# Patient Record
Sex: MALE | Race: BLACK OR AFRICAN AMERICAN | NOT HISPANIC OR LATINO | Employment: OTHER | URBAN - METROPOLITAN AREA
[De-identification: names, ages, dates, MRNs, and addresses within clinical notes are randomized per-mention and may not be internally consistent; named-entity substitution may affect disease eponyms.]

---

## 2017-02-14 DIAGNOSIS — E78.5 HYPERLIPIDEMIA, UNSPECIFIED HYPERLIPIDEMIA TYPE: ICD-10-CM

## 2017-02-14 RX ORDER — TAMSULOSIN HYDROCHLORIDE 0.4 MG/1
CAPSULE ORAL
Qty: 90 CAPSULE | Refills: 1 | Status: SHIPPED | OUTPATIENT
Start: 2017-02-14 | End: 2017-02-20 | Stop reason: SDUPTHER

## 2017-02-14 RX ORDER — FLUTICASONE PROPIONATE 50 MCG
1 SPRAY, SUSPENSION (ML) NASAL DAILY
Qty: 1 BOTTLE | Refills: 2 | Status: SHIPPED | OUTPATIENT
Start: 2017-02-14 | End: 2017-02-20 | Stop reason: SDUPTHER

## 2017-02-14 RX ORDER — SIMVASTATIN 40 MG/1
40 TABLET, FILM COATED ORAL NIGHTLY
Qty: 90 TABLET | Refills: 1 | Status: SHIPPED | OUTPATIENT
Start: 2017-02-14 | End: 2017-02-20 | Stop reason: SDUPTHER

## 2017-02-14 NOTE — TELEPHONE ENCOUNTER
----- Message from Celestina Sage sent at 2/14/2017  1:38 PM CST -----  Contact: self   [phone]  Pt needs refills on Simvastatin and Tamsulosin and allergy meds . Pls call [facility] [phone]. Thanks......Cinthya

## 2017-02-20 DIAGNOSIS — E78.5 HYPERLIPIDEMIA, UNSPECIFIED HYPERLIPIDEMIA TYPE: ICD-10-CM

## 2017-02-20 RX ORDER — FLUTICASONE PROPIONATE 50 MCG
1 SPRAY, SUSPENSION (ML) NASAL DAILY
Qty: 1 BOTTLE | Refills: 2 | Status: SHIPPED | OUTPATIENT
Start: 2017-02-20 | End: 2017-12-13 | Stop reason: SDUPTHER

## 2017-02-20 RX ORDER — SIMVASTATIN 40 MG/1
40 TABLET, FILM COATED ORAL NIGHTLY
Qty: 90 TABLET | Refills: 1 | Status: SHIPPED | OUTPATIENT
Start: 2017-02-20 | End: 2017-09-20 | Stop reason: SDUPTHER

## 2017-02-20 RX ORDER — TAMSULOSIN HYDROCHLORIDE 0.4 MG/1
CAPSULE ORAL
Qty: 90 CAPSULE | Refills: 1 | Status: SHIPPED | OUTPATIENT
Start: 2017-02-20 | End: 2017-09-20 | Stop reason: SDUPTHER

## 2017-02-20 NOTE — TELEPHONE ENCOUNTER
----- Message from Celestina Sage sent at 2/20/2017  8:58 AM CST -----  Contact: self  [phone]  Pt called upset that he called last week requesting refill on meds to be sent to [facility], pt said that it was still sent to the wrong pharmacy. Pls [facility] script to [facility]. Pls call pt [phone]. Thanks......Cinthya

## 2017-03-21 ENCOUNTER — OFFICE VISIT (OUTPATIENT)
Dept: FAMILY MEDICINE | Facility: CLINIC | Age: 77
End: 2017-03-21
Payer: MEDICARE

## 2017-03-21 ENCOUNTER — LAB VISIT (OUTPATIENT)
Dept: LAB | Facility: HOSPITAL | Age: 77
End: 2017-03-21
Attending: FAMILY MEDICINE
Payer: MEDICARE

## 2017-03-21 VITALS
SYSTOLIC BLOOD PRESSURE: 126 MMHG | BODY MASS INDEX: 30.49 KG/M2 | RESPIRATION RATE: 20 BRPM | OXYGEN SATURATION: 97 % | HEIGHT: 65 IN | WEIGHT: 183 LBS | DIASTOLIC BLOOD PRESSURE: 70 MMHG | HEART RATE: 72 BPM | TEMPERATURE: 98 F

## 2017-03-21 DIAGNOSIS — R73.03 PREDIABETES: ICD-10-CM

## 2017-03-21 DIAGNOSIS — E78.5 HYPERLIPIDEMIA, UNSPECIFIED HYPERLIPIDEMIA TYPE: ICD-10-CM

## 2017-03-21 DIAGNOSIS — E78.5 HYPERLIPIDEMIA, UNSPECIFIED HYPERLIPIDEMIA TYPE: Primary | ICD-10-CM

## 2017-03-21 DIAGNOSIS — R73.9 HYPERGLYCEMIA: ICD-10-CM

## 2017-03-21 DIAGNOSIS — J30.1 SEASONAL ALLERGIC RHINITIS DUE TO POLLEN: ICD-10-CM

## 2017-03-21 DIAGNOSIS — Z12.11 COLON CANCER SCREENING: ICD-10-CM

## 2017-03-21 LAB
ALBUMIN SERPL BCP-MCNC: 3.7 G/DL
ALP SERPL-CCNC: 63 U/L
ALT SERPL W/O P-5'-P-CCNC: 16 U/L
ANION GAP SERPL CALC-SCNC: 8 MMOL/L
AST SERPL-CCNC: 21 U/L
BILIRUB SERPL-MCNC: 0.3 MG/DL
BUN SERPL-MCNC: 12 MG/DL
CALCIUM SERPL-MCNC: 9 MG/DL
CHLORIDE SERPL-SCNC: 105 MMOL/L
CHOLEST/HDLC SERPL: 3.6 {RATIO}
CO2 SERPL-SCNC: 27 MMOL/L
CREAT SERPL-MCNC: 1.3 MG/DL
EST. GFR  (AFRICAN AMERICAN): >60 ML/MIN/1.73 M^2
EST. GFR  (NON AFRICAN AMERICAN): 53 ML/MIN/1.73 M^2
GLUCOSE SERPL-MCNC: 102 MG/DL
HDL/CHOLESTEROL RATIO: 27.6 %
HDLC SERPL-MCNC: 145 MG/DL
HDLC SERPL-MCNC: 40 MG/DL
LDLC SERPL CALC-MCNC: 87.8 MG/DL
NONHDLC SERPL-MCNC: 105 MG/DL
POTASSIUM SERPL-SCNC: 4.7 MMOL/L
PROT SERPL-MCNC: 7.6 G/DL
SODIUM SERPL-SCNC: 140 MMOL/L
TRIGL SERPL-MCNC: 86 MG/DL

## 2017-03-21 PROCEDURE — 80053 COMPREHEN METABOLIC PANEL: CPT

## 2017-03-21 PROCEDURE — 99999 PR PBB SHADOW E&M-EST. PATIENT-LVL III: CPT | Mod: PBBFAC,,, | Performed by: FAMILY MEDICINE

## 2017-03-21 PROCEDURE — 83036 HEMOGLOBIN GLYCOSYLATED A1C: CPT

## 2017-03-21 PROCEDURE — 99213 OFFICE O/P EST LOW 20 MIN: CPT | Mod: PBBFAC,PN | Performed by: FAMILY MEDICINE

## 2017-03-21 PROCEDURE — 80061 LIPID PANEL: CPT

## 2017-03-21 PROCEDURE — 36415 COLL VENOUS BLD VENIPUNCTURE: CPT

## 2017-03-21 PROCEDURE — 99214 OFFICE O/P EST MOD 30 MIN: CPT | Mod: S$PBB,,, | Performed by: FAMILY MEDICINE

## 2017-03-21 RX ORDER — LEVOCETIRIZINE DIHYDROCHLORIDE 5 MG/1
5 TABLET, FILM COATED ORAL NIGHTLY
Qty: 90 TABLET | Refills: 3 | Status: SHIPPED | OUTPATIENT
Start: 2017-03-21 | End: 2018-03-23 | Stop reason: SDUPTHER

## 2017-03-21 NOTE — MR AVS SNAPSHOT
[facility]  [address]  [place] [address]  Phone: [phone]                  Fan Paz   3/21/2017 8:00 AM   Office Visit    Description:  Male : 1940   Provider:  Lachelle Lee MD   Department:  [facility]           Reason for Visit     Follow-up     Hyperlipidemia           Diagnoses this Visit        Comments    Colon cancer screening    -  Primary     Seasonal allergic rhinitis due to pollen         Prediabetes         Hyperlipidemia, unspecified hyperlipidemia type         Hyperglycemia                To Do List           Future Appointments        Provider Department Dept Phone    3/21/2017 8:45 AM LAB, [facility] [place] [facility] [phone]    2017 8:00 AM LAB, [place] [facility] [phone]    2017 1:30 PM Raymond Reddy Jr., MD [facility] - Radiation Oncology [phone]      Goals (5 Years of Data)     None       These Medications        Disp Refills Start End    levocetirizine (XYZAL) 5 MG tablet 90 tablet 3 3/21/2017 3/21/2018    Take 1 tablet (5 mg total) by mouth every evening. - Oral    Pharmacy: [facility] [address] [place] [address] Ph #: [phone]         [facility]     [facility] Nurse Care Line -  Assistance  Registered nurses in the [facility] provide clinical advisement, health education, appointment booking, and other advisory services.  Call for this free service at [phone].             Medications           Message regarding Medications     Verify the changes and/or additions to your medication regime listed below are the same as discussed with your clinician today.  If any of these changes or additions are incorrect, please notify your healthcare provider.        START taking these NEW medications        Refills    levocetirizine (XYZAL) 5 MG tablet 3    Sig: Take 1 tablet (5 mg total)  "by mouth every evening.    Class: Normal    Route: Oral      STOP taking these medications     FLUZONE HIGH-DOSE 2016-17, PF, 180 mcg/0.5 mL Syrg adminstered by pharmacist           Verify that the below list of medications is an accurate representation of the medications you are currently taking.  If none reported, the list may be blank. If incorrect, please contact your healthcare provider. Carry this list with you in case of emergency.           Current Medications     bismuth subsalicylate (PEPTO BISMOL) 262 mg/15 mL suspension Take 15 mLs by mouth every 6 (six) hours as needed for Indigestion.    calcium carbonate (TUMS) 200 mg calcium (500 mg) chewable tablet Take 1 tablet by mouth as needed.     fluticasone (FLONASE) 50 mcg/actuation nasal spray 1 spray by Each Nare route once daily.    levocetirizine (XYZAL) 5 MG tablet Take 1 tablet (5 mg total) by mouth every evening.    sildenafil (VIAGRA) 100 MG tablet Take 1 tablet (100 mg total) by mouth daily as needed for Erectile Dysfunction.    simvastatin (ZOCOR) 40 MG tablet Take 1 tablet (40 mg total) by mouth every evening.    tamsulosin (FLOMAX) 0.4 mg Cp24 TAKE 1 CAPSULE(S) BY MOUTH ONCE A DAY           Clinical Reference Information           Your Vitals Were     BP Pulse Temp Resp Height Weight    126/70 (BP Location: Right arm, Patient Position: Sitting, BP Method: Manual) 72 97.8 °F (36.6 °C) (Oral) 20 5' 5" (1.651 m) 83 kg (182 lb 15.7 oz)    SpO2 BMI             97% 30.45 kg/m2         Blood Pressure          Most Recent Value    BP  126/70      Allergies as of 3/21/2017     No Known Allergies      Immunizations Administered on Date of Encounter - 3/21/2017     None      Orders Placed During Today's Visit      Normal Orders This Visit    Case request GI: COLONOSCOPY     Future Labs/Procedures Expected by Expires    Comprehensive metabolic panel  3/21/2017 3/21/2018    Hemoglobin A1c  3/21/2017 3/21/2018    Lipid panel  3/21/2017 3/21/2018      [facility] " Sign-Up     Activating your [facility] account is as easy as 1-2-3!     1) Visit [email or web address], select Sign Up Now, enter this activation code and your date of birth, then select Next.  [email or web address]  Expires: 5/5/2017  8:33 AM      2) Create a username and password to use when you visit [facility] in the future and select a security question in case you lose your password and select Next.    3) Enter your e-mail address and click Sign Up!    Additional Information  If you have questions, please e-mail [email or web address] or call [phone] to talk to our [facility] staff. Remember, [facility] is NOT to be used for urgent needs. For medical emergencies, dial 911.         Language Assistance Services     ATTENTION: Language assistance services are available, free of charge. Please call [phone].      ATENCIÓN: Si habla emyañol, tiene a sy disposición servicios gratuitos de asistencia lingüística. Llame al [phone].     CHÚ Ý: N?u b?n nói Ti?ng Vi?t, có các d?ch v? h? tr? ngôn ng? mi?n phí dành cho b?n. G?i s? [phone].         [facility] complies with applicable Federal civil rights laws and does not discriminate on the basis of race, color, national origin, age, disability, or sex.

## 2017-03-21 NOTE — PROGRESS NOTES
"Routine Office Visit    Patient Name: Fan Paz    : 1940  MRN: [id number]    Subjective:  Fan is a 77 y.o. male who presents today for:    1. Hyperglycemia - has been in prediabetic range 5 months ago.  Would like to repeat labs.  Doing well overall, weight stable.  Not on any diabetic medications.      2.  HLD - taking Zocor, denies side effects.  Doing well overall.  Last lipid panel >5 months ago.    3.  Colon ca screen - had multiple polyps on last colonoscopy in , due for another per result review.     4.  Seasonal allergies - "this is the season where it gets bad" he's gotten Xyzal in the past and so he would like a refill. He denies any symptoms today.  His family is in the fishing business so he's outside on his boat a lot and keeps active.    Past Medical History  Past Medical History:   Diagnosis Date    Arthritis     Cancer of palate     HTN (hypertension)     Hyperlipidemia     Prostate cancer            Past Surgical History  Past Surgical History:   Procedure Laterality Date    BACK SURGERY  y-6    [facility] surgery      [facility] - [facility]     CARPAL TUNNEL RELEASE  14    right hand,Left hand     KNEE SURGERY  y-40    left knee    KNEE SURGERY Right 12-1-15    TKR    Radio active seed Implant      2012    TOTAL HIP ARTHROPLASTY  3/2011       Family History  Family History   Problem Relation Age of Onset    Coronary artery disease Father          Cancer Sister      Liver Cancer -    Diabetes Sister          No Known Problems Brother     No Known Problems Sister     No Known Problems Sister     No Known Problems Brother     No Known Problems Mother     Lung cancer Brother 60     - was a tobacco user, had lung cancer    Cancer Brother      Liver Cancer-     Lung cancer Sister      Alive    Breast cancer Sister     Clotting disorder Sister 75     blood clot on brain-alive    Stroke Brother          " Glaucoma Cousin     No Known Problems Maternal Aunt     No Known Problems Maternal Uncle     No Known Problems Paternal Aunt     No Known Problems Paternal Uncle     No Known Problems Maternal Grandmother     No Known Problems Maternal Grandfather     No Known Problems Paternal Grandmother     No Known Problems Paternal Grandfather     Macular degeneration Neg Hx     Strabismus Neg Hx     Amblyopia Neg Hx     Blindness Neg Hx     Cataracts Neg Hx     Hypertension Neg Hx     Retinal detachment Neg Hx     Thyroid disease Neg Hx        Social History  Social History     Social History    Marital status:      Spouse name: N/A    Number of children: N/A    Years of education: N/A     Occupational History     Retired     Social History Main Topics    Smoking status: Former Smoker     Packs/day: 3.00     Years: 20.00     Quit date: 7/10/1997    Smokeless tobacco: Never Used      Comment: Quit 1997-smoked for 40 years ,2 packs a day on an average    Alcohol use No      Comment: used to drink Occasionally    Drug use: No    Sexual activity: Yes     Partners: Female     Other Topics Concern    Not on file     Social History Narrative       Current Medications  Current Outpatient Prescriptions on File Prior to Visit   Medication Sig Dispense Refill    bismuth subsalicylate (PEPTO BISMOL) 262 mg/15 mL suspension Take 15 mLs by mouth every 6 (six) hours as needed for Indigestion.      calcium carbonate (TUMS) 200 mg calcium (500 mg) chewable tablet Take 1 tablet by mouth as needed.       fluticasone (FLONASE) 50 mcg/actuation nasal spray 1 spray by Each Nare route once daily. 1 Bottle 2    simvastatin (ZOCOR) 40 MG tablet Take 1 tablet (40 mg total) by mouth every evening. 90 tablet 1    tamsulosin (FLOMAX) 0.4 mg Cp24 TAKE 1 CAPSULE(S) BY MOUTH ONCE A DAY 90 capsule 1    sildenafil (VIAGRA) 100 MG tablet Take 1 tablet (100 mg total) by mouth daily as needed for Erectile Dysfunction. 10  "tablet 3     No current facility-administered medications on file prior to visit.        Allergies   Review of patient's allergies indicates:  No Known Allergies    Review of Systems (Pertinent positives)  Constititutional: Weight loss, excess fatigue, chills, fever, night sweats, weakness, loss of appetite  Ears: Earache, ringing in ears, discharge, hearing loss, hearing aid, popping, infection Nose: stuffy nose, mouth breathing, post-nasal drip,   Throat: hoarseness, bad breath, swelling, sore throat  Lungs: Cough, sputum, wheeze, frequent URI, SOA, Asthma  Heart: Chest pain, angina, palpitations, extra heart beats  Stomach/Intestine: Heartburn, Nausea, vomiting, diarrhea, indigestion, bloating, constipation  Bones/Muscles/Joints: Joint pain, deformities, back pain, swelling, stiffness    /70 (BP Location: Right arm, Patient Position: Sitting, BP Method: Manual)  Pulse 72  Temp 97.8 °F (36.6 °C) (Oral)   Resp 20  Ht 5' 5" (1.651 m)  Wt 83 kg (182 lb 15.7 oz)  SpO2 97%  BMI 30.45 kg/m2    GENERAL APPEARANCE: in no apparent distress and well developed and well nourished  RESPIRATORY: appears well, vitals normal, no respiratory distress, acyanotic, normal RR, chest clear, no wheezing, crepitations, rhonchi, normal symmetric air entry  HEART: regular rate and rhythm, S1, S2 normal, no murmur, click, rub or gallop.    NEUROLOGIC: normal without focal findings, CN II-XII are intact.   Extremities: warm/well perfused.  No abnormal hair patterns.  No clubbing, cyanosis or edema.    SKIN: no rashes, no wounds, no other lesions  PSYCH: Alert, oriented x 3, thought content appropriate, speech normal, pleasant and cooperative, good eye contact, well groomed, recall good, concentration/judgement good and apparently average intelligence.    Assessment/Plan:  Fan Paz is a 77 y.o. male who presents today for :    Fan was seen today for follow-up and hyperlipidemia.    Diagnoses and all orders for " this visit:    Hyperlipidemia, unspecified hyperlipidemia type  -     Lipid panel; Future  -     Controlled. Continue zocor.      Seasonal allergic rhinitis due to pollen  -     levocetirizine (XYZAL) 5 MG tablet; Take 1 tablet (5 mg total) by mouth every evening.    Colon cancer screening  -     Case request GI: COLONOSCOPY    Hyperglycemia  -     Hemoglobin A1c; Future    Congratulated patient and encouraged patient to keep active and be on his boat and keep moving. Patient doing very well in this regard.    F/u 6 months - hyperglycemia, HLD

## 2017-03-22 LAB
ESTIMATED AVG GLUCOSE: 137 MG/DL
HBA1C MFR BLD HPLC: 6.4 %

## 2017-03-29 ENCOUNTER — TELEPHONE (OUTPATIENT)
Dept: FAMILY MEDICINE | Facility: CLINIC | Age: 77
End: 2017-03-29

## 2017-03-29 NOTE — TELEPHONE ENCOUNTER
Please call patient and notify that:    Prediabetes has not changed - A1c is still 6.4%  Cholesterol panel is stable and within normal limits. Continue simvastatin  Kidney function showing mild dysfunction.  Would like to repeat every 3 months for changes.  Avoid taking NSAIDs like Aleve, Ibuprofen. May take tylenol though. And drink at least 8 glasses of water a day.    Sincerely  Dr Lee

## 2017-04-09 ENCOUNTER — ANESTHESIA EVENT (OUTPATIENT)
Dept: ENDOSCOPY | Facility: HOSPITAL | Age: 77
End: 2017-04-09
Payer: MEDICARE

## 2017-04-10 ENCOUNTER — ANESTHESIA (OUTPATIENT)
Dept: ENDOSCOPY | Facility: HOSPITAL | Age: 77
End: 2017-04-10
Payer: MEDICARE

## 2017-04-10 ENCOUNTER — SURGERY (OUTPATIENT)
Age: 77
End: 2017-04-10

## 2017-04-10 ENCOUNTER — HOSPITAL ENCOUNTER (OUTPATIENT)
Facility: HOSPITAL | Age: 77
Discharge: HOME | End: 2017-04-10
Attending: INTERNAL MEDICINE | Admitting: INTERNAL MEDICINE
Payer: MEDICARE

## 2017-04-10 VITALS
HEIGHT: 66 IN | BODY MASS INDEX: 28.93 KG/M2 | SYSTOLIC BLOOD PRESSURE: 121 MMHG | WEIGHT: 180 LBS | OXYGEN SATURATION: 99 % | HEART RATE: 69 BPM | TEMPERATURE: 98 F | RESPIRATION RATE: 18 BRPM | DIASTOLIC BLOOD PRESSURE: 71 MMHG

## 2017-04-10 DIAGNOSIS — Z12.11 SCREEN FOR COLON CANCER: ICD-10-CM

## 2017-04-10 PROCEDURE — 25000003 PHARM REV CODE 250: Performed by: ANESTHESIOLOGY

## 2017-04-10 PROCEDURE — D9220A PRA ANESTHESIA: Mod: PT,CRNA,, | Performed by: REGISTERED NURSE

## 2017-04-10 PROCEDURE — 88305 TISSUE EXAM BY PATHOLOGIST: CPT | Performed by: PATHOLOGY

## 2017-04-10 PROCEDURE — D9220A PRA ANESTHESIA: Mod: PT,ANES,, | Performed by: ANESTHESIOLOGY

## 2017-04-10 PROCEDURE — 27201012 HC FORCEPS, HOT/COLD, DISP: Performed by: INTERNAL MEDICINE

## 2017-04-10 PROCEDURE — 37000009 HC ANESTHESIA EA ADD 15 MINS: Performed by: INTERNAL MEDICINE

## 2017-04-10 PROCEDURE — 25000003 PHARM REV CODE 250

## 2017-04-10 PROCEDURE — 37000008 HC ANESTHESIA 1ST 15 MINUTES: Performed by: INTERNAL MEDICINE

## 2017-04-10 PROCEDURE — 63600175 PHARM REV CODE 636 W HCPCS

## 2017-04-10 PROCEDURE — 45380 COLONOSCOPY AND BIOPSY: CPT | Performed by: INTERNAL MEDICINE

## 2017-04-10 PROCEDURE — 88305 TISSUE EXAM BY PATHOLOGIST: CPT | Mod: 26,,, | Performed by: PATHOLOGY

## 2017-04-10 RX ORDER — LIDOCAINE HYDROCHLORIDE 20 MG/ML
INJECTION, SOLUTION INFILTRATION; PERINEURAL
Status: COMPLETED
Start: 2017-04-10 | End: 2017-04-10

## 2017-04-10 RX ORDER — PROPOFOL 10 MG/ML
VIAL (ML) INTRAVENOUS
Status: DISCONTINUED
Start: 2017-04-10 | End: 2017-04-10 | Stop reason: HOSPADM

## 2017-04-10 RX ORDER — LIDOCAINE HYDROCHLORIDE 10 MG/ML
1 INJECTION, SOLUTION EPIDURAL; INFILTRATION; INTRACAUDAL; PERINEURAL ONCE AS NEEDED
Status: DISCONTINUED | OUTPATIENT
Start: 2017-04-10 | End: 2017-04-10 | Stop reason: HOSPADM

## 2017-04-10 RX ORDER — SODIUM CHLORIDE 9 MG/ML
INJECTION, SOLUTION INTRAVENOUS CONTINUOUS
Status: DISCONTINUED | OUTPATIENT
Start: 2017-04-10 | End: 2017-04-10 | Stop reason: HOSPADM

## 2017-04-10 RX ORDER — PROPOFOL 10 MG/ML
VIAL (ML) INTRAVENOUS
Status: COMPLETED
Start: 2017-04-10 | End: 2017-04-10

## 2017-04-10 RX ADMIN — PROPOFOL 20 MG: 10 INJECTION, EMULSION INTRAVENOUS at 10:04

## 2017-04-10 RX ADMIN — PROPOFOL 50 MG: 10 INJECTION, EMULSION INTRAVENOUS at 09:04

## 2017-04-10 RX ADMIN — LIDOCAINE HYDROCHLORIDE 100 MG: 20 INJECTION, SOLUTION INFILTRATION; PERINEURAL at 09:04

## 2017-04-10 RX ADMIN — SODIUM CHLORIDE: 0.9 INJECTION, SOLUTION INTRAVENOUS at 08:04

## 2017-04-10 RX ADMIN — PROPOFOL 30 MG: 10 INJECTION, EMULSION INTRAVENOUS at 10:04

## 2017-04-10 RX ADMIN — PROPOFOL 20 MG: 10 INJECTION, EMULSION INTRAVENOUS at 09:04

## 2017-04-10 NOTE — TRANSFER OF CARE
"Anesthesia Transfer of Care Note    Patient: Fan Paz    Procedure(s) Performed: Procedure(s) (LRB):  COLONOSCOPY (N/A)    Patient location: GI    Anesthesia Type: MAC    Transport from OR: Transported from OR on room air with adequate spontaneous ventilation    Post pain: adequate analgesia    Post assessment: no apparent anesthetic complications and tolerated procedure well    Post vital signs: stable    Level of consciousness: sedated    Complications: none          Last vitals:   Visit Vitals    /67 (BP Location: Left arm, Patient Position: Lying, BP Method: Automatic)    Pulse 68    Temp 36.4 °C (97.5 °F) (Oral)    Resp 16    Ht 5' 6" (1.676 m)    Wt 81.6 kg (180 lb)    SpO2 96%    BMI 29.05 kg/m2     "

## 2017-04-10 NOTE — DISCHARGE SUMMARY
[facility]  Discharge Summary      Admit Date: 4/10/2017    Discharge Date and Time:  04/10/2017 10:40 AM    Attending Physician: Nilo Kelly MD     Reason for Admission: Surveillance colonoscopy    Procedures Performed: Procedure(s) (LRB):  COLONOSCOPY (N/A)    Hospital Course (synopsis of major diagnoses, care, treatment, and services provided during the course of the hospital stay): Outpatient colonoscopy     Consults: none    Significant Diagnostic Studies: Colonoscopy    Final Diagnoses:    Principal Problem: <principal problem not specified>   Secondary Diagnoses: Surveillance colonoscopy    Discharged Condition: good    Disposition: Home or Self Care    Follow Up/Patient Instructions: Follow-up with referring physician             Resume previous diet and activity.    Medications:  Reconciled Home Medications:   Current Discharge Medication List      CONTINUE these medications which have NOT CHANGED    Details   bismuth subsalicylate (PEPTO BISMOL) 262 mg/15 mL suspension Take 15 mLs by mouth every 6 (six) hours as needed for Indigestion.      calcium carbonate (TUMS) 200 mg calcium (500 mg) chewable tablet Take 1 tablet by mouth as needed.       fluticasone (FLONASE) 50 mcg/actuation nasal spray 1 spray by Each Nare route once daily.  Qty: 1 Bottle, Refills: 2      levocetirizine (XYZAL) 5 MG tablet Take 1 tablet (5 mg total) by mouth every evening.  Qty: 90 tablet, Refills: 3    Associated Diagnoses: Seasonal allergic rhinitis due to pollen      polyethylene glycol (COLYTE) 240-22.72-6.72 -5.84 gram SolR Take 4,000 mLs (4 L total) by mouth as needed.  Qty: 1 Bottle, Refills: 0      sildenafil (VIAGRA) 100 MG tablet Take 1 tablet (100 mg total) by mouth daily as needed for Erectile Dysfunction.  Qty: 10 tablet, Refills: 3    Associated Diagnoses: Erectile dysfunction, unspecified erectile dysfunction type      simvastatin (ZOCOR) 40 MG tablet Take 1 tablet (40 mg total) by mouth every  evening.  Qty: 90 tablet, Refills: 1    Associated Diagnoses: Hyperlipidemia, unspecified hyperlipidemia type      tamsulosin (FLOMAX) 0.4 mg Cp24 TAKE 1 CAPSULE(S) BY MOUTH ONCE A DAY  Qty: 90 capsule, Refills: 1           No discharge procedures on file.

## 2017-04-10 NOTE — ANESTHESIA PREPROCEDURE EVALUATION
04/10/2017  Fan Paz is a 77 y.o., male.    [facility] Anesthesia Evaluation    I have reviewed the Patient Summary Reports.     I have reviewed the Medications.     Review of Systems  Anesthesia Hx:  No problems with previous Anesthesia Denies Hx of Anesthetic complications  History of prior surgery of interest to airway management or planning: Denies Family Hx of Anesthesia complications.   Denies Personal Hx of Anesthesia complications.   Social:  Non-Smoker, No Alcohol Use    Hematology/Oncology:  Hematology Normal   Oncology Normal     EENT/Dental:EENT/Dental Normal   Cardiovascular:   Exercise tolerance: good Hypertension, well controlled    Pulmonary:  Pulmonary Normal    Renal/:  Renal/ Normal     Hepatic/GI:   GERD, well controlled    Musculoskeletal:  Musculoskeletal Normal    Neurological:  Neurology Normal    Endocrine:  Endocrine Normal    Dermatological:  Skin Normal    Psych:  Psychiatric Normal           Physical Exam  General:  Well nourished, Obesity    Airway/Jaw/Neck:  Airway Findings: Mouth Opening: Normal Tongue: Normal  General Airway Assessment: Adult  Mallampati: II  Improves to II with phonation.  TM Distance: 4 - 6 cm      Dental:  Dental Findings: In tact   Chest/Lungs:  Chest/Lungs Findings: Clear to auscultation, Normal Respiratory Rate     Heart/Vascular:  Heart Findings: Rate: Normal  Rhythm: Regular Rhythm        Mental Status:  Mental Status Findings:  Cooperative         Anesthesia Plan  Type of Anesthesia, risks & benefits discussed:  Anesthesia Type:  general  Patient's Preference:   Intra-op Monitoring Plan:   Intra-op Monitoring Plan Comments:   Post Op Pain Control Plan:   Post Op Pain Control Plan Comments:   Induction:   IV  Beta Blocker:  Patient is not currently on a Beta-Blocker (No further documentation required).       Informed Consent: Patient  understands risks and agrees with Anesthesia plan.  Questions answered. Anesthesia consent signed with patient.  ASA Score: 3     Day of Surgery Review of History & Physical:    H&P update referred to the surgeon.         Ready For Surgery From Anesthesia Perspective.

## 2017-04-10 NOTE — IP AVS SNAPSHOT
[place] [address] [place] [address]  Phone: [phone]           Patient Discharge Instructions   Our goal is to set you up for success. This packet includes information on your condition, medications, and your home care.  It will help you care for yourself to prevent having to return to the hospital.     Please ask your nurse if you have any questions.      There are many details to remember when preparing to leave the hospital. Here is what you will need to do:    1. Take your medicine. If you are prescribed medications, review your Medication List on the following pages. You may have new medications to  at the pharmacy and others that you'll need to stop taking. Review the instructions for how and when to take your medications. Talk with your doctor or nurses if you are unsure of what to do.     2. Go to your follow-up appointments. Specific follow-up information is listed in the following pages. Your may be contacted by a nurse or clinical provider about future appointments. Be sure we have all of the phone numbers to reach you. Please contact your provider's office if you are unable to make an appointment.     3. Watch for warning signs. Your doctor or nurse will give you detailed warning signs to watch for and when to call for assistance. These instructions may also include educational information about your condition. If you experience any of warning signs to your health, call your doctor.               ** Verify the list of medication(s) below is accurate and up to date. Carry this with you in case of emergency. If your medications have changed, please notify your healthcare provider.             Medication List      CONTINUE taking these medications        Additional Info                      bismuth subsalicylate 262 mg/15 mL suspension   Commonly known as:  PEPTO BISMOL   Refills:  0   Dose:  15 mL    Instructions:  Take 15 mLs by mouth every 6 (six) hours as  needed for Indigestion.     Begin Date    AM    Noon    PM    Bedtime       calcium carbonate 200 mg calcium (500 mg) chewable tablet   Commonly known as:  TUMS   Refills:  0   Dose:  1 tablet   Indications:  Heartburn    Instructions:  Take 1 tablet by mouth as needed.     Begin Date    AM    Noon    PM    Bedtime       fluticasone 50 mcg/actuation nasal spray   Commonly known as:  FLONASE   Quantity:  1 Bottle   Refills:  2   Dose:  1 spray    Instructions:  1 spray by Each Nare route once daily.     Begin Date    AM    Noon    PM    Bedtime       levocetirizine 5 MG tablet   Commonly known as:  XYZAL   Quantity:  90 tablet   Refills:  3   Dose:  5 mg    Instructions:  Take 1 tablet (5 mg total) by mouth every evening.     Begin Date    AM    Noon    PM    Bedtime       polyethylene glycol 240-22.72-6.72 -5.84 gram Solr   Commonly known as:  COLYTE   Quantity:  1 Bottle   Refills:  0   Dose:  4 L    Instructions:  Take 4,000 mLs (4 L total) by mouth as needed.     Begin Date    AM    Noon    PM    Bedtime       sildenafil 100 MG tablet   Commonly known as:  VIAGRA   Quantity:  10 tablet   Refills:  3   Dose:  100 mg    Instructions:  Take 1 tablet (100 mg total) by mouth daily as needed for Erectile Dysfunction.     Begin Date    AM    Noon    PM    Bedtime       simvastatin 40 MG tablet   Commonly known as:  ZOCOR   Quantity:  90 tablet   Refills:  1   Dose:  40 mg    Instructions:  Take 1 tablet (40 mg total) by mouth every evening.     Begin Date    AM    Noon    PM    Bedtime       tamsulosin 0.4 mg Cp24   Commonly known as:  FLOMAX   Quantity:  90 capsule   Refills:  1    Instructions:  TAKE 1 CAPSULE(S) BY MOUTH ONCE A DAY     Begin Date    AM    Noon    PM    Bedtime                  Please bring to all follow up appointments:    1. A copy of your discharge instructions.  2. All medicines you are currently taking in their original bottles.  3. Identification and insurance card.    Please arrive 15 minutes  "ahead of scheduled appointment time.    Please call 24 hours in advance if you must reschedule your appointment and/or time.        Your Scheduled Appointments     Apr 27, 2017  8:00 AM CDT   Non-Fasting Lab with MORELIA, DAGMAR   [facility] ([facility]    [address] [place] [address]   [phone]            May 04, 2017  1:30 PM CDT   Established Patient Visit with MD William Toledo Jr. [facility] ([facility] )    [address]  [place] [address] [phone]              Follow-up Information     Follow up with Julio Hudson MD.    Specialty:  Internal Medicine    Why:  As needed    Contact information:    [address] [place] [address]  [phone]          Discharge Instructions     Future Orders    Diet general     Questions:    Total calories:      Fat restriction, if any:      Protein restriction, if any:      Na restriction, if any:      Fluid restriction:      Additional restrictions:          Admission Information     Date & Time Provider Department CSN    4/10/2017  8:12 AM Nilo Kelly MD [facility] [id number]      Care Providers     Provider Role Specialty Primary office phone    Nilo Kelly MD Attending Provider Gastroenterology [phone]    Nilo Kelly MD Surgeon  Gastroenterology [phone]      Your Vitals Were     BP Pulse Temp Resp Height Weight    156/90 (BP Location: Left arm, BP Method: Automatic) 83 97.7 °F (36.5 °C) (Oral) 18 5' 6" (1.676 m) 81.6 kg (180 lb)    SpO2 BMI             98% 29.05 kg/m2         Recent Lab Values        11/25/2015 10/11/2016 3/21/2017                     2:55 PM  8:39 AM  8:40 AM         A1C 6.3 (H) 6.4 (H) 6.4 (H)         Comment for A1C at  8:39 AM on 10/11/2016:  According to ADA guidelines, hemoglobin A1C <7.0% represents  optimal control in non-pregnant diabetic patients.  Different  metrics may apply to specific populations. "   Standards of Medical Care in Diabetes - 2016.  For the purpose of screening for the presence of diabetes:  <5.7%     Consistent with the absence of diabetes  5.7-6.4%  Consistent with increasing risk for diabetes   (prediabetes)  >or=6.5%  Consistent with diabetes  Currently no consensus exists for use of hemoglobin A1C  for diagnosis of diabetes for children.      Comment for A1C at  8:40 AM on 3/21/2017:  According to ADA guidelines, hemoglobin A1C <7.0% represents  optimal control in non-pregnant diabetic patients.  Different  metrics may apply to specific populations.   Standards of Medical Care in Diabetes - 2016.  For the purpose of screening for the presence of diabetes:  <5.7%     Consistent with the absence of diabetes  5.7-6.4%  Consistent with increasing risk for diabetes   (prediabetes)  >or=6.5%  Consistent with diabetes  Currently no consensus exists for use of hemoglobin A1C  for diagnosis of diabetes for children.        Pending Labs     Order Current Status    Specimen to Pathology - Surgery Collected (04/10/17 1038)      Allergies as of 4/10/2017     No Known Allergies      [facility]     [facility] Nurse Care Line - 24/7 Assistance  Unless otherwise directed by your provider, please contact [facility], our nurse care line that is available for 24/7 assistance.     Registered nurses in the [facility] provide clinical advisement, health education, appointment booking, and other advisory services.  Call for this free service at [phone].        Advance Directives     An advance directive is a document which, in the event you are no longer able to make decisions for yourself, tells your healthcare team what kind of treatment you do or do not want to receive, or who you would like to make those decisions for you.  If you do not currently have an advance directive, [facility] encourages you to create one.  For more information call:  [phone]),  [phone] ([phone]),  or log on to [email or web address].        Smoking Cessation     If you would like to quit smoking:   You may be eligible for free services if you are a [place] resident and started smoking cigarettes before September 1, 1988.  Call the Smoking Cessation Trust (SCT) toll free at (284) 309-8950 or (428) 212-9680.   Call 3-800-QUIT-NOW if you do not meet the above criteria.   Contact us via email: [email or web address]   View our website for more information: [email or web address]        Language Assistance Services     ATTENTION: Language assistance services are available, free of charge. Please call [phone].      ATENCIÓN: Si habla español, tiene a sy disposición servicios gratuitos de asistencia lingüística. Llame al [phone].     CHÚ Ý: N?u b?n nói Ti?ng Vi?t, có các d?ch v? h? tr? ngôn ng? mi?n phí dành cho b?n. G?i s? [phone].        [facility] Sign-Up     Activating your [facility] account is as easy as 1-2-3!     1) Visit [email or web address], select Sign Up Now, enter this activation code and your date of birth, then select Next.  [email or web address]  Expires: 5/5/2017  8:33 AM      2) Create a username and password to use when you visit [facility] in the future and select a security question in case you lose your password and select Next.    3) Enter your e-mail address and click Sign Up!    Additional Information  If you have questions, please e-mail [email or web address] or call [phone] to talk to our [facility] staff. Remember, [facility] is NOT to be used for urgent needs. For medical emergencies, dial 911.          [facility] complies with applicable Federal civil rights laws and does not discriminate on the basis of race, color, national origin, age, disability, or sex.

## 2017-04-12 NOTE — ANESTHESIA POSTPROCEDURE EVALUATION
"Anesthesia Post Evaluation    Patient: Fan Paz    Procedure(s) Performed: Procedure(s) (LRB):  COLONOSCOPY (N/A)    Final Anesthesia Type: general  Patient location during evaluation: GI PACU  Patient participation: Yes- Able to Participate  Level of consciousness: awake and alert  Post-procedure vital signs: reviewed and stable  Pain management: adequate  Airway patency: patent  PONV status at discharge: No PONV  Anesthetic complications: no      Cardiovascular status: blood pressure returned to baseline  Respiratory status: unassisted  Hydration status: euvolemic  Follow-up not needed.        Visit Vitals    /71 (BP Location: Left arm, Patient Position: Lying, BP Method: Automatic)    Pulse 69    Temp 36.4 °C (97.5 °F) (Oral)    Resp 18    Ht 5' 6" (1.676 m)    Wt 81.6 kg (180 lb)    SpO2 99%    BMI 29.05 kg/m2       Pain/Rosales Score: Pain Assessment Performed: Yes (4/10/2017 10:55 AM)  Presence of Pain: denies (4/10/2017 10:55 AM)  Pain Rating Prior to Med Admin: 0 (4/10/2017  8:51 AM)  Rosales Score: 10 (4/10/2017 10:55 AM)      "

## 2017-04-27 ENCOUNTER — LAB VISIT (OUTPATIENT)
Dept: LAB | Facility: HOSPITAL | Age: 77
End: 2017-04-27
Attending: RADIOLOGY
Payer: MEDICARE

## 2017-04-27 DIAGNOSIS — C61 PROSTATE CANCER: ICD-10-CM

## 2017-04-27 LAB — COMPLEXED PSA SERPL-MCNC: 0.02 NG/ML

## 2017-04-27 PROCEDURE — 36415 COLL VENOUS BLD VENIPUNCTURE: CPT | Mod: PO

## 2017-04-27 PROCEDURE — 84153 ASSAY OF PSA TOTAL: CPT

## 2017-05-04 ENCOUNTER — OFFICE VISIT (OUTPATIENT)
Dept: RADIATION ONCOLOGY | Facility: CLINIC | Age: 77
End: 2017-05-04
Payer: MEDICARE

## 2017-05-04 VITALS
SYSTOLIC BLOOD PRESSURE: 136 MMHG | HEART RATE: 86 BPM | BODY MASS INDEX: 31 KG/M2 | RESPIRATION RATE: 16 BRPM | WEIGHT: 186.06 LBS | DIASTOLIC BLOOD PRESSURE: 92 MMHG | HEIGHT: 65 IN

## 2017-05-04 DIAGNOSIS — C61 PROSTATE CANCER: Primary | ICD-10-CM

## 2017-05-04 PROCEDURE — 99213 OFFICE O/P EST LOW 20 MIN: CPT | Mod: S$PBB,,, | Performed by: RADIOLOGY

## 2017-05-04 PROCEDURE — 99213 OFFICE O/P EST LOW 20 MIN: CPT | Mod: PBBFAC | Performed by: RADIOLOGY

## 2017-05-04 PROCEDURE — 99999 PR PBB SHADOW E&M-EST. PATIENT-LVL III: CPT | Mod: PBBFAC,,, | Performed by: RADIOLOGY

## 2017-05-04 NOTE — PROGRESS NOTES
Subjective:       Patient ID: Fan Paz is a 77 y.o. male.    Chief Complaint: Prostate Cancer (6mo f/u;psa)    HPI Comments: This patient returns for follow up visit.     Mr. Paz has a history of Stage II (T1c, N0, M0) adenocarcinoma of the prostate. He presented with an elevated PSA of 5 ng/ml.   Biopsies from the left apex, left mid gland, left base, and right apex revealed adenocarcinoma. The Ryan score was 7 (3+4) and biopsies from the left mid gland and left base. The tumor involved 30% and 60% of the specimens, respectively. [place]'s 6 adenocarcinoma was noted in biopsies from the left apex and right apex involving only 5% and 1% of the specimens, respectively. Further metastatic work up was negative. The patient was referred for definitive radiotherapy. He was enrolled on RTOG 0815. He was randomized to receive 6 months of hormonal ablation therapy along with radiotherapy. He completed 45 Gy external beam therapy followed by implantation with Palladium 103 seeds on 1/18/12.       The patient has remained BRIGHT since that time.  Today, the patient states he feels well.  No significant GI or  complaints. Recent colonoscopy which was negative.      Review of Systems   Constitutional: Negative for activity change, appetite change, diaphoresis and fatigue.   Respiratory: Negative for cough and shortness of breath.    Cardiovascular: Negative for chest pain and palpitations.   Gastrointestinal: Positive for blood in stool (occasional BRBPR with bowel movement. ). Negative for abdominal pain, anal bleeding, constipation and diarrhea.   Genitourinary: Negative for difficulty urinating, dysuria, frequency and hematuria.       Objective:      Physical Exam   Genitourinary:   Genitourinary Comments: rectal normal tone, external hemorrhoids.   Small depressed prostate   no palpable masses or induration. no blood on the examining glove.           PSA - 0.02 ng/ml  Assessment:       1. Prostate cancer         Plan:       Doing well.  Remains BRIGHT now 5 years status post therapy.  Plan follow up in 6 - 12 months with psa

## 2017-06-21 ENCOUNTER — OFFICE VISIT (OUTPATIENT)
Dept: FAMILY MEDICINE | Facility: CLINIC | Age: 77
End: 2017-06-21
Payer: MEDICARE

## 2017-06-21 VITALS
HEART RATE: 80 BPM | OXYGEN SATURATION: 96 % | WEIGHT: 187.38 LBS | HEIGHT: 65 IN | DIASTOLIC BLOOD PRESSURE: 86 MMHG | SYSTOLIC BLOOD PRESSURE: 138 MMHG | BODY MASS INDEX: 31.22 KG/M2 | RESPIRATION RATE: 17 BRPM | TEMPERATURE: 98 F

## 2017-06-21 DIAGNOSIS — N52.9 ERECTILE DYSFUNCTION, UNSPECIFIED ERECTILE DYSFUNCTION TYPE: ICD-10-CM

## 2017-06-21 DIAGNOSIS — R12 HEARTBURN: ICD-10-CM

## 2017-06-21 DIAGNOSIS — C61 PROSTATE CANCER: Primary | ICD-10-CM

## 2017-06-21 DIAGNOSIS — H61.22 IMPACTED CERUMEN OF LEFT EAR: Primary | ICD-10-CM

## 2017-06-21 PROCEDURE — 1159F MED LIST DOCD IN RCRD: CPT | Mod: ,,, | Performed by: INTERNAL MEDICINE

## 2017-06-21 PROCEDURE — 99213 OFFICE O/P EST LOW 20 MIN: CPT | Mod: PBBFAC,PN | Performed by: INTERNAL MEDICINE

## 2017-06-21 PROCEDURE — 99213 OFFICE O/P EST LOW 20 MIN: CPT | Mod: S$PBB,,, | Performed by: INTERNAL MEDICINE

## 2017-06-21 PROCEDURE — 1126F AMNT PAIN NOTED NONE PRSNT: CPT | Mod: ,,, | Performed by: INTERNAL MEDICINE

## 2017-06-21 PROCEDURE — 99999 PR PBB SHADOW E&M-EST. PATIENT-LVL III: CPT | Mod: PBBFAC,,, | Performed by: INTERNAL MEDICINE

## 2017-06-21 RX ORDER — OMEPRAZOLE 20 MG/1
20 CAPSULE, DELAYED RELEASE ORAL DAILY
Qty: 30 CAPSULE | Refills: 2 | Status: SHIPPED | OUTPATIENT
Start: 2017-06-21 | End: 2019-12-09

## 2017-06-21 RX ORDER — SILDENAFIL 50 MG/1
50 TABLET, FILM COATED ORAL DAILY PRN
Qty: 10 TABLET | Refills: 3 | Status: SHIPPED | OUTPATIENT
Start: 2017-06-21 | End: 2017-06-21 | Stop reason: SDUPTHER

## 2017-06-21 RX ORDER — SILDENAFIL 50 MG/1
50 TABLET, FILM COATED ORAL DAILY PRN
Qty: 10 TABLET | Refills: 3 | Status: SHIPPED | OUTPATIENT
Start: 2017-06-21 | End: 2017-10-16

## 2017-06-21 NOTE — PROGRESS NOTES
Subjective:       Patient ID: Fan Paz is a 77 y.o. male.    Chief Complaint: Ear Fullness    C/o intermittent ear fullness.  He would like to have his ear flushed.  He has tried OTC medications with little improvement.  He would like to decrease his dose of Viagra.  He had no side effects with the higher dose.  Also c/o heartburn with red gravy and sometimes other foods.  He c/o food sticking at times.  He has had trouble swallowing since having radiation years ago.      Review of Systems   HENT: Negative for ear pain.    Gastrointestinal: Negative for abdominal pain.   Musculoskeletal: Positive for arthralgias.       Objective:      Physical Exam   Constitutional: He is oriented to person, place, and time. He appears well-developed and well-nourished. No distress.   HENT:   Head: Normocephalic and atraumatic.   Right Ear: Tympanic membrane, external ear and ear canal normal.   Left cerumen impaction   Neurological: He is alert and oriented to person, place, and time.   Skin: Skin is warm and dry. No rash noted.   Psychiatric: He has a normal mood and affect.   Vitals reviewed.      Assessment:       1. Impacted cerumen of left ear    2. Heartburn    3. Erectile dysfunction, unspecified erectile dysfunction type        Plan:       Fan was seen today for ear fullness.    Diagnoses and all orders for this visit:    Impacted cerumen of left ear - irrigated    Heartburn - C/o food sticking.  History of radiation.  Trial of prilosec.  EGD if no improvement  -     omeprazole (PRILOSEC) 20 MG capsule; Take 1 capsule (20 mg total) by mouth once daily.    Erectile dysfunction, unspecified erectile dysfunction type - Would like to decrease the dose of his viagra  -     sildenafil (VIAGRA) 50 MG tablet; Take 1 tablet (50 mg total) by mouth daily as needed for Erectile Dysfunction.       F/u prn

## 2017-09-20 ENCOUNTER — TELEPHONE (OUTPATIENT)
Dept: FAMILY MEDICINE | Facility: CLINIC | Age: 77
End: 2017-09-20

## 2017-09-20 DIAGNOSIS — E78.5 HYPERLIPIDEMIA, UNSPECIFIED HYPERLIPIDEMIA TYPE: ICD-10-CM

## 2017-09-20 RX ORDER — SIMVASTATIN 40 MG/1
40 TABLET, FILM COATED ORAL NIGHTLY
Qty: 90 TABLET | Refills: 1 | Status: SHIPPED | OUTPATIENT
Start: 2017-09-20 | End: 2018-04-05 | Stop reason: SDUPTHER

## 2017-09-20 RX ORDER — TAMSULOSIN HYDROCHLORIDE 0.4 MG/1
CAPSULE ORAL
Qty: 90 CAPSULE | Refills: 1 | Status: SHIPPED | OUTPATIENT
Start: 2017-09-20 | End: 2018-04-12 | Stop reason: SDUPTHER

## 2017-09-20 NOTE — TELEPHONE ENCOUNTER
----- Message from Jazmyn Schneider sent at 9/20/2017  7:48 AM CDT -----  Contact: self  REFILL: tamsulosin (FLOMAX) 0.4 mg Cp24                simvastatin (ZOCOR) 40 MG tablet    PLEASE SEND TO [facility]

## 2017-10-09 NOTE — TELEPHONE ENCOUNTER
----- Message from Roxanna Vogt sent at 10/9/2017 10:37 AM CDT -----  Contact: self  Pt calling to request a refill of Celebrex. Please call pt to verify script. Pt can be reached at [phone] or [phone].

## 2017-10-10 RX ORDER — CELECOXIB 100 MG/1
200 CAPSULE ORAL DAILY
Qty: 30 CAPSULE | Refills: 0 | Status: SHIPPED | OUTPATIENT
Start: 2017-10-10 | End: 2017-10-12 | Stop reason: CLARIF

## 2017-10-10 NOTE — TELEPHONE ENCOUNTER
----- Message from Kareem Montoya sent at 10/10/2017  9:08 AM CDT -----  Contact: [phone]  Calling to check to see why script for arthritis wasn't sent pharm. [facility] [place]

## 2017-10-10 NOTE — TELEPHONE ENCOUNTER
----- Message from Kareem Montoya sent at 10/10/2017 12:25 PM CDT -----  Contact: [phone]  Returning phone call to give correct mg for Celebrex . Its 200 mg

## 2017-10-10 NOTE — TELEPHONE ENCOUNTER
Tried to call patient to get the mg on his celebrex cause I dont see it in the system and no answer.

## 2017-10-10 NOTE — TELEPHONE ENCOUNTER
----- Message from Eugenio Montgomery sent at 10/10/2017  2:44 PM CDT -----  Contact: Self  Pt called to check status of refill for Celebrex. Pt can be reached @ [phone].

## 2017-10-12 ENCOUNTER — OFFICE VISIT (OUTPATIENT)
Dept: FAMILY MEDICINE | Facility: CLINIC | Age: 77
End: 2017-10-12
Payer: MEDICARE

## 2017-10-12 VITALS
DIASTOLIC BLOOD PRESSURE: 78 MMHG | OXYGEN SATURATION: 97 % | HEIGHT: 65 IN | WEIGHT: 189.13 LBS | HEART RATE: 76 BPM | BODY MASS INDEX: 31.51 KG/M2 | SYSTOLIC BLOOD PRESSURE: 136 MMHG | TEMPERATURE: 98 F | RESPIRATION RATE: 17 BRPM

## 2017-10-12 DIAGNOSIS — Z79.899 LONG-TERM USE OF HIGH-RISK MEDICATION: Primary | ICD-10-CM

## 2017-10-12 DIAGNOSIS — M25.50 ARTHRALGIA, UNSPECIFIED JOINT: ICD-10-CM

## 2017-10-12 DIAGNOSIS — Z23 NEED FOR PROPHYLACTIC VACCINATION AND INOCULATION AGAINST INFLUENZA: ICD-10-CM

## 2017-10-12 DIAGNOSIS — I10 ESSENTIAL HYPERTENSION: Primary | ICD-10-CM

## 2017-10-12 PROCEDURE — 99213 OFFICE O/P EST LOW 20 MIN: CPT | Mod: S$PBB,,, | Performed by: INTERNAL MEDICINE

## 2017-10-12 PROCEDURE — 99999 PR PBB SHADOW E&M-EST. PATIENT-LVL III: CPT | Mod: PBBFAC,,, | Performed by: INTERNAL MEDICINE

## 2017-10-12 PROCEDURE — 99213 OFFICE O/P EST LOW 20 MIN: CPT | Mod: PBBFAC,PN | Performed by: INTERNAL MEDICINE

## 2017-10-12 PROCEDURE — G0008 ADMIN INFLUENZA VIRUS VAC: HCPCS | Mod: PBBFAC,PN

## 2017-10-12 NOTE — PROGRESS NOTES
Subjective:       Patient ID: Fan Paz is a 77 y.o. male.    Chief Complaint: Medication Management and Follow-up    He presents for follow up.  He initially called for Celebrex refills, however, the medication is no longer covered by his insurance.  He last took the medication 2 year ago.  He started Aleve 220 mg instead and has found this works very well for his joint pain.  He reports pain in his shoulders, hands and back intermittently.      Review of Systems   Musculoskeletal: Positive for arthralgias and back pain. Negative for joint swelling.       Objective:      Physical Exam   Constitutional: He is oriented to person, place, and time. He appears well-developed and well-nourished. No distress.   HENT:   Head: Normocephalic and atraumatic.   Right Ear: External ear normal.   Left Ear: External ear normal.   Eyes: Conjunctivae are normal. No scleral icterus.   Cardiovascular: Normal rate, regular rhythm and normal heart sounds.  Exam reveals no gallop and no friction rub.    No murmur heard.  Pulmonary/Chest: Effort normal and breath sounds normal. No respiratory distress. He has no wheezes. He has no rales.   Neurological: He is alert and oriented to person, place, and time. No cranial nerve deficit.   Skin: Skin is warm and dry. No rash noted.   Psychiatric: He has a normal mood and affect.   Vitals reviewed.      Assessment:       1. Essential hypertension    2. Arthralgia, unspecified joint    3. Need for prophylactic vaccination and inoculation against influenza    4. Need for influenza vaccination        Plan:       Fan was seen today for medication management and follow-up.    Diagnoses and all orders for this visit:    Essential hypertension - repeat bp wnl    Arthralgia, unspecified joint - continue Aleve sparingly.  Will monitor bp and cr    Need for prophylactic vaccination and inoculation against influenza  -     Flu Vaccine - High Dose (PF) (65+)    F/u prn

## 2017-10-16 ENCOUNTER — HOSPITAL ENCOUNTER (OUTPATIENT)
Dept: RADIOLOGY | Facility: HOSPITAL | Age: 77
Discharge: HOME | End: 2017-10-16
Attending: NURSE PRACTITIONER
Payer: MEDICARE

## 2017-10-16 ENCOUNTER — OFFICE VISIT (OUTPATIENT)
Dept: ORTHOPEDICS | Facility: CLINIC | Age: 77
End: 2017-10-16
Payer: MEDICARE

## 2017-10-16 VITALS — HEIGHT: 65 IN | WEIGHT: 189.38 LBS | BODY MASS INDEX: 31.55 KG/M2

## 2017-10-16 DIAGNOSIS — M25.561 PAIN IN BOTH KNEES, UNSPECIFIED CHRONICITY: ICD-10-CM

## 2017-10-16 DIAGNOSIS — M25.562 PAIN IN BOTH KNEES, UNSPECIFIED CHRONICITY: ICD-10-CM

## 2017-10-16 DIAGNOSIS — M25.562 PAIN IN BOTH KNEES, UNSPECIFIED CHRONICITY: Primary | ICD-10-CM

## 2017-10-16 DIAGNOSIS — M25.561 PAIN IN BOTH KNEES, UNSPECIFIED CHRONICITY: Primary | ICD-10-CM

## 2017-10-16 PROCEDURE — 99213 OFFICE O/P EST LOW 20 MIN: CPT | Mod: PBBFAC,25 | Performed by: NURSE PRACTITIONER

## 2017-10-16 PROCEDURE — 99213 OFFICE O/P EST LOW 20 MIN: CPT | Mod: S$PBB,,, | Performed by: NURSE PRACTITIONER

## 2017-10-16 PROCEDURE — 73562 X-RAY EXAM OF KNEE 3: CPT | Mod: 26,59,RT, | Performed by: RADIOLOGY

## 2017-10-16 PROCEDURE — 99999 PR PBB SHADOW E&M-EST. PATIENT-LVL III: CPT | Mod: PBBFAC,,, | Performed by: NURSE PRACTITIONER

## 2017-10-16 PROCEDURE — 73562 X-RAY EXAM OF KNEE 3: CPT | Mod: TC,50

## 2017-10-16 PROCEDURE — 73562 X-RAY EXAM OF KNEE 3: CPT | Mod: 26,LT,, | Performed by: RADIOLOGY

## 2017-10-16 NOTE — PROGRESS NOTES
"CC: Pain of the Left Knee      HPI: Pt with left knee pain and popping for the past several weeks. He had the left knee replaced by Dr. 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 He feels like the knee is "coming out of joint". He was exercising in the past, but he stopped over the past few months and that's when his knee really started bothering him. He has an exercise bike and a treadmill, but he currently isn't using either one. He has been fishing a lot and working on his boat. He is ambulating without assistive device. There is not a limp. He denies fever or chills. .    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  No joint effusion noted bilaterally.  No osseous lesions or fractures    Assessment:  Left knee pain    Plan:  Pt will resume exercise, especially the bike and he will do straight leg raises to help build up his quadriceps muscle and help with tightening the patella tendon  No problem identified on xray   RICE  F/u as needed  "

## 2017-12-13 RX ORDER — FLUTICASONE PROPIONATE 50 MCG
1 SPRAY, SUSPENSION (ML) NASAL DAILY
Qty: 1 BOTTLE | Refills: 2 | Status: SHIPPED | OUTPATIENT
Start: 2017-12-13 | End: 2018-04-05 | Stop reason: SDUPTHER

## 2018-01-03 ENCOUNTER — OFFICE VISIT (OUTPATIENT)
Dept: FAMILY MEDICINE | Facility: CLINIC | Age: 78
End: 2018-01-03
Payer: MEDICARE

## 2018-01-03 VITALS
TEMPERATURE: 98 F | DIASTOLIC BLOOD PRESSURE: 70 MMHG | RESPIRATION RATE: 14 BRPM | SYSTOLIC BLOOD PRESSURE: 118 MMHG | OXYGEN SATURATION: 98 % | HEART RATE: 90 BPM | HEIGHT: 65 IN | BODY MASS INDEX: 31.74 KG/M2 | WEIGHT: 190.5 LBS

## 2018-01-03 DIAGNOSIS — J41.0 SIMPLE CHRONIC BRONCHITIS: Primary | ICD-10-CM

## 2018-01-03 PROCEDURE — 99213 OFFICE O/P EST LOW 20 MIN: CPT | Mod: PBBFAC,PN | Performed by: FAMILY MEDICINE

## 2018-01-03 PROCEDURE — 99214 OFFICE O/P EST MOD 30 MIN: CPT | Mod: S$PBB,,, | Performed by: FAMILY MEDICINE

## 2018-01-03 PROCEDURE — 99999 PR PBB SHADOW E&M-EST. PATIENT-LVL III: CPT | Mod: PBBFAC,,, | Performed by: FAMILY MEDICINE

## 2018-01-03 RX ORDER — AZITHROMYCIN 250 MG/1
TABLET, FILM COATED ORAL
Qty: 6 TABLET | Refills: 0 | Status: SHIPPED | OUTPATIENT
Start: 2018-01-03 | End: 2018-01-08

## 2018-01-03 NOTE — PROGRESS NOTES
Routine Office Visit    Patient Name: Fan Paz    : 1940  MRN: [id number]    Subjective:  Fan is a 77 y.o. male who presents today for:    1. Cough  Patient presenting today with 4 days of cough and congestion.  Reformed smoker for 22 years after having esophageal cancer.  The cough is productive. No shortness of breath, wheezing, fevers, or chills.  No hemoptysis or cough induced emesis.     Past Medical History  Past Medical History:   Diagnosis Date    Arthritis     Cancer of palate     HTN (hypertension)     Hyperlipidemia     Prostate cancer            Past Surgical History  Past Surgical History:   Procedure Laterality Date    BACK SURGERY  y-6    Cancer of palate surgery      Late - [facility]     CARPAL TUNNEL RELEASE  13    right hand,Left hand     COLONOSCOPY N/A 4/10/2017    Procedure: COLONOSCOPY;  Surgeon: Nilo Kelly MD;  Location: [facility];  Service: Endoscopy;  Laterality: N/A;    KNEE SURGERY  y-40    left knee    KNEE SURGERY Right 12-1-15    TKR    Radio active seed Implant      2012    TOTAL HIP ARTHROPLASTY  3/2011       Family History  Family History   Problem Relation Age of Onset    Coronary artery disease Father          Cancer Sister      Liver Cancer -    Diabetes Sister          No Known Problems Brother     No Known Problems Sister     No Known Problems Sister     No Known Problems Brother     No Known Problems Mother     Lung cancer Brother 60     - was a tobacco user, had lung cancer    Cancer Brother      Liver Cancer-     Lung cancer Sister      Alive    Breast cancer Sister     Clotting disorder Sister 75     blood clot on brain-alive    Stroke Brother          Glaucoma Cousin     No Known Problems Maternal Aunt     No Known Problems Maternal Uncle     No Known Problems Paternal Aunt     No Known Problems Paternal Uncle     No Known Problems Maternal Grandmother     No  Known Problems Maternal Grandfather     No Known Problems Paternal Grandmother     No Known Problems Paternal Grandfather     Macular degeneration Neg Hx     Strabismus Neg Hx     Amblyopia Neg Hx     Blindness Neg Hx     Cataracts Neg Hx     Hypertension Neg Hx     Retinal detachment Neg Hx     Thyroid disease Neg Hx        Social History  Social History     Social History    Marital status:      Spouse name: N/A    Number of children: N/A    Years of education: N/A     Occupational History     Retired     Social History Main Topics    Smoking status: Former Smoker     Packs/day: 3.00     Years: 20.00     Quit date: 7/10/1997    Smokeless tobacco: Never Used      Comment: Quit 1997-smoked for 40 years ,2 packs a day on an average    Alcohol use No      Comment: used to drink Occasionally    Drug use: No    Sexual activity: Yes     Partners: Female     Other Topics Concern    Not on file     Social History Narrative    No narrative on file       Current Medications  Current Outpatient Prescriptions on File Prior to Visit   Medication Sig Dispense Refill    fluticasone (FLONASE) 50 mcg/actuation nasal spray 1 spray by Each Nare route once daily. 1 Bottle 2    levocetirizine (XYZAL) 5 MG tablet Take 1 tablet (5 mg total) by mouth every evening. 90 tablet 3    simvastatin (ZOCOR) 40 MG tablet Take 1 tablet (40 mg total) by mouth every evening. 90 tablet 1    tamsulosin (FLOMAX) 0.4 mg Cp24 TAKE 1 CAPSULE(S) BY MOUTH ONCE A DAY 90 capsule 1    omeprazole (PRILOSEC) 20 MG capsule Take 1 capsule (20 mg total) by mouth once daily. 30 capsule 2     No current facility-administered medications on file prior to visit.        Allergies   Review of patient's allergies indicates:  No Known Allergies    Review of Systems (Pertinent positives)  Review of Systems   Constitutional: Positive for malaise/fatigue.   HENT: Positive for congestion.    Eyes: Negative.    Respiratory: Positive for cough and  "sputum production. Negative for hemoptysis, shortness of breath and wheezing.    Cardiovascular: Negative.    Gastrointestinal: Negative.    Genitourinary: Negative.    Musculoskeletal: Negative.    Skin: Negative.          /70 (BP Location: Right arm, Patient Position: Sitting, BP Method: Medium (Manual))   Pulse 90   Temp 98.1 °F (36.7 °C) (Oral)   Resp 14   Ht 5' 5" (1.651 m)   Wt 86.4 kg (190 lb 7.6 oz)   SpO2 98%   BMI 31.70 kg/m²     GENERAL APPEARANCE: in no apparent distress and well developed and well nourished  HEENT: PERRL, EOMI, Sclera clear, anicteric, Oropharynx clear, no lesions, Neck supple with midline trachea  NECK: normal, supple, no adenopathy, thyroid normal in size  RESPIRATORY: appears well, vitals normal, no respiratory distress, acyanotic, normal RR, chest clear, no wheezing, crepitations, rhonchi, normal symmetric air entry  HEART: regular rate and rhythm, S1, S2 normal, no murmur, click, rub or gallop.    ABDOMEN: abdomen is soft without tenderness, no masses, no hernias, no organomegaly, no rebound, no guarding. Suprapubic tenderness absent. No CVA tenderness.  SKIN: no rashes, no wounds, no other lesions  PSYCH: Alert, oriented x 3, thought content appropriate, speech normal, pleasant and cooperative, good eye contact, well groomed    Assessment/Plan:  Fan Paz is a 77 y.o. male who presents today for :    Fan was seen today for cough.    Diagnoses and all orders for this visit:    Simple chronic bronchitis  -     azithromycin (Z-SWETA) 250 MG tablet; Take 2 tablets by mouth on day 1; Take 1 tablet by mouth on days 2-5    1.  Will treat for bronchitis given history of smoking and current symptoms  2.  Follow up as needed  3.  He is to call with any concerns    Otilio Damon MD      "

## 2018-01-05 ENCOUNTER — TELEPHONE (OUTPATIENT)
Dept: FAMILY MEDICINE | Facility: CLINIC | Age: 78
End: 2018-01-05

## 2018-01-05 NOTE — TELEPHONE ENCOUNTER
----- Message from Chelita Crandall sent at 1/5/2018  8:22 AM CST -----  Contact: self  Patient is stating medication that was given to him for the cough is not working. Need something else. Patient can be reached at [phone] or [phone].      Thanks,

## 2018-03-24 RX ORDER — LEVOCETIRIZINE DIHYDROCHLORIDE 5 MG/1
5 TABLET, FILM COATED ORAL NIGHTLY
Qty: 90 TABLET | Refills: 3 | Status: SHIPPED | OUTPATIENT
Start: 2018-03-24 | End: 2019-07-19

## 2018-04-05 ENCOUNTER — OFFICE VISIT (OUTPATIENT)
Dept: FAMILY MEDICINE | Facility: CLINIC | Age: 78
End: 2018-04-05
Payer: MEDICARE

## 2018-04-05 VITALS
DIASTOLIC BLOOD PRESSURE: 80 MMHG | SYSTOLIC BLOOD PRESSURE: 128 MMHG | BODY MASS INDEX: 31.55 KG/M2 | WEIGHT: 189.63 LBS | RESPIRATION RATE: 17 BRPM

## 2018-04-05 DIAGNOSIS — H61.23 BILATERAL IMPACTED CERUMEN: ICD-10-CM

## 2018-04-05 DIAGNOSIS — J30.9 ALLERGIC RHINITIS, UNSPECIFIED CHRONICITY, UNSPECIFIED SEASONALITY, UNSPECIFIED TRIGGER: Primary | ICD-10-CM

## 2018-04-05 DIAGNOSIS — E78.5 HYPERLIPIDEMIA, UNSPECIFIED HYPERLIPIDEMIA TYPE: ICD-10-CM

## 2018-04-05 PROCEDURE — 99214 OFFICE O/P EST MOD 30 MIN: CPT | Mod: PBBFAC,PN | Performed by: NURSE PRACTITIONER

## 2018-04-05 PROCEDURE — 99999 PR PBB SHADOW E&M-EST. PATIENT-LVL IV: CPT | Mod: PBBFAC,,, | Performed by: NURSE PRACTITIONER

## 2018-04-05 PROCEDURE — 99214 OFFICE O/P EST MOD 30 MIN: CPT | Mod: S$PBB,,, | Performed by: NURSE PRACTITIONER

## 2018-04-05 RX ORDER — FLUTICASONE PROPIONATE 50 MCG
1 SPRAY, SUSPENSION (ML) NASAL DAILY
Qty: 1 BOTTLE | Refills: 2 | Status: SHIPPED | OUTPATIENT
Start: 2018-04-05 | End: 2019-04-10 | Stop reason: SDUPTHER

## 2018-04-05 RX ORDER — SIMVASTATIN 40 MG/1
40 TABLET, FILM COATED ORAL NIGHTLY
Qty: 90 TABLET | Refills: 1 | Status: SHIPPED | OUTPATIENT
Start: 2018-04-05 | End: 2018-11-07 | Stop reason: SDUPTHER

## 2018-04-05 RX ORDER — BENZONATATE 100 MG/1
100 CAPSULE ORAL 3 TIMES DAILY PRN
Qty: 30 CAPSULE | Refills: 0 | Status: SHIPPED | OUTPATIENT
Start: 2018-04-05 | End: 2018-04-15

## 2018-04-05 NOTE — PATIENT INSTRUCTIONS
Follow up with primary care provider if not improved  Go to ER for new, worse or concerning symptoms  Drink plenty of fluids  Tylenol or ibuprofen as needed for fever or pain  DEBROX as needed for fever or pain      Causes of Nasal Allergies  Nasal allergies are most commonly caused by one or more of 4 kinds of allergens: pollen (which causes seasonal allergies), house-dust mites, mold, and animals. Other substances, called irritants, can bother the nose and make allergy symptoms worse.    Pollen  Plants reproduce by moving tiny grains of pollen from plant to plant. Some pollen is carried by bees, and some is blown by the wind. Its the wind-blown pollen that causes nasal allergies. The amount of pollen in the air varies from season to season.  House-dust mites  House-dust mites are tiny bugs too small to see. They can live in mattresses, blankets, stuffed toys, carpets, and curtains. The droppings of these mites are a common indoor cause of nasal allergies.  Mold  Mold loves dark, damp areas. It tends to grow in bathrooms, basements, refrigerators, and in the soil of houseplants. Mold reproduces by sending tiny grains called spores into the air. If these spores are breathed in, they can cause a nasal allergic reaction.  Animals  Pets, such as cats, dogs, birds, horses, and rabbits, are common causes of nasal allergies. Flakes of skin (dander), saliva left on fur when an animal cleans itself, urine in litter boxes and cages, and feathers can all cause nasal allergies.  Irritants make allergies worse  Although irritants dont cause nasal allergies, they can make allergy symptoms worse. Cigarette smoke, perfume, aerosol sprays, smoke from wood stoves or fireplaces, car exhaust, and strong odors are examples of irritants.   Date Last Reviewed: 9/1/2016  © 6207-5619 [employer]. [address] [place] [address], [place] [address]. All rights reserved. This information is not intended as a substitute for professional  medical care. Always follow your healthcare professional's instructions.

## 2018-04-12 RX ORDER — TAMSULOSIN HYDROCHLORIDE 0.4 MG/1
CAPSULE ORAL
Qty: 90 CAPSULE | Refills: 0 | Status: SHIPPED | OUTPATIENT
Start: 2018-04-12 | End: 2018-08-15 | Stop reason: SDUPTHER

## 2018-04-23 ENCOUNTER — LAB VISIT (OUTPATIENT)
Dept: LAB | Facility: HOSPITAL | Age: 78
End: 2018-04-23
Payer: MEDICARE

## 2018-04-23 ENCOUNTER — OFFICE VISIT (OUTPATIENT)
Dept: FAMILY MEDICINE | Facility: CLINIC | Age: 78
End: 2018-04-23
Payer: MEDICARE

## 2018-04-23 VITALS
RESPIRATION RATE: 12 BRPM | HEART RATE: 73 BPM | TEMPERATURE: 98 F | OXYGEN SATURATION: 95 % | SYSTOLIC BLOOD PRESSURE: 136 MMHG | HEIGHT: 65 IN | DIASTOLIC BLOOD PRESSURE: 78 MMHG | BODY MASS INDEX: 31.77 KG/M2 | WEIGHT: 190.69 LBS

## 2018-04-23 DIAGNOSIS — E66.09 CLASS 1 OBESITY DUE TO EXCESS CALORIES WITH SERIOUS COMORBIDITY AND BODY MASS INDEX (BMI) OF 31.0 TO 31.9 IN ADULT: ICD-10-CM

## 2018-04-23 DIAGNOSIS — E78.6 LOW HDL (UNDER 40): ICD-10-CM

## 2018-04-23 DIAGNOSIS — E78.5 HYPERLIPIDEMIA, UNSPECIFIED HYPERLIPIDEMIA TYPE: ICD-10-CM

## 2018-04-23 DIAGNOSIS — C61 PROSTATE CANCER: ICD-10-CM

## 2018-04-23 DIAGNOSIS — Z12.5 ENCOUNTER FOR SCREENING FOR MALIGNANT NEOPLASM OF PROSTATE: ICD-10-CM

## 2018-04-23 DIAGNOSIS — Z79.899 LONG-TERM USE OF HIGH-RISK MEDICATION: ICD-10-CM

## 2018-04-23 DIAGNOSIS — M17.0 PRIMARY OSTEOARTHRITIS OF BOTH KNEES: ICD-10-CM

## 2018-04-23 DIAGNOSIS — R73.03 PREDIABETES: Primary | ICD-10-CM

## 2018-04-23 DIAGNOSIS — K21.9 GASTROESOPHAGEAL REFLUX DISEASE, ESOPHAGITIS PRESENCE NOT SPECIFIED: ICD-10-CM

## 2018-04-23 PROBLEM — E66.811 CLASS 1 OBESITY DUE TO EXCESS CALORIES WITH SERIOUS COMORBIDITY AND BODY MASS INDEX (BMI) OF 31.0 TO 31.9 IN ADULT: Status: ACTIVE | Noted: 2018-04-23

## 2018-04-23 LAB
ALBUMIN SERPL BCP-MCNC: 3.8 G/DL
ALP SERPL-CCNC: 66 U/L
ALT SERPL W/O P-5'-P-CCNC: 18 U/L
ANION GAP SERPL CALC-SCNC: 6 MMOL/L
ANION GAP SERPL CALC-SCNC: 6 MMOL/L
AST SERPL-CCNC: 21 U/L
BASOPHILS # BLD AUTO: 0.03 K/UL
BASOPHILS NFR BLD: 0.6 %
BILIRUB SERPL-MCNC: 0.4 MG/DL
BUN SERPL-MCNC: 10 MG/DL
BUN SERPL-MCNC: 10 MG/DL
CALCIUM SERPL-MCNC: 9.3 MG/DL
CALCIUM SERPL-MCNC: 9.3 MG/DL
CHLORIDE SERPL-SCNC: 104 MMOL/L
CHLORIDE SERPL-SCNC: 104 MMOL/L
CHOLEST SERPL-MCNC: 170 MG/DL
CHOLEST/HDLC SERPL: 4.4 {RATIO}
CO2 SERPL-SCNC: 31 MMOL/L
CO2 SERPL-SCNC: 31 MMOL/L
CREAT SERPL-MCNC: 1.1 MG/DL
CREAT SERPL-MCNC: 1.1 MG/DL
DIFFERENTIAL METHOD: ABNORMAL
EOSINOPHIL # BLD AUTO: 0.2 K/UL
EOSINOPHIL NFR BLD: 4.5 %
ERYTHROCYTE [DISTWIDTH] IN BLOOD BY AUTOMATED COUNT: 15.5 %
EST. GFR  (AFRICAN AMERICAN): >60 ML/MIN/1.73 M^2
EST. GFR  (AFRICAN AMERICAN): >60 ML/MIN/1.73 M^2
EST. GFR  (NON AFRICAN AMERICAN): >60 ML/MIN/1.73 M^2
EST. GFR  (NON AFRICAN AMERICAN): >60 ML/MIN/1.73 M^2
GLUCOSE SERPL-MCNC: 112 MG/DL
GLUCOSE SERPL-MCNC: 112 MG/DL
HCT VFR BLD AUTO: 43 %
HDLC SERPL-MCNC: 39 MG/DL
HDLC SERPL: 22.9 %
HGB BLD-MCNC: 13.9 G/DL
LDLC SERPL CALC-MCNC: 100 MG/DL
LYMPHOCYTES # BLD AUTO: 1.3 K/UL
LYMPHOCYTES NFR BLD: 26.2 %
MCH RBC QN AUTO: 26.8 PG
MCHC RBC AUTO-ENTMCNC: 32.3 G/DL
MCV RBC AUTO: 83 FL
MONOCYTES # BLD AUTO: 0.5 K/UL
MONOCYTES NFR BLD: 11 %
NEUTROPHILS # BLD AUTO: 2.8 K/UL
NEUTROPHILS NFR BLD: 57.3 %
NONHDLC SERPL-MCNC: 131 MG/DL
PLATELET # BLD AUTO: 202 K/UL
PMV BLD AUTO: 10.1 FL
POTASSIUM SERPL-SCNC: 4.7 MMOL/L
POTASSIUM SERPL-SCNC: 4.7 MMOL/L
PROT SERPL-MCNC: 7.5 G/DL
RBC # BLD AUTO: 5.19 M/UL
SODIUM SERPL-SCNC: 141 MMOL/L
SODIUM SERPL-SCNC: 141 MMOL/L
TRIGL SERPL-MCNC: 155 MG/DL
WBC # BLD AUTO: 4.92 K/UL

## 2018-04-23 PROCEDURE — 80053 COMPREHEN METABOLIC PANEL: CPT

## 2018-04-23 PROCEDURE — 36415 COLL VENOUS BLD VENIPUNCTURE: CPT | Mod: PN

## 2018-04-23 PROCEDURE — 85025 COMPLETE CBC W/AUTO DIFF WBC: CPT

## 2018-04-23 PROCEDURE — 99999 PR PBB SHADOW E&M-EST. PATIENT-LVL III: CPT | Mod: PBBFAC,,, | Performed by: FAMILY MEDICINE

## 2018-04-23 PROCEDURE — 99213 OFFICE O/P EST LOW 20 MIN: CPT | Mod: PBBFAC,PN | Performed by: FAMILY MEDICINE

## 2018-04-23 PROCEDURE — 84403 ASSAY OF TOTAL TESTOSTERONE: CPT

## 2018-04-23 PROCEDURE — 99215 OFFICE O/P EST HI 40 MIN: CPT | Mod: S$PBB,,, | Performed by: FAMILY MEDICINE

## 2018-04-23 PROCEDURE — 80061 LIPID PANEL: CPT

## 2018-04-23 NOTE — PROGRESS NOTES
Routine Office Visit    Patient Name: Fan Paz    : 1940  MRN: [id number]    Subjective:  Fan is a 78 y.o. male who presents today for:    1. General health  Patient presenting for yearly evaluation.  He has no new complaints.  He states that he knows he did gain about 10 pounds over the pat few months, but usually does that this time of year.  He does suffer with prediabetes and arthritis, but states that neither have been causing him any problems lately.  He has had both knees and the right hip replaced due to arthritis.  He denies any hypoglycemic episodes.  Patient stays active, but states he does eat a lot of sweets.  He states that he is following up with urology as scheduled due to history of prostate CA.  He has not had any changes in urinary habits and denies any dysuria or hematuria.     Past Medical History  Past Medical History:   Diagnosis Date    Arthritis     Cancer of palate     HTN (hypertension)     Hyperlipidemia     Prostate cancer            Past Surgical History  Past Surgical History:   Procedure Laterality Date    BACK SURGERY  y-6    [facility] surgery      [facility] - [facility]     CARPAL TUNNEL RELEASE  13    right hand,Left hand     COLONOSCOPY N/A 4/10/2017    Procedure: COLONOSCOPY;  Surgeon: Nilo Kelly MD;  Location: [facility];  Service: Endoscopy;  Laterality: N/A;    KNEE SURGERY  y-40    left knee    KNEE SURGERY Right 12-1-15    TKR    Radio active seed Implant      2012    TOTAL HIP ARTHROPLASTY  3/2011       Family History  Family History   Problem Relation Age of Onset    Coronary artery disease Father          Cancer Sister      Liver Cancer -    Diabetes Sister          No Known Problems Brother     No Known Problems Sister     No Known Problems Sister     No Known Problems Brother     No Known Problems Mother     Lung cancer Brother 60     - was a tobacco user, had lung cancer     Cancer Brother      Liver Cancer-     Lung cancer Sister      Alive    Breast cancer Sister     Clotting disorder Sister 75     blood clot on brain-alive    Stroke Brother          Glaucoma Cousin     No Known Problems Maternal Aunt     No Known Problems Maternal Uncle     No Known Problems Paternal Aunt     No Known Problems Paternal Uncle     No Known Problems Maternal Grandmother     No Known Problems Maternal Grandfather     No Known Problems Paternal Grandmother     No Known Problems Paternal Grandfather     Macular degeneration Neg Hx     Strabismus Neg Hx     Amblyopia Neg Hx     Blindness Neg Hx     Cataracts Neg Hx     Hypertension Neg Hx     Retinal detachment Neg Hx     Thyroid disease Neg Hx        Social History  Social History     Social History    Marital status:      Spouse name: N/A    Number of children: N/A    Years of education: N/A     Occupational History     Retired     Social History Main Topics    Smoking status: Former Smoker     Packs/day: 3.00     Years: 20.00     Quit date: 7/10/1997    Smokeless tobacco: Never Used      Comment: Quit -smoked for 40 years ,2 packs a day on an average    Alcohol use No      Comment: used to drink Occasionally    Drug use: No    Sexual activity: Yes     Partners: Female     Other Topics Concern    Not on file     Social History Narrative    No narrative on file       Current Medications  Current Outpatient Prescriptions on File Prior to Visit   Medication Sig Dispense Refill    fluticasone (FLONASE) 50 mcg/actuation nasal spray 1 spray (50 mcg total) by Each Nare route once daily. 1 Bottle 2    simvastatin (ZOCOR) 40 MG tablet Take 1 tablet (40 mg total) by mouth every evening. 90 tablet 1    tamsulosin (FLOMAX) 0.4 mg Cp24 TAKE 1 CAPSULE(S) BY MOUTH ONCE A DAY 90 capsule 0    levocetirizine (XYZAL) 5 MG tablet Take 1 tablet (5 mg total) by mouth every evening. 90 tablet 3    omeprazole (PRILOSEC) 20  "MG capsule Take 1 capsule (20 mg total) by mouth once daily. 30 capsule 2     No current facility-administered medications on file prior to visit.        Allergies   Review of patient's allergies indicates:  No Known Allergies    Review of Systems (Pertinent positives)  Review of Systems   Constitutional: Negative.    HENT: Negative.    Eyes: Negative.    Respiratory: Negative.    Cardiovascular: Negative.    Gastrointestinal: Negative.    Genitourinary: Negative.    Musculoskeletal: Negative.    Skin: Negative.    Neurological: Negative.          /78   Pulse 73   Temp 97.5 °F (36.4 °C) (Oral)   Resp 12   Ht 5' 5" (1.651 m)   Wt 86.5 kg (190 lb 11.2 oz)   SpO2 95%   BMI 31.73 kg/m²     GENERAL APPEARANCE: in no apparent distress and well developed and well nourished  HEENT: PERRL, EOMI, Sclera clear, anicteric, Oropharynx clear, no lesions, Neck supple with midline trachea  NECK: normal, supple, no adenopathy, thyroid normal in size  RESPIRATORY: appears well, vitals normal, no respiratory distress, acyanotic, normal RR, chest clear, no wheezing, crepitations, rhonchi, normal symmetric air entry  HEART: regular rate and rhythm, S1, S2 normal, no murmur, click, rub or gallop.    ABDOMEN: abdomen is soft without tenderness, no masses, no hernias, no organomegaly, no rebound, no guarding. Suprapubic tenderness absent. No CVA tenderness.  NEUROLOGIC: normal without focal findings, CN II-XII are intact.   SKIN: no rashes, no wounds, no other lesions  PSYCH: Alert, oriented x 3, thought content appropriate, speech normal, pleasant and cooperative, good eye contact, well groomed    Assessment/Plan:  Fan Paz is a 78 y.o. male who presents today for :    Fan was seen today for annual exam.    Diagnoses and all orders for this visit:    Prediabetes  -     Comprehensive metabolic panel; Future  -     Hemoglobin A1c; Future    Primary osteoarthritis of both knees    Low HDL (under 40)  -     " Lipid panel; Future    Class 1 obesity due to excess calories with serious comorbidity and body mass index (BMI) of 31.0 to 31.9 in adult  -     CBC auto differential; Future    Prostate cancer  -     PSA, Screening; Future    Gastroesophageal reflux disease, esophagitis presence not specified    Encounter for screening for malignant neoplasm of prostate   -     PSA, Screening; Future      1. Labs done this morning  2. He is to take medications as prescribed  3.  He is to call with any concerns  4.  Follow up 3 months or sooner if needed  5.  He declines prevnar at this time.  6.  He is to go to the ED for any chest pain, shortness of breath, weakness, numbness, slurred speech, or facial droop  7.  Encouraged patient to cut back on sweets and to lose about 10-15 pounds to better control blood sugars and pressure      Otilio Damon MD

## 2018-04-24 LAB — TESTOST SERPL-MCNC: 385 NG/DL

## 2018-04-26 ENCOUNTER — TELEPHONE (OUTPATIENT)
Dept: FAMILY MEDICINE | Facility: CLINIC | Age: 78
End: 2018-04-26

## 2018-04-26 NOTE — TELEPHONE ENCOUNTER
----- Message from Otilio Damon MD sent at 4/26/2018  7:57 AM CDT -----  Please let patient know that his labs look fine.      Thanks,  Dr. Damon

## 2018-05-01 ENCOUNTER — LAB VISIT (OUTPATIENT)
Dept: LAB | Facility: HOSPITAL | Age: 78
End: 2018-05-01
Payer: MEDICARE

## 2018-05-01 DIAGNOSIS — R73.03 PREDIABETES: ICD-10-CM

## 2018-05-01 DIAGNOSIS — Z12.5 ENCOUNTER FOR SCREENING FOR MALIGNANT NEOPLASM OF PROSTATE: ICD-10-CM

## 2018-05-01 DIAGNOSIS — E66.09 CLASS 1 OBESITY DUE TO EXCESS CALORIES WITH SERIOUS COMORBIDITY AND BODY MASS INDEX (BMI) OF 31.0 TO 31.9 IN ADULT: ICD-10-CM

## 2018-05-01 DIAGNOSIS — E78.6 LOW HDL (UNDER 40): ICD-10-CM

## 2018-05-01 DIAGNOSIS — C61 PROSTATE CANCER: ICD-10-CM

## 2018-05-01 LAB
ALBUMIN SERPL BCP-MCNC: 3.8 G/DL
ALP SERPL-CCNC: 62 U/L
ALT SERPL W/O P-5'-P-CCNC: 20 U/L
ANION GAP SERPL CALC-SCNC: 7 MMOL/L
AST SERPL-CCNC: 31 U/L
BASOPHILS # BLD AUTO: 0.03 K/UL
BASOPHILS NFR BLD: 0.6 %
BILIRUB SERPL-MCNC: 0.6 MG/DL
BUN SERPL-MCNC: 13 MG/DL
CALCIUM SERPL-MCNC: 9.5 MG/DL
CHLORIDE SERPL-SCNC: 107 MMOL/L
CHOLEST SERPL-MCNC: 136 MG/DL
CHOLEST/HDLC SERPL: 3 {RATIO}
CO2 SERPL-SCNC: 26 MMOL/L
COMPLEXED PSA SERPL-MCNC: 0.01 NG/ML
CREAT SERPL-MCNC: 1.2 MG/DL
DIFFERENTIAL METHOD: ABNORMAL
EOSINOPHIL # BLD AUTO: 0.3 K/UL
EOSINOPHIL NFR BLD: 5.9 %
ERYTHROCYTE [DISTWIDTH] IN BLOOD BY AUTOMATED COUNT: 15.3 %
EST. GFR  (AFRICAN AMERICAN): >60 ML/MIN/1.73 M^2
EST. GFR  (NON AFRICAN AMERICAN): 58 ML/MIN/1.73 M^2
ESTIMATED AVG GLUCOSE: 123 MG/DL
GLUCOSE SERPL-MCNC: 117 MG/DL
HBA1C MFR BLD HPLC: 5.9 %
HCT VFR BLD AUTO: 42.8 %
HDLC SERPL-MCNC: 46 MG/DL
HDLC SERPL: 33.8 %
HGB BLD-MCNC: 13 G/DL
IMM GRANULOCYTES # BLD AUTO: 0.01 K/UL
IMM GRANULOCYTES NFR BLD AUTO: 0.2 %
LDLC SERPL CALC-MCNC: 77.8 MG/DL
LYMPHOCYTES # BLD AUTO: 1.3 K/UL
LYMPHOCYTES NFR BLD: 27.2 %
MCH RBC QN AUTO: 26.5 PG
MCHC RBC AUTO-ENTMCNC: 30.4 G/DL
MCV RBC AUTO: 87 FL
MONOCYTES # BLD AUTO: 0.6 K/UL
MONOCYTES NFR BLD: 12.8 %
NEUTROPHILS # BLD AUTO: 2.5 K/UL
NEUTROPHILS NFR BLD: 53.3 %
NONHDLC SERPL-MCNC: 90 MG/DL
NRBC BLD-RTO: 0 /100 WBC
PLATELET # BLD AUTO: 212 K/UL
PMV BLD AUTO: 9.8 FL
POTASSIUM SERPL-SCNC: 5.1 MMOL/L
PROT SERPL-MCNC: 7.4 G/DL
RBC # BLD AUTO: 4.9 M/UL
SODIUM SERPL-SCNC: 140 MMOL/L
TRIGL SERPL-MCNC: 61 MG/DL
WBC # BLD AUTO: 4.75 K/UL

## 2018-05-01 PROCEDURE — 84153 ASSAY OF PSA TOTAL: CPT

## 2018-05-01 PROCEDURE — 36415 COLL VENOUS BLD VENIPUNCTURE: CPT | Mod: PO

## 2018-05-01 PROCEDURE — 80053 COMPREHEN METABOLIC PANEL: CPT

## 2018-05-01 PROCEDURE — 80061 LIPID PANEL: CPT

## 2018-05-01 PROCEDURE — 85025 COMPLETE CBC W/AUTO DIFF WBC: CPT

## 2018-05-01 PROCEDURE — 83036 HEMOGLOBIN GLYCOSYLATED A1C: CPT

## 2018-05-02 ENCOUNTER — TELEPHONE (OUTPATIENT)
Dept: FAMILY MEDICINE | Facility: CLINIC | Age: 78
End: 2018-05-02

## 2018-05-02 NOTE — TELEPHONE ENCOUNTER
----- Message from Otilio Damon MD sent at 5/2/2018  3:50 PM CDT -----  Please let patient know that labs look good.  No changes to medications are needed at this time.    Thanks,  Dr. Damon

## 2018-05-02 NOTE — TELEPHONE ENCOUNTER
Patient informed of lab results. Patient verbalized understanding. No further questions or concerns at this time.

## 2018-05-02 NOTE — PROGRESS NOTES
Please let patient know that labs look good.  No changes to medications are needed at this time.    Thanks,  Dr. Damon

## 2018-05-08 ENCOUNTER — OFFICE VISIT (OUTPATIENT)
Dept: RADIATION ONCOLOGY | Facility: CLINIC | Age: 78
End: 2018-05-08
Payer: MEDICARE

## 2018-05-08 VITALS
HEART RATE: 70 BPM | DIASTOLIC BLOOD PRESSURE: 77 MMHG | HEIGHT: 65 IN | SYSTOLIC BLOOD PRESSURE: 151 MMHG | BODY MASS INDEX: 30.87 KG/M2 | WEIGHT: 185.25 LBS | RESPIRATION RATE: 16 BRPM

## 2018-05-08 DIAGNOSIS — C61 PROSTATE CANCER: Primary | ICD-10-CM

## 2018-05-08 PROCEDURE — 99213 OFFICE O/P EST LOW 20 MIN: CPT | Mod: S$PBB,,, | Performed by: RADIOLOGY

## 2018-05-08 PROCEDURE — 99999 PR PBB SHADOW E&M-EST. PATIENT-LVL III: CPT | Mod: PBBFAC,,, | Performed by: RADIOLOGY

## 2018-05-08 PROCEDURE — 99213 OFFICE O/P EST LOW 20 MIN: CPT | Mod: PBBFAC | Performed by: RADIOLOGY

## 2018-05-08 NOTE — PROGRESS NOTES
Subjective:       Patient ID: Fan Paz is a 78 y.o. male.    Chief Complaint: Prostate Cancer (1yr f/u;psa)    This patient returns for follow up visit.     Mr. Paz has a history of Stage II (T1c, N0, M0) adenocarcinoma of the prostate. He presented with an elevated PSA of 5 ng/ml.   Biopsies from the left apex, left mid gland, left base, and right apex revealed adenocarcinoma. The Trenton score was 7 (3+4) and biopsies from the left mid gland and left base. The tumor involved 30% and 60% of the specimens, respectively. Ryan's 6 adenocarcinoma was noted in biopsies from the left apex and right apex involving only 5% and 1% of the specimens, respectively. Further metastatic work up was negative. The patient was referred for definitive radiotherapy. He was enrolled on RTOG 0815. He was randomized to receive 6 months of hormonal ablation therapy along with radiotherapy. He completed 45 Gy external beam therapy followed by implantation with Palladium 103 seeds on 1/18/12.   The patient has remained BRIGHT since that time.  Today, the patient states he feels well.  Still notes some nocturia and increased frequency.  The patient drinks 4 - 5 cups of coffee each day.  No hematuria.   Rarely BRBPR.        Review of Systems   Constitutional: Negative for activity change, appetite change, chills and fatigue.   Respiratory: Negative for cough and shortness of breath.    Cardiovascular: Negative for chest pain and palpitations.   Gastrointestinal: Negative for abdominal pain, blood in stool, constipation and diarrhea.   Genitourinary: Negative for difficulty urinating, dysuria, flank pain, hematuria, testicular pain and urgency.       Objective:      Physical Exam   Constitutional: He appears well-developed and well-nourished. No distress.   Abdominal: Soft. He exhibits no distension.   Genitourinary:   Genitourinary Comments: rectal - normal tone, prostate depressed.  small gland   no nodules or induration.          PSA - 0.01 ng/ml   testosterone - 385  Assessment:       1. Prostate cancer        Plan:       Doing well  no evidence of tumor recurrence of progression.  Plan follow up in 1 year.

## 2018-08-15 RX ORDER — TAMSULOSIN HYDROCHLORIDE 0.4 MG/1
CAPSULE ORAL
Qty: 90 CAPSULE | Refills: 0 | Status: SHIPPED | OUTPATIENT
Start: 2018-08-15 | End: 2018-11-07 | Stop reason: SDUPTHER

## 2018-11-07 ENCOUNTER — OFFICE VISIT (OUTPATIENT)
Dept: FAMILY MEDICINE | Facility: CLINIC | Age: 78
End: 2018-11-07
Payer: MEDICARE

## 2018-11-07 VITALS
HEIGHT: 65 IN | SYSTOLIC BLOOD PRESSURE: 126 MMHG | DIASTOLIC BLOOD PRESSURE: 72 MMHG | TEMPERATURE: 98 F | WEIGHT: 185.19 LBS | HEART RATE: 73 BPM | BODY MASS INDEX: 30.85 KG/M2 | OXYGEN SATURATION: 97 %

## 2018-11-07 DIAGNOSIS — C61 PROSTATE CANCER: ICD-10-CM

## 2018-11-07 DIAGNOSIS — B35.6 TINEA CRURIS: Primary | ICD-10-CM

## 2018-11-07 DIAGNOSIS — E78.5 HYPERLIPIDEMIA, UNSPECIFIED HYPERLIPIDEMIA TYPE: ICD-10-CM

## 2018-11-07 DIAGNOSIS — R73.03 PREDIABETES: ICD-10-CM

## 2018-11-07 PROCEDURE — 99213 OFFICE O/P EST LOW 20 MIN: CPT | Mod: PBBFAC,PO | Performed by: INTERNAL MEDICINE

## 2018-11-07 PROCEDURE — 99214 OFFICE O/P EST MOD 30 MIN: CPT | Mod: S$PBB,,, | Performed by: INTERNAL MEDICINE

## 2018-11-07 PROCEDURE — 99999 PR PBB SHADOW E&M-EST. PATIENT-LVL III: CPT | Mod: PBBFAC,,, | Performed by: INTERNAL MEDICINE

## 2018-11-07 RX ORDER — KETOCONAZOLE 20 MG/G
CREAM TOPICAL DAILY
Qty: 30 G | Refills: 2 | Status: SHIPPED | OUTPATIENT
Start: 2018-11-07 | End: 2020-07-20

## 2018-11-07 RX ORDER — TAMSULOSIN HYDROCHLORIDE 0.4 MG/1
CAPSULE ORAL
Qty: 90 CAPSULE | Refills: 1 | Status: SHIPPED | OUTPATIENT
Start: 2018-11-07 | End: 2019-06-20 | Stop reason: SDUPTHER

## 2018-11-07 RX ORDER — SIMVASTATIN 40 MG/1
40 TABLET, FILM COATED ORAL NIGHTLY
Qty: 90 TABLET | Refills: 1 | Status: SHIPPED | OUTPATIENT
Start: 2018-11-07 | End: 2019-06-20 | Stop reason: SDUPTHER

## 2018-11-07 NOTE — PROGRESS NOTES
SUBJECTIVE     Chief Complaint   Patient presents with    Rash       HPI  Fan Paz is a 78 y.o. male with multiple medical diagnoses as listed in the medical history and problem list that presents for evaluation of a rash to the B/L groin x 3-4 weeks. Pt reports using deodorant in the area and then switched to powder, but still had the rash. He has been using diaper rash cream with only minimal improvement. Rash is erythematous and itchy.     PAST MEDICAL HISTORY:  Past Medical History:   Diagnosis Date    Arthritis     Cancer of palate     HTN (hypertension)     Hyperlipidemia     Prostate cancer     2011       PAST SURGICAL HISTORY:  Past Surgical History:   Procedure Laterality Date    ARTHROPLASTY, KNEE, TOTAL Left 2/6/2014    Performed by [facility]in [facility] at [facility] OR 2ND FLR    BACK SURGERY  y-6    Cancer of palate surgery      Late [facility]- [facility]     CARPAL TUNNEL RELEASE  8-14-13    right hand,Left hand 2013    COLONOSCOPY N/A 4/10/2017    Procedure: COLONOSCOPY;  Surgeon: Nilo Kelly MD;  Location: [facility];  Service: Endoscopy;  Laterality: N/A;    COLONOSCOPY N/A 4/10/2017    [facility]    COLONOSCOPY N/A 4/17/2014    [facility] at [facility]    KNEE SURGERY  y-40    left knee    KNEE SURGERY Right 12-1-15    TKR    Radio active seed Implant      January 2012    RELEASE, CARPAL TUNNEL Left 9/11/2013    Performed by Camelia [facility] at [facility] 1ST FLR    RELEASE, CARPAL TUNNEL Right 8/14/2013    [facility] at [facility] 1ST FLR    REPLACEMENT-KNEE-TOTAL Right 12/1/2015    Per[facility] by [facility] at [facility] OR 2ND FLR    TOTAL HIP ARTHROPLASTY  3/2011       SOCIAL HISTORY:  Social History     Socioeconomic History    Marital status:      Spouse name: Not on file    Number of children: Not on file    Years of education: Not on file    Highest education  level: Not on file   Social Needs    Financial resource strain: Not on file    Food insecurity - worry: Not on file    Food insecurity - inability: Not on file    Transportation needs - medical: Not on file    Transportation needs - non-medical: Not on file   Occupational History     Employer: RETIRED   Tobacco Use    Smoking status: Former Smoker     Packs/day: 3.00     Years: 20.00     Pack years: 60.00     Last attempt to quit: 7/10/1997     Years since quittin.3    Smokeless tobacco: Never Used    Tobacco comment: Quit -smoked for 40 years ,2 packs a day on an average   Substance and Sexual Activity    Alcohol use: No     Comment: used to drink Occasionally    Drug use: No    Sexual activity: Yes     Partners: Female   Other Topics Concern    Not on file   Social History Narrative    Not on file       FAMILY HISTORY:  Family History   Problem Relation Age of Onset    Coronary artery disease Father             Cancer Sister         Liver Cancer -    Diabetes Sister             No Known Problems Brother     No Known Problems Sister     No Known Problems Sister     No Known Problems Brother     No Known Problems Mother     Lung cancer Brother 60        - was a tobacco user, had lung cancer    Cancer Brother         Liver Cancer-     Lung cancer Sister         Alive    Breast cancer Sister     Clotting disorder Sister 75        blood clot on brain-alive    Stroke Brother             Glaucoma Cousin     No Known Problems Maternal Aunt     No Known Problems Maternal Uncle     No Known Problems Paternal Aunt     No Known Problems Paternal Uncle     No Known Problems Maternal Grandmother     No Known Problems Maternal Grandfather     No Known Problems Paternal Grandmother     No Known Problems Paternal Grandfather     Macular degeneration Neg Hx     Strabismus Neg Hx     Amblyopia Neg Hx     Blindness Neg Hx     Cataracts Neg Hx     Hypertension  "Neg Hx     Retinal detachment Neg Hx     Thyroid disease Neg Hx        ALLERGIES AND MEDICATIONS: updated and reviewed.  Review of patient's allergies indicates:  No Known Allergies  Current Outpatient Medications   Medication Sig Dispense Refill    fluticasone (FLONASE) 50 mcg/actuation nasal spray 1 spray (50 mcg total) by Each Nare route once daily. 1 Bottle 2    levocetirizine (XYZAL) 5 MG tablet Take 1 tablet (5 mg total) by mouth every evening. 90 tablet 3    simvastatin (ZOCOR) 40 MG tablet Take 1 tablet (40 mg total) by mouth every evening. 90 tablet 1    tamsulosin (FLOMAX) 0.4 mg Cap TAKE 1 CAPSULE(S) BY MOUTH ONCE A DAY 90 capsule 1    ketoconazole (NIZORAL) 2 % cream Apply topically once daily. 30 g 2    omeprazole (PRILOSEC) 20 MG capsule Take 1 capsule (20 mg total) by mouth once daily. 30 capsule 2     No current facility-administered medications for this visit.        ROS  Review of Systems   Constitutional: Negative for chills and fever.   HENT: Negative for hearing loss and sore throat.    Eyes: Negative for visual disturbance.   Respiratory: Negative for cough and shortness of breath.    Cardiovascular: Negative for chest pain, palpitations and leg swelling.   Gastrointestinal: Negative for abdominal pain, constipation, diarrhea, nausea and vomiting.   Genitourinary: Negative for dysuria, frequency and urgency.   Musculoskeletal: Negative for arthralgias, joint swelling and myalgias.   Skin: Positive for rash. Negative for wound.   Neurological: Negative for headaches.   Psychiatric/Behavioral: Negative for agitation and confusion. The patient is not nervous/anxious.          OBJECTIVE     Physical Exam  Vitals:    11/07/18 0858   BP: 126/72   Pulse: 73   Temp: 98.1 °F (36.7 °C)    Body mass index is 30.82 kg/m².  Weight: 84 kg (185 lb 3 oz)   Height: 5' 5" (165.1 cm)     Physical Exam   Constitutional: He is oriented to person, place, and time. He appears well-developed and " well-nourished. No distress.   HENT:   Head: Normocephalic and atraumatic.   Right Ear: External ear normal.   Left Ear: External ear normal.   Nose: Nose normal.   Mouth/Throat: Oropharynx is clear and moist.   Eyes: Conjunctivae and EOM are normal. Right eye exhibits no discharge. Left eye exhibits no discharge. No scleral icterus.   Neck: Normal range of motion. Neck supple. No JVD present. No tracheal deviation present.   Cardiovascular: Normal rate, regular rhythm, normal heart sounds and intact distal pulses. Exam reveals no gallop and no friction rub.   No murmur heard.  Pulmonary/Chest: Effort normal and breath sounds normal. No respiratory distress. He has no wheezes.   Abdominal: Soft. Bowel sounds are normal. He exhibits no distension and no mass. There is no tenderness. There is no rebound and no guarding.   Musculoskeletal: Normal range of motion. He exhibits no edema, tenderness or deformity.   Neurological: He is alert and oriented to person, place, and time. He exhibits normal muscle tone. Coordination normal.   Skin: Skin is warm and dry. No rash noted. No erythema.   Psychiatric: He has a normal mood and affect. His behavior is normal. Judgment and thought content normal.         Health Maintenance       Date Due Completion Date    Zoster Vaccine 03/12/2001 ---    Pneumococcal (65+) (2 of 2 - PCV13) 09/21/2016 9/21/2015    TETANUS VACCINE 04/02/2023 4/2/2013 (Done)    Override on 4/2/2013: Done    Lipid Panel 05/01/2023 5/1/2018            ASSESSMENT     78 y.o. male with     1. Tinea cruris    2. Hyperlipidemia, unspecified hyperlipidemia type    3. Prediabetes    4. Prostate cancer        PLAN:     1. Tinea cruris  - Pt advised to keep area dry  - ketoconazole (NIZORAL) 2 % cream; Apply topically once daily.  Dispense: 30 g; Refill: 2    2. Hyperlipidemia, unspecified hyperlipidemia type  - Stable; no acute issues  - The current medical regimen is effective;  continue present plan and  medications.  - simvastatin (ZOCOR) 40 MG tablet; Take 1 tablet (40 mg total) by mouth every evening.  Dispense: 90 tablet; Refill: 1    3. Prediabetes  - Stable; no acute issues  - Monitor    4. Prostate cancer  - Stable; no acute issues  - The current medical regimen is effective;  continue present plan and medications.  - tamsulosin (FLOMAX) 0.4 mg Cap; TAKE 1 CAPSULE(S) BY MOUTH ONCE A DAY  Dispense: 90 capsule; Refill: 1        RTC in 1-2 weeks as needed for any acute worsening of current condition or failure to improve; pt offered, but refused pneumo 13       Qiana Lorenz MD  11/07/2018 9:15 AM        No Follow-up on file.

## 2019-03-25 DIAGNOSIS — C61 CANCER OF PROSTATE: Primary | ICD-10-CM

## 2019-04-10 ENCOUNTER — OFFICE VISIT (OUTPATIENT)
Dept: FAMILY MEDICINE | Facility: CLINIC | Age: 79
End: 2019-04-10
Payer: MEDICARE

## 2019-04-10 VITALS
HEART RATE: 71 BPM | RESPIRATION RATE: 17 BRPM | SYSTOLIC BLOOD PRESSURE: 130 MMHG | TEMPERATURE: 99 F | OXYGEN SATURATION: 96 % | DIASTOLIC BLOOD PRESSURE: 78 MMHG | WEIGHT: 188.25 LBS | HEIGHT: 65 IN | BODY MASS INDEX: 31.36 KG/M2

## 2019-04-10 DIAGNOSIS — I10 ESSENTIAL HYPERTENSION: ICD-10-CM

## 2019-04-10 DIAGNOSIS — I83.93 ASYMPTOMATIC VARICOSE VEINS OF BILATERAL LOWER EXTREMITIES: ICD-10-CM

## 2019-04-10 DIAGNOSIS — J30.2 SEASONAL ALLERGIES: ICD-10-CM

## 2019-04-10 DIAGNOSIS — E78.00 PURE HYPERCHOLESTEROLEMIA: ICD-10-CM

## 2019-04-10 DIAGNOSIS — C61 PROSTATE CANCER: ICD-10-CM

## 2019-04-10 DIAGNOSIS — E66.09 CLASS 1 OBESITY DUE TO EXCESS CALORIES WITH SERIOUS COMORBIDITY AND BODY MASS INDEX (BMI) OF 31.0 TO 31.9 IN ADULT: ICD-10-CM

## 2019-04-10 DIAGNOSIS — Z00.00 VISIT FOR WELL MAN HEALTH CHECK: Primary | ICD-10-CM

## 2019-04-10 DIAGNOSIS — M17.11 PRIMARY OSTEOARTHRITIS OF RIGHT KNEE: ICD-10-CM

## 2019-04-10 DIAGNOSIS — K21.9 GASTROESOPHAGEAL REFLUX DISEASE, ESOPHAGITIS PRESENCE NOT SPECIFIED: ICD-10-CM

## 2019-04-10 PROBLEM — H61.23 BILATERAL IMPACTED CERUMEN: Status: RESOLVED | Noted: 2018-04-05 | Resolved: 2019-04-10

## 2019-04-10 PROCEDURE — 99999 PR PBB SHADOW E&M-EST. PATIENT-LVL III: CPT | Mod: PBBFAC,,, | Performed by: FAMILY MEDICINE

## 2019-04-10 PROCEDURE — 99999 PR PBB SHADOW E&M-EST. PATIENT-LVL III: ICD-10-PCS | Mod: PBBFAC,,, | Performed by: FAMILY MEDICINE

## 2019-04-10 PROCEDURE — 99397 PER PM REEVAL EST PAT 65+ YR: CPT | Mod: S$PBB,,, | Performed by: FAMILY MEDICINE

## 2019-04-10 PROCEDURE — 99213 OFFICE O/P EST LOW 20 MIN: CPT | Mod: PBBFAC,PN | Performed by: FAMILY MEDICINE

## 2019-04-10 PROCEDURE — 99397 PR PREVENTIVE VISIT,EST,65 & OVER: ICD-10-PCS | Mod: S$PBB,,, | Performed by: FAMILY MEDICINE

## 2019-04-10 RX ORDER — FLUTICASONE PROPIONATE 50 MCG
1 SPRAY, SUSPENSION (ML) NASAL DAILY
Qty: 1 BOTTLE | Refills: 5 | Status: SHIPPED | OUTPATIENT
Start: 2019-04-10 | End: 2020-06-02

## 2019-04-10 NOTE — PROGRESS NOTES
"Well Man VISIT      CHIEF COMPLAINT  Chief Complaint   Patient presents with    Annual Exam       HPI  Fan Paz is a 79 y.o. male who presents for physical.     Social Factors  Tobacco use: No  Ready to Quit: No  Alcohol: seldom use  Intimate partner violence screening  Living Will/POA: No  Regular Exercise: No    Depression  Over the past two weeks, have you felt down, depressed, or hopeless? No  Over the past two weeks, have you felt little interest or pleasure in doing things? No    Reproductive Health  STD screening in last year: deferred  HIV screening: deferred    Screen for Chronic Disease  CHD Risk Factors: advanced age (older than 55 for men, 65 for women), male gender and obesity (BMI >= 30 kg/m2)  Estimated body mass index is 31.33 kg/m² as calculated from the following:    Height as of this encounter: 5' 5" (1.651 m).    Weight as of this encounter: 85.4 kg (188 lb 4.4 oz).  Dyslipidemia screening needed: order 4/10/19  T2DM screening needed: order 4/10/19  Colonoscopy needed: n/a  PSA needed: order by urology  AAA screening needed:n/a  Screen men 35 years and older, and men 20 to 34 years of age who have cardiovascular risk factors for dyslipidemia  Begin screening colonoscopies at 50 years of age in men of average risk, and continue until 75 years of age; offer fecal occult blood testing every year, flexible sigmoidoscopy every five years combined with fecal occult blood testing every three years, or colonoscopy every 10 years   The American Urological Association recommends offering PSA testing and digital rectal examination to well-informed men beginning at 40 years of age and continuing until life expectancy is less than 10 years  Screen once with ultrasonography in men 65 to 75 years of age if they have a family history or have smoked at [id number] cigarettes in their lifetime  Screen men with a sustained blood pressure greater than 135/80 mm Hg for " "T2DM      Immunizations  delayed    ALLERGIES and MEDS were verified.   PMHx, PSHx, FHx, SOCIALHx were updated as pertinent.    REVIEW OF SYSTEMS  Review of Systems   Constitutional: Negative.    HENT: Negative.    Eyes: Negative.    Respiratory: Negative.    Cardiovascular: Negative.    Gastrointestinal: Negative.    Genitourinary: Negative.    Musculoskeletal: Positive for joint pain and myalgias.   Skin: Negative.    Neurological: Negative.    Psychiatric/Behavioral: Negative.          PHYSICAL EXAM  VITAL SIGNS: /78 (BP Location: Left arm, Patient Position: Sitting, BP Method: Medium (Manual))   Pulse 71   Temp 98.6 °F (37 °C) (Oral)   Resp 17   Ht 5' 5" (1.651 m)   Wt 85.4 kg (188 lb 4.4 oz)   SpO2 96%   BMI 31.33 kg/m²   GEN: Well developed, Well nourished, No acute distress.  HENT: Normocephalic, Atraumatic, Bilateral external ears normal, Nose normal, Oropharynx moist, No oral exudates.   Eyes: PERRL, EOMI, Conjunctiva normal, No discharge.   Neck: Supple, No tenderness.  Lymphatic: No cervical or supraclavicular lymphadenopathy noted.   Cardiovascular: Normal heart rate, Normal rhythm, No murmurs, No rubs, No gallops.   Thorax & Lungs: Normal breath sounds, No respiratory distress, No wheezing.  Abdomen: Soft, No tenderness, Bowel sounds normal.  Genital: deferred  Skin: Warm, Dry, No erythema, No rash.   Extremities: No edema, No tenderness.       ASSESSMENT/PLAN    Fan was seen today for annual exam.    Diagnoses and all orders for this visit:    Visit for [facility] health check  -     CBC auto differential; Future  -     Comprehensive metabolic panel; Future  -     Lipid panel; Future  -     Urinalysis; Future    Asymptomatic varicose veins of bilateral lower extremities    Essential hypertension    Pure hypercholesterolemia  -     Lipid panel; Future    Prostate cancer    Class 1 obesity due to excess calories with serious comorbidity and body mass index (BMI) of 31.0 to 31.9 in " adult    Gastroesophageal reflux disease, esophagitis presence not specified    Primary osteoarthritis of right knee    Seasonal allergies  -     fluticasone (FLONASE) 50 mcg/actuation nasal spray; 1 spray (50 mcg total) by Each Nare route once daily.       FOLLOW UP: 6 months or sooner if needed  Follow up with urology as scheduled      Otilio Damon MD

## 2019-04-12 ENCOUNTER — TELEPHONE (OUTPATIENT)
Dept: FAMILY MEDICINE | Facility: CLINIC | Age: 79
End: 2019-04-12

## 2019-04-12 NOTE — TELEPHONE ENCOUNTER
Spoke with Mrs.Betty Paz and informed her about Mr. Fan Paz lab results. Pt wife expressed understanding verbally.

## 2019-05-24 ENCOUNTER — LAB VISIT (OUTPATIENT)
Dept: LAB | Facility: HOSPITAL | Age: 79
End: 2019-05-24
Payer: MEDICARE

## 2019-05-24 DIAGNOSIS — C61 CANCER OF PROSTATE: ICD-10-CM

## 2019-05-24 LAB — COMPLEXED PSA SERPL-MCNC: <0.01 NG/ML (ref 0–4)

## 2019-05-24 PROCEDURE — 84153 ASSAY OF PSA TOTAL: CPT

## 2019-05-24 PROCEDURE — 36415 COLL VENOUS BLD VENIPUNCTURE: CPT | Mod: PN

## 2019-05-31 ENCOUNTER — OFFICE VISIT (OUTPATIENT)
Dept: RADIATION ONCOLOGY | Facility: CLINIC | Age: 79
End: 2019-05-31
Payer: MEDICARE

## 2019-05-31 VITALS
DIASTOLIC BLOOD PRESSURE: 80 MMHG | RESPIRATION RATE: 16 BRPM | HEIGHT: 66 IN | BODY MASS INDEX: 30.18 KG/M2 | SYSTOLIC BLOOD PRESSURE: 148 MMHG | WEIGHT: 187.81 LBS | HEART RATE: 73 BPM

## 2019-05-31 DIAGNOSIS — C61 PROSTATE CANCER: Primary | ICD-10-CM

## 2019-05-31 PROCEDURE — 99999 PR PBB SHADOW E&M-EST. PATIENT-LVL III: CPT | Mod: PBBFAC,,, | Performed by: RADIOLOGY

## 2019-05-31 PROCEDURE — 99213 PR OFFICE/OUTPT VISIT, EST, LEVL III, 20-29 MIN: ICD-10-PCS | Mod: S$PBB,,, | Performed by: RADIOLOGY

## 2019-05-31 PROCEDURE — 99213 OFFICE O/P EST LOW 20 MIN: CPT | Mod: PBBFAC | Performed by: RADIOLOGY

## 2019-05-31 PROCEDURE — 99213 OFFICE O/P EST LOW 20 MIN: CPT | Mod: S$PBB,,, | Performed by: RADIOLOGY

## 2019-05-31 PROCEDURE — 99999 PR PBB SHADOW E&M-EST. PATIENT-LVL III: ICD-10-PCS | Mod: PBBFAC,,, | Performed by: RADIOLOGY

## 2019-05-31 NOTE — PROGRESS NOTES
Subjective:       Patient ID: Fan Paz is a 79 y.o. male.    Chief Complaint: Prostate Cancer (1yr f/u;psa)    This patient returns for his annual visit.     Mr. Paz has a history of Stage II (T1c, N0, M0) adenocarcinoma of the prostate. He presented with an elevated PSA of 5 ng/ml.   Biopsies from the left apex, left mid gland, left base, and right apex revealed adenocarcinoma. The [place] score was 7 (3+4) and biopsies from the left mid gland and left base. The tumor involved 30% and 60% of the specimens, respectively. [place]'s 6 adenocarcinoma was noted in biopsies from the left apex and right apex involving only 5% and 1% of the specimens, respectively. Further metastatic work up was negative. The patient was referred for definitive radiotherapy. He was enrolled on RTOG 0815. He was randomized to receive 6 months of hormonal ablation therapy along with radiotherapy. He completed 45 Gy external beam therapy followed by implantation with Palladium 103 seeds on 1/18/12.   The patient has remained BRIGHT since that time.  Today, the patient states he feels well.  No GI or  complaints.      Review of Systems   Constitutional: Negative for activity change, appetite change, chills and fatigue.   Respiratory: Negative for cough and shortness of breath.    Cardiovascular: Negative for chest pain and palpitations.   Gastrointestinal: Negative for abdominal pain, blood in stool, constipation and diarrhea.   Genitourinary: Negative for difficulty urinating, dysuria, flank pain, frequency, hematuria and urgency.       Objective:      Physical Exam   Constitutional: He appears well-developed and well-nourished. No distress.   Neck: Normal range of motion. Neck supple.   Pulmonary/Chest: Effort normal. No respiratory distress. He has no wheezes.   Abdominal: Soft. He exhibits no distension. There is no tenderness.   Genitourinary:   Genitourinary Comments: rectal - external hemorrhoids.  Normal tone,   prostate fossa depressed  no nodules or induration.         PSA < 0.01 ng/ml  Assessment:       1. Prostate cancer        Plan:       Doing well  no evidence of recurrent or progressive disease.  Plan follow up in 1 year with PSA

## 2019-06-20 DIAGNOSIS — C61 PROSTATE CANCER: ICD-10-CM

## 2019-06-20 DIAGNOSIS — E78.5 HYPERLIPIDEMIA, UNSPECIFIED HYPERLIPIDEMIA TYPE: ICD-10-CM

## 2019-06-20 RX ORDER — SIMVASTATIN 40 MG/1
40 TABLET, FILM COATED ORAL NIGHTLY
Qty: 90 TABLET | Refills: 1 | Status: SHIPPED | OUTPATIENT
Start: 2019-06-20 | End: 2019-12-30

## 2019-06-20 RX ORDER — TAMSULOSIN HYDROCHLORIDE 0.4 MG/1
CAPSULE ORAL
Qty: 90 CAPSULE | Refills: 1 | Status: SHIPPED | OUTPATIENT
Start: 2019-06-20 | End: 2019-12-30

## 2019-06-20 NOTE — TELEPHONE ENCOUNTER
----- Message from Megha Ocasio sent at 6/20/2019  4:50 PM CDT -----  Contact: Self  Type: RX Refill Request    Who Called: Fan    Refill or New Rx:Refill    RX Name and Strength:tamsulosin (FLOMAX) 0.4 mg Cap 90 capsule   simvastatin (ZOCOR) 40 MG tablet 90 tablet     How is the patient currently taking it? (ex. 1XDay):    Is this a 30 day or 90 day RX:    [facility] phone number[facility] [address] [phone] (Phone)  [phone] (Fax)    Local or Mail Order:Local    Ordering Provider:Page    Would the patient rather a call back or a response via [facility]? Call    Best Call Back Number:[phone] or [phone]    Additional Information: medication refill

## 2019-07-19 ENCOUNTER — OFFICE VISIT (OUTPATIENT)
Dept: FAMILY MEDICINE | Facility: CLINIC | Age: 79
End: 2019-07-19
Payer: MEDICARE

## 2019-07-19 VITALS
HEART RATE: 71 BPM | TEMPERATURE: 97 F | RESPIRATION RATE: 17 BRPM | WEIGHT: 186.75 LBS | SYSTOLIC BLOOD PRESSURE: 115 MMHG | OXYGEN SATURATION: 96 % | BODY MASS INDEX: 30.01 KG/M2 | DIASTOLIC BLOOD PRESSURE: 63 MMHG | HEIGHT: 66 IN

## 2019-07-19 DIAGNOSIS — Z91.09 ENVIRONMENTAL ALLERGIES: Primary | ICD-10-CM

## 2019-07-19 PROCEDURE — 99214 PR OFFICE/OUTPT VISIT, EST, LEVL IV, 30-39 MIN: ICD-10-PCS | Mod: S$PBB,,, | Performed by: FAMILY MEDICINE

## 2019-07-19 PROCEDURE — 99213 OFFICE O/P EST LOW 20 MIN: CPT | Mod: PBBFAC,PN | Performed by: FAMILY MEDICINE

## 2019-07-19 PROCEDURE — 99214 OFFICE O/P EST MOD 30 MIN: CPT | Mod: S$PBB,,, | Performed by: FAMILY MEDICINE

## 2019-07-19 PROCEDURE — 99999 PR PBB SHADOW E&M-EST. PATIENT-LVL III: ICD-10-PCS | Mod: PBBFAC,,, | Performed by: FAMILY MEDICINE

## 2019-07-19 PROCEDURE — 99999 PR PBB SHADOW E&M-EST. PATIENT-LVL III: CPT | Mod: PBBFAC,,, | Performed by: FAMILY MEDICINE

## 2019-07-19 RX ORDER — MONTELUKAST SODIUM 10 MG/1
10 TABLET ORAL NIGHTLY
Qty: 90 TABLET | Refills: 3 | Status: SHIPPED | OUTPATIENT
Start: 2019-07-19 | End: 2020-09-28

## 2019-07-19 NOTE — PROGRESS NOTES
"Routine Office Visit    Patient Name: Fan Paz    : 1940  MRN: [id number]    Subjective:  Fan is a 79 y.o. male who presents today for:    1. Allergies  Patient presenting today for recurring environmental/seasonal allergies.  He states that he requires being on allergy medication year round or else he gets itchy eyes and runny nose "all the time".  He has been tried on a few medications including xyzal and it "иван helps".  He never has shortness of breath, wheezing, hives, rash, n/v/d.  He has not tried nasal steroids, but would rather not. He denies any history of asthma/COPD    Past Medical History  Past Medical History:   Diagnosis Date    Arthritis     Cancer of palate     HTN (hypertension)     Hyperlipidemia     Prostate cancer            Past Surgical History  Past Surgical History:   Procedure Laterality Date    ARTHROPLASTY, KNEE, TOTAL Left 2014    [facility]formed [facility] OR 2ND FLR    BACK SURGERY  y-6    [facility]      [facility]     CARPAL TUNNEL RELEASE  13    right hand,Left hand     COLONOSCOPY N/A 4/10/2017    [facility]    COLONOSCOPY N/A 2014    [facility] END[facility]    KNEE SURGERY  y-40    left knee    KNEE SURGERY Right 12-1-15    TKR    Radio active seed Implant      2012    RELEASE, CARPAL TUNNEL Left 2013    [facility] 1ST FLR    RELEASE, CARPAL TUNNEL Right 2013    [facility]ST [facility]LR    REPLACEMENT-KNEE-TOTAL Right 2015    [facility] at [facility] 2ND FLR    TOTAL HIP ARTHROPLASTY  3/2011       Family History  Family History   Problem Relation Age of Onset    Coronary artery disease Father             Cancer Sister         Liver Cancer -    Diabetes Sister             No Known Problems " Brother     No Known Problems Sister     No Known Problems Sister     No Known Problems Brother     No Known Problems Mother     Lung cancer Brother 60        - was a tobacco user, had lung cancer    Cancer Brother         Liver Cancer-     Lung cancer Sister         Alive    Breast cancer Sister     Clotting disorder Sister 75        blood clot on brain-alive    Stroke Brother             Glaucoma Cousin     No Known Problems Maternal Aunt     No Known Problems Maternal Uncle     No Known Problems Paternal Aunt     No Known Problems Paternal Uncle     No Known Problems Maternal Grandmother     No Known Problems Maternal Grandfather     No Known Problems Paternal Grandmother     No Known Problems Paternal Grandfather     Macular degeneration Neg Hx     Strabismus Neg Hx     Amblyopia Neg Hx     Blindness Neg Hx     Cataracts Neg Hx     Hypertension Neg Hx     Retinal detachment Neg Hx     Thyroid disease Neg Hx        Social History  Social History     Socioeconomic History    Marital status:      Spouse name: Not on file    Number of children: Not on file    Years of education: Not on file    Highest education level: Not on file   Occupational History     Employer: RETIRED   Social Needs    Financial resource strain: Not on file    Food insecurity:     Worry: Not on file     Inability: Not on file    Transportation needs:     Medical: Not on file     Non-medical: Not on file   Tobacco Use    Smoking status: Former Smoker     Packs/day: 3.00     Years: 20.00     Pack years: 60.00     Last attempt to quit: 7/10/1997     Years since quittin.0    Smokeless tobacco: Never Used    Tobacco comment: Quit -smoked for 40 years ,2 packs a day on an average   Substance and Sexual Activity    Alcohol use: No     Comment: used to drink Occasionally    Drug use: No    Sexual activity: Yes     Partners: Female   Lifestyle    Physical activity:     Days per week:  "Not on file     Minutes per session: Not on file    Stress: Not on file   Relationships    Social connections:     Talks on phone: Not on file     Gets together: Not on file     Attends Nondenominational service: Not on file     Active member of club or organization: Not on file     Attends meetings of clubs or organizations: Not on file     Relationship status: Not on file   Other Topics Concern    Not on file   Social History Narrative    Not on file       Current Medications  Current Outpatient Medications on File Prior to Visit   Medication Sig Dispense Refill    fluticasone (FLONASE) 50 mcg/actuation nasal spray 1 spray (50 mcg total) by Each Nare route once daily. 1 Bottle 5    ketoconazole (NIZORAL) 2 % cream Apply topically once daily. 30 g 2    simvastatin (ZOCOR) 40 MG tablet Take 1 tablet (40 mg total) by mouth every evening. 90 tablet 1    tamsulosin (FLOMAX) 0.4 mg Cap TAKE 1 CAPSULE(S) BY MOUTH ONCE A DAY 90 capsule 1    [DISCONTINUED] levocetirizine (XYZAL) 5 MG tablet Take 1 tablet (5 mg total) by mouth every evening. 90 tablet 3    omeprazole (PRILOSEC) 20 MG capsule Take 1 capsule (20 mg total) by mouth once daily. 30 capsule 2     No current facility-administered medications on file prior to visit.        Allergies   Review of patient's allergies indicates:  No Known Allergies    Review of Systems (Pertinent positives)  Review of Systems   Constitutional: Negative.    HENT: Positive for congestion and sore throat. Negative for ear discharge, ear pain, hearing loss and tinnitus.    Respiratory: Negative.    Cardiovascular: Negative.    Gastrointestinal: Negative.    Musculoskeletal: Negative.    Skin: Negative.          /63 (BP Location: Left arm, Patient Position: Sitting, BP Method: Medium (Automatic))   Pulse 71   Temp 97.3 °F (36.3 °C) (Oral)   Resp 17   Ht 5' 5.98" (1.676 m)   Wt 84.7 kg (186 lb 11.7 oz)   SpO2 96%   BMI 30.15 kg/m²     GENERAL APPEARANCE: in no apparent " distress and well developed and well nourished  HEENT: PERRL, EOMI, Sclera clear, anicteric, Oropharynx clear, no lesions, Neck supple with midline trachea  NECK: normal, supple, no adenopathy, thyroid normal in size  RESPIRATORY: appears well, vitals normal, no respiratory distress, acyanotic, normal RR, chest clear, no wheezing, crepitations, rhonchi, normal symmetric air entry  HEART: regular rate and rhythm, S1, S2 normal, no murmur, click, rub or gallop.    ABDOMEN: abdomen is soft without tenderness, no masses, no hernias, no organomegaly, no rebound, no guarding. Suprapubic tenderness absent. No CVA tenderness.  SKIN: no rashes, no wounds, no other lesions  PSYCH: Alert, oriented x 3, thought content appropriate, speech normal, pleasant and cooperative, good eye contact, well groomed    Assessment/Plan:  Fan Paz is a 79 y.o. male who presents today for :    Fan was seen today for sinus problem.    Diagnoses and all orders for this visit:    Environmental allergies  -     montelukast (SINGULAIR) 10 mg tablet; Take 1 tablet (10 mg total) by mouth every evening.      1.  Patient to try singulair  2.  If no improvement will try nasal or oral steroids to get inflammation down  3.  Follow up as needed  4.  Warned that singulair could be sedating, so only to take at bedtime    Otilio Damon MD

## 2019-08-21 ENCOUNTER — OFFICE VISIT (OUTPATIENT)
Dept: FAMILY MEDICINE | Facility: CLINIC | Age: 79
End: 2019-08-21
Payer: MEDICARE

## 2019-08-21 VITALS
WEIGHT: 175.69 LBS | DIASTOLIC BLOOD PRESSURE: 80 MMHG | OXYGEN SATURATION: 98 % | BODY MASS INDEX: 28.23 KG/M2 | RESPIRATION RATE: 17 BRPM | TEMPERATURE: 98 F | SYSTOLIC BLOOD PRESSURE: 138 MMHG | HEART RATE: 76 BPM | HEIGHT: 66 IN

## 2019-08-21 DIAGNOSIS — H61.23 BILATERAL HEARING LOSS DUE TO CERUMEN IMPACTION: Primary | ICD-10-CM

## 2019-08-21 DIAGNOSIS — L72.0 EPIDERMAL CYST: ICD-10-CM

## 2019-08-21 PROCEDURE — 99999 PR PBB SHADOW E&M-EST. PATIENT-LVL IV: CPT | Mod: PBBFAC,,, | Performed by: FAMILY MEDICINE

## 2019-08-21 PROCEDURE — 99214 OFFICE O/P EST MOD 30 MIN: CPT | Mod: PBBFAC,PN | Performed by: FAMILY MEDICINE

## 2019-08-21 PROCEDURE — 99999 PR PBB SHADOW E&M-EST. PATIENT-LVL IV: ICD-10-PCS | Mod: PBBFAC,,, | Performed by: FAMILY MEDICINE

## 2019-08-21 PROCEDURE — 99214 OFFICE O/P EST MOD 30 MIN: CPT | Mod: S$PBB,,, | Performed by: FAMILY MEDICINE

## 2019-08-21 PROCEDURE — 99214 PR OFFICE/OUTPT VISIT, EST, LEVL IV, 30-39 MIN: ICD-10-PCS | Mod: S$PBB,,, | Performed by: FAMILY MEDICINE

## 2019-08-21 NOTE — PROGRESS NOTES
"Routine Office Visit    Patient Name: Fan Paz    : 1940  MRN: [id number]    Subjective:  Fan is a 79 y.o. male who presents today for:    1. Decreased hearing  Patient states that has been having decreased hearing for several weeks.  He states that when this has happened in the past it was from cerumen impaction. There is no pain in the ears and there has been no drainage from either ear.  He has not used any OTC medications to help    2.  Skin lesion  Patient states he has an area on his left shoulder that he squeezes from time to time to get "stuff" out.  He states that sometimes pus is present and other times not.  It is not painful or red.  Not warm to touch.  It fluctuates in size.    Past Medical History  Past Medical History:   Diagnosis Date    Arthritis     Cancer of palate     HTN (hypertension)     Hyperlipidemia     Prostate cancer            Past Surgical History  Past Surgical History:   Procedure Laterality Date    ARTHROPLASTY, KNEE, TOTAL Left 2014    Performed by Dustin [facility] at [facility] OR 2ND FLR    BACK SURGERY  y-6    Cancer of palate surgery      Late - [facility]     CARPAL TUNNEL RELEASE  13    right hand,Left hand     COLONOSCOPY N/A 4/10/2017    Performed by Nilo Kelly MD at [facility] ENDO    COLONOSCOPY N/A 2014    Performed by Nilo Kelly MD at [facility] ENDO    KNEE SURGERY  y-40    left knee    KNEE SURGERY Right 12-1-15    TKR    Radio active seed Implant      2012    RELEASE, CARPAL TUNNEL Left 2013    Performed by Camelia Leonard [facility] at [facility] OR 1ST FLR    RELEASE, CARPAL TUNNEL Right 2013    Performed by Camelia Leonard [facility] at [facility] OR 1ST FLR    REPLACEMENT-KNEE-TOTAL Right 2015    Performed by Dustin [facility] at [facility] OR 2ND FLR    TOTAL HIP ARTHROPLASTY  3/2011       Family History  Family History   Problem Relation Age of Onset    Coronary artery disease Father        "      Cancer Sister         Liver Cancer -    Diabetes Sister             No Known Problems Brother     No Known Problems Sister     No Known Problems Sister     No Known Problems Brother     No Known Problems Mother     Lung cancer Brother 60        - was a tobacco user, had lung cancer    Cancer Brother         Liver Cancer-     Lung cancer Sister         Alive    Breast cancer Sister     Clotting disorder Sister 75        blood clot on brain-alive    Stroke Brother             Glaucoma Cousin     No Known Problems Maternal Aunt     No Known Problems Maternal Uncle     No Known Problems Paternal Aunt     No Known Problems Paternal Uncle     No Known Problems Maternal Grandmother     No Known Problems Maternal Grandfather     No Known Problems Paternal Grandmother     No Known Problems Paternal Grandfather     Macular degeneration Neg Hx     Strabismus Neg Hx     Amblyopia Neg Hx     Blindness Neg Hx     Cataracts Neg Hx     Hypertension Neg Hx     Retinal detachment Neg Hx     Thyroid disease Neg Hx        Social History  Social History     Socioeconomic History    Marital status:      Spouse name: Not on file    Number of children: Not on file    Years of education: Not on file    Highest education level: Not on file   Occupational History     Employer: RETIRED   Social Needs    Financial resource strain: Not on file    Food insecurity:     Worry: Not on file     Inability: Not on file    Transportation needs:     Medical: Not on file     Non-medical: Not on file   Tobacco Use    Smoking status: Former Smoker     Packs/day: 3.00     Years: 20.00     Pack years: 60.00     Last attempt to quit: 7/10/1997     Years since quittin.1    Smokeless tobacco: Never Used    Tobacco comment: Quit -smoked for 40 years ,2 packs a day on an average   Substance and Sexual Activity    Alcohol use: No     Comment: used to drink Occasionally    Drug  "use: No    Sexual activity: Yes     Partners: Female   Lifestyle    Physical activity:     Days per week: Not on file     Minutes per session: Not on file    Stress: Not on file   Relationships    Social connections:     Talks on phone: Not on file     Gets together: Not on file     Attends Confucianist service: Not on file     Active member of club or organization: Not on file     Attends meetings of clubs or organizations: Not on file     Relationship status: Not on file   Other Topics Concern    Not on file   Social History Narrative    Not on file       Current Medications  Current Outpatient Medications on File Prior to Visit   Medication Sig Dispense Refill    fluticasone (FLONASE) 50 mcg/actuation nasal spray 1 spray (50 mcg total) by Each Nare route once daily. 1 Bottle 5    ketoconazole (NIZORAL) 2 % cream Apply topically once daily. 30 g 2    montelukast (SINGULAIR) 10 mg tablet Take 1 tablet (10 mg total) by mouth every evening. 90 tablet 3    simvastatin (ZOCOR) 40 MG tablet Take 1 tablet (40 mg total) by mouth every evening. 90 tablet 1    tamsulosin (FLOMAX) 0.4 mg Cap TAKE 1 CAPSULE(S) BY MOUTH ONCE A DAY 90 capsule 1    omeprazole (PRILOSEC) 20 MG capsule Take 1 capsule (20 mg total) by mouth once daily. 30 capsule 2     No current facility-administered medications on file prior to visit.        Allergies   Review of patient's allergies indicates:  No Known Allergies    Review of Systems (Pertinent positives)  Review of Systems   Constitutional: Negative.    HENT: Positive for ear pain. Negative for congestion, ear discharge and sore throat.    Eyes: Negative.    Respiratory: Negative.    Cardiovascular: Negative.    Gastrointestinal: Negative.    Skin:        +lump to right shoulder           /80 (BP Location: Left arm, Patient Position: Sitting, BP Method: Medium (Automatic))   Pulse 76   Temp 97.9 °F (36.6 °C) (Oral)   Resp 17   Ht 5' 5.98" (1.676 m)   Wt 79.7 kg (175 lb 11.3 " oz)   SpO2 98%   BMI 28.37 kg/m²     GENERAL APPEARANCE: in no apparent distress and well developed and well nourished  HEENT: PERRL, EOMI, Sclera clear, anicteric, Oropharynx clear, no lesions, Neck supple with midline trachea; bilateral cerumen impaction  NECK: normal, supple, no adenopathy, thyroid normal in size  RESPIRATORY: appears well, vitals normal, no respiratory distress, acyanotic, normal RR, chest clear, no wheezing, crepitations, rhonchi, normal symmetric air entry  HEART: regular rate and rhythm, S1, S2 normal, no murmur, click, rub or gallop.    ABDOMEN: abdomen is soft without tenderness, no masses, no hernias, no organomegaly, no rebound, no guarding. Suprapubic tenderness absent. No CVA tenderness.  SKIN:epidermal cyst to left posterior shoulder  PSYCH: Alert, oriented x 3, thought content appropriate, speech normal, pleasant and cooperative, good eye contact    Assessment/Plan:  Fan Paz is a 79 y.o. male who presents today for :    Fan was seen today for cerumen impaction.    Diagnoses and all orders for this visit:    Bilateral hearing loss due to cerumen impaction  -     Ear wax removal    Epidermal cyst  -     Ambulatory referral to Dermatology      1.  Attempted to remove excess cerumen with currett, but not successful, so will lavage ears  2.  Referred to derm for epidermal cyst  3.  Recommended he use debrox drops regularly to prevent cerumen impaction  4.  Follow up 3 months or sooner if needed    Otilio Damon MD

## 2019-10-31 ENCOUNTER — EXTERNAL CHRONIC CARE MANAGEMENT (OUTPATIENT)
Dept: PRIMARY CARE CLINIC | Facility: CLINIC | Age: 79
End: 2019-10-31
Payer: MEDICARE

## 2019-10-31 PROCEDURE — 99490 CHRNC CARE MGMT STAFF 1ST 20: CPT | Mod: S$PBB,,, | Performed by: FAMILY MEDICINE

## 2019-10-31 PROCEDURE — 99490 CHRNC CARE MGMT STAFF 1ST 20: CPT | Mod: PBBFAC,PN | Performed by: FAMILY MEDICINE

## 2019-10-31 PROCEDURE — 99490 PR CHRONIC CARE MGMT, 1ST 20 MIN: ICD-10-PCS | Mod: S$PBB,,, | Performed by: FAMILY MEDICINE

## 2019-11-30 ENCOUNTER — EXTERNAL CHRONIC CARE MANAGEMENT (OUTPATIENT)
Dept: PRIMARY CARE CLINIC | Facility: CLINIC | Age: 79
End: 2019-11-30
Payer: MEDICARE

## 2019-11-30 PROCEDURE — 99490 CHRNC CARE MGMT STAFF 1ST 20: CPT | Mod: PBBFAC,PN | Performed by: FAMILY MEDICINE

## 2019-11-30 PROCEDURE — 99490 PR CHRONIC CARE MGMT, 1ST 20 MIN: ICD-10-PCS | Mod: S$PBB,,, | Performed by: FAMILY MEDICINE

## 2019-11-30 PROCEDURE — 99490 CHRNC CARE MGMT STAFF 1ST 20: CPT | Mod: S$PBB,,, | Performed by: FAMILY MEDICINE

## 2019-12-09 ENCOUNTER — OFFICE VISIT (OUTPATIENT)
Dept: FAMILY MEDICINE | Facility: CLINIC | Age: 79
End: 2019-12-09
Payer: MEDICARE

## 2019-12-09 VITALS
OXYGEN SATURATION: 97 % | BODY MASS INDEX: 29.02 KG/M2 | TEMPERATURE: 98 F | SYSTOLIC BLOOD PRESSURE: 132 MMHG | WEIGHT: 180.56 LBS | HEART RATE: 71 BPM | DIASTOLIC BLOOD PRESSURE: 81 MMHG | RESPIRATION RATE: 17 BRPM | HEIGHT: 66 IN

## 2019-12-09 DIAGNOSIS — Z79.899 DEEP VEIN THROMBOSIS PROPHYLAXIS: ICD-10-CM

## 2019-12-09 DIAGNOSIS — Z86.711 HX PULMONARY EMBOLISM: ICD-10-CM

## 2019-12-09 DIAGNOSIS — I10 ESSENTIAL HYPERTENSION: Primary | ICD-10-CM

## 2019-12-09 DIAGNOSIS — J30.1 SEASONAL ALLERGIC RHINITIS DUE TO POLLEN: ICD-10-CM

## 2019-12-09 DIAGNOSIS — J31.0 CHRONIC RHINITIS: ICD-10-CM

## 2019-12-09 DIAGNOSIS — E78.5 HYPERLIPIDEMIA, UNSPECIFIED HYPERLIPIDEMIA TYPE: ICD-10-CM

## 2019-12-09 PROCEDURE — 99213 OFFICE O/P EST LOW 20 MIN: CPT | Mod: PBBFAC,PN | Performed by: FAMILY MEDICINE

## 2019-12-09 PROCEDURE — 99214 PR OFFICE/OUTPT VISIT, EST, LEVL IV, 30-39 MIN: ICD-10-PCS | Mod: S$PBB,,, | Performed by: FAMILY MEDICINE

## 2019-12-09 PROCEDURE — 1126F AMNT PAIN NOTED NONE PRSNT: CPT | Mod: ,,, | Performed by: FAMILY MEDICINE

## 2019-12-09 PROCEDURE — 99999 PR PBB SHADOW E&M-EST. PATIENT-LVL III: ICD-10-PCS | Mod: PBBFAC,,, | Performed by: FAMILY MEDICINE

## 2019-12-09 PROCEDURE — 99214 OFFICE O/P EST MOD 30 MIN: CPT | Mod: S$PBB,,, | Performed by: FAMILY MEDICINE

## 2019-12-09 PROCEDURE — 1159F MED LIST DOCD IN RCRD: CPT | Mod: ,,, | Performed by: FAMILY MEDICINE

## 2019-12-09 PROCEDURE — 1159F PR MEDICATION LIST DOCUMENTED IN MEDICAL RECORD: ICD-10-PCS | Mod: ,,, | Performed by: FAMILY MEDICINE

## 2019-12-09 PROCEDURE — 1126F PR PAIN SEVERITY QUANTIFIED, NO PAIN PRESENT: ICD-10-PCS | Mod: ,,, | Performed by: FAMILY MEDICINE

## 2019-12-09 PROCEDURE — 99999 PR PBB SHADOW E&M-EST. PATIENT-LVL III: CPT | Mod: PBBFAC,,, | Performed by: FAMILY MEDICINE

## 2019-12-09 NOTE — PROGRESS NOTES
Routine Office Visit    Patient Name: Fan Paz    : 1940  MRN: [id number]    Subjective:  Fan is a 79 y.o. male who presents today for:    1. General health  Patient presenting for yearly evaluation.  He has no new complaints.  He states that he knows he did gain about 5 pounds over the pat few months, but usually does that this time of year.  He does suffer with prediabetes (A1c in 2018 was 5.9 and most recent fasting glucose was 119mg/dl) and arthritis, but states that neither have been causing him any problems lately.  He has had both knees and the right hip replaced due to arthritis.  He denies any hypoglycemic episodes.  Patient stays active, but states he does eat a lot of sweets.  He states that he is following up with urology as scheduled due to history of prostate CA.  He has not had any changes in urinary habits and denies any dysuria or hematuria. He does have chronic rhinitis that he states he had surgery for years ago at [facility].  He has been tried on nasal sprays and oral antihistamines, but no relief.  He is requesting to see ENT.    Past Medical History  Past Medical History:   Diagnosis Date    Arthritis     Cancer of palate     HTN (hypertension)     Hyperlipidemia     Prostate cancer            Past Surgical History  Past Surgical History:   Procedure Laterality Date    BACK SURGERY  y-6    Cancer of palate surgery      Late - [facility]     CARPAL TUNNEL RELEASE  13    right hand,Left hand     COLONOSCOPY N/A 4/10/2017    Procedure: COLONOSCOPY;  Surgeon: Nilo Kelly MD;  Location[facility];  Service: Endoscopy;  Laterality: N/A;    KNEE SURGERY  y-40    left knee    KNEE SURGERY Right 12-1-15    TKR    Radio active seed Implant      2012    TOTAL HIP ARTHROPLASTY  3/2011       Family History  Family History   Problem Relation Age of Onset    Coronary artery disease Father             Cancer Sister         Liver Cancer  -    Diabetes Sister             No Known Problems Brother     No Known Problems Sister     No Known Problems Sister     No Known Problems Brother     No Known Problems Mother     Lung cancer Brother 60        - was a tobacco user, had lung cancer    Cancer Brother         Liver Cancer-     Lung cancer Sister         Alive    Breast cancer Sister     Clotting disorder Sister 75        blood clot on brain-alive    Stroke Brother             Glaucoma Cousin     No Known Problems Maternal Aunt     No Known Problems Maternal Uncle     No Known Problems Paternal Aunt     No Known Problems Paternal Uncle     No Known Problems Maternal Grandmother     No Known Problems Maternal Grandfather     No Known Problems Paternal Grandmother     No Known Problems Paternal Grandfather     Macular degeneration Neg Hx     Strabismus Neg Hx     Amblyopia Neg Hx     Blindness Neg Hx     Cataracts Neg Hx     Hypertension Neg Hx     Retinal detachment Neg Hx     Thyroid disease Neg Hx        Social History  Social History     Socioeconomic History    Marital status:      Spouse name: Not on file    Number of children: Not on file    Years of education: Not on file    Highest education level: Not on file   Occupational History     Employer: RETIRED   Social Needs    Financial resource strain: Not on file    Food insecurity:     Worry: Not on file     Inability: Not on file    Transportation needs:     Medical: Not on file     Non-medical: Not on file   Tobacco Use    Smoking status: Former Smoker     Packs/day: 3.00     Years: 20.00     Pack years: 60.00     Last attempt to quit: 7/10/1997     Years since quittin.4    Smokeless tobacco: Never Used    Tobacco comment: Quit -smoked for 40 years ,2 packs a day on an average   Substance and Sexual Activity    Alcohol use: No     Comment: used to drink Occasionally    Drug use: No    Sexual activity: Yes     Partners:  Female   Lifestyle    Physical activity:     Days per week: Not on file     Minutes per session: Not on file    Stress: Not on file   Relationships    Social connections:     Talks on phone: Not on file     Gets together: Not on file     Attends Spiritism service: Not on file     Active member of club or organization: Not on file     Attends meetings of clubs or organizations: Not on file     Relationship status: Not on file   Other Topics Concern    Not on file   Social History Narrative    Not on file       Current Medications  Current Outpatient Medications on File Prior to Visit   Medication Sig Dispense Refill    fluticasone (FLONASE) 50 mcg/actuation nasal spray 1 spray (50 mcg total) by Each Nare route once daily. 1 Bottle 5    FLUZONE HIGH-DOSE 2019-20, PF, 180 mcg/0.5 mL Syrg PHARMACIST ADMINISTERED IMMUNIZATION ADMINISTERED AT TIME OF DISPENSING  0    ketoconazole (NIZORAL) 2 % cream Apply topically once daily. 30 g 2    montelukast (SINGULAIR) 10 mg tablet Take 1 tablet (10 mg total) by mouth every evening. 90 tablet 3    simvastatin (ZOCOR) 40 MG tablet Take 1 tablet (40 mg total) by mouth every evening. 90 tablet 1    tamsulosin (FLOMAX) 0.4 mg Cap TAKE 1 CAPSULE(S) BY MOUTH ONCE A DAY 90 capsule 1    [DISCONTINUED] omeprazole (PRILOSEC) 20 MG capsule Take 1 capsule (20 mg total) by mouth once daily. 30 capsule 2     No current facility-administered medications on file prior to visit.        Allergies   Review of patient's allergies indicates:  No Known Allergies    Review of Systems (Pertinent positives)  Review of Systems   Constitutional: Negative.    HENT: Negative.    Eyes: Negative.    Respiratory: Negative.    Cardiovascular: Negative.    Gastrointestinal: Negative.    Genitourinary: Negative.    Musculoskeletal: Negative.    Skin: Negative.    Neurological: Negative.          /81 (BP Location: Left arm, Patient Position: Sitting, BP Method: Medium (Automatic))   Pulse 71    "Temp 97.8 °F (36.6 °C) (Oral)   Resp 17   Ht 5' 5.98" (1.676 m)   Wt 81.9 kg (180 lb 8.9 oz)   SpO2 97%   BMI 29.16 kg/m²     GENERAL APPEARANCE: in no apparent distress and well developed and well nourished  HEENT: PERRL, EOMI, Sclera clear, anicteric, Oropharynx clear, no lesions, Neck supple with midline trachea  NECK: normal, supple, no adenopathy, thyroid normal in size  RESPIRATORY: appears well, vitals normal, no respiratory distress, acyanotic, normal RR, chest clear, no wheezing, crepitations, rhonchi, normal symmetric air entry  HEART: regular rate and rhythm, S1, S2 normal, no murmur, click, rub or gallop.    ABDOMEN: abdomen is soft without tenderness, no masses, no hernias, no organomegaly, no rebound, no guarding. Suprapubic tenderness absent. No CVA tenderness.  NEUROLOGIC: normal without focal findings, CN II-XII are intact.   SKIN: no rashes, no wounds, no other lesions  PSYCH: Alert, oriented x 3, thought content appropriate, speech normal, pleasant and cooperative, good eye contact, well groomed    Assessment/Plan:  Fan Paz is a 79 y.o. male who presents today for :    Fan was seen today for follow-up.    Diagnoses and all orders for this visit:    Essential hypertension    Chronic rhinitis  -     Ambulatory referral to ENT    Deep vein thrombosis prophylaxis    Hx pulmonary embolism-6/20/09- bilateral lower lobe pulmonary emboli    Hyperlipidemia, unspecified hyperlipidemia type    Seasonal allergic rhinitis due to pollen      1. Labs UTD  2. He is to take medications as prescribed  3.  He is to call with any concerns  4.  Follow up 6 months or sooner if needed  5.  He declines prevnar at this time.  6.  He is to go to the ED for any chest pain, shortness of breath, weakness, numbness, slurred speech, or facial droop  7.  Encouraged patient to cut back on sweets and to lose about 10-15 pounds more to better control blood sugars and pressure      Otilio Damon MD    "

## 2019-12-30 DIAGNOSIS — C61 PROSTATE CANCER: ICD-10-CM

## 2019-12-30 DIAGNOSIS — E78.5 HYPERLIPIDEMIA, UNSPECIFIED HYPERLIPIDEMIA TYPE: ICD-10-CM

## 2019-12-30 RX ORDER — SIMVASTATIN 40 MG/1
TABLET, FILM COATED ORAL
Qty: 90 TABLET | Refills: 0 | Status: SHIPPED | OUTPATIENT
Start: 2019-12-30 | End: 2020-04-13

## 2019-12-30 RX ORDER — TAMSULOSIN HYDROCHLORIDE 0.4 MG/1
CAPSULE ORAL
Qty: 90 CAPSULE | Refills: 0 | Status: SHIPPED | OUTPATIENT
Start: 2019-12-30 | End: 2020-04-13

## 2019-12-31 ENCOUNTER — EXTERNAL CHRONIC CARE MANAGEMENT (OUTPATIENT)
Dept: PRIMARY CARE CLINIC | Facility: CLINIC | Age: 79
End: 2019-12-31
Payer: MEDICARE

## 2019-12-31 PROCEDURE — 99490 PR CHRONIC CARE MGMT, 1ST 20 MIN: ICD-10-PCS | Mod: S$PBB,,, | Performed by: FAMILY MEDICINE

## 2019-12-31 PROCEDURE — 99490 CHRNC CARE MGMT STAFF 1ST 20: CPT | Mod: S$PBB,,, | Performed by: FAMILY MEDICINE

## 2019-12-31 PROCEDURE — 99490 CHRNC CARE MGMT STAFF 1ST 20: CPT | Mod: PBBFAC,PN | Performed by: FAMILY MEDICINE

## 2020-01-23 ENCOUNTER — OFFICE VISIT (OUTPATIENT)
Dept: FAMILY MEDICINE | Facility: CLINIC | Age: 80
End: 2020-01-23
Payer: MEDICARE

## 2020-01-23 VITALS
HEART RATE: 96 BPM | RESPIRATION RATE: 16 BRPM | BODY MASS INDEX: 29.72 KG/M2 | WEIGHT: 184.94 LBS | HEIGHT: 66 IN | OXYGEN SATURATION: 97 % | SYSTOLIC BLOOD PRESSURE: 142 MMHG | TEMPERATURE: 98 F | DIASTOLIC BLOOD PRESSURE: 73 MMHG

## 2020-01-23 DIAGNOSIS — M79.672 LEFT FOOT PAIN: Primary | ICD-10-CM

## 2020-01-23 PROCEDURE — 1125F PR PAIN SEVERITY QUANTIFIED, PAIN PRESENT: ICD-10-PCS | Mod: ,,, | Performed by: NURSE PRACTITIONER

## 2020-01-23 PROCEDURE — 99999 PR PBB SHADOW E&M-EST. PATIENT-LVL III: CPT | Mod: PBBFAC,,, | Performed by: NURSE PRACTITIONER

## 2020-01-23 PROCEDURE — 99213 OFFICE O/P EST LOW 20 MIN: CPT | Mod: PBBFAC,PN | Performed by: NURSE PRACTITIONER

## 2020-01-23 PROCEDURE — 1125F AMNT PAIN NOTED PAIN PRSNT: CPT | Mod: ,,, | Performed by: NURSE PRACTITIONER

## 2020-01-23 PROCEDURE — 99999 PR PBB SHADOW E&M-EST. PATIENT-LVL III: ICD-10-PCS | Mod: PBBFAC,,, | Performed by: NURSE PRACTITIONER

## 2020-01-23 PROCEDURE — 1159F PR MEDICATION LIST DOCUMENTED IN MEDICAL RECORD: ICD-10-PCS | Mod: ,,, | Performed by: NURSE PRACTITIONER

## 2020-01-23 PROCEDURE — 1159F MED LIST DOCD IN RCRD: CPT | Mod: ,,, | Performed by: NURSE PRACTITIONER

## 2020-01-23 PROCEDURE — 99213 PR OFFICE/OUTPT VISIT, EST, LEVL III, 20-29 MIN: ICD-10-PCS | Mod: S$PBB,,, | Performed by: NURSE PRACTITIONER

## 2020-01-23 PROCEDURE — 99213 OFFICE O/P EST LOW 20 MIN: CPT | Mod: S$PBB,,, | Performed by: NURSE PRACTITIONER

## 2020-01-23 NOTE — PROGRESS NOTES
Routine Office Visit    Patient Name: Fan Paz    : 1940  MRN: [id number]    Chief Complaint:  Left foot pain    Subjective:  Fan is a 79 y.o. male who presents today for:    1.  Left foot pain - patient reports today for evaluation of left foot pain. He states that he has been having pain in the medial aspect of his left foot for the last 2 weeks.  Denies any pain at rest but he does report pain in the area when walking and stepping.  Pain is 6/10 in severity aching in nature when walking and stepping.  He has tried nothing for the symptoms.  The pain does not radiate anywhere.  He states that his ankle is slightly swollen as well.  Denies any numbness, tingling, or weakness associated with the symptoms.  He reports being very active staying busy doing things around his house.  Has a history of a right hip and bilateral knee replacement.  He denies any other symptoms at today's visit.  Patient is new to me.  He is wearing new sketchers sneakers at today's visit.    Past Medical History  Past Medical History:   Diagnosis Date    Arthritis     Cancer of palate     HTN (hypertension)     Hyperlipidemia     Prostate cancer            Past Surgical History  Past Surgical History:   Procedure Laterality Date    BACK SURGERY  y-6    [place]     CARPAL TUNNEL RELEASE  13    right hand,Left hand     COLONOSCOPY N/A 4/10/2017    Procedure: COLONOSCOPY;  Surgeon: Nilo Kelly MD;  Location: [facility];  Service: Endoscopy;  Laterality: N/A;    KNEE SURGERY  y-40    left knee    KNEE SURGERY Right 12-1-15    TKR    Radio active seed Implant      2012    TOTAL HIP ARTHROPLASTY  3/2011       Family History  Family History   Problem Relation Age of Onset    Coronary artery disease Father             Cancer Sister         Liver Cancer -    Diabetes Sister             No Known Problems Brother     No Known  Problems Sister     No Known Problems Sister     No Known Problems Brother     No Known Problems Mother     Lung cancer Brother 60        - was a tobacco user, had lung cancer    Cancer Brother         Liver Cancer-     Lung cancer Sister         Alive    Breast cancer Sister     Clotting disorder Sister 75        blood clot on brain-alive    Stroke Brother             Glaucoma Cousin     No Known Problems Maternal Aunt     No Known Problems Maternal Uncle     No Known Problems Paternal Aunt     No Known Problems Paternal Uncle     No Known Problems Maternal Grandmother     No Known Problems Maternal Grandfather     No Known Problems Paternal Grandmother     No Known Problems Paternal Grandfather     Macular degeneration Neg Hx     Strabismus Neg Hx     Amblyopia Neg Hx     Blindness Neg Hx     Cataracts Neg Hx     Hypertension Neg Hx     Retinal detachment Neg Hx     Thyroid disease Neg Hx        Social History  Social History     Socioeconomic History    Marital status:      Spouse name: Not on file    Number of children: Not on file    Years of education: Not on file    Highest education level: Not on file   Occupational History     Employer: RETIRED   Social Needs    Financial resource strain: Not on file    Food insecurity:     Worry: Not on file     Inability: Not on file    Transportation needs:     Medical: Not on file     Non-medical: Not on file   Tobacco Use    Smoking status: Former Smoker     Packs/day: 3.00     Years: 20.00     Pack years: 60.00     Last attempt to quit: 7/10/1997     Years since quittin.5    Smokeless tobacco: Never Used    Tobacco comment: Quit -smoked for 40 years ,2 packs a day on an average   Substance and Sexual Activity    Alcohol use: No     Comment: used to drink Occasionally    Drug use: No    Sexual activity: Yes     Partners: Female   Lifestyle    Physical activity:     Days per week: Not on file      Minutes per session: Not on file    Stress: Not on file   Relationships    Social connections:     Talks on phone: Not on file     Gets together: Not on file     Attends Islam service: Not on file     Active member of club or organization: Not on file     Attends meetings of clubs or organizations: Not on file     Relationship status: Not on file   Other Topics Concern    Not on file   Social History Narrative    Not on file       Current Medications  Current Outpatient Medications on File Prior to Visit   Medication Sig Dispense Refill    fluticasone (FLONASE) 50 mcg/actuation nasal spray 1 spray (50 mcg total) by Each Nare route once daily. 1 Bottle 5    FLUZONE HIGH-DOSE 2019-20, PF, 180 mcg/0.5 mL Syrg PHARMACIST ADMINISTERED IMMUNIZATION ADMINISTERED AT TIME OF DISPENSING  0    ketoconazole (NIZORAL) 2 % cream Apply topically once daily. 30 g 2    montelukast (SINGULAIR) 10 mg tablet Take 1 tablet (10 mg total) by mouth every evening. 90 tablet 3    simvastatin (ZOCOR) 40 MG tablet TAKE 1 TABLET BY MOUTH ONCE DAILY EVERY  EVENING 90 tablet 0    tamsulosin (FLOMAX) 0.4 mg Cap TAKE 1 CAPSULE BY MOUTH ONCE DAILY 90 capsule 0     No current facility-administered medications on file prior to visit.        Allergies   Review of patient's allergies indicates:  No Known Allergies    Review of Systems (Pertinent positives)  Review of Systems   Constitutional: Negative for chills, fever and weight loss.   HENT: Negative.    Eyes: Negative.    Cardiovascular: Negative.    Gastrointestinal: Negative.    Genitourinary: Negative.    Musculoskeletal: Positive for joint pain and myalgias. Negative for back pain, falls and neck pain.   Skin: Negative.    Neurological: Negative.    Endo/Heme/Allergies: Negative.    Psychiatric/Behavioral: Negative.        BP (!) 142/73 (BP Location: Left arm, Patient Position: Sitting, BP Method: Small (Automatic))   Pulse 96   Temp 98.4 °F (36.9 °C) (Oral)   Resp 16   Ht 5'  "5.98" (1.676 m)   Wt 83.9 kg (184 lb 15.5 oz)   SpO2 97%   BMI 29.87 kg/m²     Physical Exam   Constitutional: He is oriented to person, place, and time. He appears well-developed and well-nourished. No distress.   Cardiovascular: Normal rate, regular rhythm, normal heart sounds and intact distal pulses. Exam reveals no gallop and no friction rub.   No murmur heard.  Pulses:       Popliteal pulses are 2+ on the right side, and 2+ on the left side.        Dorsalis pedis pulses are 2+ on the right side, and 2+ on the left side.   No edema noted bilaterally to ankles or lower extremities   Pulmonary/Chest: Effort normal and breath sounds normal. No stridor. No respiratory distress. He has no wheezes. He has no rales. He exhibits no tenderness.   Musculoskeletal: Normal range of motion.        Left ankle: He exhibits normal range of motion and no swelling. No tenderness. Achilles tendon normal.        Left foot: There is tenderness. There is normal range of motion, no bony tenderness, no swelling, normal capillary refill, no crepitus, no deformity and no laceration.        Feet:    Neurological: He is alert and oriented to person, place, and time.   Skin: Skin is warm and dry. Capillary refill takes less than 2 seconds. He is not diaphoretic.   Vitals reviewed.       Assessment/Plan:  Fan Paz is a 79 y.o. male who presents today for :    Fan was seen today for ankle pain.    Diagnoses and all orders for this visit:    Left foot pain  -     X-Ray Foot Complete Left; Future    Discussed potential etiologies of pain with patient.  He does have some slight inward bowing of the left ankle, however the arches of his feet are not overtly flat.  Educated patient symptoms may be due to pes planus recommended patient to wear appropriate shoe inserts to assist with arch support.  Offered to send the patient a short course prescription NSAID to assist with the symptoms, however patient states he does not want " to take any medications.  Recommended range of motion and strengthening exercises of the ankle.  Will get ankle x-ray as well.  If symptoms not resolved with the recommended therapies will refer to Podiatry.  Patient verbalized understanding of instructions.        This office note has been dictated.  This dictation has been generated using M-Modal Fluency Direct dictation; some phonetic errors may occur.   My collaborating physician is Dr. Arthur Acosta.

## 2020-01-24 ENCOUNTER — TELEPHONE (OUTPATIENT)
Dept: FAMILY MEDICINE | Facility: CLINIC | Age: 80
End: 2020-01-24

## 2020-01-31 ENCOUNTER — EXTERNAL CHRONIC CARE MANAGEMENT (OUTPATIENT)
Dept: PRIMARY CARE CLINIC | Facility: CLINIC | Age: 80
End: 2020-01-31
Payer: MEDICARE

## 2020-01-31 PROCEDURE — 99490 CHRNC CARE MGMT STAFF 1ST 20: CPT | Mod: S$PBB,,, | Performed by: FAMILY MEDICINE

## 2020-01-31 PROCEDURE — 99490 PR CHRONIC CARE MGMT, 1ST 20 MIN: ICD-10-PCS | Mod: S$PBB,,, | Performed by: FAMILY MEDICINE

## 2020-01-31 PROCEDURE — 99490 CHRNC CARE MGMT STAFF 1ST 20: CPT | Mod: PBBFAC,PN | Performed by: FAMILY MEDICINE

## 2020-02-06 ENCOUNTER — PATIENT OUTREACH (OUTPATIENT)
Dept: ADMINISTRATIVE | Facility: OTHER | Age: 80
End: 2020-02-06

## 2020-02-06 ENCOUNTER — OFFICE VISIT (OUTPATIENT)
Dept: PODIATRY | Facility: CLINIC | Age: 80
End: 2020-02-06
Payer: MEDICARE

## 2020-02-06 VITALS
HEIGHT: 65 IN | SYSTOLIC BLOOD PRESSURE: 138 MMHG | BODY MASS INDEX: 30.81 KG/M2 | DIASTOLIC BLOOD PRESSURE: 74 MMHG | WEIGHT: 184.94 LBS

## 2020-02-06 DIAGNOSIS — M79.672 LEFT FOOT PAIN: Primary | ICD-10-CM

## 2020-02-06 DIAGNOSIS — M76.829 PTTD (POSTERIOR TIBIAL TENDON DYSFUNCTION): ICD-10-CM

## 2020-02-06 PROCEDURE — 99999 PR PBB SHADOW E&M-EST. PATIENT-LVL III: ICD-10-PCS | Mod: PBBFAC,,, | Performed by: PODIATRIST

## 2020-02-06 PROCEDURE — 99999 PR PBB SHADOW E&M-EST. PATIENT-LVL III: CPT | Mod: PBBFAC,,, | Performed by: PODIATRIST

## 2020-02-06 PROCEDURE — 99203 OFFICE O/P NEW LOW 30 MIN: CPT | Mod: S$PBB,,, | Performed by: PODIATRIST

## 2020-02-06 PROCEDURE — 99203 PR OFFICE/OUTPT VISIT, NEW, LEVL III, 30-44 MIN: ICD-10-PCS | Mod: S$PBB,,, | Performed by: PODIATRIST

## 2020-02-06 PROCEDURE — 99213 OFFICE O/P EST LOW 20 MIN: CPT | Mod: PBBFAC,PO | Performed by: PODIATRIST

## 2020-02-06 RX ORDER — DICLOFENAC SODIUM 10 MG/G
2 GEL TOPICAL DAILY
Qty: 100 G | Refills: 3 | Status: SHIPPED | OUTPATIENT
Start: 2020-02-06 | End: 2020-08-31

## 2020-02-11 NOTE — PROGRESS NOTES
Subjective:      Patient ID: Fan Paz is a 79 y.o. male.    Chief Complaint: Ankle Pain (LEFT FOOT, OV 12/9/19 DR OAKES)    Fan is a 79 y.o. male who presents to the podiatry clinic  with complaint of  left foot pain. Onset of the symptoms was several weeks ago. Precipitating event: none known. Current symptoms include: ability to bear weight, but with some pain. Aggravating factors: any weight bearing. Symptoms have progressed to a point and plateaued. Patient has had no prior foot problems. Evaluation to date: none. Treatment to date: none. Patients rates pain 5/10 on pain scale.        Review of Systems   Constitution: Negative for chills, diaphoresis and fever.   Cardiovascular: Negative for claudication, cyanosis, leg swelling and syncope.   Respiratory: Negative for cough and shortness of breath.    Skin: Negative for color change, nail changes and suspicious lesions.   Musculoskeletal: Positive for joint pain. Negative for falls, muscle cramps and muscle weakness.   Gastrointestinal: Negative for diarrhea, nausea and vomiting.   Neurological: Negative for disturbances in coordination, numbness, paresthesias, sensory change, tremors and weakness.   Psychiatric/Behavioral: Negative for altered mental status.           Objective:      Physical Exam   Constitutional: He appears well-developed. He is cooperative.   Oriented to time, place, and person.   Cardiovascular:   DP and PT pulses are palpable bilaterally. 3 sec capillary refill time and toes and feet are warm to touch proximally .  There is  hair growth on the feet and toes b/l. There is no edema b/l. No spider veins or varicosities present b/l.      Musculoskeletal:   Equinus noted b/l ankles with < 10 deg DF noted. MMT 5/5 in DF/PF/Inv/Ev resistance with no reproduction of pain in any direction. Passive range of motion of ankle and pedal joints is painless b/l.    Pain upon palpation left medial ankle along course of PT tendon.    Pes  planus foot type.    Feet:   Right Foot:   Skin Integrity: Negative for callus or dry skin.   Left Foot:   Skin Integrity: Negative for callus or dry skin.   Lymphadenopathy:   Negative lymphadenopathy bilateral popliteal fossa and tarsal tunnel.   Neurological: He is alert.   Light touch, proprioception, and sharp/dull sensation are all intact bilaterally. Protective threshold with the [place]-Wienstein monofilament is intact bilaterally.    Skin:   No open lesions, lacerations or wounds noted.Interdigital spaces clean, dry and intact b/l. No erythema noted to b/l foot.  Nails normal color and trophic qualities.     Psychiatric: He has a normal mood and affect.             Assessment:       Encounter Diagnoses   Name Primary?    Left foot pain Yes    PTTD (posterior tibial tendon dysfunction)          Plan:       Fan was seen today for ankle pain.    Diagnoses and all orders for this visit:    Left foot pain    PTTD (posterior tibial tendon dysfunction)    Other orders  -     diclofenac sodium (VOLTAREN) 1 % Gel; Apply 2 g topically once daily.      I counseled the patient on his conditions, their implications and medical management.      Dispensed, fitted and gait trained with ankle brace. Instructed to wear with supportive shoe at all times for next 3-4 weeks when placing weight on the foot.    Declines CAM boot    Discussed conservative treatment with shoes of adequate dimensions, material, and style to alleviate symptoms and delay or prevent surgical intervention.    Rx Voltaren gel to be applied to affected area up to 3-4 x daily as needed for pain    F/u 6 weeks.

## 2020-02-29 ENCOUNTER — EXTERNAL CHRONIC CARE MANAGEMENT (OUTPATIENT)
Dept: PRIMARY CARE CLINIC | Facility: CLINIC | Age: 80
End: 2020-02-29
Payer: MEDICARE

## 2020-02-29 PROCEDURE — 99490 PR CHRONIC CARE MGMT, 1ST 20 MIN: ICD-10-PCS | Mod: S$PBB,,, | Performed by: FAMILY MEDICINE

## 2020-02-29 PROCEDURE — 99490 CHRNC CARE MGMT STAFF 1ST 20: CPT | Mod: S$PBB,,, | Performed by: FAMILY MEDICINE

## 2020-02-29 PROCEDURE — 99490 CHRNC CARE MGMT STAFF 1ST 20: CPT | Mod: PBBFAC,PN | Performed by: FAMILY MEDICINE

## 2020-03-03 DIAGNOSIS — M25.561 PAIN IN BOTH KNEES, UNSPECIFIED CHRONICITY: Primary | ICD-10-CM

## 2020-03-03 DIAGNOSIS — M25.562 PAIN IN BOTH KNEES, UNSPECIFIED CHRONICITY: Primary | ICD-10-CM

## 2020-03-10 ENCOUNTER — PATIENT OUTREACH (OUTPATIENT)
Dept: ADMINISTRATIVE | Facility: OTHER | Age: 80
End: 2020-03-10

## 2020-03-12 ENCOUNTER — OFFICE VISIT (OUTPATIENT)
Dept: PODIATRY | Facility: CLINIC | Age: 80
End: 2020-03-12
Payer: MEDICARE

## 2020-03-12 VITALS
BODY MASS INDEX: 30.81 KG/M2 | SYSTOLIC BLOOD PRESSURE: 132 MMHG | WEIGHT: 184.94 LBS | DIASTOLIC BLOOD PRESSURE: 74 MMHG | HEIGHT: 65 IN

## 2020-03-12 DIAGNOSIS — M79.672 LEFT FOOT PAIN: Primary | ICD-10-CM

## 2020-03-12 DIAGNOSIS — M76.829 PTTD (POSTERIOR TIBIAL TENDON DYSFUNCTION): ICD-10-CM

## 2020-03-12 PROCEDURE — 99213 OFFICE O/P EST LOW 20 MIN: CPT | Mod: S$PBB,,, | Performed by: PODIATRIST

## 2020-03-12 PROCEDURE — 99213 PR OFFICE/OUTPT VISIT, EST, LEVL III, 20-29 MIN: ICD-10-PCS | Mod: S$PBB,,, | Performed by: PODIATRIST

## 2020-03-12 PROCEDURE — 99999 PR PBB SHADOW E&M-EST. PATIENT-LVL III: CPT | Mod: PBBFAC,,, | Performed by: PODIATRIST

## 2020-03-12 PROCEDURE — 99213 OFFICE O/P EST LOW 20 MIN: CPT | Mod: PBBFAC,PO | Performed by: PODIATRIST

## 2020-03-12 PROCEDURE — 99999 PR PBB SHADOW E&M-EST. PATIENT-LVL III: ICD-10-PCS | Mod: PBBFAC,,, | Performed by: PODIATRIST

## 2020-03-16 NOTE — PROGRESS NOTES
Subjective:      Patient ID: Fan Paz is a 80 y.o. male.    Chief Complaint: Ankle Pain (left foot)    Fan is a 80 y.o. male who presents to the podiatry clinic  with complaint of  left foot pain. Onset of the symptoms was several weeks ago. Precipitating event: none known. Current symptoms include: ability to bear weight, but with some pain. Aggravating factors: any weight bearing. Symptoms have progressed to a point and plateaued. Patient has had no prior foot problems. Evaluation to date: none. Treatment to date: none. Patients rates pain 5/10 on pain scale.    3/12/20: F/u left foot pain. Reports pain improved with wearing previously dispensed ankle brace. Presents in boots today.     Review of Systems   Constitution: Negative for chills, diaphoresis and fever.   Cardiovascular: Negative for claudication, cyanosis, leg swelling and syncope.   Respiratory: Negative for cough and shortness of breath.    Skin: Negative for color change, nail changes and suspicious lesions.   Musculoskeletal: Positive for joint pain. Negative for falls, muscle cramps and muscle weakness.   Gastrointestinal: Negative for diarrhea, nausea and vomiting.   Neurological: Negative for disturbances in coordination, numbness, paresthesias, sensory change, tremors and weakness.   Psychiatric/Behavioral: Negative for altered mental status.           Objective:      Physical Exam   Constitutional: He appears well-developed. He is cooperative.   Oriented to time, place, and person.   Cardiovascular:   DP and PT pulses are palpable bilaterally. 3 sec capillary refill time and toes and feet are warm to touch proximally .  There is  hair growth on the feet and toes b/l. There is no edema b/l. No spider veins or varicosities present b/l.      Musculoskeletal:   Equinus noted b/l ankles with < 10 deg DF noted. MMT 5/5 in DF/PF/Inv/Ev resistance with no reproduction of pain in any direction. Passive range of motion of ankle and  pedal joints is painless b/l.    Pain upon palpation left medial ankle along course of PT tendon.    Pes planus foot type.    Feet:   Right Foot:   Skin Integrity: Negative for callus or dry skin.   Left Foot:   Skin Integrity: Negative for callus or dry skin.   Lymphadenopathy:   Negative lymphadenopathy bilateral popliteal fossa and tarsal tunnel.   Neurological: He is alert.   Light touch, proprioception, and sharp/dull sensation are all intact bilaterally. Protective threshold with the [place]-Wienstein monofilament is intact bilaterally.    Skin:   No open lesions, lacerations or wounds noted.Interdigital spaces clean, dry and intact b/l. No erythema noted to b/l foot.  Nails normal color and trophic qualities.     Psychiatric: He has a normal mood and affect.             Assessment:       Encounter Diagnoses   Name Primary?    Left foot pain Yes    PTTD (posterior tibial tendon dysfunction)          Plan:       Fan was seen today for ankle pain.    Diagnoses and all orders for this visit:    Left foot pain    PTTD (posterior tibial tendon dysfunction)      I counseled the patient on his conditions, their implications and medical management.    Pain left foot improved today upon exam.     Dispensed, fitted and gait trained with ankle brace. Instructed to wear with supportive shoe at all times for next 3-4 weeks when placing weight on the foot.-- continue PRN.       Discussed conservative treatment with shoes of adequate dimensions, material, and style to alleviate symptoms and delay or prevent surgical intervention.    Rx Voltaren gel to be applied to affected area up to 3-4 x daily as needed for pain-- continue    RTC PRN

## 2020-03-31 ENCOUNTER — EXTERNAL CHRONIC CARE MANAGEMENT (OUTPATIENT)
Dept: PRIMARY CARE CLINIC | Facility: CLINIC | Age: 80
End: 2020-03-31
Payer: MEDICARE

## 2020-03-31 PROCEDURE — 99490 CHRNC CARE MGMT STAFF 1ST 20: CPT | Mod: S$PBB,,, | Performed by: FAMILY MEDICINE

## 2020-03-31 PROCEDURE — 99490 PR CHRONIC CARE MGMT, 1ST 20 MIN: ICD-10-PCS | Mod: S$PBB,,, | Performed by: FAMILY MEDICINE

## 2020-03-31 PROCEDURE — 99490 CHRNC CARE MGMT STAFF 1ST 20: CPT | Mod: PBBFAC,PN | Performed by: FAMILY MEDICINE

## 2020-04-13 DIAGNOSIS — E78.5 HYPERLIPIDEMIA, UNSPECIFIED HYPERLIPIDEMIA TYPE: ICD-10-CM

## 2020-04-13 DIAGNOSIS — C61 PROSTATE CANCER: ICD-10-CM

## 2020-04-13 RX ORDER — TAMSULOSIN HYDROCHLORIDE 0.4 MG/1
CAPSULE ORAL
Qty: 90 CAPSULE | Refills: 0 | Status: SHIPPED | OUTPATIENT
Start: 2020-04-13 | End: 2020-07-20 | Stop reason: SDUPTHER

## 2020-04-13 RX ORDER — SIMVASTATIN 40 MG/1
TABLET, FILM COATED ORAL
Qty: 90 TABLET | Refills: 0 | Status: SHIPPED | OUTPATIENT
Start: 2020-04-13 | End: 2020-07-20 | Stop reason: SDUPTHER

## 2020-04-30 ENCOUNTER — EXTERNAL CHRONIC CARE MANAGEMENT (OUTPATIENT)
Dept: PRIMARY CARE CLINIC | Facility: CLINIC | Age: 80
End: 2020-04-30
Payer: MEDICARE

## 2020-04-30 PROCEDURE — 99490 CHRNC CARE MGMT STAFF 1ST 20: CPT | Mod: PBBFAC,PN | Performed by: FAMILY MEDICINE

## 2020-04-30 PROCEDURE — 99490 PR CHRONIC CARE MGMT, 1ST 20 MIN: ICD-10-PCS | Mod: S$PBB,,, | Performed by: FAMILY MEDICINE

## 2020-04-30 PROCEDURE — 99490 CHRNC CARE MGMT STAFF 1ST 20: CPT | Mod: S$PBB,,, | Performed by: FAMILY MEDICINE

## 2020-05-07 DIAGNOSIS — I10 ESSENTIAL HYPERTENSION: ICD-10-CM

## 2020-05-29 DIAGNOSIS — I10 ESSENTIAL HYPERTENSION: ICD-10-CM

## 2020-05-31 ENCOUNTER — EXTERNAL CHRONIC CARE MANAGEMENT (OUTPATIENT)
Dept: PRIMARY CARE CLINIC | Facility: CLINIC | Age: 80
End: 2020-05-31
Payer: MEDICARE

## 2020-05-31 PROCEDURE — 99490 CHRNC CARE MGMT STAFF 1ST 20: CPT | Mod: S$PBB,,, | Performed by: FAMILY MEDICINE

## 2020-05-31 PROCEDURE — 99490 CHRNC CARE MGMT STAFF 1ST 20: CPT | Mod: PBBFAC,PN | Performed by: FAMILY MEDICINE

## 2020-05-31 PROCEDURE — 99490 PR CHRONIC CARE MGMT, 1ST 20 MIN: ICD-10-PCS | Mod: S$PBB,,, | Performed by: FAMILY MEDICINE

## 2020-06-29 ENCOUNTER — LAB VISIT (OUTPATIENT)
Dept: LAB | Facility: HOSPITAL | Age: 80
End: 2020-06-29
Attending: RADIOLOGY
Payer: MEDICARE

## 2020-06-29 DIAGNOSIS — C61 PROSTATE CANCER: ICD-10-CM

## 2020-06-29 LAB — COMPLEXED PSA SERPL-MCNC: <0.01 NG/ML (ref 0–4)

## 2020-06-29 PROCEDURE — 84153 ASSAY OF PSA TOTAL: CPT

## 2020-06-29 PROCEDURE — 36415 COLL VENOUS BLD VENIPUNCTURE: CPT | Mod: PN

## 2020-06-30 ENCOUNTER — EXTERNAL CHRONIC CARE MANAGEMENT (OUTPATIENT)
Dept: PRIMARY CARE CLINIC | Facility: CLINIC | Age: 80
End: 2020-06-30
Payer: MEDICARE

## 2020-06-30 PROCEDURE — 99490 CHRNC CARE MGMT STAFF 1ST 20: CPT | Mod: PBBFAC,PN | Performed by: FAMILY MEDICINE

## 2020-06-30 PROCEDURE — 99490 PR CHRONIC CARE MGMT, 1ST 20 MIN: ICD-10-PCS | Mod: S$PBB,,, | Performed by: FAMILY MEDICINE

## 2020-06-30 PROCEDURE — 99490 CHRNC CARE MGMT STAFF 1ST 20: CPT | Mod: S$PBB,,, | Performed by: FAMILY MEDICINE

## 2020-07-20 ENCOUNTER — OFFICE VISIT (OUTPATIENT)
Dept: FAMILY MEDICINE | Facility: CLINIC | Age: 80
End: 2020-07-20
Payer: MEDICARE

## 2020-07-20 VITALS
WEIGHT: 179.69 LBS | TEMPERATURE: 98 F | BODY MASS INDEX: 29.94 KG/M2 | RESPIRATION RATE: 17 BRPM | DIASTOLIC BLOOD PRESSURE: 75 MMHG | HEIGHT: 65 IN | HEART RATE: 59 BPM | SYSTOLIC BLOOD PRESSURE: 136 MMHG | OXYGEN SATURATION: 96 %

## 2020-07-20 DIAGNOSIS — Z86.711 HX PULMONARY EMBOLISM: ICD-10-CM

## 2020-07-20 DIAGNOSIS — Z12.11 COLON CANCER SCREENING: ICD-10-CM

## 2020-07-20 DIAGNOSIS — R73.03 PREDIABETES: ICD-10-CM

## 2020-07-20 DIAGNOSIS — I10 ESSENTIAL HYPERTENSION: ICD-10-CM

## 2020-07-20 DIAGNOSIS — E78.5 HYPERLIPIDEMIA, UNSPECIFIED HYPERLIPIDEMIA TYPE: ICD-10-CM

## 2020-07-20 DIAGNOSIS — K21.9 GASTROESOPHAGEAL REFLUX DISEASE, ESOPHAGITIS PRESENCE NOT SPECIFIED: ICD-10-CM

## 2020-07-20 DIAGNOSIS — Z12.11 COLON CANCER SCREENING: Primary | ICD-10-CM

## 2020-07-20 DIAGNOSIS — Z00.00 VISIT FOR WELL MAN HEALTH CHECK: Primary | ICD-10-CM

## 2020-07-20 DIAGNOSIS — E66.09 CLASS 1 OBESITY DUE TO EXCESS CALORIES WITH SERIOUS COMORBIDITY AND BODY MASS INDEX (BMI) OF 31.0 TO 31.9 IN ADULT: ICD-10-CM

## 2020-07-20 DIAGNOSIS — C61 PROSTATE CANCER: ICD-10-CM

## 2020-07-20 DIAGNOSIS — E66.3 OVERWEIGHT (BMI 25.0-29.9): ICD-10-CM

## 2020-07-20 DIAGNOSIS — R79.9 ABNORMAL FINDING OF BLOOD CHEMISTRY, UNSPECIFIED: ICD-10-CM

## 2020-07-20 PROCEDURE — 99999 PR PBB SHADOW E&M-EST. PATIENT-LVL III: ICD-10-PCS | Mod: PBBFAC,,, | Performed by: FAMILY MEDICINE

## 2020-07-20 PROCEDURE — 99999 PR PBB SHADOW E&M-EST. PATIENT-LVL III: CPT | Mod: PBBFAC,,, | Performed by: FAMILY MEDICINE

## 2020-07-20 PROCEDURE — 99397 PR PREVENTIVE VISIT,EST,65 & OVER: ICD-10-PCS | Mod: S$PBB,,, | Performed by: FAMILY MEDICINE

## 2020-07-20 PROCEDURE — 99213 OFFICE O/P EST LOW 20 MIN: CPT | Mod: PBBFAC,PN | Performed by: FAMILY MEDICINE

## 2020-07-20 PROCEDURE — 99397 PER PM REEVAL EST PAT 65+ YR: CPT | Mod: S$PBB,,, | Performed by: FAMILY MEDICINE

## 2020-07-20 RX ORDER — SIMVASTATIN 40 MG/1
40 TABLET, FILM COATED ORAL NIGHTLY
Qty: 90 TABLET | Refills: 1 | Status: SHIPPED | OUTPATIENT
Start: 2020-07-20 | End: 2021-03-05

## 2020-07-20 RX ORDER — TAMSULOSIN HYDROCHLORIDE 0.4 MG/1
1 CAPSULE ORAL DAILY
Qty: 90 CAPSULE | Refills: 1 | Status: SHIPPED | OUTPATIENT
Start: 2020-07-20 | End: 2021-03-05

## 2020-07-20 NOTE — PROGRESS NOTES
"Well Man VISIT      CHIEF COMPLAINT  Chief Complaint   Patient presents with    Annual Exam       HPI  Fan Paz is a 80 y.o. male who presents for physical.     Social Factors  Tobacco use: No  Ready to Quit: No  Alcohol: seldom use  Intimate partner violence screening  Living Will/POA: No  Regular Exercise: No    Depression  Over the past two weeks, have you felt down, depressed, or hopeless? No  Over the past two weeks, have you felt little interest or pleasure in doing things? No    Reproductive Health  STD screening in last year: deferred  HIV screening: deferred    Screen for Chronic Disease  CHD Risk Factors: advanced age (older than 55 for men, 65 for women), male gender and obesity (BMI >= 30 kg/m2)  Estimated body mass index is 29.9 kg/m² as calculated from the following:    Height as of this encounter: 5' 5" (1.651 m).    Weight as of this encounter: 81.5 kg (179 lb 10.8 oz).  Dyslipidemia screening needed: ordered  T2DM screening needed: ordered  Colonoscopy needed: n/a  PSA needed: followed by urology  AAA screening needed:n/a  Screen men 35 years and older, and men 20 to 34 years of age who have cardiovascular risk factors for dyslipidemia  Begin screening colonoscopies at 50 years of age in men of average risk, and continue until 75 years of age; offer fecal occult blood testing every year, flexible sigmoidoscopy every five years combined with fecal occult blood testing every three years, or colonoscopy every 10 years   The American Urological Association recommends offering PSA testing and digital rectal examination to well-informed men beginning at 40 years of age and continuing until life expectancy is less than 10 years  Screen once with ultrasonography in men 65 to 75 years of age if they have a family history or have smoked at [id number] cigarettes in their lifetime  Screen men with a sustained blood pressure greater than 135/80 mm Hg for " "T2DM      Immunizations  delayed    ALLERGIES and MEDS were verified.   PMHx, PSHx, FHx, SOCIALHx were updated as pertinent.    REVIEW OF SYSTEMS  Review of Systems   Constitutional: Negative.    HENT: Negative.    Eyes: Negative.    Respiratory: Negative.    Cardiovascular: Negative.    Gastrointestinal: Negative.    Genitourinary: Negative.    Musculoskeletal: Positive for joint pain and myalgias.   Skin: Negative.    Neurological: Negative.    Psychiatric/Behavioral: Negative.          PHYSICAL EXAM  VITAL SIGNS: /75 (BP Location: Left arm, Patient Position: Sitting, BP Method: Medium (Automatic))   Pulse (!) 59   Temp 98.1 °F (36.7 °C) (Oral)   Resp 17   Ht 5' 5" (1.651 m)   Wt 81.5 kg (179 lb 10.8 oz)   SpO2 96%   BMI 29.90 kg/m²   GEN: Well developed, Well nourished, No acute distress.  HENT: Normocephalic, Atraumatic, Bilateral external ears normal, Nose normal, Oropharynx moist, No oral exudates.   Eyes: PERRL, EOMI, Conjunctiva normal, No discharge.   Neck: Supple, No tenderness.  Lymphatic: No cervical or supraclavicular lymphadenopathy noted.   Cardiovascular: Normal heart rate, Normal rhythm, No murmurs, No rubs, No gallops.   Thorax & Lungs: Normal breath sounds, No respiratory distress, No wheezing.  Abdomen: Soft, No tenderness, Bowel sounds normal.  Genital: deferred  Skin: Warm, Dry, No erythema, No rash.   Extremities: No edema, No tenderness.       ASSESSMENT/PLAN    Fan was seen today for annual exam.    Diagnoses and all orders for this visit:    Visit for [facility] health check    Colon cancer screening  -     Case request GI: COLONOSCOPY    Hyperlipidemia, unspecified hyperlipidemia type  -     simvastatin (ZOCOR) 40 MG tablet; Take 1 tablet (40 mg total) by mouth every evening.    Essential hypertension    Hx pulmonary embolism-6/20/09- bilateral lower lobe pulmonary emboli    Prediabetes    Class 1 obesity due to excess calories with serious comorbidity and body mass index " (BMI) of 31.0 to 31.9 in adult    Gastroesophageal reflux disease, esophagitis presence not specified    Overweight (BMI 25.0-29.9)    Prostate cancer  -     tamsulosin (FLOMAX) 0.4 mg Cap; Take 1 capsule (0.4 mg total) by mouth once daily.       FOLLOW UP: 6 months or sooner if needed  Follow up with urology as scheduled      Otilio Damon MD

## 2020-07-27 DIAGNOSIS — D64.9 ANEMIA, UNSPECIFIED TYPE: Primary | ICD-10-CM

## 2020-07-30 ENCOUNTER — TELEPHONE (OUTPATIENT)
Dept: FAMILY MEDICINE | Facility: CLINIC | Age: 80
End: 2020-07-30

## 2020-07-30 NOTE — TELEPHONE ENCOUNTER
----- Message from Otilio Damon MD sent at 7/26/2020 12:57 PM CDT -----  Please let patient know that his labs showed him to be mildly anemic.  I want to see him back in 4 weeks to recheck these labs to see if they correct on their own.    Dr. Damon

## 2020-07-30 NOTE — TELEPHONE ENCOUNTER
I spoke to the pt and scheduled him for repeat blood work. He also requested to be seen sooner for Shoulder pain. Scheduled for next week with NP

## 2020-07-31 ENCOUNTER — EXTERNAL CHRONIC CARE MANAGEMENT (OUTPATIENT)
Dept: PRIMARY CARE CLINIC | Facility: CLINIC | Age: 80
End: 2020-07-31
Payer: MEDICARE

## 2020-07-31 PROCEDURE — 99490 PR CHRONIC CARE MGMT, 1ST 20 MIN: ICD-10-PCS | Mod: S$PBB,,, | Performed by: FAMILY MEDICINE

## 2020-07-31 PROCEDURE — 99490 CHRNC CARE MGMT STAFF 1ST 20: CPT | Mod: PBBFAC,PN | Performed by: FAMILY MEDICINE

## 2020-07-31 PROCEDURE — 99490 CHRNC CARE MGMT STAFF 1ST 20: CPT | Mod: S$PBB,,, | Performed by: FAMILY MEDICINE

## 2020-08-04 ENCOUNTER — OFFICE VISIT (OUTPATIENT)
Dept: FAMILY MEDICINE | Facility: CLINIC | Age: 80
End: 2020-08-04
Payer: MEDICARE

## 2020-08-04 VITALS
BODY MASS INDEX: 29.79 KG/M2 | DIASTOLIC BLOOD PRESSURE: 79 MMHG | WEIGHT: 178.81 LBS | HEART RATE: 85 BPM | TEMPERATURE: 98 F | SYSTOLIC BLOOD PRESSURE: 130 MMHG | HEIGHT: 65 IN | OXYGEN SATURATION: 98 % | RESPIRATION RATE: 18 BRPM

## 2020-08-04 DIAGNOSIS — G89.29 CHRONIC RIGHT SHOULDER PAIN: Primary | ICD-10-CM

## 2020-08-04 DIAGNOSIS — M25.511 CHRONIC RIGHT SHOULDER PAIN: Primary | ICD-10-CM

## 2020-08-04 PROCEDURE — 99213 PR OFFICE/OUTPT VISIT, EST, LEVL III, 20-29 MIN: ICD-10-PCS | Mod: S$PBB,,, | Performed by: NURSE PRACTITIONER

## 2020-08-04 PROCEDURE — 99999 PR PBB SHADOW E&M-EST. PATIENT-LVL III: ICD-10-PCS | Mod: PBBFAC,,, | Performed by: NURSE PRACTITIONER

## 2020-08-04 PROCEDURE — 99999 PR PBB SHADOW E&M-EST. PATIENT-LVL III: CPT | Mod: PBBFAC,,, | Performed by: NURSE PRACTITIONER

## 2020-08-04 PROCEDURE — 99213 OFFICE O/P EST LOW 20 MIN: CPT | Mod: S$PBB,,, | Performed by: NURSE PRACTITIONER

## 2020-08-04 PROCEDURE — 99213 OFFICE O/P EST LOW 20 MIN: CPT | Mod: PBBFAC,PN | Performed by: NURSE PRACTITIONER

## 2020-08-04 NOTE — PROGRESS NOTES
Routine Office Visit    Patient Name: Fan Paz    : 1940  MRN: [id number]    Chief Complaint:  Right shoulder pain    Subjective:  Fan is a 80 y.o. male who presents today for:    1.  Right shoulder pain - patient reports today for evaluation of right shoulder pain.  He states that he has been having right shoulder pain for at least the last year.  He denies any specific inciting event or injury for this pain.  He notes that he has pain on the posterior aspect of his right shoulder that does radiate sometimes down to the elbow.  Denies any numbness, tingling, or weakness of the upper extremity.  He states that he usually has constant pain and the pain severity waxes and wanes.  Worsening motions include reaching backwards and abducting the right shoulder.  Denies any fevers, chills, or neck pain.  Denies any cardiac or respiratory symptoms.  He has tried Voltaren gel for the symptoms with only partial relief.    Past Medical History  Past Medical History:   Diagnosis Date    Arthritis     Cancer of palate     HTN (hypertension)     Hyperlipidemia     Prostate cancer            Past Surgical History  Past Surgical History:   Procedure Laterality Date    BACK SURGERY  y-6    [facility] palate surgery      [facility] - [facility]     CARPAL TUNNEL RELEASE  14    right hand,Left hand     COLONOSCOPY N/A 4/10/2017    Procedure: COLONOSCOPY;  Surgeon: Nilo Kelly MD;  Location: [facility];  Service: Endoscopy;  Laterality: N/A;    KNEE SURGERY  y-40    left knee    KNEE SURGERY Right 12-1-15    TKR    Radio active seed Implant      2012    TOTAL HIP ARTHROPLASTY  3/2011       Family History  Family History   Problem Relation Age of Onset    Coronary artery disease Father             Cancer Sister         Liver Cancer -    Diabetes Sister             No Known Problems Brother     No Known Problems Sister     No Known Problems Sister     No Known  Problems Brother     No Known Problems Mother     Lung cancer Brother 60        - was a tobacco user, had lung cancer    Cancer Brother         Liver Cancer-     Lung cancer Sister         Alive    Breast cancer Sister     Clotting disorder Sister 75        blood clot on brain-alive    Stroke Brother             Glaucoma Cousin     No Known Problems Maternal Aunt     No Known Problems Maternal Uncle     No Known Problems Paternal Aunt     No Known Problems Paternal Uncle     No Known Problems Maternal Grandmother     No Known Problems Maternal Grandfather     No Known Problems Paternal Grandmother     No Known Problems Paternal Grandfather     Macular degeneration Neg Hx     Strabismus Neg Hx     Amblyopia Neg Hx     Blindness Neg Hx     Cataracts Neg Hx     Hypertension Neg Hx     Retinal detachment Neg Hx     Thyroid disease Neg Hx        Social History  Social History     Socioeconomic History    Marital status:      Spouse name: Not on file    Number of children: Not on file    Years of education: Not on file    Highest education level: Not on file   Occupational History     Employer: RETIRED   Social Needs    Financial resource strain: Not on file    Food insecurity     Worry: Not on file     Inability: Not on file    Transportation needs     Medical: Not on file     Non-medical: Not on file   Tobacco Use    Smoking status: Former Smoker     Packs/day: 3.00     Years: 20.00     Pack years: 60.00     Quit date: 7/10/1997     Years since quittin.0    Smokeless tobacco: Never Used    Tobacco comment: Quit -smoked for 40 years ,2 packs a day on an average   Substance and Sexual Activity    Alcohol use: No     Comment: used to drink Occasionally    Drug use: No    Sexual activity: Yes     Partners: Female   Lifestyle    Physical activity     Days per week: Not on file     Minutes per session: Not on file    Stress: Not on file   Relationships     Social connections     Talks on phone: Not on file     Gets together: Not on file     Attends Mu-ism service: Not on file     Active member of club or organization: Not on file     Attends meetings of clubs or organizations: Not on file     Relationship status: Not on file   Other Topics Concern    Not on file   Social History Narrative    Not on file       Current Medications  Current Outpatient Medications on File Prior to Visit   Medication Sig Dispense Refill    diclofenac sodium (VOLTAREN) 1 % Gel Apply 2 g topically once daily. 100 g 3    fluticasone propionate (FLONASE) 50 mcg/actuation nasal spray Use 1 spray(s) in each nostril once daily 16 g 0    simvastatin (ZOCOR) 40 MG tablet Take 1 tablet (40 mg total) by mouth every evening. 90 tablet 1    tamsulosin (FLOMAX) 0.4 mg Cap Take 1 capsule (0.4 mg total) by mouth once daily. 90 capsule 1    FLUZONE HIGH-DOSE 2019-20, PF, 180 mcg/0.5 mL Syrg PHARMACIST ADMINISTERED IMMUNIZATION ADMINISTERED AT TIME OF DISPENSING  0    [START ON 9/14/2020] polyethylene glycol (COLYTE) 240-22.72-6.72 -5.84 gram SolR Take 4,000 mLs (4 L total) by mouth once. for 1 dose (Patient not taking: Reported on 8/4/2020) 4000 mL 0     No current facility-administered medications on file prior to visit.        Allergies   Review of patient's allergies indicates:  No Known Allergies    Review of Systems (Pertinent positives)  Review of Systems   Constitutional: Negative.    HENT: Negative.    Eyes: Negative.    Respiratory: Negative.    Cardiovascular: Negative.    Gastrointestinal: Negative.    Genitourinary: Negative.    Musculoskeletal: Positive for joint pain and myalgias. Negative for back pain, falls and neck pain.   Skin: Negative.    Neurological: Negative.    Endo/Heme/Allergies: Negative.    Psychiatric/Behavioral: Negative.        /79 (BP Location: Left arm, Patient Position: Sitting, BP Method: Medium (Automatic))   Pulse 85   Temp 98 °F (36.7 °C) (Oral)    "Resp 18   Ht 5' 5" (1.651 m)   Wt 81.1 kg (178 lb 12.7 oz)   SpO2 98%   BMI 29.75 kg/m²     Physical Exam  Vitals signs reviewed.   Constitutional:       General: He is not in acute distress.     Appearance: Normal appearance. He is not ill-appearing, toxic-appearing or diaphoretic.   HENT:      Head: Normocephalic and atraumatic.   Neck:      Musculoskeletal: Normal range of motion and neck supple. No neck rigidity or muscular tenderness.   Cardiovascular:      Rate and Rhythm: Normal rate and regular rhythm.      Pulses: Normal pulses.      Heart sounds: Normal heart sounds. No murmur. No friction rub. No gallop.    Pulmonary:      Effort: Pulmonary effort is normal. No respiratory distress.      Breath sounds: Normal breath sounds. No stridor. No wheezing, rhonchi or rales.   Chest:      Chest wall: No tenderness.   Abdominal:      General: Abdomen is flat. Bowel sounds are normal.      Palpations: Abdomen is soft.   Musculoskeletal:      Right shoulder: He exhibits tenderness. He exhibits normal range of motion, no bony tenderness, no swelling, no effusion, no crepitus, no deformity, no laceration, no pain, no spasm, normal pulse and normal strength.      Comments: Right shoulder -normal range of motion exhibited; patient reports TTP about the posterior aspect of the right shoulder; no instability of rotator cuff noted, however patient does report pain in the posterior aspect of the right shoulder with the scratch test   Skin:     General: Skin is warm and dry.      Capillary Refill: Capillary refill takes less than 2 seconds.   Neurological:      General: No focal deficit present.      Mental Status: He is alert and oriented to person, place, and time.   Psychiatric:         Mood and Affect: Mood normal.         Behavior: Behavior normal.          Assessment/Plan:  Fan Paz is a 80 y.o. male who presents today for :    Fan was seen today for shoulder pain.    Diagnoses and all orders for " this visit:    Chronic right shoulder pain  -     Ambulatory referral/consult to Orthopedics; Future     Discussed with patient that due to chronicity of symptoms it may be prudent for him to get evaluation by an orthopedic specialist.  I offered to send him a p.r.n. p.o. NSAID, however patient states that he does not want to take any new medications by mouth at this time.  Recommended continued use of Voltaren gel for pain and follow-up with orthopedics.  Will defer imaging to them.  Patient is in agreement with plan of care and verbalized understanding of instructions.        This office note has been dictated.  This dictation has been generated using M-Modal Fluency Direct dictation; some phonetic errors may occur.   My collaborating physician is Dr. Arthur Acosta.

## 2020-08-14 DIAGNOSIS — Z11.59 NEED FOR HEPATITIS C SCREENING TEST: ICD-10-CM

## 2020-08-27 DIAGNOSIS — M25.511 RIGHT SHOULDER PAIN, UNSPECIFIED CHRONICITY: Primary | ICD-10-CM

## 2020-08-27 DIAGNOSIS — M25.512 LEFT SHOULDER PAIN, UNSPECIFIED CHRONICITY: ICD-10-CM

## 2020-08-28 ENCOUNTER — PATIENT OUTREACH (OUTPATIENT)
Dept: ADMINISTRATIVE | Facility: OTHER | Age: 80
End: 2020-08-28

## 2020-08-28 NOTE — PROGRESS NOTES
Health Maintenance Due   Topic Date Due    Hepatitis C Screening  1940    Shingles Vaccine (1 of 2) 03/12/1991    Pneumococcal Vaccine (65+ High/Highest Risk) (2 of 2 - PCV13) 09/21/2016     Updates were requested from care everywhere.  Chart was reviewed for overdue Proactive [facility] Encounters (FUAD) topics (CRS, Breast Cancer Screening, Eye exam)  Health Maintenance has been updated.  LINKS immunization registry triggered.  Immunizations were reconciled.

## 2020-08-31 ENCOUNTER — OFFICE VISIT (OUTPATIENT)
Dept: ORTHOPEDICS | Facility: CLINIC | Age: 80
End: 2020-08-31
Payer: MEDICARE

## 2020-08-31 ENCOUNTER — EXTERNAL CHRONIC CARE MANAGEMENT (OUTPATIENT)
Dept: PRIMARY CARE CLINIC | Facility: CLINIC | Age: 80
End: 2020-08-31
Payer: MEDICARE

## 2020-08-31 ENCOUNTER — LAB VISIT (OUTPATIENT)
Dept: LAB | Facility: HOSPITAL | Age: 80
End: 2020-08-31
Attending: FAMILY MEDICINE
Payer: MEDICARE

## 2020-08-31 VITALS
HEIGHT: 65 IN | WEIGHT: 176.38 LBS | BODY MASS INDEX: 29.38 KG/M2 | OXYGEN SATURATION: 98 % | HEART RATE: 67 BPM | RESPIRATION RATE: 18 BRPM | SYSTOLIC BLOOD PRESSURE: 134 MMHG | DIASTOLIC BLOOD PRESSURE: 82 MMHG

## 2020-08-31 DIAGNOSIS — M25.511 CHRONIC RIGHT SHOULDER PAIN: ICD-10-CM

## 2020-08-31 DIAGNOSIS — D64.9 ANEMIA, UNSPECIFIED TYPE: ICD-10-CM

## 2020-08-31 DIAGNOSIS — G89.29 CHRONIC RIGHT SHOULDER PAIN: ICD-10-CM

## 2020-08-31 LAB
BASOPHILS # BLD AUTO: 0.03 K/UL (ref 0–0.2)
BASOPHILS NFR BLD: 0.5 % (ref 0–1.9)
DIFFERENTIAL METHOD: ABNORMAL
EOSINOPHIL # BLD AUTO: 0.2 K/UL (ref 0–0.5)
EOSINOPHIL NFR BLD: 3.1 % (ref 0–8)
ERYTHROCYTE [DISTWIDTH] IN BLOOD BY AUTOMATED COUNT: 15.1 % (ref 11.5–14.5)
HCT VFR BLD AUTO: 41.5 % (ref 40–54)
HGB BLD-MCNC: 13.1 G/DL (ref 14–18)
IMM GRANULOCYTES # BLD AUTO: 0.01 K/UL (ref 0–0.04)
IMM GRANULOCYTES NFR BLD AUTO: 0.2 % (ref 0–0.5)
LYMPHOCYTES # BLD AUTO: 1.3 K/UL (ref 1–4.8)
LYMPHOCYTES NFR BLD: 20.8 % (ref 18–48)
MCH RBC QN AUTO: 27.1 PG (ref 27–31)
MCHC RBC AUTO-ENTMCNC: 31.6 G/DL (ref 32–36)
MCV RBC AUTO: 86 FL (ref 82–98)
MONOCYTES # BLD AUTO: 0.7 K/UL (ref 0.3–1)
MONOCYTES NFR BLD: 11.1 % (ref 4–15)
NEUTROPHILS # BLD AUTO: 3.9 K/UL (ref 1.8–7.7)
NEUTROPHILS NFR BLD: 64.3 % (ref 38–73)
NRBC BLD-RTO: 0 /100 WBC
PLATELET # BLD AUTO: 198 K/UL (ref 150–350)
PMV BLD AUTO: 9.9 FL (ref 9.2–12.9)
RBC # BLD AUTO: 4.83 M/UL (ref 4.6–6.2)
WBC # BLD AUTO: 6.1 K/UL (ref 3.9–12.7)

## 2020-08-31 PROCEDURE — 85025 COMPLETE CBC W/AUTO DIFF WBC: CPT

## 2020-08-31 PROCEDURE — 99999 PR PBB SHADOW E&M-EST. PATIENT-LVL III: ICD-10-PCS | Mod: PBBFAC,,, | Performed by: ORTHOPAEDIC SURGERY

## 2020-08-31 PROCEDURE — 99214 OFFICE O/P EST MOD 30 MIN: CPT | Mod: S$PBB,,, | Performed by: ORTHOPAEDIC SURGERY

## 2020-08-31 PROCEDURE — 99214 PR OFFICE/OUTPT VISIT, EST, LEVL IV, 30-39 MIN: ICD-10-PCS | Mod: S$PBB,,, | Performed by: ORTHOPAEDIC SURGERY

## 2020-08-31 PROCEDURE — 36415 COLL VENOUS BLD VENIPUNCTURE: CPT | Mod: PN

## 2020-08-31 PROCEDURE — 99999 PR PBB SHADOW E&M-EST. PATIENT-LVL III: CPT | Mod: PBBFAC,,, | Performed by: ORTHOPAEDIC SURGERY

## 2020-08-31 PROCEDURE — 99490 CHRNC CARE MGMT STAFF 1ST 20: CPT | Mod: PBBFAC,PN | Performed by: FAMILY MEDICINE

## 2020-08-31 PROCEDURE — 99490 CHRNC CARE MGMT STAFF 1ST 20: CPT | Mod: S$PBB,,, | Performed by: FAMILY MEDICINE

## 2020-08-31 PROCEDURE — 99490 PR CHRONIC CARE MGMT, 1ST 20 MIN: ICD-10-PCS | Mod: S$PBB,,, | Performed by: FAMILY MEDICINE

## 2020-08-31 PROCEDURE — 99213 OFFICE O/P EST LOW 20 MIN: CPT | Mod: PBBFAC,25,PN | Performed by: ORTHOPAEDIC SURGERY

## 2020-08-31 RX ORDER — DICLOFENAC SODIUM 50 MG/1
50 TABLET, DELAYED RELEASE ORAL 2 TIMES DAILY
Qty: 60 TABLET | Refills: 0 | Status: ON HOLD | OUTPATIENT
Start: 2020-08-31 | End: 2023-05-03 | Stop reason: HOSPADM

## 2020-08-31 NOTE — LETTER
August 31, 2020      Dick Valdez, NP  [address]  [facility] [address]           [facility]  [address], [address]  [place] [address]  Phone: [phone]          Patient: Fan Paz   MR Number: [id number]   YOB: 1940   Date of Visit: 8/31/2020       Dear Dick Valdez:    Thank you for referring Fan Paz to me for evaluation. Attached you will find relevant portions of my assessment and plan of care.    If you have questions, please do not hesitate to call me. I look forward to following Fan Paz along with you.    Sincerely,    Terri Wade MD    Enclosure  CC:  No Recipients    If you would like to receive this communication electronically, please contact [email or web address] or [phone] to request more information on [employer] Link access.    For providers and/or their staff who would like to refer a patient to [facility], please contact us through our one-stop-shop provider referral line, [facility], at [phone].    If you feel you have received this communication in error or would no longer like to receive these types of communications, please e-mail [email or web address]          Detail Level: Zone

## 2020-08-31 NOTE — PROGRESS NOTES
Chief Complaint   Patient presents with    Right Shoulder - Pain       This patient was seen in consultation at the request of Dick Valdez     [facility] (08/31/2020): Fan Paz is a 80 y.o. male who presents today complaining of right shoulder pain  Duration of symptoms:  About 1 year  Trauma or new activity: no  Pain is intermittent  Aggravating factors: Lifting, reaching overhead, reaching behind his back,laying on the right side   Relieving factors: rest   Night pain is absent  Radicular symptoms: occ numbness, paresthesias   Pain radiates to the elbow    Associated symptoms: none  Prior treatment:  None  Pain does interfere with activities of daily living .    This is the extent of the patient's complaints at this time.     Hand dominance: Right     Occupation: Retired     Review of Systems   All other systems reviewed and are negative.        Review of patient's allergies indicates:  No Known Allergies      Current Outpatient Medications:     diclofenac sodium (VOLTAREN) 1 % Gel, Apply 2 g topically once daily., Disp: 100 g, Rfl: 3    fluticasone propionate (FLONASE) 50 mcg/actuation nasal spray, Use 1 spray(s) in each nostril once daily, Disp: 16 g, Rfl: 0    simvastatin (ZOCOR) 40 MG tablet, Take 1 tablet (40 mg total) by mouth every evening., Disp: 90 tablet, Rfl: 1    tamsulosin (FLOMAX) 0.4 mg Cap, Take 1 capsule (0.4 mg total) by mouth once daily., Disp: 90 capsule, Rfl: 1    FLUZONE HIGH-DOSE 2019-20, PF, 180 mcg/0.5 mL Syrg, PHARMACIST ADMINISTERED IMMUNIZATION ADMINISTERED AT TIME OF DISPENSING, Disp: , Rfl: 0    [START ON 9/14/2020] polyethylene glycol (COLYTE) 240-22.72-6.72 -5.84 gram SolR, Take 4,000 mLs (4 L total) by mouth once. for 1 dose (Patient not taking: Reported on 8/31/2020), Disp: 4000 mL, Rfl: 0    Past Medical History:   Diagnosis Date    Arthritis     Cancer of palate     HTN (hypertension)     Hyperlipidemia     Prostate cancer     2011       Patient Active  Problem List   Diagnosis    Hyperlipidemia    History of prostate cancer    Carpal tunnel syndrome    Arthritis of hand    Essential hypertension    Anemia    Osteoarthritis, knee    Dysphagia    Dry mouth    GERD (gastroesophageal reflux disease)    Hx pulmonary embolism-09- bilateral lower lobe pulmonary emboli    Asymptomatic varicose veins of bilateral lower extremities    At risk for aspiration    Edema    Prediabetes    Primary osteoarthritis of right knee    Long-term (current) use of anticoagulants    Deep vein thrombosis prophylaxis    Seasonal allergic rhinitis due to pollen    Screen for colon cancer    Low HDL (under 40)    Class 1 obesity due to excess calories with serious comorbidity and body mass index (BMI) of 31.0 to 31.9 in adult    Overweight (BMI 25.0-29.9)       Past Surgical History:   Procedure Laterality Date    BACK SURGERY  y-6    Cancer of palate surgery      Late - [facility]     CARPAL TUNNEL RELEASE  13    right hand,Left hand     COLONOSCOPY N/A 4/10/2017    Procedure: COLONOSCOPY;  Surgeon: Nilo Kelly MD;  Location[facility];  Service: Endoscopy;  Laterality: N/A;    KNEE SURGERY  y-40    left knee    KNEE SURGERY Right 12-1-15    TKR    Radio active seed Implant      2012    TOTAL HIP ARTHROPLASTY  3/2011       Social History     Tobacco Use    Smoking status: Former Smoker     Packs/day: 3.00     Years: 20.00     Pack years: 60.00     Quit date: 7/10/1997     Years since quittin.1    Smokeless tobacco: Never Used    Tobacco comment: Quit -smoked for 40 years ,2 packs a day on an average   Substance Use Topics    Alcohol use: No     Comment: used to drink Occasionally    Drug use: No       Family History   Problem Relation Age of Onset    Coronary artery disease Father             Cancer Sister         Liver Cancer -    Diabetes Sister             No Known Problems Brother     No Known  "Problems Sister     No Known Problems Sister     No Known Problems Brother     No Known Problems Mother     Lung cancer Brother 60        - was a tobacco user, had lung cancer    Cancer Brother         Liver Cancer-     Lung cancer Sister         Alive    Breast cancer Sister     Clotting disorder Sister 75        blood clot on brain-alive    Stroke Brother             Glaucoma Cousin     No Known Problems Maternal Aunt     No Known Problems Maternal Uncle     No Known Problems Paternal Aunt     No Known Problems Paternal Uncle     No Known Problems Maternal Grandmother     No Known Problems Maternal Grandfather     No Known Problems Paternal Grandmother     No Known Problems Paternal Grandfather     Macular degeneration Neg Hx     Strabismus Neg Hx     Amblyopia Neg Hx     Blindness Neg Hx     Cataracts Neg Hx     Hypertension Neg Hx     Retinal detachment Neg Hx     Thyroid disease Neg Hx        Physical Exam:   Vitals:    20 1414   BP: 134/82   Pulse: 67   Resp: 18   SpO2: 98%   Weight: 80 kg (176 lb 5.9 oz)   Height: 5' 5" (1.651 m)   PainSc: 0-No pain       General: Weight: 80 kg (176 lb 5.9 oz) Body mass index is 29.35 kg/m².  Patient is alert, awake and oriented to time, place and person. Mood and affect are appropriate.  Patient does not appear to be in any distress, denies any constitutional symptoms and appears stated age.   HEENT: Pupils are equal and round, sclera are not injected. External examination of ears and nose reveals no abnormalities. Cranial nerves II-X are grossly intact  Neck: examination demonstrates painless full active range of motion. Spurling's sign is negative  Skin: no rashes, abrasions or open wounds on the affected extremity   Resp: No respiratory distress or audible wheezing   CV: 2+  pulses, all extremities warm and well perfused   Right Shoulder    Shoulder Range of Motion    Right     Left   (Active/Passive)       Forward Elevation "     165/165            165/165  External rotation (arm at side)  45/45             45/45   Internal rotation behind the back   L3             L3     Range of motion is painful     Scapular winging no  Scapular dyskinesia no    Examination of the back shows no atrophy     Acromioclavicular joint is not tender  Crossbody test: negative    Neer's positive  Hawkin's positive    Wiley's positive  Drop arm negative  Belly press negative      Cuff Strength     Right     Left   Supraspinatus        5/5    5/5  Infraspinatus     5/5    5/5  Subscapularis     5/5    5/5    Deltoid testing            5/5    5/5    Speeds negative  Yergasons negative    Elbow examination demonstrates no tenderness to palpation and has normal range of motion.     LTSI m/u/r  2+ RP  + EPL, IO, FDS, FDP      Imaging:  3 views of the right shoulder:  positive for degenerative changes of the AC joint. The humeral head is well centered on the AP and axillary views.   + sclerosis at the rotator cuff insertion on the greater tuberosity. There is not significant degenerative change of the glenohumeral joint or posterior subluxation of the humeral head. No acute changes or fracture.      I personally reviewed and interpreted the patient's imaging obtained today in clinic     Assessment: 80 y.o. male with right subacromial bursitis  rotator cuff tendinitis    I explained my diagnostic impression and the reasoning behind it in detail, using layman's terms.  Models and/or pictures were used to help in the explanation.  We discussed non operative and operative treatment modalities -- He would like to start with non-operative treatment in the form of meds.     Plan:   - Diclofenac 50 mg PO BID x 2 weeks then PRN. Take with food. The patient was advised that NSAID-type medications have multiple very important potential side effects: gastrointestinal irritation including Gi bleed, cardiac effects and renal injuries. Instructions were given to take the medication  with food and to stop for any GI upset. Call for vomiting, abdominal pain or black/bloody stools. Do not take with other NSAIDs such as ibuprofen or naproxen.  The patient expresses understanding of these issues and questions were answered.   - declined pt and injections - wants to see how he does with meds  - Return to clinic PRN    All questions were answered in detail. The patient is in full agreement with the treatment plan and will proceed accordingly.    A note notifying Dick Valdez of my findings was sent via the electronic medical record     This note was created by combination of typed  and M-Modal dictation. Transcription and phonetic errors may be present.  If there are any questions, please contact me.

## 2020-09-10 DIAGNOSIS — Z12.11 ENCOUNTER FOR SCREENING COLONOSCOPY: ICD-10-CM

## 2020-09-12 ENCOUNTER — CLINICAL SUPPORT (OUTPATIENT)
Dept: URGENT CARE | Facility: CLINIC | Age: 80
End: 2020-09-12
Payer: MEDICARE

## 2020-09-12 DIAGNOSIS — Z12.11 ENCOUNTER FOR SCREENING COLONOSCOPY: ICD-10-CM

## 2020-09-12 PROCEDURE — U0003 INFECTIOUS AGENT DETECTION BY NUCLEIC ACID (DNA OR RNA); SEVERE ACUTE RESPIRATORY SYNDROME CORONAVIRUS 2 (SARS-COV-2) (CORONAVIRUS DISEASE [COVID-19]), AMPLIFIED PROBE TECHNIQUE, MAKING USE OF HIGH THROUGHPUT TECHNOLOGIES AS DESCRIBED BY CMS-2020-01-R: HCPCS

## 2020-09-13 LAB — SARS-COV-2 RNA RESP QL NAA+PROBE: NOT DETECTED

## 2020-09-30 ENCOUNTER — EXTERNAL CHRONIC CARE MANAGEMENT (OUTPATIENT)
Dept: PRIMARY CARE CLINIC | Facility: CLINIC | Age: 80
End: 2020-09-30
Payer: MEDICARE

## 2020-09-30 PROCEDURE — 99490 CHRNC CARE MGMT STAFF 1ST 20: CPT | Mod: PBBFAC,PN | Performed by: FAMILY MEDICINE

## 2020-09-30 PROCEDURE — 99490 CHRNC CARE MGMT STAFF 1ST 20: CPT | Mod: S$PBB,,, | Performed by: FAMILY MEDICINE

## 2020-09-30 PROCEDURE — 99490 PR CHRONIC CARE MGMT, 1ST 20 MIN: ICD-10-PCS | Mod: S$PBB,,, | Performed by: FAMILY MEDICINE

## 2020-10-05 ENCOUNTER — TELEPHONE (OUTPATIENT)
Dept: FAMILY MEDICINE | Facility: CLINIC | Age: 80
End: 2020-10-05

## 2020-10-05 NOTE — TELEPHONE ENCOUNTER
----- Message from Rahel Parada sent at 10/5/2020  2:32 PM CDT -----  Patient came into clinic. He states he has not been contacted with his lab results. Please call him at number on chart. Thank you

## 2020-10-05 NOTE — TELEPHONE ENCOUNTER
Please let patient know that his blood work improved, so no further work up needed    Thanks  Dr. Damon

## 2020-10-05 NOTE — TELEPHONE ENCOUNTER
Patient called for results on anemia. Pt stated he took bloodwork a few weeks ago and did not receive results.

## 2020-10-06 ENCOUNTER — HOSPITAL ENCOUNTER (OUTPATIENT)
Dept: RADIOLOGY | Facility: HOSPITAL | Age: 80
Discharge: HOME | End: 2020-10-06
Attending: NURSE PRACTITIONER
Payer: MEDICARE

## 2020-10-06 ENCOUNTER — PATIENT OUTREACH (OUTPATIENT)
Dept: ADMINISTRATIVE | Facility: OTHER | Age: 80
End: 2020-10-06

## 2020-10-06 ENCOUNTER — OFFICE VISIT (OUTPATIENT)
Dept: ORTHOPEDICS | Facility: CLINIC | Age: 80
End: 2020-10-06
Payer: MEDICARE

## 2020-10-06 DIAGNOSIS — M25.559 HIP PAIN: Primary | ICD-10-CM

## 2020-10-06 DIAGNOSIS — M25.559 HIP PAIN: ICD-10-CM

## 2020-10-06 PROCEDURE — 99213 OFFICE O/P EST LOW 20 MIN: CPT | Mod: S$PBB,,, | Performed by: NURSE PRACTITIONER

## 2020-10-06 PROCEDURE — 99999 PR PBB SHADOW E&M-EST. PATIENT-LVL III: ICD-10-PCS | Mod: PBBFAC,,, | Performed by: NURSE PRACTITIONER

## 2020-10-06 PROCEDURE — 73502 X-RAY EXAM HIP UNI 2-3 VIEWS: CPT | Mod: TC,RT

## 2020-10-06 PROCEDURE — 99213 PR OFFICE/OUTPT VISIT, EST, LEVL III, 20-29 MIN: ICD-10-PCS | Mod: S$PBB,,, | Performed by: NURSE PRACTITIONER

## 2020-10-06 PROCEDURE — 73502 X-RAY EXAM HIP UNI 2-3 VIEWS: CPT | Mod: 26,RT,, | Performed by: RADIOLOGY

## 2020-10-06 PROCEDURE — 73502 XR HIP 2 VIEW RIGHT: ICD-10-PCS | Mod: 26,RT,, | Performed by: RADIOLOGY

## 2020-10-06 PROCEDURE — 99213 OFFICE O/P EST LOW 20 MIN: CPT | Mod: PBBFAC,25 | Performed by: NURSE PRACTITIONER

## 2020-10-06 PROCEDURE — 99999 PR PBB SHADOW E&M-EST. PATIENT-LVL III: CPT | Mod: PBBFAC,,, | Performed by: NURSE PRACTITIONER

## 2020-10-06 RX ORDER — METHYLPREDNISOLONE 4 MG/1
TABLET ORAL
Qty: 1 PACKAGE | Refills: 0 | Status: SHIPPED | OUTPATIENT
Start: 2020-10-06 | End: 2020-10-27

## 2020-10-06 RX ORDER — TIZANIDINE 2 MG/1
4 TABLET ORAL EVERY 8 HOURS PRN
Qty: 40 TABLET | Refills: 0 | Status: SHIPPED | OUTPATIENT
Start: 2020-10-06 | End: 2020-10-16

## 2020-10-06 NOTE — PROGRESS NOTES
Chart reviewed.   Immunizations: updated  Orders placed: n/a  Upcoming appts to satisfy FUAD topics: Colonoscopy 10/21

## 2020-10-07 NOTE — PROGRESS NOTES
CC: Pain of the Right Hip      HPI: Pt with c/o right hip pain for the past few weeks. The pain in the right hip is aching and starts over the buttock and radiates to the thigh. The pain started after he picked up something heavy for his boat. 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 He also likes to work on his boat which is very physical. He has a history of bilateral knee replacement and right hip replacement. He denies complication with any of those surgeries. He has had back pain intermittently.     ROS  General: denies fever and chills  Resp: no c/o sob  CVS: no c/o cp  MSK: c/o right hip pain which starts in the buttock and radiates to the thigh    PE  General: AAOx3, pleasant and cooperative  Resp: respirations even and unlabored  MSK: right hip exam  - [facility]  - straight leg raise  - pain with internal rotation  - pain with external rotation  - pain over the greater trochanter    Xray:  Reviewed by me with the patient: There is a right CINDY in place good alignment no complication.  There are prostate radiation seeds and DJD of the spine pelvis and left hip joint.  No fracture dislocation bone destruction seen. Lumbar spine djd    Assessment:  sciatica    Plan:  Medrol dose pack  Tizanidine prn muscle spasms  F/u as needed

## 2020-10-18 ENCOUNTER — CLINICAL SUPPORT (OUTPATIENT)
Dept: URGENT CARE | Facility: CLINIC | Age: 80
End: 2020-10-18
Payer: MEDICARE

## 2020-10-18 DIAGNOSIS — Z12.11 COLON CANCER SCREENING: ICD-10-CM

## 2020-10-18 PROCEDURE — 99211 OFF/OP EST MAY X REQ PHY/QHP: CPT | Mod: S$GLB,,, | Performed by: NURSE PRACTITIONER

## 2020-10-18 PROCEDURE — 99211 PR OFFICE/OUTPT VISIT, EST, LEVL I: ICD-10-PCS | Mod: S$GLB,,, | Performed by: NURSE PRACTITIONER

## 2020-10-18 PROCEDURE — U0003 INFECTIOUS AGENT DETECTION BY NUCLEIC ACID (DNA OR RNA); SEVERE ACUTE RESPIRATORY SYNDROME CORONAVIRUS 2 (SARS-COV-2) (CORONAVIRUS DISEASE [COVID-19]), AMPLIFIED PROBE TECHNIQUE, MAKING USE OF HIGH THROUGHPUT TECHNOLOGIES AS DESCRIBED BY CMS-2020-01-R: HCPCS

## 2020-10-19 LAB — SARS-COV-2 RNA RESP QL NAA+PROBE: NOT DETECTED

## 2020-10-21 ENCOUNTER — ANESTHESIA (OUTPATIENT)
Dept: ENDOSCOPY | Facility: HOSPITAL | Age: 80
End: 2020-10-21
Payer: MEDICARE

## 2020-10-21 ENCOUNTER — ANESTHESIA EVENT (OUTPATIENT)
Dept: ENDOSCOPY | Facility: HOSPITAL | Age: 80
End: 2020-10-21
Payer: MEDICARE

## 2020-10-21 ENCOUNTER — HOSPITAL ENCOUNTER (OUTPATIENT)
Facility: HOSPITAL | Age: 80
Discharge: HOME | End: 2020-10-21
Attending: INTERNAL MEDICINE | Admitting: INTERNAL MEDICINE
Payer: MEDICARE

## 2020-10-21 VITALS
HEIGHT: 65 IN | RESPIRATION RATE: 18 BRPM | HEART RATE: 68 BPM | DIASTOLIC BLOOD PRESSURE: 88 MMHG | OXYGEN SATURATION: 98 % | WEIGHT: 179 LBS | TEMPERATURE: 98 F | SYSTOLIC BLOOD PRESSURE: 161 MMHG | BODY MASS INDEX: 29.82 KG/M2

## 2020-10-21 DIAGNOSIS — Z12.11 ENCOUNTER FOR SCREENING COLONOSCOPY: ICD-10-CM

## 2020-10-21 PROCEDURE — G0105 COLORECTAL SCRN; HI RISK IND: HCPCS | Mod: ,,, | Performed by: INTERNAL MEDICINE

## 2020-10-21 PROCEDURE — 37000008 HC ANESTHESIA 1ST 15 MINUTES: Performed by: INTERNAL MEDICINE

## 2020-10-21 PROCEDURE — G0105 COLORECTAL SCRN; HI RISK IND: ICD-10-PCS | Mod: ,,, | Performed by: INTERNAL MEDICINE

## 2020-10-21 PROCEDURE — G0105 COLORECTAL SCRN; HI RISK IND: HCPCS | Performed by: INTERNAL MEDICINE

## 2020-10-21 PROCEDURE — D9220A PRA ANESTHESIA: ICD-10-PCS | Mod: ANES,,, | Performed by: ANESTHESIOLOGY

## 2020-10-21 PROCEDURE — G0121 COLON CA SCRN NOT HI RSK IND: HCPCS | Performed by: INTERNAL MEDICINE

## 2020-10-21 PROCEDURE — 63600175 PHARM REV CODE 636 W HCPCS: Performed by: REGISTERED NURSE

## 2020-10-21 PROCEDURE — 25000003 PHARM REV CODE 250: Performed by: ANESTHESIOLOGY

## 2020-10-21 PROCEDURE — D9220A PRA ANESTHESIA: Mod: CRNA,,, | Performed by: REGISTERED NURSE

## 2020-10-21 PROCEDURE — 25000003 PHARM REV CODE 250: Performed by: REGISTERED NURSE

## 2020-10-21 PROCEDURE — D9220A PRA ANESTHESIA: Mod: ANES,,, | Performed by: ANESTHESIOLOGY

## 2020-10-21 PROCEDURE — D9220A PRA ANESTHESIA: ICD-10-PCS | Mod: CRNA,,, | Performed by: REGISTERED NURSE

## 2020-10-21 PROCEDURE — 37000009 HC ANESTHESIA EA ADD 15 MINS: Performed by: INTERNAL MEDICINE

## 2020-10-21 RX ORDER — SODIUM CHLORIDE 9 MG/ML
INJECTION, SOLUTION INTRAVENOUS CONTINUOUS
Status: DISCONTINUED | OUTPATIENT
Start: 2020-10-21 | End: 2020-10-21 | Stop reason: HOSPADM

## 2020-10-21 RX ORDER — LIDOCAINE HYDROCHLORIDE 20 MG/ML
INJECTION INTRAVENOUS
Status: DISCONTINUED | OUTPATIENT
Start: 2020-10-21 | End: 2020-10-21

## 2020-10-21 RX ORDER — PROPOFOL 10 MG/ML
INJECTION, EMULSION INTRAVENOUS
Status: DISCONTINUED
Start: 2020-10-21 | End: 2020-10-21 | Stop reason: HOSPADM

## 2020-10-21 RX ORDER — LIDOCAINE HYDROCHLORIDE 20 MG/ML
INJECTION, SOLUTION EPIDURAL; INFILTRATION; INTRACAUDAL; PERINEURAL
Status: DISCONTINUED
Start: 2020-10-21 | End: 2020-10-21 | Stop reason: HOSPADM

## 2020-10-21 RX ORDER — LIDOCAINE HYDROCHLORIDE 10 MG/ML
1 INJECTION, SOLUTION EPIDURAL; INFILTRATION; INTRACAUDAL; PERINEURAL ONCE
Status: DISCONTINUED | OUTPATIENT
Start: 2020-10-21 | End: 2020-10-21 | Stop reason: HOSPADM

## 2020-10-21 RX ORDER — PROPOFOL 10 MG/ML
VIAL (ML) INTRAVENOUS
Status: DISCONTINUED | OUTPATIENT
Start: 2020-10-21 | End: 2020-10-21

## 2020-10-21 RX ADMIN — SODIUM CHLORIDE: 0.9 INJECTION, SOLUTION INTRAVENOUS at 10:10

## 2020-10-21 RX ADMIN — PROPOFOL 30 MG: 10 INJECTION, EMULSION INTRAVENOUS at 12:10

## 2020-10-21 RX ADMIN — PROPOFOL 20 MG: 10 INJECTION, EMULSION INTRAVENOUS at 12:10

## 2020-10-21 RX ADMIN — SODIUM CHLORIDE: 0.9 INJECTION, SOLUTION INTRAVENOUS at 11:10

## 2020-10-21 RX ADMIN — PROPOFOL 70 MG: 10 INJECTION, EMULSION INTRAVENOUS at 11:10

## 2020-10-21 RX ADMIN — Medication 100 MG: at 11:10

## 2020-10-21 NOTE — DISCHARGE INSTRUCTIONS
Understanding Hemorrhoids    Hemorrhoid tissues are cushions of blood vessels that swell slightly during bowel movements. Too much pressure on the anal canal can make these tissues remain enlarged, become inflamed, and cause symptoms. This can happen both inside and outside the anal canal.  Parts of the anal canal  The parts of the anal canal are:  · Internal hemorrhoid tissue is in the upper area of the anal canal.  · The rectum is the last several inches of the colon. This is where stool is stored prior to bowel movements.  · Anal sphincters are ring-shaped muscles that expand and contract to control the anal opening.  · External hemorrhoid tissue lies under the anal skin.  · The anus is the passage between the rectum and the outside of the body.  Normal hemorrhoid tissue  Hemorrhoid tissues play an important role in helping your body eliminate waste. Food passes from the stomach through the intestines. The waste (stool) then travels through the colon to the rectum. It is stored in the rectum until its ready to be passed from the anus. During bowel movements, hemorrhoids swell with blood and become slightly larger. This swelling helps protect and cushion the anal canal as stool passes from the body. Once the stool has passed, the tissues stop swelling and return to normal.  Problem hemorrhoids  Pressure due to straining or other factors can cause hemorrhoid tissues to remain swollen. When this happens to the hemorrhoid tissues in the anal canal theyre called internal hemorrhoids. Swollen tissues around the anal opening are called external hemorrhoids. Depending on the location, your symptoms can differ.  · Internal hemorrhoids often happen in clusters around the wall of the anal canal. They are usually painless. But they may prolapse (protrude out of the anus) due to straining or pressure from hard stool. After the bowel movement is over, they may then reduce (return inside the body). Internal hemorrhoids  often bleed. They can also discharge mucus.  ·   External hemorrhoids are located at the anal opening, just beneath the skin. These tissues rarely cause problems unless they thrombose (form a blood clot). When this happens, a hard, bluish lump may appear. A thrombosed hemorrhoid also causes sudden, severe pain. In time, the clot may go away on its own. This sometimes leaves a skin tag of tissue stretched by the clot.  Hemorrhoid symptoms  Hemorrhoid symptoms may include:  · Pain or a burning sensation  · Bleeding during bowel movements  · Protrusion of tissue from the anus  · Itching around the anus  Causes of hemorrhoids  Theres no single cause of hemorrhoids. Most often, though, they are caused by too much pressure on the anal canal. This can be due to:  · Chronic (ongoing) constipation  · Straining during bowel movements  · Sitting too long on the toilet  · Strenuous exercise or heavy lifting  · Pregnancy and childbirth  · Aging  · Diarrhea  Date Last Reviewed: 7/1/2016  © 5324-8104 [employer]. [address] [place] [address]. All rights reserved. This information is not intended as a substitute for professional medical care. Always follow your healthcare professional's instructions.        Discharge Instructions: Eating a High-Fiber Diet  Your health care provider has prescribed a high-fiber diet for you. Fiber is what gives strength and structure to plants. Most grains, beans, vegetables, and fruits contain fiber. Foods rich in fiber are often low in calories and fat, but they fill you up more. These foods may also reduce the risk of certain health problems.  There are two types of fiber:  · Insoluble fiber. This is found in whole grains, cereals, and certain fruits and vegetables (such as apple skins, corn, and beans). Insoluble fiber is made up mainly of plant cell walls. It may prevent constipation and reduce the risk of certain types of cancer.  · Soluble fiber. This type of  fiber is found in oats, beans, nuts, and certain fruits and vegetables (such as strawberries and peas). Soluble fiber turns to gel in the digestive system, slowing the movement of the digestive tract. It helps control blood sugar levels and can reduce cholesterol, which may help lower the risk of heart disease. Soluble fiber can also help control appetite.     Home care  · Know how much fiber you need a day. The recommended daily amount of fiber is 25 grams for women and 38 grams for men. After age 50, daily fiber needs drop to 21 grams for women and 30 grams for men.  · Ask your doctor about a fiber supplement. (Always take fiber supplements with a large glass of water.)  · Keep track of how much fiber you eat.  · Eat a variety of foods high in fiber.  · Learn to read and understand food labels.  · Ask your healthcare provider how much water you should be drinking.  · Look for these high-fiber foods:  ¨ Whole-grain breads and cereals  § 6 ounces a day give you about 18 grams of fiber (1 ounce is equal to 1 slice of bread, 1 cup of dry cereal, or 1/2 cup of cooked rice).  § Include wheat and oat bran cereals, whole-wheat muffins or toast, and corn tortillas in your meals.  ¨ Fruits   § 2 cups a day give you about 8 grams of fiber.  § Apples, oranges, strawberries, pears, and bananas are good sources.  § Fruit juice does not contain as much fiber as the fruit it was made from.  ¨ Vegetables  § 2½ cups a day give you about 11 grams of fiber. Add asparagus, carrots, broccoli, peas, and corn to your meals.  ¨ Legumes  § 1/4 cup a day (in place of meat) gives you about 4 grams of fiber. Try navy beans, lentils, chickpeas, and soybeans.  ¨ Seeds   § A small handful of seeds gives you about 3 grams of fiber. Try sunflower seeds.  Follow-up  Make a follow-up appointment with a nutritionist as directed by our staff.  Date Last Reviewed: 6/1/2015  © 1062-9423 [employer]. [address] [place] [address], [place]  [address]. All rights reserved. This information is not intended as a substitute for professional medical care. Always follow your healthcare professional's instructions.        Understanding Diverticulosis and Diverticulitis     Pouches or diverticula usually occur in the lower part of the colon called the sigmoid.     The colon (large intestine) is the last part of the digestive tract. It absorbs water from stool and changes it from a liquid to a solid. In certain cases, small pouches called diverticula can form in the colon wall. This condition is called diverticulosis. The pouches can become infected. If this happens, it becomes a more serious problem called diverticulitis. These problems can be painful. But they can be managed.  Managing your condition  Diet changes or medicines may be prescribed.   If you have diverticulosis  Recommendations include:  · Diet changes are often enough to control symptoms. The main changes are adding fiber (roughage) and drinking more water. Fiber absorbs water as it travels through your colon. This helps your stool stay soft and move smoothly. Water helps this process.  · If needed, you may be told to take over-the-counter stool softeners.  · To help relieve pain, antispasmodic medicines may be prescribed.  · Watch for changes in your bowel movements. Tell the healthcare provider if you notice any changes.  · Begin an exercise program. Ask your healthcare provider how to get started.  · Get plenty of rest and sleep.   If you have diverticulitis  Treatment depends on how bad your symptoms are.  · For mild symptoms. You may be put on a liquid diet for a short time. Antibiotics are usually prescribed. If these two steps relieve your symptoms, you may then be prescribed a high-fiber diet. If you still have symptoms, your healthcare provider will discuss more treatment choices with you.  · For severe symptoms. You may need to be admitted to the hospital. There, you can be given IV  antibiotics and fluids. You will also be put on a low-fiber or liquid diet. Although not common, surgery is needed in some people with severe symptoms.  [place] to colon health     Diverticulitis occurs when the pouches become infected or inflamed.     Help keep your colon healthy with a diet that includes plenty of high-fiber fruits, vegetables, and whole grains. Drink plenty of liquids like water and juice. Maintain a healthy lifestyle including regular exercise, stress management, and adequate rest and sleep.   Date Last Reviewed: 7/1/2016  © 4981-1903 [employer]. [address] [place] [address]. All rights reserved. This information is not intended as a substitute for professional medical care. Always follow your healthcare professional's instructions.

## 2020-10-21 NOTE — ANESTHESIA POSTPROCEDURE EVALUATION
Anesthesia Post Evaluation    Patient: Fan Paz    Procedure(s) Performed: Procedure(s) (LRB):  COLONOSCOPY (N/A)    Final Anesthesia Type: general    Patient location during evaluation: GI PACU  Patient participation: Yes- Able to Participate  Level of consciousness: awake and alert and oriented  Post-procedure vital signs: reviewed and stable  Pain management: adequate  Airway patency: patent    PONV status at discharge: No PONV  Anesthetic complications: no      Cardiovascular status: hemodynamically stable and blood pressure returned to baseline  Respiratory status: spontaneous ventilation, room air and unassisted  Hydration status: euvolemic  Follow-up not needed.          Vitals Value Taken Time   /88 10/21/20 1248   Temp 36.8 °C (98.2 °F) 10/21/20 1219   Pulse 68 10/21/20 1248   Resp 18 10/21/20 1248   SpO2 98 % 10/21/20 1248         Event Time   Out of Recovery 12:50:00         Pain/Rosales Score: Rosales Score: 10 (10/21/2020 12:49 PM)

## 2020-10-21 NOTE — [employer] PATIENT INSTRUCTIONS
Discharge Summary/Instructions after an Endoscopic Procedure  Patient Name: Fan aPz  Patient MRN: [id number]  Patient YOB: 1940  Wednesday, October 21, 2020  Demetrius Araujo MD  RESTRICTIONS:  During your procedure today, you received medications for sedation.  These   medications may affect your judgment, balance and coordination.  Therefore,   for 24 hours, you have the following restrictions:   - DO NOT drive a car, operate machinery, make legal/financial decisions,   sign important papers or drink alcohol.    ACTIVITY:  Today: no heavy lifting, straining or running due to procedural   sedation/anesthesia.  The following day: return to full activity including work.  DIET:  Eat and drink normally unless instructed otherwise.     TREATMENT FOR COMMON SIDE EFFECTS:  - Mild abdominal pain, nausea, belching, bloating or excessive gas:  rest,   eat lightly and use a heating pad.  - Sore Throat: treat with throat lozenges and/or gargle with warm salt   water.  - Because air was used during the procedure, expelling large amounts of air   from your rectum or belching is normal.  - If a bowel prep was taken, you may not have a bowel movement for 1-3 days.    This is normal.  SYMPTOMS TO WATCH FOR AND REPORT TO YOUR PHYSICIAN:  1. Abdominal pain or bloating, other than gas cramps.  2. Chest pain.  3. Back pain.  4. Signs of infection such as: chills or fever occurring within 24 hours   after the procedure.  5. Rectal bleeding, which would show as bright red, maroon, or black stools.   (A tablespoon of blood from the rectum is not serious, especially if   hemorrhoids are present.)  6. Vomiting.  7. Weakness or dizziness.  GO DIRECTLY TO THE NEAREST EMERGENCY ROOM IF YOU HAVE ANY OF THE FOLLOWING:      Difficulty breathing              Chills and/or fever over 101 F   Persistent vomiting and/or vomiting blood   Severe abdominal pain   Severe chest pain   Black, tarry stools   Bleeding- more than one  tablespoon   Any other symptom or condition that you feel may need urgent attention  Your doctor recommends these additional instructions:  If any biopsies were taken, your doctors clinic will contact you in 1 to 2   weeks with any results.  - Discharge patient to home.   - Repeat colonoscopy in 5 years for surveillance.   - Return to referring physician.   - The findings and recommendations were discussed with the patient.  For questions, problems or results please call your physician - Demetrius Araujo MD at Work:  [phone].  [facility] can be reached at [phone]     IF A COMPLICATION OR EMERGENCY SITUATION ARISES AND YOU ARE UNABLE TO REACH   YOUR PHYSICIAN - GO DIRECTLY TO THE EMERGENCY ROOM.  Demetrius Araujo MD  10/21/2020 12:21:27 PM  This report has been verified and signed electronically.  [employer]

## 2020-10-21 NOTE — ANESTHESIA PREPROCEDURE EVALUATION
10/21/2020  Fan Paz is a 80 y.o., male.  To undergo Procedure(s) (LRB):  COLONOSCOPY (N/A)     Denies CP/SOB/MI/CVA/URI symptoms.  Occasional GERD with certain foods.  METS > 4  NPO > 8    Past Medical History:  Past Medical History:   Diagnosis Date    Arthritis     Cancer of palate     HTN (hypertension)     Hyperlipidemia     Prostate cancer            Past Surgical History:  Past Surgical History:   Procedure Laterality Date    BACK SURGERY  y-6    Cancer of palate surgery      Late [facility]     CARPAL TUNNEL RELEASE  13    right hand,Left hand     COLONOSCOPY N/A 4/10/2017    Procedure: COLONOSCOPY;  Surgeon: Nilo Kelly MD;  Location: [facility];  Service: Endoscopy;  Laterality: N/A;    KNEE SURGERY  y-40    left knee    KNEE SURGERY Right 12-1-15    TKR    Radio active seed Implant      2012    TOTAL HIP ARTHROPLASTY  3/2011       Social History:  Social History     Socioeconomic History    Marital status:      Spouse name: Not on file    Number of children: Not on file    Years of education: Not on file    Highest education level: Not on file   Occupational History     Employer: RETIRED   Social Needs    Financial resource strain: Not on file    Food insecurity     Worry: Not on file     Inability: Not on file    Transportation needs     Medical: Not on file     Non-medical: Not on file   Tobacco Use    Smoking status: Former Smoker     Packs/day: 3.00     Years: 20.00     Pack years: 60.00     Quit date: 7/10/1997     Years since quittin.2    Smokeless tobacco: Never Used    Tobacco comment: Quit -smoked for 40 years ,2 packs a day on an average   Substance and Sexual Activity    Alcohol use: No     Comment: used to drink Occasionally    Drug use: No    Sexual activity: Yes     Partners: Female   Lifestyle     Physical activity     Days per week: Not on file     Minutes per session: Not on file    Stress: Not on file   Relationships    Social connections     Talks on phone: Not on file     Gets together: Not on file     Attends [employer] service: Not on file     Active member of club or organization: Not on file     Attends meetings of clubs or organizations: Not on file     Relationship status: Not on file   Other Topics Concern    Not on file   Social History Narrative    Not on file       Medications:  No current facility-administered medications on file prior to encounter.      Current Outpatient Medications on File Prior to Encounter   Medication Sig Dispense Refill    FLUZONE HIGH-DOSE 2019-20, PF, 180 mcg/0.5 mL Syrg PHARMACIST ADMINISTERED IMMUNIZATION ADMINISTERED AT TIME OF DISPENSING  0    simvastatin (ZOCOR) 40 MG tablet Take 1 tablet (40 mg total) by mouth every evening. 90 tablet 1    tamsulosin (FLOMAX) 0.4 mg Cap Take 1 capsule (0.4 mg total) by mouth once daily. 90 capsule 1       Allergies:  Review of patient's allergies indicates:  No Known Allergies    Active Problems:  Patient Active Problem List   Diagnosis    Hyperlipidemia    History of prostate cancer    Carpal tunnel syndrome    Arthritis of hand    Essential hypertension    Anemia    Osteoarthritis, knee    Dysphagia    Dry mouth    GERD (gastroesophageal reflux disease)    Hx pulmonary embolism-6/20/09- bilateral lower lobe pulmonary emboli    Asymptomatic varicose veins of bilateral lower extremities    At risk for aspiration    Edema    Prediabetes    Primary osteoarthritis of right knee    Long-term (current) use of anticoagulants    Deep vein thrombosis prophylaxis    Seasonal allergic rhinitis due to pollen    Screen for colon cancer    Low HDL (under 40)    Class 1 obesity due to excess calories with serious comorbidity and body mass index (BMI) of 31.0 to 31.9 in adult    Overweight (BMI 25.0-29.9)        Diagnostic Studies:  Results for BOLA REICH (MRN [id number]) as of 10/21/2020 08:02   Ref. Range 8/31/2020 13:53   WBC Latest Ref Range: 3.90 - 12.70 K/uL 6.10   RBC Latest Ref Range: 4.60 - 6.20 M/uL 4.83   Hemoglobin Latest Ref Range: 14.0 - 18.0 g/dL 13.1 (L)   Hematocrit Latest Ref Range: 40.0 - 54.0 % 41.5   MCV Latest Ref Range: 82 - 98 fL 86   MCH Latest Ref Range: 27.0 - 31.0 pg 27.1   MCHC Latest Ref Range: 32.0 - 36.0 g/dL 31.6 (L)   RDW Latest Ref Range: 11.5 - 14.5 % 15.1 (H)   Platelets Latest Ref Range: 150 - 350 K/uL 198   MPV Latest Ref Range: 9.2 - 12.9 fL 9.9   Gran% Latest Ref Range: 38.0 - 73.0 % 64.3   Lymph% Latest Ref Range: 18.0 - 48.0 % 20.8   Mono% Latest Ref Range: 4.0 - 15.0 % 11.1   Eosinophil% Latest Ref Range: 0.0 - 8.0 % 3.1   Basophil% Latest Ref Range: 0.0 - 1.9 % 0.5   Immature Granulocytes Latest Ref Range: 0.0 - 0.5 % 0.2   Gran # (ANC) Latest Ref Range: 1.8 - 7.7 K/uL 3.9   Lymph # Latest Ref Range: 1.0 - 4.8 K/uL 1.3   Mono # Latest Ref Range: 0.3 - 1.0 K/uL 0.7   Eos # Latest Ref Range: 0.0 - 0.5 K/uL 0.2   Baso # Latest Ref Range: 0.00 - 0.20 K/uL 0.03   Immature Grans (Abs) Latest Ref Range: 0.00 - 0.04 K/uL 0.01   nRBC Latest Ref Range: 0 /100 WBC 0   Differential Method Unknown Automated   Results for BOLA REICH (MRN [id number]) as of 10/21/2020 08:02   Ref. Range 10/18/2020 09:25   SARS-CoV2 (COVID-19) Qualitative PCR Latest Ref Range: Not Detected  Not Detected     24 Hour Vitals:      See Nursing Charting For Additional Vitals    Anesthesia Evaluation    I have reviewed the Patient Summary Reports.    I have reviewed the Nursing Notes.       Review of Systems  Anesthesia Hx:  No problems with previous Anesthesia   Denies Personal Hx of Anesthesia complications.   Social:  Former Smoker, Social Alcohol Use    Hematology/Oncology:         -- Anemia: --  Cancer in past history:  Oncology Comments: CA of palate - surgical removal   Radiation to  throat x17, denies dysphagia    CA prostate     Cardiovascular:   Exercise tolerance: good Hypertension, well controlled hyperlipidemia    Pulmonary:  Pulmonary Normal    Hepatic/GI:   Bowel prep:    GERD, well controlled    Musculoskeletal:   Arthritis     Neurological:  Neurology Normal    Endocrine:  Endocrine Normal        Physical Exam  General:  Well nourished    Airway/Jaw/Neck:   MP2, TMD > 3FB, edentulous     Chest/Lungs:  Chest/Lungs Clear    Heart/Vascular:  Heart Findings: Normal            Anesthesia Plan  Type of Anesthesia, risks & benefits discussed:  Anesthesia Type:  general, MAC  Patient's Preference:   Intra-op Monitoring Plan: standard ASA monitors  Intra-op Monitoring Plan Comments:   Post Op Pain Control Plan: multimodal analgesia  Post Op Pain Control Plan Comments:   Induction:   IV  Beta Blocker:  Patient is not currently on a Beta-Blocker (No further documentation required).       Informed Consent: Patient understands risks and agrees with Anesthesia plan.  Questions answered. Anesthesia consent signed with patient.  ASA Score: 3     Day of Surgery Review of History & Physical:  There are no significant changes.          Ready For Surgery From Anesthesia Perspective.

## 2020-10-21 NOTE — H&P
[facility]  History & Physical    Subjective:      Chief Complaint/Reason for Admission:     Colonoscopy    Fan Paz is a 80 y.o. male.    Past Medical History:   Diagnosis Date    Arthritis     Cancer of palate     HTN (hypertension)     Hyperlipidemia     Prostate cancer          Past Surgical History:   Procedure Laterality Date    BACK SURGERY  y-6    Cancer of [facility] surgery      Late [facility]     CARPAL TUNNEL RELEASE  13    right hand,Left hand     COLONOSCOPY N/A 4/10/2017    Procedure: COLONOSCOPY;  Surgeon: Nilo Kelly MD;  Location: [facility];  Service: Endoscopy;  Laterality: N/A;    KNEE SURGERY  y-40    left knee    KNEE SURGERY Right 12-1-15    TKR    Radio active seed Implant      2012    TOTAL HIP ARTHROPLASTY  3/2011     Family History   Problem Relation Age of Onset    Coronary artery disease Father             Cancer Sister         Liver Cancer -    Diabetes Sister             No Known Problems Brother     No Known Problems Sister     No Known Problems Sister     No Known Problems Brother     No Known Problems Mother     Lung cancer Brother 60        - was a tobacco user, had lung cancer    Cancer Brother         Liver Cancer-     Lung cancer Sister         Alive    Breast cancer Sister     Clotting disorder Sister 75        blood clot on brain-alive    Stroke Brother             Glaucoma Cousin     No Known Problems Maternal Aunt     No Known Problems Maternal Uncle     No Known Problems Paternal Aunt     No Known Problems Paternal Uncle     No Known Problems Maternal Grandmother     No Known Problems Maternal Grandfather     No Known Problems Paternal Grandmother     No Known Problems Paternal Grandfather     Macular degeneration Neg Hx     Strabismus Neg Hx     Amblyopia Neg Hx     Blindness Neg Hx     Cataracts Neg Hx     Hypertension Neg Hx     Retinal  detachment Neg Hx     Thyroid disease Neg Hx      Social History     Tobacco Use    Smoking status: Former Smoker     Packs/day: 3.00     Years: 20.00     Pack years: 60.00     Quit date: 7/10/1997     Years since quittin.2    Smokeless tobacco: Never Used    Tobacco comment: Quit -smoked for 40 years ,2 packs a day on an average   Substance Use Topics    Alcohol use: No     Comment: used to drink Occasionally    Drug use: No       PTA Medications   Medication Sig    diclofenac (VOLTAREN) 50 MG EC tablet Take 1 tablet (50 mg total) by mouth 2 (two) times daily.    fluticasone propionate (FLONASE) 50 mcg/actuation nasal spray Use 1 spray(s) in each nostril once daily    FLUZONE HIGH-DOSE 2019-20, PF, 180 mcg/0.5 mL Syrg PHARMACIST ADMINISTERED IMMUNIZATION ADMINISTERED AT TIME OF DISPENSING    methylPREDNISolone (MEDROL DOSEPACK) 4 mg tablet use as directed    montelukast (SINGULAIR) 10 mg tablet TAKE 1 TABLET BY MOUTH ONCE DAILY IN THE EVENING    simvastatin (ZOCOR) 40 MG tablet Take 1 tablet (40 mg total) by mouth every evening.    tamsulosin (FLOMAX) 0.4 mg Cap Take 1 capsule (0.4 mg total) by mouth once daily.     Review of patient's allergies indicates:  No Known Allergies     Review of Systems   Constitutional: Negative for chills and fever.   Respiratory: Negative for shortness of breath.    Cardiovascular: Negative for chest pain.   Gastrointestinal: Negative for abdominal pain.       Objective:      Vital Signs (Most Recent)  Temp: 97.6 °F (36.4 °C) (10/21/20 1014)  Pulse: 87 (10/21/20 1014)  Resp: 18 (10/21/20 1014)  BP: (!) 175/90 (10/21/20 1014)  SpO2: 95 % (10/21/20 1014)    Vital Signs Range (Last 24H):  Temp:  [97.6 °F (36.4 °C)]   Pulse:  [87]   Resp:  [18]   BP: (175)/(90)   SpO2:  [95 %]     Physical Exam  Cardiovascular:      Rate and Rhythm: Normal rate.   Pulmonary:      Effort: Pulmonary effort is normal.   Abdominal:      Palpations: Abdomen is soft.   Neurological:       Mental Status: He is alert and oriented to person, place, and time.             Assessment:      Active Hospital Problems    Diagnosis  POA    Encounter for screening colonoscopy [Z12.11]  Not Applicable      Resolved Hospital Problems   No resolved problems to display.       Plan:    Direct access surveillance colonoscopy for history of multiple colon polyps

## 2020-10-21 NOTE — OR NURSING
PROCEDURE AND RECOVERY COMPLETED.  DISCUSSED RESULTS AND DISCHARGE INSTRUCTIONS GIVEN TO PATIENT AND SPOUSE. UNDERSTANDING VERBALIZED. PATIENT READY FOR DISCHARGE.

## 2020-10-21 NOTE — TRANSFER OF CARE
"Anesthesia Transfer of Care Note    Patient: Fan Paz    Procedure(s) Performed: Procedure(s) (LRB):  COLONOSCOPY (N/A)    Patient location: GI    Anesthesia Type: general    Transport from OR: Transported from OR on room air with adequate spontaneous ventilation    Post pain: adequate analgesia    Post assessment: no apparent anesthetic complications and tolerated procedure well    Post vital signs: stable    Level of consciousness: awake and alert    Nausea/Vomiting: no nausea/vomiting    Complications: none    Transfer of care protocol was followed      Last vitals:   Visit Vitals  /61 (BP Location: Left arm, Patient Position: Lying)   Pulse 72   Temp 36.8 °C (98.2 °F)   Resp 14   Ht 5' 5" (1.651 m)   Wt 81.2 kg (179 lb)   SpO2 98%   BMI 29.79 kg/m²     "

## 2020-10-31 ENCOUNTER — EXTERNAL CHRONIC CARE MANAGEMENT (OUTPATIENT)
Dept: PRIMARY CARE CLINIC | Facility: CLINIC | Age: 80
End: 2020-10-31
Payer: MEDICARE

## 2020-10-31 PROCEDURE — G2058 CCM ADD 20MIN: HCPCS | Mod: S$PBB,,, | Performed by: FAMILY MEDICINE

## 2020-10-31 PROCEDURE — 99490 PR CHRONIC CARE MGMT, 1ST 20 MIN: ICD-10-PCS | Mod: S$PBB,,, | Performed by: FAMILY MEDICINE

## 2020-10-31 PROCEDURE — G2058 CCM ADD 20MIN: HCPCS | Mod: PBBFAC,PN | Performed by: FAMILY MEDICINE

## 2020-10-31 PROCEDURE — 99490 CHRNC CARE MGMT STAFF 1ST 20: CPT | Mod: PBBFAC,PN | Performed by: FAMILY MEDICINE

## 2020-10-31 PROCEDURE — 99490 CHRNC CARE MGMT STAFF 1ST 20: CPT | Mod: S$PBB,,, | Performed by: FAMILY MEDICINE

## 2020-10-31 PROCEDURE — G2058 PR CHRON CARE MGMT, EA ADDTL 20 MINS: ICD-10-PCS | Mod: S$PBB,,, | Performed by: FAMILY MEDICINE

## 2020-11-11 ENCOUNTER — OFFICE VISIT (OUTPATIENT)
Dept: FAMILY MEDICINE | Facility: CLINIC | Age: 80
End: 2020-11-11
Payer: MEDICARE

## 2020-11-11 VITALS
HEIGHT: 65 IN | WEIGHT: 178.81 LBS | HEART RATE: 67 BPM | DIASTOLIC BLOOD PRESSURE: 78 MMHG | SYSTOLIC BLOOD PRESSURE: 132 MMHG | OXYGEN SATURATION: 97 % | RESPIRATION RATE: 18 BRPM | TEMPERATURE: 98 F | BODY MASS INDEX: 29.79 KG/M2

## 2020-11-11 DIAGNOSIS — H61.22 LEFT EAR IMPACTED CERUMEN: ICD-10-CM

## 2020-11-11 DIAGNOSIS — R20.2 TINGLING SENSATION: Primary | ICD-10-CM

## 2020-11-11 PROCEDURE — 99213 PR OFFICE/OUTPT VISIT, EST, LEVL III, 20-29 MIN: ICD-10-PCS | Mod: S$PBB,,, | Performed by: FAMILY MEDICINE

## 2020-11-11 PROCEDURE — 99213 OFFICE O/P EST LOW 20 MIN: CPT | Mod: PBBFAC,PN | Performed by: FAMILY MEDICINE

## 2020-11-11 PROCEDURE — 99999 PR PBB SHADOW E&M-EST. PATIENT-LVL III: ICD-10-PCS | Mod: PBBFAC,,, | Performed by: FAMILY MEDICINE

## 2020-11-11 PROCEDURE — 99213 OFFICE O/P EST LOW 20 MIN: CPT | Mod: S$PBB,,, | Performed by: FAMILY MEDICINE

## 2020-11-11 PROCEDURE — 99999 PR PBB SHADOW E&M-EST. PATIENT-LVL III: CPT | Mod: PBBFAC,,, | Performed by: FAMILY MEDICINE

## 2020-11-17 ENCOUNTER — TELEPHONE (OUTPATIENT)
Dept: FAMILY MEDICINE | Facility: CLINIC | Age: 80
End: 2020-11-17

## 2020-11-17 NOTE — TELEPHONE ENCOUNTER
----- Message from Debbie Haas sent at 11/17/2020 10:00 AM CST -----  Regarding: callback  Type: Patient Call Back    Who called: pt    What is the request in detail: PT called in to speak with a nurse. PT stated he discussed medication being sent in for him     Can the clinic reply by [facility]? No     Would the patient rather a call back or a response via [facility]? Callback     Best call back number: [phone] ([place])      Additional Information:

## 2020-11-17 NOTE — TELEPHONE ENCOUNTER
----- Message from Dayanara Batista sent at 11/17/2020  1:40 PM CST -----  Regarding: Patient Returning Call  Contact: Patient  Type:  Patient Returning Call    Who Called: Patient     Who Left Message for Patient:  Veronica Linnea,    Does the patient know what this is regarding?: not sure     Would the patient rather a call back or a response via [facility]? Call back     Best Call Back Number: [phone]

## 2020-11-17 NOTE — TELEPHONE ENCOUNTER
I spoke to the pt and he was not able to get the Lidocaine gel last week. [facility] said they did not receive the Rx. I will call pharmacy to get more information.

## 2020-11-23 NOTE — PROGRESS NOTES
"Subjective:       Patient ID: Fan Paz is a 80 y.o. male.    Chief Complaint: No chief complaint on file.    HPI   80-year-old male comes in with complaint of tingling sensation on a his low last top scalp.  It has been present for a month.  He states that it is constant.  He does not know what is causing it.  He states that is not headache.  It is not associated with any other symptoms.    Review of Systems   Constitutional: Negative for unexpected weight change.   HENT: Negative for nasal congestion, ear pain, hearing loss, postnasal drip, rhinorrhea and sneezing.    Eyes: Negative for visual disturbance.   Respiratory: Negative for cough.    Cardiovascular: Negative for chest pain.   Neurological:        Tingling of left scalp         Objective:     /78 (BP Location: Right arm, Patient Position: Sitting, BP Method: Medium (Manual))   Pulse 67   Temp 98.3 °F (36.8 °C) (Oral)   Resp 18   Ht 5' 5" (1.651 m)   Wt 81.1 kg (178 lb 12.7 oz)   SpO2 97%   BMI 29.75 kg/m²     Physical Exam  HENT:      Head: Normocephalic and atraumatic.        Right Ear: Ear canal and external ear normal.      Left Ear: There is impacted cerumen.   Neck:      Musculoskeletal: Normal range of motion.   Cardiovascular:      Rate and Rhythm: Normal rate.      Pulses: Normal pulses.   Pulmonary:      Effort: Pulmonary effort is normal. No respiratory distress.      Breath sounds: No wheezing or rales.   Neurological:      General: No focal deficit present.      Mental Status: He is alert.      Cranial Nerves: No cranial nerve deficit.         Assessment:       1. Tingling sensation    2. Left ear impacted cerumen        Plan:       Diagnoses and all orders for this visit:    Tingling sensation  -     lidocaine 4 % Gel; To scalp area BID PRN for tingling    Left ear impacted cerumen      Cerumen from left ear canal was irrigated out and patient tolerated this well.  Patient also reports there was no tingling of scalp " at the end of the visit.  Patient to return to office as needed.

## 2020-11-27 ENCOUNTER — TELEPHONE (OUTPATIENT)
Dept: ORTHOPEDICS | Facility: CLINIC | Age: 80
End: 2020-11-27

## 2020-11-27 ENCOUNTER — ANESTHESIA EVENT (OUTPATIENT)
Dept: SURGERY | Facility: HOSPITAL | Age: 80
DRG: 487 | End: 2020-11-27
Payer: MEDICARE

## 2020-11-27 ENCOUNTER — OFFICE VISIT (OUTPATIENT)
Dept: ORTHOPEDICS | Facility: CLINIC | Age: 80
DRG: 487 | End: 2020-11-27
Payer: MEDICARE

## 2020-11-27 ENCOUNTER — HOSPITAL ENCOUNTER (OUTPATIENT)
Dept: RADIOLOGY | Facility: HOSPITAL | Age: 80
Discharge: HOME | DRG: 487 | End: 2020-11-27
Attending: NURSE PRACTITIONER
Payer: MEDICARE

## 2020-11-27 ENCOUNTER — PATIENT OUTREACH (OUTPATIENT)
Dept: ADMINISTRATIVE | Facility: OTHER | Age: 80
End: 2020-11-27

## 2020-11-27 ENCOUNTER — HOSPITAL ENCOUNTER (INPATIENT)
Facility: HOSPITAL | Age: 80
LOS: 4 days | Discharge: HOME | DRG: 487 | End: 2020-12-01
Attending: ORTHOPAEDIC SURGERY | Admitting: ORTHOPAEDIC SURGERY
Payer: MEDICARE

## 2020-11-27 DIAGNOSIS — T84.50XA: ICD-10-CM

## 2020-11-27 DIAGNOSIS — T84.50XD INFECTION OF PROSTHETIC JOINT, SUBSEQUENT ENCOUNTER: ICD-10-CM

## 2020-11-27 DIAGNOSIS — T84.50XA INFECTION OF PROSTHETIC JOINT, INITIAL ENCOUNTER: ICD-10-CM

## 2020-11-27 DIAGNOSIS — M25.561 PAIN IN BOTH KNEES, UNSPECIFIED CHRONICITY: Primary | ICD-10-CM

## 2020-11-27 DIAGNOSIS — M25.562 ACUTE PAIN OF LEFT KNEE: Primary | ICD-10-CM

## 2020-11-27 DIAGNOSIS — T84.50XA INFECTION OF PROSTHETIC JOINT, INITIAL ENCOUNTER: Primary | ICD-10-CM

## 2020-11-27 DIAGNOSIS — M25.561 PAIN IN BOTH KNEES, UNSPECIFIED CHRONICITY: ICD-10-CM

## 2020-11-27 DIAGNOSIS — M25.562 PAIN IN BOTH KNEES, UNSPECIFIED CHRONICITY: ICD-10-CM

## 2020-11-27 DIAGNOSIS — M25.562 PAIN IN BOTH KNEES, UNSPECIFIED CHRONICITY: Primary | ICD-10-CM

## 2020-11-27 LAB
ABO + RH BLD: NORMAL
ANION GAP SERPL CALC-SCNC: 11 MMOL/L (ref 8–16)
APPEARANCE FLD: NORMAL
BACTERIA #/AREA URNS AUTO: NORMAL /HPF
BASOPHILS # BLD AUTO: 0.02 K/UL (ref 0–0.2)
BASOPHILS NFR BLD: 0.2 % (ref 0–1.9)
BILIRUB UR QL STRIP: NEGATIVE
BLD GP AB SCN CELLS X3 SERPL QL: NORMAL
BODY FLD TYPE: NORMAL
BUN SERPL-MCNC: 15 MG/DL (ref 8–23)
CALCIUM SERPL-MCNC: 9.2 MG/DL (ref 8.7–10.5)
CHLORIDE SERPL-SCNC: 101 MMOL/L (ref 95–110)
CLARITY UR REFRACT.AUTO: CLEAR
CO2 SERPL-SCNC: 23 MMOL/L (ref 23–29)
COLOR FLD: NORMAL
COLOR UR AUTO: YELLOW
CREAT SERPL-MCNC: 1.1 MG/DL (ref 0.5–1.4)
DIFFERENTIAL METHOD: ABNORMAL
EOSINOPHIL # BLD AUTO: 0 K/UL (ref 0–0.5)
EOSINOPHIL NFR BLD: 0.1 % (ref 0–8)
ERYTHROCYTE [DISTWIDTH] IN BLOOD BY AUTOMATED COUNT: 15.7 % (ref 11.5–14.5)
EST. GFR  (AFRICAN AMERICAN): >60 ML/MIN/1.73 M^2
EST. GFR  (NON AFRICAN AMERICAN): >60 ML/MIN/1.73 M^2
GLUCOSE SERPL-MCNC: 104 MG/DL (ref 70–110)
GLUCOSE UR QL STRIP: NEGATIVE
HCT VFR BLD AUTO: 40.9 % (ref 40–54)
HGB BLD-MCNC: 12.7 G/DL (ref 14–18)
HGB UR QL STRIP: NEGATIVE
HYALINE CASTS UR QL AUTO: 0 /LPF
IMM GRANULOCYTES # BLD AUTO: 0.04 K/UL (ref 0–0.04)
IMM GRANULOCYTES NFR BLD AUTO: 0.3 % (ref 0–0.5)
INR PPP: 1 (ref 0.8–1.2)
KETONES UR QL STRIP: ABNORMAL
LEUKOCYTE ESTERASE UR QL STRIP: NEGATIVE
LYMPHOCYTES # BLD AUTO: 0.9 K/UL (ref 1–4.8)
LYMPHOCYTES NFR BLD: 8 % (ref 18–48)
LYMPHOCYTES NFR FLD MANUAL: 3 %
MCH RBC QN AUTO: 27.1 PG (ref 27–31)
MCHC RBC AUTO-ENTMCNC: 31.1 G/DL (ref 32–36)
MCV RBC AUTO: 87 FL (ref 82–98)
MICROSCOPIC COMMENT: NORMAL
MONOCYTES # BLD AUTO: 1.3 K/UL (ref 0.3–1)
MONOCYTES NFR BLD: 11.3 % (ref 4–15)
MONOS+MACROS NFR FLD MANUAL: 2 %
NEUTROPHILS # BLD AUTO: 9.4 K/UL (ref 1.8–7.7)
NEUTROPHILS NFR BLD: 80.1 % (ref 38–73)
NEUTROPHILS NFR FLD MANUAL: 95 %
NITRITE UR QL STRIP: NEGATIVE
NRBC BLD-RTO: 0 /100 WBC
PH UR STRIP: 5 [PH] (ref 5–8)
PLATELET # BLD AUTO: 173 K/UL (ref 150–350)
PMV BLD AUTO: 11 FL (ref 9.2–12.9)
POTASSIUM SERPL-SCNC: 3.9 MMOL/L (ref 3.5–5.1)
PROT UR QL STRIP: ABNORMAL
PROTHROMBIN TIME: 11 SEC (ref 9–12.5)
RBC # BLD AUTO: 4.68 M/UL (ref 4.6–6.2)
RBC #/AREA URNS AUTO: 2 /HPF (ref 0–4)
SARS-COV-2 RDRP RESP QL NAA+PROBE: NEGATIVE
SODIUM SERPL-SCNC: 135 MMOL/L (ref 136–145)
SP GR UR STRIP: 1.02 (ref 1–1.03)
URN SPEC COLLECT METH UR: ABNORMAL
WBC # BLD AUTO: 11.68 K/UL (ref 3.9–12.7)
WBC # FLD: NORMAL /CU MM
WBC #/AREA URNS AUTO: 1 /HPF (ref 0–5)

## 2020-11-27 PROCEDURE — 87147 CULTURE TYPE IMMUNOLOGIC: CPT

## 2020-11-27 PROCEDURE — 73562 XR KNEE ORTHO BILAT: ICD-10-PCS | Mod: 26,50,, | Performed by: RADIOLOGY

## 2020-11-27 PROCEDURE — 99999 PR PBB SHADOW E&M-EST. PATIENT-LVL III: CPT | Mod: PBBFAC,,, | Performed by: NURSE PRACTITIONER

## 2020-11-27 PROCEDURE — 87186 SC STD MICRODIL/AGAR DIL: CPT

## 2020-11-27 PROCEDURE — U0002 COVID-19 LAB TEST NON-CDC: HCPCS

## 2020-11-27 PROCEDURE — 87070 CULTURE OTHR SPECIMN AEROBIC: CPT

## 2020-11-27 PROCEDURE — 99999 PR PBB SHADOW E&M-EST. PATIENT-LVL III: ICD-10-PCS | Mod: PBBFAC,,, | Performed by: NURSE PRACTITIONER

## 2020-11-27 PROCEDURE — 80048 BASIC METABOLIC PNL TOTAL CA: CPT

## 2020-11-27 PROCEDURE — 11000001 HC ACUTE MED/SURG PRIVATE ROOM

## 2020-11-27 PROCEDURE — 85025 COMPLETE CBC W/AUTO DIFF WBC: CPT

## 2020-11-27 PROCEDURE — 86850 RBC ANTIBODY SCREEN: CPT

## 2020-11-27 PROCEDURE — 87040 BLOOD CULTURE FOR BACTERIA: CPT | Mod: 59

## 2020-11-27 PROCEDURE — 99213 OFFICE O/P EST LOW 20 MIN: CPT | Mod: PBBFAC,25 | Performed by: NURSE PRACTITIONER

## 2020-11-27 PROCEDURE — 99213 PR OFFICE/OUTPT VISIT, EST, LEVL III, 20-29 MIN: ICD-10-PCS | Mod: S$PBB,,, | Performed by: NURSE PRACTITIONER

## 2020-11-27 PROCEDURE — 73562 X-RAY EXAM OF KNEE 3: CPT | Mod: 26,50,, | Performed by: RADIOLOGY

## 2020-11-27 PROCEDURE — 25000003 PHARM REV CODE 250: Performed by: STUDENT IN AN ORGANIZED HEALTH CARE EDUCATION/TRAINING PROGRAM

## 2020-11-27 PROCEDURE — 81001 URINALYSIS AUTO W/SCOPE: CPT

## 2020-11-27 PROCEDURE — 86920 COMPATIBILITY TEST SPIN: CPT

## 2020-11-27 PROCEDURE — 73562 X-RAY EXAM OF KNEE 3: CPT | Mod: TC,50

## 2020-11-27 PROCEDURE — 36415 COLL VENOUS BLD VENIPUNCTURE: CPT

## 2020-11-27 PROCEDURE — 85610 PROTHROMBIN TIME: CPT

## 2020-11-27 PROCEDURE — 89051 BODY FLUID CELL COUNT: CPT

## 2020-11-27 PROCEDURE — 99213 OFFICE O/P EST LOW 20 MIN: CPT | Mod: S$PBB,,, | Performed by: NURSE PRACTITIONER

## 2020-11-27 RX ORDER — LIDOCAINE HYDROCHLORIDE 10 MG/ML
1 INJECTION, SOLUTION EPIDURAL; INFILTRATION; INTRACAUDAL; PERINEURAL ONCE
Status: DISCONTINUED | OUTPATIENT
Start: 2020-11-27 | End: 2020-12-01 | Stop reason: HOSPADM

## 2020-11-27 RX ORDER — SODIUM CHLORIDE 0.9 % (FLUSH) 0.9 %
10 SYRINGE (ML) INJECTION
Status: DISCONTINUED | OUTPATIENT
Start: 2020-11-27 | End: 2020-12-01 | Stop reason: HOSPADM

## 2020-11-27 RX ORDER — OXYCODONE HYDROCHLORIDE 10 MG/1
10 TABLET ORAL EVERY 4 HOURS PRN
Status: DISCONTINUED | OUTPATIENT
Start: 2020-11-27 | End: 2020-11-28

## 2020-11-27 RX ORDER — ACETAMINOPHEN 325 MG/1
650 TABLET ORAL EVERY 4 HOURS PRN
Status: DISCONTINUED | OUTPATIENT
Start: 2020-11-27 | End: 2020-11-28

## 2020-11-27 RX ORDER — TALC
6 POWDER (GRAM) TOPICAL NIGHTLY PRN
Status: DISCONTINUED | OUTPATIENT
Start: 2020-11-27 | End: 2020-12-01 | Stop reason: HOSPADM

## 2020-11-27 RX ORDER — FLUTICASONE PROPIONATE 50 MCG
1 SPRAY, SUSPENSION (ML) NASAL DAILY
Status: DISCONTINUED | OUTPATIENT
Start: 2020-11-28 | End: 2020-12-01 | Stop reason: HOSPADM

## 2020-11-27 RX ORDER — TAMSULOSIN HYDROCHLORIDE 0.4 MG/1
1 CAPSULE ORAL DAILY
Status: DISCONTINUED | OUTPATIENT
Start: 2020-11-28 | End: 2020-12-01 | Stop reason: HOSPADM

## 2020-11-27 RX ORDER — ACETAMINOPHEN 325 MG/1
650 TABLET ORAL EVERY 8 HOURS PRN
Status: DISCONTINUED | OUTPATIENT
Start: 2020-11-27 | End: 2020-11-28

## 2020-11-27 RX ORDER — MUPIROCIN 20 MG/G
OINTMENT TOPICAL
Status: CANCELLED | OUTPATIENT
Start: 2020-11-27

## 2020-11-27 RX ORDER — ONDANSETRON 8 MG/1
8 TABLET, ORALLY DISINTEGRATING ORAL EVERY 8 HOURS PRN
Status: DISCONTINUED | OUTPATIENT
Start: 2020-11-27 | End: 2020-12-01 | Stop reason: HOSPADM

## 2020-11-27 RX ORDER — METOCLOPRAMIDE HYDROCHLORIDE 5 MG/ML
5 INJECTION INTRAMUSCULAR; INTRAVENOUS EVERY 6 HOURS PRN
Status: DISCONTINUED | OUTPATIENT
Start: 2020-11-27 | End: 2020-12-01 | Stop reason: HOSPADM

## 2020-11-27 RX ORDER — MONTELUKAST SODIUM 10 MG/1
10 TABLET ORAL NIGHTLY
Status: DISCONTINUED | OUTPATIENT
Start: 2020-11-27 | End: 2020-12-01 | Stop reason: HOSPADM

## 2020-11-27 RX ORDER — LABETALOL HCL 20 MG/4 ML
10 SYRINGE (ML) INTRAVENOUS EVERY 6 HOURS PRN
Status: DISCONTINUED | OUTPATIENT
Start: 2020-11-27 | End: 2020-12-01 | Stop reason: HOSPADM

## 2020-11-27 RX ORDER — SIMVASTATIN 20 MG/1
40 TABLET, FILM COATED ORAL NIGHTLY
Status: DISCONTINUED | OUTPATIENT
Start: 2020-11-27 | End: 2020-12-01 | Stop reason: HOSPADM

## 2020-11-27 RX ORDER — OXYCODONE HYDROCHLORIDE 5 MG/1
5 TABLET ORAL EVERY 4 HOURS PRN
Status: DISCONTINUED | OUTPATIENT
Start: 2020-11-27 | End: 2020-11-28

## 2020-11-27 RX ADMIN — SIMVASTATIN 40 MG: 20 TABLET, FILM COATED ORAL at 09:11

## 2020-11-27 RX ADMIN — MONTELUKAST 10 MG: 10 TABLET, FILM COATED ORAL at 09:11

## 2020-11-27 RX ADMIN — OXYCODONE HYDROCHLORIDE 5 MG: 5 TABLET ORAL at 03:11

## 2020-11-27 NOTE — PROGRESS NOTES
Requested updates within Care Everywhere.  Patient's chart was reviewed for overdue FUAD topics.  Immunizations reconciled.

## 2020-11-27 NOTE — PROGRESS NOTES
CC: Pain of the Left Knee      HPI: Pt with c/o new onset of left knee pain and swelling since Wednesday night. The pain is severe and he has been unable to stand or walk on the knee. The pain is aching and global. He would have gone to the ER, but he and his family are concerned about possible covid exposure. He denies any recent infections. He does report low grade fever and warmth to the knee. The knee was replaced by Dr. Paul in February 2014. He comes to this appt in a wheelchair accompanied by his daughter.     ROS  General: denies fever and chills  Resp: no c/o sob  CVS: no c/o cp  MSK: c/o severe pain with swelling in the left knee    PE  General: AAOx3, pleasant and cooperative  Resp: respirations even and unlabored  MSK: left knee exam  -10 degrees extension  80 degrees flexion  + warmth or erythema   + large effusion  Pain with all movement     Xray:  Reviewed by myself and Dr. Pires: Bones are well mineralized.  Postoperative changes of TKA are again identified bilaterally.  Position and alignment of both prosthetic devices appears satisfactory and similar to the previous study.  Hardware remains intact without evidence of loosening.  No fracture or dislocation is seen.  There is a little increased soft tissue density in the suprapatellar area on the left which could represent a small joint effusion.    Assessment:  Left knee prosthetic joint infection    Plan:  , ESR 40  Aspiration done and fluid sent for cultures at [facility] and to synovasure testing  Discussed with Dr. Pires and he plans to take the patient to the OR tomorrow or Sunday for a washout and poly exchange  Pt and his daughter understand and are in agreement with the plan  Rapid covid test sent to lab        Knee Arthrocentesis with Injection Procedure Note    Pre-operative Diagnosis: Left knee degenerative arthritis    Post-operative Diagnosis: same    Indications: Left knee pain    Anesthesia: none    Procedure Details      Verbal consent was obtained for the procedure.An 18 gauge needle was inserted into the superior aspect of the joint from a lateral approach. 110 ml of purulent yellow fluid was removed from the joint and sent to the lab for analysis. The needle was removed and the area cleansed and dressed.    Complications:  None; patient tolerated the procedure well.

## 2020-11-27 NOTE — SUBJECTIVE & OBJECTIVE
Past Medical History:   Diagnosis Date    Arthritis     Cancer of palate     HTN (hypertension)     Hyperlipidemia     Prostate cancer     2011       Past Surgical History:   Procedure Laterality Date    BACK SURGERY  y-6    Cancer [facility] surgery      [facility]     CARPAL TUNNEL RELEASE  8-14-13    right hand,Left hand 2013    COLONOSCOPY N/A 4/10/2017    Procedure: COLONOSCOPY;  Surgeon: Nilo Kelly MD;  Location: [facility];  Service: Endoscopy;  Laterality: N/A;    COLONOSCOPY N/A 10/21/2020    Procedure: COLONOSCOPY;  Surgeon: Demetrius Araujo MD;  Location: [facility];  Service: Endoscopy;  Laterality: N/A;    KNEE SURGERY  y-40    left knee    KNEE SURGERY Right 12-1-15    TKR    Radio active seed Implant      January 2012    TOTAL HIP ARTHROPLASTY  3/2011       Review of patient's allergies indicates:  No Known Allergies    Current Facility-Administered Medications   Medication    acetaminophen tablet 650 mg    acetaminophen tablet 650 mg    [START ON 11/28/2020] fluticasone propionate 50 mcg/actuation nasal spray 50 mcg    lidocaine (PF) 10 mg/ml (1%) injection 10 mg    melatonin tablet 6 mg    metoclopramide HCl injection 5 mg    montelukast tablet 10 mg    ondansetron disintegrating tablet 8 mg    oxyCODONE immediate release tablet 5 mg    oxyCODONE immediate release tablet Tab 10 mg    simvastatin tablet 40 mg    sodium chloride 0.9% flush 10 mL    [START ON 11/28/2020] tamsulosin 24 hr capsule 0.4 mg     Family History     Problem Relation (Age of Onset)    Breast cancer Sister    Cancer Sister, Brother    Clotting disorder Sister (75)    Coronary artery disease Father    Diabetes Sister    Glaucoma Cousin    Lung cancer Brother (60), Sister    No Known Problems Brother, Sister, Sister, Brother, Mother, Maternal Aunt, Maternal Uncle, Paternal Aunt, Paternal Uncle, Maternal Grandmother, Maternal Grandfather, Paternal Grandmother, Paternal Grandfather    Stroke  "Brother        Tobacco Use    Smoking status: Former Smoker     Packs/day: 3.00     Years: 20.00     Pack years: 60.00     Quit date: 7/10/1997     Years since quittin.4    Smokeless tobacco: Never Used    Tobacco comment: Quit -smoked for 40 years ,2 packs a day on an average   Substance and Sexual Activity    Alcohol use: No     Comment: used to drink Occasionally    Drug use: No    Sexual activity: Yes     Partners: Female     ROS   Constitutional: negative for fevers  Eyes: no visual changes  ENT: negative for hearing loss  Respiratory: negative for dyspnea  Cardiovascular: negative for chest pain  Gastrointestinal: negative for abdominal pain  Genitourinary: negative for dysuria  Neurological: negative for headaches  Behavioral/Psych: negative for hallucinations  Endocrine: negative for temperature intolerance    Objective:     Vital Signs (Most Recent):  Temp: 98.6 °F (37 °C) (20 1425)  Pulse: 95 (20 1425)  Resp: 14 (20 1425)  BP: (!) 193/86 (20 1425)  SpO2: 95 % (20 1425) Vital Signs (24h Range):  Temp:  [98.6 °F (37 °C)] 98.6 °F (37 °C)  Pulse:  [95] 95  Resp:  [14] 14  SpO2:  [95 %] 95 %  BP: (193)/(86) 193/86     Weight: 80.7 kg (178 lb)  Height: 5' 6" (167.6 cm)  Body mass index is 28.73 kg/m².    Ortho/SPM Exam     Vitals: Afebrile.  Vital signs stable.  General: No acute distress.  Cardio: Regular rate.  Chest: No increased work of breathing.    Left Lower Extremity Exam    - Skin intact, well healed anterior knee incision, mild warmth  - Mild effusion s/p arthrocentesis   - TTP about knee  -10 degrees extension  - 80 degrees flexion  - Compartments soft and compressible  - ROM full (eversion,inversion,plantar/dorsiflexion)  - TA/EHL/Gastroc/FHL assessed in isolation without deficit  - SILT throughout  - DP and PT palpated  2+  - Capillary Refill <3s    Significant Labs:   CRP:   Recent Labs   Lab 20  1033   .0*     Admission labs pending    All " pertinent labs within the past 24 hours have been reviewed.    Significant Imaging: I have reviewed all pertinent imaging results/findings.   No fracture or dislocation appreciated. Well fixed, appropriately placed knee components.

## 2020-11-27 NOTE — TELEPHONE ENCOUNTER
Spoke to Baljeet, patients daughter, offered appointment for today. She was agreeable.     ----- Message from Fahad Guy sent at 11/27/2020  8:49 AM CST -----  Contact: daughter- baljeet  Pts daughter is asking for a call back in regards to a sooner appt. She stated that the pts knee is causing unbearable pain, knee swelling has increased and is hot .      I was not able to schedule the pt due to no earlier availability     Contact info- [phone] or [phone]

## 2020-11-27 NOTE — ANESTHESIA PREPROCEDURE EVALUATION
[facility] [address]  Anesthesia Pre-Operative Evaluation         Patient Name: Fan Paz  YOB: 1940  MRN: [id number]    SUBJECTIVE:     Pre-operative evaluation for Procedure(s) (LRB):  REVISION, ARTHROPLASTY, KNEE, left, exactech, supine, cultures, pulsavac, bactisure avaliable (Left)     11/27/2020    Fan Paz is a 80 y.o. male w/ a significant PMHx of HTN, HLD, prostate cancer [2011], GERD, arthritis, and multiple knee surgeries.   Pt admitted with left knee pain. Workup concerning for infection of the prosthetic joint.    Patient now presents for the above procedure(s).    Prev airway: None documented.    Patient Active Problem List   Diagnosis    Hyperlipidemia    History of prostate cancer    Carpal tunnel syndrome    Arthritis of hand    Essential hypertension    Anemia    Osteoarthritis, knee    Dysphagia    Dry mouth    GERD (gastroesophageal reflux disease)    Hx pulmonary embolism-6/20/09- bilateral lower lobe pulmonary emboli    Asymptomatic varicose veins of bilateral lower extremities    At risk for aspiration    Edema    Prediabetes    Primary osteoarthritis of right knee    Long-term (current) use of anticoagulants    Deep vein thrombosis prophylaxis    Seasonal allergic rhinitis due to pollen    Screen for colon cancer    Low HDL (under 40)    Class 1 obesity due to excess calories with serious comorbidity and body mass index (BMI) of 31.0 to 31.9 in adult    Overweight (BMI 25.0-29.9)    Encounter for screening colonoscopy    Infection of prosthetic joint       Review of patient's allergies indicates:  No Known Allergies    Current Inpatient Medications:   [START ON 11/28/2020] fluticasone propionate  1 spray Each Nostril Daily    lidocaine (PF) 10 mg/ml (1%)  1 mL Other Once    montelukast  10 mg Oral QHS    simvastatin  40 mg Oral QHS    [START ON 11/28/2020] tamsulosin  1 capsule Oral Daily       No current  facility-administered medications on file prior to encounter.      Current Outpatient Medications on File Prior to Encounter   Medication Sig Dispense Refill    montelukast (SINGULAIR) 10 mg tablet TAKE 1 TABLET BY MOUTH ONCE DAILY IN THE EVENING 90 tablet 0    simvastatin (ZOCOR) 40 MG tablet Take 1 tablet (40 mg total) by mouth every evening. 90 tablet 1    tamsulosin (FLOMAX) 0.4 mg Cap Take 1 capsule (0.4 mg total) by mouth once daily. 90 capsule 1    diclofenac (VOLTAREN) 50 MG EC tablet Take 1 tablet (50 mg total) by mouth 2 (two) times daily. 60 tablet 0    fluticasone propionate (FLONASE) 50 mcg/actuation nasal spray Use 1 spray(s) in each nostril once daily 16 g 0    FLUZONE HIGH-DOSE , PF, 180 mcg/0.5 mL Syrg PHARMACIST ADMINISTERED IMMUNIZATION ADMINISTERED AT TIME OF DISPENSING  0    lidocaine 4 % Gel To scalp area BID PRN for tingling 30 g 0       Past Surgical History:   Procedure Laterality Date    BACK SURGERY  y-6    Cancer of palate surgery      Late [facility]     CARPAL TUNNEL RELEASE  13    right hand,Left hand     COLONOSCOPY N/A 4/10/2017    Procedure: COLONOSCOPY;  Surgeon: Nilo Kelly MD;  Location: [facility] ENDO;  Service: Endoscopy;  Laterality: N/A;    COLONOSCOPY N/A 10/21/2020    Procedure: COLONOSCOPY;  Surgeon: Demetrius Araujo MD;  Location: [facility] ENDO;  Service: Endoscopy;  Laterality: N/A;    KNEE SURGERY  y-40    left knee    KNEE SURGERY Right 12-1-15    TKR    Radio active seed Implant      2012    TOTAL HIP ARTHROPLASTY  3/2011       Social History     Socioeconomic History    Marital status:    Tobacco Use    Smoking status: Former Smoker     Packs/day: 3.00     Years: 20.00     Pack years: 60.00     Quit date: 7/10/1997     Years since quittin.4    Smokeless tobacco: Never Used    Tobacco comment: Quit -smoked for 40 years ,2 packs a day on an average       OBJECTIVE:     Vital Signs Range (Last 24H):  Temp:   [36.6 °C (97.9 °F)-37 °C (98.6 °F)]   Pulse:  [86-95]   Resp:  [14-16]   BP: (142-193)/(74-86)   SpO2:  [95 %-96 %]       Significant Labs:  Lab Results   Component Value Date    WBC 11.68 11/27/2020    HGB 12.7 (L) 11/27/2020    HCT 40.9 11/27/2020     11/27/2020    CHOL 130 07/20/2020    TRIG 68 07/20/2020    HDL 43 07/20/2020    ALT 24 07/20/2020    AST 28 07/20/2020     07/20/2020    K 4.3 07/20/2020     07/20/2020    CREATININE 1.1 07/20/2020    BUN 15 07/20/2020    CO2 26 07/20/2020    TSH 1.030 12/07/2012    PSA 0.01 05/01/2018    INR 1.0 11/27/2020    HGBA1C 5.9 (H) 05/01/2018       Diagnostic Studies: No relevant studies.    EKG:   Results for orders placed or performed during the hospital encounter of 01/30/14   SCHEDULED EKG 12-LEAD (to Muse)    Collection Time: 01/30/14 11:32 AM    Narrative    Test Reason : 729.5  Vent. Rate : 083 BPM     Atrial Rate : 083 BPM     P-R Int : 206 ms          QRS Dur : 090 ms      QT Int : 348 ms       P-R-T Axes : 052 006 064 degrees     QTc Int : 408 ms    Normal sinus rhythm  Nonspecific T wave abnormality  Abnormal ECG  When compared with ECG of 20-JUN-2009 17:19,  No significant change was found  Confirmed by ROQUE GRIGSBY MD ([id number]) on 1/30/2014 1:19:46 PM    Referred By: RHONDA LEOPOLD           Overread By: ROQUE GRIGSBY MD       2D ECHO:  TTE:  No results found for this or any previous visit.    ASSESSMENT/PLAN:       Anesthesia Evaluation    I have reviewed the Patient Summary Reports.    I have reviewed the Nursing Notes.       Review of Systems  Anesthesia Hx:  No problems with previous Anesthesia   Denies Personal Hx of Anesthesia complications.   Social:  Former Smoker, Social Alcohol Use    Hematology/Oncology:         -- Anemia: --  Cancer in past history:  Oncology Comments: CA of palate - surgical removal   Radiation to throat x17, denies dysphagia    CA prostate     Cardiovascular:   Exercise tolerance: good Hypertension, well  controlled hyperlipidemia    Pulmonary:  Pulmonary Normal    Hepatic/GI:   Bowel prep:    GERD, well controlled    Musculoskeletal:   Arthritis     Neurological:  Neurology Normal    Endocrine:  Endocrine Normal        Physical Exam  General:  Well nourished    Airway/Jaw/Neck:  Airway Findings: Mouth Opening: Normal Tongue: Normal  General Airway Assessment: Adult  Mallampati: II  Improves to II with phonation.  TM Distance: 4 - 6 cm  Jaw/Neck Findings:  Neck ROM: Extension Decreased, Mild  MP2, TMD > 3FB, edentulous    Dental:  Dental Findings: Edentulous   Chest/Lungs:  Chest/Lungs Findings: Normal Respiratory Rate     Heart/Vascular:  Heart Findings: Rate: Normal        Mental Status:  Mental Status Findings:  Cooperative         Anesthesia Plan  Type of Anesthesia, risks & benefits discussed:  Anesthesia Type:  MAC, general, regional  Patient's Preference:   Intra-op Monitoring Plan: standard ASA monitors  Intra-op Monitoring Plan Comments:   Post Op Pain Control Plan: per primary service following discharge from PACU, IV/PO Opioids PRN and multimodal analgesia  Post Op Pain Control Plan Comments:   Induction:   IV  Beta Blocker:  Patient is not currently on a Beta-Blocker (No further documentation required).       Informed Consent: Patient understands risks and agrees with Anesthesia plan.  Questions answered. Anesthesia consent signed with patient.  ASA Score: 3     Day of Surgery Review of History & Physical:    H&P update referred to the surgeon.         Ready For Surgery From Anesthesia Perspective.

## 2020-11-27 NOTE — ASSESSMENT & PLAN NOTE
Fan Paz is a 80 y.o. male s/p L TKA with Dr. Paul 2/2015.   Diagnosis discussed in detail with patient. Will plan for I&D with poly exchange tomorrow.      -NPO midnight  -Pain control MM  -Marked, booked, and consented for surgery  - WBAT  -DVT PPx: Hold anticoagulation  -Abx: Hold abx for now  -Labs:  , ESR 40 , other labs pending

## 2020-11-27 NOTE — H&P
[facility]  Orthopedics  H&P    Patient Name: Fan Paz  MRN: [id number]  Admission Date: 11/27/2020  Primary Care Provider: Otilio Damon MD        Subjective:     Principal Problem:Infection of prosthetic joint    Chief Complaint: No chief complaint on file.       HPI: Fan Paz is a 80 y.o. male presented to clinic earlier today with new onset of left knee pain and swelling for 2 days. The pain is severe and he has been unable to stand or walk on the knee. The pain is aching and global. He would have gone to the ER, but he and his family are concerned about possible covid exposure. He denies any recent infections. He does report low grade fever and warmth to the knee. The knee was replaced by Dr. Paul in February 2014. He came to his appt in a wheelchair accompanied by his daughter. He denies any SOB,CP, N/V.   R TKA by Dr. Paul on 12/1/15  L TKA: IMPLANTS: Ex[employer]ech Optetrak Logic, size 4 Left posterior stabilized   cemented femoral component with a 3 cemented trapezoidal tibial tray   with 12 x 11 stem extension and stem extension set screw, a 35 mm 3-peg   patella and a size 4, 9 mm posterior stabilized polyethylene insert    Past Medical History:   Diagnosis Date    Arthritis     Cancer of palate     HTN (hypertension)     Hyperlipidemia     Prostate cancer     2011       Past Surgical History:   Procedure Laterality Date    BACK SURGERY  y-6    Cancer of palate surgery      Late 90's- [facility]     CARPAL TUNNEL RELEASE  8-14-13    right hand,Left hand 2013    COLONOSCOPY N/A 4/10/2017    Procedure: COLONOSCOPY;  Surgeon: Nilo Kelly MD;  Location: [facility] ENDO;  Service: Endoscopy;  Laterality: N/A;    COLONOSCOPY N/A 10/21/2020    Procedure: COLONOSCOPY;  Surgeon: Demetrius Araujo MD;  Location: [facility] ENDO;  Service: Endoscopy;  Laterality: N/A;    KNEE SURGERY  y-40    left knee    KNEE SURGERY Right 12-1-15    TKR    Radio active seed  Implant      2012    TOTAL HIP ARTHROPLASTY  3/2011       Review of patient's allergies indicates:  No Known Allergies    Current Facility-Administered Medications   Medication    acetaminophen tablet 650 mg    acetaminophen tablet 650 mg    [START ON 2020] fluticasone propionate 50 mcg/actuation nasal spray 50 mcg    lidocaine (PF) 10 mg/ml (1%) injection 10 mg    melatonin tablet 6 mg    metoclopramide HCl injection 5 mg    montelukast tablet 10 mg    ondansetron disintegrating tablet 8 mg    oxyCODONE immediate release tablet 5 mg    oxyCODONE immediate release tablet Tab 10 mg    simvastatin tablet 40 mg    sodium chloride 0.9% flush 10 mL    [START ON 2020] tamsulosin 24 hr capsule 0.4 mg     Family History     Problem Relation (Age of Onset)    Breast cancer Sister    Cancer Sister, Brother    Clotting disorder Sister (75)    Coronary artery disease Father    Diabetes Sister    Glaucoma Cousin    Lung cancer Brother (60), Sister    No Known Problems Brother, Sister, Sister, Brother, Mother, Maternal Aunt, Maternal Uncle, Paternal Aunt, Paternal Uncle, Maternal Grandmother, Maternal Grandfather, Paternal Grandmother, Paternal Grandfather    Stroke Brother        Tobacco Use    Smoking status: Former Smoker     Packs/day: 3.00     Years: 20.00     Pack years: 60.00     Quit date: 7/10/1997     Years since quittin.4    Smokeless tobacco: Never Used    Tobacco comment: Quit -smoked for 40 years ,2 packs a day on an average   Substance and Sexual Activity    Alcohol use: No     Comment: used to drink Occasionally    Drug use: No    Sexual activity: Yes     Partners: Female     ROS   Constitutional: negative for fevers  Eyes: no visual changes  ENT: negative for hearing loss  Respiratory: negative for dyspnea  Cardiovascular: negative for chest pain  Gastrointestinal: negative for abdominal pain  Genitourinary: negative for dysuria  Neurological: negative for  "headaches  Behavioral/Psych: negative for hallucinations  Endocrine: negative for temperature intolerance    Objective:     Vital Signs (Most Recent):  Temp: 98.6 °F (37 °C) (11/27/20 1425)  Pulse: 95 (11/27/20 1425)  Resp: 14 (11/27/20 1425)  BP: (!) 193/86 (11/27/20 1425)  SpO2: 95 % (11/27/20 1425) Vital Signs (24h Range):  Temp:  [98.6 °F (37 °C)] 98.6 °F (37 °C)  Pulse:  [95] 95  Resp:  [14] 14  SpO2:  [95 %] 95 %  BP: (193)/(86) 193/86     Weight: 80.7 kg (178 lb)  Height: 5' 6" (167.6 cm)  Body mass index is 28.73 kg/m².    Ortho/SPM Exam     Vitals: Afebrile.  Vital signs stable.  General: No acute distress.  Cardio: Regular rate.  Chest: No increased work of breathing.    Left Lower Extremity Exam    - Skin intact, well healed anterior knee incision, mild warmth  - Mild effusion s/p arthrocentesis   - TTP about knee  -10 degrees extension  - 80 degrees flexion  - Compartments soft and compressible  - ROM full (eversion,inversion,plantar/dorsiflexion)  - TA/EHL/Gastroc/FHL assessed in isolation without deficit  - SILT throughout  - DP and PT palpated  2+  - Capillary Refill <3s    Significant Labs:   CRP:   Recent Labs   Lab 11/27/20  1033   .0*     Admission labs pending    All pertinent labs within the past 24 hours have been reviewed.    Significant Imaging: I have reviewed all pertinent imaging results/findings.   No fracture or dislocation appreciated. Well fixed, appropriately placed knee components.     Assessment/Plan:     * Infection of prosthetic joint  Fan Paz is a 80 y.o. male s/p L TKA with Dr. Paul 2/2015. 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  Diagnosis discussed in detail with patient. Will plan for I&D with poly exchange tomorrow.      -NPO midnight  -Pain control MM  -Marked, booked, and consented for surgery  - WBAT  -DVT PPx: Hold anticoagulation  -Abx: Hold abx for now  -Labs:  , ESR 40 , other labs pending        Essential " hypertension  Restart home meds  Labetalol for break through     History of prostate cancer  Restart home meds    Hyperlipidemia  Restart home meds      Ronak Molina MD  Orthopedics  [facility]

## 2020-11-27 NOTE — HPI
Fan Paz is a 80 y.o. male presented to clinic earlier today with new onset of left knee pain and swelling for 2 days. The pain is severe and he has been unable to stand or walk on the knee. The pain is aching and global. He would have gone to the ER, but he and his family are concerned about possible covid exposure. He denies any recent infections. He does report low grade fever and warmth to the knee. The knee was replaced by Dr. Paul in February 2014. He came to his appt in a wheelchair accompanied by his daughter. He denies any SOB,CP, N/V.   R TKA by Dr. Paul on 12/1/15  L TKA: IMPLANTS: Ex[employer]ech Optetrak [employer], size 4 Left posterior stabilized   cemented femoral component with a 3 cemented trapezoidal tibial tray   with 12 x 11 stem extension and stem extension set screw, a 35 mm 3-peg   patella and a size 4, 9 mm posterior stabilized polyethylene insert

## 2020-11-28 ENCOUNTER — ANESTHESIA (OUTPATIENT)
Dept: SURGERY | Facility: HOSPITAL | Age: 80
DRG: 487 | End: 2020-11-28
Payer: MEDICARE

## 2020-11-28 LAB
ANION GAP SERPL CALC-SCNC: 10 MMOL/L (ref 8–16)
BASOPHILS # BLD AUTO: 0.02 K/UL (ref 0–0.2)
BASOPHILS NFR BLD: 0.2 % (ref 0–1.9)
BUN SERPL-MCNC: 17 MG/DL (ref 8–23)
CALCIUM SERPL-MCNC: 8.5 MG/DL (ref 8.7–10.5)
CHLORIDE SERPL-SCNC: 101 MMOL/L (ref 95–110)
CO2 SERPL-SCNC: 21 MMOL/L (ref 23–29)
CREAT SERPL-MCNC: 0.9 MG/DL (ref 0.5–1.4)
DIFFERENTIAL METHOD: ABNORMAL
EOSINOPHIL # BLD AUTO: 0.1 K/UL (ref 0–0.5)
EOSINOPHIL NFR BLD: 0.6 % (ref 0–8)
ERYTHROCYTE [DISTWIDTH] IN BLOOD BY AUTOMATED COUNT: 15.4 % (ref 11.5–14.5)
EST. GFR  (AFRICAN AMERICAN): >60 ML/MIN/1.73 M^2
EST. GFR  (NON AFRICAN AMERICAN): >60 ML/MIN/1.73 M^2
GLUCOSE SERPL-MCNC: 116 MG/DL (ref 70–110)
GRAM STN SPEC: NORMAL
GRAM STN SPEC: NORMAL
HCT VFR BLD AUTO: 36.3 % (ref 40–54)
HGB BLD-MCNC: 11.6 G/DL (ref 14–18)
IMM GRANULOCYTES # BLD AUTO: 0.04 K/UL (ref 0–0.04)
IMM GRANULOCYTES NFR BLD AUTO: 0.5 % (ref 0–0.5)
LYMPHOCYTES # BLD AUTO: 1 K/UL (ref 1–4.8)
LYMPHOCYTES NFR BLD: 11.6 % (ref 18–48)
MCH RBC QN AUTO: 27.5 PG (ref 27–31)
MCHC RBC AUTO-ENTMCNC: 32 G/DL (ref 32–36)
MCV RBC AUTO: 86 FL (ref 82–98)
MONOCYTES # BLD AUTO: 1.1 K/UL (ref 0.3–1)
MONOCYTES NFR BLD: 12.8 % (ref 4–15)
NEUTROPHILS # BLD AUTO: 6.6 K/UL (ref 1.8–7.7)
NEUTROPHILS NFR BLD: 74.3 % (ref 38–73)
NRBC BLD-RTO: 0 /100 WBC
PLATELET # BLD AUTO: 182 K/UL (ref 150–350)
PMV BLD AUTO: 10.6 FL (ref 9.2–12.9)
POTASSIUM SERPL-SCNC: 3.7 MMOL/L (ref 3.5–5.1)
RBC # BLD AUTO: 4.22 M/UL (ref 4.6–6.2)
SODIUM SERPL-SCNC: 132 MMOL/L (ref 136–145)
WBC # BLD AUTO: 8.86 K/UL (ref 3.9–12.7)

## 2020-11-28 PROCEDURE — 80048 BASIC METABOLIC PNL TOTAL CA: CPT

## 2020-11-28 PROCEDURE — 37000008 HC ANESTHESIA 1ST 15 MINUTES: Performed by: ORTHOPAEDIC SURGERY

## 2020-11-28 PROCEDURE — 76942 ECHO GUIDE FOR BIOPSY: CPT | Mod: 26,,, | Performed by: ANESTHESIOLOGY

## 2020-11-28 PROCEDURE — 25000003 PHARM REV CODE 250: Performed by: STUDENT IN AN ORGANIZED HEALTH CARE EDUCATION/TRAINING PROGRAM

## 2020-11-28 PROCEDURE — 63600175 PHARM REV CODE 636 W HCPCS

## 2020-11-28 PROCEDURE — 63600175 PHARM REV CODE 636 W HCPCS: Performed by: STUDENT IN AN ORGANIZED HEALTH CARE EDUCATION/TRAINING PROGRAM

## 2020-11-28 PROCEDURE — 64447 NJX AA&/STRD FEMORAL NRV IMG: CPT | Mod: 59,RT,, | Performed by: ANESTHESIOLOGY

## 2020-11-28 PROCEDURE — D9220A PRA ANESTHESIA: Mod: CRNA,,, | Performed by: NURSE ANESTHETIST, CERTIFIED REGISTERED

## 2020-11-28 PROCEDURE — 27486 REVISE/REPLACE KNEE JOINT: CPT | Mod: 52,LT,GC, | Performed by: ORTHOPAEDIC SURGERY

## 2020-11-28 PROCEDURE — 63600175 PHARM REV CODE 636 W HCPCS: Performed by: PHYSICIAN ASSISTANT

## 2020-11-28 PROCEDURE — C1776 JOINT DEVICE (IMPLANTABLE): HCPCS | Performed by: ORTHOPAEDIC SURGERY

## 2020-11-28 PROCEDURE — D9220A PRA ANESTHESIA: ICD-10-PCS | Mod: CRNA,,, | Performed by: NURSE ANESTHETIST, CERTIFIED REGISTERED

## 2020-11-28 PROCEDURE — 64447 NJX AA&/STRD FEMORAL NRV IMG: CPT | Performed by: ANESTHESIOLOGY

## 2020-11-28 PROCEDURE — 63600175 PHARM REV CODE 636 W HCPCS: Performed by: ANESTHESIOLOGY

## 2020-11-28 PROCEDURE — 87176 TISSUE HOMOGENIZATION CULTR: CPT

## 2020-11-28 PROCEDURE — 76942 ADDUCTOR CANAL SINGLE INJECTION BLOCK: ICD-10-PCS | Mod: 26,,, | Performed by: ANESTHESIOLOGY

## 2020-11-28 PROCEDURE — 87015 SPECIMEN INFECT AGNT CONCNTJ: CPT

## 2020-11-28 PROCEDURE — 87206 SMEAR FLUORESCENT/ACID STAI: CPT

## 2020-11-28 PROCEDURE — 27486 PR REVISE KNEE JOINT REPLACE,1 PART: ICD-10-PCS | Mod: 52,LT,GC, | Performed by: ORTHOPAEDIC SURGERY

## 2020-11-28 PROCEDURE — 64447 ADDUCTOR CANAL SINGLE INJECTION BLOCK: ICD-10-PCS | Mod: 59,RT,, | Performed by: ANESTHESIOLOGY

## 2020-11-28 PROCEDURE — 25000003 PHARM REV CODE 250: Performed by: NURSE ANESTHETIST, CERTIFIED REGISTERED

## 2020-11-28 PROCEDURE — 71000016 HC POSTOP RECOV ADDL HR: Performed by: ORTHOPAEDIC SURGERY

## 2020-11-28 PROCEDURE — 87075 CULTR BACTERIA EXCEPT BLOOD: CPT

## 2020-11-28 PROCEDURE — D9220A PRA ANESTHESIA: Mod: ANES,,, | Performed by: ANESTHESIOLOGY

## 2020-11-28 PROCEDURE — 63600175 PHARM REV CODE 636 W HCPCS: Performed by: NURSE ANESTHETIST, CERTIFIED REGISTERED

## 2020-11-28 PROCEDURE — 11000001 HC ACUTE MED/SURG PRIVATE ROOM

## 2020-11-28 PROCEDURE — 36000711: Performed by: ORTHOPAEDIC SURGERY

## 2020-11-28 PROCEDURE — 71000033 HC RECOVERY, INTIAL HOUR: Performed by: ORTHOPAEDIC SURGERY

## 2020-11-28 PROCEDURE — 87070 CULTURE OTHR SPECIMN AEROBIC: CPT

## 2020-11-28 PROCEDURE — 71000015 HC POSTOP RECOV 1ST HR: Performed by: ORTHOPAEDIC SURGERY

## 2020-11-28 PROCEDURE — 63600175 PHARM REV CODE 636 W HCPCS: Performed by: ORTHOPAEDIC SURGERY

## 2020-11-28 PROCEDURE — 87205 SMEAR GRAM STAIN: CPT

## 2020-11-28 PROCEDURE — 37000009 HC ANESTHESIA EA ADD 15 MINS: Performed by: ORTHOPAEDIC SURGERY

## 2020-11-28 PROCEDURE — 27201423 OPTIME MED/SURG SUP & DEVICES STERILE SUPPLY: Performed by: ORTHOPAEDIC SURGERY

## 2020-11-28 PROCEDURE — D9220A PRA ANESTHESIA: ICD-10-PCS | Mod: ANES,,, | Performed by: ANESTHESIOLOGY

## 2020-11-28 PROCEDURE — 99499 UNLISTED E&M SERVICE: CPT | Mod: ,,, | Performed by: PHYSICIAN ASSISTANT

## 2020-11-28 PROCEDURE — 87147 CULTURE TYPE IMMUNOLOGIC: CPT

## 2020-11-28 PROCEDURE — 36415 COLL VENOUS BLD VENIPUNCTURE: CPT

## 2020-11-28 PROCEDURE — 36000710: Performed by: ORTHOPAEDIC SURGERY

## 2020-11-28 PROCEDURE — 85025 COMPLETE CBC W/AUTO DIFF WBC: CPT

## 2020-11-28 PROCEDURE — 87116 MYCOBACTERIA CULTURE: CPT

## 2020-11-28 PROCEDURE — 87102 FUNGUS ISOLATION CULTURE: CPT

## 2020-11-28 PROCEDURE — 99499 NO LOS: ICD-10-PCS | Mod: ,,, | Performed by: PHYSICIAN ASSISTANT

## 2020-11-28 DEVICE — IMPLANTABLE DEVICE: Type: IMPLANTABLE DEVICE | Site: KNEE | Status: FUNCTIONAL

## 2020-11-28 RX ORDER — HYDROMORPHONE HYDROCHLORIDE 1 MG/ML
0.2 INJECTION, SOLUTION INTRAMUSCULAR; INTRAVENOUS; SUBCUTANEOUS
Status: DISCONTINUED | OUTPATIENT
Start: 2020-11-28 | End: 2020-12-01 | Stop reason: HOSPADM

## 2020-11-28 RX ORDER — CEFAZOLIN SODIUM 1 G/3ML
INJECTION, POWDER, FOR SOLUTION INTRAMUSCULAR; INTRAVENOUS
Status: DISCONTINUED | OUTPATIENT
Start: 2020-11-28 | End: 2020-11-28

## 2020-11-28 RX ORDER — DOCUSATE SODIUM 100 MG/1
100 CAPSULE, LIQUID FILLED ORAL 2 TIMES DAILY
Qty: 60 CAPSULE | Refills: 0 | Status: SHIPPED | OUTPATIENT
Start: 2020-11-28 | End: 2020-12-31

## 2020-11-28 RX ORDER — HYDROMORPHONE HYDROCHLORIDE 1 MG/ML
INJECTION, SOLUTION INTRAMUSCULAR; INTRAVENOUS; SUBCUTANEOUS
Status: COMPLETED
Start: 2020-11-28 | End: 2020-11-28

## 2020-11-28 RX ORDER — OXYCODONE HYDROCHLORIDE 5 MG/1
5 TABLET ORAL EVERY 4 HOURS PRN
Status: DISCONTINUED | OUTPATIENT
Start: 2020-11-28 | End: 2020-12-01 | Stop reason: HOSPADM

## 2020-11-28 RX ORDER — PHENYLEPHRINE HYDROCHLORIDE 10 MG/ML
INJECTION INTRAVENOUS
Status: DISCONTINUED | OUTPATIENT
Start: 2020-11-28 | End: 2020-11-28

## 2020-11-28 RX ORDER — TRANEXAMIC ACID 100 MG/ML
INJECTION, SOLUTION INTRAVENOUS
Status: DISCONTINUED | OUTPATIENT
Start: 2020-11-28 | End: 2020-11-28

## 2020-11-28 RX ORDER — ONDANSETRON 2 MG/ML
INJECTION INTRAMUSCULAR; INTRAVENOUS
Status: DISCONTINUED | OUTPATIENT
Start: 2020-11-28 | End: 2020-11-28

## 2020-11-28 RX ORDER — KETAMINE HCL IN 0.9 % NACL 50 MG/5 ML
SYRINGE (ML) INTRAVENOUS
Status: DISCONTINUED | OUTPATIENT
Start: 2020-11-28 | End: 2020-11-28

## 2020-11-28 RX ORDER — FENTANYL CITRATE 50 UG/ML
INJECTION, SOLUTION INTRAMUSCULAR; INTRAVENOUS
Status: DISCONTINUED | OUTPATIENT
Start: 2020-11-28 | End: 2020-11-28

## 2020-11-28 RX ORDER — ACETAMINOPHEN 500 MG
1000 TABLET ORAL EVERY 6 HOURS
Qty: 60 TABLET | Refills: 0 | Status: SHIPPED | OUTPATIENT
Start: 2020-11-28 | End: 2022-10-24

## 2020-11-28 RX ORDER — ACETAMINOPHEN 325 MG/1
650 TABLET ORAL
Status: DISCONTINUED | OUTPATIENT
Start: 2020-11-28 | End: 2020-12-01 | Stop reason: HOSPADM

## 2020-11-28 RX ORDER — ASPIRIN 81 MG/1
81 TABLET ORAL 2 TIMES DAILY
Qty: 60 TABLET | Refills: 0 | Status: SHIPPED | OUTPATIENT
Start: 2020-11-28 | End: 2021-01-28 | Stop reason: SDUPTHER

## 2020-11-28 RX ORDER — ONDANSETRON 8 MG/1
8 TABLET, ORALLY DISINTEGRATING ORAL EVERY 6 HOURS PRN
Qty: 30 TABLET | Refills: 0 | Status: ON HOLD | OUTPATIENT
Start: 2020-11-28 | End: 2023-05-03 | Stop reason: HOSPADM

## 2020-11-28 RX ORDER — CELECOXIB 200 MG/1
200 CAPSULE ORAL DAILY
Qty: 30 CAPSULE | Refills: 0 | Status: SHIPPED | OUTPATIENT
Start: 2020-11-28 | End: 2022-01-28

## 2020-11-28 RX ORDER — OXYCODONE HYDROCHLORIDE 10 MG/1
10 TABLET ORAL EVERY 4 HOURS PRN
Status: DISCONTINUED | OUTPATIENT
Start: 2020-11-28 | End: 2020-12-01 | Stop reason: HOSPADM

## 2020-11-28 RX ORDER — OXYCODONE HYDROCHLORIDE 5 MG/1
5 TABLET ORAL EVERY 6 HOURS PRN
Qty: 50 TABLET | Refills: 0 | Status: SHIPPED | OUTPATIENT
Start: 2020-11-28 | End: 2022-01-28

## 2020-11-28 RX ORDER — ROCURONIUM BROMIDE 10 MG/ML
INJECTION, SOLUTION INTRAVENOUS
Status: DISCONTINUED | OUTPATIENT
Start: 2020-11-28 | End: 2020-11-28

## 2020-11-28 RX ORDER — VANCOMYCIN HYDROCHLORIDE 1 G/20ML
INJECTION, POWDER, LYOPHILIZED, FOR SOLUTION INTRAVENOUS
Status: DISCONTINUED | OUTPATIENT
Start: 2020-11-28 | End: 2020-11-28 | Stop reason: HOSPADM

## 2020-11-28 RX ORDER — ROPIVACAINE HYDROCHLORIDE 5 MG/ML
INJECTION, SOLUTION EPIDURAL; INFILTRATION; PERINEURAL
Status: COMPLETED | OUTPATIENT
Start: 2020-11-28 | End: 2020-11-28

## 2020-11-28 RX ORDER — LIDOCAINE HYDROCHLORIDE 20 MG/ML
INJECTION INTRAVENOUS
Status: DISCONTINUED | OUTPATIENT
Start: 2020-11-28 | End: 2020-11-28

## 2020-11-28 RX ORDER — SODIUM CHLORIDE 9 MG/ML
INJECTION, SOLUTION INTRAVENOUS CONTINUOUS PRN
Status: DISCONTINUED | OUTPATIENT
Start: 2020-11-28 | End: 2020-11-28

## 2020-11-28 RX ORDER — RIFAMPIN 300 MG/1
300 CAPSULE ORAL EVERY 12 HOURS
Status: DISCONTINUED | OUTPATIENT
Start: 2020-11-28 | End: 2020-11-30

## 2020-11-28 RX ORDER — SUCCINYLCHOLINE CHLORIDE 20 MG/ML
INJECTION INTRAMUSCULAR; INTRAVENOUS
Status: DISCONTINUED | OUTPATIENT
Start: 2020-11-28 | End: 2020-11-28

## 2020-11-28 RX ORDER — PROPOFOL 10 MG/ML
VIAL (ML) INTRAVENOUS
Status: DISCONTINUED | OUTPATIENT
Start: 2020-11-28 | End: 2020-11-28

## 2020-11-28 RX ORDER — HYDROMORPHONE HYDROCHLORIDE 1 MG/ML
0.2 INJECTION, SOLUTION INTRAMUSCULAR; INTRAVENOUS; SUBCUTANEOUS EVERY 5 MIN PRN
Status: DISCONTINUED | OUTPATIENT
Start: 2020-11-28 | End: 2020-11-28

## 2020-11-28 RX ADMIN — FENTANYL CITRATE 50 MCG: 50 INJECTION INTRAMUSCULAR; INTRAVENOUS at 08:11

## 2020-11-28 RX ADMIN — RIFAMPIN 300 MG: 300 CAPSULE ORAL at 09:11

## 2020-11-28 RX ADMIN — TRANEXAMIC ACID 1000 MG: 1 INJECTION, SOLUTION INTRAVENOUS at 09:11

## 2020-11-28 RX ADMIN — ONDANSETRON 4 MG: 2 INJECTION INTRAMUSCULAR; INTRAVENOUS at 09:11

## 2020-11-28 RX ADMIN — PHENYLEPHRINE HYDROCHLORIDE 100 MCG: 10 INJECTION INTRAVENOUS at 09:11

## 2020-11-28 RX ADMIN — CEFAZOLIN 2 G: 330 INJECTION, POWDER, FOR SOLUTION INTRAMUSCULAR; INTRAVENOUS at 08:11

## 2020-11-28 RX ADMIN — Medication 10 MG: at 11:11

## 2020-11-28 RX ADMIN — Medication 10 MG: at 08:11

## 2020-11-28 RX ADMIN — MONTELUKAST 10 MG: 10 TABLET, FILM COATED ORAL at 09:11

## 2020-11-28 RX ADMIN — OXYCODONE HYDROCHLORIDE 5 MG: 5 TABLET ORAL at 10:11

## 2020-11-28 RX ADMIN — HYDROMORPHONE HYDROCHLORIDE 0.2 MG: 1 INJECTION, SOLUTION INTRAMUSCULAR; INTRAVENOUS; SUBCUTANEOUS at 11:11

## 2020-11-28 RX ADMIN — CEFTRIAXONE 2 G: 2 INJECTION, SOLUTION INTRAVENOUS at 12:11

## 2020-11-28 RX ADMIN — METOCLOPRAMIDE 5 MG: 5 INJECTION, SOLUTION INTRAMUSCULAR; INTRAVENOUS at 11:11

## 2020-11-28 RX ADMIN — VANCOMYCIN HYDROCHLORIDE 1500 MG: 1.5 INJECTION, POWDER, LYOPHILIZED, FOR SOLUTION INTRAVENOUS at 10:11

## 2020-11-28 RX ADMIN — ROPIVACAINE HYDROCHLORIDE 15 ML: 5 INJECTION, SOLUTION EPIDURAL; INFILTRATION; PERINEURAL at 08:11

## 2020-11-28 RX ADMIN — OXYCODONE HYDROCHLORIDE 5 MG: 5 TABLET ORAL at 03:11

## 2020-11-28 RX ADMIN — OXYCODONE HYDROCHLORIDE 5 MG: 5 TABLET ORAL at 08:11

## 2020-11-28 RX ADMIN — SODIUM CHLORIDE: 0.9 INJECTION, SOLUTION INTRAVENOUS at 08:11

## 2020-11-28 RX ADMIN — ACETAMINOPHEN 650 MG: 325 TABLET ORAL at 05:11

## 2020-11-28 RX ADMIN — ACETAMINOPHEN 650 MG: 325 TABLET ORAL at 10:11

## 2020-11-28 RX ADMIN — Medication 15 MG: at 09:11

## 2020-11-28 RX ADMIN — RIFAMPIN 300 MG: 300 CAPSULE ORAL at 12:11

## 2020-11-28 RX ADMIN — LIDOCAINE HYDROCHLORIDE 100 MG: 20 INJECTION, SOLUTION INTRAVENOUS at 08:11

## 2020-11-28 RX ADMIN — Medication 25 MG: at 08:11

## 2020-11-28 RX ADMIN — PHENYLEPHRINE HYDROCHLORIDE 100 MCG: 10 INJECTION INTRAVENOUS at 08:11

## 2020-11-28 RX ADMIN — ROCURONIUM BROMIDE 5 MG: 10 INJECTION, SOLUTION INTRAVENOUS at 08:11

## 2020-11-28 RX ADMIN — SIMVASTATIN 40 MG: 20 TABLET, FILM COATED ORAL at 09:11

## 2020-11-28 RX ADMIN — PROPOFOL 50 MG: 10 INJECTION, EMULSION INTRAVENOUS at 08:11

## 2020-11-28 RX ADMIN — PROPOFOL 140 MG: 10 INJECTION, EMULSION INTRAVENOUS at 08:11

## 2020-11-28 RX ADMIN — SUCCINYLCHOLINE CHLORIDE 120 MG: 20 INJECTION, SOLUTION INTRAMUSCULAR; INTRAVENOUS; PARENTERAL at 08:11

## 2020-11-28 NOTE — PROGRESS NOTES
Pharmacokinetic Initial Assessment: IV Vancomycin    Assessment/Plan:    Initiate intravenous vancomycin with loading dose of 1500 mg once followed by a maintenance dose of vancomycin 1250mg IV every 12 hours  Desired empiric serum trough concentration is 15 to 20 mcg/mL  Draw vancomycin trough level 60 min prior to 5th dose on 11/30 at approximately 1100  Pharmacy will continue to follow and monitor vancomycin.      Please contact pharmacy at extension [phone] with any questions regarding this assessment.     Thank you for the consult,   Megha Ricci       Patient brief summary:  Fan Paz is a 80 y.o. male initiated on antimicrobial therapy with IV Vancomycin for treatment of suspected bone/joint infection    Drug Allergies:   Review of patient's allergies indicates:  No Known Allergies    Actual Body Weight:   80.7kg    Renal Function:   Estimated Creatinine Clearance: 66.5 mL/min (based on SCr of 0.9 mg/dL).,     Dialysis Method (if applicable):  N/A    CBC (last 72 hours):  Recent Labs   Lab Result Units 11/27/20  1539 11/28/20  0453   WBC K/uL 11.68 8.86   Hemoglobin g/dL 12.7* 11.6*   Hematocrit % 40.9 36.3*   Platelets K/uL 173 182   Gran % % 80.1* 74.3*   Lymph % % 8.0* 11.6*   Mono % % 11.3 12.8   Eosinophil % % 0.1 0.6   Basophil % % 0.2 0.2   Differential Method  Automated Automated       Metabolic Panel (last 72 hours):  Recent Labs   Lab Result Units 11/27/20  1538 11/27/20  1623 11/28/20  0453   Sodium mmol/L 135*  --  132*   Potassium mmol/L 3.9  --  3.7   Chloride mmol/L 101  --  101   CO2 mmol/L 23  --  21*   Glucose mg/dL 104  --  116*   Glucose, UA   --  Negative  --    BUN mg/dL 15  --  17   Creatinine mg/dL 1.1  --  0.9       Drug levels (last 3 results):  No results for input(s): VANCOMYCINRA, VANCOMYCINPE, VANCOMYCINTR in the last 72 hours.    Microbiologic Results:  Microbiology Results (last 7 days)     Procedure Component Value Units Date/Time    AFB Culture & Smear  [[id number]] Collected: 11/28/20 0858    Order Status: Sent Specimen: Wound from Knee, Left Updated: 11/28/20 0944    Fungus culture [[id number]] Collected: 11/28/20 0858    Order Status: Sent Specimen: Wound from Knee, Left Updated: 11/28/20 0944    Culture, Anaerobe [[id number]] Collected: 11/28/20 0858    Order Status: Sent Specimen: Wound from Knee, Left Updated: 11/28/20 0944    Gram stain [[id number]] Collected: 11/28/20 0858    Order Status: Sent Specimen: Wound from Knee, Left Updated: 11/28/20 0944    Aerobic culture [[id number]] Collected: 11/28/20 0858    Order Status: Sent Specimen: Wound from Knee, Left Updated: 11/28/20 0944    Blood culture - site #2 [[id number]] Collected: 11/27/20 1536    Order Status: Completed Specimen: Blood Updated: 11/28/20 0115     Blood Culture, Routine No Growth to date    Narrative:      Collection has been rescheduled by VS1 at 11/27/2020 14:08 Reason:   Patient is not in room has to be admitted from another floor  Collection has been rescheduled by VS1 at 11/27/2020 14:08 Reason:   Patient is not in room has to be admitted from another floor    Blood culture - site #1 [[id number]] Collected: 11/27/20 1537    Order Status: Completed Specimen: Blood Updated: 11/28/20 0115     Blood Culture, Routine No Growth to date    Narrative:      Collection has been rescheduled by VS1 at 11/27/2020 14:08 Reason:   Patient is not in room has to be admitted from another floor  Collection has been rescheduled by VS1 at 11/27/2020 14:08 Reason:   Patient is not in room has to be admitted from another floor

## 2020-11-28 NOTE — ASSESSMENT & PLAN NOTE
ID consult received. Patient not seen today as was off the floor in the OR.  Chart reviewed. Aspiration cx are showing gpcs in chains resembling strep. Surgical cultures to be obtained.     1. Recommend continuing IV Vancomycin  2. Deescalate Zosyn to Ceftriaxone  3. Will follow culture data to guide antibiotics  4. Anticipate 6 weeks of antibiotic therapy    Will see patient tomorrow with formal consult note to follow.      In the interim, please call with any questions.    Thank you,  Elisa Up PA-C  [phone]

## 2020-11-28 NOTE — CONSULTS
[facility]  Infectious Disease  Consult Note    Patient Name: Fan Paz  MRN: [id number]  Admission Date: 11/27/2020  Hospital Length of Stay: 1 days  Attending Physician: Ronak Pires MD  Primary Care Provider: Otilio Damon MD         Inpatient consult to Infectious Diseases  Consult performed by: Elisa Up PA-C  Consult ordered by: Fernie Baldwin MD          * Infection of prosthetic joint    ID consult received. Patient not seen today as was off the floor in the OR.  Chart reviewed. Aspiration cx are showing gpcs in chains resembling strep. Surgical cultures to be obtained.     1. Recommend continuing IV Vancomycin  2. Deescalate Zosyn to Ceftriaxone  3. Will follow culture data to guide antibiotics  4. Anticipate 6 weeks of antibiotic therapy    Will see patient tomorrow with formal consult note to follow.      In the interim, please call with any questions.    Thank you,  Elisa Up PA-C  [phone]

## 2020-11-28 NOTE — PROGRESS NOTES
Patient arrive to floor via stretcher escorted by staff with LLE ace wrap with polar ice. AAO x 4. VSS. Call light within reach. Bed in lowest position.

## 2020-11-28 NOTE — PLAN OF CARE
Patient's underwear and watch placed in plastic bag with patient label, brought to PACU with patient and chart.

## 2020-11-28 NOTE — HPI
Fan Paz is a 80 y.o. male with history of left TKA in 2015 with Dr. Paul admitted with left prosthetic knee infection. The patient presented to clinic two days ago with new onset of left knee pain and swelling for 2 days. The pain is severe and he has been unable to stand or walk on the knee.  He would have gone to the ER, but he and his family are concerned about possible covid exposure. He denies any recent infections. He does report low grade fever and warmth to the knee. He denies any SOB,CP, N/V. Underwent left knee aspiration in clinic which revealed 92,717 WBC (95% segs). Aspiration cx are positive for GBS. The patient then underwent an I&D and poly exchange yesterday. He is doing well post operatively. He is afebrile. No leukocytosis. ESR 40. . Surgical cx pending. Denies fevers, chills, sweats. Pain well controlled. Lives at home with his wife.

## 2020-11-28 NOTE — ANESTHESIA PROCEDURE NOTES
Adductor Canal Single Injection Block    Patient location during procedure: pre-op   Block not for primary anesthetic.  Reason for block: at surgeon's request and post-op pain management   Post-op Pain Location: right knee pain  Start time: 11/28/2020 8:18 AM  Timeout: 11/28/2020 8:16 AM   End time: 11/28/2020 8:20 AM    Staffing  Authorizing Provider: Lefty Nova MD  Performing Provider: Dina Ferrell MD    Preanesthetic Checklist  Completed: patient identified, site marked, surgical consent, pre-op evaluation, timeout performed, IV checked, risks and benefits discussed and monitors and equipment checked  Peripheral Block  Patient position: supine  Prep: ChloraPrep  Patient monitoring: heart rate, cardiac monitor, continuous pulse ox, continuous capnometry and frequent blood pressure checks  Block type: adductor canal  Laterality: right  Injection technique: single shot  Needle  Needle type: Stimuplex   Needle gauge: 21 G  Needle length: 4 in  Needle localization: anatomical landmarks and ultrasound guidance   -ultrasound image captured on disc.  Assessment  Injection assessment: negative aspiration, negative parasthesia and local visualized surrounding nerve  Paresthesia pain: none  Heart rate change: no  Slow fractionated injection: yes  Additional Notes  Done in OR - 15 cc of 0.5% ropiv with 1/300k epi used as local

## 2020-11-28 NOTE — PLAN OF CARE
Plan of care reviewed with patient and family. VSS and Afebrile. No distress noted. Medicated with Oxycodone 5mg x 1 for pain. Medication was effective. Moderate relief obtained. Safety maintained .

## 2020-11-28 NOTE — ANESTHESIA POSTPROCEDURE EVALUATION
Anesthesia Post Evaluation    Patient: Fan Paz had no inrtraoperative  Problems. To PACU in stable condition. Full report.     Procedure(s) Performed: Procedure(s) (LRB):  REPLACEMENT, POLYETHYLENE LINER (Left)    Final Anesthesia Type: general    Patient location during evaluation: PACU  Patient participation: Yes- Able to Participate  Level of consciousness: awake and alert  Post-procedure vital signs: reviewed and stable  Pain management: adequate  Airway patency: patent    PONV status at discharge: No PONV  Anesthetic complications: no      Cardiovascular status: blood pressure returned to baseline  Respiratory status: unassisted  Hydration status: euvolemic  Follow-up not needed.          Vitals Value Taken Time   /79 11/28/20 1146   Temp 37.1 °C (98.7 °F) 11/28/20 1145   Pulse 73 11/28/20 1153   Resp 16 11/28/20 1145   SpO2 98 % 11/28/20 1153   Vitals shown include unvalidated device data.      Event Time   Out of Recovery 11/28/2020 10:46:00         Pain/Rosales Score: Pain Rating Prior to Med Admin: 6 (11/28/2020 11:25 AM)  Rosales Score: 10 (11/28/2020 10:46 AM)

## 2020-11-28 NOTE — ASSESSMENT & PLAN NOTE
Fan Paz is a 80 y.o. male s/p L TKA with Dr. 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 Scheduled for I&D and poly exchange this morning with Dr. Pires.      -Npo for surgery this AM  -Pain control MM  - WBAT  -DVT PPx: Hold anticoagulation  -Abx: Hold abx for now  -Labs:  , ESR 40, Hb 12.7, WBC 11

## 2020-11-28 NOTE — SUBJECTIVE & OBJECTIVE
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 Ready for surgery this AM. No questions or concerns that have not already been addressed.    Review of patient's allergies indicates:  No Known Allergies    Current Facility-Administered Medications   Medication    acetaminophen tablet 650 mg    acetaminophen tablet 650 mg    fluticasone propionate 50 mcg/actuation nasal spray 50 mcg    labetalol 20 mg/4 mL (5 mg/mL) IV syring    lidocaine (PF) 10 mg/ml (1%) injection 10 mg    melatonin tablet 6 mg    metoclopramide HCl injection 5 mg    montelukast tablet 10 mg    ondansetron disintegrating tablet 8 mg    oxyCODONE immediate release tablet 5 mg    oxyCODONE immediate release tablet Tab 10 mg    simvastatin tablet 40 mg    sodium chloride 0.9% flush 10 mL    tamsulosin 24 hr capsule 0.4 mg     Objective:     Vital Signs (Most Recent):  Temp: 98.9 °F (37.2 °C) (11/28/20 0432)  Pulse: 84 (11/28/20 0432)  Resp: 20 (11/27/20 2133)  BP: 127/62 (11/28/20 0432)  SpO2: 97 % (11/28/20 0432) Vital Signs (24h Range):  Temp:  [97.9 °F (36.6 °C)-99.8 °F (37.7 °C)] 98.9 °F (37.2 °C)  Pulse:  [84-95] 84  Resp:  [14-20] 20  SpO2:  [95 %-97 %] 97 %  BP: (127-193)/(62-86) 127/62     Weight: 80.7 kg (178 lb)  Height: 5' 6" (167.6 cm)  Body mass index is 28.73 kg/m².    No intake or output data in the 24 hours ending 11/28/20 0603    Ortho/SPM Exam  Ortho/SPM Exam      Vitals: Afebrile.  Vital signs stable.  General: No acute distress.  Cardio: Regular rate.  Chest: No increased work of breathing.     Left Lower Extremity Exam     - Skin intact, well healed anterior knee incision, mild warmth  - Mild effusion s/p arthrocentesis   - TTP about knee  -10 degrees extension  - 80 degrees flexion  - Compartments soft and compressible  - ROM full (eversion,inversion,plantar/dorsiflexion)  - TA/EHL/Gastroc/FHL " assessed in isolation without deficit  - SILT throughout  - DP and PT palpated  2+  - Capillary Refill <3s     Significant Labs:   Blood Culture:   Recent Labs   Lab 11/27/20  1536 11/27/20  1537   LABBLOO No Growth to date No Growth to date     BMP:   Recent Labs   Lab 11/27/20  1538      *   K 3.9      CO2 23   BUN 15   CREATININE 1.1   CALCIUM 9.2     CBC:   Recent Labs   Lab 11/27/20  1539   WBC 11.68   HGB 12.7*   HCT 40.9        CMP:   Recent Labs   Lab 11/27/20  1538   *   K 3.9      CO2 23      BUN 15   CREATININE 1.1   CALCIUM 9.2   ANIONGAP 11   EGFRNONAA >60.0     Coagulation:   Recent Labs   Lab 11/27/20  1538   LABPROT 11.0   INR 1.0     CRP:   Recent Labs   Lab 11/27/20  1033   .0*     Lactic Acid: No results for input(s): LACTATE in the last 48 hours.  All pertinent labs within the past 24 hours have been reviewed.    Significant Imaging: I have reviewed and interpreted all pertinent imaging results/findings.

## 2020-11-28 NOTE — NURSING TRANSFER
Nursing Transfer Note      11/28/2020     Transfer To: 506    Transfer via stretcher    Transfer with personal belongings, watch, polar ice     Transported by pct    Medicines sent: none    Chart send with patient: Yes    Notified: spouse via text messaging system    Patient reassessed at: 11/28/2020 1132

## 2020-11-28 NOTE — PROGRESS NOTES
"[facility]  Orthopedics  Progress Note    Patient Name: Fan Paz  MRN: [id number]  Admission Date: 11/27/2020  Hospital Length of Stay: 1 days  Attending Provider: Ronak Pires MD  Primary Care Provider: Otilio Damon MD  Follow-up For: Procedure(s) (LRB):  REVISION, ARTHROPLASTY, KNEE, left, exactech, supine, cultures, pulsavac, bactisure avaliable (Left)    Post-Operative Day:    Subjective:     Principal Problem:Infection of prosthetic joint    Principal Orthopedic Problem: left knee PJI, pending poly exchange surgery 11/28/2020     Interval History: NAEON. Ready for surgery this AM. No questions or concerns that have not already been addressed.    Review of patient's allergies indicates:  No Known Allergies    Current Facility-Administered Medications   Medication    acetaminophen tablet 650 mg    acetaminophen tablet 650 mg    fluticasone propionate 50 mcg/actuation nasal spray 50 mcg    labetalol 20 mg/4 mL (5 mg/mL) IV syring    lidocaine (PF) 10 mg/ml (1%) injection 10 mg    melatonin tablet 6 mg    metoclopramide HCl injection 5 mg    montelukast tablet 10 mg    ondansetron disintegrating tablet 8 mg    oxyCODONE immediate release tablet 5 mg    oxyCODONE immediate release tablet Tab 10 mg    simvastatin tablet 40 mg    sodium chloride 0.9% flush 10 mL    tamsulosin 24 hr capsule 0.4 mg     Objective:     Vital Signs (Most Recent):  Temp: 98.9 °F (37.2 °C) (11/28/20 0432)  Pulse: 84 (11/28/20 0432)  Resp: 20 (11/27/20 2133)  BP: 127/62 (11/28/20 0432)  SpO2: 97 % (11/28/20 0432) Vital Signs (24h Range):  Temp:  [97.9 °F (36.6 °C)-99.8 °F (37.7 °C)] 98.9 °F (37.2 °C)  Pulse:  [84-95] 84  Resp:  [14-20] 20  SpO2:  [95 %-97 %] 97 %  BP: (127-193)/(62-86) 127/62     Weight: 80.7 kg (178 lb)  Height: 5' 6" (167.6 cm)  Body mass index is 28.73 kg/m².    No intake or output data in the 24 hours ending 11/28/20 0603    Ortho/SPM Exam  Ortho/SPM Exam      Vitals: " Afebrile.  Vital signs stable.  General: No acute distress.  Cardio: Regular rate.  Chest: No increased work of breathing.     Left Lower Extremity Exam     - Skin intact, well healed anterior knee incision, mild warmth  - Mild effusion s/p arthrocentesis   - TTP about knee  -10 degrees extension  - 80 degrees flexion  - Compartments soft and compressible  - ROM full (eversion,inversion,plantar/dorsiflexion)  - TA/EHL/Gastroc/FHL assessed in isolation without deficit  - SILT throughout  - DP and PT palpated  2+  - Capillary Refill <3s     Significant Labs:   Blood Culture:   Recent Labs   Lab 11/27/20  1536 11/27/20  1537   LABBLOO No Growth to date No Growth to date     BMP:   Recent Labs   Lab 11/27/20  1538      *   K 3.9      CO2 23   BUN 15   CREATININE 1.1   CALCIUM 9.2     CBC:   Recent Labs   Lab 11/27/20  1539   WBC 11.68   HGB 12.7*   HCT 40.9        CMP:   Recent Labs   Lab 11/27/20  1538   *   K 3.9      CO2 23      BUN 15   CREATININE 1.1   CALCIUM 9.2   ANIONGAP 11   EGFRNONAA >60.0     Coagulation:   Recent Labs   Lab 11/27/20  1538   LABPROT 11.0   INR 1.0     CRP:   Recent Labs   Lab 11/27/20  1033   .0*     Lactic Acid: No results for input(s): LACTATE in the last 48 hours.  All pertinent labs within the past 24 hours have been reviewed.    Significant Imaging: I have reviewed and interpreted all pertinent imaging results/findings.         Assessment/Plan:     * Infection of prosthetic joint  Fan Paz is a 80 y.o. male s/p L TKA with Dr. 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 Scheduled for I&D and poly exchange this morning with Dr. Pires.      -Npo for surgery this AM  -Pain control MM  - WBAT  -DVT PPx: Hold anticoagulation  -Abx: Hold abx for now  -Labs:  , ESR 40, Hb 12.7, WBC 11        Essential hypertension  Restart home meds  Labetalol for break through     History of prostate cancer  Restart home  meds    Hyperlipidemia  Restart home meds          Carmen Curry MD  Orthopedics  [facility]

## 2020-11-28 NOTE — ANESTHESIA PROCEDURE NOTES
Intubation  Performed by: Shauna Rosario CRNA  Authorized by: Lefty Nova MD     Intubation:     Induction:  Intravenous    Intubated:  Postinduction    Mask Ventilation:  Easy mask    Attempts:  1    Attempted By:  CRNA    Blade:  Villanueva 2    Laryngeal View Grade: Grade I - full view of chords      Difficult Airway Encountered?: No      Complications:  None    Airway Device:  Oral endotracheal tube    Airway Device Size:  7.5    Style/Cuff Inflation:  Cuffed    Inflation Amount (mL):  8    Tube secured:  22    Secured at:  The lips    Placement Verified By:  Capnometry    Complicating Factors:  None    Findings Post-Intubation:  BS equal bilateral and atraumatic/condition of teeth unchanged

## 2020-11-28 NOTE — PLAN OF CARE
Problem: Fall Injury Risk  Goal: Absence of Fall and Fall-Related Injury  Outcome: Ongoing, Progressing     Patient AAOx4. VS stable, afebrile. LLE knee edema noted. Bilateral pedal pulses +2. No skin breakdown noted. Up to bathroom with walker. IV site clean and patent. Room air. Free from falls. Bed at lowest point, side rails up x2 and call light within reach. Nonskid socks out of bed. Patient verbalizes understanding of care plan and voices no concerns at this time,

## 2020-11-28 NOTE — PLAN OF CARE
SW completed discharge planning assessment with the patient's spouse Bonnie at bedside (Pt currently in surgery). SW verified demographic information listed on the pt.'s Face sheet. Pt lived with his spouse in a one story home with steps outside the home w/rail. Pt's wife reports that their daughter will provide transportation upon discharge. As reported pt utilizes a walker and mathew as needed. Pt's wife reports the pt would best benefit from a shower chair upon discharge (SW communicated shower chair will be out-of-pocket cost). As reported the pt has has  services, however does not recall the name at this time.    Otilio Damon MD  Payor: MEDICARE / Plan: MEDICARE PART A & B / Product Type: [facility] /     [facility] [address] [place] - [address] [place] [address]  [address] [place] [address]  [place] [id number]  Phone: [phone] Fax: [phone]       11/28/20 1006   Discharge Assessment   Assessment Type Discharge Planning Assessment   Confirmed/corrected address and phone number on facesheet? Yes   Assessment information obtained from? Other  (Wife Bonnie)   Expected Length of Stay (days)   (TBD)   Communicated expected length of stay with patient/caregiver yes   Prior to hospitilization cognitive status: Alert/Oriented   Prior to hospitalization functional status: Independent;Assistive Equipment   Current cognitive status: Alert/Oriented   Current Functional Status: Independent;Assistive Equipment   Facility Arrived From: N/A   Lives With spouse   Able to Return to Prior Arrangements yes   Is patient able to care for self after discharge? Yes   Who are your caregiver(s) and their phone number(s)? Bonnie #[phone]   Patient's perception of discharge disposition home or selfcare   Readmission Within the Last 30 Days no previous admission in last 30 days   Patient currently being followed by outpatient case management? No   Patient currently receives any other outside agency services? No   Equipment Currently Used  at Home cane, straight;walker, rolling   Do you have any problems affording any of your prescribed medications? No   Is the patient taking medications as prescribed? yes   Does the patient have transportation home? Yes   Transportation Anticipated family or friend will provide  (Pt's daughter)   Dialysis Name and Scheduled days N/A   Does the patient receive services at the [facility]mad[facility] Clinic? No   Discharge Plan A Home with family   Discharge Plan B Home Health   DME Needed Upon Discharge  shower chair   Patient/Family in Agreement with Plan yes     Amanda Narvaez LMSW  Case Management   [facility]   Ext. [phone]

## 2020-11-28 NOTE — OP NOTE
Lexis Left TKA I&D  OPERATIVE NOTE    Date of Operation:   11/28/2020    Providers Performing:   Surgeon(s):  MD Ronak Marks MD      Operative Procedure:   Left Total Knee Arthroplasty arthrotomy with I&D and poly swap    Preoperative Diagnosis:   Left TKA Deep PJI    Postoperative Diagnosis:   SAME    Components Used:       Implant Name Type Inv. Item Serial No.  Lot No. LRB No. Used Action   [employer]SC TIBIAL INSERT POSTERIOR STABILIZED   [id number] [employer]  Left 1 Implanted       Anesthesia:   General  Adductor canal block, 1 shot      Estimated Blood Loss:   100 cc    Specimens:   Tissue sent for culture    Complications:   None    Indications:   The patient has presented with hyperacute deep prosthetic infection of the left total knee.  He and his family understand the need for operative treatment of this to attempt to eradicate his infection with exchange of polyethylene as well.  They understand that this is not guaranteed to cure his infection, and that if it fails he will need further surgery.  After an explanation of risks and benefits of knee arthroplasty surgery, the patient wishes to proceed with surgery.    Operative Notes:   The patient was greeted in the pre-op holding area.  The patients knee was marked with a surgical marker by the surgeon.  General anesthesia in nerve block were administered by the anesthesia team. The patient was placed in the supine position on the OR table with all bony prominences padded.  A tourniquet was not used.  IV antibiotics were administered.  The leg was prepped and draped in the usual sterile fashion.  A timeout was performed and the correct patient, site and procedure were identified.    Scar line reopened sharply.  The arthrotomy was recreated in line with healed scar deep.  Medial and lateral releases to include scar and soft tissue release was utilized to expose TKA.     Once this had been accomplished, the  articular surface was removed. The size was verified on the component.  We did as complete a synovectomy as possible.  The other interior surfaces of the joint or thoroughly curetted and rongeured to clean out all the surface material and we irrigated with Bactisure.  We changed our gloves and drapes.  We irrigated with Betadine solution and 6 liters saline by pulse lavage. A new articular surface was placed. The TKA was then ranged and found to be appropriate.   We used multiple deep #1 PDS sutures to close the arthrotomy. The skin and subQ were closed using 2-0 PDS and staples. Dressing was applied, and the patient was taken to recovery room in stable condition without apparent orthopedic or anesthetic complications.  Sponge and needle counts were correct.    The first-assistant was critical to all steps of the operation, including retraction and leg stabilization during exposure and bone preparation, as well as the deep and superficial wound closure.  Dr. Pires performed and/or supervised the key portions of this surgical procedure.    Postoperative Plan:  The patient will be anticoagulated with 81mg aspirin twice daily for one month.   They will receive 24 hours of post-operative antibiotics.    Activity will be weight bearing as tolerated.    Signed by: Ronak Pires MD

## 2020-11-28 NOTE — TRANSFER OF CARE
"Anesthesia Transfer of Care Note    Patient: Fan Paz    Procedure(s) Performed: Procedure(s) (LRB):  REPLACEMENT, POLYETHYLENE LINER (Left)    Patient location: PACU    Anesthesia Type: general    Transport from OR: Transported from OR on room air with adequate spontaneous ventilation    Post pain: adequate analgesia    Post assessment: no apparent anesthetic complications and tolerated procedure well    Post vital signs: stable    Level of consciousness: awake, alert and oriented    Nausea/Vomiting: no nausea/vomiting    Complications: none    Transfer of care protocol was followed      Last vitals:   Visit Vitals  BP (!) 151/75   Pulse 78   Temp 36.7 °C (98.1 °F) (Temporal)   Resp 15   Ht 5' 6" (1.676 m)   Wt 80.7 kg (178 lb)   SpO2 97%   BMI 28.73 kg/m²     "

## 2020-11-28 NOTE — PROGRESS NOTES
Pharmacist Renal Dose Adjustment Note    Fan Paz is a 80 y.o. male being treated with the medication piperacillin/tazobactam    Patient Data:    Vital Signs (Most Recent):  Temp: 98.1 °F (36.7 °C) (11/28/20 1033)  Pulse: 78 (11/28/20 1033)  Resp: 15 (11/28/20 1033)  BP: (!) 151/75 (11/28/20 1033)  SpO2: 97 % (11/28/20 1033)   Vital Signs (72h Range):  Temp:  [97.9 °F (36.6 °C)-99.8 °F (37.7 °C)]   Pulse:  [78-95]   Resp:  [14-20]   BP: (127-193)/(62-86)   SpO2:  [95 %-97 %]      Recent Labs   Lab 11/27/20  1538 11/28/20  0453   CREATININE 1.1 0.9     Serum creatinine: 0.9 mg/dL 11/28/20 0453  Estimated creatinine clearance: 66.5 mL/min    Medication: piperacillin/tazobactam 4.5g q12h will be changed to piperacillin/tazobactam 4.5g q8h because CrCl is above 20mL/min    Pharmacist's Name: Megha Ricci  Pharmacist's Extension: [phone]

## 2020-11-29 LAB
ALBUMIN SERPL BCP-MCNC: 2.5 G/DL (ref 3.5–5.2)
ALP SERPL-CCNC: 67 U/L (ref 55–135)
ALT SERPL W/O P-5'-P-CCNC: 19 U/L (ref 10–44)
ANION GAP SERPL CALC-SCNC: 9 MMOL/L (ref 8–16)
AST SERPL-CCNC: 24 U/L (ref 10–40)
BASOPHILS # BLD AUTO: 0.02 K/UL (ref 0–0.2)
BASOPHILS NFR BLD: 0.2 % (ref 0–1.9)
BILIRUB SERPL-MCNC: 2.2 MG/DL (ref 0.1–1)
BUN SERPL-MCNC: 10 MG/DL (ref 8–23)
CALCIUM SERPL-MCNC: 8.6 MG/DL (ref 8.7–10.5)
CHLORIDE SERPL-SCNC: 102 MMOL/L (ref 95–110)
CO2 SERPL-SCNC: 24 MMOL/L (ref 23–29)
CREAT SERPL-MCNC: 0.9 MG/DL (ref 0.5–1.4)
DIFFERENTIAL METHOD: ABNORMAL
EOSINOPHIL # BLD AUTO: 0 K/UL (ref 0–0.5)
EOSINOPHIL NFR BLD: 0.3 % (ref 0–8)
ERYTHROCYTE [DISTWIDTH] IN BLOOD BY AUTOMATED COUNT: 15.2 % (ref 11.5–14.5)
EST. GFR  (AFRICAN AMERICAN): >60 ML/MIN/1.73 M^2
EST. GFR  (NON AFRICAN AMERICAN): >60 ML/MIN/1.73 M^2
ESTIMATED AVG GLUCOSE: 117 MG/DL (ref 68–131)
GLUCOSE SERPL-MCNC: 129 MG/DL (ref 70–110)
HBA1C MFR BLD HPLC: 5.7 % (ref 4–5.6)
HCT VFR BLD AUTO: 37.7 % (ref 40–54)
HGB BLD-MCNC: 11.8 G/DL (ref 14–18)
IMM GRANULOCYTES # BLD AUTO: 0.04 K/UL (ref 0–0.04)
IMM GRANULOCYTES NFR BLD AUTO: 0.4 % (ref 0–0.5)
LYMPHOCYTES # BLD AUTO: 0.9 K/UL (ref 1–4.8)
LYMPHOCYTES NFR BLD: 9.5 % (ref 18–48)
MCH RBC QN AUTO: 26.7 PG (ref 27–31)
MCHC RBC AUTO-ENTMCNC: 31.3 G/DL (ref 32–36)
MCV RBC AUTO: 85 FL (ref 82–98)
MONOCYTES # BLD AUTO: 1.2 K/UL (ref 0.3–1)
MONOCYTES NFR BLD: 12.4 % (ref 4–15)
NEUTROPHILS # BLD AUTO: 7.3 K/UL (ref 1.8–7.7)
NEUTROPHILS NFR BLD: 77.2 % (ref 38–73)
NRBC BLD-RTO: 0 /100 WBC
PLATELET # BLD AUTO: 167 K/UL (ref 150–350)
PMV BLD AUTO: 10 FL (ref 9.2–12.9)
POCT GLUCOSE: 112 MG/DL (ref 70–110)
POCT GLUCOSE: 125 MG/DL (ref 70–110)
POTASSIUM SERPL-SCNC: 4.2 MMOL/L (ref 3.5–5.1)
PROT SERPL-MCNC: 6.9 G/DL (ref 6–8.4)
RBC # BLD AUTO: 4.42 M/UL (ref 4.6–6.2)
SODIUM SERPL-SCNC: 135 MMOL/L (ref 136–145)
WBC # BLD AUTO: 9.45 K/UL (ref 3.9–12.7)

## 2020-11-29 PROCEDURE — 25000003 PHARM REV CODE 250: Performed by: ORTHOPAEDIC SURGERY

## 2020-11-29 PROCEDURE — 36415 COLL VENOUS BLD VENIPUNCTURE: CPT

## 2020-11-29 PROCEDURE — 97116 GAIT TRAINING THERAPY: CPT

## 2020-11-29 PROCEDURE — 97165 OT EVAL LOW COMPLEX 30 MIN: CPT

## 2020-11-29 PROCEDURE — 25000003 PHARM REV CODE 250: Performed by: STUDENT IN AN ORGANIZED HEALTH CARE EDUCATION/TRAINING PROGRAM

## 2020-11-29 PROCEDURE — 63600175 PHARM REV CODE 636 W HCPCS: Performed by: PHYSICIAN ASSISTANT

## 2020-11-29 PROCEDURE — 97161 PT EVAL LOW COMPLEX 20 MIN: CPT

## 2020-11-29 PROCEDURE — 99223 1ST HOSP IP/OBS HIGH 75: CPT | Mod: ,,, | Performed by: PHYSICIAN ASSISTANT

## 2020-11-29 PROCEDURE — 11000001 HC ACUTE MED/SURG PRIVATE ROOM

## 2020-11-29 PROCEDURE — 63600175 PHARM REV CODE 636 W HCPCS: Performed by: ORTHOPAEDIC SURGERY

## 2020-11-29 PROCEDURE — 85025 COMPLETE CBC W/AUTO DIFF WBC: CPT

## 2020-11-29 PROCEDURE — 25000242 PHARM REV CODE 250 ALT 637 W/ HCPCS: Performed by: STUDENT IN AN ORGANIZED HEALTH CARE EDUCATION/TRAINING PROGRAM

## 2020-11-29 PROCEDURE — 83036 HEMOGLOBIN GLYCOSYLATED A1C: CPT

## 2020-11-29 PROCEDURE — 80053 COMPREHEN METABOLIC PANEL: CPT

## 2020-11-29 PROCEDURE — 99223 PR INITIAL HOSPITAL CARE,LEVL III: ICD-10-PCS | Mod: ,,, | Performed by: PHYSICIAN ASSISTANT

## 2020-11-29 PROCEDURE — 97535 SELF CARE MNGMENT TRAINING: CPT

## 2020-11-29 RX ORDER — IBUPROFEN 200 MG
16 TABLET ORAL
Status: DISCONTINUED | OUTPATIENT
Start: 2020-11-29 | End: 2020-12-01 | Stop reason: HOSPADM

## 2020-11-29 RX ORDER — INSULIN ASPART 100 [IU]/ML
0-5 INJECTION, SOLUTION INTRAVENOUS; SUBCUTANEOUS
Status: DISCONTINUED | OUTPATIENT
Start: 2020-11-29 | End: 2020-12-01 | Stop reason: HOSPADM

## 2020-11-29 RX ORDER — IBUPROFEN 200 MG
24 TABLET ORAL
Status: DISCONTINUED | OUTPATIENT
Start: 2020-11-29 | End: 2020-12-01 | Stop reason: HOSPADM

## 2020-11-29 RX ORDER — ASPIRIN 81 MG/1
81 TABLET ORAL 2 TIMES DAILY
Status: DISCONTINUED | OUTPATIENT
Start: 2020-11-29 | End: 2020-12-01 | Stop reason: HOSPADM

## 2020-11-29 RX ORDER — GLUCAGON 1 MG
1 KIT INJECTION
Status: DISCONTINUED | OUTPATIENT
Start: 2020-11-29 | End: 2020-12-01 | Stop reason: HOSPADM

## 2020-11-29 RX ADMIN — ACETAMINOPHEN 650 MG: 325 TABLET ORAL at 11:11

## 2020-11-29 RX ADMIN — VANCOMYCIN HYDROCHLORIDE 1250 MG: 1.25 INJECTION, POWDER, LYOPHILIZED, FOR SOLUTION INTRAVENOUS at 11:11

## 2020-11-29 RX ADMIN — ASPIRIN 81 MG: 81 TABLET, COATED ORAL at 09:11

## 2020-11-29 RX ADMIN — TAMSULOSIN HYDROCHLORIDE 0.4 MG: 0.4 CAPSULE ORAL at 09:11

## 2020-11-29 RX ADMIN — CEFTRIAXONE 2 G: 2 INJECTION, SOLUTION INTRAVENOUS at 01:11

## 2020-11-29 RX ADMIN — OXYCODONE HYDROCHLORIDE 10 MG: 10 TABLET ORAL at 12:11

## 2020-11-29 RX ADMIN — OXYCODONE HYDROCHLORIDE 5 MG: 5 TABLET ORAL at 03:11

## 2020-11-29 RX ADMIN — OXYCODONE HYDROCHLORIDE 5 MG: 5 TABLET ORAL at 11:11

## 2020-11-29 RX ADMIN — ACETAMINOPHEN 650 MG: 325 TABLET ORAL at 12:11

## 2020-11-29 RX ADMIN — VANCOMYCIN HYDROCHLORIDE 1250 MG: 1.25 INJECTION, POWDER, LYOPHILIZED, FOR SOLUTION INTRAVENOUS at 12:11

## 2020-11-29 RX ADMIN — ACETAMINOPHEN 650 MG: 325 TABLET ORAL at 05:11

## 2020-11-29 RX ADMIN — SIMVASTATIN 40 MG: 20 TABLET, FILM COATED ORAL at 11:11

## 2020-11-29 RX ADMIN — RIFAMPIN 300 MG: 300 CAPSULE ORAL at 11:11

## 2020-11-29 RX ADMIN — RIFAMPIN 300 MG: 300 CAPSULE ORAL at 09:11

## 2020-11-29 RX ADMIN — OXYCODONE HYDROCHLORIDE 5 MG: 5 TABLET ORAL at 07:11

## 2020-11-29 RX ADMIN — ACETAMINOPHEN 650 MG: 325 TABLET ORAL at 06:11

## 2020-11-29 RX ADMIN — MONTELUKAST 10 MG: 10 TABLET, FILM COATED ORAL at 11:11

## 2020-11-29 RX ADMIN — ASPIRIN 81 MG: 81 TABLET, COATED ORAL at 11:11

## 2020-11-29 RX ADMIN — FLUTICASONE PROPIONATE 50 MCG: 50 SPRAY, METERED NASAL at 08:11

## 2020-11-29 NOTE — PLAN OF CARE
PT alexander completed. Pt will begin PT POC.  Gema Hoskins, PT  2020    Problem: Physical Therapy Goal  Goal: Physical Therapy Goal  Description: Goals to be met by: 20     Patient will increase functional independence with mobility by performin. Supine to sit with Set-up [place]  2. Sit to supine with Set-up [place]  3. Sit to stand transfer with Supervision  4. Bed to chair transfer with Supervision using Rolling Walker  5. Gait  x 150 feet with Supervision using Rolling Walker.   6. Ascend/descend 4 stair with bilateral Handrails Contact Guard Assistance     Outcome: Ongoing, Progressing

## 2020-11-29 NOTE — PLAN OF CARE
OT Acute eval complete. Goals established. Pt motivated for therapy and tolerated session well secondary to pain. Pt required CGA in bed mobility, t/f's, and ambulation due to decreased balance and pain. Pt ambulated ~20ft CGA using RW to increase activity tolerance required for adls and functional mobility. Pt stood at sink to complete grooming tasks with CGA. Pt would benefit from OP PT when d/c to return to PLOF.      Problem: Occupational Therapy Goal  Goal: Occupational Therapy Goal  Description: Goals to be met by: 12/29/2020    Patient will increase functional independence with ADLs by performing:    UE Dressing with Stand-by Assistance.  LE Dressing with Stand-by Assistance.  Grooming while standing at sink with Stand-by Assistance.  Toileting from toilet with Stand-by Assistance for hygiene and clothing management.   Step transfer with Stand-by Assistance  Toilet transfer to toilet with Stand-by Assistance.    Outcome: Ongoing, Progressing

## 2020-11-29 NOTE — PROGRESS NOTES
"[facility]  Orthopedics  Progress Note    Patient Name: Fan Paz  MRN: [id number]  Admission Date: 11/27/2020  Hospital Length of Stay: 2 days  Attending Provider: Ronak Pires MD  Primary Care Provider: Otilio Damon MD  Follow-up For: Procedure(s) (LRB):  REPLACEMENT, POLYETHYLENE LINER (Left)    Post-Operative Day: 1 Day Post-Op  Subjective:     Principal Problem:Infection of prosthetic joint    Principal Orthopedic Problem: Same    Interval History: Pt seen and examined at bedside.     Review of patient's allergies indicates:  No Known Allergies    Current Facility-Administered Medications   Medication    acetaminophen tablet 650 mg    aspirin EC tablet 81 mg    cefTRIAXone (ROCEPHIN) 2 g/50 mL D5W IVPB    fluticasone propionate 50 mcg/actuation nasal spray 50 mcg    HYDROmorphone injection 0.2 mg    labetalol 20 mg/4 mL (5 mg/mL) IV syring    lidocaine (PF) 10 mg/ml (1%) injection 10 mg    melatonin tablet 6 mg    metoclopramide HCl injection 5 mg    montelukast tablet 10 mg    ondansetron disintegrating tablet 8 mg    oxyCODONE immediate release tablet 5 mg    oxyCODONE immediate release tablet Tab 10 mg    rifAMpin capsule 300 mg    simvastatin tablet 40 mg    sodium chloride 0.9% flush 10 mL    tamsulosin 24 hr capsule 0.4 mg    vancomycin - pharmacy to dose    vancomycin 1.25 g in dextrose 5% 250 mL IVPB (ready to mix)     Objective:     Vital Signs (Most Recent):  Temp: 98.3 °F (36.8 °C) (11/29/20 0503)  Pulse: 84 (11/29/20 0503)  Resp: 18 (11/29/20 0503)  BP: (!) 156/80 (11/29/20 0503)  SpO2: (!) 91 % (11/29/20 0503) Vital Signs (24h Range):  Temp:  [97 °F (36.1 °C)-100.9 °F (38.3 °C)] 98.3 °F (36.8 °C)  Pulse:  [] 84  Resp:  [14-18] 18  SpO2:  [91 %-98 %] 91 %  BP: (136-197)/(73-92) 156/80     Weight: 80.7 kg (178 lb)  Height: 5' 6" (167.6 cm)  Body mass index is 28.73 kg/m².      Intake/Output Summary (Last 24 hours) at 11/29/2020 0652  Last " data filed at 11/29/2020 0010  Gross per 24 hour   Intake 1180 ml   Output 450 ml   Net 730 ml       Ortho/SPM Exam    Physical Exam:  NAD, A/O x 3.  Wound c/d/i with clean dressing.  No focal motor or sensory deficits noted.  Drain: None    Significant Labs:   BMP:   Recent Labs   Lab 11/29/20 0447   *   *   K 4.2      CO2 24   BUN 10   CREATININE 0.9   CALCIUM 8.6*     CBC:   Recent Labs   Lab 11/27/20  1539 11/28/20 0453 11/29/20 0447   WBC 11.68 8.86 9.45   HGB 12.7* 11.6* 11.8*   HCT 40.9 36.3* 37.7*    182 167     CMP:   Recent Labs   Lab 11/27/20  1538 11/28/20 0453 11/29/20 0447   * 132* 135*   K 3.9 3.7 4.2    101 102   CO2 23 21* 24    116* 129*   BUN 15 17 10   CREATININE 1.1 0.9 0.9   CALCIUM 9.2 8.5* 8.6*   PROT  --   --  6.9   ALBUMIN  --   --  2.5*   BILITOT  --   --  2.2*   ALKPHOS  --   --  67   AST  --   --  24   ALT  --   --  19   ANIONGAP 11 10 9   EGFRNONAA >60.0 >60.0 >60.0     All pertinent labs within the past 24 hours have been reviewed.    Significant Imaging: I have reviewed all pertinent imaging results/findings.    Assessment/Plan:     * Infection of prosthetic joint  Fan Paz is a 80 y.o. male s/p L TKA with Dr. 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     Now s/p I&D with poly exchange 11/28/20.     -Pain control MM  - WBAT  -DVT PPx: ASA BID  -Abx: Vanc, rocephin, rifampin  - Cx: GBS, sen pending  - PT/OT  -Labs: hgb 12, wbc 9  - Appreciate ID recs        Essential hypertension  Restart home meds  Labetalol for break through     History of prostate cancer  Restart home meds    Hyperlipidemia  Restart home meds          Ronak Molina MD  Orthopedics  [facility]

## 2020-11-29 NOTE — CONSULTS
[facility]  Infectious Disease  Consult Note    Patient Name: Fan Paz  MRN: [id number]  Admission Date: 11/27/2020  Hospital Length of Stay: 2 days  Attending Physician: Ronak Pires MD  Primary Care Provider: Otilio Damon MD     Isolation Status: No active isolations    Patient information was obtained from patient and past medical records.      Consults  Assessment/Plan:     * Infection of prosthetic joint     80 y.o. male with history of left TKA in 2015 admitted with left prosthetic knee infection.  Underwent left knee aspiration in clinic which revealed 92,717 WBC (95% segs). Aspiration cx are positive for GBS. He is now s/p I&D and poly exchange on 11/28. Surgical cx are pending. He is doing well post operatively. Afebrile. No leukocytosis.  Pain well controlled.    Plan  1. Continue IV Vancomycin and Ceftriaxone for now  2. Will follow culture data to guide antibiotics (likely deescalate antibiotics tomorrow).  3. Anticipate 6 weeks of antibiotic therapy (end date 1/9/21)  4. ID will follow. Ok to place PICC line.         Thank you for the consult. Please call for any questions.  Elisa Up PA-C  Phone: [phone]  Pager: [phone]    Subjective:     Principal Problem: Infection of prosthetic joint    HPI: Fan Paz is a 80 y.o. male with history of left TKA in 2015 with Dr. Paul admitted with left prosthetic knee infection. The patient presented to clinic two days ago with new onset of left knee pain and swelling for 2 days. The pain is severe and he has been unable to stand or walk on the knee.  He would have gone to the ER, but he and his family are concerned about possible covid exposure. He denies any recent infections. He does report low grade fever and warmth to the knee. He denies any SOB,CP, N/V. Underwent left knee aspiration in clinic which revealed 92,717 WBC (95% segs). Aspiration cx are positive for GBS. The patient then underwent an I&D  and poly exchange yesterday. He is doing well post operatively. He is afebrile. No leukocytosis. ESR 40. . Surgical cx pending. Denies fevers, chills, sweats. Pain well controlled. Lives at home with his wife.        Past Medical History:   Diagnosis Date    Arthritis     Cancer of palate     HTN (hypertension)     Hyperlipidemia     Prostate cancer     2011       Past Surgical History:   Procedure Laterality Date    BACK SURGERY  y-6    Cancer of palate surgery      [facility]- [facility]     CARPAL TUNNEL RELEASE  8-14-13    right hand,Left hand 2013    COLONOSCOPY N/A 4/10/2017    Procedure: COLONOSCOPY;  Surgeon: Nilo Kelly MD;  Location: [facility] ENDO;  Service: Endoscopy;  Laterality: N/A;    COLONOSCOPY N/A 10/21/2020    Procedure: COLONOSCOPY;  Surgeon: Demetrius Araujo MD;  Location: [facility] ENDO;  Service: Endoscopy;  Laterality: N/A;    KNEE SURGERY  y-40    left knee    KNEE SURGERY Right 12-1-15    TKR    Radio active seed Implant      January 2012    TOTAL HIP ARTHROPLASTY  3/2011       Review of patient's allergies indicates:  No Known Allergies    Medications:  Medications Prior to Admission   Medication Sig    montelukast (SINGULAIR) 10 mg tablet TAKE 1 TABLET BY MOUTH ONCE DAILY IN THE EVENING    simvastatin (ZOCOR) 40 MG tablet Take 1 tablet (40 mg total) by mouth every evening.    tamsulosin (FLOMAX) 0.4 mg Cap Take 1 capsule (0.4 mg total) by mouth once daily.    diclofenac (VOLTAREN) 50 MG EC tablet Take 1 tablet (50 mg total) by mouth 2 (two) times daily.    fluticasone propionate (FLONASE) 50 mcg/actuation nasal spray Use 1 spray(s) in each nostril once daily    FLUZONE HIGH-DOSE 2019-20, PF, 180 mcg/0.5 mL Syrg PHARMACIST ADMINISTERED IMMUNIZATION ADMINISTERED AT TIME OF DISPENSING    lidocaine 4 % Gel To scalp area BID PRN for tingling     Antibiotics (From admission, onward)    Start     Stop Route Frequency Ordered    11/29/20 0000  vancomycin 1.25 g in dextrose  5% 250 mL IVPB (ready to mix)      -- IV Every 12 hours (non-standard times) 11/28/20 1046    11/28/20 1230  cefTRIAXone (ROCEPHIN) 2 g/50 mL D5W IVPB      -- IV Every 24 hours (non-standard times) 11/28/20 1129    11/28/20 1145  rifAMpin capsule 300 mg      -- Oral Every 12 hours 11/28/20 1036    11/28/20 1133  vancomycin - pharmacy to dose  (vancomycin IVPB)      -- IV pharmacy to manage frequency 11/28/20 1036        Antifungals (From admission, onward)    None        Antivirals (From admission, onward)    None           Immunization History   Administered Date(s) Administered    Hepatitis B, Adult 10/10/2005    Influenza 09/21/2015    Influenza - High Dose - PF (65 years and older) 11/15/2013, 12/15/2016, 10/12/2017, 10/17/2018, 11/04/2019    Influenza - Quadrivalent - High Dose - PF (65 years and older) 09/01/2020    Influenza - Quadrivalent - PF *Preferred* (6 months and older) 11/16/2005, 11/13/2008, 12/02/2009    Pneumococcal Polysaccharide - 23 Valent 09/21/2015       Family History     Problem Relation (Age of Onset)    Breast cancer Sister    Cancer Sister, Brother    Clotting disorder Sister (75)    Coronary artery disease Father    Diabetes Sister    Glaucoma Cousin    Lung cancer Brother (60), Sister    No Known Problems Brother, Sister, Sister, Brother, Mother, Maternal Aunt, Maternal Uncle, Paternal Aunt, Paternal Uncle, Maternal Grandmother, Maternal Grandfather, Paternal Grandmother, Paternal Grandfather    Stroke Brother        Social History     Socioeconomic History    Marital status:      Spouse name: Not on file    Number of children: Not on file    Years of education: Not on file    Highest education level: Not on file   Occupational History     Employer: RETIRED   Social Needs    Financial resource strain: Not on file    Food insecurity     Worry: Not on file     Inability: Not on file    Transportation needs     Medical: Not on file     Non-medical: Not on file   Tobacco  Use    Smoking status: Former Smoker     Packs/day: 3.00     Years: 20.00     Pack years: 60.00     Quit date: 7/10/1997     Years since quittin.4    Smokeless tobacco: Never Used    Tobacco comment: Quit -smoked for 40 years ,2 packs a day on an average   Substance and Sexual Activity    Alcohol use: No     Comment: used to drink Occasionally    Drug use: No    Sexual activity: Yes     Partners: Female   Lifestyle    Physical activity     Days per week: Not on file     Minutes per session: Not on file    Stress: Not on file   Relationships    Social connections     Talks on phone: Not on file     Gets together: Not on file     Attends Buddhism service: Not on file     Active member of club or organization: Not on file     Attends meetings of clubs or organizations: Not on file     Relationship status: Not on file   Other Topics Concern    Not on file   Social History Narrative    Not on file     Review of Systems   Constitutional: Positive for appetite change (decreased). Negative for chills, diaphoresis, fatigue and fever.   Eyes: Negative for visual disturbance.   Respiratory: Negative for cough and shortness of breath.    Cardiovascular: Negative for chest pain and leg swelling.   Gastrointestinal: Negative for abdominal pain, constipation, diarrhea, nausea and vomiting.   Genitourinary: Negative for difficulty urinating, dysuria, frequency and hematuria.   Musculoskeletal: Positive for joint swelling. Negative for arthralgias and back pain.   Skin: Negative for color change and rash.   Neurological: Negative for dizziness, weakness and headaches.   Psychiatric/Behavioral: Negative for agitation and confusion. The patient is not nervous/anxious.    All other systems reviewed and are negative.    Objective:     Vital Signs (Most Recent):  Temp: 97.5 °F (36.4 °C) (20)  Pulse: 89 (20)  Resp: 16 (20)  BP: (!) 144/73 (20)  SpO2: 95 % (20)  Vital Signs (24h Range):  Temp:  [97 °F (36.1 °C)-100.9 °F (38.3 °C)] 97.5 °F (36.4 °C)  Pulse:  [] 89  Resp:  [15-18] 16  SpO2:  [91 %-98 %] 95 %  BP: (139-197)/(73-92) 144/73     Weight: 80.7 kg (178 lb)  Body mass index is 28.73 kg/m².    Estimated Creatinine Clearance: 66.5 mL/min (based on SCr of 0.9 mg/dL).    Physical Exam  Vitals signs reviewed.   Constitutional:       Appearance: Normal appearance. He is not ill-appearing or diaphoretic.      Comments: Seen sitting up in chair   HENT:      Head: Normocephalic and atraumatic.      Nose: Nose normal. No congestion.      Mouth/Throat:      Pharynx: Oropharynx is clear.   Eyes:      General: No scleral icterus.     Conjunctiva/sclera: Conjunctivae normal.   Cardiovascular:      Rate and Rhythm: Normal rate and regular rhythm.   Pulmonary:      Effort: Pulmonary effort is normal. No respiratory distress.      Breath sounds: Normal breath sounds.   Abdominal:      General: There is no distension.      Palpations: Abdomen is soft.   Musculoskeletal:      Comments: Left knee wound dressed. Dressing c/d/i   Skin:     General: Skin is warm and dry.      Findings: No rash.   Neurological:      Mental Status: He is alert and oriented to person, place, and time. Mental status is at baseline.   Psychiatric:         Mood and Affect: Mood normal.         Behavior: Behavior normal.         Thought Content: Thought content normal.         Judgment: Judgment normal.         Significant Labs: All pertinent labs within the past 24 hours have been reviewed.    Significant Imaging: I have reviewed all pertinent imaging results/findings within the past 24 hours.

## 2020-11-29 NOTE — SUBJECTIVE & OBJECTIVE
"Principal Problem:Infection of prosthetic joint    Principal Orthopedic Problem: Same    Interval History: Pt seen and examined at bedside.     Review of patient's allergies indicates:  No Known Allergies    Current Facility-Administered Medications   Medication    acetaminophen tablet 650 mg    aspirin EC tablet 81 mg    cefTRIAXone (ROCEPHIN) 2 g/50 mL D5W IVPB    fluticasone propionate 50 mcg/actuation nasal spray 50 mcg    HYDROmorphone injection 0.2 mg    labetalol 20 mg/4 mL (5 mg/mL) IV syring    lidocaine (PF) 10 mg/ml (1%) injection 10 mg    melatonin tablet 6 mg    metoclopramide HCl injection 5 mg    montelukast tablet 10 mg    ondansetron disintegrating tablet 8 mg    oxyCODONE immediate release tablet 5 mg    oxyCODONE immediate release tablet Tab 10 mg    rifAMpin capsule 300 mg    simvastatin tablet 40 mg    sodium chloride 0.9% flush 10 mL    tamsulosin 24 hr capsule 0.4 mg    vancomycin - pharmacy to dose    vancomycin 1.25 g in dextrose 5% 250 mL IVPB (ready to mix)     Objective:     Vital Signs (Most Recent):  Temp: 98.3 °F (36.8 °C) (11/29/20 0503)  Pulse: 84 (11/29/20 0503)  Resp: 18 (11/29/20 0503)  BP: (!) 156/80 (11/29/20 0503)  SpO2: (!) 91 % (11/29/20 0503) Vital Signs (24h Range):  Temp:  [97 °F (36.1 °C)-100.9 °F (38.3 °C)] 98.3 °F (36.8 °C)  Pulse:  [] 84  Resp:  [14-18] 18  SpO2:  [91 %-98 %] 91 %  BP: (136-197)/(73-92) 156/80     Weight: 80.7 kg (178 lb)  Height: 5' 6" (167.6 cm)  Body mass index is 28.73 kg/m².      Intake/Output Summary (Last 24 hours) at 11/29/2020 0652  Last data filed at 11/29/2020 0010  Gross per 24 hour   Intake 1180 ml   Output 450 ml   Net 730 ml       Ortho/SPM Exam    Physical Exam:  NAD, A/O x 3.  Wound c/d/i with clean dressing.  No focal motor or sensory deficits noted.  Drain: None    Significant Labs:   BMP:   Recent Labs   Lab 11/29/20  0447   *   *   K 4.2      CO2 24   BUN 10   CREATININE 0.9   CALCIUM " 8.6*     CBC:   Recent Labs   Lab 11/27/20  1539 11/28/20  0453 11/29/20  0447   WBC 11.68 8.86 9.45   HGB 12.7* 11.6* 11.8*   HCT 40.9 36.3* 37.7*    182 167     CMP:   Recent Labs   Lab 11/27/20  1538 11/28/20  0453 11/29/20  0447   * 132* 135*   K 3.9 3.7 4.2    101 102   CO2 23 21* 24    116* 129*   BUN 15 17 10   CREATININE 1.1 0.9 0.9   CALCIUM 9.2 8.5* 8.6*   PROT  --   --  6.9   ALBUMIN  --   --  2.5*   BILITOT  --   --  2.2*   ALKPHOS  --   --  67   AST  --   --  24   ALT  --   --  19   ANIONGAP 11 10 9   EGFRNONAA >60.0 >60.0 >60.0     All pertinent labs within the past 24 hours have been reviewed.    Significant Imaging: I have reviewed all pertinent imaging results/findings.

## 2020-11-29 NOTE — SUBJECTIVE & OBJECTIVE
Past Medical History:   Diagnosis Date    Arthritis     Cancer of palate     HTN (hypertension)     Hyperlipidemia     Prostate cancer     2011       Past Surgical History:   Procedure Laterality Date    BACK SURGERY  y-6    Cancer [facility] palate surgery      [facility]- [facility]     CARPAL TUNNEL RELEASE  8-14-13    right hand,Left hand 2013    COLONOSCOPY N/A 4/10/2017    Procedure: COLONOSCOPY;  Surgeon: Nilo Kelly MD;  Location: [facility] ENDO;  Service: Endoscopy;  Laterality: N/A;    COLONOSCOPY N/A 10/21/2020    Procedure: COLONOSCOPY;  Surgeon: Demetrius Araujo MD;  Location: [facility] ENDO;  Service: Endoscopy;  Laterality: N/A;    KNEE SURGERY  y-40    left knee    KNEE SURGERY Right 12-1-15    TKR    Radio active seed Implant      January 2012    TOTAL HIP ARTHROPLASTY  3/2011       Review of patient's allergies indicates:  No Known Allergies    Medications:  Medications Prior to Admission   Medication Sig    montelukast (SINGULAIR) 10 mg tablet TAKE 1 TABLET BY MOUTH ONCE DAILY IN THE EVENING    simvastatin (ZOCOR) 40 MG tablet Take 1 tablet (40 mg total) by mouth every evening.    tamsulosin (FLOMAX) 0.4 mg Cap Take 1 capsule (0.4 mg total) by mouth once daily.    diclofenac (VOLTAREN) 50 MG EC tablet Take 1 tablet (50 mg total) by mouth 2 (two) times daily.    fluticasone propionate (FLONASE) 50 mcg/actuation nasal spray Use 1 spray(s) in each nostril once daily    FLUZONE HIGH-DOSE 2019-20, PF, 180 mcg/0.5 mL Syrg PHARMACIST ADMINISTERED IMMUNIZATION ADMINISTERED AT TIME OF DISPENSING    lidocaine 4 % Gel To scalp area BID PRN for tingling     Antibiotics (From admission, onward)    Start     Stop Route Frequency Ordered    11/29/20 0000  vancomycin 1.25 g in dextrose 5% 250 mL IVPB (ready to mix)      -- IV Every 12 hours (non-standard times) 11/28/20 1046    11/28/20 1230  cefTRIAXone (ROCEPHIN) 2 g/50 mL D5W IVPB      -- IV Every 24 hours (non-standard times) 11/28/20 1129    11/28/20  1145  rifAMpin capsule 300 mg      -- Oral Every 12 hours 20 1036    20 1133  vancomycin - pharmacy to dose  (vancomycin IVPB)      -- IV pharmacy to manage frequency 20 1036        Antifungals (From admission, onward)    None        Antivirals (From admission, onward)    None           Immunization History   Administered Date(s) Administered    Hepatitis B, Adult 10/10/2005    Influenza 2015    Influenza - High Dose - PF (65 years and older) 11/15/2013, 12/15/2016, 10/12/2017, 10/17/2018, 2019    Influenza - Quadrivalent - High Dose - PF (65 years and older) 2020    Influenza - Quadrivalent - PF *Preferred* (6 months and older) 2005, 2008, 2009    Pneumococcal Polysaccharide - 23 Valent 2015       Family History     Problem Relation (Age of Onset)    Breast cancer Sister    Cancer Sister, Brother    Clotting disorder Sister (75)    Coronary artery disease Father    Diabetes Sister    Glaucoma Cousin    Lung cancer Brother (60), Sister    No Known Problems Brother, Sister, Sister, Brother, Mother, Maternal Aunt, Maternal Uncle, Paternal Aunt, Paternal Uncle, Maternal Grandmother, Maternal Grandfather, Paternal Grandmother, Paternal Grandfather    Stroke Brother        Social History     Socioeconomic History    Marital status:      Spouse name: Not on file    Number of children: Not on file    Years of education: Not on file    Highest education level: Not on file   Occupational History     Employer: RETIRED   Social Needs    Financial resource strain: Not on file    Food insecurity     Worry: Not on file     Inability: Not on file    Transportation needs     Medical: Not on file     Non-medical: Not on file   Tobacco Use    Smoking status: Former Smoker     Packs/day: 3.00     Years: 20.00     Pack years: 60.00     Quit date: 7/10/1997     Years since quittin.4    Smokeless tobacco: Never Used    Tobacco comment: Quit  1997-smoked for 40 years ,2 packs a day on an average   Substance and Sexual Activity    Alcohol use: No     Comment: used to drink Occasionally    Drug use: No    Sexual activity: Yes     Partners: Female   Lifestyle    Physical activity     Days per week: Not on file     Minutes per session: Not on file    Stress: Not on file   Relationships    Social connections     Talks on phone: Not on file     Gets together: Not on file     Attends Christianity service: Not on file     Active member of club or organization: Not on file     Attends meetings of clubs or organizations: Not on file     Relationship status: Not on file   Other Topics Concern    Not on file   Social History Narrative    Not on file     Review of Systems   Constitutional: Positive for appetite change (decreased). Negative for chills, diaphoresis, fatigue and fever.   Eyes: Negative for visual disturbance.   Respiratory: Negative for cough and shortness of breath.    Cardiovascular: Negative for chest pain and leg swelling.   Gastrointestinal: Negative for abdominal pain, constipation, diarrhea, nausea and vomiting.   Genitourinary: Negative for difficulty urinating, dysuria, frequency and hematuria.   Musculoskeletal: Positive for joint swelling. Negative for arthralgias and back pain.   Skin: Negative for color change and rash.   Neurological: Negative for dizziness, weakness and headaches.   Psychiatric/Behavioral: Negative for agitation and confusion. The patient is not nervous/anxious.    All other systems reviewed and are negative.    Objective:     Vital Signs (Most Recent):  Temp: 97.5 °F (36.4 °C) (11/29/20 0711)  Pulse: 89 (11/29/20 0711)  Resp: 16 (11/29/20 0730)  BP: (!) 144/73 (11/29/20 0711)  SpO2: 95 % (11/29/20 0711) Vital Signs (24h Range):  Temp:  [97 °F (36.1 °C)-100.9 °F (38.3 °C)] 97.5 °F (36.4 °C)  Pulse:  [] 89  Resp:  [15-18] 16  SpO2:  [91 %-98 %] 95 %  BP: (139-197)/(73-92) 144/73     Weight: 80.7 kg (178  lb)  Body mass index is 28.73 kg/m².    Estimated Creatinine Clearance: 66.5 mL/min (based on SCr of 0.9 mg/dL).    Physical Exam  Vitals signs reviewed.   Constitutional:       Appearance: Normal appearance. He is not ill-appearing or diaphoretic.      Comments: Seen sitting up in chair   HENT:      Head: Normocephalic and atraumatic.      Nose: Nose normal. No congestion.      Mouth/Throat:      Pharynx: Oropharynx is clear.   Eyes:      General: No scleral icterus.     Conjunctiva/sclera: Conjunctivae normal.   Cardiovascular:      Rate and Rhythm: Normal rate and regular rhythm.   Pulmonary:      Effort: Pulmonary effort is normal. No respiratory distress.      Breath sounds: Normal breath sounds.   Abdominal:      General: There is no distension.      Palpations: Abdomen is soft.   Musculoskeletal:      Comments: Left knee wound dressed. Dressing c/d/i   Skin:     General: Skin is warm and dry.      Findings: No rash.   Neurological:      Mental Status: He is alert and oriented to person, place, and time. Mental status is at baseline.   Psychiatric:         Mood and Affect: Mood normal.         Behavior: Behavior normal.         Thought Content: Thought content normal.         Judgment: Judgment normal.         Significant Labs: All pertinent labs within the past 24 hours have been reviewed.    Significant Imaging: I have reviewed all pertinent imaging results/findings within the past 24 hours.

## 2020-11-29 NOTE — ASSESSMENT & PLAN NOTE
80 y.o. male with history of left TKA in 2015 admitted with left prosthetic knee infection.  Underwent left knee aspiration in clinic which revealed 92,717 WBC (95% segs). Aspiration cx are positive for GBS. He is now s/p I&D and poly exchange on 11/28. Surgical cx are pending. He is doing well post operatively. Afebrile. No leukocytosis.  Pain well controlled.    Plan  1. Continue IV Vancomycin and Ceftriaxone for now  2. Will follow culture data to guide antibiotics (likely deescalate antibiotics tomorrow).  3. Anticipate 6 weeks of antibiotic therapy (end date 1/9/21)  4. ID will follow. Ok to place PICC line.

## 2020-11-29 NOTE — ASSESSMENT & PLAN NOTE
Fan Paz is a 80 y.o. male s/p L TKA with Dr. 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     Now s/p I&D with poly exchange 11/28/20.     -Pain control MM  - WBAT  -DVT PPx: ASA BID  -Abx: Vanc, rocephin, rifampin  - Cx: GBS, sen pending  - PT/OT  -Labs: hgb 12, wbc 9  - Appreciate ID recs

## 2020-11-29 NOTE — PLAN OF CARE
Patient A&Ox4. VS as charted. BP and HR elevated with pain, but decreased with pain medication administration. BP in the upper 130s/70a by 0400 vitals. Q2H rounding and white board updating maintained. Call bell within reach. Pain well controlled with PRN medication. No falls or skin breakdown noted. Will continue plan of care.     Problem: Adult Inpatient Plan of Care  Goal: Plan of Care Review  Outcome: Ongoing, Progressing  Goal: Patient-Specific Goal (Individualization)  Outcome: Ongoing, Progressing  Goal: Absence of Hospital-Acquired Illness or Injury  Outcome: Ongoing, Progressing  Intervention: Identify and Manage Fall Risk  Flowsheets (Taken 11/29/2020 0313)  Safety Promotion/Fall Prevention:   assistive device/personal item within reach   commode/urinal/bedpan at bedside   Fall Risk signage in place   Fall Risk reviewed with patient/family   medications reviewed   nonskid shoes/socks when out of bed   side rails raised x 2   supervised activity   instructed to call staff for mobility  Intervention: Prevent VTE (venous thromboembolism)  Flowsheets (Taken 11/29/2020 0313)  VTE Prevention/Management:   remove, assess skin and reapply sequential compression device   ROM (passive) performed   fluids promoted  Goal: Optimal Comfort and Wellbeing  Outcome: Ongoing, Progressing  Intervention: Provide Person-Centered Care  Flowsheets (Taken 11/29/2020 0313)  Trust Relationship/Rapport:   care explained   choices provided   emotional support provided   empathic listening provided   questions answered   questions encouraged   reassurance provided   thoughts/feelings acknowledged  Goal: Readiness for Transition of Care  Outcome: Ongoing, Progressing  Goal: Rounds/Family Conference  Outcome: Ongoing, Progressing     Problem: Fall Injury Risk  Goal: Absence of Fall and Fall-Related Injury  Outcome: Ongoing, Progressing  Intervention: Identify and Manage Contributors to Fall Injury Risk  Flowsheets  (Taken 11/29/2020 0313)  Self-Care Promotion:   independence encouraged   safe use of adaptive equipment encouraged  Medication Review/Management: medications reviewed  Intervention: Promote Injury-Free Environment  Flowsheets (Taken 11/29/2020 0313)  Safety Promotion/Fall Prevention:   assistive device/personal item within reach   commode/urinal/bedpan at bedside   Fall Risk signage in place   Fall Risk reviewed with patient/family   medications reviewed   nonskid shoes/socks when out of bed   side rails raised x 2   supervised activity   instructed to call staff for mobility  Environmental Safety Modification:   assistive device/personal items within reach   clutter free environment maintained

## 2020-11-29 NOTE — PT/OT/SLP EVAL
"Occupational Therapy   Evaluation    Name: Fan Paz  MRN: [id number]  Admitting Diagnosis:  Infection of prosthetic joint 1 Day Post-Op    Recommendations:     Discharge Recommendations: outpatient PT  Discharge Equipment Recommendations:  none  Barriers to discharge:  None    Assessment:     Fan Paz is a 80 y.o. male with a medical diagnosis of Infection of prosthetic joint.  He presents with the following. Performance deficits affecting function: weakness, impaired endurance, impaired self care skills, impaired functional mobilty, gait instability, impaired balance, decreased lower extremity function, decreased safety awareness, pain, orthopedic precautions.      OT Acute eval complete. Goals established. Pt motivated for therapy and tolerated session well secondary to pain. Pt required CGA in bed mobility, t/f's, and ambulation due to decreased balance and pain. Pt ambulated ~20ft CGA using RW to increase activity tolerance required for adls and functional mobility. Pt stood at sink to complete grooming tasks with CGA. Pt would benefit from OP PT when d/c to return to PLOF.    Rehab Prognosis: Good; patient would benefit from acute skilled OT services to address these deficits and reach maximum level of function.       Plan:     Patient to be seen daily to address the above listed problems via self-care/home management, therapeutic activities, therapeutic exercises  · Plan of Care Expires: 12/28/20  · Plan of Care Reviewed with: patient    Subjective     Chief Complaint: "This wrap is really tight"  Patient/Family Comments/goals: to go home    Occupational Profile:  Living Environment: Patient lives with their spouse in [facility] with 4 steps to enter and ramp and has a walk in shower.  Previous level of function: Independent  Roles and Routines: fishing  Equipment Used at Home:  shower chair, walker, rolling, rollator, wheelchair, bedside commode  Assistance upon Discharge: Pt will have " assistance from spouse and family upon d/c.    Pain/Comfort:  · Pain Rating 1: 2/10  · Location - Side 1: Left  · Location 1: knee  · Pain Addressed 1: Pre-medicate for activity, Reposition, Distraction    Patients cultural, spiritual, Jew conflicts given the current situation: no    Objective:     Communicated with: Nursing prior to session.  Patient found supine with telemetry, cryotherapy upon OT entry to room.    General Precautions: Standard, fall   Orthopedic Precautions:LLE weight bearing as tolerated   Braces:       Occupational Performance:    Bed Mobility:    · Patient completed Supine to Sit with contact guard assistance    Functional Mobility/Transfers:  · Patient completed Sit <> Stand Transfer with contact guard assistance  with  rolling walker   · Patient completed Bed <> Chair Transfer using Step Transfer technique with contact guard assistance with rolling walker  · Patient completed Toilet Transfer Step Transfer technique with contact guard assistance with  rolling walker  · Functional Mobility: OT Acute eval complete. Goals established. Pt motivated for therapy and tolerated session well secondary to pain. Pt required CGA in bed mobility, t/f's, and ambulation due to decreased balance and pain. Pt ambulated ~20ft CGA using RW to increase activity tolerance required for adls and functional mobility. Pt stood at sink to complete grooming tasks with CGA. Pt would benefit from OP PT when d/c to return to PLOF.    Activities of Daily Living:  · Grooming: contact guard assistance standing at sink  · Upper Body Dressing: minimum assistance sam gown  · Lower Body Dressing: total assistance sam sock  · Toileting: contact guard assistance standing at toilet    Cognitive/Visual Perceptual:  Cognitive/Psychosocial Skills:     -       Oriented to: Person, Place, Time and Situation   -       Safety awareness/insight to disability: intact     Physical Exam:  Upper Extremity Range of Motion:     -        Right Upper Extremity: WFL  -       Left Upper Extremity: WFL  Upper Extremity Strength:    -       Right Upper Extremity: WFL  -       Left Upper Extremity: WFL   Strength:    -       Right Upper Extremity: WFL  -       Left Upper Extremity: WFL    AMPAC 6 Click ADL:  AMPAC Total Score: 21    Treatment & Education:  - OT/POC-  - Importance of mobility to maximize recovery.  - safety w/ functional mobility; hand placement to ensure safe transfers to various surfaces in prep for ADLs  - Reviewed gait sequence and RW management via verbalization and demonstration   - encouraged to ambulate within household environment at least every hour to hour 1/2  -Educated on weight bearing status    Patient left up in chair with all lines intact, call button in reach and RN notified    GOALS:   Multidisciplinary Problems     Occupational Therapy Goals        Problem: Occupational Therapy Goal    Goal Priority Disciplines Outcome Interventions   Occupational Therapy Goal     OT, PT/OT Ongoing, Progressing    Description: Goals to be met by: 12/29/2020    Patient will increase functional independence with ADLs by performing:    UE Dressing with Stand-by Assistance.  LE Dressing with Stand-by Assistance.  Grooming while standing at sink with Stand-by Assistance.  Toileting from toilet with Stand-by Assistance for hygiene and clothing management.   Step transfer with Stand-by Assistance  Toilet transfer to toilet with Stand-by Assistance.                     History:     Past Medical History:   Diagnosis Date    Arthritis     Cancer of palate     HTN (hypertension)     Hyperlipidemia     Prostate cancer     2011       Past Surgical History:   Procedure Laterality Date    BACK SURGERY  y-6    Cancer of palate surgery      Late [facility]'s- [facility]     CARPAL TUNNEL RELEASE  8-14-13    right hand,Left hand 2013    COLONOSCOPY N/A 4/10/2017    Procedure: COLONOSCOPY;  Surgeon: Nilo Kelly MD;  Location: [facility];  Service:  Endoscopy;  Laterality: N/A;    COLONOSCOPY N/A 10/21/2020    Procedure: COLONOSCOPY;  Surgeon: Demetrius Araujo MD;  Location: [facility];  Service: Endoscopy;  Laterality: N/A;    KNEE SURGERY  y-41    left knee    KNEE SURGERY Right 12-1-15    TKR    Radio active seed Implant      January 2012    TOTAL HIP ARTHROPLASTY  3/2011       Time Tracking:     OT Date of Treatment: 11/29/20  OT Start Time: 0906  OT Stop Time: 0932  OT Total Time (min): 26 min    Billable Minutes:Evaluation 10  Self Care/Home Management 16    Roxie Bell, OT  11/29/2020    Co-tx performed for this visit due to patient need for two skilled therapists to ensure patient and staff safety and to accommodate for patient activity tolerance

## 2020-11-29 NOTE — PT/OT/SLP EVAL
Physical Therapy Evaluation    Patient Name:  Fan Paz   MRN:  [id number]    Recommendations:     Discharge Recommendations:  outpatient OT   Discharge Equipment Recommendations: none   Barriers to discharge: None    Assessment:     Fan Paz is a 80 y.o. male admitted with a medical diagnosis of Infection of prosthetic joint.  He presents with the following impairments/functional limitations:  weakness, impaired endurance, impaired self care skills, impaired functional mobilty, gait instability, impaired balance, decreased lower extremity function, pain, decreased ROM, edema .     Pt suzie session well w/ good participation. PTA he was IND w/ all mobility/ADLs w/o an AD. He is currently limited by the above listed deficits requiring CGA for safety w/ transfers and short distance gait. Pt is expected to progress well towards goals and would benefit from OP PT upon D/C from acute care. Pt will begin PT POC.    Rehab Prognosis: Good; patient would benefit from acute skilled PT services to address these deficits and reach maximum level of function.    Recent Surgery: Procedure(s) (LRB):  REPLACEMENT, POLYETHYLENE LINER (Left) 1 Day Post-Op    Plan:     During this hospitalization, patient to be seen daily to address the identified rehab impairments via gait training, therapeutic activities, therapeutic exercises and progress toward the following goals:    · Plan of Care Expires:  12/28/20    Subjective     Chief Complaint: LLE pain/stiffness  Patient/Family Comments/goals: return to PLOF  Pain/Comfort:  · Pain Rating 1: 5/10  · Location - Side 1: Left  · Location - Orientation 1: generalized  · Location 1: knee  · Pain Addressed 1: Pre-medicate for activity, Reposition, Cessation of Activity  · Pain Rating Post-Intervention 1: 2/10    Patients cultural, spiritual, Yazidi conflicts given the current situation: no    Living Environment:  Pt lives w/ his wife in a 1SH that has 2 entrances- 1 has  4 HENRIETTA w/ BHR and 1 has a ramp w/ a RHR. Pt has a walk-in shower w/ a grab bar.  Prior to admission, patients level of function was IND w/o AD for all mobility and ADLs. He drives and enjoys fishing on his boat.  Equipment used at home: none.  DME owned (not currently used): rolling walker, bedside commode, shower chair, wheelchair and rollator.  Upon discharge, patient will have assistance from his family 24/7.    Objective:     Communicated with nursing prior to session.  Patient found supine with cryotherapy  upon PT entry to room.    General Precautions: Standard, fall   Orthopedic Precautions:LLE weight bearing as tolerated   Braces: N/A     Exams:  · Cognitive Exam:  Patient is oriented to Person, Place, Time and Situation  · Fine Motor Coordination:    · -       Intact  · Gross Motor Coordination:  WFL  · Postural Exam:  Patient presented with the following abnormalities:    · -       No postural abnormalities identified  · Sensation:    · -       Intact  · Skin Integrity/Edema:      · -       Edema: Mild LLE  · RLE ROM: WFL  · RLE Strength: WFL  · LLE ROM: WFL except knee limited by edema and ace wrap  · LLE Strength: HF 1/5, knee 1/5, ankle WFL    Functional Mobility:  · Bed Mobility:  · Supine>sit on bed w/ Tyson for LLE  · HOB elevated and w/ side rail  · inc'd time and cues required  · Transfers:   · Sit>stand from EOB w/ RW and CGA  · Stand pivot to bedside chair w/ RW and CGA  · Cues for hand placement and safety  · Gait:   · ~50ft in room w/ RW and CGA for safety  · Minimal WB through LLE due to pain  · Limited by fatigue  · Balance:   · Static stand at sink for ADLs ~3min w/ RW and CGA for safety    Therapeutic Activities and Exercises:   Pt was educated on PT role and POC. Pt verb understanding.    AM-PAC 6 CLICK MOBILITY  Total Score:18     Patient left up in chair with all lines intact and call button in reach.    GOALS:   Multidisciplinary Problems     Physical Therapy Goals        Problem:  Physical Therapy Goal    Goal Priority Disciplines Outcome Goal Variances Interventions   Physical Therapy Goal     PT, PT/OT Ongoing, Progressing     Description: Goals to be met by: 20     Patient will increase functional independence with mobility by performin. Supine to sit with Set-up Gem  2. Sit to supine with Set-up Gem  3. Sit to stand transfer with Supervision  4. Bed to chair transfer with Supervision using Rolling Walker  5. Gait  x 150 feet with Supervision using Rolling Walker.   6. Ascend/descend 4 stair with bilateral Handrails Contact Guard Assistance                      History:     Past Medical History:   Diagnosis Date    Arthritis     Cancer of palate     HTN (hypertension)     Hyperlipidemia     Prostate cancer            Past Surgical History:   Procedure Laterality Date    BACK SURGERY  y-6    Cancer of palate surgery      Late - [facility]     CARPAL TUNNEL RELEASE  13    right hand,Left hand     COLONOSCOPY N/A 4/10/2017    Procedure: COLONOSCOPY;  Surgeon: Nilo Kelly MD;  Location: [facility] ENDO;  Service: Endoscopy;  Laterality: N/A;    COLONOSCOPY N/A 10/21/2020    Procedure: COLONOSCOPY;  Surgeon: Demetrius Araujo MD;  Location: [facility] ENDO;  Service: Endoscopy;  Laterality: N/A;    KNEE SURGERY  y-40    left knee    KNEE SURGERY Right 12-1-15    TKR    Radio active seed Implant      2012    TOTAL HIP ARTHROPLASTY  3/2011       Time Tracking:     PT Received On: 20  PT Start Time: 905     PT Stop Time: 932  PT Total Time (min): 27 min     Billable Minutes: Evaluation 17, Gait Training 10 and Total Time 27      Gema Hoskins, PT  2020

## 2020-11-30 ENCOUNTER — EXTERNAL CHRONIC CARE MANAGEMENT (OUTPATIENT)
Dept: PRIMARY CARE CLINIC | Facility: CLINIC | Age: 80
DRG: 487 | End: 2020-11-30
Payer: MEDICARE

## 2020-11-30 LAB
ALBUMIN SERPL BCP-MCNC: 2.5 G/DL (ref 3.5–5.2)
ALP SERPL-CCNC: 78 U/L (ref 55–135)
ALT SERPL W/O P-5'-P-CCNC: 17 U/L (ref 10–44)
ANION GAP SERPL CALC-SCNC: 10 MMOL/L (ref 8–16)
AST SERPL-CCNC: 20 U/L (ref 10–40)
BACTERIA SPEC AEROBE CULT: ABNORMAL
BASOPHILS # BLD AUTO: 0.04 K/UL (ref 0–0.2)
BASOPHILS NFR BLD: 0.4 % (ref 0–1.9)
BILIRUB SERPL-MCNC: 2.1 MG/DL (ref 0.1–1)
BUN SERPL-MCNC: 12 MG/DL (ref 8–23)
CALCIUM SERPL-MCNC: 8.7 MG/DL (ref 8.7–10.5)
CHLORIDE SERPL-SCNC: 101 MMOL/L (ref 95–110)
CO2 SERPL-SCNC: 26 MMOL/L (ref 23–29)
CREAT SERPL-MCNC: 0.9 MG/DL (ref 0.5–1.4)
DIFFERENTIAL METHOD: ABNORMAL
EOSINOPHIL # BLD AUTO: 0.1 K/UL (ref 0–0.5)
EOSINOPHIL NFR BLD: 1.4 % (ref 0–8)
ERYTHROCYTE [DISTWIDTH] IN BLOOD BY AUTOMATED COUNT: 15 % (ref 11.5–14.5)
EST. GFR  (AFRICAN AMERICAN): >60 ML/MIN/1.73 M^2
EST. GFR  (NON AFRICAN AMERICAN): >60 ML/MIN/1.73 M^2
GLUCOSE SERPL-MCNC: 121 MG/DL (ref 70–110)
HCT VFR BLD AUTO: 37.5 % (ref 40–54)
HGB BLD-MCNC: 11.9 G/DL (ref 14–18)
IMM GRANULOCYTES # BLD AUTO: 0.05 K/UL (ref 0–0.04)
IMM GRANULOCYTES NFR BLD AUTO: 0.5 % (ref 0–0.5)
LYMPHOCYTES # BLD AUTO: 1.1 K/UL (ref 1–4.8)
LYMPHOCYTES NFR BLD: 10.8 % (ref 18–48)
MCH RBC QN AUTO: 27 PG (ref 27–31)
MCHC RBC AUTO-ENTMCNC: 31.7 G/DL (ref 32–36)
MCV RBC AUTO: 85 FL (ref 82–98)
MONOCYTES # BLD AUTO: 1.5 K/UL (ref 0.3–1)
MONOCYTES NFR BLD: 14.4 % (ref 4–15)
NEUTROPHILS # BLD AUTO: 7.3 K/UL (ref 1.8–7.7)
NEUTROPHILS NFR BLD: 72.5 % (ref 38–73)
NRBC BLD-RTO: 0 /100 WBC
PLATELET # BLD AUTO: 220 K/UL (ref 150–350)
PMV BLD AUTO: 10.3 FL (ref 9.2–12.9)
POCT GLUCOSE: 111 MG/DL (ref 70–110)
POCT GLUCOSE: 127 MG/DL (ref 70–110)
POCT GLUCOSE: 95 MG/DL (ref 70–110)
POTASSIUM SERPL-SCNC: 4.3 MMOL/L (ref 3.5–5.1)
PROT SERPL-MCNC: 7.2 G/DL (ref 6–8.4)
RBC # BLD AUTO: 4.4 M/UL (ref 4.6–6.2)
SODIUM SERPL-SCNC: 137 MMOL/L (ref 136–145)
WBC # BLD AUTO: 10.04 K/UL (ref 3.9–12.7)

## 2020-11-30 PROCEDURE — C1751 CATH, INF, PER/CENT/MIDLINE: HCPCS

## 2020-11-30 PROCEDURE — 97116 GAIT TRAINING THERAPY: CPT | Mod: CQ

## 2020-11-30 PROCEDURE — 99490 PR CHRONIC CARE MGMT, 1ST 20 MIN: ICD-10-PCS | Mod: S$PBB,,, | Performed by: FAMILY MEDICINE

## 2020-11-30 PROCEDURE — 99233 SBSQ HOSP IP/OBS HIGH 50: CPT | Mod: ,,, | Performed by: PHYSICIAN ASSISTANT

## 2020-11-30 PROCEDURE — 99490 CHRNC CARE MGMT STAFF 1ST 20: CPT | Mod: S$PBB,,, | Performed by: FAMILY MEDICINE

## 2020-11-30 PROCEDURE — 25000003 PHARM REV CODE 250: Performed by: STUDENT IN AN ORGANIZED HEALTH CARE EDUCATION/TRAINING PROGRAM

## 2020-11-30 PROCEDURE — 80053 COMPREHEN METABOLIC PANEL: CPT

## 2020-11-30 PROCEDURE — 63600175 PHARM REV CODE 636 W HCPCS: Performed by: PHYSICIAN ASSISTANT

## 2020-11-30 PROCEDURE — 11000001 HC ACUTE MED/SURG PRIVATE ROOM

## 2020-11-30 PROCEDURE — A4216 STERILE WATER/SALINE, 10 ML: HCPCS | Performed by: ORTHOPAEDIC SURGERY

## 2020-11-30 PROCEDURE — 36415 COLL VENOUS BLD VENIPUNCTURE: CPT

## 2020-11-30 PROCEDURE — 36573 INSJ PICC RS&I 5 YR+: CPT

## 2020-11-30 PROCEDURE — 97530 THERAPEUTIC ACTIVITIES: CPT | Mod: CQ

## 2020-11-30 PROCEDURE — 99233 PR SUBSEQUENT HOSPITAL CARE,LEVL III: ICD-10-PCS | Mod: ,,, | Performed by: PHYSICIAN ASSISTANT

## 2020-11-30 PROCEDURE — 25000003 PHARM REV CODE 250: Performed by: ORTHOPAEDIC SURGERY

## 2020-11-30 PROCEDURE — 97535 SELF CARE MNGMENT TRAINING: CPT

## 2020-11-30 PROCEDURE — 76937 US GUIDE VASCULAR ACCESS: CPT

## 2020-11-30 PROCEDURE — 85025 COMPLETE CBC W/AUTO DIFF WBC: CPT

## 2020-11-30 PROCEDURE — 99490 CHRNC CARE MGMT STAFF 1ST 20: CPT | Mod: PBBFAC,PN | Performed by: FAMILY MEDICINE

## 2020-11-30 RX ORDER — SODIUM CHLORIDE 0.9 % (FLUSH) 0.9 %
10 SYRINGE (ML) INJECTION
Status: DISCONTINUED | OUTPATIENT
Start: 2020-11-30 | End: 2020-12-01 | Stop reason: HOSPADM

## 2020-11-30 RX ORDER — SODIUM CHLORIDE 0.9 % (FLUSH) 0.9 %
10 SYRINGE (ML) INJECTION EVERY 6 HOURS
Status: DISCONTINUED | OUTPATIENT
Start: 2020-11-30 | End: 2020-12-01 | Stop reason: HOSPADM

## 2020-11-30 RX ADMIN — ASPIRIN 81 MG: 81 TABLET, COATED ORAL at 08:11

## 2020-11-30 RX ADMIN — Medication 10 ML: at 12:11

## 2020-11-30 RX ADMIN — TAMSULOSIN HYDROCHLORIDE 0.4 MG: 0.4 CAPSULE ORAL at 09:11

## 2020-11-30 RX ADMIN — OXYCODONE HYDROCHLORIDE 5 MG: 5 TABLET ORAL at 05:11

## 2020-11-30 RX ADMIN — ACETAMINOPHEN 650 MG: 325 TABLET ORAL at 11:11

## 2020-11-30 RX ADMIN — ASPIRIN 81 MG: 81 TABLET, COATED ORAL at 09:11

## 2020-11-30 RX ADMIN — SIMVASTATIN 40 MG: 20 TABLET, FILM COATED ORAL at 08:11

## 2020-11-30 RX ADMIN — ACETAMINOPHEN 650 MG: 325 TABLET ORAL at 12:11

## 2020-11-30 RX ADMIN — MONTELUKAST 10 MG: 10 TABLET, FILM COATED ORAL at 08:11

## 2020-11-30 RX ADMIN — Medication 10 ML: at 11:11

## 2020-11-30 RX ADMIN — OXYCODONE HYDROCHLORIDE 5 MG: 5 TABLET ORAL at 09:11

## 2020-11-30 RX ADMIN — ACETAMINOPHEN 650 MG: 325 TABLET ORAL at 05:11

## 2020-11-30 RX ADMIN — FLUTICASONE PROPIONATE 50 MCG: 50 SPRAY, METERED NASAL at 09:11

## 2020-11-30 RX ADMIN — CEFTRIAXONE 2 G: 2 INJECTION, SOLUTION INTRAVENOUS at 01:11

## 2020-11-30 NOTE — PLAN OF CARE
Pt AAO x 4. Plan of care reviewed with patient and family.Pt afebrile throughout shift. Accuchecks ACHS. Did not require coverage. Will continue to monitor.

## 2020-11-30 NOTE — PLAN OF CARE
Pt motivated for therapy and tolerated session well. Pt required SBA for grooming activities standing at sink and for perineal hygiene. Pt ambulated ~100ft SBA using RW to increase activity tolerance required for adls and functional mobility. Continue OT POC    Problem: Occupational Therapy Goal  Goal: Occupational Therapy Goal  Description: Goals to be met by: 12/29/2020    Patient will increase functional independence with ADLs by performing:    UE Dressing with Stand-by Assistance.  LE Dressing with Stand-by Assistance.  Grooming while standing at sink with Stand-by Assistance.  Toileting from toilet with Stand-by Assistance for hygiene and clothing management.   Step transfer with Stand-by Assistance  Toilet transfer to toilet with Stand-by Assistance.    Outcome: Ongoing, Progressing

## 2020-11-30 NOTE — PLAN OF CARE
Patient is AAOX3. Frequent checks maintained q 2 hours. Pain controlled with prn medications. Vital signs have remained stable throughout shift. Will continue to monitor.

## 2020-11-30 NOTE — PLAN OF CARE
11/30/20 1427   Discharge Reassessment   Assessment Type Discharge Planning Reassessment   Provided patient/caregiver education on the expected discharge date and the discharge plan Yes   Discharge Plan A Home with family;Home Health   Discharge Plan B Skilled Nursing Facility   Post-Acute Status   Post-Acute Authorization Home Health   Home Health Status Awaiting Internal Medical Clearance

## 2020-11-30 NOTE — PLAN OF CARE
Problem: Physical Therapy Goal  Goal: Physical Therapy Goal  Description: Goals to be met by: 20     Patient will increase functional independence with mobility by performin. Supine to sit with Set-up [place]  2. Sit to supine with Set-up [place]  3. Sit to stand transfer with Supervision  4. Bed to chair transfer with Supervision using Rolling Walker  5. Gait  x 150 feet with Supervision using Rolling Walker.   6. Ascend/descend 4 stair with bilateral Handrails Contact Guard Assistance     Outcome: Ongoing, Progressing

## 2020-11-30 NOTE — SUBJECTIVE & OBJECTIVE
Interval History: NAEON. Afebrile. No leukocytosis. Sitting up in chair. Awaiting PICC and teaching. Daughter to come when teaching occurs. Pain controlled.    Review of Systems   Constitutional: Positive for appetite change (decreased). Negative for chills, diaphoresis, fatigue and fever.   Eyes: Negative for visual disturbance.   Respiratory: Negative for cough and shortness of breath.    Cardiovascular: Negative for chest pain and leg swelling.   Gastrointestinal: Negative for abdominal pain, diarrhea and nausea.   Genitourinary: Negative for difficulty urinating, dysuria, frequency and hematuria.   Musculoskeletal: Positive for joint swelling. Negative for arthralgias and back pain.   Skin: Negative for color change and rash.   Neurological: Negative for dizziness, weakness and headaches.   Psychiatric/Behavioral: Negative for agitation and confusion. The patient is not nervous/anxious.    All other systems reviewed and are negative.    Objective:     Vital Signs (Most Recent):  Temp: 98 °F (36.7 °C) (11/30/20 0800)  Pulse: 84 (11/30/20 0800)  Resp: 16 (11/30/20 0915)  BP: (!) 152/82 (11/30/20 0800)  SpO2: 95 % (11/30/20 0800) Vital Signs (24h Range):  Temp:  [98 °F (36.7 °C)-99.2 °F (37.3 °C)] 98 °F (36.7 °C)  Pulse:  [84-93] 84  Resp:  [14-19] 16  SpO2:  [95 %-96 %] 95 %  BP: (127-152)/(74-84) 152/82     Weight: 80.7 kg (178 lb)  Body mass index is 28.73 kg/m².    Estimated Creatinine Clearance: 66.5 mL/min (based on SCr of 0.9 mg/dL).    Physical Exam  Vitals signs reviewed.   Constitutional:       Appearance: Normal appearance. He is not ill-appearing or diaphoretic.      Comments: Seen sitting up in chair   HENT:      Head: Normocephalic and atraumatic.      Nose: Nose normal. No congestion.      Mouth/Throat:      Pharynx: Oropharynx is clear.   Eyes:      General: No scleral icterus.     Conjunctiva/sclera: Conjunctivae normal.   Cardiovascular:      Rate and Rhythm: Normal rate and regular rhythm.    Pulmonary:      Effort: Pulmonary effort is normal. No respiratory distress.      Breath sounds: Normal breath sounds.   Abdominal:      General: There is no distension.      Palpations: Abdomen is soft.   Musculoskeletal:      Comments: Left knee wound dressed. Dressing c/d/i   Skin:     General: Skin is warm and dry.      Findings: No rash.   Neurological:      Mental Status: He is alert and oriented to person, place, and time. Mental status is at baseline.   Psychiatric:         Mood and Affect: Mood normal.         Behavior: Behavior normal.         Thought Content: Thought content normal.         Judgment: Judgment normal.         Significant Labs: All pertinent labs within the past 24 hours have been reviewed.    Significant Imaging: I have reviewed all pertinent imaging results/findings within the past 24 hours.

## 2020-11-30 NOTE — PROGRESS NOTES
[facility]  Infectious Disease  Progress Note    Patient Name: Fan Paz  MRN: [id number]  Admission Date: 11/27/2020  Length of Stay: 3 days  Attending Physician: Ronak Pires MD  Primary Care Provider: Otilio Damon MD    Isolation Status: No active isolations  Assessment/Plan:      * Infection of prosthetic joint     80 y.o. male with history of left TKA in 2015 admitted with left prosthetic knee infection s/p I&D and poly exchange on 11/28. Aspiration and surgical cx are positive for GBS. He is on empiric Vanc/Ceftriaxone. He is doing well post operatively. Afebrile. No leukocytosis.  Pain well controlled.    Plan  Recommend Ceftriaxone 2 g IV q 24 hours x 6 weeks from day of surgery    End date of IV antibiotics: 1/9/21    Weekly outpatient laboratory on Monday or Tuesday while on IV antibiotics.    CBC   CMP   ESR and CRP    Fax laboratory results to [facility] at [phone] with attn: Elisa Up    Outpatient Infectious Diseases clinic follow up will be arranged and found in patient calendar.    Will consider transitioning to oral Amoxil suppression for 6 months once IV abx are complete given retained hardware.     Prior to discharge, please ensure the patient's follow-up has been scheduled.  If there is still no follow-up scheduled in Infectious Diseases clinic, please send an EPIC message to Jazmine Conroy in Infectious Diseases.    ID will sign off.          Please call for any questions. Thank you.  Elisa Up PA-C  Phone: [phone]  Pager: [phone]    Subjective:     Principal Problem:Infection of prosthetic joint    HPI: Fan Paz is a 80 y.o. male with history of left TKA in 2015 with Dr. Paul admitted with left prosthetic knee infection. The patient presented to clinic two days ago with new onset of left knee pain and swelling for 2 days. The pain is severe and he has been unable to stand or walk on the knee.  He would have gone to  the ER, but he and his family are concerned about possible covid exposure. He denies any recent infections. He does report low grade fever and warmth to the knee. He denies any SOB,CP, N/V. Underwent left knee aspiration in clinic which revealed 92,717 WBC (95% segs). Aspiration cx are positive for GBS. The patient then underwent an I&D and poly exchange yesterday. He is doing well post operatively. He is afebrile. No leukocytosis. ESR 40. . Surgical cx pending. Denies fevers, chills, sweats. Pain well controlled. Lives at home with his wife.      Interval History: NAEON. Afebrile. No leukocytosis. Sitting up in chair. Awaiting PICC and teaching. Daughter to come when teaching occurs. Pain controlled.    Review of Systems   Constitutional: Positive for appetite change (decreased). Negative for chills, diaphoresis, fatigue and fever.   Eyes: Negative for visual disturbance.   Respiratory: Negative for cough and shortness of breath.    Cardiovascular: Negative for chest pain and leg swelling.   Gastrointestinal: Negative for abdominal pain, diarrhea and nausea.   Genitourinary: Negative for difficulty urinating, dysuria, frequency and hematuria.   Musculoskeletal: Positive for joint swelling. Negative for arthralgias and back pain.   Skin: Negative for color change and rash.   Neurological: Negative for dizziness, weakness and headaches.   Psychiatric/Behavioral: Negative for agitation and confusion. The patient is not nervous/anxious.    All other systems reviewed and are negative.    Objective:     Vital Signs (Most Recent):  Temp: 98 °F (36.7 °C) (11/30/20 0800)  Pulse: 84 (11/30/20 0800)  Resp: 16 (11/30/20 0915)  BP: (!) 152/82 (11/30/20 0800)  SpO2: 95 % (11/30/20 0800) Vital Signs (24h Range):  Temp:  [98 °F (36.7 °C)-99.2 °F (37.3 °C)] 98 °F (36.7 °C)  Pulse:  [84-93] 84  Resp:  [14-19] 16  SpO2:  [95 %-96 %] 95 %  BP: (127-152)/(74-84) 152/82     Weight: 80.7 kg (178 lb)  Body mass index is 28.73  kg/m².    Estimated Creatinine Clearance: 66.5 mL/min (based on SCr of 0.9 mg/dL).    Physical Exam  Vitals signs reviewed.   Constitutional:       Appearance: Normal appearance. He is not ill-appearing or diaphoretic.      Comments: Seen sitting up in chair   HENT:      Head: Normocephalic and atraumatic.      Nose: Nose normal. No congestion.      Mouth/Throat:      Pharynx: Oropharynx is clear.   Eyes:      General: No scleral icterus.     Conjunctiva/sclera: Conjunctivae normal.   Cardiovascular:      Rate and Rhythm: Normal rate and regular rhythm.   Pulmonary:      Effort: Pulmonary effort is normal. No respiratory distress.      Breath sounds: Normal breath sounds.   Abdominal:      General: There is no distension.      Palpations: Abdomen is soft.   Musculoskeletal:      Comments: Left knee wound dressed. Dressing c/d/i   Skin:     General: Skin is warm and dry.      Findings: No rash.   Neurological:      Mental Status: He is alert and oriented to person, place, and time. Mental status is at baseline.   Psychiatric:         Mood and Affect: Mood normal.         Behavior: Behavior normal.         Thought Content: Thought content normal.         Judgment: Judgment normal.         Significant Labs: All pertinent labs within the past 24 hours have been reviewed.    Significant Imaging: I have reviewed all pertinent imaging results/findings within the past 24 hours.

## 2020-11-30 NOTE — CONSULTS
Double lumen PICC to right brachial vein.  38 cm in length, 30 cm arm circumference and 0 cm exposed.   Lot # [id number].

## 2020-11-30 NOTE — PROCEDURES
"Fan Paz is a 80 y.o. male patient.    Temp: 98 °F (36.7 °C) (11/30/20 0800)  Pulse: 84 (11/30/20 0800)  Resp: 16 (11/30/20 0915)  BP: (!) 152/82 (11/30/20 0800)  SpO2: 95 % (11/30/20 0800)  Weight: 80.7 kg (178 lb) (11/28/20 2305)  Height: 5' 6" (167.6 cm) (11/28/20 2305)    PICC  Date/Time: 11/30/2020 9:55 AM  Performed by: Kandace Ayers RN  Assisting provider: Chris Hernandez RN  Consent Done: Yes  Time out: Immediately prior to procedure a time out was called to verify the correct patient, procedure, equipment, support staff and site/side marked as required  Indications: med administration and vascular access  Anesthesia: local infiltration  Local anesthetic: lidocaine 1% without epinephrine  Anesthetic Total (mL): 3  Description of findings: PICC  Preparation: skin prepped with ChloraPrep  Skin prep agent dried: skin prep agent completely dried prior to procedure  Sterile barriers: all five maximum sterile barriers used - cap, mask, sterile gown, sterile gloves, and large sterile sheet  Hand hygiene: hand hygiene performed prior to central venous catheter insertion  Location details: right brachial  Catheter type: double lumen  Catheter size: 5 Fr  Catheter Length: 38cm    Ultrasound guidance: yes  Vessel Caliber: medium and patent, compressibility normal  Vascular Doppler: not done  Needle advanced into vessel with real time Ultrasound guidance.  Guidewire confirmed in vessel.  Image recorded and saved.  Sterile sheath used.  Number of attempts: 1  Post-procedure: blood return through all ports, chlorhexidine patch and sterile dressing applied  Technical procedures used: 3cg  Specimens: No  Implants: No  Assessment: placement verified by x-ray  Complications: none          Chris Hernandez  11/30/2020  "

## 2020-11-30 NOTE — SUBJECTIVE & OBJECTIVE
"Principal Problem:Infection of prosthetic joint    Principal Orthopedic Problem: Same    Interval History: Patient seen and examined at bedside.  No acute events overnight.  Afebrile, HTN to  overnight, other signs stable.  Patient walked 50 feet with PT yesterday with rolling walker.  Pain well controlled       Review of patient's allergies indicates:  No Known Allergies    Current Facility-Administered Medications   Medication    acetaminophen tablet 650 mg    aspirin EC tablet 81 mg    cefTRIAXone (ROCEPHIN) 2 g/50 mL D5W IVPB    dextrose 50% injection 12.5 g    dextrose 50% injection 25 g    fluticasone propionate 50 mcg/actuation nasal spray 50 mcg    glucagon (human recombinant) injection 1 mg    glucose chewable tablet 16 g    glucose chewable tablet 24 g    HYDROmorphone injection 0.2 mg    insulin aspart U-100 pen 0-5 Units    labetalol 20 mg/4 mL (5 mg/mL) IV syring    lidocaine (PF) 10 mg/ml (1%) injection 10 mg    melatonin tablet 6 mg    metoclopramide HCl injection 5 mg    montelukast tablet 10 mg    ondansetron disintegrating tablet 8 mg    oxyCODONE immediate release tablet 5 mg    oxyCODONE immediate release tablet Tab 10 mg    rifAMpin capsule 300 mg    simvastatin tablet 40 mg    sodium chloride 0.9% flush 10 mL    tamsulosin 24 hr capsule 0.4 mg    vancomycin - pharmacy to dose    vancomycin 1.25 g in dextrose 5% 250 mL IVPB (ready to mix)     Objective:     Vital Signs (Most Recent):  Temp: 98.4 °F (36.9 °C) (11/30/20 0400)  Pulse: 86 (11/30/20 0400)  Resp: 17 (11/30/20 0502)  BP: 134/78 (11/30/20 0400)  SpO2: 95 % (11/30/20 0400) Vital Signs (24h Range):  Temp:  [97.5 °F (36.4 °C)-100.3 °F (37.9 °C)] 98.4 °F (36.9 °C)  Pulse:  [] 86  Resp:  [15-19] 17  SpO2:  [95 %-96 %] 95 %  BP: (127-160)/(73-84) 134/78     Weight: 80.7 kg (178 lb)  Height: 5' 6" (167.6 cm)  Body mass index is 28.73 kg/m².      Intake/Output Summary (Last 24 hours) at 11/30/2020 " 0557  Last data filed at 11/30/2020 0100  Gross per 24 hour   Intake 1104 ml   Output 1550 ml   Net -446 ml       Ortho/SPM Exam      Physical Exam:  NAD, A/O x 3.  Wound c/d/i with clean dressing.  No focal motor or sensory deficits noted.  Drain: None    Significant Labs:   BMP:   Recent Labs   Lab 11/29/20 0447   *   *   K 4.2      CO2 24   BUN 10   CREATININE 0.9   CALCIUM 8.6*     CBC:   Recent Labs   Lab 11/29/20 0447   WBC 9.45   HGB 11.8*   HCT 37.7*        CMP:   Recent Labs   Lab 11/29/20 0447   *   K 4.2      CO2 24   *   BUN 10   CREATININE 0.9   CALCIUM 8.6*   PROT 6.9   ALBUMIN 2.5*   BILITOT 2.2*   ALKPHOS 67   AST 24   ALT 19   ANIONGAP 9   EGFRNONAA >60.0     All pertinent labs within the past 24 hours have been reviewed.    Significant Imaging: I have reviewed all pertinent imaging results/findings.

## 2020-11-30 NOTE — PROGRESS NOTES
[facility]  Orthopedics  Progress Note    Patient Name: Fan Paz  MRN: [id number]  Admission Date: 11/27/2020  Hospital Length of Stay: 3 days  Attending Provider: Ronak Pires MD  Primary Care Provider: Otilio Damon MD  Follow-up For: Procedure(s) (LRB):  REPLACEMENT, POLYETHYLENE LINER (Left)    Post-Operative Day: 2 Days Post-Op  Subjective:     Principal Problem:Infection of prosthetic joint    Principal Orthopedic Problem: Same    Interval History: Patient seen and examined at bedside.  No acute events overnight.  Afebrile, HTN to  overnight, other signs stable.  Patient walked 50 feet with PT yesterday with rolling walker.  Pain well controlled       Review of patient's allergies indicates:  No Known Allergies    Current Facility-Administered Medications   Medication    acetaminophen tablet 650 mg    aspirin EC tablet 81 mg    cefTRIAXone (ROCEPHIN) 2 g/50 mL D5W IVPB    dextrose 50% injection 12.5 g    dextrose 50% injection 25 g    fluticasone propionate 50 mcg/actuation nasal spray 50 mcg    glucagon (human recombinant) injection 1 mg    glucose chewable tablet 16 g    glucose chewable tablet 24 g    HYDROmorphone injection 0.2 mg    insulin aspart U-100 pen 0-5 Units    labetalol 20 mg/4 mL (5 mg/mL) IV syring    lidocaine (PF) 10 mg/ml (1%) injection 10 mg    melatonin tablet 6 mg    metoclopramide HCl injection 5 mg    montelukast tablet 10 mg    ondansetron disintegrating tablet 8 mg    oxyCODONE immediate release tablet 5 mg    oxyCODONE immediate release tablet Tab 10 mg    rifAMpin capsule 300 mg    simvastatin tablet 40 mg    sodium chloride 0.9% flush 10 mL    tamsulosin 24 hr capsule 0.4 mg    vancomycin - pharmacy to dose    vancomycin 1.25 g in dextrose 5% 250 mL IVPB (ready to mix)     Objective:     Vital Signs (Most Recent):  Temp: 98.4 °F (36.9 °C) (11/30/20 0400)  Pulse: 86 (11/30/20 0400)  Resp: 17 (11/30/20 0502)  BP:  "134/78 (11/30/20 0400)  SpO2: 95 % (11/30/20 0400) Vital Signs (24h Range):  Temp:  [97.5 °F (36.4 °C)-100.3 °F (37.9 °C)] 98.4 °F (36.9 °C)  Pulse:  [] 86  Resp:  [15-19] 17  SpO2:  [95 %-96 %] 95 %  BP: (127-160)/(73-84) 134/78     Weight: 80.7 kg (178 lb)  Height: 5' 6" (167.6 cm)  Body mass index is 28.73 kg/m².      Intake/Output Summary (Last 24 hours) at 11/30/2020 0557  Last data filed at 11/30/2020 0100  Gross per 24 hour   Intake 1104 ml   Output 1550 ml   Net -446 ml       Ortho/SPM Exam      Physical Exam:  NAD, A/O x 3.  Wound c/d/i with clean dressing.  No focal motor or sensory deficits noted.  Drain: None    Significant Labs:   BMP:   Recent Labs   Lab 11/29/20  0447   *   *   K 4.2      CO2 24   BUN 10   CREATININE 0.9   CALCIUM 8.6*     CBC:   Recent Labs   Lab 11/29/20  0447   WBC 9.45   HGB 11.8*   HCT 37.7*        CMP:   Recent Labs   Lab 11/29/20  0447   *   K 4.2      CO2 24   *   BUN 10   CREATININE 0.9   CALCIUM 8.6*   PROT 6.9   ALBUMIN 2.5*   BILITOT 2.2*   ALKPHOS 67   AST 24   ALT 19   ANIONGAP 9   EGFRNONAA >60.0     All pertinent labs within the past 24 hours have been reviewed.    Significant Imaging: I have reviewed all pertinent imaging results/findings.    Assessment/Plan:     * Infection of prosthetic joint  Fan Paz is a 80 y.o. male s/p L TKA with Dr. 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     Now s/p I&D with poly exchange 11/28/20.    -Pain control MM  - WBAT  -DVT PPx: ASA BID  -Abx: Vanc, rocephin, rifampin  - Cx: GBS, sen pending  - PT/OT  -Labs: hgb 12, wbc 9  - Appreciate ID recs.    Dispo: Plan for dispo pending home antibiotic plan and clearance with PT    Essential hypertension  Restart home meds  Labetalol for break through     History of prostate cancer  Restart home meds    Hyperlipidemia  Restart home meds          Elías Pearce MD  Orthopedics  [facility]

## 2020-11-30 NOTE — PT/OT/SLP PROGRESS
Occupational Therapy   Treatment    Name: Fan Paz  MRN: [id number]  Admitting Diagnosis:  Infection of prosthetic joint  2 Days Post-Op    Recommendations:     Discharge Recommendations: outpatient PT  Discharge Equipment Recommendations:  none  Barriers to discharge:  None    Assessment:     Fan Paz is a 80 y.o. male with a medical diagnosis of Infection of prosthetic joint.  He presents with the following. Performance deficits affecting function are weakness, impaired endurance, impaired self care skills, impaired functional mobilty, gait instability, impaired balance, decreased lower extremity function, decreased safety awareness, orthopedic precautions, pain.    Pt motivated for therapy and tolerated session well. Pt required SBA for grooming activities standing at sink and for perineal hygiene. Pt ambulated ~100ft SBA using RW to increase activity tolerance required for adls and functional mobility. Continue OT POC     Rehab Prognosis:  Good; patient would benefit from acute skilled OT services to address these deficits and reach maximum level of function.       Plan:     Patient to be seen daily to address the above listed problems via self-care/home management, therapeutic activities, therapeutic exercises  · Plan of Care Expires: 12/28/20  · Plan of Care Reviewed with: patient    Subjective     Pain/Comfort:  · Pain Rating 1: 2/10  · Location - Side 1: Left  · Location 1: knee  · Pain Addressed 1: Pre-medicate for activity    Objective:     Communicated with: Nursing prior to session.  Patient found standing in room with cryotherapy upon OT entry to room.    General Precautions: Standard, fall   Orthopedic Precautions:LLE weight bearing as tolerated   Braces: N/A     Occupational Performance:     Bed Mobility:    · Patient completed Sit to Supine with minimum assistance     Functional Mobility/Transfers:  · Patient completed Sit <> Stand Transfer with stand by assistance  with   rolling walker   · Patient completed Bed <> Chair Transfer using Step Transfer technique with stand by assistance with rolling walker  · Patient completed Toilet Transfer Step Transfer technique with stand by assistance with  rolling walker  · Functional Mobility: Pt motivated for therapy and tolerated session well. Pt required SBA for grooming activities standing at sink and for perineal hygiene. Pt ambulated ~100ft SBA using RW to increase activity tolerance required for adls and functional mobility. Continue OT POC    Activities of Daily Living:  · Grooming: stand by assistance standing at sink  · Upper Body Dressing: minimum assistance sam gown  · Toileting: stand by assistance for perineal hygiene      [facility] 6 Click ADL: 22    Treatment & Education:  - OT POC   - Importance of mobility to maximize recovery.  - safety w/ functional mobility; hand placement to ensure safe transfers to various surfaces in prep for ADLs  - Reviewed gait sequence and RW management via verbalization and demonstration   - encouraged to ambulate within household environment at least every hour to hour 1/2  -Educated on weight bearing status    Patient left supine with all lines intact, call button in reach, RN notified and spouse present    GOALS:   Multidisciplinary Problems     Occupational Therapy Goals        Problem: Occupational Therapy Goal    Goal Priority Disciplines Outcome Interventions   Occupational Therapy Goal     OT, PT/OT Ongoing, Progressing    Description: Goals to be met by: 12/29/2020    Patient will increase functional independence with ADLs by performing:    UE Dressing with Stand-by Assistance.  LE Dressing with Stand-by Assistance.  Grooming while standing at sink with Stand-by Assistance.  Toileting from toilet with Stand-by Assistance for hygiene and clothing management.   Step transfer with Stand-by Assistance  Toilet transfer to toilet with Stand-by Assistance.                     Time Tracking:     OT  Date of Treatment: 11/30/20  OT Start Time: 0907  OT Stop Time: 0930  OT Total Time (min): 23 min    Billable Minutes:Self Care/Home Management 23    Roxie Bell OT  11/30/2020    Co-tx performed for this visit due to patient need for two skilled therapists to ensure patient and staff safety and to accommodate for patient activity tolerance

## 2020-11-30 NOTE — PT/OT/SLP PROGRESS
"Physical Therapy Treatment    Patient Name:  Fan Paz   MRN:  [id number]    Recommendations:     Discharge Recommendations:  outpatient PT   Discharge Equipment Recommendations: none   Barriers to discharge: None    Assessment:     Fan Paz is a 80 y.o. male admitted with a medical diagnosis of Infection of prosthetic joint.  He presents with the following impairments/functional limitations:  weakness, impaired endurance, impaired self care skills, impaired functional mobilty, gait instability, impaired balance, decreased lower extremity function, pain.  Patient making good progress with therapy. He was able to ambulate in halls for apprx 100 ft with SBA and RW. Will continue PT per POC.     Rehab Prognosis: Good; patient would benefit from acute skilled PT services to address these deficits and reach maximum level of function.    Recent Surgery: Procedure(s) (LRB):  REPLACEMENT, POLYETHYLENE LINER (Left) 2 Days Post-Op    Plan:     During this hospitalization, patient to be seen daily to address the identified rehab impairments via gait training, therapeutic activities, therapeutic exercises and progress toward the following goals:    · Plan of Care Expires:  12/28/20    Subjective     Chief Complaint: discomfort to LLE  Patient/Family Comments/goals: "That leg is heavy".   Pain/Comfort:  Pain Rating 1: 3/10  Location - Side 1: Left  Location 1: knee  Pain Addressed 1: Pre-medicate for activity      Objective:     Communicated with nurse prior to session.  Patient found up in bathroom with spouse present with cryotherapy upon PT entry to room.     General Precautions: Standard, fall   Orthopedic Precautions:LLE weight bearing as tolerated   Braces: N/A     Functional Mobility:  · Bed Mobility:     · Sit to Supine: minimum assistance and for LE only  · Transfers:     · Sit to Stand:  supervision and stand by assistance with rolling walker  · Gait: 100 ft with RW and SBA for safety. "       AM-PAC 6 CLICK MOBILITY  Turning over in bed (including adjusting bedclothes, sheets and blankets)?: 4  Sitting down on and standing up from a chair with arms (e.g., wheelchair, bedside commode, etc.): 3  Moving from lying on back to sitting on the side of the bed?: 3  Moving to and from a bed to a chair (including a wheelchair)?: 3  Need to walk in hospital room?: 3  Climbing 3-5 steps with a railing?: 2  Basic Mobility Total Score: 18       Therapeutic Activities and Exercises:   -white board updated  -educ patient with benefit of OOB activity    Patient left supine with all lines intact, call button in reach and PICC line team and spouse present..    GOALS:   Multidisciplinary Problems     Physical Therapy Goals        Problem: Physical Therapy Goal    Goal Priority Disciplines Outcome Goal Variances Interventions   Physical Therapy Goal     PT, PT/OT Ongoing, Progressing     Description: Goals to be met by: 20     Patient will increase functional independence with mobility by performin. Supine to sit with Set-up [place]  2. Sit to supine with Set-up [place]  3. Sit to stand transfer with Supervision  4. Bed to chair transfer with Supervision using Rolling Walker  5. Gait  x 150 feet with Supervision using Rolling Walker.   6. Ascend/descend 4 stair with bilateral Handrails Contact Guard Assistance                      Time Tracking:     PT Received On: 20  PT Start Time: 907     PT Stop Time: 930  PT Total Time (min): 23 min     Billable Minutes: Gait Training 15 and Therapeutic Activity 8    Treatment Type: Treatment  PT/PTA: PTA     PTA Visit Number: 1     Alana Peraza, RENEE  2020

## 2020-11-30 NOTE — ASSESSMENT & PLAN NOTE
Fan Paz is a 80 y.o. male s/p L TKA with Dr. 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     Now s/p I&D with poly exchange 11/28/20.    -Pain control MM  - WBAT  -DVT PPx: ASA BID  -Abx: Vanc, rocephin, rifampin  - Cx: GBS, sen pending  - PT/OT  -Labs: hgb 12, wbc 9  - Appreciate ID recs.

## 2020-12-01 ENCOUNTER — TELEPHONE (OUTPATIENT)
Dept: ORTHOPEDICS | Facility: CLINIC | Age: 80
End: 2020-12-01

## 2020-12-01 VITALS
SYSTOLIC BLOOD PRESSURE: 140 MMHG | WEIGHT: 178 LBS | RESPIRATION RATE: 18 BRPM | BODY MASS INDEX: 28.61 KG/M2 | TEMPERATURE: 98 F | HEART RATE: 89 BPM | DIASTOLIC BLOOD PRESSURE: 77 MMHG | HEIGHT: 66 IN | OXYGEN SATURATION: 97 %

## 2020-12-01 LAB
ALBUMIN SERPL BCP-MCNC: 2.4 G/DL (ref 3.5–5.2)
ALP SERPL-CCNC: 93 U/L (ref 55–135)
ALT SERPL W/O P-5'-P-CCNC: 21 U/L (ref 10–44)
ANION GAP SERPL CALC-SCNC: 9 MMOL/L (ref 8–16)
AST SERPL-CCNC: 27 U/L (ref 10–40)
BACTERIA FLD CULT: ABNORMAL
BASOPHILS # BLD AUTO: 0.03 K/UL (ref 0–0.2)
BASOPHILS NFR BLD: 0.3 % (ref 0–1.9)
BILIRUB SERPL-MCNC: 0.7 MG/DL (ref 0.1–1)
BLD PROD TYP BPU: NORMAL
BLOOD UNIT EXPIRATION DATE: NORMAL
BLOOD UNIT TYPE CODE: 5100
BLOOD UNIT TYPE: NORMAL
BUN SERPL-MCNC: 13 MG/DL (ref 8–23)
CALCIUM SERPL-MCNC: 8.5 MG/DL (ref 8.7–10.5)
CHLORIDE SERPL-SCNC: 101 MMOL/L (ref 95–110)
CO2 SERPL-SCNC: 26 MMOL/L (ref 23–29)
CODING SYSTEM: NORMAL
CREAT SERPL-MCNC: 0.8 MG/DL (ref 0.5–1.4)
DIFFERENTIAL METHOD: ABNORMAL
DISPENSE STATUS: NORMAL
EOSINOPHIL # BLD AUTO: 0.3 K/UL (ref 0–0.5)
EOSINOPHIL NFR BLD: 2.5 % (ref 0–8)
ERYTHROCYTE [DISTWIDTH] IN BLOOD BY AUTOMATED COUNT: 15.2 % (ref 11.5–14.5)
EST. GFR  (AFRICAN AMERICAN): >60 ML/MIN/1.73 M^2
EST. GFR  (NON AFRICAN AMERICAN): >60 ML/MIN/1.73 M^2
GLUCOSE SERPL-MCNC: 111 MG/DL (ref 70–110)
HCT VFR BLD AUTO: 33.7 % (ref 40–54)
HGB BLD-MCNC: 10.9 G/DL (ref 14–18)
IMM GRANULOCYTES # BLD AUTO: 0.08 K/UL (ref 0–0.04)
IMM GRANULOCYTES NFR BLD AUTO: 0.8 % (ref 0–0.5)
LYMPHOCYTES # BLD AUTO: 1 K/UL (ref 1–4.8)
LYMPHOCYTES NFR BLD: 9.8 % (ref 18–48)
MCH RBC QN AUTO: 26.8 PG (ref 27–31)
MCHC RBC AUTO-ENTMCNC: 32.3 G/DL (ref 32–36)
MCV RBC AUTO: 83 FL (ref 82–98)
MONOCYTES # BLD AUTO: 1.3 K/UL (ref 0.3–1)
MONOCYTES NFR BLD: 12.1 % (ref 4–15)
NEUTROPHILS # BLD AUTO: 7.7 K/UL (ref 1.8–7.7)
NEUTROPHILS NFR BLD: 74.5 % (ref 38–73)
NRBC BLD-RTO: 0 /100 WBC
PLATELET # BLD AUTO: 247 K/UL (ref 150–350)
PMV BLD AUTO: 10 FL (ref 9.2–12.9)
POCT GLUCOSE: 146 MG/DL (ref 70–110)
POTASSIUM SERPL-SCNC: 3.6 MMOL/L (ref 3.5–5.1)
PROT SERPL-MCNC: 6.7 G/DL (ref 6–8.4)
RBC # BLD AUTO: 4.07 M/UL (ref 4.6–6.2)
SODIUM SERPL-SCNC: 136 MMOL/L (ref 136–145)
TRANS ERYTHROCYTES VOL PATIENT: NORMAL ML
WBC # BLD AUTO: 10.32 K/UL (ref 3.9–12.7)

## 2020-12-01 PROCEDURE — 97110 THERAPEUTIC EXERCISES: CPT | Mod: CQ

## 2020-12-01 PROCEDURE — 25000003 PHARM REV CODE 250: Performed by: STUDENT IN AN ORGANIZED HEALTH CARE EDUCATION/TRAINING PROGRAM

## 2020-12-01 PROCEDURE — 80053 COMPREHEN METABOLIC PANEL: CPT

## 2020-12-01 PROCEDURE — 85025 COMPLETE CBC W/AUTO DIFF WBC: CPT

## 2020-12-01 PROCEDURE — A4216 STERILE WATER/SALINE, 10 ML: HCPCS | Performed by: ORTHOPAEDIC SURGERY

## 2020-12-01 PROCEDURE — 25000003 PHARM REV CODE 250: Performed by: ORTHOPAEDIC SURGERY

## 2020-12-01 PROCEDURE — 97116 GAIT TRAINING THERAPY: CPT | Mod: CQ

## 2020-12-01 RX ORDER — ASPIRIN 81 MG/1
81 TABLET ORAL 2 TIMES DAILY
Qty: 120 TABLET | Refills: 0 | Status: SHIPPED | OUTPATIENT
Start: 2020-12-01 | End: 2020-12-01 | Stop reason: HOSPADM

## 2020-12-01 RX ADMIN — Medication 10 ML: at 05:12

## 2020-12-01 RX ADMIN — OXYCODONE HYDROCHLORIDE 5 MG: 5 TABLET ORAL at 08:12

## 2020-12-01 RX ADMIN — ACETAMINOPHEN 650 MG: 325 TABLET ORAL at 05:12

## 2020-12-01 RX ADMIN — TAMSULOSIN HYDROCHLORIDE 0.4 MG: 0.4 CAPSULE ORAL at 08:12

## 2020-12-01 RX ADMIN — ASPIRIN 81 MG: 81 TABLET, COATED ORAL at 08:12

## 2020-12-01 NOTE — PLAN OF CARE
Received referral. Plan to dc home today with IV ABX. Plan of care noted by Dr. Pearce for ceftriaxone 2 GM IV q 24 hrs end date is 01/09/21. Will need noon dose prior to dc. Pt has right brachial 38 cm double lumen picc. Spoke with Martha Sahu and sending referral to [facility]. Will need to run benefits, teach, and deliver medications to bedside.       [facility]  Ravi Reeves Rn, Clinical Educator  Cell [phone]  Office [phone]  Fax [phone]

## 2020-12-01 NOTE — PLAN OF CARE
DAR spoke with Shauna Dodson at [phone] and was advised to fax  referral to [phone]. Fax sent manually due to  system is down.  DAR will continue to monitor.      DAR spoke to Ravi with [facility] at [phone] and verbally completed referral process.  DAR waiting for acceptance response and marcial     12/01/20 1037   Post-Acute Status   Post-Acute Authorization Medications;Home Health   Home Health Status Referrals Sent   Medication Status Pending Prior Authorization     Martha Valadez LMSW  PRN-  [facility]  Ext. [phone]

## 2020-12-01 NOTE — SUBJECTIVE & OBJECTIVE
"Principal Problem:Infection of prosthetic joint    Principal Orthopedic Problem: Same    Interval History: Patient seen and examined at bedside.  No acute events overnight.  Afebrile, HTN to  overnight, other signs stable.  Patient walked 100 feet with PT yesterday with rolling walker.  Pain well controlled and tolerating regular diet.      Review of patient's allergies indicates:  No Known Allergies    Current Facility-Administered Medications   Medication    acetaminophen tablet 650 mg    aspirin EC tablet 81 mg    cefTRIAXone (ROCEPHIN) 2 g/50 mL D5W IVPB    dextrose 50% injection 12.5 g    dextrose 50% injection 25 g    fluticasone propionate 50 mcg/actuation nasal spray 50 mcg    glucagon (human recombinant) injection 1 mg    glucose chewable tablet 16 g    glucose chewable tablet 24 g    HYDROmorphone injection 0.2 mg    insulin aspart U-100 pen 0-5 Units    labetalol 20 mg/4 mL (5 mg/mL) IV syring    lidocaine (PF) 10 mg/ml (1%) injection 10 mg    melatonin tablet 6 mg    metoclopramide HCl injection 5 mg    montelukast tablet 10 mg    ondansetron disintegrating tablet 8 mg    oxyCODONE immediate release tablet 5 mg    oxyCODONE immediate release tablet Tab 10 mg    simvastatin tablet 40 mg    sodium chloride 0.9% flush 10 mL    sodium chloride 0.9% flush 10 mL    And    sodium chloride 0.9% flush 10 mL    tamsulosin 24 hr capsule 0.4 mg     Objective:     Vital Signs (Most Recent):  Temp: 98.1 °F (36.7 °C) (12/01/20 0405)  Pulse: 78 (12/01/20 0405)  Resp: 17 (12/01/20 0405)  BP: (!) 147/74 (12/01/20 0405)  SpO2: 95 % (12/01/20 0405) Vital Signs (24h Range):  Temp:  [97.5 °F (36.4 °C)-98.1 °F (36.7 °C)] 98.1 °F (36.7 °C)  Pulse:  [78-94] 78  Resp:  [14-19] 17  SpO2:  [95 %-97 %] 95 %  BP: (130-160)/(67-85) 147/74     Weight: 80.7 kg (178 lb)  Height: 5' 6" (167.6 cm)  Body mass index is 28.73 kg/m².      Intake/Output Summary (Last 24 hours) at 12/1/2020 0649  Last data filed at " 11/30/2020 2300  Gross per 24 hour   Intake 1105 ml   Output 1075 ml   Net 30 ml       Ortho/SPM Exam      Physical Exam:  NAD, A/O x 3.  Wound c/d/i with clean dressing.  No focal motor or sensory deficits noted.  Drain: None    Significant Labs:   BMP:   Recent Labs   Lab 12/01/20 0418   *      K 3.6      CO2 26   BUN 13   CREATININE 0.8   CALCIUM 8.5*     CBC:   Recent Labs   Lab 11/30/20 0457 12/01/20 0418   WBC 10.04 10.32   HGB 11.9* 10.9*   HCT 37.5* 33.7*    247     CMP:   Recent Labs   Lab 11/30/20 0457 12/01/20 0418    136   K 4.3 3.6    101   CO2 26 26   * 111*   BUN 12 13   CREATININE 0.9 0.8   CALCIUM 8.7 8.5*   PROT 7.2 6.7   ALBUMIN 2.5* 2.4*   BILITOT 2.1* 0.7   ALKPHOS 78 93   AST 20 27   ALT 17 21   ANIONGAP 10 9   EGFRNONAA >60.0 >60.0     All pertinent labs within the past 24 hours have been reviewed.    Significant Imaging: I have reviewed all pertinent imaging results/findings.

## 2020-12-01 NOTE — PLAN OF CARE
12/01/20 1532   Final Note   Assessment Type Final Discharge Note   Anticipated Discharge Disposition Home-Health  ([facility]   What phone number can be called within the next 1-3 days to see how you are doing after discharge? [phone]   Hospital Follow Up  Appt(s) scheduled? Yes   Discharge plans and expectations educations in teach back method with documentation complete? Yes   Post-Acute Status   Post-Acute Authorization Home Health  ([facility]     Future Appointments   Date Time Provider Department Center   12/11/2020 11:30 AM Kelly Paz NP [facility]   12/17/2020 10:00 AM Elisa Up PA-C [facility]   12/30/2020 10:40 AM Otilio Damon MD [facility]   1/8/2021  2:00 PM Isai Chen PA-C [facility]

## 2020-12-01 NOTE — ASSESSMENT & PLAN NOTE
Fan Paz is a 80 y.o. male s/p L TKA with Dr. 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     Now s/p I&D with poly exchange 11/28/20.  ID recommending 6 weeks IV rocephin followed by 6 months of oral abx.      -Pain control MM  - WBAT  -DVT PPx: ASA BID  -Abx: Vanc, rocephin, rifampin  - Cx: GBS, sen pending  - PT/OT  -Labs: hgb 10.9, wbc 10    Dispo: Plan for today after PICC placement.

## 2020-12-01 NOTE — PLAN OF CARE
Patient A&Ox4. VS as charted. Q2H rounding and white board updating maintained. Call bell within reach.  Patient had no complaints of pain throughout the night. No falls or skin breakdown noted. Will continue plan of care.     Problem: Adult Inpatient Plan of Care  Goal: Plan of Care Review  Outcome: Ongoing, Progressing  Goal: Patient-Specific Goal (Individualization)  Outcome: Ongoing, Progressing  Goal: Absence of Hospital-Acquired Illness or Injury  Outcome: Ongoing, Progressing  Intervention: Identify and Manage Fall Risk  Flowsheets (Taken 12/1/2020 0334)  Safety Promotion/Fall Prevention:   assistive device/personal item within reach   commode/urinal/bedpan at bedside   Fall Risk reviewed with patient/family   Fall Risk signage in place   nonskid shoes/socks when out of bed   side rails raised x 2   instructed to call staff for mobility  Intervention: Prevent VTE (venous thromboembolism)  Flowsheets (Taken 12/1/2020 0334)  VTE Prevention/Management:   remove, assess skin and reapply sequential compression device   ROM (passive) performed  Goal: Optimal Comfort and Wellbeing  Outcome: Ongoing, Progressing  Intervention: Provide Person-Centered Care  Flowsheets (Taken 12/1/2020 0334)  Trust Relationship/Rapport:   care explained   choices provided   emotional support provided   empathic listening provided   questions answered   questions encouraged   reassurance provided   thoughts/feelings acknowledged  Goal: Readiness for Transition of Care  Outcome: Ongoing, Progressing  Goal: Rounds/Family Conference  Outcome: Ongoing, Progressing     Problem: Fall Injury Risk  Goal: Absence of Fall and Fall-Related Injury  Outcome: Ongoing, Progressing  Intervention: Identify and Manage Contributors to Fall Injury Risk  Flowsheets (Taken 12/1/2020 0334)  Self-Care Promotion:   independence encouraged   safe use of adaptive equipment encouraged  Medication Review/Management: medications reviewed  Intervention: Promote  Injury-Free Environment  Flowsheets (Taken 12/1/2020 0334)  Safety Promotion/Fall Prevention:   assistive device/personal item within reach   commode/urinal/bedpan at bedside   Fall Risk reviewed with patient/family   Fall Risk signage in place   nonskid shoes/socks when out of bed   side rails raised x 2   instructed to call staff for mobility  Environmental Safety Modification:   assistive device/personal items within reach   clutter free environment maintained     Problem: Infection  Goal: Infection Symptom Resolution  Outcome: Ongoing, Progressing  Intervention: Prevent or Manage Infection  Flowsheets (Taken 12/1/2020 0334)  Fever Reduction/Comfort Measures:   lightweight bedding   lightweight clothing  Infection Management: aseptic technique maintained  Isolation Precautions: protective environment maintained

## 2020-12-01 NOTE — TELEPHONE ENCOUNTER
Spoke with pt. Scheduled his p/o appt. Pt. Verbalized understanding.              ----- Message from Elías Pearce MD sent at 12/1/2020  9:06 AM CST -----  Hi there, please schedule patient for 2 week post op visit.  Thanks!

## 2020-12-01 NOTE — PT/OT/SLP PROGRESS
"Physical Therapy Treatment    Patient Name:  Fan Paz   MRN:  [id number]    Recommendations:     Discharge Recommendations:  outpatient PT   Discharge Equipment Recommendations: none   Barriers to discharge: None    Assessment:     Fan Paz is a 80 y.o. male admitted with a medical diagnosis of Infection of prosthetic joint.  He presents with the following impairments/functional limitations:  orthopedic precautions, pain.  Patient doing well with therapy as evidenced by his ability to ambulate in pereira apprx 150 ft with no difficulty and walker. Will continue PT per POC    Rehab Prognosis: Good; patient would benefit from acute skilled PT services to address these deficits and reach maximum level of function.    Recent Surgery: Procedure(s) (LRB):  REPLACEMENT, POLYETHYLENE LINER (Left) 3 Days Post-Op    Plan:     During this hospitalization, patient to be seen daily to address the identified rehab impairments via gait training, therapeutic activities, therapeutic exercises and progress toward the following goals:    · Plan of Care Expires:  01/28/21    Subjective     Chief Complaint: none  Patient/Family Comments/goals: "I hope to leave today".   Pain/Comfort:  · Pain Rating 1: 0/10      Objective:     Communicated with nurse prior to session.  Patient found supine with cryotherapy upon PT entry to room.     General Precautions: Standard, fall   Orthopedic Precautions:LLE weight bearing as tolerated   Braces: N/A     Functional Mobility:  · Bed Mobility:     · Supine to Sit: independence  · Transfers:     · Sit to Stand:  modified independence with rolling walker  · Gait: 150 ft with RW and S in pereira with mask donned.       AM-PAC 6 CLICK MOBILITY  Turning over in bed (including adjusting bedclothes, sheets and blankets)?: 4  Sitting down on and standing up from a chair with arms (e.g., wheelchair, bedside commode, etc.): 4  Moving from lying on back to sitting on the side of the bed?: " 4  Moving to and from a bed to a chair (including a wheelchair)?: 4  Need to walk in hospital room?: 3  Climbing 3-5 steps with a railing?: 3  Basic Mobility Total Score: 22       Therapeutic Activities and Exercises:   -white board updated  -instr patient with TKR exercises including qs,ap,hs,laq. Performed 10 reps each    Patient left up in chair with all lines intact, call button in reach and nurse notified..    GOALS:   Multidisciplinary Problems     Physical Therapy Goals        Problem: Physical Therapy Goal    Goal Priority Disciplines Outcome Goal Variances Interventions   Physical Therapy Goal     PT, PT/OT Ongoing, Progressing     Description: Goals to be met by: 20     Patient will increase functional independence with mobility by performin. Supine to sit with Set-up Daniels  2. Sit to supine with Set-up Daniels  3. Sit to stand transfer with Supervision  4. Bed to chair transfer with Supervision using Rolling Walker  5. Gait  x 150 feet with Supervision using Rolling Walker.   6. Ascend/descend 4 stair with bilateral Handrails Contact Guard Assistance                      Time Tracking:     PT Received On: 20  PT Start Time: 908     PT Stop Time: 932  PT Total Time (min): 24 min     Billable Minutes: Gait Training 15 and Therapeutic Exercise 9    Treatment Type: Treatment  PT/PTA: PTA     PTA Visit Number: 1     Alana Peraza PTA  2020

## 2020-12-01 NOTE — PROGRESS NOTES
[facility]  Orthopedics  Progress Note    Patient Name: Fan Paz  MRN: [id number]  Admission Date: 11/27/2020  Hospital Length of Stay: 4 days  Attending Provider: Ronak Pires MD  Primary Care Provider: Otilio Damon MD  Follow-up For: Procedure(s) (LRB):  REPLACEMENT, POLYETHYLENE LINER (Left)    Post-Operative Day: 3 Days Post-Op  Subjective:     Principal Problem:Infection of prosthetic joint    Principal Orthopedic Problem: Same    Interval History: Patient seen and examined at bedside.  No acute events overnight.  Afebrile, HTN to  overnight, other signs stable.  Patient walked 100 feet with PT yesterday with rolling walker.  Pain well controlled and tolerating regular diet.      Review of patient's allergies indicates:  No Known Allergies    Current Facility-Administered Medications   Medication    acetaminophen tablet 650 mg    aspirin EC tablet 81 mg    cefTRIAXone (ROCEPHIN) 2 g/50 mL D5W IVPB    dextrose 50% injection 12.5 g    dextrose 50% injection 25 g    fluticasone propionate 50 mcg/actuation nasal spray 50 mcg    glucagon (human recombinant) injection 1 mg    glucose chewable tablet 16 g    glucose chewable tablet 24 g    HYDROmorphone injection 0.2 mg    insulin aspart U-100 pen 0-5 Units    labetalol 20 mg/4 mL (5 mg/mL) IV syring    lidocaine (PF) 10 mg/ml (1%) injection 10 mg    melatonin tablet 6 mg    metoclopramide HCl injection 5 mg    montelukast tablet 10 mg    ondansetron disintegrating tablet 8 mg    oxyCODONE immediate release tablet 5 mg    oxyCODONE immediate release tablet Tab 10 mg    simvastatin tablet 40 mg    sodium chloride 0.9% flush 10 mL    sodium chloride 0.9% flush 10 mL    And    sodium chloride 0.9% flush 10 mL    tamsulosin 24 hr capsule 0.4 mg     Objective:     Vital Signs (Most Recent):  Temp: 98.1 °F (36.7 °C) (12/01/20 0405)  Pulse: 78 (12/01/20 0405)  Resp: 17 (12/01/20 0405)  BP: (!) 147/74  "(12/01/20 0405)  SpO2: 95 % (12/01/20 0405) Vital Signs (24h Range):  Temp:  [97.5 °F (36.4 °C)-98.1 °F (36.7 °C)] 98.1 °F (36.7 °C)  Pulse:  [78-94] 78  Resp:  [14-19] 17  SpO2:  [95 %-97 %] 95 %  BP: (130-160)/(67-85) 147/74     Weight: 80.7 kg (178 lb)  Height: 5' 6" (167.6 cm)  Body mass index is 28.73 kg/m².      Intake/Output Summary (Last 24 hours) at 12/1/2020 0649  Last data filed at 11/30/2020 2300  Gross per 24 hour   Intake 1105 ml   Output 1075 ml   Net 30 ml       Ortho/SPM Exam      Physical Exam:  NAD, A/O x 3.  Wound c/d/i with clean dressing.  No focal motor or sensory deficits noted.  Drain: None    Significant Labs:   BMP:   Recent Labs   Lab 12/01/20  0418   *      K 3.6      CO2 26   BUN 13   CREATININE 0.8   CALCIUM 8.5*     CBC:   Recent Labs   Lab 11/30/20  0457 12/01/20  0418   WBC 10.04 10.32   HGB 11.9* 10.9*   HCT 37.5* 33.7*    247     CMP:   Recent Labs   Lab 11/30/20  0457 12/01/20  0418    136   K 4.3 3.6    101   CO2 26 26   * 111*   BUN 12 13   CREATININE 0.9 0.8   CALCIUM 8.7 8.5*   PROT 7.2 6.7   ALBUMIN 2.5* 2.4*   BILITOT 2.1* 0.7   ALKPHOS 78 93   AST 20 27   ALT 17 21   ANIONGAP 10 9   EGFRNONAA >60.0 >60.0     All pertinent labs within the past 24 hours have been reviewed.    Significant Imaging: I have reviewed all pertinent imaging results/findings.    Assessment/Plan:     * Infection of prosthetic joint  Fan Paz is a 80 y.o. male s/p L TKA with Dr. 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     Now s/p I&D with poly exchange 11/28/20.  ID recommending 6 weeks IV rocephin followed by 6 months of oral abx.      -Pain control MM  - WBAT  -DVT PPx: ASA BID  -Abx: Vanc, rocephin, rifampin  - Cx: GBS, sen pending  - PT/OT  -Labs: hgb 10.9, wbc 10    Dispo: Plan for today after PICC placement.         Essential hypertension  Restart home meds  Labetalol for break through     History of prostate " cancer  Restart home meds    Hyperlipidemia  Restart home meds          Elías Pearce MD  Orthopedics  [facility]

## 2020-12-01 NOTE — PLAN OF CARE
[facility]    HOME HEALTH ORDERS  FACE TO FACE ENCOUNTER    Patient Name: Fan Paz  YOB: 1940    PCP: Otilio Damon MD   PCP Address: [address] / [place] [address]  PCP Phone Number: [phone]  PCP Fax: [phone]    Encounter Date: 12/01/2020    Admit to Home Health    Diagnoses:  Active Hospital Problems    Diagnosis  POA    *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]  Yes    Essential hypertension [I10]  Yes    History of prostate cancer [Z85.46]  Not Applicable    Hyperlipidemia [E78.5]  Yes      Resolved Hospital Problems   No resolved problems to display.       Future Appointments   Date Time Provider Department Center   12/17/2020 10:00 AM Elisa Up PA-C [facility] ID [address]   12/30/2020 10:40 AM Otilio Damon MD [facility] -    1/8/2021  2:00 PM Isai Chen PA-C [facility] [address]           I have seen and examined this patient face to face today. My clinical findings that support the need for the home health skilled services and home bound status are the following:  Medical restrictions requiring assistance of another human to leave home due to  IV infusion Needs.    Allergies:Review of patient's allergies indicates:  No Known Allergies    Diet: regular diet    Activities: activity as tolerated    Home Health Admitting Clinician:   SN/PT to complete comprehensive assessment including routine vital signs. Instruct on disease process and s/s of complications to report to MD    Follow specific home health arthoplasty protocol. Review/verify medication list sent home with the patient at time of discharge  and instruct patient/caregiver as needed. If coumadin ordered, coumadin clinic to manage INR with INR draws 2x per week with a goal to maintain INR between 1.8 and 2.2. Frequency may be adjusted depending on start of care date.    Notify MD if SBP > 160 or < 90; DBP > 90 or < 50; HR > 120 or < 50; Temp > 101    Home Medical  Equipment:  Walker, 3-1 bedside commode, transfer tub bench    CONSULTS:    Physical Therapy may admit if patient not on coumadin, PT to perform comprehensive assessment if performing admit visit and generate therapy plan of care. Evaluate for home safety and equipment needs; Establish/upgrade home exercise program. Perform/instruct on therapeutic exercises, gait training, transfer training, and Range of Motion.      OTHER: (only select if patient needs other therapy disciplines)  Infusion therapy for PICC line    MISCELLANEOUS CARE:  HOME INFUSION THERAPY:   SN to perform Infusion Therapy/Central Line Care.  Review Central Line Care & Central Line Flush with patient.    Administer (drug and dose): Ceftriaxone 2g IV q 24 hours for 6 weeks (end date 1/9/21)  Last dose given: Today (12/1)                       Home dose due: Tomorrow (12/2)    Scrub the Hub: Prior to accessing the line, always perform a 30 second alcohol scrub  Each lumen of the central line is to be flushed at least daily with 10 mL Normal Saline and 3 mL Heparin flush (10 units/mL)  Skilled Nurse (SN) may draw blood from IV access  Blood Draw Procedure:   - Aspirate at least 5 mL of blood   - Discard   - Obtain specimen   - Change injection cap   - Flush with 20 mL Normal Saline followed by a                 3-5 mL Heparin flush (10 units/mL)  Central :   - Sterile dressing changes are done weekly and as needed.   - Use chlor-hexadine scrub to cleanse site, apply Biopatch to insertion site,       apply securement device dressing   - Injection caps are changed weekly and after EVERY lab draw.   - If sterile gauze is under dressing to control oozing,                 dressing change must be performed every 24 hours until gauze is not needed.    Weekly Labs  - CBC  - CMP  - ESR and CRP    WOUND CARE ORDERS:  Assess Surgical Incision/DSRG each TX  Aquacel AG drsg applied post-op leave on 14 days post op. Call MD if any drainage reaches  border to border of drsg horizontally, s/s of infection, temp >101, induration, swelling or redness.  If dressing is removed per MD order, then apply island dressing, change/teach caregiver to perform daily dressing change if island dressing present.    Medications: Review discharge medications with patient and family and provide education.      Current Discharge Medication List      START taking these medications    Details   acetaminophen (TYLENOL) 500 MG tablet Take 2 tablets (1,000 mg total) by mouth every 6 (six) hours.  Qty: 60 tablet, Refills: 0      aspirin (ECOTRIN) 81 MG EC tablet Take 1 tablet (81 mg total) by mouth 2 (two) times daily.  Qty: 60 tablet, Refills: 0      celecoxib (CELEBREX) 200 MG capsule Take 1 capsule (200 mg total) by mouth once daily.  Qty: 30 capsule, Refills: 0      docusate sodium (COLACE) 100 MG capsule Take 1 capsule (100 mg total) by mouth 2 (two) times daily.  Qty: 60 capsule, Refills: 0      ondansetron (ZOFRAN-ODT) 8 MG TbDL Dissolve 1 tablet (8 mg total) by mouth every 6 (six) hours as needed.  Qty: 30 tablet, Refills: 0      oxyCODONE (ROXICODONE) 5 MG immediate release tablet Take 1 tablet (5 mg total) by mouth every 6 (six) hours as needed for Pain. May take 1-2 tablets every 6 hours as needed for pain  Qty: 50 tablet, Refills: 0         CONTINUE these medications which have NOT CHANGED    Details   montelukast (SINGULAIR) 10 mg tablet TAKE 1 TABLET BY MOUTH ONCE DAILY IN THE EVENING  Qty: 90 tablet, Refills: 0    Associated Diagnoses: Environmental allergies      simvastatin (ZOCOR) 40 MG tablet Take 1 tablet (40 mg total) by mouth every evening.  Qty: 90 tablet, Refills: 1    Associated Diagnoses: Hyperlipidemia, unspecified hyperlipidemia type      tamsulosin (FLOMAX) 0.4 mg Cap Take 1 capsule (0.4 mg total) by mouth once daily.  Qty: 90 capsule, Refills: 1    Associated Diagnoses: Prostate cancer      diclofenac (VOLTAREN) 50 MG EC tablet Take 1 tablet (50 mg total) by  mouth 2 (two) times daily.  Qty: 60 tablet, Refills: 0      fluticasone propionate (FLONASE) 50 mcg/actuation nasal spray Use 1 spray(s) in each nostril once daily  Qty: 16 g, Refills: 0    Associated Diagnoses: Seasonal allergies      FLUZONE HIGH-DOSE 2019-20, PF, 180 mcg/0.5 mL Syrg PHARMACIST ADMINISTERED IMMUNIZATION ADMINISTERED AT TIME OF DISPENSING  Refills: 0      lidocaine 4 % Gel To scalp area BID PRN for tingling  Qty: 30 g, Refills: 0    Associated Diagnoses: Tingling sensation             I certify that this patient is confined to his home and needs intermittent skilled nursing care.

## 2020-12-01 NOTE — NURSING
Pt d/c home per MD order. VSS. Pt and family verbalized understanding d/c instructions. Pt medications delivered to patient bedside. Pt left via wheelchair escorted by staff.

## 2020-12-01 NOTE — PLAN OF CARE
SW spoke with pt and wife at bedside to discuss referrals.  Pt does not have a preference for Home Infusion.  Pt would like [facility] for [facility].  SW spoke to Shauna austin/ [facility] at [phone] in reference to a referral for HH.  SW manually faxed HH referral documentation to [phone] as instructed.  system is down.  SW spoke to Ravi austin/ [facility] at [phone] and completed a verbal referral.  SW waiting for a response on acceptance.  SW will continue to follow case status.      12:02 PM  SW spoke to Shauna with [facility] and was advised they are unable to accept pt, facility does not cover [facility].     SW sent referrals to [facility] [phone] and [facility] [phone].  SW will continue to follow for acceptance.      12:35 PM  [facility] unable to accept pt due to no service provider in area.   1:18 PM  The follow locations unable to accept pt:  [facility], [facility], [facility], [facility], [facility], [facility], and [facility]. SW sent additional referrals and will continue to follow.       2:22 PM  The following agencies does not service [facility]:  [facility], [facility], [facility], [facility].         12/01/20 1037   Post-Acute Status   Post-Acute Authorization Medications;Home Health   Home Health Status Referrals Sent   Medication Status Pending Prior Authorization     Martha Valadez LMSW  PRN-  [facility]  Ext. [phone]

## 2020-12-01 NOTE — DISCHARGE INSTRUCTIONS
6 weeks of IV antibiotic therapy from home health nursing.      Pain control via prescriptions    Weight bearing as tolerated through left leg.      Return to clinic in 2 weeks for post op ap

## 2020-12-02 LAB
BACTERIA BLD CULT: NORMAL
BACTERIA BLD CULT: NORMAL
BACTERIA SPEC ANAEROBE CULT: NORMAL

## 2020-12-02 PROCEDURE — G0180 MD CERTIFICATION HHA PATIENT: HCPCS | Mod: ,,, | Performed by: INTERNAL MEDICINE

## 2020-12-02 PROCEDURE — G0180 PR HOME HEALTH MD CERTIFICATION: ICD-10-PCS | Mod: ,,, | Performed by: INTERNAL MEDICINE

## 2020-12-02 NOTE — DISCHARGE SUMMARY
[facility]  Orthopedics  Discharge Summary      Patient Name: Fan Paz  MRN: [id number]  Admission Date: 11/27/2020  Hospital Length of Stay: 4 days  Discharge Date and Time: 12/1/2020  4:17 PM  Attending Physician: No att. providers found   Discharging Provider: Elías Pearce MD  Primary Care Provider: Otilio Damon MD    HPI: Fan Paz is a 80 y.o. male presented to clinic on 11/27 with new onset of left knee pain and swelling for 2 days.  He came to his appt in a wheelchair accompanied by his daughter.  The pain is severe and he was unable to stand or walk on the knee. The pain is aching and global. He denied any recent infections. He did report low grade fever and warmth to the knee. The knee was replaced by Dr. Paul in February 2014. He denies any SOB,CP, N/V.     R TKA was done by Dr. Paul on 12/1/15    Procedure(s) (LRB):  REPLACEMENT, POLYETHYLENE LINER (Left)      Hospital Course:  Following the patient's clinic visit he was admitted to the hospital.  The following morning, upon completion of pre-operative preparation, the patient was taken back to the operative theatre.  Left total knee arthroplasty arthrotomy with I and D and poly exchange was performed without complication and the patient was transported to the post anesthesia care unit in stable condition.  After appropriate recovery from the anaesthetic agents used during the surgery, the patient was then transported to the hospital inpatient floor.  Intraoperative cultures were taken and sent, which grew Streptococcus agalactiae.  Infectious Disease was consulted to provide recommendations for antibiotic treatment.  He was initially placed on IV vancomycin and ceftriaxone, which was deescalated ceftriaxone.  A PICC line was placed for antibiotic therapy after discharge.    The interim of the hospital stay from arrival on the floor up to discharge has been uncomplicated. The patient has tolerated  regular diet.  The patient's pain has been controlled using a multimodal approach. Currently, the patient's pain is well controlled on an oral regimen.  The patient has been voiding without difficulty.  The patient began participation in physical therapy after surgery and has progressed throughout the entire hospital stay.  Currently, the patient's progress is sufficient to allow the them to be discharged to home safely with home health to provide physical therapy and assist with antibiotic treatments.  Patient was discharged with plan to continue ceftriaxone for 6 weeks.  The patient agrees with this assessment and desires a discharge today.      Consults (From admission, onward)        Status Ordering Provider     Inpatient consult to Infectious Diseases  Once     Provider:  (Not yet assigned)    Completed FAY MAYS     Inpatient consult to PICC team ([facility])  Once     Provider:  (Not yet assigned)    Completed FAY MAYS          Significant Diagnostic Studies: Labs:   CMP   Recent Labs   Lab 12/01/20  0418      K 3.6      CO2 26   *   BUN 13   CREATININE 0.8   CALCIUM 8.5*   PROT 6.7   ALBUMIN 2.4*   BILITOT 0.7   ALKPHOS 93   AST 27   ALT 21   ANIONGAP 9   ESTGFRAFRICA >60.0   EGFRNONAA >60.0    and CBC   Recent Labs   Lab 12/01/20  0418   WBC 10.32   HGB 10.9*   HCT 33.7*          Pending Diagnostic Studies:     None        Final Active Diagnoses:    Diagnosis Date Noted POA    PRINCIPAL PROBLEM:  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] 11/27/2020 Yes    Essential hypertension [I10] 01/30/2014 Yes    History of prostate cancer [Z85.46]  Not Applicable    Hyperlipidemia [E78.5]  Yes      Problems Resolved During this Admission:      Discharged Condition: stable    Disposition: Home or Self Care    Follow Up:    Patient Instructions:      Ambulatory referral/consult to Physical/Occupational Therapy   Standing Status: Future   Referral Priority: Routine Referral  Type: Physical Medicine   Referral Reason: Specialty Services Required   Number of Visits Requested: 1     Diet Adult Regular     Notify your health care provider if you experience any of the following:  temperature >100.4     Notify your health care provider if you experience any of the following:  persistent nausea and vomiting or diarrhea     Notify your health care provider if you experience any of the following:  severe uncontrolled pain     Notify your health care provider if you experience any of the following:  redness, tenderness, or signs of infection (pain, swelling, redness, odor or green/yellow discharge around incision site)     Notify your health care provider if you experience any of the following:  difficulty breathing or increased cough     Leave dressing on - Keep it clean, dry, and intact until clinic visit     Activity as tolerated     Medications:  Reconciled Home Medications:      Medication List      START taking these medications    celecoxib 200 MG capsule  Commonly known as: CeleBREX  Take 1 capsule (200 mg total) by mouth once daily.     ELIQUIS 2.5 mg Tab  Generic drug: apixaban  Take 1 tablet (2.5 mg total) by mouth 2 (two) times daily.     ondansetron 8 MG Tbdl  Commonly known as: ZOFRAN-ODT  Dissolve 1 tablet (8 mg total) by mouth every 6 (six) hours as needed.     oxyCODONE 5 MG immediate release tablet  Commonly known as: ROXICODONE  Take 1 tablet (5 mg total) by mouth every 6 (six) hours as needed for Pain. May take 1-2 tablets every 6 hours as needed for pain     PAIN RELIEF EXTRA STRENGTH 500 MG tablet  Generic drug: acetaminophen  Take 2 tablets (1,000 mg total) by mouth every 6 (six) hours.     STOOL SOFTENER 100 MG capsule  Generic drug: docusate sodium  Take 1 capsule (100 mg total) by mouth 2 (two) times daily.        CONTINUE taking these medications    diclofenac 50 MG EC tablet  Commonly known as: VOLTAREN  Take 1 tablet (50 mg total) by mouth 2 (two) times daily.      fluticasone propionate 50 mcg/actuation nasal spray  Commonly known as: FLONASE  Use 1 spray(s) in each nostril once daily     FLUZONE HIGH-DOSE 2019-20 (PF) 180 mcg/0.5 mL Syrg  Generic drug: flu vacc [id number](65yr up)PF  PHARMACIST ADMINISTERED IMMUNIZATION ADMINISTERED AT TIME OF DISPENSING     lidocaine 4 % Gel  To scalp area BID PRN for tingling     montelukast 10 mg tablet  Commonly known as: SINGULAIR  TAKE 1 TABLET BY MOUTH ONCE DAILY IN THE EVENING     simvastatin 40 MG tablet  Commonly known as: ZOCOR  Take 1 tablet (40 mg total) by mouth every evening.     tamsulosin 0.4 mg Cap  Commonly known as: FLOMAX  Take 1 capsule (0.4 mg total) by mouth once daily.            Elías Pearce MD  Orthopedics  [facility]

## 2020-12-11 ENCOUNTER — OFFICE VISIT (OUTPATIENT)
Dept: ORTHOPEDICS | Facility: CLINIC | Age: 80
End: 2020-12-11
Payer: MEDICARE

## 2020-12-11 DIAGNOSIS — T84.50XD INFECTION OF PROSTHETIC JOINT, SUBSEQUENT ENCOUNTER: Primary | ICD-10-CM

## 2020-12-11 PROCEDURE — 99999 PR PBB SHADOW E&M-EST. PATIENT-LVL III: CPT | Mod: PBBFAC,,, | Performed by: NURSE PRACTITIONER

## 2020-12-11 PROCEDURE — 99213 OFFICE O/P EST LOW 20 MIN: CPT | Mod: PBBFAC | Performed by: NURSE PRACTITIONER

## 2020-12-11 PROCEDURE — 99024 POSTOP FOLLOW-UP VISIT: CPT | Mod: POP,,, | Performed by: NURSE PRACTITIONER

## 2020-12-11 PROCEDURE — 99024 PR POST-OP FOLLOW-UP VISIT: ICD-10-PCS | Mod: POP,,, | Performed by: NURSE PRACTITIONER

## 2020-12-11 PROCEDURE — 99999 PR PBB SHADOW E&M-EST. PATIENT-LVL III: ICD-10-PCS | Mod: PBBFAC,,, | Performed by: NURSE PRACTITIONER

## 2020-12-12 ENCOUNTER — LAB VISIT (OUTPATIENT)
Dept: LAB | Facility: HOSPITAL | Age: 80
End: 2020-12-12
Attending: INTERNAL MEDICINE
Payer: MEDICARE

## 2020-12-12 DIAGNOSIS — T84.50XD INFECTION OF PROSTHETIC JOINT, SUBSEQUENT ENCOUNTER: ICD-10-CM

## 2020-12-12 LAB
ANION GAP SERPL CALC-SCNC: 10 MMOL/L (ref 8–16)
BUN SERPL-MCNC: 12 MG/DL (ref 8–23)
CALCIUM SERPL-MCNC: 9.3 MG/DL (ref 8.7–10.5)
CHLORIDE SERPL-SCNC: 100 MMOL/L (ref 95–110)
CO2 SERPL-SCNC: 29 MMOL/L (ref 23–29)
CREAT SERPL-MCNC: 0.9 MG/DL (ref 0.5–1.4)
EST. GFR  (AFRICAN AMERICAN): >60 ML/MIN/1.73 M^2
EST. GFR  (NON AFRICAN AMERICAN): >60 ML/MIN/1.73 M^2
GLUCOSE SERPL-MCNC: 124 MG/DL (ref 70–110)
POTASSIUM SERPL-SCNC: 5 MMOL/L (ref 3.5–5.1)
SODIUM SERPL-SCNC: 139 MMOL/L (ref 136–145)

## 2020-12-12 PROCEDURE — 36415 COLL VENOUS BLD VENIPUNCTURE: CPT

## 2020-12-12 PROCEDURE — 80048 BASIC METABOLIC PNL TOTAL CA: CPT

## 2020-12-14 NOTE — PROGRESS NOTES
Fan Paz presents for initial post-operative visit following a left total knee revision arthroplasty (I&D with poly exchange) performed by Dr. Pires on 11/28/2020. Preop cultures were positive for group B strep. He is currently being treated with IV antibiotics by ID.His crp is trending down.    Exam:    Ambulating well with assistive device. He is using a rolling walker to ambulate.  Incision is clean and dry without drainage or erythema. Staples removed without difficulty.   ROM:0-100    Initial post-operative radiographs reviewed today revealing a well fixed and aligned prosthesis.    A/P:  2 weeks s/p left total knee replacement    - The patient was advised to keep the incision clean and dry for the next 24 hours after which he may wash the area with antibacterial soap in the shower. Will not submerge until the incision is completely healed.   - Outpatient PT ongoing with home health  - Continue eliquis for 1 month from surgery.  - Pain medication: refill not needed  - Reviewed antibiotic prophylaxis   - Follow up in 4 weeks with new x-rays. Pt will call clinic with problems/concerns.

## 2020-12-17 ENCOUNTER — OFFICE VISIT (OUTPATIENT)
Dept: INFECTIOUS DISEASES | Facility: CLINIC | Age: 80
End: 2020-12-17
Payer: MEDICARE

## 2020-12-17 VITALS
BODY MASS INDEX: 28.23 KG/M2 | HEART RATE: 86 BPM | DIASTOLIC BLOOD PRESSURE: 64 MMHG | TEMPERATURE: 98 F | SYSTOLIC BLOOD PRESSURE: 118 MMHG | HEIGHT: 66 IN | WEIGHT: 175.69 LBS

## 2020-12-17 DIAGNOSIS — T84.50XD INFECTION OF PROSTHETIC JOINT, SUBSEQUENT ENCOUNTER: Primary | ICD-10-CM

## 2020-12-17 PROCEDURE — 99213 OFFICE O/P EST LOW 20 MIN: CPT | Mod: PBBFAC | Performed by: PHYSICIAN ASSISTANT

## 2020-12-17 PROCEDURE — 99213 PR OFFICE/OUTPT VISIT, EST, LEVL III, 20-29 MIN: ICD-10-PCS | Mod: S$PBB,,, | Performed by: PHYSICIAN ASSISTANT

## 2020-12-17 PROCEDURE — 99999 PR PBB SHADOW E&M-EST. PATIENT-LVL III: ICD-10-PCS | Mod: PBBFAC,,, | Performed by: PHYSICIAN ASSISTANT

## 2020-12-17 PROCEDURE — 99999 PR PBB SHADOW E&M-EST. PATIENT-LVL III: CPT | Mod: PBBFAC,,, | Performed by: PHYSICIAN ASSISTANT

## 2020-12-17 PROCEDURE — 99213 OFFICE O/P EST LOW 20 MIN: CPT | Mod: S$PBB,,, | Performed by: PHYSICIAN ASSISTANT

## 2020-12-17 NOTE — PROGRESS NOTES
Subjective:      Patient ID: Fan Paz is a 80 y.o. male.    Chief Complaint:No chief complaint on file.      History of Present Illness    HPI    Mr. Paz is a 80 y.o. 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  He was discharged on Ceftriaxone x 6 weeks from day of surgery (end date 1/9/21)     He is seen today for follow up. He saw orthopedic surgery on 12/11. He has overall been feeling well. His incision is healing well without any drainage or warmth. Reports improvement in swelling and pain.  Ambulating well with a walker. Denies issues with his PICC line. Tolerating Ceftriaxone. Denies systemic signs of infection. CRP is downtrending. Only complaint is stiffness. He is working with PT.    Review of Systems   Constitution: Positive for weight loss. Negative for chills, fever, malaise/fatigue, night sweats and weight gain.   HENT: Negative for congestion and sore throat.    Eyes: Negative for blurred vision, redness and visual disturbance.   Cardiovascular: Negative for chest pain and leg swelling.   Respiratory: Negative for cough, shortness of breath, sputum production and wheezing.    Hematologic/Lymphatic: Negative for adenopathy. Does not bruise/bleed easily.   Skin: Negative for dry skin, itching, rash and suspicious lesions.   Musculoskeletal: Negative for back pain, joint pain, myalgias and neck pain.   Gastrointestinal: Positive for constipation. Negative for abdominal pain, diarrhea, heartburn, nausea and vomiting.   Genitourinary: Negative for dysuria, flank pain, frequency, hematuria, hesitancy and urgency.   Neurological: Negative for dizziness, headaches, numbness, paresthesias and weakness.   Psychiatric/Behavioral: Negative for depression and memory loss. The patient does not have insomnia and is not nervous/anxious.   "  Allergic/Immunologic: Negative for environmental allergies, HIV exposure, hives and persistent infections.     Objective:     Vitals:    12/17/20 0940   BP: 118/64   Pulse: 86   Temp: 97.9 °F (36.6 °C)   TempSrc: Oral   Weight: 79.7 kg (175 lb 11.3 oz)   Height: 5' 6" (1.676 m)   PainSc: 0-No pain     Physical Exam  Vitals signs reviewed.   Constitutional:       General: He is not in acute distress.     Appearance: He is well-developed. He is not toxic-appearing or diaphoretic.   HENT:      Head: Normocephalic and atraumatic.      Nose: Nose normal. No congestion.      Mouth/Throat:      Pharynx: No oropharyngeal exudate.   Eyes:      General: No scleral icterus.     Conjunctiva/sclera: Conjunctivae normal.   Cardiovascular:      Rate and Rhythm: Normal rate and regular rhythm.   Pulmonary:      Effort: Pulmonary effort is normal. No respiratory distress.      Breath sounds: Normal breath sounds. No wheezing.   Abdominal:      General: Bowel sounds are normal. There is no distension.      Palpations: Abdomen is soft.      Tenderness: There is no abdominal tenderness.   Musculoskeletal: Normal range of motion.         General: No swelling, tenderness or deformity.      Comments: Left knee incision healing well. No drainage. No warmth, redness, swelling.    Skin:     General: Skin is warm and dry.      Findings: No erythema or rash.   Neurological:      Mental Status: He is alert and oriented to person, place, and time.      Cranial Nerves: No cranial nerve deficit.   Psychiatric:         Behavior: Behavior normal.         Thought Content: Thought content normal.         Judgment: Judgment normal.                                 >> 82    Assessment:       1. Infection of prosthetic joint, subsequent encounter          Plan:   - Continue Ceftriaxone 2 g IV q 24 hours x 6 weeks as planned (end date of IV antibiotics: 1/9/21)  - Weekly ESR, CRP, CBC and CMP (labs drawn yesterday - will request updated " results)  - ID follow up has been scheduled and coordinated with orthopedic surgery follow up.   - Will likely plan on transitioning to oral Amoxil suppression for 6 months once IV abx are complete given retained hardware.   - The patient understands and agrees with the plan of care. All questions were answered.

## 2020-12-28 LAB — FUNGUS SPEC CULT: NORMAL

## 2020-12-31 ENCOUNTER — EXTERNAL CHRONIC CARE MANAGEMENT (OUTPATIENT)
Dept: PRIMARY CARE CLINIC | Facility: CLINIC | Age: 80
End: 2020-12-31
Payer: MEDICARE

## 2020-12-31 PROCEDURE — 99490 CHRNC CARE MGMT STAFF 1ST 20: CPT | Mod: S$PBB,,, | Performed by: FAMILY MEDICINE

## 2020-12-31 PROCEDURE — 99490 CHRNC CARE MGMT STAFF 1ST 20: CPT | Mod: PBBFAC,PN | Performed by: FAMILY MEDICINE

## 2020-12-31 PROCEDURE — 99490 PR CHRONIC CARE MGMT, 1ST 20 MIN: ICD-10-PCS | Mod: S$PBB,,, | Performed by: FAMILY MEDICINE

## 2021-01-04 ENCOUNTER — TELEPHONE (OUTPATIENT)
Dept: INFECTIOUS DISEASES | Facility: CLINIC | Age: 81
End: 2021-01-04

## 2021-01-04 LAB
ALP ISOS SERPL LEV INH-CCNC: 45 U/L (ref 35–144)
ALT SERPL-CCNC: 35 U/L (ref 9–46)
AST: 35 U/L (ref 10–35)
BILIRUBIN TOTAL: 0.3 MG/DL (ref 0.2–1.2)
BUN BLD-MCNC: 13 MG/DL (ref 7–25)
CREATININE: 0.7 MG/DL (ref 0.7–1.18)
CRP: 65 MG/L
GLUCOSE: 96 MG/DL (ref 65–99)
HEMATOCRIT BLOOD: 28.7 % (ref 38.5–50)
HGB BLD-MCNC: 9.3 G/DL (ref 13.5–17.5)
PLATELET COUNT: 229 THOUSAND/UL (ref 140–400)
POTASSIUM: 4.3 MMOL/L (ref 3.5–5.3)
SODIUM: 134 MMOL/L (ref 135–146)
WBC: 2.9 THOUSAND/UL (ref 3.8–10.8)

## 2021-01-05 ENCOUNTER — EXTERNAL HOME HEALTH (OUTPATIENT)
Dept: HOME HEALTH SERVICES | Facility: HOSPITAL | Age: 81
End: 2021-01-05
Payer: MEDICARE

## 2021-01-06 ENCOUNTER — PATIENT OUTREACH (OUTPATIENT)
Dept: ADMINISTRATIVE | Facility: OTHER | Age: 81
End: 2021-01-06

## 2021-01-06 ENCOUNTER — DOCUMENT SCAN (OUTPATIENT)
Dept: HOME HEALTH SERVICES | Facility: HOSPITAL | Age: 81
End: 2021-01-06
Payer: MEDICARE

## 2021-01-08 ENCOUNTER — HOSPITAL ENCOUNTER (OUTPATIENT)
Dept: RADIOLOGY | Facility: HOSPITAL | Age: 81
Discharge: HOME | End: 2021-01-08
Attending: ORTHOPAEDIC SURGERY
Payer: MEDICARE

## 2021-01-08 ENCOUNTER — PATIENT MESSAGE (OUTPATIENT)
Dept: ADMINISTRATIVE | Facility: OTHER | Age: 81
End: 2021-01-08

## 2021-01-08 ENCOUNTER — OFFICE VISIT (OUTPATIENT)
Dept: INFECTIOUS DISEASES | Facility: CLINIC | Age: 81
End: 2021-01-08
Payer: MEDICARE

## 2021-01-08 ENCOUNTER — INFUSION (OUTPATIENT)
Dept: INFECTIOUS DISEASES | Facility: HOSPITAL | Age: 81
End: 2021-01-08
Attending: INTERNAL MEDICINE
Payer: MEDICARE

## 2021-01-08 ENCOUNTER — OFFICE VISIT (OUTPATIENT)
Dept: ORTHOPEDICS | Facility: CLINIC | Age: 81
End: 2021-01-08
Payer: MEDICARE

## 2021-01-08 VITALS — BODY MASS INDEX: 28.23 KG/M2 | HEIGHT: 66 IN | WEIGHT: 175.69 LBS

## 2021-01-08 DIAGNOSIS — Z96.652 STATUS POST LEFT KNEE REPLACEMENT: Primary | ICD-10-CM

## 2021-01-08 DIAGNOSIS — Z51.81 THERAPEUTIC DRUG MONITORING: ICD-10-CM

## 2021-01-08 DIAGNOSIS — T84.50XD INFECTION OF PROSTHETIC JOINT, SUBSEQUENT ENCOUNTER: Primary | ICD-10-CM

## 2021-01-08 DIAGNOSIS — Z96.652 STATUS POST LEFT KNEE REPLACEMENT: ICD-10-CM

## 2021-01-08 PROCEDURE — 99213 OFFICE O/P EST LOW 20 MIN: CPT | Mod: S$PBB,,, | Performed by: PHYSICIAN ASSISTANT

## 2021-01-08 PROCEDURE — 99999 PR PBB SHADOW E&M-EST. PATIENT-LVL I: CPT | Mod: PBBFAC,,, | Performed by: PHYSICIAN ASSISTANT

## 2021-01-08 PROCEDURE — 99999 PR PBB SHADOW E&M-EST. PATIENT-LVL III: ICD-10-PCS | Mod: PBBFAC,,, | Performed by: ORTHOPAEDIC SURGERY

## 2021-01-08 PROCEDURE — 99213 OFFICE O/P EST LOW 20 MIN: CPT | Mod: PBBFAC,25,27 | Performed by: ORTHOPAEDIC SURGERY

## 2021-01-08 PROCEDURE — 99213 PR OFFICE/OUTPT VISIT, EST, LEVL III, 20-29 MIN: ICD-10-PCS | Mod: S$PBB,,, | Performed by: PHYSICIAN ASSISTANT

## 2021-01-08 PROCEDURE — 99024 POSTOP FOLLOW-UP VISIT: CPT | Mod: POP,,, | Performed by: ORTHOPAEDIC SURGERY

## 2021-01-08 PROCEDURE — 73562 XR KNEE 3 VIEW LEFT: ICD-10-PCS | Mod: 26,LT,, | Performed by: RADIOLOGY

## 2021-01-08 PROCEDURE — 73562 X-RAY EXAM OF KNEE 3: CPT | Mod: 26,LT,, | Performed by: RADIOLOGY

## 2021-01-08 PROCEDURE — 73562 X-RAY EXAM OF KNEE 3: CPT | Mod: TC,LT

## 2021-01-08 PROCEDURE — 99211 OFF/OP EST MAY X REQ PHY/QHP: CPT | Mod: PBBFAC,25 | Performed by: PHYSICIAN ASSISTANT

## 2021-01-08 PROCEDURE — 99024 PR POST-OP FOLLOW-UP VISIT: ICD-10-PCS | Mod: POP,,, | Performed by: ORTHOPAEDIC SURGERY

## 2021-01-08 PROCEDURE — 99999 PR PBB SHADOW E&M-EST. PATIENT-LVL III: CPT | Mod: PBBFAC,,, | Performed by: ORTHOPAEDIC SURGERY

## 2021-01-08 PROCEDURE — 99999 PR PBB SHADOW E&M-EST. PATIENT-LVL I: ICD-10-PCS | Mod: PBBFAC,,, | Performed by: PHYSICIAN ASSISTANT

## 2021-01-08 RX ORDER — CEFADROXIL 500 MG/1
1000 CAPSULE ORAL EVERY 12 HOURS
Qty: 120 CAPSULE | Refills: 5 | Status: SHIPPED | OUTPATIENT
Start: 2021-01-08 | End: 2021-02-07

## 2021-01-22 ENCOUNTER — TELEPHONE (OUTPATIENT)
Dept: ORTHOPEDICS | Facility: CLINIC | Age: 81
End: 2021-01-22

## 2021-01-27 ENCOUNTER — DOCUMENT SCAN (OUTPATIENT)
Dept: HOME HEALTH SERVICES | Facility: HOSPITAL | Age: 81
End: 2021-01-27
Payer: MEDICARE

## 2021-01-28 RX ORDER — ASPIRIN 81 MG/1
81 TABLET ORAL 2 TIMES DAILY
Qty: 60 TABLET | Refills: 0 | Status: ON HOLD | OUTPATIENT
Start: 2021-01-28 | End: 2023-05-03 | Stop reason: HOSPADM

## 2021-01-28 RX ORDER — ASPIRIN 81 MG/1
81 TABLET ORAL 2 TIMES DAILY
Qty: 60 TABLET | Refills: 0 | Status: CANCELLED | OUTPATIENT
Start: 2021-01-28

## 2021-01-30 LAB
ACID FAST MOD KINY STN SPEC: NORMAL
MYCOBACTERIUM SPEC QL CULT: NORMAL

## 2021-01-31 ENCOUNTER — EXTERNAL CHRONIC CARE MANAGEMENT (OUTPATIENT)
Dept: PRIMARY CARE CLINIC | Facility: CLINIC | Age: 81
End: 2021-01-31
Payer: MEDICARE

## 2021-01-31 PROCEDURE — 99490 CHRNC CARE MGMT STAFF 1ST 20: CPT | Mod: PBBFAC,PN | Performed by: FAMILY MEDICINE

## 2021-01-31 PROCEDURE — 99490 PR CHRONIC CARE MGMT, 1ST 20 MIN: ICD-10-PCS | Mod: S$PBB,,, | Performed by: FAMILY MEDICINE

## 2021-01-31 PROCEDURE — 99490 CHRNC CARE MGMT STAFF 1ST 20: CPT | Mod: S$PBB,,, | Performed by: FAMILY MEDICINE

## 2021-02-19 ENCOUNTER — PATIENT MESSAGE (OUTPATIENT)
Dept: ADMINISTRATIVE | Facility: OTHER | Age: 81
End: 2021-02-19

## 2021-02-19 ENCOUNTER — PATIENT OUTREACH (OUTPATIENT)
Dept: ORTHOPEDICS | Facility: CLINIC | Age: 81
End: 2021-02-19

## 2021-02-19 ENCOUNTER — LAB VISIT (OUTPATIENT)
Dept: LAB | Facility: HOSPITAL | Age: 81
End: 2021-02-19
Attending: ORTHOPAEDIC SURGERY
Payer: MEDICARE

## 2021-02-19 ENCOUNTER — OFFICE VISIT (OUTPATIENT)
Dept: ORTHOPEDICS | Facility: CLINIC | Age: 81
End: 2021-02-19
Payer: MEDICARE

## 2021-02-19 DIAGNOSIS — T84.50XD INFECTION OF PROSTHETIC JOINT, SUBSEQUENT ENCOUNTER: ICD-10-CM

## 2021-02-19 DIAGNOSIS — T84.50XD INFECTION OF PROSTHETIC JOINT, SUBSEQUENT ENCOUNTER: Primary | ICD-10-CM

## 2021-02-19 LAB
BASOPHILS # BLD AUTO: 0.03 K/UL (ref 0–0.2)
BASOPHILS NFR BLD: 0.5 % (ref 0–1.9)
CRP SERPL-MCNC: 15 MG/L (ref 0–8.2)
DIFFERENTIAL METHOD: ABNORMAL
EOSINOPHIL # BLD AUTO: 0.1 K/UL (ref 0–0.5)
EOSINOPHIL NFR BLD: 2.3 % (ref 0–8)
ERYTHROCYTE [DISTWIDTH] IN BLOOD BY AUTOMATED COUNT: 16.9 % (ref 11.5–14.5)
ERYTHROCYTE [SEDIMENTATION RATE] IN BLOOD BY WESTERGREN METHOD: 80 MM/HR (ref 0–23)
HCT VFR BLD AUTO: 36.6 % (ref 40–54)
HGB BLD-MCNC: 11.1 G/DL (ref 14–18)
IMM GRANULOCYTES # BLD AUTO: 0.02 K/UL (ref 0–0.04)
IMM GRANULOCYTES NFR BLD AUTO: 0.3 % (ref 0–0.5)
LYMPHOCYTES # BLD AUTO: 1.1 K/UL (ref 1–4.8)
LYMPHOCYTES NFR BLD: 18.2 % (ref 18–48)
MCH RBC QN AUTO: 25.5 PG (ref 27–31)
MCHC RBC AUTO-ENTMCNC: 30.3 G/DL (ref 32–36)
MCV RBC AUTO: 84 FL (ref 82–98)
MONOCYTES # BLD AUTO: 0.7 K/UL (ref 0.3–1)
MONOCYTES NFR BLD: 11.7 % (ref 4–15)
NEUTROPHILS # BLD AUTO: 4 K/UL (ref 1.8–7.7)
NEUTROPHILS NFR BLD: 67 % (ref 38–73)
NRBC BLD-RTO: 0 /100 WBC
PLATELET # BLD AUTO: 269 K/UL (ref 150–350)
PMV BLD AUTO: 9.4 FL (ref 9.2–12.9)
RBC # BLD AUTO: 4.35 M/UL (ref 4.6–6.2)
WBC # BLD AUTO: 5.99 K/UL (ref 3.9–12.7)

## 2021-02-19 PROCEDURE — 99024 POSTOP FOLLOW-UP VISIT: CPT | Mod: POP,,, | Performed by: ORTHOPAEDIC SURGERY

## 2021-02-19 PROCEDURE — 86140 C-REACTIVE PROTEIN: CPT

## 2021-02-19 PROCEDURE — 99999 PR PBB SHADOW E&M-EST. PATIENT-LVL III: CPT | Mod: PBBFAC,,, | Performed by: ORTHOPAEDIC SURGERY

## 2021-02-19 PROCEDURE — 99999 PR PBB SHADOW E&M-EST. PATIENT-LVL III: ICD-10-PCS | Mod: PBBFAC,,, | Performed by: ORTHOPAEDIC SURGERY

## 2021-02-19 PROCEDURE — 99213 OFFICE O/P EST LOW 20 MIN: CPT | Mod: PBBFAC | Performed by: ORTHOPAEDIC SURGERY

## 2021-02-19 PROCEDURE — 85025 COMPLETE CBC W/AUTO DIFF WBC: CPT

## 2021-02-19 PROCEDURE — 36415 COLL VENOUS BLD VENIPUNCTURE: CPT

## 2021-02-19 PROCEDURE — 99024 PR POST-OP FOLLOW-UP VISIT: ICD-10-PCS | Mod: POP,,, | Performed by: ORTHOPAEDIC SURGERY

## 2021-02-19 PROCEDURE — 85652 RBC SED RATE AUTOMATED: CPT

## 2021-02-22 ENCOUNTER — TELEPHONE (OUTPATIENT)
Dept: RADIATION ONCOLOGY | Facility: CLINIC | Age: 81
End: 2021-02-22

## 2021-02-23 ENCOUNTER — TELEPHONE (OUTPATIENT)
Dept: FAMILY MEDICINE | Facility: CLINIC | Age: 81
End: 2021-02-23

## 2021-02-23 ENCOUNTER — TELEPHONE (OUTPATIENT)
Dept: INFECTIOUS DISEASES | Facility: CLINIC | Age: 81
End: 2021-02-23

## 2021-02-23 DIAGNOSIS — T84.50XD INFECTION OF PROSTHETIC JOINT, SUBSEQUENT ENCOUNTER: Primary | ICD-10-CM

## 2021-02-24 ENCOUNTER — OFFICE VISIT (OUTPATIENT)
Dept: FAMILY MEDICINE | Facility: CLINIC | Age: 81
End: 2021-02-24
Payer: MEDICARE

## 2021-02-24 VITALS
WEIGHT: 171.75 LBS | HEIGHT: 66 IN | DIASTOLIC BLOOD PRESSURE: 76 MMHG | BODY MASS INDEX: 27.6 KG/M2 | TEMPERATURE: 98 F | OXYGEN SATURATION: 98 % | HEART RATE: 95 BPM | SYSTOLIC BLOOD PRESSURE: 134 MMHG | RESPIRATION RATE: 18 BRPM

## 2021-02-24 DIAGNOSIS — Z85.46 HISTORY OF PROSTATE CANCER: ICD-10-CM

## 2021-02-24 DIAGNOSIS — R35.1 NOCTURIA: Primary | ICD-10-CM

## 2021-02-24 PROCEDURE — 99213 OFFICE O/P EST LOW 20 MIN: CPT | Mod: S$PBB,,, | Performed by: NURSE PRACTITIONER

## 2021-02-24 PROCEDURE — 87086 URINE CULTURE/COLONY COUNT: CPT

## 2021-02-24 PROCEDURE — 99213 OFFICE O/P EST LOW 20 MIN: CPT | Mod: PBBFAC,PN | Performed by: NURSE PRACTITIONER

## 2021-02-24 PROCEDURE — 99999 PR PBB SHADOW E&M-EST. PATIENT-LVL III: ICD-10-PCS | Mod: PBBFAC,,, | Performed by: NURSE PRACTITIONER

## 2021-02-24 PROCEDURE — 99999 PR PBB SHADOW E&M-EST. PATIENT-LVL III: CPT | Mod: PBBFAC,,, | Performed by: NURSE PRACTITIONER

## 2021-02-24 PROCEDURE — 99213 PR OFFICE/OUTPT VISIT, EST, LEVL III, 20-29 MIN: ICD-10-PCS | Mod: S$PBB,,, | Performed by: NURSE PRACTITIONER

## 2021-02-24 RX ORDER — CEFADROXIL 500 MG/1
1000 CAPSULE ORAL EVERY 12 HOURS
COMMUNITY
End: 2022-01-28

## 2021-02-25 ENCOUNTER — TELEPHONE (OUTPATIENT)
Dept: ORTHOPEDICS | Facility: CLINIC | Age: 81
End: 2021-02-25

## 2021-02-25 DIAGNOSIS — T84.50XD INFECTION OF PROSTHETIC JOINT, SUBSEQUENT ENCOUNTER: Primary | ICD-10-CM

## 2021-02-26 ENCOUNTER — TELEPHONE (OUTPATIENT)
Dept: FAMILY MEDICINE | Facility: CLINIC | Age: 81
End: 2021-02-26

## 2021-02-26 LAB — BACTERIA UR CULT: NO GROWTH

## 2021-02-28 ENCOUNTER — EXTERNAL CHRONIC CARE MANAGEMENT (OUTPATIENT)
Dept: PRIMARY CARE CLINIC | Facility: CLINIC | Age: 81
End: 2021-02-28
Payer: MEDICARE

## 2021-02-28 PROCEDURE — 99490 PR CHRONIC CARE MGMT, 1ST 20 MIN: ICD-10-PCS | Mod: S$PBB,,, | Performed by: FAMILY MEDICINE

## 2021-02-28 PROCEDURE — 99490 CHRNC CARE MGMT STAFF 1ST 20: CPT | Mod: PBBFAC,PN | Performed by: FAMILY MEDICINE

## 2021-02-28 PROCEDURE — 99490 CHRNC CARE MGMT STAFF 1ST 20: CPT | Mod: S$PBB,,, | Performed by: FAMILY MEDICINE

## 2021-03-05 ENCOUNTER — LAB VISIT (OUTPATIENT)
Dept: LAB | Facility: HOSPITAL | Age: 81
End: 2021-03-05
Attending: PHYSICIAN ASSISTANT
Payer: MEDICARE

## 2021-03-05 DIAGNOSIS — T84.50XD INFECTION OF PROSTHETIC JOINT, SUBSEQUENT ENCOUNTER: ICD-10-CM

## 2021-03-05 LAB
ALBUMIN SERPL BCP-MCNC: 3.5 G/DL (ref 3.5–5.2)
ALP SERPL-CCNC: 74 U/L (ref 55–135)
ALT SERPL W/O P-5'-P-CCNC: 13 U/L (ref 10–44)
ANION GAP SERPL CALC-SCNC: 9 MMOL/L (ref 8–16)
AST SERPL-CCNC: 16 U/L (ref 10–40)
BILIRUB SERPL-MCNC: 0.3 MG/DL (ref 0.1–1)
BUN SERPL-MCNC: 13 MG/DL (ref 8–23)
CALCIUM SERPL-MCNC: 9 MG/DL (ref 8.7–10.5)
CHLORIDE SERPL-SCNC: 102 MMOL/L (ref 95–110)
CO2 SERPL-SCNC: 26 MMOL/L (ref 23–29)
CREAT SERPL-MCNC: 0.8 MG/DL (ref 0.5–1.4)
CRP SERPL-MCNC: 13.6 MG/L (ref 0–8.2)
ERYTHROCYTE [SEDIMENTATION RATE] IN BLOOD BY WESTERGREN METHOD: 68 MM/HR (ref 0–10)
EST. GFR  (AFRICAN AMERICAN): >60 ML/MIN/1.73 M^2
EST. GFR  (NON AFRICAN AMERICAN): >60 ML/MIN/1.73 M^2
GLUCOSE SERPL-MCNC: 106 MG/DL (ref 70–110)
POTASSIUM SERPL-SCNC: 4.8 MMOL/L (ref 3.5–5.1)
PROT SERPL-MCNC: 8.1 G/DL (ref 6–8.4)
SODIUM SERPL-SCNC: 137 MMOL/L (ref 136–145)

## 2021-03-05 PROCEDURE — 80053 COMPREHEN METABOLIC PANEL: CPT | Performed by: PHYSICIAN ASSISTANT

## 2021-03-05 PROCEDURE — 36415 COLL VENOUS BLD VENIPUNCTURE: CPT | Performed by: PHYSICIAN ASSISTANT

## 2021-03-05 PROCEDURE — 85652 RBC SED RATE AUTOMATED: CPT | Performed by: PHYSICIAN ASSISTANT

## 2021-03-05 PROCEDURE — 86140 C-REACTIVE PROTEIN: CPT | Performed by: PHYSICIAN ASSISTANT

## 2021-03-23 ENCOUNTER — PES CALL (OUTPATIENT)
Dept: ADMINISTRATIVE | Facility: CLINIC | Age: 81
End: 2021-03-23

## 2021-03-31 ENCOUNTER — EXTERNAL CHRONIC CARE MANAGEMENT (OUTPATIENT)
Dept: PRIMARY CARE CLINIC | Facility: CLINIC | Age: 81
End: 2021-03-31
Payer: MEDICARE

## 2021-03-31 PROCEDURE — 99490 PR CHRONIC CARE MGMT, 1ST 20 MIN: ICD-10-PCS | Mod: S$PBB,,, | Performed by: FAMILY MEDICINE

## 2021-03-31 PROCEDURE — 99490 CHRNC CARE MGMT STAFF 1ST 20: CPT | Mod: PBBFAC,PN | Performed by: FAMILY MEDICINE

## 2021-03-31 PROCEDURE — 99490 CHRNC CARE MGMT STAFF 1ST 20: CPT | Mod: S$PBB,,, | Performed by: FAMILY MEDICINE

## 2021-04-29 ENCOUNTER — TELEPHONE (OUTPATIENT)
Dept: ORTHOPEDICS | Facility: CLINIC | Age: 81
End: 2021-04-29

## 2021-04-30 ENCOUNTER — EXTERNAL CHRONIC CARE MANAGEMENT (OUTPATIENT)
Dept: PRIMARY CARE CLINIC | Facility: CLINIC | Age: 81
End: 2021-04-30
Payer: MEDICARE

## 2021-04-30 PROCEDURE — 99490 CHRNC CARE MGMT STAFF 1ST 20: CPT | Mod: PBBFAC,PN | Performed by: FAMILY MEDICINE

## 2021-04-30 PROCEDURE — 99490 PR CHRONIC CARE MGMT, 1ST 20 MIN: ICD-10-PCS | Mod: S$PBB,,, | Performed by: FAMILY MEDICINE

## 2021-04-30 PROCEDURE — 99490 CHRNC CARE MGMT STAFF 1ST 20: CPT | Mod: S$PBB,,, | Performed by: FAMILY MEDICINE

## 2021-05-18 DIAGNOSIS — C61 PROSTATE CANCER: Primary | ICD-10-CM

## 2021-05-19 ENCOUNTER — PATIENT OUTREACH (OUTPATIENT)
Dept: ADMINISTRATIVE | Facility: OTHER | Age: 81
End: 2021-05-19

## 2021-05-20 ENCOUNTER — OFFICE VISIT (OUTPATIENT)
Dept: ORTHOPEDICS | Facility: CLINIC | Age: 81
End: 2021-05-20
Payer: MEDICARE

## 2021-05-20 ENCOUNTER — OFFICE VISIT (OUTPATIENT)
Dept: INFECTIOUS DISEASES | Facility: CLINIC | Age: 81
End: 2021-05-20
Payer: MEDICARE

## 2021-05-20 VITALS — BODY MASS INDEX: 27.91 KG/M2 | HEIGHT: 66 IN | WEIGHT: 173.63 LBS

## 2021-05-20 DIAGNOSIS — T84.50XD INFECTION OF PROSTHETIC JOINT, SUBSEQUENT ENCOUNTER: Primary | ICD-10-CM

## 2021-05-20 PROCEDURE — 99213 PR OFFICE/OUTPT VISIT, EST, LEVL III, 20-29 MIN: ICD-10-PCS | Mod: S$PBB,,, | Performed by: ORTHOPAEDIC SURGERY

## 2021-05-20 PROCEDURE — 99213 OFFICE O/P EST LOW 20 MIN: CPT | Mod: S$PBB,,, | Performed by: ORTHOPAEDIC SURGERY

## 2021-05-20 PROCEDURE — 99213 OFFICE O/P EST LOW 20 MIN: CPT | Mod: PBBFAC | Performed by: ORTHOPAEDIC SURGERY

## 2021-05-20 PROCEDURE — 99999 PR PBB SHADOW E&M-EST. PATIENT-LVL III: CPT | Mod: PBBFAC,,, | Performed by: ORTHOPAEDIC SURGERY

## 2021-05-20 PROCEDURE — 99213 PR OFFICE/OUTPT VISIT, EST, LEVL III, 20-29 MIN: ICD-10-PCS | Mod: S$PBB,,, | Performed by: PHYSICIAN ASSISTANT

## 2021-05-20 PROCEDURE — 99213 OFFICE O/P EST LOW 20 MIN: CPT | Mod: S$PBB,,, | Performed by: PHYSICIAN ASSISTANT

## 2021-05-20 PROCEDURE — 99999 PR PBB SHADOW E&M-EST. PATIENT-LVL III: ICD-10-PCS | Mod: PBBFAC,,, | Performed by: ORTHOPAEDIC SURGERY

## 2021-05-31 ENCOUNTER — EXTERNAL CHRONIC CARE MANAGEMENT (OUTPATIENT)
Dept: PRIMARY CARE CLINIC | Facility: CLINIC | Age: 81
End: 2021-05-31
Payer: MEDICARE

## 2021-05-31 PROCEDURE — 99490 CHRNC CARE MGMT STAFF 1ST 20: CPT | Mod: PBBFAC,PN | Performed by: FAMILY MEDICINE

## 2021-05-31 PROCEDURE — 99490 PR CHRONIC CARE MGMT, 1ST 20 MIN: ICD-10-PCS | Mod: S$PBB,,, | Performed by: FAMILY MEDICINE

## 2021-05-31 PROCEDURE — 99490 CHRNC CARE MGMT STAFF 1ST 20: CPT | Mod: S$PBB,,, | Performed by: FAMILY MEDICINE

## 2021-06-30 ENCOUNTER — EXTERNAL CHRONIC CARE MANAGEMENT (OUTPATIENT)
Dept: PRIMARY CARE CLINIC | Facility: CLINIC | Age: 81
End: 2021-06-30
Payer: MEDICARE

## 2021-06-30 PROCEDURE — 99490 PR CHRONIC CARE MGMT, 1ST 20 MIN: ICD-10-PCS | Mod: S$PBB,,, | Performed by: FAMILY MEDICINE

## 2021-06-30 PROCEDURE — 99490 CHRNC CARE MGMT STAFF 1ST 20: CPT | Mod: S$PBB,,, | Performed by: FAMILY MEDICINE

## 2021-06-30 PROCEDURE — 99490 CHRNC CARE MGMT STAFF 1ST 20: CPT | Mod: PBBFAC,PN | Performed by: FAMILY MEDICINE

## 2021-07-06 ENCOUNTER — LAB VISIT (OUTPATIENT)
Dept: LAB | Facility: HOSPITAL | Age: 81
End: 2021-07-06
Payer: MEDICARE

## 2021-07-06 DIAGNOSIS — T84.50XD INFECTION OF PROSTHETIC JOINT, SUBSEQUENT ENCOUNTER: ICD-10-CM

## 2021-07-06 LAB
ALBUMIN SERPL BCP-MCNC: 3.8 G/DL (ref 3.5–5.2)
ALP SERPL-CCNC: 65 U/L (ref 55–135)
ALT SERPL W/O P-5'-P-CCNC: 15 U/L (ref 10–44)
ANION GAP SERPL CALC-SCNC: 7 MMOL/L (ref 8–16)
AST SERPL-CCNC: 19 U/L (ref 10–40)
BASOPHILS # BLD AUTO: 0.04 K/UL (ref 0–0.2)
BASOPHILS NFR BLD: 0.8 % (ref 0–1.9)
BILIRUB SERPL-MCNC: 0.4 MG/DL (ref 0.1–1)
BUN SERPL-MCNC: 14 MG/DL (ref 8–23)
CALCIUM SERPL-MCNC: 8.9 MG/DL (ref 8.7–10.5)
CHLORIDE SERPL-SCNC: 106 MMOL/L (ref 95–110)
CO2 SERPL-SCNC: 27 MMOL/L (ref 23–29)
CREAT SERPL-MCNC: 0.9 MG/DL (ref 0.5–1.4)
CRP SERPL-MCNC: 1 MG/L (ref 0–8.2)
DIFFERENTIAL METHOD: ABNORMAL
EOSINOPHIL # BLD AUTO: 0.2 K/UL (ref 0–0.5)
EOSINOPHIL NFR BLD: 3.5 % (ref 0–8)
ERYTHROCYTE [DISTWIDTH] IN BLOOD BY AUTOMATED COUNT: 17.2 % (ref 11.5–14.5)
EST. GFR  (AFRICAN AMERICAN): >60 ML/MIN/1.73 M^2
EST. GFR  (NON AFRICAN AMERICAN): >60 ML/MIN/1.73 M^2
GLUCOSE SERPL-MCNC: 93 MG/DL (ref 70–110)
HCT VFR BLD AUTO: 41.5 % (ref 40–54)
HGB BLD-MCNC: 12.7 G/DL (ref 14–18)
IMM GRANULOCYTES # BLD AUTO: 0.02 K/UL (ref 0–0.04)
IMM GRANULOCYTES NFR BLD AUTO: 0.4 % (ref 0–0.5)
LYMPHOCYTES # BLD AUTO: 1.1 K/UL (ref 1–4.8)
LYMPHOCYTES NFR BLD: 20.7 % (ref 18–48)
MCH RBC QN AUTO: 26.2 PG (ref 27–31)
MCHC RBC AUTO-ENTMCNC: 30.6 G/DL (ref 32–36)
MCV RBC AUTO: 86 FL (ref 82–98)
MONOCYTES # BLD AUTO: 0.5 K/UL (ref 0.3–1)
MONOCYTES NFR BLD: 10.6 % (ref 4–15)
NEUTROPHILS # BLD AUTO: 3.3 K/UL (ref 1.8–7.7)
NEUTROPHILS NFR BLD: 64 % (ref 38–73)
NRBC BLD-RTO: 0 /100 WBC
PLATELET # BLD AUTO: 208 K/UL (ref 150–450)
PMV BLD AUTO: 10.9 FL (ref 9.2–12.9)
POTASSIUM SERPL-SCNC: 3.9 MMOL/L (ref 3.5–5.1)
PROT SERPL-MCNC: 7.3 G/DL (ref 6–8.4)
RBC # BLD AUTO: 4.84 M/UL (ref 4.6–6.2)
SODIUM SERPL-SCNC: 140 MMOL/L (ref 136–145)
WBC # BLD AUTO: 5.11 K/UL (ref 3.9–12.7)

## 2021-07-06 PROCEDURE — 36415 COLL VENOUS BLD VENIPUNCTURE: CPT | Mod: PN | Performed by: PHYSICIAN ASSISTANT

## 2021-07-06 PROCEDURE — 80053 COMPREHEN METABOLIC PANEL: CPT | Performed by: PHYSICIAN ASSISTANT

## 2021-07-06 PROCEDURE — 86140 C-REACTIVE PROTEIN: CPT | Performed by: PHYSICIAN ASSISTANT

## 2021-07-06 PROCEDURE — 85025 COMPLETE CBC W/AUTO DIFF WBC: CPT | Performed by: PHYSICIAN ASSISTANT

## 2021-07-13 ENCOUNTER — OFFICE VISIT (OUTPATIENT)
Dept: RADIATION ONCOLOGY | Facility: CLINIC | Age: 81
End: 2021-07-13
Payer: MEDICARE

## 2021-07-13 ENCOUNTER — LAB VISIT (OUTPATIENT)
Dept: LAB | Facility: HOSPITAL | Age: 81
End: 2021-07-13
Attending: RADIOLOGY
Payer: MEDICARE

## 2021-07-13 VITALS
DIASTOLIC BLOOD PRESSURE: 86 MMHG | SYSTOLIC BLOOD PRESSURE: 174 MMHG | BODY MASS INDEX: 28.42 KG/M2 | HEART RATE: 78 BPM | RESPIRATION RATE: 18 BRPM | WEIGHT: 176.13 LBS | TEMPERATURE: 98 F

## 2021-07-13 DIAGNOSIS — Z85.46 HISTORY OF PROSTATE CANCER: Primary | ICD-10-CM

## 2021-07-13 DIAGNOSIS — Z85.46 HISTORY OF PROSTATE CANCER: ICD-10-CM

## 2021-07-13 LAB — COMPLEXED PSA SERPL-MCNC: <0.01 NG/ML (ref 0–4)

## 2021-07-13 PROCEDURE — 99999 PR PBB SHADOW E&M-EST. PATIENT-LVL III: ICD-10-PCS | Mod: PBBFAC,,, | Performed by: RADIOLOGY

## 2021-07-13 PROCEDURE — 36415 COLL VENOUS BLD VENIPUNCTURE: CPT | Performed by: RADIOLOGY

## 2021-07-13 PROCEDURE — 99212 OFFICE O/P EST SF 10 MIN: CPT | Mod: S$PBB,,, | Performed by: RADIOLOGY

## 2021-07-13 PROCEDURE — 84153 ASSAY OF PSA TOTAL: CPT | Performed by: RADIOLOGY

## 2021-07-13 PROCEDURE — 99213 OFFICE O/P EST LOW 20 MIN: CPT | Mod: PBBFAC | Performed by: RADIOLOGY

## 2021-07-13 PROCEDURE — 99212 PR OFFICE/OUTPT VISIT, EST, LEVL II, 10-19 MIN: ICD-10-PCS | Mod: S$PBB,,, | Performed by: RADIOLOGY

## 2021-07-13 PROCEDURE — 99999 PR PBB SHADOW E&M-EST. PATIENT-LVL III: CPT | Mod: PBBFAC,,, | Performed by: RADIOLOGY

## 2021-07-31 ENCOUNTER — EXTERNAL CHRONIC CARE MANAGEMENT (OUTPATIENT)
Dept: PRIMARY CARE CLINIC | Facility: CLINIC | Age: 81
End: 2021-07-31
Payer: MEDICARE

## 2021-07-31 PROCEDURE — 99490 CHRNC CARE MGMT STAFF 1ST 20: CPT | Mod: S$PBB,,, | Performed by: FAMILY MEDICINE

## 2021-07-31 PROCEDURE — 99490 CHRNC CARE MGMT STAFF 1ST 20: CPT | Mod: PBBFAC,PN | Performed by: FAMILY MEDICINE

## 2021-07-31 PROCEDURE — 99490 PR CHRONIC CARE MGMT, 1ST 20 MIN: ICD-10-PCS | Mod: S$PBB,,, | Performed by: FAMILY MEDICINE

## 2021-08-05 NOTE — ASSESSMENT & PLAN NOTE
80 y.o. male with history of left TKA in 2015 admitted with left prosthetic knee infection s/p I&D and poly exchange on 11/28. Aspiration and surgical cx are positive for GBS. He is on empiric Vanc/Ceftriaxone. He is doing well post operatively. Afebrile. No leukocytosis.  Pain well controlled.    Plan  Recommend Ceftriaxone 2 g IV q 24 hours x 6 weeks from day of surgery    End date of IV antibiotics: 1/9/21    Weekly outpatient laboratory on Monday or Tuesday while on IV antibiotics.    CBC   CMP   ESR and CRP    Fax laboratory results to [facility] at [phone] with attn: Elisa Up    Outpatient Infectious Diseases clinic follow up will be arranged and found in patient calendar.    Prior to discharge, please ensure the patient's follow-up has been scheduled.  If there is still no follow-up scheduled in Infectious Diseases clinic, please send an EPIC message to Jazmine Conroy in Infectious Diseases.    ID will sign off.     0 Hour(s) 22 Minute(s)

## 2021-08-31 ENCOUNTER — EXTERNAL CHRONIC CARE MANAGEMENT (OUTPATIENT)
Dept: PRIMARY CARE CLINIC | Facility: CLINIC | Age: 81
End: 2021-08-31
Payer: MEDICARE

## 2021-08-31 PROCEDURE — 99490 CHRNC CARE MGMT STAFF 1ST 20: CPT | Mod: S$PBB,,, | Performed by: FAMILY MEDICINE

## 2021-08-31 PROCEDURE — 99490 CHRNC CARE MGMT STAFF 1ST 20: CPT | Mod: PBBFAC,PN | Performed by: FAMILY MEDICINE

## 2021-08-31 PROCEDURE — 99490 PR CHRONIC CARE MGMT, 1ST 20 MIN: ICD-10-PCS | Mod: S$PBB,,, | Performed by: FAMILY MEDICINE

## 2021-09-28 ENCOUNTER — OFFICE VISIT (OUTPATIENT)
Dept: OPTOMETRY | Facility: CLINIC | Age: 81
End: 2021-09-28
Payer: MEDICARE

## 2021-09-28 DIAGNOSIS — H25.13 NUCLEAR SCLEROSIS, BILATERAL: Primary | ICD-10-CM

## 2021-09-28 DIAGNOSIS — Z13.5 GLAUCOMA SCREENING: ICD-10-CM

## 2021-09-28 PROCEDURE — 99999 PR PBB SHADOW E&M-EST. PATIENT-LVL III: ICD-10-PCS | Mod: PBBFAC,,, | Performed by: OPTOMETRIST

## 2021-09-28 PROCEDURE — 99999 PR PBB SHADOW E&M-EST. PATIENT-LVL III: CPT | Mod: PBBFAC,,, | Performed by: OPTOMETRIST

## 2021-09-28 PROCEDURE — 99213 OFFICE O/P EST LOW 20 MIN: CPT | Mod: PBBFAC,PO | Performed by: OPTOMETRIST

## 2021-09-28 PROCEDURE — 92004 PR EYE EXAM, NEW PATIENT,COMPREHESV: ICD-10-PCS | Mod: S$PBB,,, | Performed by: OPTOMETRIST

## 2021-09-28 PROCEDURE — 92004 COMPRE OPH EXAM NEW PT 1/>: CPT | Mod: S$PBB,,, | Performed by: OPTOMETRIST

## 2021-09-30 ENCOUNTER — EXTERNAL CHRONIC CARE MANAGEMENT (OUTPATIENT)
Dept: PRIMARY CARE CLINIC | Facility: CLINIC | Age: 81
End: 2021-09-30
Payer: MEDICARE

## 2021-09-30 PROCEDURE — 99490 PR CHRONIC CARE MGMT, 1ST 20 MIN: ICD-10-PCS | Mod: S$PBB,,, | Performed by: FAMILY MEDICINE

## 2021-09-30 PROCEDURE — 99490 CHRNC CARE MGMT STAFF 1ST 20: CPT | Mod: PBBFAC,PN | Performed by: FAMILY MEDICINE

## 2021-09-30 PROCEDURE — 99490 CHRNC CARE MGMT STAFF 1ST 20: CPT | Mod: S$PBB,,, | Performed by: FAMILY MEDICINE

## 2021-10-11 ENCOUNTER — OFFICE VISIT (OUTPATIENT)
Dept: FAMILY MEDICINE | Facility: CLINIC | Age: 81
End: 2021-10-11
Payer: MEDICARE

## 2021-10-11 VITALS
WEIGHT: 180.75 LBS | HEART RATE: 73 BPM | OXYGEN SATURATION: 97 % | SYSTOLIC BLOOD PRESSURE: 144 MMHG | TEMPERATURE: 98 F | DIASTOLIC BLOOD PRESSURE: 72 MMHG | BODY MASS INDEX: 29.05 KG/M2 | RESPIRATION RATE: 18 BRPM | HEIGHT: 66 IN

## 2021-10-11 DIAGNOSIS — R09.81 CHRONIC NASAL CONGESTION: Primary | ICD-10-CM

## 2021-10-11 DIAGNOSIS — Z23 NEEDS FLU SHOT: ICD-10-CM

## 2021-10-11 DIAGNOSIS — J30.2 SEASONAL ALLERGIES: ICD-10-CM

## 2021-10-11 PROCEDURE — G0008 ADMIN INFLUENZA VIRUS VAC: HCPCS | Mod: PBBFAC,PN

## 2021-10-11 PROCEDURE — 99999 PR PBB SHADOW E&M-EST. PATIENT-LVL V: ICD-10-PCS | Mod: PBBFAC,,, | Performed by: NURSE PRACTITIONER

## 2021-10-11 PROCEDURE — 99215 OFFICE O/P EST HI 40 MIN: CPT | Mod: PBBFAC,PN | Performed by: NURSE PRACTITIONER

## 2021-10-11 PROCEDURE — 99999 PR PBB SHADOW E&M-EST. PATIENT-LVL V: CPT | Mod: PBBFAC,,, | Performed by: NURSE PRACTITIONER

## 2021-10-11 PROCEDURE — 99214 OFFICE O/P EST MOD 30 MIN: CPT | Mod: S$PBB,,, | Performed by: NURSE PRACTITIONER

## 2021-10-11 PROCEDURE — 90694 VACC AIIV4 NO PRSRV 0.5ML IM: CPT | Mod: PBBFAC,PN

## 2021-10-11 PROCEDURE — 99214 PR OFFICE/OUTPT VISIT, EST, LEVL IV, 30-39 MIN: ICD-10-PCS | Mod: S$PBB,,, | Performed by: NURSE PRACTITIONER

## 2021-10-11 RX ORDER — FLUTICASONE PROPIONATE 50 MCG
1 SPRAY, SUSPENSION (ML) NASAL DAILY
Qty: 16 G | Refills: 0 | Status: ON HOLD | OUTPATIENT
Start: 2021-10-11 | End: 2023-05-03 | Stop reason: HOSPADM

## 2021-10-31 ENCOUNTER — EXTERNAL CHRONIC CARE MANAGEMENT (OUTPATIENT)
Dept: PRIMARY CARE CLINIC | Facility: CLINIC | Age: 81
End: 2021-10-31
Payer: MEDICARE

## 2021-10-31 PROCEDURE — 99490 CHRNC CARE MGMT STAFF 1ST 20: CPT | Mod: S$PBB,,, | Performed by: FAMILY MEDICINE

## 2021-10-31 PROCEDURE — 99490 PR CHRONIC CARE MGMT, 1ST 20 MIN: ICD-10-PCS | Mod: S$PBB,,, | Performed by: FAMILY MEDICINE

## 2021-10-31 PROCEDURE — 99490 CHRNC CARE MGMT STAFF 1ST 20: CPT | Mod: PBBFAC,PN | Performed by: FAMILY MEDICINE

## 2021-11-08 ENCOUNTER — OFFICE VISIT (OUTPATIENT)
Dept: OTOLARYNGOLOGY | Facility: CLINIC | Age: 81
End: 2021-11-08
Payer: MEDICARE

## 2021-11-08 ENCOUNTER — DOCUMENTATION ONLY (OUTPATIENT)
Dept: OTOLARYNGOLOGY | Facility: CLINIC | Age: 81
End: 2021-11-08

## 2021-11-08 VITALS — WEIGHT: 176.81 LBS | BODY MASS INDEX: 28.42 KG/M2 | HEIGHT: 66 IN

## 2021-11-08 DIAGNOSIS — J34.2 DEVIATED NASAL SEPTUM: ICD-10-CM

## 2021-11-08 DIAGNOSIS — Z08 ENCOUNTER FOR FOLLOW-UP SURVEILLANCE OF ORAL CAVITY CANCER: ICD-10-CM

## 2021-11-08 DIAGNOSIS — Z85.819 ENCOUNTER FOR FOLLOW-UP SURVEILLANCE OF ORAL CAVITY CANCER: ICD-10-CM

## 2021-11-08 DIAGNOSIS — J34.89 CONCHA BULLOSA: ICD-10-CM

## 2021-11-08 DIAGNOSIS — Z92.3 HISTORY OF HEAD AND NECK RADIATION: Primary | ICD-10-CM

## 2021-11-08 DIAGNOSIS — R09.81 CHRONIC NASAL CONGESTION: ICD-10-CM

## 2021-11-08 PROCEDURE — 31231 PR NASAL ENDOSCOPY, DX: ICD-10-PCS | Mod: S$GLB,,, | Performed by: OTOLARYNGOLOGY

## 2021-11-08 PROCEDURE — 99203 PR OFFICE/OUTPT VISIT, NEW, LEVL III, 30-44 MIN: ICD-10-PCS | Mod: 25,S$GLB,, | Performed by: OTOLARYNGOLOGY

## 2021-11-08 PROCEDURE — 99203 OFFICE O/P NEW LOW 30 MIN: CPT | Mod: 25,S$GLB,, | Performed by: OTOLARYNGOLOGY

## 2021-11-08 PROCEDURE — 31231 NASAL ENDOSCOPY DX: CPT | Mod: S$GLB,,, | Performed by: OTOLARYNGOLOGY

## 2021-11-08 RX ORDER — FLUTICASONE PROPIONATE 50 MCG
1 SPRAY, SUSPENSION (ML) NASAL 2 TIMES DAILY
Qty: 18.2 ML | Refills: 3 | Status: SHIPPED | OUTPATIENT
Start: 2021-11-08 | End: 2022-10-24

## 2021-11-30 ENCOUNTER — OFFICE VISIT (OUTPATIENT)
Dept: ORTHOPEDICS | Facility: CLINIC | Age: 81
End: 2021-11-30
Payer: MEDICARE

## 2021-11-30 ENCOUNTER — TELEPHONE (OUTPATIENT)
Dept: FAMILY MEDICINE | Facility: CLINIC | Age: 81
End: 2021-11-30
Payer: MEDICARE

## 2021-11-30 ENCOUNTER — EXTERNAL CHRONIC CARE MANAGEMENT (OUTPATIENT)
Dept: PRIMARY CARE CLINIC | Facility: CLINIC | Age: 81
End: 2021-11-30
Payer: MEDICARE

## 2021-11-30 ENCOUNTER — PATIENT OUTREACH (OUTPATIENT)
Dept: ADMINISTRATIVE | Facility: OTHER | Age: 81
End: 2021-11-30
Payer: MEDICARE

## 2021-11-30 ENCOUNTER — HOSPITAL ENCOUNTER (OUTPATIENT)
Dept: RADIOLOGY | Facility: HOSPITAL | Age: 81
Discharge: HOME | End: 2021-11-30
Attending: ORTHOPAEDIC SURGERY
Payer: MEDICARE

## 2021-11-30 VITALS — WEIGHT: 178.38 LBS | BODY MASS INDEX: 28.67 KG/M2 | HEIGHT: 66 IN

## 2021-11-30 DIAGNOSIS — M25.569 KNEE PAIN, UNSPECIFIED CHRONICITY, UNSPECIFIED LATERALITY: Primary | ICD-10-CM

## 2021-11-30 DIAGNOSIS — M25.569 KNEE PAIN, UNSPECIFIED CHRONICITY, UNSPECIFIED LATERALITY: ICD-10-CM

## 2021-11-30 PROCEDURE — 99213 PR OFFICE/OUTPT VISIT, EST, LEVL III, 20-29 MIN: ICD-10-PCS | Mod: S$PBB,,, | Performed by: ORTHOPAEDIC SURGERY

## 2021-11-30 PROCEDURE — 99213 OFFICE O/P EST LOW 20 MIN: CPT | Mod: PBBFAC | Performed by: ORTHOPAEDIC SURGERY

## 2021-11-30 PROCEDURE — 73562 XR KNEE ORTHO LEFT: ICD-10-PCS | Mod: 26,LT,, | Performed by: RADIOLOGY

## 2021-11-30 PROCEDURE — 99490 CHRNC CARE MGMT STAFF 1ST 20: CPT | Mod: PBBFAC,PN | Performed by: FAMILY MEDICINE

## 2021-11-30 PROCEDURE — 73562 X-RAY EXAM OF KNEE 3: CPT | Mod: 26,LT,, | Performed by: RADIOLOGY

## 2021-11-30 PROCEDURE — 73560 XR KNEE ORTHO LEFT: ICD-10-PCS | Mod: 26,RT,, | Performed by: RADIOLOGY

## 2021-11-30 PROCEDURE — 99999 PR PBB SHADOW E&M-EST. PATIENT-LVL III: ICD-10-PCS | Mod: PBBFAC,,, | Performed by: ORTHOPAEDIC SURGERY

## 2021-11-30 PROCEDURE — 73560 X-RAY EXAM OF KNEE 1 OR 2: CPT | Mod: TC,RT

## 2021-11-30 PROCEDURE — 99999 PR PBB SHADOW E&M-EST. PATIENT-LVL III: CPT | Mod: PBBFAC,,, | Performed by: ORTHOPAEDIC SURGERY

## 2021-11-30 PROCEDURE — 73560 X-RAY EXAM OF KNEE 1 OR 2: CPT | Mod: 26,RT,, | Performed by: RADIOLOGY

## 2021-11-30 PROCEDURE — 99490 PR CHRONIC CARE MGMT, 1ST 20 MIN: ICD-10-PCS | Mod: S$PBB,,, | Performed by: FAMILY MEDICINE

## 2021-11-30 PROCEDURE — 99490 CHRNC CARE MGMT STAFF 1ST 20: CPT | Mod: S$PBB,,, | Performed by: FAMILY MEDICINE

## 2021-11-30 PROCEDURE — 99213 OFFICE O/P EST LOW 20 MIN: CPT | Mod: S$PBB,,, | Performed by: ORTHOPAEDIC SURGERY

## 2021-12-31 ENCOUNTER — EXTERNAL CHRONIC CARE MANAGEMENT (OUTPATIENT)
Dept: PRIMARY CARE CLINIC | Facility: CLINIC | Age: 81
End: 2021-12-31
Payer: MEDICARE

## 2021-12-31 PROCEDURE — 99490 CHRNC CARE MGMT STAFF 1ST 20: CPT | Mod: PBBFAC,PN | Performed by: FAMILY MEDICINE

## 2021-12-31 PROCEDURE — 99490 CHRNC CARE MGMT STAFF 1ST 20: CPT | Mod: S$PBB,,, | Performed by: FAMILY MEDICINE

## 2021-12-31 PROCEDURE — 99490 PR CHRONIC CARE MGMT, 1ST 20 MIN: ICD-10-PCS | Mod: S$PBB,,, | Performed by: FAMILY MEDICINE

## 2022-01-28 ENCOUNTER — LAB VISIT (OUTPATIENT)
Dept: LAB | Facility: HOSPITAL | Age: 82
End: 2022-01-28
Attending: FAMILY MEDICINE
Payer: MEDICARE

## 2022-01-28 ENCOUNTER — OFFICE VISIT (OUTPATIENT)
Dept: FAMILY MEDICINE | Facility: CLINIC | Age: 82
End: 2022-01-28
Payer: MEDICARE

## 2022-01-28 VITALS
DIASTOLIC BLOOD PRESSURE: 78 MMHG | RESPIRATION RATE: 18 BRPM | SYSTOLIC BLOOD PRESSURE: 136 MMHG | OXYGEN SATURATION: 95 % | BODY MASS INDEX: 29.41 KG/M2 | WEIGHT: 183 LBS | HEIGHT: 66 IN | HEART RATE: 76 BPM

## 2022-01-28 DIAGNOSIS — Z86.711 HX PULMONARY EMBOLISM: ICD-10-CM

## 2022-01-28 DIAGNOSIS — E78.5 HYPERLIPIDEMIA, UNSPECIFIED HYPERLIPIDEMIA TYPE: ICD-10-CM

## 2022-01-28 DIAGNOSIS — R79.9 ABNORMAL FINDING OF BLOOD CHEMISTRY, UNSPECIFIED: ICD-10-CM

## 2022-01-28 DIAGNOSIS — E66.09 CLASS 1 OBESITY DUE TO EXCESS CALORIES WITH SERIOUS COMORBIDITY AND BODY MASS INDEX (BMI) OF 31.0 TO 31.9 IN ADULT: ICD-10-CM

## 2022-01-28 DIAGNOSIS — D64.9 ANEMIA, UNSPECIFIED TYPE: ICD-10-CM

## 2022-01-28 DIAGNOSIS — I83.93 ASYMPTOMATIC VARICOSE VEINS OF BILATERAL LOWER EXTREMITIES: ICD-10-CM

## 2022-01-28 DIAGNOSIS — I10 ESSENTIAL HYPERTENSION: ICD-10-CM

## 2022-01-28 DIAGNOSIS — Z85.46 HISTORY OF PROSTATE CANCER: ICD-10-CM

## 2022-01-28 DIAGNOSIS — Z00.00 VISIT FOR WELL MAN HEALTH CHECK: Primary | ICD-10-CM

## 2022-01-28 DIAGNOSIS — R73.03 PREDIABETES: ICD-10-CM

## 2022-01-28 DIAGNOSIS — Z00.00 VISIT FOR WELL MAN HEALTH CHECK: ICD-10-CM

## 2022-01-28 DIAGNOSIS — N52.9 ERECTILE DYSFUNCTION, UNSPECIFIED ERECTILE DYSFUNCTION TYPE: ICD-10-CM

## 2022-01-28 DIAGNOSIS — I44.1 MOBITZ TYPE 1 SECOND DEGREE AV BLOCK: ICD-10-CM

## 2022-01-28 PROBLEM — T84.50XA INFECTION OF PROSTHETIC JOINT: Status: RESOLVED | Noted: 2020-11-27 | Resolved: 2022-01-28

## 2022-01-28 LAB
ALBUMIN SERPL BCP-MCNC: 3.8 G/DL (ref 3.5–5.2)
ALP SERPL-CCNC: 56 U/L (ref 55–135)
ALT SERPL W/O P-5'-P-CCNC: 20 U/L (ref 10–44)
ANION GAP SERPL CALC-SCNC: 5 MMOL/L (ref 8–16)
AST SERPL-CCNC: 23 U/L (ref 10–40)
BASOPHILS # BLD AUTO: 0.04 K/UL (ref 0–0.2)
BASOPHILS NFR BLD: 0.8 % (ref 0–1.9)
BILIRUB SERPL-MCNC: 0.5 MG/DL (ref 0.1–1)
BUN SERPL-MCNC: 16 MG/DL (ref 8–23)
CALCIUM SERPL-MCNC: 8.8 MG/DL (ref 8.7–10.5)
CHLORIDE SERPL-SCNC: 108 MMOL/L (ref 95–110)
CHOLEST SERPL-MCNC: 158 MG/DL (ref 120–199)
CHOLEST/HDLC SERPL: 3.4 {RATIO} (ref 2–5)
CO2 SERPL-SCNC: 27 MMOL/L (ref 23–29)
CREAT SERPL-MCNC: 1 MG/DL (ref 0.5–1.4)
DIFFERENTIAL METHOD: ABNORMAL
EOSINOPHIL # BLD AUTO: 0.1 K/UL (ref 0–0.5)
EOSINOPHIL NFR BLD: 2.7 % (ref 0–8)
ERYTHROCYTE [DISTWIDTH] IN BLOOD BY AUTOMATED COUNT: 15.5 % (ref 11.5–14.5)
EST. GFR  (AFRICAN AMERICAN): >60 ML/MIN/1.73 M^2
EST. GFR  (NON AFRICAN AMERICAN): >60 ML/MIN/1.73 M^2
ESTIMATED AVG GLUCOSE: 128 MG/DL (ref 68–131)
GLUCOSE SERPL-MCNC: 110 MG/DL (ref 70–110)
HBA1C MFR BLD: 6.1 % (ref 4–5.6)
HCT VFR BLD AUTO: 41.8 % (ref 40–54)
HDLC SERPL-MCNC: 47 MG/DL (ref 40–75)
HDLC SERPL: 29.7 % (ref 20–50)
HGB BLD-MCNC: 12.9 G/DL (ref 14–18)
IMM GRANULOCYTES # BLD AUTO: 0.01 K/UL (ref 0–0.04)
IMM GRANULOCYTES NFR BLD AUTO: 0.2 % (ref 0–0.5)
LDLC SERPL CALC-MCNC: 97.2 MG/DL (ref 63–159)
LYMPHOCYTES # BLD AUTO: 1.3 K/UL (ref 1–4.8)
LYMPHOCYTES NFR BLD: 25.5 % (ref 18–48)
MCH RBC QN AUTO: 26.8 PG (ref 27–31)
MCHC RBC AUTO-ENTMCNC: 30.9 G/DL (ref 32–36)
MCV RBC AUTO: 87 FL (ref 82–98)
MONOCYTES # BLD AUTO: 0.6 K/UL (ref 0.3–1)
MONOCYTES NFR BLD: 10.8 % (ref 4–15)
NEUTROPHILS # BLD AUTO: 3.1 K/UL (ref 1.8–7.7)
NEUTROPHILS NFR BLD: 60 % (ref 38–73)
NONHDLC SERPL-MCNC: 111 MG/DL
NRBC BLD-RTO: 0 /100 WBC
PLATELET # BLD AUTO: 196 K/UL (ref 150–450)
PMV BLD AUTO: 10.4 FL (ref 9.2–12.9)
POTASSIUM SERPL-SCNC: 4.2 MMOL/L (ref 3.5–5.1)
PROT SERPL-MCNC: 7.3 G/DL (ref 6–8.4)
RBC # BLD AUTO: 4.82 M/UL (ref 4.6–6.2)
SODIUM SERPL-SCNC: 140 MMOL/L (ref 136–145)
TRIGL SERPL-MCNC: 69 MG/DL (ref 30–150)
WBC # BLD AUTO: 5.17 K/UL (ref 3.9–12.7)

## 2022-01-28 PROCEDURE — 99214 OFFICE O/P EST MOD 30 MIN: CPT | Mod: S$PBB,,, | Performed by: FAMILY MEDICINE

## 2022-01-28 PROCEDURE — 99215 OFFICE O/P EST HI 40 MIN: CPT | Mod: PBBFAC,PN | Performed by: FAMILY MEDICINE

## 2022-01-28 PROCEDURE — 93005 ELECTROCARDIOGRAM TRACING: CPT | Mod: PBBFAC,PN | Performed by: INTERNAL MEDICINE

## 2022-01-28 PROCEDURE — 93010 EKG 12-LEAD: ICD-10-PCS | Mod: S$PBB,,, | Performed by: INTERNAL MEDICINE

## 2022-01-28 PROCEDURE — 83036 HEMOGLOBIN GLYCOSYLATED A1C: CPT | Performed by: FAMILY MEDICINE

## 2022-01-28 PROCEDURE — 80061 LIPID PANEL: CPT | Performed by: FAMILY MEDICINE

## 2022-01-28 PROCEDURE — 99999 PR PBB SHADOW E&M-EST. PATIENT-LVL V: ICD-10-PCS | Mod: PBBFAC,,, | Performed by: FAMILY MEDICINE

## 2022-01-28 PROCEDURE — 99999 PR PBB SHADOW E&M-EST. PATIENT-LVL V: CPT | Mod: PBBFAC,,, | Performed by: FAMILY MEDICINE

## 2022-01-28 PROCEDURE — 36415 COLL VENOUS BLD VENIPUNCTURE: CPT | Mod: PN | Performed by: FAMILY MEDICINE

## 2022-01-28 PROCEDURE — 85025 COMPLETE CBC W/AUTO DIFF WBC: CPT | Performed by: FAMILY MEDICINE

## 2022-01-28 PROCEDURE — 99214 PR OFFICE/OUTPT VISIT, EST, LEVL IV, 30-39 MIN: ICD-10-PCS | Mod: S$PBB,,, | Performed by: FAMILY MEDICINE

## 2022-01-28 PROCEDURE — 93010 ELECTROCARDIOGRAM REPORT: CPT | Mod: S$PBB,,, | Performed by: INTERNAL MEDICINE

## 2022-01-28 PROCEDURE — 80053 COMPREHEN METABOLIC PANEL: CPT | Performed by: FAMILY MEDICINE

## 2022-01-28 RX ORDER — SILDENAFIL 25 MG/1
25 TABLET, FILM COATED ORAL DAILY PRN
Qty: 30 TABLET | Refills: 1 | Status: SHIPPED | OUTPATIENT
Start: 2022-01-28 | End: 2022-10-24

## 2022-01-28 NOTE — PROGRESS NOTES
"Well Man VISIT      CHIEF COMPLAINT  Chief Complaint   Patient presents with    Annual Exam       HPI  Fan Paz is a 81 y.o. male who presents for physical.     Social Factors  Tobacco use: No  Ready to Quit: No  Alcohol: seldom use  Intimate partner violence screening  Living Will/POA: No  Regular Exercise: No    Depression  Over the past two weeks, have you felt down, depressed, or hopeless? No  Over the past two weeks, have you felt little interest or pleasure in doing things? No    Reproductive Health  STD screening in last year: deferred  HIV screening: deferred    Screen for Chronic Disease  CHD Risk Factors: advanced age (older than 55 for men, 65 for women), male gender and obesity (BMI >= 30 kg/m2)  Estimated body mass index is 29.53 kg/m² as calculated from the following:    Height as of this encounter: 5' 6" (1.676 m).    Weight as of this encounter: 83 kg (182 lb 15.7 oz).  Dyslipidemia screening needed: ordered  T2DM screening needed: ordered  Colonoscopy needed: n/a  PSA needed: followed by urology  AAA screening needed:n/a  Screen men 35 years and older, and men 20 to 34 years of age who have cardiovascular risk factors for dyslipidemia  Begin screening colonoscopies at 50 years of age in men of average risk, and continue until 75 years of age; offer fecal occult blood testing every year, flexible sigmoidoscopy every five years combined with fecal occult blood testing every three years, or colonoscopy every 10 years   The American Urological Association recommends offering PSA testing and digital rectal examination to well-informed men beginning at 40 years of age and continuing until life expectancy is less than 10 years  Screen once with ultrasonography in men 65 to 75 years of age if they have a family history or have smoked at [id number] cigarettes in their lifetime  Screen men with a sustained blood pressure greater than 135/80 mm Hg for " "T2DM      Immunizations  delayed    ALLERGIES and MEDS were verified.   PMHx, PSHx, FHx, SOCIALHx were updated as pertinent.    REVIEW OF SYSTEMS  Review of Systems   Constitutional: Negative.    HENT: Negative.    Eyes: Negative.    Respiratory: Negative.    Cardiovascular: Negative.    Gastrointestinal: Negative.    Genitourinary: Negative.    Musculoskeletal: Positive for joint pain.   Skin: Negative.    Neurological: Negative.    Psychiatric/Behavioral: Negative.          PHYSICAL EXAM  VITAL SIGNS: /78   Pulse 76   Resp 18   Ht 5' 6" (1.676 m)   Wt 83 kg (182 lb 15.7 oz)   SpO2 95%   BMI 29.53 kg/m²   GEN: Well developed, Well nourished, No acute distress.  HENT: Normocephalic, Atraumatic, Bilateral external ears normal, Nose normal, Oropharynx moist, No oral exudates.   Eyes: PERRL, EOMI, Conjunctiva normal, No discharge.   Neck: Supple, No tenderness.  Lymphatic: No cervical or supraclavicular lymphadenopathy noted.   Cardiovascular: Normal heart rate, Normal rhythm, No murmurs, No rubs, No gallops.   Thorax & Lungs: Normal breath sounds, No respiratory distress, No wheezing.  Abdomen: Soft, No tenderness, Bowel sounds normal.  Genital: deferred  Skin: Warm, Dry, No erythema, No rash.   Extremities: No edema, No tenderness.       ASSESSMENT/PLAN    Fan was seen today for annual exam.    Diagnoses and all orders for this visit:    Visit for [facility] health check  -     CBC Auto Differential; Future  -     Comprehensive Metabolic Panel; Future  -     Lipid Panel; Future  -     Hemoglobin A1C; Future    Hyperlipidemia, unspecified hyperlipidemia type  -     Lipid Panel; Future    Essential hypertension  -     EKG 12-lead    Asymptomatic varicose veins of bilateral lower extremities    Hx pulmonary embolism-6/20/09- bilateral lower lobe pulmonary emboli    History of prostate cancer    Prediabetes  -     Comprehensive Metabolic Panel; Future  -     Hemoglobin A1C; Future    Class 1 obesity due to " excess calories with serious comorbidity and body mass index (BMI) of 31.0 to 31.9 in adult    Abnormal finding of blood chemistry, unspecified   -     CBC Auto Differential; Future    Anemia, unspecified type    Erectile dysfunction, unspecified erectile dysfunction type  -     sildenafiL (VIAGRA) 25 MG tablet; Take 1 tablet (25 mg total) by mouth daily as needed for Erectile Dysfunction.       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

## 2022-01-28 NOTE — PROGRESS NOTES
01/28/22 0857   Depression Patient Health Questionnaire (PHQ-2)   Over the last two weeks how often have you been bothered by little interest or pleasure in doing things 0   Over the last two weeks how often have you been bothered by feeling down, depressed or hopeless 0   PHQ-2 Total Score 0

## 2022-01-29 NOTE — PROGRESS NOTES
Please let patient know that labs are back and everything looks good.  I want to see him back in 6 months    Thanks  Dr. Damon

## 2022-01-31 ENCOUNTER — TELEPHONE (OUTPATIENT)
Dept: FAMILY MEDICINE | Facility: CLINIC | Age: 82
End: 2022-01-31
Payer: MEDICARE

## 2022-01-31 ENCOUNTER — EXTERNAL CHRONIC CARE MANAGEMENT (OUTPATIENT)
Dept: PRIMARY CARE CLINIC | Facility: CLINIC | Age: 82
End: 2022-01-31
Payer: MEDICARE

## 2022-01-31 PROCEDURE — 99490 CHRNC CARE MGMT STAFF 1ST 20: CPT | Mod: S$PBB,,, | Performed by: FAMILY MEDICINE

## 2022-01-31 PROCEDURE — 99490 PR CHRONIC CARE MGMT, 1ST 20 MIN: ICD-10-PCS | Mod: S$PBB,,, | Performed by: FAMILY MEDICINE

## 2022-01-31 PROCEDURE — 99490 CHRNC CARE MGMT STAFF 1ST 20: CPT | Mod: PBBFAC,PN | Performed by: FAMILY MEDICINE

## 2022-01-31 NOTE — TELEPHONE ENCOUNTER
Patient notified of lab results, verbalized understanding. Instructed to call with any questions or concerns

## 2022-01-31 NOTE — TELEPHONE ENCOUNTER
----- Message from Otilio Damon MD sent at 1/29/2022  5:55 PM CST -----  Please let patient know that labs are back and everything looks good.  I want to see him back in 6 months    Thanks  Dr. Damon

## 2022-02-02 ENCOUNTER — OFFICE VISIT (OUTPATIENT)
Dept: CARDIOLOGY | Facility: CLINIC | Age: 82
End: 2022-02-02
Payer: MEDICARE

## 2022-02-02 VITALS
WEIGHT: 182 LBS | HEART RATE: 68 BPM | DIASTOLIC BLOOD PRESSURE: 84 MMHG | BODY MASS INDEX: 29.25 KG/M2 | HEIGHT: 66 IN | SYSTOLIC BLOOD PRESSURE: 171 MMHG | OXYGEN SATURATION: 98 %

## 2022-02-02 DIAGNOSIS — M17.0 PRIMARY OSTEOARTHRITIS OF BOTH KNEES: ICD-10-CM

## 2022-02-02 DIAGNOSIS — Z86.711 HX PULMONARY EMBOLISM: ICD-10-CM

## 2022-02-02 DIAGNOSIS — I44.1 MOBITZ TYPE 1 SECOND DEGREE AV BLOCK: Primary | ICD-10-CM

## 2022-02-02 DIAGNOSIS — R03.0 ELEVATED BLOOD PRESSURE READING IN OFFICE WITHOUT DIAGNOSIS OF HYPERTENSION: ICD-10-CM

## 2022-02-02 PROCEDURE — 99999 PR PBB SHADOW E&M-EST. PATIENT-LVL IV: CPT | Mod: PBBFAC,,, | Performed by: INTERNAL MEDICINE

## 2022-02-02 PROCEDURE — 99204 PR OFFICE/OUTPT VISIT, NEW, LEVL IV, 45-59 MIN: ICD-10-PCS | Mod: S$PBB,,, | Performed by: INTERNAL MEDICINE

## 2022-02-02 PROCEDURE — 99204 OFFICE O/P NEW MOD 45 MIN: CPT | Mod: S$PBB,,, | Performed by: INTERNAL MEDICINE

## 2022-02-02 PROCEDURE — 99214 OFFICE O/P EST MOD 30 MIN: CPT | Mod: PBBFAC | Performed by: INTERNAL MEDICINE

## 2022-02-02 PROCEDURE — 99999 PR PBB SHADOW E&M-EST. PATIENT-LVL IV: ICD-10-PCS | Mod: PBBFAC,,, | Performed by: INTERNAL MEDICINE

## 2022-02-02 NOTE — PROGRESS NOTES
CARDIOLOGY CONSULTATION    REASON FOR CONSULT:   Fan Paz is a 81 y.o. male who presents for evaluation of an abnormal ekg.      HISTORY OF PRESENT ILLNESS:     Fan Paz presents for evaluation of an abnormal ekg.  EKG from 01/28/2022 showed second-degree AV block, Mobitz 1. Patient denies any history of coronary artery disease.  No chest pain.  No shortness of breath.  Denies any dizziness.  No syncope/presyncope.  His blood pressure is elevated today but he denies any history of hypertension.  Favorable lipid profile.  Primary health issues have been related to arthritis.  History of bilateral knee replacements.  Right hip replacement.    CARDIOVASCULAR HISTORY:     None    PAST MEDICAL HISTORY:     Past Medical History:   Diagnosis Date    Arthritis     Cancer of palate     HTN (hypertension)     Hyperlipidemia     Prostate cancer     2011       PAST SURGICAL HISTORY:     Past Surgical History:   Procedure Laterality Date    BACK SURGERY  y-6    Cancer of palate surgery      [facility]- [facility]     CARPAL TUNNEL RELEASE  8-14-13    right hand,Left hand 2013    COLONOSCOPY N/A 4/10/2017    Procedure: COLONOSCOPY;  Surgeon: Nilo Kelly MD;  Location: [facility] ENDO;  Service: Endoscopy;  Laterality: N/A;    COLONOSCOPY N/A 10/21/2020    Procedure: COLONOSCOPY;  Surgeon: Demetrius Araujo MD;  Location: [facility] ENDO;  Service: Endoscopy;  Laterality: N/A;    KNEE SURGERY  y-40    left knee    KNEE SURGERY Right 12-1-15    TKR    Radio active seed Implant      January 2012    TOTAL HIP ARTHROPLASTY  3/2011       ALLERGIES AND MEDICATION:   Review of patient's allergies indicates:  No Known Allergies     Medication List          Accurate as of February 2, 2022  9:23 AM. If you have any questions, ask your nurse or doctor.            CONTINUE taking these medications    aspirin 81 MG EC tablet  Commonly known as: ECOTRIN  Take 1 tablet (81 mg total) by mouth 2 (two) times daily.      diclofenac 50 MG EC tablet  Commonly known as: VOLTAREN  Take 1 tablet (50 mg total) by mouth 2 (two) times daily.     * fluticasone propionate 50 mcg/actuation nasal spray  Commonly known as: FLONASE  1 spray (50 mcg total) by Each Nostril route once daily.     * fluticasone propionate 50 mcg/actuation nasal spray  Commonly known as: FLONASE  1 spray (50 mcg total) by Each Nostril route 2 (two) times daily.     FLUZONE HIGH-DOSE  (PF) 180 mcg/0.5 mL Syrg  Generic drug: flu vacc or7119-99(65yr up)PF     LIDOcaine 4 % Gel  To scalp area BID PRN for tingling     montelukast 10 mg tablet  Commonly known as: SINGULAIR  TAKE 1 TABLET BY MOUTH ONCE DAILY IN THE EVENING     ondansetron 8 MG Tbdl  Commonly known as: ZOFRAN-ODT  Dissolve 1 tablet (8 mg total) by mouth every 6 (six) hours as needed.     PAIN RELIEF EXTRA STRENGTH 500 MG tablet  Generic drug: acetaminophen  Take 2 tablets (1,000 mg total) by mouth every 6 (six) hours.     sildenafiL 25 MG tablet  Commonly known as: VIAGRA  Take 1 tablet (25 mg total) by mouth daily as needed for Erectile Dysfunction.     simvastatin 40 MG tablet  Commonly known as: ZOCOR  TAKE 1 TABLET BY MOUTH ONCE DAILY IN THE EVENING     tamsulosin 0.4 mg Cap  Commonly known as: FLOMAX  Take 1 capsule by mouth once daily         * This list has 2 medication(s) that are the same as other medications prescribed for you. Read the directions carefully, and ask your doctor or other care provider to review them with you.                SOCIAL HISTORY:     Social History     Socioeconomic History    Marital status:    Occupational History     Employer: RETIRED   Tobacco Use    Smoking status: Former Smoker     Packs/day: 3.00     Years: 20.00     Pack years: 60.00     Quit date: 7/10/1997     Years since quittin.5    Smokeless tobacco: Never Used    Tobacco comment: Quit -smoked for 40 years ,2 packs a day on an average   Substance and Sexual Activity    Alcohol use:  "No     Comment: used to drink Occasionally    Drug use: No    Sexual activity: Yes     Partners: Female       FAMILY HISTORY:     Family History   Problem Relation Age of Onset    Coronary artery disease Father             Cancer Sister         Liver Cancer -    Diabetes Sister             No Known Problems Brother     No Known Problems Sister     No Known Problems Sister     No Known Problems Brother     No Known Problems Mother     Lung cancer Brother 60        - was a tobacco user, had lung cancer    Cancer Brother         Liver Cancer-     Lung cancer Sister         Alive    Breast cancer Sister     Clotting disorder Sister 75        blood clot on brain-alive    Stroke Brother             Glaucoma Cousin     No Known Problems Maternal Aunt     No Known Problems Maternal Uncle     No Known Problems Paternal Aunt     No Known Problems Paternal Uncle     No Known Problems Maternal Grandmother     No Known Problems Maternal Grandfather     No Known Problems Paternal Grandmother     No Known Problems Paternal Grandfather     Macular degeneration Neg Hx     Strabismus Neg Hx     Amblyopia Neg Hx     Blindness Neg Hx     Cataracts Neg Hx     Hypertension Neg Hx     Retinal detachment Neg Hx     Thyroid disease Neg Hx        REVIEW OF SYSTEMS:   Review of Systems   Respiratory: Negative for sputum production, shortness of breath and wheezing.    Cardiovascular: Negative for chest pain, palpitations, orthopnea, claudication, leg swelling and PND.   Gastrointestinal: Negative for abdominal pain, heartburn, nausea and vomiting.   Musculoskeletal: Positive for joint pain.   Neurological: Negative for dizziness, speech change, focal weakness, loss of consciousness, weakness and headaches.       PHYSICAL EXAM:     Vitals:    22 0844   BP: (!) 171/84   Pulse: 68    Body mass index is 29.38 kg/m².  Weight: 82.6 kg (182 lb)   Height: 5' 6" (167.6 cm)     Physical " Exam  Vitals reviewed.   Constitutional:       General: He is not in acute distress.     Appearance: He is well-developed and well-nourished. He is not diaphoretic.   Neck:      Vascular: No carotid bruit or JVD.   Cardiovascular:      Rate and Rhythm: Normal rate and regular rhythm. Occasional extrasystoles are present.     Pulses: Normal pulses.      Heart sounds: Normal heart sounds.   Pulmonary:      Effort: Pulmonary effort is normal.      Breath sounds: Normal breath sounds.   Abdominal:      General: Bowel sounds are normal.      Palpations: Abdomen is soft.      Tenderness: There is no abdominal tenderness.   Musculoskeletal:      Right lower leg: No edema.      Left lower leg: No edema.   Neurological:      Mental Status: He is alert and oriented to person, place, and time.   Psychiatric:         Mood and Affect: Mood and affect normal.         Speech: Speech normal.         Behavior: Behavior normal.         DATA:   EKG: (personally reviewed tracing)  01/28/2022-sinus rhythm with second-degree AV block, Mobitz 1    Laboratory:  CBC:  Recent Labs   Lab 02/19/21  1440 07/06/21  1052 01/28/22  0938   WBC 5.99 5.11 5.17   Hemoglobin 11.1 L 12.7 L 12.9 L   Hematocrit 36.6 L 41.5 41.8   Platelets 269 208 196       CHEMISTRIES:  Recent Labs   Lab 03/05/21  0935 07/06/21  1052 01/28/22  0938   Glucose 106 93 110   Sodium 137 140 140   Potassium 4.8 3.9 4.2   BUN 13 14 16   Creatinine 0.8 0.9 1.0   eGFR if African American >60 >60 >60   eGFR if non African American >60 >60 >60   Calcium 9.0 8.9 8.8       CARDIAC BIOMARKERS:        COAGS:  Recent Labs   Lab 11/27/20  1538   INR 1.0       LIPIDS/LFTS:  Recent Labs   Lab 04/10/19  0852 04/10/19  0852 07/20/20  0945 11/29/20  0447 03/05/21  0935 07/06/21  1052 01/28/22  0938   Cholesterol 153  --  130  --   --   --  158   Triglycerides 125  --  68  --   --   --  69   HDL 44  --  43  --   --   --  47   LDL Cholesterol 84.0  --  73.4  --   --   --  97.2   Non-HDL  Cholesterol 109  --  87  --   --   --  111   AST 26   < > 28   < > 16 19 23   ALT 18   < > 24   < > 13 15 20    < > = values in this interval not displayed.       Cardiovascular Testing:      ASSESSMENT:     1. Abnormal EKG  2. Mobitz type 1 second-degree AV block  3. Elevated blood pressure reading without diagnosis of hypertension  4. Osteoarthritis     PLAN:     1. Abnormal EKG/Mobitz type 1 second-degree AV block:  24 hour Holter to assess heart rate variability.  Rhythm.  2. Elevated blood pressure reading without diagnosis of hypertension:  Monitor.  2D echocardiogram to assess structure and function.  3. Return to clinic 1 month.          Jonathan Mullins MD, MPH, FACC, [facility]

## 2022-02-16 ENCOUNTER — HOSPITAL ENCOUNTER (OUTPATIENT)
Dept: CARDIOLOGY | Facility: HOSPITAL | Age: 82
Discharge: HOME | End: 2022-02-16
Attending: INTERNAL MEDICINE
Payer: MEDICARE

## 2022-02-16 VITALS — HEIGHT: 66 IN | WEIGHT: 182 LBS | BODY MASS INDEX: 29.25 KG/M2

## 2022-02-16 DIAGNOSIS — I44.1 MOBITZ TYPE 1 SECOND DEGREE AV BLOCK: ICD-10-CM

## 2022-02-16 DIAGNOSIS — R03.0 ELEVATED BLOOD PRESSURE READING IN OFFICE WITHOUT DIAGNOSIS OF HYPERTENSION: ICD-10-CM

## 2022-02-16 LAB
AV INDEX (PROSTH): 0.83
AV MEAN GRADIENT: 2 MMHG
AV PEAK GRADIENT: 4 MMHG
AV VALVE AREA: 2.96 CM2
AV VELOCITY RATIO: 0.83
BSA FOR ECHO PROCEDURE: 1.96 M2
CV ECHO LV RWT: 0.42 CM
DOP CALC AO PEAK VEL: 1.03 M/S
DOP CALC AO VTI: 24.07 CM
DOP CALC LVOT AREA: 3.6 CM2
DOP CALC LVOT DIAMETER: 2.13 CM
DOP CALC LVOT PEAK VEL: 0.86 M/S
DOP CALC LVOT STROKE VOLUME: 71.19 CM3
DOP CALCLVOT PEAK VEL VTI: 19.99 CM
E WAVE DECELERATION TIME: 192.95 MSEC
E/A RATIO: 0.63
E/E' RATIO: 8.29 M/S
ECHO LV POSTERIOR WALL: 1.03 CM (ref 0.6–1.1)
EJECTION FRACTION: 60 %
FRACTIONAL SHORTENING: 34 % (ref 28–44)
INTERVENTRICULAR SEPTUM: 1.08 CM (ref 0.6–1.1)
IVRT: 114.19 MSEC
LA MAJOR: 5.69 CM
LA MINOR: 5.27 CM
LA WIDTH: 4.12 CM
LEFT ATRIUM SIZE: 4.18 CM
LEFT ATRIUM VOLUME INDEX: 41.7 ML/M2
LEFT ATRIUM VOLUME: 80.1 CM3
LEFT INTERNAL DIMENSION IN SYSTOLE: 3.26 CM (ref 2.1–4)
LEFT VENTRICLE DIASTOLIC VOLUME INDEX: 59.02 ML/M2
LEFT VENTRICLE DIASTOLIC VOLUME: 113.32 ML
LEFT VENTRICLE MASS INDEX: 99 G/M2
LEFT VENTRICLE SYSTOLIC VOLUME INDEX: 22.4 ML/M2
LEFT VENTRICLE SYSTOLIC VOLUME: 42.99 ML
LEFT VENTRICULAR INTERNAL DIMENSION IN DIASTOLE: 4.91 CM (ref 3.5–6)
LEFT VENTRICULAR MASS: 189.94 G
LV LATERAL E/E' RATIO: 6.44 M/S
LV SEPTAL E/E' RATIO: 11.6 M/S
MV PEAK A VEL: 0.92 M/S
MV PEAK E VEL: 0.58 M/S
PISA TR MAX VEL: 0.85 M/S
PULM VEIN S/D RATIO: 1.27
PV PEAK D VEL: 0.3 M/S
PV PEAK S VEL: 0.38 M/S
PV PEAK VELOCITY: 0.91 CM/S
RA MAJOR: 5.26 CM
RA PRESSURE: 3 MMHG
RA WIDTH: 3.95 CM
RIGHT VENTRICULAR END-DIASTOLIC DIMENSION: 4.7 CM
SINUS: 3.34 CM
STJ: 2.9 CM
TDI LATERAL: 0.09 M/S
TDI SEPTAL: 0.05 M/S
TDI: 0.07 M/S
TR MAX PG: 3 MMHG
TRICUSPID ANNULAR PLANE SYSTOLIC EXCURSION: 1.72 CM
TV REST PULMONARY ARTERY PRESSURE: 6 MMHG

## 2022-02-16 PROCEDURE — 93227 HOLTER MONITOR - 24 HOUR (CUPID ONLY): ICD-10-PCS | Mod: ,,, | Performed by: INTERNAL MEDICINE

## 2022-02-16 PROCEDURE — 93306 TTE W/DOPPLER COMPLETE: CPT | Mod: 26,,, | Performed by: INTERNAL MEDICINE

## 2022-02-16 PROCEDURE — 93226 XTRNL ECG REC<48 HR SCAN A/R: CPT

## 2022-02-16 PROCEDURE — 93306 ECHO (CUPID ONLY): ICD-10-PCS | Mod: 26,,, | Performed by: INTERNAL MEDICINE

## 2022-02-16 PROCEDURE — 93227 XTRNL ECG REC<48 HR R&I: CPT | Mod: ,,, | Performed by: INTERNAL MEDICINE

## 2022-02-16 PROCEDURE — 93306 TTE W/DOPPLER COMPLETE: CPT

## 2022-02-17 LAB
OHS CV EVENT MONITOR DAY: 0
OHS CV HOLTER LENGTH DECIMAL HOURS: 23.98
OHS CV HOLTER LENGTH HOURS: 23
OHS CV HOLTER LENGTH MINUTES: 59
OHS CV HOLTER SINUS AVERAGE HR: 79
OHS CV HOLTER SINUS MAX HR: 113
OHS CV HOLTER SINUS MIN HR: 43

## 2022-02-28 ENCOUNTER — EXTERNAL CHRONIC CARE MANAGEMENT (OUTPATIENT)
Dept: PRIMARY CARE CLINIC | Facility: CLINIC | Age: 82
End: 2022-02-28
Payer: MEDICARE

## 2022-02-28 PROCEDURE — 99490 CHRNC CARE MGMT STAFF 1ST 20: CPT | Mod: PBBFAC,PN | Performed by: FAMILY MEDICINE

## 2022-02-28 PROCEDURE — 99490 CHRNC CARE MGMT STAFF 1ST 20: CPT | Mod: S$PBB,,, | Performed by: FAMILY MEDICINE

## 2022-02-28 PROCEDURE — 99490 PR CHRONIC CARE MGMT, 1ST 20 MIN: ICD-10-PCS | Mod: S$PBB,,, | Performed by: FAMILY MEDICINE

## 2022-03-03 ENCOUNTER — PATIENT OUTREACH (OUTPATIENT)
Dept: ADMINISTRATIVE | Facility: OTHER | Age: 82
End: 2022-03-03
Payer: MEDICARE

## 2022-03-14 ENCOUNTER — OFFICE VISIT (OUTPATIENT)
Dept: OTOLARYNGOLOGY | Facility: CLINIC | Age: 82
End: 2022-03-14
Payer: MEDICARE

## 2022-03-14 VITALS — BODY MASS INDEX: 29.09 KG/M2 | WEIGHT: 180.25 LBS

## 2022-03-14 DIAGNOSIS — Z08 ENCOUNTER FOR FOLLOW-UP SURVEILLANCE OF ORAL CAVITY CANCER: ICD-10-CM

## 2022-03-14 DIAGNOSIS — Z85.819 ENCOUNTER FOR FOLLOW-UP SURVEILLANCE OF ORAL CAVITY CANCER: ICD-10-CM

## 2022-03-14 DIAGNOSIS — Z92.3 HISTORY OF HEAD AND NECK RADIATION: ICD-10-CM

## 2022-03-14 DIAGNOSIS — H61.23 BILATERAL IMPACTED CERUMEN: ICD-10-CM

## 2022-03-14 DIAGNOSIS — J34.2 DEVIATED NASAL SEPTUM: ICD-10-CM

## 2022-03-14 DIAGNOSIS — R09.81 CHRONIC NASAL CONGESTION: Primary | ICD-10-CM

## 2022-03-14 PROCEDURE — 69210 REMOVE IMPACTED EAR WAX UNI: CPT | Mod: S$GLB,,, | Performed by: OTOLARYNGOLOGY

## 2022-03-14 PROCEDURE — 99213 PR OFFICE/OUTPT VISIT, EST, LEVL III, 20-29 MIN: ICD-10-PCS | Mod: 25,S$GLB,, | Performed by: OTOLARYNGOLOGY

## 2022-03-14 PROCEDURE — 99213 OFFICE O/P EST LOW 20 MIN: CPT | Mod: 25,S$GLB,, | Performed by: OTOLARYNGOLOGY

## 2022-03-14 PROCEDURE — 69210 PR REMOVAL IMPACTED CERUMEN REQUIRING INSTRUMENTATION, UNILATERAL: ICD-10-PCS | Mod: S$GLB,,, | Performed by: OTOLARYNGOLOGY

## 2022-03-14 NOTE — PROGRESS NOTES
OTOLARYNGOLOGY CLINIC NOTE  Date:  03/14/2022     Chief complaint:  Chief Complaint   Patient presents with    Follow-up     4 mo nasal congestion       History of Present Illness  Fan Paz is a 82 y.o. male  presenting today for a followup of h/o palate cancer, nasal congestion and recurrent cerumen impaction.      Is doing saline and flonase   Using claritin-D - does not take daily    Had ordered tsh but was not completed last time labs drawn    When around different scents and spices has runny nose  No facial pressure nor congestion  No facial pressure. No changes in postnasal drip- not that bothersome   Sometimes gets bleeding on right but less frequently than  Before    On occasion has pain left ear - has been going on for many years , he thinks it may be related to wax build up     I initially saw the patient on 11-8-21 for . History at that time is as noted below (below HPI copied from previous note on date  of initial visit  describing history at presentation)  sinusitis and also had history of palate cancer and had radiation. Sinus surgery was done after cancer surgery. Dr. Rushing also did cancer surgery. Had rt and needed feeding tube during that. Had most of teeth extracted except for 6 on the bottom.   Started having sinus problems a couple of years after treatment. Has itchy eyes and sneezing. No headaches. Sneezes with seasoning. flonase does help. + runny nose is all the time but also happens when exposed to seasoning.   Does singulair at night. Gets post nasal drip.  No changes to swallowing- has to eat mostly pureed/liquid  But that is stable since radiation.   Dry mouth since radiation. No pain with raising shoulder or head movement. No neck swelling  Right side bleeds more when blows  Not having hearing loss  Nor ear pain nor drainage currently.   Past Medical History  Past Medical History:   Diagnosis Date    Arthritis     Cancer of palate     HTN (hypertension)      Hyperlipidemia     Prostate cancer     2011        Past Surgical History  Past Surgical History:   Procedure Laterality Date    BACK SURGERY  y-6    Cancer [facility] palate surgery      [facility]- [facility]     CARPAL TUNNEL RELEASE  8-14-13    right hand,Left hand 2013    COLONOSCOPY N/A 4/10/2017    Procedure: COLONOSCOPY;  Surgeon: Nilo Kelly MD;  Location: [facility] ENDO;  Service: Endoscopy;  Laterality: N/A;    COLONOSCOPY N/A 10/21/2020    Procedure: COLONOSCOPY;  Surgeon: Demetrius Araujo MD;  Location: [facility] ENDO;  Service: Endoscopy;  Laterality: N/A;    KNEE SURGERY  y-40    left knee    KNEE SURGERY Right 12-1-15    TKR    Radio active seed Implant      January 2012    TOTAL HIP ARTHROPLASTY  3/2011        Medications  Current Outpatient Medications on File Prior to Visit   Medication Sig Dispense Refill    fluticasone propionate (FLONASE) 50 mcg/actuation nasal spray 1 spray (50 mcg total) by Each Nostril route 2 (two) times daily. 18.2 mL 3    montelukast (SINGULAIR) 10 mg tablet TAKE 1 TABLET BY MOUTH ONCE DAILY IN THE EVENING 90 tablet 0    acetaminophen (TYLENOL) 500 MG tablet Take 2 tablets (1,000 mg total) by mouth every 6 (six) hours. (Patient not taking: Reported on 2/2/2022) 60 tablet 0    aspirin (ECOTRIN) 81 MG EC tablet Take 1 tablet (81 mg total) by mouth 2 (two) times daily. (Patient not taking: Reported on 2/2/2022) 60 tablet 0    diclofenac (VOLTAREN) 50 MG EC tablet Take 1 tablet (50 mg total) by mouth 2 (two) times daily. (Patient not taking: Reported on 2/2/2022) 60 tablet 0    fluticasone propionate (FLONASE) 50 mcg/actuation nasal spray 1 spray (50 mcg total) by Each Nostril route once daily. 16 g 0    FLUZONE HIGH-DOSE 2019-20, PF, 180 mcg/0.5 mL Syrg PHARMACIST ADMINISTERED IMMUNIZATION ADMINISTERED AT TIME OF DISPENSING  0    lidocaine 4 % Gel To scalp area BID PRN for tingling 30 g 0    ondansetron (ZOFRAN-ODT) 8 MG TbDL Dissolve 1 tablet (8 mg total) by mouth every  6 (six) hours as needed. 30 tablet 0    sildenafiL (VIAGRA) 25 MG tablet Take 1 tablet (25 mg total) by mouth daily as needed for Erectile Dysfunction. (Patient not taking: Reported on 2022) 30 tablet 1    simvastatin (ZOCOR) 40 MG tablet TAKE 1 TABLET BY MOUTH ONCE DAILY IN THE EVENING 90 tablet 0    tamsulosin (FLOMAX) 0.4 mg Cap Take 1 capsule by mouth once daily 90 capsule 0     No current facility-administered medications on file prior to visit.       Review of Systems  Review of Systems   Constitutional: Negative.    HENT: Positive for ear pain and nosebleeds.    Eyes: Negative.    Respiratory: Negative.    Cardiovascular: Negative.    Gastrointestinal: Negative.    Genitourinary: Negative.    Skin: Negative.    Neurological: Negative.    Psychiatric/Behavioral: Negative.         Social History   reports that he quit smoking about 24 years ago. He has a 60.00 pack-year smoking history. He has never used smokeless tobacco. He reports that he does not drink alcohol and does not use drugs.     Family History  Family History   Problem Relation Age of Onset    Coronary artery disease Father             Cancer Sister         Liver Cancer -    Diabetes Sister             No Known Problems Brother     No Known Problems Sister     No Known Problems Sister     No Known Problems Brother     No Known Problems Mother     Lung cancer Brother 60        - was a tobacco user, had lung cancer    Cancer Brother         Liver Cancer-     Lung cancer Sister         Alive    Breast cancer Sister     Clotting disorder Sister 75        blood clot on brain-alive    Stroke Brother             Glaucoma Cousin     No Known Problems Maternal Aunt     No Known Problems Maternal Uncle     No Known Problems Paternal Aunt     No Known Problems Paternal Uncle     No Known Problems Maternal Grandmother     No Known Problems Maternal Grandfather     No Known Problems Paternal Grandmother      No Known Problems Paternal Grandfather     Macular degeneration Neg Hx     Strabismus Neg Hx     Amblyopia Neg Hx     Blindness Neg Hx     Cataracts Neg Hx     Hypertension Neg Hx     Retinal detachment Neg Hx     Thyroid disease Neg Hx         Physical Exam   There were no vitals filed for this visit. Body mass index is 29.09 kg/m².  Weight: 81.8 kg (180 lb 3.6 oz)         GENERAL: no acute distress.  HEAD: normocephalic.   EYES: lids and lashes normal. Pupils equal . No proptosis  EARS: external ear without lesion, normal pinna shape and position.  External auditory canal with impacted  Cerumen b/l  NOSE: external nose without significant bony abnormality, no mass nor lesion on anterior rhinoscopy  ORAL CAVITY/OROPHARYNX: tongue  mobile.   NECK: trachea midline.   LYMPH NODES:No cervical lymphadenopathy.  RESPIRATORY: no stridor, no stertor.  Respirations nonlabored.  NEURO: alert, responds to questions appropriately.  Facial movement symmetric at rest   PSYCH:mood appropriate    Procedure Note   Procedure performed:microscopic examination of ears with cerumen disimpaction    Indication for procedure: bilateral cerumen impaction     Description of procedure:  After verbal consent was obtained, the patient was positioned in semi recumbent position and speculum was placed in the right ear and microscope brought into the field.  The microscope was positioned and magnification adjusted for appropriate visualization. Otologic instruments including various size otologic suctions and curette were used to remove the cerumen from the right external auditory canals under visualization with the operating microscope. After cleaning, visualization was again performed and at various levels of higher magnification to optimize views of the ear canal, tympanic membrane, ossicles and middle ear space. The same procedure was then repeated for the left ear. Findings as indicated below. All portions of the procedure and  examination by otomicroscopy were tolerated well without complication.     Findings:  Right ear: Complete cerumen impaction removed entirely revealing normal external auditory canal; tympanic membrane without bulging, retraction, or perforation; no evidence of middle ear fluid or effusion.   Left ear: Complete cerumen impaction removed entirely revealing normal external auditory canal; tympanic membrane without bulging, retraction, or perforation; no evidence of middle ear fluid or effusion.       Imaging:  The patient does not have any new imaging of the head and neck since last visit.     Labs:  CBC  Recent Labs   Lab 02/19/21  1440 07/06/21  1052 01/28/22  0938   WBC 5.99 5.11 5.17   Hemoglobin 11.1 L 12.7 L 12.9 L   Hematocrit 36.6 L 41.5 41.8   MCV 84 86 87   Platelets 269 208 196     BMP  Recent Labs   Lab 03/05/21  0935 07/06/21  1052 01/28/22  0938   Glucose 106 93 110   Sodium 137 140 140   Potassium 4.8 3.9 4.2   Chloride 102 106 108   CO2 26 27 27   BUN 13 14 16   Creatinine 0.8 0.9 1.0   Calcium 9.0 8.9 8.8     COAGS  Recent Labs   Lab 11/27/20  1538   INR 1.0       Assessment  1. Chronic nasal congestion    2. History of head and neck radiation    3. 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 Deviated nasal septum    5. Bilateral impacted cerumen       Plan:  Discussed plan of care with patient in detail and all questions answered. Patient reported understanding of plan of care.   Cerumen removed  F/u 7 months for ear cleaning and yearly scope exam, no symptoms of recurrent disease.   Continue nasal spray regimen for nasal congestion   Counseled patient on need for yearly TSH s/p XRT    Please be aware that this note has been generated with the assistance of [employer] voice-to-text.  Please excuse any spelling or grammatical errors.

## 2022-03-15 NOTE — TELEPHONE ENCOUNTER
----- Message from Dick Valdez NP sent at 1/24/2020 11:45 AM CST -----  Please call patient and let him know that he does have arthritis in his feet which may be contributing to his symptoms.  If his symptoms worsen or fail to improve I can refer him to Podiatry.  Thank you   3

## 2022-03-22 ENCOUNTER — OFFICE VISIT (OUTPATIENT)
Dept: CARDIOLOGY | Facility: CLINIC | Age: 82
End: 2022-03-22
Payer: MEDICARE

## 2022-03-22 VITALS
DIASTOLIC BLOOD PRESSURE: 84 MMHG | HEIGHT: 66 IN | WEIGHT: 180 LBS | HEART RATE: 73 BPM | SYSTOLIC BLOOD PRESSURE: 122 MMHG | OXYGEN SATURATION: 96 % | RESPIRATION RATE: 15 BRPM | BODY MASS INDEX: 28.93 KG/M2

## 2022-03-22 DIAGNOSIS — I44.1 MOBITZ TYPE 1 SECOND DEGREE AV BLOCK: Primary | ICD-10-CM

## 2022-03-22 DIAGNOSIS — I49.1 PREMATURE ATRIAL CONTRACTION: ICD-10-CM

## 2022-03-22 DIAGNOSIS — E78.49 OTHER HYPERLIPIDEMIA: ICD-10-CM

## 2022-03-22 DIAGNOSIS — M17.0 PRIMARY OSTEOARTHRITIS OF BOTH KNEES: ICD-10-CM

## 2022-03-22 DIAGNOSIS — R03.0 ELEVATED BLOOD PRESSURE READING IN OFFICE WITHOUT DIAGNOSIS OF HYPERTENSION: ICD-10-CM

## 2022-03-22 PROCEDURE — 99999 PR PBB SHADOW E&M-EST. PATIENT-LVL III: CPT | Mod: PBBFAC,,, | Performed by: INTERNAL MEDICINE

## 2022-03-22 PROCEDURE — 99214 OFFICE O/P EST MOD 30 MIN: CPT | Mod: S$PBB,,, | Performed by: INTERNAL MEDICINE

## 2022-03-22 PROCEDURE — 99214 PR OFFICE/OUTPT VISIT, EST, LEVL IV, 30-39 MIN: ICD-10-PCS | Mod: S$PBB,,, | Performed by: INTERNAL MEDICINE

## 2022-03-22 PROCEDURE — 99213 OFFICE O/P EST LOW 20 MIN: CPT | Mod: PBBFAC | Performed by: INTERNAL MEDICINE

## 2022-03-22 PROCEDURE — 99999 PR PBB SHADOW E&M-EST. PATIENT-LVL III: ICD-10-PCS | Mod: PBBFAC,,, | Performed by: INTERNAL MEDICINE

## 2022-03-22 NOTE — PROGRESS NOTES
CARDIOLOGY CLINIC VISIT        HISTORY OF PRESENT ILLNESS:     Fan Paz presents for continued care.  Seen 02/02/2022 for evaluation of abnormal EKG.  EKG showed second-degree AV block, Mobitz 1. The Holter showed sinus rhythm with average heart rate of 79 beats per minute.  Frequent PACs.  Wenckebach.  Echocardiogram showed normal left ventricular systolic function with estimated ejection fraction of 60%.  No pulmonary hypertension.  The blood pressure looks good today.  Patient without complaints.    CARDIOVASCULAR HISTORY:     Mobitz 1    PAST MEDICAL HISTORY:     Past Medical History:   Diagnosis Date    Arthritis     Cancer of palate     HTN (hypertension)     Hyperlipidemia     Prostate cancer     2011       PAST SURGICAL HISTORY:     Past Surgical History:   Procedure Laterality Date    BACK SURGERY  y-6    Cancer of palate surgery      [facility]- [facility]     CARPAL TUNNEL RELEASE  8-14-13    right hand,Left hand 2013    COLONOSCOPY N/A 4/10/2017    Procedure: COLONOSCOPY;  Surgeon: Nilo Kelly MD;  Location: [facility] ENDO;  Service: Endoscopy;  Laterality: N/A;    COLONOSCOPY N/A 10/21/2020    Procedure: COLONOSCOPY;  Surgeon: Demetrius Araujo MD;  Location: [facility] ENDO;  Service: Endoscopy;  Laterality: N/A;    KNEE SURGERY  y-40    left knee    KNEE SURGERY Right 12-1-15    TKR    Radio active seed Implant      January 2012    TOTAL HIP ARTHROPLASTY  3/2011       ALLERGIES AND MEDICATION:   Review of patient's allergies indicates:  No Known Allergies     Medication List          Accurate as of March 22, 2022  9:34 AM. If you have any questions, ask your nurse or doctor.            CONTINUE taking these medications    aspirin 81 MG EC tablet  Commonly known as: ECOTRIN  Take 1 tablet (81 mg total) by mouth 2 (two) times daily.     diclofenac 50 MG EC tablet  Commonly known as: VOLTAREN  Take 1 tablet (50 mg total) by mouth 2 (two) times daily.     * fluticasone propionate 50  mcg/actuation nasal spray  Commonly known as: FLONASE  1 spray (50 mcg total) by Each Nostril route once daily.     * fluticasone propionate 50 mcg/actuation nasal spray  Commonly known as: FLONASE  1 spray (50 mcg total) by Each Nostril route 2 (two) times daily.     FLUZONE HIGH-DOSE  (PF) 180 mcg/0.5 mL Syrg  Generic drug: flu vacc [id number](65yr up)PF     LIDOcaine 4 % Gel  To scalp area BID PRN for tingling     montelukast 10 mg tablet  Commonly known as: SINGULAIR  TAKE 1 TABLET BY MOUTH ONCE DAILY IN THE EVENING     ondansetron 8 MG Tbdl  Commonly known as: ZOFRAN-ODT  Dissolve 1 tablet (8 mg total) by mouth every 6 (six) hours as needed.     PAIN RELIEF EXTRA STRENGTH 500 MG tablet  Generic drug: acetaminophen  Take 2 tablets (1,000 mg total) by mouth every 6 (six) hours.     sildenafiL 25 MG tablet  Commonly known as: VIAGRA  Take 1 tablet (25 mg total) by mouth daily as needed for Erectile Dysfunction.     simvastatin 40 MG tablet  Commonly known as: ZOCOR  TAKE 1 TABLET BY MOUTH ONCE DAILY IN THE EVENING     tamsulosin 0.4 mg Cap  Commonly known as: FLOMAX  Take 1 capsule by mouth once daily         * This list has 2 medication(s) that are the same as other medications prescribed for you. Read the directions carefully, and ask your doctor or other care provider to review them with you.                SOCIAL HISTORY:     Social History     Socioeconomic History    Marital status:    Occupational History     Employer: RETIRED   Tobacco Use    Smoking status: Former Smoker     Packs/day: 3.00     Years: 20.00     Pack years: 60.00     Quit date: 7/10/1997     Years since quittin.7    Smokeless tobacco: Never Used    Tobacco comment: Quit -smoked for 40 years ,2 packs a day on an average   Substance and Sexual Activity    Alcohol use: No     Comment: used to drink Occasionally    Drug use: No    Sexual activity: Yes     Partners: Female       FAMILY HISTORY:     Family History  "  Problem Relation Age of Onset    Coronary artery disease Father             Cancer Sister         Liver Cancer -    Diabetes Sister             No Known Problems Brother     No Known Problems Sister     No Known Problems Sister     No Known Problems Brother     No Known Problems Mother     Lung cancer Brother 60        - was a tobacco user, had lung cancer    Cancer Brother         Liver Cancer-     Lung cancer Sister         Alive    Breast cancer Sister     Clotting disorder Sister 75        blood clot on brain-alive    Stroke Brother             Glaucoma Cousin     No Known Problems Maternal Aunt     No Known Problems Maternal Uncle     No Known Problems Paternal Aunt     No Known Problems Paternal Uncle     No Known Problems Maternal Grandmother     No Known Problems Maternal Grandfather     No Known Problems Paternal Grandmother     No Known Problems Paternal Grandfather     Macular degeneration Neg Hx     Strabismus Neg Hx     Amblyopia Neg Hx     Blindness Neg Hx     Cataracts Neg Hx     Hypertension Neg Hx     Retinal detachment Neg Hx     Thyroid disease Neg Hx        REVIEW OF SYSTEMS:   Review of Systems   Respiratory: Negative for sputum production, shortness of breath and wheezing.    Cardiovascular: Negative for chest pain, palpitations, orthopnea, claudication, leg swelling and PND.   Gastrointestinal: Negative for abdominal pain, heartburn, nausea and vomiting.   Musculoskeletal: Positive for joint pain.   Neurological: Negative for dizziness, speech change, focal weakness, loss of consciousness, weakness and headaches.       PHYSICAL EXAM:     Vitals:    22 0907   BP: 122/84   Pulse: 73   Resp: 15    Body mass index is 29.05 kg/m².  Weight: 81.6 kg (180 lb)   Height: 5' 6" (167.6 cm)     Physical Exam  Vitals reviewed.   Constitutional:       General: He is not in acute distress.     Appearance: He is well-developed. He is not " diaphoretic.   Neck:      Vascular: No carotid bruit or JVD.   Cardiovascular:      Rate and Rhythm: Normal rate and regular rhythm.  No extrasystoles are present.     Pulses: Normal pulses.      Heart sounds: Normal heart sounds.   Pulmonary:      Effort: Pulmonary effort is normal.      Breath sounds: Normal breath sounds.   Abdominal:      General: Bowel sounds are normal.      Palpations: Abdomen is soft.      Tenderness: There is no abdominal tenderness.   Musculoskeletal:      Right lower leg: No edema.      Left lower leg: No edema.   Neurological:      Mental Status: He is alert and oriented to person, place, and time.   Psychiatric:         Speech: Speech normal.         Behavior: Behavior normal.         DATA:   EKG: (personally reviewed tracing)    Laboratory:  CBC:  Recent Labs   Lab 02/19/21  1440 07/06/21  1052 01/28/22  0938   WBC 5.99 5.11 5.17   Hemoglobin 11.1 L 12.7 L 12.9 L   Hematocrit 36.6 L 41.5 41.8   Platelets 269 208 196       CHEMISTRIES:  Recent Labs   Lab 03/05/21  0935 07/06/21  1052 01/28/22  0938   Glucose 106 93 110   Sodium 137 140 140   Potassium 4.8 3.9 4.2   BUN 13 14 16   Creatinine 0.8 0.9 1.0   eGFR if African American >60 >60 >60   eGFR if non African American >60 >60 >60   Calcium 9.0 8.9 8.8       CARDIAC BIOMARKERS:        COAGS:  Recent Labs   Lab 11/27/20  1538   INR 1.0       LIPIDS/LFTS:  Recent Labs   Lab 04/10/19  0852 07/20/20  0945 11/29/20  0447 03/05/21  0935 07/06/21  1052 01/28/22  0938   Cholesterol 153 130  --   --   --  158   Triglycerides 125 68  --   --   --  69   HDL 44 43  --   --   --  47   LDL Cholesterol 84.0 73.4  --   --   --  97.2   Non-HDL Cholesterol 109 87  --   --   --  111   AST 26 28   < > 16 19 23   ALT 18 24   < > 13 15 20    < > = values in this interval not displayed.       Cardiovascular Testing:    Echocardiogram 02/16/2022:    · The left ventricle is normal in size with normal systolic function.  · The estimated ejection fraction is  60%.  · Moderate left atrial enlargement.  · Indeterminate left ventricular diastolic function.  · Normal right ventricular size with normal right ventricular systolic function.  · Normal central venous pressure (3 mmHg).  · The estimated PA systolic pressure is 6 mmHg.  · There is no pulmonary hypertension.    Holter 02/16/2022:    · Sinus rhythm with heart rates varying between 43 and 113 BPM with an average of 79 BPM  · There were very frequent PACs  · Wenckebach    ASSESSMENT:     1. Abnormal EKG  2. Mobitz type 1 second-degree AV block  3. Elevated blood pressure reading without diagnosis of hypertension  4. Hyperlipidemia: LDL 97  4. Osteoarthritis     PLAN:     1. Abnormal EKG/Mobitz type 1 second-degree AV block:  24 hour Holter showed no significant arrhythmias.  PACs.  2. Elevated blood pressure reading without diagnosis of hypertension:  Blood pressure looks good today.  2D echocardiogram showed normal function.  No left ventricular hypertrophy.  3. Hyperlipidemia:  Continue current management.  4. Return to clinic 6 months.           Jonathan Mullins MD, MPH, FACC, [facility]

## 2022-03-31 ENCOUNTER — EXTERNAL CHRONIC CARE MANAGEMENT (OUTPATIENT)
Dept: PRIMARY CARE CLINIC | Facility: CLINIC | Age: 82
End: 2022-03-31
Payer: MEDICARE

## 2022-03-31 PROCEDURE — 99490 PR CHRONIC CARE MGMT, 1ST 20 MIN: ICD-10-PCS | Mod: S$PBB,,, | Performed by: FAMILY MEDICINE

## 2022-03-31 PROCEDURE — 99490 CHRNC CARE MGMT STAFF 1ST 20: CPT | Mod: PBBFAC,PO | Performed by: FAMILY MEDICINE

## 2022-03-31 PROCEDURE — 99490 CHRNC CARE MGMT STAFF 1ST 20: CPT | Mod: S$PBB,,, | Performed by: FAMILY MEDICINE

## 2022-04-04 ENCOUNTER — TELEPHONE (OUTPATIENT)
Dept: ORTHOPEDICS | Facility: CLINIC | Age: 82
End: 2022-04-04
Payer: MEDICARE

## 2022-04-04 DIAGNOSIS — Z96.653 H/O TOTAL KNEE REPLACEMENT, BILATERAL: Primary | ICD-10-CM

## 2022-04-04 DIAGNOSIS — Z91.09 ENVIRONMENTAL ALLERGIES: ICD-10-CM

## 2022-04-04 DIAGNOSIS — E78.5 HYPERLIPIDEMIA, UNSPECIFIED HYPERLIPIDEMIA TYPE: ICD-10-CM

## 2022-04-04 DIAGNOSIS — C61 PROSTATE CANCER: ICD-10-CM

## 2022-04-04 NOTE — TELEPHONE ENCOUNTER
No new care gaps identified.  Powered by [employer]. Reference number: [id number].   4/04/2022 10:31:06 AM CDT

## 2022-04-04 NOTE — TELEPHONE ENCOUNTER
Spoke to patient, and his wife. Scheduled for xray for recall as requested for next week.       ----- Message from Evy Tai MA sent at 4/4/2022 11:25 AM CDT -----  Contact: [phone]  X-ray orders are in.    ----- Message -----  From: Frida De Los Santos  Sent: 4/4/2022  10:10 AM CDT  To: Beverly SANCHEZ [facility] Recall Patient.    Pt is calling to get scheduled for his x-ray.    Pt would like a call back.    [phone]

## 2022-04-05 RX ORDER — MONTELUKAST SODIUM 10 MG/1
TABLET ORAL
Qty: 90 TABLET | Refills: 3 | Status: SHIPPED | OUTPATIENT
Start: 2022-04-05 | End: 2023-06-12

## 2022-04-05 RX ORDER — SIMVASTATIN 40 MG/1
TABLET, FILM COATED ORAL
Qty: 90 TABLET | Refills: 3 | Status: SHIPPED | OUTPATIENT
Start: 2022-04-05 | End: 2022-10-24 | Stop reason: SDUPTHER

## 2022-04-06 RX ORDER — TAMSULOSIN HYDROCHLORIDE 0.4 MG/1
CAPSULE ORAL
Qty: 90 CAPSULE | Refills: 0 | Status: SHIPPED | OUTPATIENT
Start: 2022-04-06 | End: 2022-07-20

## 2022-04-06 NOTE — TELEPHONE ENCOUNTER
Refill Routing Note   Medication(s) are not appropriate for processing by [facility] for the following reason(s):      - Prostate cancer on the problem list    [facility] action(s):  Defer  Approve       Medication Therapy Plan: DEFER tamsulosin (prostate cancer on the problem list) APPROVE montelukast, simvastatin  Medication reconciliation completed: No     Appointments  past 12m or future 3m with PCP    Date Provider   Last Visit   1/28/2022 Otilio Damon MD   Next Visit   7/28/2022 Otilio Damon MD   ED visits in past 90 days: 0        Note composed:8:28 PM 04/05/2022

## 2022-04-11 ENCOUNTER — HOSPITAL ENCOUNTER (OUTPATIENT)
Dept: RADIOLOGY | Facility: HOSPITAL | Age: 82
Discharge: HOME | End: 2022-04-11
Attending: NURSE PRACTITIONER
Payer: MEDICARE

## 2022-04-11 DIAGNOSIS — Z96.653 H/O TOTAL KNEE REPLACEMENT, BILATERAL: ICD-10-CM

## 2022-04-11 PROCEDURE — 73562 XR KNEE ORTHO BILAT: ICD-10-PCS | Mod: 26,50,, | Performed by: RADIOLOGY

## 2022-04-11 PROCEDURE — 73562 X-RAY EXAM OF KNEE 3: CPT | Mod: 26,50,, | Performed by: RADIOLOGY

## 2022-04-11 PROCEDURE — 73562 X-RAY EXAM OF KNEE 3: CPT | Mod: TC,50

## 2022-04-12 ENCOUNTER — TELEPHONE (OUTPATIENT)
Dept: ORTHOPEDICS | Facility: CLINIC | Age: 82
End: 2022-04-12
Payer: MEDICARE

## 2022-04-12 NOTE — TELEPHONE ENCOUNTER
Called patient with xray results. He has some stiffness in the previously infected knee, but no swelling or major problems. He will f/u as needed.

## 2022-04-29 ENCOUNTER — TELEPHONE (OUTPATIENT)
Dept: ORTHOPEDICS | Facility: CLINIC | Age: 82
End: 2022-04-29
Payer: MEDICARE

## 2022-04-29 DIAGNOSIS — M50.30 DDD (DEGENERATIVE DISC DISEASE), CERVICAL: Primary | ICD-10-CM

## 2022-04-29 NOTE — PROGRESS NOTES
DATE: 5/2/2022  PATIENT: Fan Paz    Supervising Physician: Kwame Baird M.D.    CHIEF COMPLAINT: neck pain    HISTORY:  Fan Paz is a 82 y.o. male here for initial evaluation of neck pain (Neck - 4). The pain has been present for years but recently increased in severity over the past 2 weeks. Denies specific injury.. The patient describes the pain as stiffness. The pain is worse with neck rotation and improved by nothing in particular. There is no associated numbness and tingling. There is no subjective weakness. Prior treatments have included tylenol, but no PT, ESIs or surgery.     The patient denies myelopathic symptoms such as handwriting changes or difficulty with buttons/coins/keys. Denies perineal paresthesias, bowel/bladder dysfunction.    PAST MEDICAL/SURGICAL HISTORY:  Past Medical History:   Diagnosis Date    Arthritis     Cancer of palate     HTN (hypertension)     Hyperlipidemia     Prostate cancer     2011     Past Surgical History:   Procedure Laterality Date    BACK SURGERY  y-6    Cancer [facility] palate surgery      Late [facility]- [facility]     CARPAL TUNNEL RELEASE  8-14-13    right hand,Left hand 2013    COLONOSCOPY N/A 4/10/2017    Procedure: COLONOSCOPY;  Surgeon: Nilo Kelly MD;  Location: [facility] ENDO;  Service: Endoscopy;  Laterality: N/A;    COLONOSCOPY N/A 10/21/2020    Procedure: COLONOSCOPY;  Surgeon: Demetrius Araujo MD;  Location: [facility] ENDO;  Service: Endoscopy;  Laterality: N/A;    KNEE SURGERY  y-40    left knee    KNEE SURGERY Right 12-1-15    TKR    Radio active seed Implant      January 2012    TOTAL HIP ARTHROPLASTY  3/2011       Medications:  Current Outpatient Medications on File Prior to Visit   Medication Sig Dispense Refill    acetaminophen (TYLENOL) 500 MG tablet Take 2 tablets (1,000 mg total) by mouth every 6 (six) hours. (Patient not taking: No sig reported) 60 tablet 0    aspirin (ECOTRIN) 81 MG EC tablet Take 1 tablet (81 mg  total) by mouth 2 (two) times daily. (Patient not taking: No sig reported) 60 tablet 0    diclofenac (VOLTAREN) 50 MG EC tablet Take 1 tablet (50 mg total) by mouth 2 (two) times daily. 60 tablet 0    fluticasone propionate (FLONASE) 50 mcg/actuation nasal spray 1 spray (50 mcg total) by Each Nostril route once daily. 16 g 0    fluticasone propionate (FLONASE) 50 mcg/actuation nasal spray 1 spray (50 mcg total) by Each Nostril route 2 (two) times daily. 18.2 mL 3    FLUZONE HIGH-DOSE , PF, 180 mcg/0.5 mL Syrg PHARMACIST ADMINISTERED IMMUNIZATION ADMINISTERED AT TIME OF DISPENSING  0    lidocaine 4 % Gel To scalp area BID PRN for tingling (Patient not taking: Reported on 2022) 30 g 0    montelukast (SINGULAIR) 10 mg tablet TAKE 1 TABLET BY MOUTH ONCE DAILY IN THE EVENING 90 tablet 3    ondansetron (ZOFRAN-ODT) 8 MG TbDL Dissolve 1 tablet (8 mg total) by mouth every 6 (six) hours as needed. (Patient not taking: Reported on 2022) 30 tablet 0    sildenafiL (VIAGRA) 25 MG tablet Take 1 tablet (25 mg total) by mouth daily as needed for Erectile Dysfunction. 30 tablet 1    simvastatin (ZOCOR) 40 MG tablet TAKE 1 TABLET BY MOUTH ONCE DAILY IN THE EVENING 90 tablet 3    tamsulosin (FLOMAX) 0.4 mg Cap Take 1 capsule by mouth once daily 90 capsule 0     No current facility-administered medications on file prior to visit.       Social History:   Social History     Socioeconomic History    Marital status:    Occupational History     Employer: RETIRED   Tobacco Use    Smoking status: Former Smoker     Packs/day: 3.00     Years: 20.00     Pack years: 60.00     Quit date: 7/10/1997     Years since quittin.8    Smokeless tobacco: Never Used    Tobacco comment: Quit -smoked for 40 years ,2 packs a day on an average   Substance and Sexual Activity    Alcohol use: No     Comment: used to drink Occasionally    Drug use: No    Sexual activity: Yes     Partners: Female       REVIEW OF  "SYSTEMS:  Constitution: Negative. Negative for chills, fever and night sweats.   Cardiovascular: Negative for chest pain and syncope.   Respiratory: Negative for cough and shortness of breath.   Gastrointestinal: See HPI. Negative for nausea/vomiting. Negative for abdominal pain.  Genitourinary: See HPI. Negative for discoloration or dysuria.  Skin: Negative for dry skin, itching and rash.   Hematologic/Lymphatic: Negative for bleeding problem. Does not bruise/bleed easily.   Musculoskeletal: Negative for falls and muscle weakness.   Neurological: See HPI. No seizures.   Endocrine: Negative for polydipsia, polyphagia and polyuria.   Allergic/Immunologic: Negative for hives and persistent infections.  Psychiatric/Behavioral: Negative for depression and insomnia.         EXAM:  Ht 5' 4.5" (1.638 m)   Wt 75.8 kg (167 lb 1.7 oz)   BMI 28.24 kg/m²     General: The patient is a very pleasant 82 y.o. male in no apparent distress, the patient is oriented to person, place and time.  Psych: Normal mood and affect  HEENT: Vision grossly intact, hearing intact to the spoken word.  Lungs: Respirations unlabored.  Gait: Normal station and gait, no difficulty with toe or heel walk.   Skin: Cervical skin negative for rashes, lesions, hairy patches and surgical scars.  Range of motion: Cervical range of motion is acceptable. There is mild tenderness to palpation.  Spinal Balance: Global saggital and coronal spinal balance acceptable, no significant for scoliosis and kyphosis.  Musculoskeletal: No pain with the range of motion of the bilateral shoulders and elbows. Normal bulk and contour of the bilateral hands.  Vascular: Bilateral hands warm and well perfused, radial pulses 2+ bilaterally.  Neurological: Normal strength and tone in all major motor groups in the bilateral upper and lower extremities. Normal sensation to light touch in the C5-T1 and L2-S1 dermatomes bilaterally.  Deep tendon reflexes symmetric 2+ in the bilateral " upper and lower extremities.  Negative Inverted Radial Reflex and Francisco's bilaterally. Negative Babinski bilaterally.     IMAGING:   Today I personally reviewed AP, Lat and Flex/Ex  upright C-spine films that demonstrate moderate DDD.      Body mass index is 28.24 kg/m².    Hemoglobin A1C   Date Value Ref Range Status   01/28/2022 6.1 (H) 4.0 - 5.6 % Final     Comment:     ADA Screening Guidelines:  5.7-6.4%  Consistent with prediabetes  >or=6.5%  Consistent with diabetes    High levels of fetal hemoglobin interfere with the HbA1C  assay. Heterozygous hemoglobin variants (HbS, HgC, etc)do  not significantly interfere with this assay.   However, presence of multiple variants may affect accuracy.     11/29/2020 5.7 (H) 4.0 - 5.6 % Final     Comment:     ADA Screening Guidelines:  5.7-6.4%  Consistent with prediabetes  >or=6.5%  Consistent with diabetes  High levels of fetal hemoglobin interfere with the HbA1C  assay. Heterozygous hemoglobin variants (HbS, HgC, etc)do  not significantly interfere with this assay.   However, presence of multiple variants may affect accuracy.     05/01/2018 5.9 (H) 4.0 - 5.6 % Final     Comment:     According to ADA guidelines, hemoglobin A1c <7.0% represents  optimal control in non-pregnant diabetic patients. Different  metrics may apply to specific patient populations.   Standards of Medical Care in Diabetes-2016.  For the purpose of screening for the presence of diabetes:  <5.7%     Consistent with the absence of diabetes  5.7-6.4%  Consistent with increasing risk for diabetes   (prediabetes)  >or=6.5%  Consistent with diabetes  Currently, no consensus exists for use of hemoglobin A1c  for diagnosis of diabetes for children.  This Hemoglobin A1c assay has significant interference with fetal   hemoglobin   (HbF). The results are invalid for patients with abnormal amounts of   HbF,   including those with known Hereditary Persistence   of Fetal Hemoglobin. Heterozygous hemoglobin  variants (HbAS, HbAC,   HbAD, HbAE, HbA2) do not significantly interfere with this assay;   however, presence of multiple variants in a sample may impact the %   interference.             ASSESSMENT/PLAN:    Fan was seen today for establish care and pain.    Diagnoses and all orders for this visit:    DDD (degenerative disc disease), cervical  -     MRI Cervical Spine Without Contrast; Future  -     Ambulatory referral/consult to Physical/Occupational Therapy; Future    Cervical radiculopathy  -     MRI Cervical Spine Without Contrast; Future  -     Ambulatory referral/consult to Physical/Occupational Therapy; Future        Today we discussed at length all of the different treatment options including anti-inflammatories, acetaminophen, rest, ice, heat, physical therapy including strengthening and stretching exercises, home exercises, ROM, aerobic conditioning, aqua therapy, other modalities including ultrasound, massage, and dry needling, epidural steroid injections and finally surgical intervention.      PT orders placed. Cervical MRI ordered, I will call with results.

## 2022-04-30 ENCOUNTER — EXTERNAL CHRONIC CARE MANAGEMENT (OUTPATIENT)
Dept: PRIMARY CARE CLINIC | Facility: CLINIC | Age: 82
End: 2022-04-30
Payer: MEDICARE

## 2022-04-30 PROCEDURE — 99490 PR CHRONIC CARE MGMT, 1ST 20 MIN: ICD-10-PCS | Mod: S$PBB,,, | Performed by: FAMILY MEDICINE

## 2022-04-30 PROCEDURE — 99490 CHRNC CARE MGMT STAFF 1ST 20: CPT | Mod: PBBFAC,25,PO | Performed by: FAMILY MEDICINE

## 2022-04-30 PROCEDURE — 99439 PR CHRONIC CARE MGMT, EA ADDTL 20 MIN: ICD-10-PCS | Mod: S$PBB,,, | Performed by: FAMILY MEDICINE

## 2022-04-30 PROCEDURE — 99439 CHRNC CARE MGMT STAF EA ADDL: CPT | Mod: S$PBB,,, | Performed by: FAMILY MEDICINE

## 2022-04-30 PROCEDURE — 99439 CHRNC CARE MGMT STAF EA ADDL: CPT | Mod: PBBFAC,PO | Performed by: FAMILY MEDICINE

## 2022-04-30 PROCEDURE — 99490 CHRNC CARE MGMT STAFF 1ST 20: CPT | Mod: S$PBB,,, | Performed by: FAMILY MEDICINE

## 2022-05-02 ENCOUNTER — HOSPITAL ENCOUNTER (OUTPATIENT)
Dept: RADIOLOGY | Facility: HOSPITAL | Age: 82
Discharge: HOME | End: 2022-05-02
Attending: REGISTERED NURSE
Payer: MEDICARE

## 2022-05-02 ENCOUNTER — OFFICE VISIT (OUTPATIENT)
Dept: ORTHOPEDICS | Facility: CLINIC | Age: 82
End: 2022-05-02
Payer: MEDICARE

## 2022-05-02 VITALS — HEIGHT: 65 IN | WEIGHT: 167.13 LBS | BODY MASS INDEX: 27.85 KG/M2

## 2022-05-02 DIAGNOSIS — M54.12 CERVICAL RADICULOPATHY: ICD-10-CM

## 2022-05-02 DIAGNOSIS — M50.30 DDD (DEGENERATIVE DISC DISEASE), CERVICAL: ICD-10-CM

## 2022-05-02 DIAGNOSIS — M50.30 DDD (DEGENERATIVE DISC DISEASE), CERVICAL: Primary | ICD-10-CM

## 2022-05-02 PROCEDURE — 99999 PR PBB SHADOW E&M-EST. PATIENT-LVL III: CPT | Mod: PBBFAC,,, | Performed by: REGISTERED NURSE

## 2022-05-02 PROCEDURE — 72050 X-RAY EXAM NECK SPINE 4/5VWS: CPT | Mod: 26,,, | Performed by: RADIOLOGY

## 2022-05-02 PROCEDURE — 99999 PR PBB SHADOW E&M-EST. PATIENT-LVL III: ICD-10-PCS | Mod: PBBFAC,,, | Performed by: REGISTERED NURSE

## 2022-05-02 PROCEDURE — 72050 X-RAY EXAM NECK SPINE 4/5VWS: CPT | Mod: TC

## 2022-05-02 PROCEDURE — 72050 XR CERVICAL SPINE AP LAT WITH FLEX EXTEN: ICD-10-PCS | Mod: 26,,, | Performed by: RADIOLOGY

## 2022-05-02 PROCEDURE — 99213 OFFICE O/P EST LOW 20 MIN: CPT | Mod: PBBFAC | Performed by: REGISTERED NURSE

## 2022-05-02 PROCEDURE — 99214 PR OFFICE/OUTPT VISIT, EST, LEVL IV, 30-39 MIN: ICD-10-PCS | Mod: S$PBB,,, | Performed by: REGISTERED NURSE

## 2022-05-02 PROCEDURE — 99214 OFFICE O/P EST MOD 30 MIN: CPT | Mod: S$PBB,,, | Performed by: REGISTERED NURSE

## 2022-05-10 ENCOUNTER — HOSPITAL ENCOUNTER (OUTPATIENT)
Dept: RADIOLOGY | Facility: HOSPITAL | Age: 82
Discharge: HOME | End: 2022-05-10
Attending: REGISTERED NURSE
Payer: MEDICARE

## 2022-05-10 DIAGNOSIS — M54.12 CERVICAL RADICULOPATHY: ICD-10-CM

## 2022-05-10 DIAGNOSIS — M50.30 DDD (DEGENERATIVE DISC DISEASE), CERVICAL: ICD-10-CM

## 2022-05-10 PROCEDURE — 72141 MRI CERVICAL SPINE WITHOUT CONTRAST: ICD-10-PCS | Mod: 26,,, | Performed by: RADIOLOGY

## 2022-05-10 PROCEDURE — 72141 MRI NECK SPINE W/O DYE: CPT | Mod: TC

## 2022-05-10 PROCEDURE — 72141 MRI NECK SPINE W/O DYE: CPT | Mod: 26,,, | Performed by: RADIOLOGY

## 2022-05-11 NOTE — PROGRESS NOTES
Established Patient - Audio Only Telehealth Visit     The patient location is: home  The chief complaint leading to consultation is: MRI results  Visit type: Virtual visit with audio only (telephone)  Total time spent with patient: 10 minutes       The reason for the audio only service rather than synchronous audio and video virtual visit was related to technical difficulties or patient preference/necessity.     Each patient to whom I provide medical services by telemedicine is:  (1) informed of the relationship between the physician and patient and the respective role of any other health care provider with respect to management of the patient; and (2) notified that they may decline to receive medical services by telemedicine and may withdraw from such care at any time. Patient verbally consented to receive this service via voice-only telephone call.       DATE: 5/11/2022  PATIENT: Fan Paz    Attending Physician: Ramsey Bolanos MD    HISTORY:  Fan Paz is a 82 y.o. male who returns to me today for MRI results.  He was last seen by Ruby Joel NP on 5/2/22.  Today he is doing well but notes he is doing well with physical therapy.    The Patient denies myelopathic symptoms such as handwriting changes or difficulty with buttons/coins/keys. Denies perineal paresthesias, bowel/bladder dysfunction.    PMH/PSH/FamHx/SocHx:  Unchanged from prior visit    ROS:  REVIEW OF SYSTEMS:  Constitution: Negative. Negative for chills, fever and night sweats.   HENT: Negative for congestion and headaches.    Eyes: Negative for blurred vision, left vision loss and right vision loss.   Cardiovascular: Negative for chest pain and syncope.   Respiratory: Negative for cough and shortness of breath.    Endocrine: Negative for polydipsia, polyphagia and polyuria.   Hematologic/Lymphatic: Negative for bleeding problem. Does not bruise/bleed easily.   Skin: Negative for dry skin, itching and rash.   Musculoskeletal:  Negative for falls and muscle weakness.   Gastrointestinal: Negative for abdominal pain and bowel incontinence.   Allergic/Immunologic: Negative for hives and persistent infections.  Genitourinary: Negative for urinary retention/incontinence and nocturia.   Neurological: negative for disturbances in coordination, no myelopathic symptoms such as handwriting changes or difficulty with buttons, coins, keys or small objects. No loss of balance and seizures.   Psychiatric/Behavioral: Negative for depression. The patient does not have insomnia.   Denies myelopathic symptoms, perineal paresthesias, bowel or bladder incontinence    EXAM:  There were no vitals taken for this visit.    My physical examination was notable for the following findings:     Musculoskeletal and neuro exam stable.    IMAGING:    Today I personally re-reviewed AP, Lat and Flex/Ex  upright C-spine films that demonstrate moderate DDD.      MRI cervical demonstrates significant multi level degenerative changes with varying degrees of central stenosis and neural foraminal narrowing. No abnormal cord signal to suggest myelopathy.    There is no height or weight on file to calculate BMI.    Hemoglobin A1C   Date Value Ref Range Status   01/28/2022 6.1 (H) 4.0 - 5.6 % Final     Comment:     ADA Screening Guidelines:  5.7-6.4%  Consistent with prediabetes  >or=6.5%  Consistent with diabetes    High levels of fetal hemoglobin interfere with the HbA1C  assay. Heterozygous hemoglobin variants (HbS, HgC, etc)do  not significantly interfere with this assay.   However, presence of multiple variants may affect accuracy.     11/29/2020 5.7 (H) 4.0 - 5.6 % Final     Comment:     ADA Screening Guidelines:  5.7-6.4%  Consistent with prediabetes  >or=6.5%  Consistent with diabetes  High levels of fetal hemoglobin interfere with the HbA1C  assay. Heterozygous hemoglobin variants (HbS, HgC, etc)do  not significantly interfere with this assay.   However, presence of  multiple variants may affect accuracy.     05/01/2018 5.9 (H) 4.0 - 5.6 % Final     Comment:     According to ADA guidelines, hemoglobin A1c <7.0% represents  optimal control in non-pregnant diabetic patients. Different  metrics may apply to specific patient populations.   Standards of Medical Care in Diabetes-2016.  For the purpose of screening for the presence of diabetes:  <5.7%     Consistent with the absence of diabetes  5.7-6.4%  Consistent with increasing risk for diabetes   (prediabetes)  >or=6.5%  Consistent with diabetes  Currently, no consensus exists for use of hemoglobin A1c  for diagnosis of diabetes for children.  This Hemoglobin A1c assay has significant interference with fetal   hemoglobin   (HbF). The results are invalid for patients with abnormal amounts of   HbF,   including those with known Hereditary Persistence   of Fetal Hemoglobin. Heterozygous hemoglobin variants (HbAS, HbAC,   HbAD, HbAE, HbA2) do not significantly interfere with this assay;   however, presence of multiple variants in a sample may impact the %   interference.           ASSESSMENT/PLAN:    There are no diagnoses linked to this encounter.    Today we discussed at length all of the different treatment options including anti-inflammatories, acetaminophen, rest, ice, heat, physical therapy including strengthening and stretching exercises, home exercises, ROM, aerobic conditioning, aqua therapy, other modalities including ultrasound, massage, and dry needling, epidural steroid injections and finally surgical intervention.      Pt presents with neck pain that is improving with PT. Pt is not interested in ESIs at this time. Will fu as needed if pain persists.                                This service was not originating from a related E/M service provided within the previous 7 days nor will  to an E/M service or procedure within the next 24 hours or my soonest available appointment.  Prevailing standard of care was able  to be met in this audio-only visit.

## 2022-05-12 ENCOUNTER — OFFICE VISIT (OUTPATIENT)
Dept: ORTHOPEDICS | Facility: CLINIC | Age: 82
End: 2022-05-12
Payer: MEDICARE

## 2022-05-12 DIAGNOSIS — M54.2 NECK PAIN: Primary | ICD-10-CM

## 2022-05-12 PROCEDURE — 99441 PR PHYSICIAN TELEPHONE EVALUATION 5-10 MIN: ICD-10-PCS | Mod: 95,,, | Performed by: ORTHOPAEDIC SURGERY

## 2022-05-12 PROCEDURE — 99441 PR PHYSICIAN TELEPHONE EVALUATION 5-10 MIN: CPT | Mod: 95,,, | Performed by: ORTHOPAEDIC SURGERY

## 2022-05-31 ENCOUNTER — EXTERNAL CHRONIC CARE MANAGEMENT (OUTPATIENT)
Dept: PRIMARY CARE CLINIC | Facility: CLINIC | Age: 82
End: 2022-05-31
Payer: MEDICARE

## 2022-05-31 PROCEDURE — 99490 CHRNC CARE MGMT STAFF 1ST 20: CPT | Mod: PBBFAC,PO | Performed by: FAMILY MEDICINE

## 2022-05-31 PROCEDURE — 99490 PR CHRONIC CARE MGMT, 1ST 20 MIN: ICD-10-PCS | Mod: S$PBB,,, | Performed by: FAMILY MEDICINE

## 2022-05-31 PROCEDURE — 99490 CHRNC CARE MGMT STAFF 1ST 20: CPT | Mod: S$PBB,,, | Performed by: FAMILY MEDICINE

## 2022-06-27 DIAGNOSIS — Z85.46 HISTORY OF PROSTATE CANCER: Primary | ICD-10-CM

## 2022-06-30 ENCOUNTER — EXTERNAL CHRONIC CARE MANAGEMENT (OUTPATIENT)
Dept: PRIMARY CARE CLINIC | Facility: CLINIC | Age: 82
End: 2022-06-30
Payer: MEDICARE

## 2022-06-30 PROCEDURE — 99490 PR CHRONIC CARE MGMT, 1ST 20 MIN: ICD-10-PCS | Mod: S$PBB,,, | Performed by: FAMILY MEDICINE

## 2022-06-30 PROCEDURE — 99490 CHRNC CARE MGMT STAFF 1ST 20: CPT | Mod: S$PBB,,, | Performed by: FAMILY MEDICINE

## 2022-06-30 PROCEDURE — 99490 CHRNC CARE MGMT STAFF 1ST 20: CPT | Mod: PBBFAC,PO | Performed by: FAMILY MEDICINE

## 2022-07-12 DIAGNOSIS — M25.532 LEFT WRIST PAIN: Primary | ICD-10-CM

## 2022-07-14 ENCOUNTER — TELEPHONE (OUTPATIENT)
Dept: FAMILY MEDICINE | Facility: CLINIC | Age: 82
End: 2022-07-14
Payer: MEDICARE

## 2022-07-14 NOTE — TELEPHONE ENCOUNTER
phone pt to hailey Chacon.Page pt has been recsh from 07/28/22 to 10/17/22 @ 9:00 am vs pt states that will be finding him another

## 2022-07-19 ENCOUNTER — OFFICE VISIT (OUTPATIENT)
Dept: ORTHOPEDICS | Facility: CLINIC | Age: 82
End: 2022-07-19
Payer: MEDICARE

## 2022-07-19 VITALS
SYSTOLIC BLOOD PRESSURE: 122 MMHG | OXYGEN SATURATION: 96 % | WEIGHT: 178 LBS | DIASTOLIC BLOOD PRESSURE: 82 MMHG | HEART RATE: 64 BPM | RESPIRATION RATE: 18 BRPM | BODY MASS INDEX: 30.08 KG/M2

## 2022-07-19 DIAGNOSIS — M25.532 LEFT WRIST PAIN: Primary | ICD-10-CM

## 2022-07-19 DIAGNOSIS — M65.4 TENDINITIS, DE QUERVAIN'S: ICD-10-CM

## 2022-07-19 PROCEDURE — 99214 OFFICE O/P EST MOD 30 MIN: CPT | Mod: PBBFAC,PN | Performed by: PHYSICIAN ASSISTANT

## 2022-07-19 PROCEDURE — 99999 PR PBB SHADOW E&M-EST. PATIENT-LVL IV: ICD-10-PCS | Mod: PBBFAC,,, | Performed by: PHYSICIAN ASSISTANT

## 2022-07-19 PROCEDURE — 99214 PR OFFICE/OUTPT VISIT, EST, LEVL IV, 30-39 MIN: ICD-10-PCS | Mod: S$PBB,,, | Performed by: PHYSICIAN ASSISTANT

## 2022-07-19 PROCEDURE — 99214 OFFICE O/P EST MOD 30 MIN: CPT | Mod: S$PBB,,, | Performed by: PHYSICIAN ASSISTANT

## 2022-07-19 PROCEDURE — 99999 PR PBB SHADOW E&M-EST. PATIENT-LVL IV: CPT | Mod: PBBFAC,,, | Performed by: PHYSICIAN ASSISTANT

## 2022-07-19 RX ORDER — DICLOFENAC SODIUM 10 MG/G
2 GEL TOPICAL 4 TIMES DAILY
Qty: 1 EACH | Refills: 1 | Status: SHIPPED | OUTPATIENT
Start: 2022-07-19 | End: 2022-10-24

## 2022-07-19 NOTE — PROGRESS NOTES
Subjective:      Patient ID: Fan Paz is a 82 y.o. male.    Chief Complaint: Pain of the Left Hand    HPI   Patient is here today with complaints of left wrist pain for 2 weeks.  There was no injury.  He states his pain is better today.  Pain is worse with activity.  The pain is mostly along radial aspect of wrist.  No swelling, numbness or tingling.  No history of prior injury or surgery to the wrist.     Review of Systems   Constitutional: Negative for chills and fever.   HENT: Negative for congestion.    Eyes: Negative for double vision and redness.   Cardiovascular: Negative for chest pain and palpitations.   Respiratory: Negative for shortness of breath.    Hematologic/Lymphatic: Does not bruise/bleed easily.   Skin: Negative for rash.   Gastrointestinal: Negative for abdominal pain, nausea and vomiting.   Neurological: Negative for dizziness and seizures.   Psychiatric/Behavioral: Negative for altered mental status.   Allergic/Immunologic: Negative for persistent infections.         Objective:        Vitals:    07/19/22 1119   BP: 122/82   Pulse: 64   Resp: 18     General    Vitals reviewed.  Constitutional: He is oriented to person, place, and time. He appears well-developed and well-nourished. No distress.   HENT:   Head: Normocephalic and atraumatic.   Eyes: Pupils are equal, round, and reactive to light.   Pulmonary/Chest: Effort normal.   Neurological: He is alert and oriented to person, place, and time.   Psychiatric: He has a normal mood and affect. His behavior is normal.         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  No fracture or dislocation.      Assessment:       Encounter Diagnoses   Name Primary?    Left wrist pain Yes    Tendinitis, de Quervain's           Plan:       Fan was seen today for pain.    Diagnoses and all orders for this visit:    Left wrist pain    Tendinitis, de Quervain's    Other orders  -     diclofenac sodium (VOLTAREN) 1 % Gel; Apply 2 g topically 4 (four) times daily.    - Discussed options including rest, ice daily, bracing as needed  - Can consider injection if pain persists  - Topical voltaren  - Follow up 6 weeks if no improvement

## 2022-07-31 ENCOUNTER — EXTERNAL CHRONIC CARE MANAGEMENT (OUTPATIENT)
Dept: PRIMARY CARE CLINIC | Facility: CLINIC | Age: 82
End: 2022-07-31
Payer: MEDICARE

## 2022-07-31 PROCEDURE — 99490 PR CHRONIC CARE MGMT, 1ST 20 MIN: ICD-10-PCS | Mod: S$PBB,,, | Performed by: FAMILY MEDICINE

## 2022-07-31 PROCEDURE — 99490 CHRNC CARE MGMT STAFF 1ST 20: CPT | Mod: PBBFAC,PO | Performed by: FAMILY MEDICINE

## 2022-07-31 PROCEDURE — 99490 CHRNC CARE MGMT STAFF 1ST 20: CPT | Mod: S$PBB,,, | Performed by: FAMILY MEDICINE

## 2022-08-03 ENCOUNTER — OFFICE VISIT (OUTPATIENT)
Dept: ORTHOPEDICS | Facility: CLINIC | Age: 82
End: 2022-08-03
Payer: MEDICARE

## 2022-08-03 VITALS
RESPIRATION RATE: 18 BRPM | HEART RATE: 65 BPM | BODY MASS INDEX: 30.9 KG/M2 | WEIGHT: 181 LBS | OXYGEN SATURATION: 98 % | DIASTOLIC BLOOD PRESSURE: 60 MMHG | HEIGHT: 64 IN | SYSTOLIC BLOOD PRESSURE: 158 MMHG

## 2022-08-03 DIAGNOSIS — M65.4 TENDINITIS, DE QUERVAIN'S: Primary | ICD-10-CM

## 2022-08-03 PROCEDURE — 99213 PR OFFICE/OUTPT VISIT, EST, LEVL III, 20-29 MIN: ICD-10-PCS | Mod: 25,S$PBB,, | Performed by: ORTHOPAEDIC SURGERY

## 2022-08-03 PROCEDURE — 20550 TENDON SHEATH: ICD-10-PCS | Mod: S$PBB,LT,, | Performed by: ORTHOPAEDIC SURGERY

## 2022-08-03 PROCEDURE — 20550 NJX 1 TENDON SHEATH/LIGAMENT: CPT | Mod: PBBFAC,PN,LT | Performed by: ORTHOPAEDIC SURGERY

## 2022-08-03 PROCEDURE — 99214 OFFICE O/P EST MOD 30 MIN: CPT | Mod: PBBFAC,PN,25 | Performed by: ORTHOPAEDIC SURGERY

## 2022-08-03 PROCEDURE — 99213 OFFICE O/P EST LOW 20 MIN: CPT | Mod: 25,S$PBB,, | Performed by: ORTHOPAEDIC SURGERY

## 2022-08-03 PROCEDURE — 99999 PR PBB SHADOW E&M-EST. PATIENT-LVL IV: CPT | Mod: PBBFAC,,, | Performed by: ORTHOPAEDIC SURGERY

## 2022-08-03 PROCEDURE — 99999 PR PBB SHADOW E&M-EST. PATIENT-LVL IV: ICD-10-PCS | Mod: PBBFAC,,, | Performed by: ORTHOPAEDIC SURGERY

## 2022-08-03 RX ORDER — TRIAMCINOLONE ACETONIDE 40 MG/ML
40 INJECTION, SUSPENSION INTRA-ARTICULAR; INTRAMUSCULAR
Status: DISCONTINUED | OUTPATIENT
Start: 2022-08-03 | End: 2022-08-03 | Stop reason: HOSPADM

## 2022-08-03 RX ADMIN — TRIAMCINOLONE ACETONIDE 40 MG: 40 INJECTION, SUSPENSION INTRA-ARTICULAR; INTRAMUSCULAR at 07:08

## 2022-08-03 NOTE — PROGRESS NOTES
"Follow up visit    History of Present Illness:   Fan comes to the office for follow up evaluation of left wrist pain - seen by hortensia and diagnosed with dequervains tenosynovitis .  Recommended treatment at the last visit included voltaren. No improvement. Pain with motion of the wrist or thumb    ROS: unremarkable and no change since last visit    Physical Examination:    Vitals:    08/03/22 0736   BP: (!) 158/60   Pulse: 65   Resp: 18   SpO2: 98%   Weight: 82.1 kg (181 lb)   Height: 5' 4" (1.626 m)   PainSc:   8   PainLoc: Wrist      NAD  Left wrist   + finklestein  Neg grind  Localizes pain to first dorsal compartment, no pain over CMC     Radiographic imaging:  No new. Previous films show degenerative changes at first CMC     Assessment/Plan:  82 y.o. male  with Left dequervains tenosynovitis     We discussed the etiology of persistent pain and further treatment options.  Injection of the left first dorsal compartment  performed, please see procedure note for more details.  Prior to the injection risks and benefits of corticosteroid injection were discussed with the patient including pain, infection, bleeding, skin color changes, swelling, steroid flare. We discussed that over time injections can result in chondral damage, acceleration of arthritis formation, damage to tendons and damage to joints.  The patient consented for the procedure.  Post-injection instructions were given to the patient in writing.  1. Return for follow up visit: PRN    All questions were answered in detail. The patient  verbalized the understanding of the treatment plan and is in full agreement with the treatment plan.    "

## 2022-08-03 NOTE — PROCEDURES
Tendon Sheath    Date/Time: 8/3/2022 7:45 AM  Performed by: Terri Wade MD  Authorized by: Terri Wade MD     Consent Done?:  Yes (Verbal)  Indications:  Pain  Timeout: prior to procedure the correct patient, procedure, and site was verified    Prep: patient was prepped and draped in usual sterile fashion      Local anesthesia used?: Yes    Local anesthetic:  Topical anesthetic  Location:  Wrist  Site:  L first doral compartment  Ultrasonic guidance for needle placement?: No    Needle size:  25 G  Approach:  Radial  Medications:  40 mg triamcinolone acetonide 40 mg/mL  Patient tolerance:  Patient tolerated the procedure well with no immediate complications

## 2022-08-05 ENCOUNTER — TELEPHONE (OUTPATIENT)
Dept: FAMILY MEDICINE | Facility: CLINIC | Age: 82
End: 2022-08-05
Payer: MEDICARE

## 2022-08-05 NOTE — TELEPHONE ENCOUNTER
Spoke with the Patient to inform about the E[facility] appointment and to schedule.  Patient requested a call back  By October 1st.

## 2022-08-16 ENCOUNTER — LAB VISIT (OUTPATIENT)
Dept: LAB | Facility: HOSPITAL | Age: 82
End: 2022-08-16
Attending: RADIOLOGY
Payer: MEDICARE

## 2022-08-16 DIAGNOSIS — Z85.46 HISTORY OF PROSTATE CANCER: ICD-10-CM

## 2022-08-16 LAB — COMPLEXED PSA SERPL-MCNC: <0.01 NG/ML (ref 0–4)

## 2022-08-16 PROCEDURE — 36415 COLL VENOUS BLD VENIPUNCTURE: CPT | Performed by: RADIOLOGY

## 2022-08-16 PROCEDURE — 84153 ASSAY OF PSA TOTAL: CPT | Performed by: RADIOLOGY

## 2022-08-22 ENCOUNTER — OFFICE VISIT (OUTPATIENT)
Dept: RADIATION ONCOLOGY | Facility: CLINIC | Age: 82
End: 2022-08-22
Attending: RADIOLOGY
Payer: MEDICARE

## 2022-08-22 DIAGNOSIS — Z85.46 HISTORY OF PROSTATE CANCER: Primary | ICD-10-CM

## 2022-08-22 PROCEDURE — 99499 NO LOS: ICD-10-PCS | Mod: 95,,, | Performed by: RADIOLOGY

## 2022-08-22 PROCEDURE — 99499 UNLISTED E&M SERVICE: CPT | Mod: 95,,, | Performed by: RADIOLOGY

## 2022-08-22 NOTE — PROGRESS NOTES
Subjective:       Patient ID: Fan Paz is a 82 y.o. male.    Chief Complaint: No chief complaint on file.    The patient location is: home   The chief complaint leading to consultation is: prostate cancer     Visit type: audio only    15 minutes of total time spent on the encounter, which includes face to face time and non-face to face time preparing to see the patient (eg, review of tests), Obtaining and/or reviewing separately obtained history, Documenting clinical information in the electronic or other health record, Independently interpreting results (not separately reported) and communicating results to the patient/family/caregiver, or Care coordination (not separately reported).     Each patient to whom he or she provides medical services by telemedicine is:  (1) informed of the relationship between the physician and patient and the respective role of any other health care provider with respect to management of the patient; and (2) notified that he or she may decline to receive medical services by telemedicine and may withdraw from such care at any time.    Mr. Paz has a history of Stage II (T1c, N0, M0) adenocarcinoma of the prostate. He presented with an elevated PSA of 5 ng/ml.   Biopsies from the left apex, left mid gland, left base, and right apex revealed adenocarcinoma. The Ryan score was 7 (3+4) and biopsies from the left mid gland and left base. The tumor involved 30% and 60% of the specimens, respectively. [place]'s 6 adenocarcinoma was noted in biopsies from the left apex and right apex involving only 5% and 1% of the specimens, respectively. Further metastatic work up was negative. The patient was referred for definitive radiotherapy. He was enrolled on RTOG 0815. He was randomized to receive 6 months of hormonal ablation therapy along with radiotherapy. He completed 45 Gy external beam therapy followed by implantation with Palladium 103 seeds on 1/18/12.   He has remained BRIGHT  since that  time.  Today, the patient  states he feels well.  No  complaints.           Review of Systems   Constitutional: Negative for activity change, appetite change, chills and fatigue.   Gastrointestinal: Negative for abdominal pain, constipation and diarrhea.   Genitourinary: Negative for bladder incontinence, difficulty urinating, dysuria, frequency and hematuria.         Objective:      Physical Exam  Constitutional:       General: He is not in acute distress.     Appearance: Normal appearance.   Neurological:      Mental Status: He is alert and oriented to person, place, and time.   Psychiatric:         Mood and Affect: Mood normal.         Judgment: Judgment normal.       PSA < 0.01 ng/ml  Assessment:       Problem List Items Addressed This Visit     History of prostate cancer - Primary          Plan:       Patient doing well   no evidence of tumor progression or recurrence.   Plan follow up in 1 year with PSA

## 2022-08-30 ENCOUNTER — TELEPHONE (OUTPATIENT)
Dept: FAMILY MEDICINE | Facility: CLINIC | Age: 82
End: 2022-08-30
Payer: MEDICARE

## 2022-08-30 NOTE — TELEPHONE ENCOUNTER
Tried to reach pt about the [facility] appt but voicemail is full as of 08/30/22 @ 3:38 pm vs   per Page pt has been recsh from 10/17/22 to 10/24/22 @ 8:20 pm  new appt letter was sent out on 08/30/22 vs

## 2022-08-31 ENCOUNTER — EXTERNAL CHRONIC CARE MANAGEMENT (OUTPATIENT)
Dept: PRIMARY CARE CLINIC | Facility: CLINIC | Age: 82
End: 2022-08-31
Payer: MEDICARE

## 2022-08-31 PROCEDURE — 99490 CHRNC CARE MGMT STAFF 1ST 20: CPT | Mod: S$PBB,,, | Performed by: FAMILY MEDICINE

## 2022-08-31 PROCEDURE — 99490 PR CHRONIC CARE MGMT, 1ST 20 MIN: ICD-10-PCS | Mod: S$PBB,,, | Performed by: FAMILY MEDICINE

## 2022-08-31 PROCEDURE — 99490 CHRNC CARE MGMT STAFF 1ST 20: CPT | Mod: PBBFAC,PO | Performed by: FAMILY MEDICINE

## 2022-09-20 ENCOUNTER — OFFICE VISIT (OUTPATIENT)
Dept: CARDIOLOGY | Facility: CLINIC | Age: 82
End: 2022-09-20
Payer: MEDICARE

## 2022-09-20 VITALS
RESPIRATION RATE: 18 BRPM | WEIGHT: 177.69 LBS | OXYGEN SATURATION: 99 % | BODY MASS INDEX: 30.34 KG/M2 | HEART RATE: 68 BPM | HEIGHT: 64 IN | DIASTOLIC BLOOD PRESSURE: 84 MMHG | SYSTOLIC BLOOD PRESSURE: 146 MMHG

## 2022-09-20 DIAGNOSIS — E78.49 OTHER HYPERLIPIDEMIA: ICD-10-CM

## 2022-09-20 DIAGNOSIS — R03.0 ELEVATED BLOOD PRESSURE READING IN OFFICE WITHOUT DIAGNOSIS OF HYPERTENSION: Primary | ICD-10-CM

## 2022-09-20 DIAGNOSIS — I44.1 MOBITZ TYPE 1 SECOND DEGREE AV BLOCK: ICD-10-CM

## 2022-09-20 PROCEDURE — 99999 PR PBB SHADOW E&M-EST. PATIENT-LVL III: ICD-10-PCS | Mod: PBBFAC,,, | Performed by: INTERNAL MEDICINE

## 2022-09-20 PROCEDURE — 93010 ELECTROCARDIOGRAM REPORT: CPT | Mod: S$PBB,,, | Performed by: INTERNAL MEDICINE

## 2022-09-20 PROCEDURE — 99213 OFFICE O/P EST LOW 20 MIN: CPT | Mod: PBBFAC | Performed by: INTERNAL MEDICINE

## 2022-09-20 PROCEDURE — 99213 PR OFFICE/OUTPT VISIT, EST, LEVL III, 20-29 MIN: ICD-10-PCS | Mod: S$PBB,,, | Performed by: INTERNAL MEDICINE

## 2022-09-20 PROCEDURE — 99213 OFFICE O/P EST LOW 20 MIN: CPT | Mod: S$PBB,,, | Performed by: INTERNAL MEDICINE

## 2022-09-20 PROCEDURE — 99999 PR PBB SHADOW E&M-EST. PATIENT-LVL III: CPT | Mod: PBBFAC,,, | Performed by: INTERNAL MEDICINE

## 2022-09-20 PROCEDURE — 93005 ELECTROCARDIOGRAM TRACING: CPT | Mod: PBBFAC | Performed by: INTERNAL MEDICINE

## 2022-09-20 PROCEDURE — 93010 EKG 12-LEAD: ICD-10-PCS | Mod: S$PBB,,, | Performed by: INTERNAL MEDICINE

## 2022-09-20 NOTE — PROGRESS NOTES
CARDIOLOGY CLINIC VISIT        HISTORY OF PRESENT ILLNESS:     Fan Paz presents for continued care.  Last seen 03/22/2022. Seen 02/02/2022 for evaluation of abnormal EKG.  EKG showed second-degree AV block, Mobitz 1. The Holter showed sinus rhythm with average heart rate of 79 beats per minute.  Frequent PACs.  Wenckebach.  Echocardiogram showed normal left ventricular systolic function with estimated ejection fraction of 60%.  No pulmonary hypertension.  The blood pressure looks good today.  Patient without complaints.    Twenty-two: Patient without complaints.  No dizziness.  No syncope.  Only issue is his arthritis.  Blood pressure is a little elevated today.  EKG today shows sinus rhythm with first-degree AV block.    CARDIOVASCULAR HISTORY:     Mobitz 1    PAST MEDICAL HISTORY:     Past Medical History:   Diagnosis Date    Arthritis     Cancer of palate     HTN (hypertension)     Hyperlipidemia     Prostate cancer     2011       PAST SURGICAL HISTORY:     Past Surgical History:   Procedure Laterality Date    BACK SURGERY  y-7    Cancer of palate surgery      Late [facility]- [facility]     CARPAL TUNNEL RELEASE  8-14-13    right hand,Left hand 2013    COLONOSCOPY N/A 4/10/2017    Procedure: COLONOSCOPY;  Surgeon: Nilo Kelly MD;  Location: [facility] ENDO;  Service: Endoscopy;  Laterality: N/A;    COLONOSCOPY N/A 10/21/2020    Procedure: COLONOSCOPY;  Surgeon: Demetrius Araujo MD;  Location: [facility] ENDO;  Service: Endoscopy;  Laterality: N/A;    KNEE SURGERY  y-41    left knee    KNEE SURGERY Right 12-1-15    TKR    Radio active seed Implant      January 2012    TOTAL HIP ARTHROPLASTY  3/2011       ALLERGIES AND MEDICATION:   Review of patient's allergies indicates:  No Known Allergies     Medication List            Accurate as of September 20, 2022  9:14 AM. If you have any questions, ask your nurse or doctor.                CONTINUE taking these medications      aspirin 81 MG EC tablet  Commonly known as:  ECOTRIN  Take 1 tablet (81 mg total) by mouth 2 (two) times daily.     * diclofenac 50 MG EC tablet  Commonly known as: VOLTAREN  Take 1 tablet (50 mg total) by mouth 2 (two) times daily.     * diclofenac sodium 1 % Gel  Commonly known as: VOLTAREN  Apply 2 g topically 4 (four) times daily.     * fluticasone propionate 50 mcg/actuation nasal spray  Commonly known as: FLONASE  1 spray (50 mcg total) by Each Nostril route once daily.     * fluticasone propionate 50 mcg/actuation nasal spray  Commonly known as: FLONASE  1 spray (50 mcg total) by Each Nostril route 2 (two) times daily.     FLUZONE HIGH-DOSE 2019-20 (PF) 180 mcg/0.5 mL Syrg  Generic drug: flu vacc [id number](65yr up)PF     LIDOcaine 4 % Gel  To scalp area BID PRN for tingling     montelukast 10 mg tablet  Commonly known as: SINGULAIR  TAKE 1 TABLET BY MOUTH ONCE DAILY IN THE EVENING     ondansetron 8 MG Tbdl  Commonly known as: ZOFRAN-ODT  Dissolve 1 tablet (8 mg total) by mouth every 6 (six) hours as needed.     PAIN RELIEF EXTRA STRENGTH 500 MG tablet  Generic drug: acetaminophen  Take 2 tablets (1,000 mg total) by mouth every 6 (six) hours.     sildenafiL 25 MG tablet  Commonly known as: VIAGRA  Take 1 tablet (25 mg total) by mouth daily as needed for Erectile Dysfunction.     simvastatin 40 MG tablet  Commonly known as: ZOCOR  TAKE 1 TABLET BY MOUTH ONCE DAILY IN THE EVENING     tamsulosin 0.4 mg Cap  Commonly known as: FLOMAX  Take 1 capsule by mouth once daily           * This list has 4 medication(s) that are the same as other medications prescribed for you. Read the directions carefully, and ask your doctor or other care provider to review them with you.                  SOCIAL HISTORY:     Social History     Socioeconomic History    Marital status:    Occupational History     Employer: RETIRED   Tobacco Use    Smoking status: Former     Packs/day: 3.00     Years: 20.00     Pack years: 60.00     Types: Cigarettes     Quit date: 7/10/1997      Years since quittin.2    Smokeless tobacco: Never    Tobacco comments:     Quit -smoked for 40 years ,2 packs a day on an average   Substance and Sexual Activity    Alcohol use: No     Comment: used to drink Occasionally    Drug use: No    Sexual activity: Yes     Partners: Female       FAMILY HISTORY:     Family History   Problem Relation Age of Onset    Coronary artery disease Father             Cancer Sister         Liver Cancer -    Diabetes Sister             No Known Problems Brother     No Known Problems Sister     No Known Problems Sister     No Known Problems Brother     No Known Problems Mother     Lung cancer Brother 60        - was a tobacco user, had lung cancer    Cancer Brother         Liver Cancer-     Lung cancer Sister         Alive    Breast cancer Sister     Clotting disorder Sister 75        blood clot on brain-alive    Stroke Brother             Glaucoma Cousin     No Known Problems Maternal Aunt     No Known Problems Maternal Uncle     No Known Problems Paternal Aunt     No Known Problems Paternal Uncle     No Known Problems Maternal Grandmother     No Known Problems Maternal Grandfather     No Known Problems Paternal Grandmother     No Known Problems Paternal Grandfather     Macular degeneration Neg Hx     Strabismus Neg Hx     Amblyopia Neg Hx     Blindness Neg Hx     Cataracts Neg Hx     Hypertension Neg Hx     Retinal detachment Neg Hx     Thyroid disease Neg Hx        REVIEW OF SYSTEMS:   Review of Systems   Constitutional:  Negative for chills, diaphoresis, fever, malaise/fatigue and weight loss.   Eyes:  Negative for blurred vision and pain.   Respiratory:  Negative for sputum production, shortness of breath and wheezing.    Cardiovascular:  Negative for chest pain, palpitations, orthopnea, claudication, leg swelling and PND.   Gastrointestinal:  Negative for abdominal pain, heartburn, nausea and vomiting.   Musculoskeletal:  Positive for joint pain.  "Negative for back pain, falls, myalgias and neck pain.   Neurological:  Negative for dizziness, speech change, focal weakness, loss of consciousness, weakness and headaches.   Endo/Heme/Allergies:  Does not bruise/bleed easily.   Psychiatric/Behavioral:  Negative for depression, memory loss and substance abuse. The patient is not nervous/anxious.      PHYSICAL EXAM:     Vitals:    09/20/22 0849   BP: (!) 146/84   Pulse: 68   Resp: 18    Body mass index is 30.5 kg/m².  Weight: 80.6 kg (177 lb 11.1 oz)   Height: 5' 4" (162.6 cm)     Physical Exam  Vitals reviewed.   Constitutional:       General: He is not in acute distress.     Appearance: He is well-developed. He is not diaphoretic.   Neck:      Vascular: No carotid bruit or JVD.   Cardiovascular:      Rate and Rhythm: Normal rate and regular rhythm. No extrasystoles are present.     Pulses: Normal pulses.      Heart sounds: Normal heart sounds.   Pulmonary:      Effort: Pulmonary effort is normal.      Breath sounds: Normal breath sounds.   Abdominal:      General: Bowel sounds are normal.      Palpations: Abdomen is soft.      Tenderness: There is no abdominal tenderness.   Musculoskeletal:      Right lower leg: No edema.      Left lower leg: No edema.   Neurological:      Mental Status: He is alert and oriented to person, place, and time.   Psychiatric:         Speech: Speech normal.         Behavior: Behavior normal.       DATA:   EKG: (personally reviewed tracing)  09/20/2022: Sinus rhythm first-degree AV block  Laboratory:  CBC:  Recent Labs   Lab 02/19/21  1440 07/06/21  1052 01/28/22  0938   WBC 5.99 5.11 5.17   Hemoglobin 11.1 L 12.7 L 12.9 L   Hematocrit 36.6 L 41.5 41.8   Platelets 269 208 196       CHEMISTRIES:  Recent Labs   Lab 03/05/21  0935 07/06/21  1052 01/28/22  0938   Glucose 106 93 110   Sodium 137 140 140   Potassium 4.8 3.9 4.2   BUN 13 14 16   Creatinine 0.8 0.9 1.0   eGFR if African American >60 >60 >60   eGFR if non  >60 >60 " >60   Calcium 9.0 8.9 8.8       CARDIAC BIOMARKERS:        COAGS:  Recent Labs   Lab 11/27/20  1538   INR 1.0       LIPIDS/LFTS:  Recent Labs   Lab 07/20/20  0945 11/29/20  0447 03/05/21  0935 07/06/21  1052 01/28/22  0938   Cholesterol 130  --   --   --  158   Triglycerides 68  --   --   --  69   HDL 43  --   --   --  47   LDL Cholesterol 73.4  --   --   --  97.2   Non-HDL Cholesterol 87  --   --   --  111   AST 28   < > 16 19 23   ALT 24   < > 13 15 20    < > = values in this interval not displayed.       Cardiovascular Testing:    Echocardiogram 02/16/2022:    The left ventricle is normal in size with normal systolic function.  The estimated ejection fraction is 60%.  Moderate left atrial enlargement.  Indeterminate left ventricular diastolic function.  Normal right ventricular size with normal right ventricular systolic function.  Normal central venous pressure (3 mmHg).  The estimated PA systolic pressure is 6 mmHg.  There is no pulmonary hypertension.    Holter 02/16/2022:    Sinus rhythm with heart rates varying between 43 and 113 BPM with an average of 79 BPM  There were very frequent PACs  Wenckebach    ASSESSMENT:     1. Abnormal EKG  2. Mobitz type 1 second-degree AV block  3. Elevated blood pressure reading without diagnosis of hypertension  4. Hyperlipidemia: LDL 97  4. Osteoarthritis     PLAN:     1. Abnormal EKG/Mobitz type 1 second-degree AV block:  24 hour Holter showed no significant arrhythmias.  PACs.  2. Elevated blood pressure reading without diagnosis of hypertension:  Monitor. 2D echocardiogram showed normal function.  No left ventricular hypertrophy.  3. Hyperlipidemia:  Continue current management.  4. Return to clinic 6 months.           Jonathan Mullins MD, MPH, FACC, [facility]

## 2022-09-26 ENCOUNTER — OFFICE VISIT (OUTPATIENT)
Dept: OTOLARYNGOLOGY | Facility: CLINIC | Age: 82
End: 2022-09-26
Payer: MEDICARE

## 2022-09-26 VITALS — WEIGHT: 177 LBS | BODY MASS INDEX: 30.22 KG/M2 | HEIGHT: 64 IN

## 2022-09-26 DIAGNOSIS — J34.2 DEVIATED NASAL SEPTUM: ICD-10-CM

## 2022-09-26 DIAGNOSIS — Z08 ENCOUNTER FOR FOLLOW-UP SURVEILLANCE OF ORAL CAVITY CANCER: ICD-10-CM

## 2022-09-26 DIAGNOSIS — Z85.819 ENCOUNTER FOR FOLLOW-UP SURVEILLANCE OF ORAL CAVITY CANCER: ICD-10-CM

## 2022-09-26 DIAGNOSIS — H61.23 BILATERAL IMPACTED CERUMEN: ICD-10-CM

## 2022-09-26 DIAGNOSIS — Z92.3 HISTORY OF HEAD AND NECK RADIATION: Primary | ICD-10-CM

## 2022-09-26 PROCEDURE — 99213 OFFICE O/P EST LOW 20 MIN: CPT | Mod: 25,S$GLB,, | Performed by: OTOLARYNGOLOGY

## 2022-09-26 PROCEDURE — 99213 PR OFFICE/OUTPT VISIT, EST, LEVL III, 20-29 MIN: ICD-10-PCS | Mod: 25,S$GLB,, | Performed by: OTOLARYNGOLOGY

## 2022-09-26 PROCEDURE — 69210 REMOVE IMPACTED EAR WAX UNI: CPT | Mod: 51,S$GLB,, | Performed by: OTOLARYNGOLOGY

## 2022-09-26 PROCEDURE — 31231 PR NASAL ENDOSCOPY, DX: ICD-10-PCS | Mod: S$GLB,,, | Performed by: OTOLARYNGOLOGY

## 2022-09-26 PROCEDURE — 31231 NASAL ENDOSCOPY DX: CPT | Mod: S$GLB,,, | Performed by: OTOLARYNGOLOGY

## 2022-09-26 PROCEDURE — 69210 PR REMOVAL IMPACTED CERUMEN REQUIRING INSTRUMENTATION, UNILATERAL: ICD-10-PCS | Mod: 51,S$GLB,, | Performed by: OTOLARYNGOLOGY

## 2022-09-26 RX ORDER — FLUTICASONE PROPIONATE 50 MCG
2 SPRAY, SUSPENSION (ML) NASAL 2 TIMES DAILY
Qty: 18.2 ML | Refills: 11 | Status: SHIPPED | OUTPATIENT
Start: 2022-09-26

## 2022-09-26 NOTE — PROGRESS NOTES
OTOLARYNGOLOGY CLINIC NOTE  Date:  09/26/2022     Chief complaint:  Chief Complaint   Patient presents with    Follow-up     6 month follow up for ear cleaning and sinus. Bilateral cerumen impaction       History of Present Illness  Fan Paz is a 82 y.o. male  presenting today for a followup.  Finished treatment in [id number]  Has to be careful how he eats but that is not new.   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 Usually on the right side sometimes on the left side but more on right no facial numbness or pain.  Has been gong on for a couple of years   Sinus issues are controlled. Using nasal sprays -saline and flonase    I originally saw the patient on 11-8-21. Below text is copied from initial note on that date describing history of present illness at time of presentation.   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 Sinus surgery was done after cancer surgery. Dr. Rushing also did cancer surgery. Had rt and needed feeding tube during that. Had most of teeth extracted except for 6 on the bottom.   Started having sinus problems a couple of years after treatment. Has itchy eyes and sneezing. No headaches. Sneezes with seasoning. flonase does help. + runny nose is all the time but also happens when exposed to seasoning.   Does singulair at night. Gets post nasal drip.  No changes to swallowing- has to eat mostly pureed/liquid  But that is stable since radiation.   Dry mouth since radiation. No pain with raising shoulder or head movement. No neck swelling  Right side bleeds more when blows  Not having hearing loss  Nor ear pain nor drainage currently.     Past Medical History  Past Medical History:   Diagnosis Date    Arthritis     Cancer of palate     HTN (hypertension)     Hyperlipidemia     Prostate cancer     2011        Past Surgical History  Past Surgical History:    Procedure Laterality Date    BACK SURGERY  y-7    Cancer of palate surgery      [facility]- [facility]     CARPAL TUNNEL RELEASE  8-14-13    right hand,Left hand 2013    COLONOSCOPY N/A 4/10/2017    Procedure: COLONOSCOPY;  Surgeon: Nilo Kelly MD;  Location: [facility] ENDO;  Service: Endoscopy;  Laterality: N/A;    COLONOSCOPY N/A 10/21/2020    Procedure: COLONOSCOPY;  Surgeon: Demetrius Araujo MD;  Location: [facility] ENDO;  Service: Endoscopy;  Laterality: N/A;    KNEE SURGERY  y-41    left knee    KNEE SURGERY Right 12-1-15    TKR    Radio active seed Implant      January 2012    TOTAL HIP ARTHROPLASTY  3/2011        Medications  Current Outpatient Medications on File Prior to Visit   Medication Sig Dispense Refill    acetaminophen (TYLENOL) 500 MG tablet Take 2 tablets (1,000 mg total) by mouth every 6 (six) hours. 60 tablet 0    aspirin (ECOTRIN) 81 MG EC tablet Take 1 tablet (81 mg total) by mouth 2 (two) times daily. 60 tablet 0    diclofenac (VOLTAREN) 50 MG EC tablet Take 1 tablet (50 mg total) by mouth 2 (two) times daily. 60 tablet 0    diclofenac sodium (VOLTAREN) 1 % Gel Apply 2 g topically 4 (four) times daily. 1 each 1    fluticasone propionate (FLONASE) 50 mcg/actuation nasal spray 1 spray (50 mcg total) by Each Nostril route once daily. 16 g 0    fluticasone propionate (FLONASE) 50 mcg/actuation nasal spray 1 spray (50 mcg total) by Each Nostril route 2 (two) times daily. 18.2 mL 3    FLUZONE HIGH-DOSE 2019-20, PF, 180 mcg/0.5 mL Syrg PHARMACIST ADMINISTERED IMMUNIZATION ADMINISTERED AT TIME OF DISPENSING  0    lidocaine 4 % Gel To scalp area BID PRN for tingling 30 g 0    montelukast (SINGULAIR) 10 mg tablet TAKE 1 TABLET BY MOUTH ONCE DAILY IN THE EVENING 90 tablet 3    ondansetron (ZOFRAN-ODT) 8 MG TbDL Dissolve 1 tablet (8 mg total) by mouth every 6 (six) hours as needed. 30 tablet 0    sildenafiL (VIAGRA) 25 MG tablet Take 1 tablet (25 mg total) by mouth daily as needed for Erectile Dysfunction.  30 tablet 1    simvastatin (ZOCOR) 40 MG tablet TAKE 1 TABLET BY MOUTH ONCE DAILY IN THE EVENING 90 tablet 3    tamsulosin (FLOMAX) 0.4 mg Cap Take 1 capsule by mouth once daily 90 capsule 0     No current facility-administered medications on file prior to visit.       Review of Systems  Review of Systems   Constitutional: Negative.    Cardiovascular: Negative.    Gastrointestinal:  Positive for heartburn.   Genitourinary: Negative.    Skin: Negative.    Neurological: Negative.    Psychiatric/Behavioral: Negative.      Answers submitted by the patient for this visit:  Review of Symptoms Questionnaire  (Submitted on 2022)  sinus pressure : Yes  Sinus infection(s)?: Yes  trouble swallowing: Yes  eye itching: Yes  Snoring?: Yes  Muscle aches / pain?: Yes  Seasonal Allergies?: Yes  Social History   reports that he quit smoking about 25 years ago. He has a 60.00 pack-year smoking history. He has never used smokeless tobacco. He reports that he does not drink alcohol and does not use drugs.     Family History  Family History   Problem Relation Age of Onset    Coronary artery disease Father             Cancer Sister         Liver Cancer -    Diabetes Sister             No Known Problems Brother     No Known Problems Sister     No Known Problems Sister     No Known Problems Brother     No Known Problems Mother     Lung cancer Brother 60        - was a tobacco user, had lung cancer    Cancer Brother         Liver Cancer-     Lung cancer Sister         Alive    Breast cancer Sister     Clotting disorder Sister 75        blood clot on brain-alive    Stroke Brother             Glaucoma Cousin     No Known Problems Maternal Aunt     No Known Problems Maternal Uncle     No Known Problems Paternal Aunt     No Known Problems Paternal Uncle     No Known Problems Maternal Grandmother     No Known Problems Maternal Grandfather     No Known Problems Paternal Grandmother     No Known Problems Paternal  "Grandfather     Macular degeneration Neg Hx     Strabismus Neg Hx     Amblyopia Neg Hx     Blindness Neg Hx     Cataracts Neg Hx     Hypertension Neg Hx     Retinal detachment Neg Hx     Thyroid disease Neg Hx         Physical Exam   There were no vitals filed for this visit. Body mass index is 30.38 kg/m².  Weight: 80.3 kg (177 lb)   Height: 5' 4" (162.6 cm)     GENERAL: no acute distress.  HEAD: normocephalic.   EYES: No scleral icterus  EARS: external ear without lesion, normal pinna shape and position.  External auditory canal with impacted cerumen, bialteral   NOSE: external nose without significant bony abnormality  ORAL CAVITY/OROPHARYNX: tongue mobile. Denture removed for exam, no mass or lesion to vision or palpation     NECK: trachea midline.   LYMPH NODES:No cervical lymphadenopathy.post rt changes  RESPIRATORY: no stridor, no stertor. Respirations nonlabored.  NEURO: alert, responds to questions appropriately.    PSYCH:mood appropriate    PROCEDURE NOTE  Procedure: diagnostic rigid nasal endoscopy  Indications for procedure:follow up surveillance of palate cancer; history of chronic sinus    Consent: procedure was explained in detail and verbal consent was obtained.   Anesthesia:4% lidocaine with neosynephrine  Procedure in detail: With the patient in the seated position, the zero degree endoscope was inserted atraumatically into the bilateral nasal cavities and advance to the nasopharynx with the following areas examined with findings as described below.     Nasal cavity:no polyps or mass, no purulent drainage, no bleeding  Septum: no perforation   Turbinates:  inferior turbinates moderate hypertrophy- improved from prior scope; middle turbinates without mass or lesion   Middle Meati: mild edema  Nasopharynx: no mass or lesion in the nasopharynx.     The scope was removed atraumatically without complication. The patient tolerated the procedure well. Photodocumentation obtained with representative images " below, all images and/or videos uploaded in media section of epic.                                Procedure Note   Procedure performed:microscopic examination of ears with cerumen disimpaction    Indication for procedure: bilateral cerumen impaction     Description of procedure:  After verbal consent was obtained, the patient was positioned in semi recumbent position and speculum was placed in the right ear and microscope brought into the field.  The microscope was positioned and magnification adjusted for appropriate visualization. Otologic instruments including various size otologic suctions,peroxide rinse, alligator forcep and curette were used to remove the cerumen from the right external auditory canals under visualization with the operating microscope. After cleaning, visualization was again performed and at various levels of higher magnification to optimize views of the ear canal, tympanic membrane, ossicles and middle ear space. The same procedure was then repeated for the left ear. Findings as indicated below. All portions of the procedure and examination by otomicroscopy were tolerated well without complication.     Findings:  Right ear: Complete cerumen impaction removed entirely revealing normal external auditory canal; tympanic membrane without bulging, retraction, or perforation; no evidence of middle ear fluid or effusion.   Left ear: Complete cerumen impaction removed entirely revealing normal external auditory canal; tympanic membrane without bulging, retraction, or perforation; no evidence of middle ear fluid or effusion.       Imaging:  The patient does not have any new imaging of the head and neck since last visit.     Labs:  CBC  Recent Labs   Lab 02/19/21  1440 07/06/21  1052 01/28/22  0938   WBC 5.99 5.11 5.17   Hemoglobin 11.1 L 12.7 L 12.9 L   Hematocrit 36.6 L 41.5 41.8   MCV 84 86 87   Platelets 269 208 196     BMP  Recent Labs   Lab 03/05/21  0935 07/06/21  1052 01/28/22  0938   Glucose  106 93 110   Sodium 137 140 140   Potassium 4.8 3.9 4.2   Chloride 102 106 108   CO2 26 27 27   BUN 13 14 16   Creatinine 0.8 0.9 1.0   Calcium 9.0 8.9 8.8     COAGS  Recent Labs   Lab 11/27/20  1538   INR 1.0       Assessment  1. History of head and neck radiation    2. Encounter for follow-up surveillance of oral cavity cancer     3. Deviated nasal septum    4. Bilateral impacted cerumen     Plan:  Discussed plan of care with patient in detail and all questions answered. Patient reported understanding of plan of care.   Offered astelin, can stop claritin wants to continue with saline and flonase, will contact me if wants to try astelin. Ok to dc claritin can be drying . Can use saline prn for congestion and advised to use after outside as well.   Uses debrox prn but has not used in a while, continue prn use and prior to next visit  6 month follow up for ear cleaning  Scope qyear   Needs to get tsh- ordered last year, counseled pt on getting yearly tsh   No recurrence , no sinus infection on scope       Please be aware that this note has been generated with the assistance of [employer] voice-to-text.  Please excuse any spelling or grammatical errors.

## 2022-09-30 ENCOUNTER — EXTERNAL CHRONIC CARE MANAGEMENT (OUTPATIENT)
Dept: PRIMARY CARE CLINIC | Facility: CLINIC | Age: 82
End: 2022-09-30
Payer: MEDICARE

## 2022-09-30 PROCEDURE — 99490 CHRNC CARE MGMT STAFF 1ST 20: CPT | Mod: PBBFAC,PO | Performed by: FAMILY MEDICINE

## 2022-09-30 PROCEDURE — 99490 CHRNC CARE MGMT STAFF 1ST 20: CPT | Mod: S$PBB,,, | Performed by: FAMILY MEDICINE

## 2022-09-30 PROCEDURE — 99439 CHRNC CARE MGMT STAF EA ADDL: CPT | Mod: PBBFAC,PO | Performed by: FAMILY MEDICINE

## 2022-09-30 PROCEDURE — 99439 PR CHRONIC CARE MGMT, EA ADDTL 20 MIN: ICD-10-PCS | Mod: S$PBB,,, | Performed by: FAMILY MEDICINE

## 2022-09-30 PROCEDURE — 99439 CHRNC CARE MGMT STAF EA ADDL: CPT | Mod: S$PBB,,, | Performed by: FAMILY MEDICINE

## 2022-09-30 PROCEDURE — 99490 PR CHRONIC CARE MGMT, 1ST 20 MIN: ICD-10-PCS | Mod: S$PBB,,, | Performed by: FAMILY MEDICINE

## 2022-10-21 ENCOUNTER — TELEPHONE (OUTPATIENT)
Dept: FAMILY MEDICINE | Facility: CLINIC | Age: 82
End: 2022-10-21
Payer: MEDICARE

## 2022-10-24 ENCOUNTER — OFFICE VISIT (OUTPATIENT)
Dept: FAMILY MEDICINE | Facility: CLINIC | Age: 82
End: 2022-10-24
Payer: MEDICARE

## 2022-10-24 VITALS
SYSTOLIC BLOOD PRESSURE: 136 MMHG | BODY MASS INDEX: 30.28 KG/M2 | RESPIRATION RATE: 18 BRPM | TEMPERATURE: 98 F | HEART RATE: 80 BPM | WEIGHT: 177.38 LBS | OXYGEN SATURATION: 95 % | DIASTOLIC BLOOD PRESSURE: 78 MMHG | HEIGHT: 64 IN

## 2022-10-24 DIAGNOSIS — M79.641 RIGHT HAND PAIN: ICD-10-CM

## 2022-10-24 DIAGNOSIS — E78.5 HYPERLIPIDEMIA, UNSPECIFIED HYPERLIPIDEMIA TYPE: ICD-10-CM

## 2022-10-24 DIAGNOSIS — C61 PROSTATE CANCER: ICD-10-CM

## 2022-10-24 DIAGNOSIS — I10 ESSENTIAL HYPERTENSION: Primary | ICD-10-CM

## 2022-10-24 DIAGNOSIS — N52.9 ERECTILE DYSFUNCTION, UNSPECIFIED ERECTILE DYSFUNCTION TYPE: ICD-10-CM

## 2022-10-24 PROCEDURE — 99999 PR PBB SHADOW E&M-EST. PATIENT-LVL V: ICD-10-PCS | Mod: PBBFAC,,, | Performed by: FAMILY MEDICINE

## 2022-10-24 PROCEDURE — 99999 PR PBB SHADOW E&M-EST. PATIENT-LVL V: CPT | Mod: PBBFAC,,, | Performed by: FAMILY MEDICINE

## 2022-10-24 PROCEDURE — 99214 OFFICE O/P EST MOD 30 MIN: CPT | Mod: S$PBB,,, | Performed by: FAMILY MEDICINE

## 2022-10-24 PROCEDURE — 99214 PR OFFICE/OUTPT VISIT, EST, LEVL IV, 30-39 MIN: ICD-10-PCS | Mod: S$PBB,,, | Performed by: FAMILY MEDICINE

## 2022-10-24 PROCEDURE — 99215 OFFICE O/P EST HI 40 MIN: CPT | Mod: PBBFAC,PO | Performed by: FAMILY MEDICINE

## 2022-10-24 RX ORDER — SIMVASTATIN 40 MG/1
40 TABLET, FILM COATED ORAL NIGHTLY
Qty: 90 TABLET | Refills: 3 | Status: SHIPPED | OUTPATIENT
Start: 2022-10-24 | End: 2023-12-19

## 2022-10-24 RX ORDER — TAMSULOSIN HYDROCHLORIDE 0.4 MG/1
1 CAPSULE ORAL DAILY
Qty: 90 CAPSULE | Refills: 1 | Status: SHIPPED | OUTPATIENT
Start: 2022-10-24 | End: 2023-05-10

## 2022-10-24 RX ORDER — SILDENAFIL 50 MG/1
50 TABLET, FILM COATED ORAL DAILY PRN
Qty: 30 TABLET | Refills: 1 | Status: ON HOLD | OUTPATIENT
Start: 2022-10-24 | End: 2023-05-03 | Stop reason: HOSPADM

## 2022-10-24 NOTE — PROGRESS NOTES
Routine Office Visit    Patient Name: aFn Paz    : 1940  MRN: [id number]    Subjective:  Fan is a 82 y.o. male who presents today for:    1. Medication refill  Patient presenting today for refills of flomax, simvastatin, and viagra.  He has been taking all medication as prescribed.  There has been no chest pain, weakness, numbness, blurred vision, or slurred speech.  He would like to increase viagra to 50mg stating the 25mg dose doesn't work all the time.  No history of priapism    Past Medical History  Past Medical History:   Diagnosis Date    Arthritis     Cancer of palate     HTN (hypertension)     Hyperlipidemia     Prostate cancer            Past Surgical History  Past Surgical History:   Procedure Laterality Date    BACK SURGERY  y-6    Cancer of palate surgery      Late - [facility]     CARPAL TUNNEL RELEASE  14    right hand,Left hand     COLONOSCOPY N/A 4/10/2017    Procedure: COLONOSCOPY;  Surgeon: Nilo Kelly MD;  Location: [facility] ENDO;  Service: Endoscopy;  Laterality: N/A;    COLONOSCOPY N/A 10/21/2020    Procedure: COLONOSCOPY;  Surgeon: Demetrius Araujo MD;  Location: [facility] ENDO;  Service: Endoscopy;  Laterality: N/A;    KNEE SURGERY  y-40    left knee    KNEE SURGERY Right 12-1-15    TKR    Radio active seed Implant      2012    TOTAL HIP ARTHROPLASTY  3/2011       Family History  Family History   Problem Relation Age of Onset    Coronary artery disease Father             Cancer Sister         Liver Cancer -    Diabetes Sister             No Known Problems Brother     No Known Problems Sister     No Known Problems Sister     No Known Problems Brother     No Known Problems Mother     Lung cancer Brother 60        - was a tobacco user, had lung cancer    Cancer Brother         Liver Cancer-     Lung cancer Sister         Alive    Breast cancer Sister     Clotting disorder Sister 75        blood clot on brain-alive    Stroke Brother              Glaucoma Cousin     No Known Problems Maternal Aunt     No Known Problems Maternal Uncle     No Known Problems Paternal Aunt     No Known Problems Paternal Uncle     No Known Problems Maternal Grandmother     No Known Problems Maternal Grandfather     No Known Problems Paternal Grandmother     No Known Problems Paternal Grandfather     Macular degeneration Neg Hx     Strabismus Neg Hx     Amblyopia Neg Hx     Blindness Neg Hx     Cataracts Neg Hx     Hypertension Neg Hx     Retinal detachment Neg Hx     Thyroid disease Neg Hx        Social History  Social History     Socioeconomic History    Marital status:     Number of children: 4   Occupational History     Employer: RETIRED   Tobacco Use    Smoking status: Former     Packs/day: 3.00     Years: 20.00     Pack years: 60.00     Types: Cigarettes     Quit date: 7/10/1997     Years since quittin.3     Passive exposure: Past    Smokeless tobacco: Never    Tobacco comments:     Quit -smoked for 40 years ,2 packs a day on an average   Substance and Sexual Activity    Alcohol use: No     Comment: used to drink Occasionally    Drug use: No    Sexual activity: Yes     Partners: Female       Current Medications  Current Outpatient Medications on File Prior to Visit   Medication Sig Dispense Refill    aspirin (ECOTRIN) 81 MG EC tablet Take 1 tablet (81 mg total) by mouth 2 (two) times daily. 60 tablet 0    diclofenac (VOLTAREN) 50 MG EC tablet Take 1 tablet (50 mg total) by mouth 2 (two) times daily. 60 tablet 0    fluticasone propionate (FLONASE) 50 mcg/actuation nasal spray 1 spray (50 mcg total) by Each Nostril route once daily. 16 g 0    fluticasone propionate (FLONASE) 50 mcg/actuation nasal spray 1 spray (50 mcg total) by Each Nostril route 2 (two) times daily. 18.2 mL 3    fluticasone propionate (FLONASE) 50 mcg/actuation nasal spray 2 sprays (100 mcg total) by Each Nostril route 2 (two) times daily. 18.2 mL 11    montelukast (SINGULAIR)  "10 mg tablet TAKE 1 TABLET BY MOUTH ONCE DAILY IN THE EVENING 90 tablet 3    ondansetron (ZOFRAN-ODT) 8 MG TbDL Dissolve 1 tablet (8 mg total) by mouth every 6 (six) hours as needed. 30 tablet 0    [DISCONTINUED] sildenafiL (VIAGRA) 25 MG tablet Take 1 tablet (25 mg total) by mouth daily as needed for Erectile Dysfunction. 30 tablet 1    [DISCONTINUED] simvastatin (ZOCOR) 40 MG tablet TAKE 1 TABLET BY MOUTH ONCE DAILY IN THE EVENING 90 tablet 3    [DISCONTINUED] tamsulosin (FLOMAX) 0.4 mg Cap Take 1 capsule by mouth once daily 90 capsule 0    acetaminophen (TYLENOL) 500 MG tablet Take 2 tablets (1,000 mg total) by mouth every 6 (six) hours. (Patient not taking: Reported on 10/24/2022) 60 tablet 0    diclofenac sodium (VOLTAREN) 1 % Gel Apply 2 g topically 4 (four) times daily. (Patient not taking: Reported on 10/24/2022) 1 each 1    FLUZONE HIGH-DOSE 2019-20, PF, 180 mcg/0.5 mL Syrg PHARMACIST ADMINISTERED IMMUNIZATION ADMINISTERED AT TIME OF DISPENSING  0    lidocaine 4 % Gel To scalp area BID PRN for tingling (Patient not taking: Reported on 10/24/2022) 30 g 0     No current facility-administered medications on file prior to visit.       Allergies   Review of patient's allergies indicates:  No Known Allergies    Review of Systems (Pertinent positives)  Review of Systems   Constitutional: Negative.    HENT: Negative.     Eyes: Negative.    Respiratory: Negative.     Cardiovascular: Negative.    Musculoskeletal:  Positive for joint pain.   Skin: Negative.    Neurological: Negative.        /78   Pulse 80   Temp 97.7 °F (36.5 °C) (Oral)   Resp 18   Ht 5' 4" (1.626 m)   Wt 80.4 kg (177 lb 5.8 oz)   SpO2 95%   BMI 30.44 kg/m²     GENERAL APPEARANCE: in no apparent distress and well developed and well nourished  HEENT: PERRL, EOMI, Sclera clear, anicteric, Oropharynx clear, no lesions, Neck supple with midline trachea  NECK: normal, supple, no adenopathy, thyroid normal in size  RESPIRATORY: appears well, " vitals normal, no respiratory distress, acyanotic, normal RR, chest clear, no wheezing, crepitations, rhonchi, normal symmetric air entry  HEART: regular rate and rhythm, S1, S2 normal, no murmur, click, rub or gallop.    ABDOMEN: abdomen is soft without tenderness, no masses, no hernias, no organomegaly, no rebound, no guarding. Suprapubic tenderness absent. No CVA tenderness.  Extremities: warm/well perfused.  No abnormal hair patterns.  No clubbing, cyanosis or edema.  no muscular tenderness noted, full range of motion without pain.  no joint tenderness, but middle finger is deformed at the MIP  SKIN: no rashes, no wounds, no other lesions  PSYCH: Alert, oriented x 3, thought content appropriate, speech normal, pleasant and cooperative, good eye contact, well groomed,    Assessment/Plan:  Fan Paz is a 82 y.o. male who presents today for :    Fan was seen today for follow-up.    Diagnoses and all orders for this visit:    Essential hypertension    Hyperlipidemia, unspecified hyperlipidemia type  -     simvastatin (ZOCOR) 40 MG tablet; Take 1 tablet (40 mg total) by mouth every evening.    Prostate cancer  -     tamsulosin (FLOMAX) 0.4 mg Cap; Take 1 capsule (0.4 mg total) by mouth once daily.    Erectile dysfunction, unspecified erectile dysfunction type  -     sildenafiL (VIAGRA) 50 MG tablet; Take 1 tablet (50 mg total) by mouth daily as needed for Erectile Dysfunction.    Right hand pain  -     Ambulatory referral/consult to Orthopedics; Future     Htn stable  Medications refilled  Warned of risk of dizzines, changes in vision, syncope, and priapism with viagra  Call with any concerns  Follow up February for yearly visit      Otilio Damon MD

## 2022-10-31 ENCOUNTER — EXTERNAL CHRONIC CARE MANAGEMENT (OUTPATIENT)
Dept: PRIMARY CARE CLINIC | Facility: CLINIC | Age: 82
End: 2022-10-31
Payer: MEDICARE

## 2022-10-31 PROCEDURE — 99490 CHRNC CARE MGMT STAFF 1ST 20: CPT | Mod: S$PBB,,, | Performed by: FAMILY MEDICINE

## 2022-10-31 PROCEDURE — 99490 CHRNC CARE MGMT STAFF 1ST 20: CPT | Mod: PBBFAC,PO | Performed by: FAMILY MEDICINE

## 2022-10-31 PROCEDURE — 99490 PR CHRONIC CARE MGMT, 1ST 20 MIN: ICD-10-PCS | Mod: S$PBB,,, | Performed by: FAMILY MEDICINE

## 2022-11-30 ENCOUNTER — EXTERNAL CHRONIC CARE MANAGEMENT (OUTPATIENT)
Dept: PRIMARY CARE CLINIC | Facility: CLINIC | Age: 82
End: 2022-11-30
Payer: MEDICARE

## 2022-11-30 PROCEDURE — 99490 PR CHRONIC CARE MGMT, 1ST 20 MIN: ICD-10-PCS | Mod: S$PBB,,, | Performed by: FAMILY MEDICINE

## 2022-11-30 PROCEDURE — 99490 CHRNC CARE MGMT STAFF 1ST 20: CPT | Mod: S$PBB,,, | Performed by: FAMILY MEDICINE

## 2022-11-30 PROCEDURE — 99490 CHRNC CARE MGMT STAFF 1ST 20: CPT | Mod: PBBFAC,PO | Performed by: FAMILY MEDICINE

## 2022-12-02 ENCOUNTER — OFFICE VISIT (OUTPATIENT)
Dept: ORTHOPEDICS | Facility: CLINIC | Age: 82
End: 2022-12-02
Payer: MEDICARE

## 2022-12-02 DIAGNOSIS — M19.041 OSTEOARTHRITIS OF RIGHT HAND, UNSPECIFIED OSTEOARTHRITIS TYPE: Primary | ICD-10-CM

## 2022-12-02 DIAGNOSIS — M79.641 PAIN OF RIGHT HAND: Primary | ICD-10-CM

## 2022-12-02 PROCEDURE — 99213 PR OFFICE/OUTPT VISIT, EST, LEVL III, 20-29 MIN: ICD-10-PCS | Mod: S$PBB,,, | Performed by: ORTHOPAEDIC SURGERY

## 2022-12-02 PROCEDURE — 99213 OFFICE O/P EST LOW 20 MIN: CPT | Mod: PBBFAC,PN | Performed by: ORTHOPAEDIC SURGERY

## 2022-12-02 PROCEDURE — 99999 PR PBB SHADOW E&M-EST. PATIENT-LVL III: ICD-10-PCS | Mod: PBBFAC,,, | Performed by: ORTHOPAEDIC SURGERY

## 2022-12-02 PROCEDURE — 99999 PR PBB SHADOW E&M-EST. PATIENT-LVL III: CPT | Mod: PBBFAC,,, | Performed by: ORTHOPAEDIC SURGERY

## 2022-12-02 PROCEDURE — 99213 OFFICE O/P EST LOW 20 MIN: CPT | Mod: S$PBB,,, | Performed by: ORTHOPAEDIC SURGERY

## 2022-12-02 NOTE — PROGRESS NOTES
Follow up visit    Fan is here today for new evaluation of right hand pain.  He particularly has loss of motion at the PIP joint of the right long finger.  He has occasional pain in multiple joints in the hand.  He denies numbness and tingling    Pain: 0/10 today, rates as 6/10 at worst    Physical exam  No apparent distress   Right hand  Has deformity at multiple interphalangeal joints consistent with osteoarthritis.  Range of motion at the PIP joint of the long finger is 30° to 80°.  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  Offered occupational therapy referral which the patient declined.  Return to clinic p.r.n.

## 2022-12-23 ENCOUNTER — TELEPHONE (OUTPATIENT)
Dept: ORTHOPEDICS | Facility: CLINIC | Age: 82
End: 2022-12-23
Payer: MEDICARE

## 2022-12-23 NOTE — TELEPHONE ENCOUNTER
Called and spoke to pts wife. Pt and spouse requested to set an apt with Dr Paul offered first available and accepted.

## 2022-12-23 NOTE — TELEPHONE ENCOUNTER
Spoke to patient, advised of sooner appointment time. Patient agreeable. Mailed appointment slip     ----- Message from Loida Sibley RN sent at 12/23/2022 10:14 AM CST -----  Okay, I put him on 2/20 at 2:15 pm  ----- Message -----  From: Rosina Young MA  Sent: 12/23/2022  10:05 AM CST  To: Loida Sibley RN    I see there are some open slots on [facility] schedule for any slots on 2/20. Could we move this patient up to one of those slots and I can call and let him know?   Thanks!   ----- Message -----  From: Anusha Atkinson MA  Sent: 12/23/2022   9:57 AM CST  To: Rosina Young MA      ----- Message -----  From: Rosina Young MA  Sent: 12/23/2022   9:25 AM CST  To: Lexis RAHMAN Staff    This is Adal patient. Can we get him scheduled with Lexis for next available :)  ----- Message -----  From: Dee Dee Guy MA  Sent: 12/23/2022   9:17 AM CST  To: Rosina Young MA      ----- Message -----  From: Dee Dee Guy MA  Sent: 12/22/2022   9:26 PM CST  To: Rocio Mantilla MA      ----- Message -----  From: Daniella Castillo  Sent: 12/22/2022   1:02 PM CST  To: Beverly SANCHEZ Staff    Type :  Needs Medical Advice    Who Called: pt  Symptoms (please be specific):knee  How long has patient had these symptoms:  knee  Pharmacy name and phone #:  no  Would the patient rather a call back or a response via [facility]?  Call   Best Call Back Number: [phone]  Additional Information: no appt until march

## 2022-12-31 ENCOUNTER — EXTERNAL CHRONIC CARE MANAGEMENT (OUTPATIENT)
Dept: PRIMARY CARE CLINIC | Facility: CLINIC | Age: 82
End: 2022-12-31
Payer: MEDICARE

## 2022-12-31 PROCEDURE — 99490 CHRNC CARE MGMT STAFF 1ST 20: CPT | Mod: PBBFAC,PO | Performed by: FAMILY MEDICINE

## 2022-12-31 PROCEDURE — 99490 PR CHRONIC CARE MGMT, 1ST 20 MIN: ICD-10-PCS | Mod: S$PBB,,, | Performed by: FAMILY MEDICINE

## 2022-12-31 PROCEDURE — 99490 CHRNC CARE MGMT STAFF 1ST 20: CPT | Mod: S$PBB,,, | Performed by: FAMILY MEDICINE

## 2023-01-12 ENCOUNTER — TELEPHONE (OUTPATIENT)
Dept: ORTHOPEDICS | Facility: CLINIC | Age: 83
End: 2023-01-12
Payer: MEDICARE

## 2023-01-12 NOTE — TELEPHONE ENCOUNTER
Spoke to pt, rescheduled appt on 2/20 to 2/6 at 2 pm due to Dr. Paul being out. Mailed appt slip, pt pleased and verbalized understanding

## 2023-01-31 ENCOUNTER — EXTERNAL CHRONIC CARE MANAGEMENT (OUTPATIENT)
Dept: PRIMARY CARE CLINIC | Facility: CLINIC | Age: 83
End: 2023-01-31
Payer: MEDICARE

## 2023-01-31 DIAGNOSIS — M25.562 LEFT KNEE PAIN, UNSPECIFIED CHRONICITY: Primary | ICD-10-CM

## 2023-01-31 PROCEDURE — 99490 PR CHRONIC CARE MGMT, 1ST 20 MIN: ICD-10-PCS | Mod: S$PBB,,, | Performed by: FAMILY MEDICINE

## 2023-01-31 PROCEDURE — 99490 CHRNC CARE MGMT STAFF 1ST 20: CPT | Mod: PBBFAC,PO | Performed by: FAMILY MEDICINE

## 2023-01-31 PROCEDURE — 99490 CHRNC CARE MGMT STAFF 1ST 20: CPT | Mod: S$PBB,,, | Performed by: FAMILY MEDICINE

## 2023-02-03 ENCOUNTER — TELEPHONE (OUTPATIENT)
Dept: FAMILY MEDICINE | Facility: CLINIC | Age: 83
End: 2023-02-03
Payer: MEDICARE

## 2023-02-06 ENCOUNTER — OFFICE VISIT (OUTPATIENT)
Dept: ORTHOPEDICS | Facility: CLINIC | Age: 83
End: 2023-02-06
Payer: MEDICARE

## 2023-02-06 ENCOUNTER — OFFICE VISIT (OUTPATIENT)
Dept: FAMILY MEDICINE | Facility: CLINIC | Age: 83
End: 2023-02-06
Payer: MEDICARE

## 2023-02-06 ENCOUNTER — HOSPITAL ENCOUNTER (OUTPATIENT)
Dept: RADIOLOGY | Facility: HOSPITAL | Age: 83
Discharge: HOME | End: 2023-02-06
Attending: ORTHOPAEDIC SURGERY
Payer: MEDICARE

## 2023-02-06 VITALS — WEIGHT: 176.13 LBS | BODY MASS INDEX: 30.07 KG/M2 | HEIGHT: 64 IN

## 2023-02-06 VITALS
BODY MASS INDEX: 30.09 KG/M2 | HEIGHT: 64 IN | SYSTOLIC BLOOD PRESSURE: 132 MMHG | HEART RATE: 69 BPM | OXYGEN SATURATION: 95 % | WEIGHT: 176.25 LBS | DIASTOLIC BLOOD PRESSURE: 74 MMHG | RESPIRATION RATE: 18 BRPM | TEMPERATURE: 98 F

## 2023-02-06 DIAGNOSIS — G89.29 CHRONIC PAIN OF LEFT KNEE: Primary | ICD-10-CM

## 2023-02-06 DIAGNOSIS — E66.09 CLASS 1 OBESITY DUE TO EXCESS CALORIES WITH SERIOUS COMORBIDITY AND BODY MASS INDEX (BMI) OF 31.0 TO 31.9 IN ADULT: ICD-10-CM

## 2023-02-06 DIAGNOSIS — Z85.46 HISTORY OF PROSTATE CANCER: ICD-10-CM

## 2023-02-06 DIAGNOSIS — R73.03 PREDIABETES: ICD-10-CM

## 2023-02-06 DIAGNOSIS — Z96.652 STATUS POST TOTAL LEFT KNEE REPLACEMENT: ICD-10-CM

## 2023-02-06 DIAGNOSIS — I44.1 MOBITZ TYPE 1 SECOND DEGREE AV BLOCK: ICD-10-CM

## 2023-02-06 DIAGNOSIS — I10 ESSENTIAL HYPERTENSION: ICD-10-CM

## 2023-02-06 DIAGNOSIS — M25.562 CHRONIC PAIN OF LEFT KNEE: Primary | ICD-10-CM

## 2023-02-06 DIAGNOSIS — E78.49 OTHER HYPERLIPIDEMIA: ICD-10-CM

## 2023-02-06 DIAGNOSIS — Z79.01 LONG TERM CURRENT USE OF ANTICOAGULANT THERAPY: ICD-10-CM

## 2023-02-06 DIAGNOSIS — I83.93 ASYMPTOMATIC VARICOSE VEINS OF BILATERAL LOWER EXTREMITIES: ICD-10-CM

## 2023-02-06 DIAGNOSIS — Z00.00 VISIT FOR WELL MAN HEALTH CHECK: Primary | ICD-10-CM

## 2023-02-06 DIAGNOSIS — Z86.711 HX PULMONARY EMBOLISM: ICD-10-CM

## 2023-02-06 DIAGNOSIS — Z79.899 DEEP VEIN THROMBOSIS PROPHYLAXIS: ICD-10-CM

## 2023-02-06 DIAGNOSIS — Z96.652 PRESENCE OF LEFT ARTIFICIAL KNEE JOINT: ICD-10-CM

## 2023-02-06 DIAGNOSIS — J30.1 SEASONAL ALLERGIC RHINITIS DUE TO POLLEN: ICD-10-CM

## 2023-02-06 DIAGNOSIS — M25.562 LEFT KNEE PAIN, UNSPECIFIED CHRONICITY: ICD-10-CM

## 2023-02-06 DIAGNOSIS — K21.9 GASTROESOPHAGEAL REFLUX DISEASE, UNSPECIFIED WHETHER ESOPHAGITIS PRESENT: ICD-10-CM

## 2023-02-06 DIAGNOSIS — R13.10 DYSPHAGIA, UNSPECIFIED TYPE: ICD-10-CM

## 2023-02-06 PROBLEM — E66.3 OVERWEIGHT (BMI 25.0-29.9): Status: RESOLVED | Noted: 2020-07-20 | Resolved: 2023-02-06

## 2023-02-06 PROCEDURE — 73562 X-RAY EXAM OF KNEE 3: CPT | Mod: 26,LT,, | Performed by: RADIOLOGY

## 2023-02-06 PROCEDURE — 99213 OFFICE O/P EST LOW 20 MIN: CPT | Mod: PBBFAC,27 | Performed by: ORTHOPAEDIC SURGERY

## 2023-02-06 PROCEDURE — 73560 XR KNEE ORTHO LEFT: ICD-10-PCS | Mod: 26,RT,, | Performed by: RADIOLOGY

## 2023-02-06 PROCEDURE — 99214 PR OFFICE/OUTPT VISIT, EST, LEVL IV, 30-39 MIN: ICD-10-PCS | Mod: S$PBB,,, | Performed by: ORTHOPAEDIC SURGERY

## 2023-02-06 PROCEDURE — 73562 XR KNEE ORTHO LEFT: ICD-10-PCS | Mod: 26,LT,, | Performed by: RADIOLOGY

## 2023-02-06 PROCEDURE — 99999 PR PBB SHADOW E&M-EST. PATIENT-LVL III: ICD-10-PCS | Mod: PBBFAC,,, | Performed by: ORTHOPAEDIC SURGERY

## 2023-02-06 PROCEDURE — 99397 PR PREVENTIVE VISIT,EST,65 & OVER: ICD-10-PCS | Mod: S$PBB,GZ,, | Performed by: FAMILY MEDICINE

## 2023-02-06 PROCEDURE — 99999 PR PBB SHADOW E&M-EST. PATIENT-LVL V: ICD-10-PCS | Mod: PBBFAC,,, | Performed by: FAMILY MEDICINE

## 2023-02-06 PROCEDURE — 99999 PR PBB SHADOW E&M-EST. PATIENT-LVL V: CPT | Mod: PBBFAC,,, | Performed by: FAMILY MEDICINE

## 2023-02-06 PROCEDURE — 73560 X-RAY EXAM OF KNEE 1 OR 2: CPT | Mod: 26,RT,, | Performed by: RADIOLOGY

## 2023-02-06 PROCEDURE — 99999 PR PBB SHADOW E&M-EST. PATIENT-LVL III: CPT | Mod: PBBFAC,,, | Performed by: ORTHOPAEDIC SURGERY

## 2023-02-06 PROCEDURE — 99214 OFFICE O/P EST MOD 30 MIN: CPT | Mod: S$PBB,,, | Performed by: ORTHOPAEDIC SURGERY

## 2023-02-06 PROCEDURE — 99397 PER PM REEVAL EST PAT 65+ YR: CPT | Mod: S$PBB,GZ,, | Performed by: FAMILY MEDICINE

## 2023-02-06 PROCEDURE — 73560 X-RAY EXAM OF KNEE 1 OR 2: CPT | Mod: TC,RT

## 2023-02-06 PROCEDURE — 99215 OFFICE O/P EST HI 40 MIN: CPT | Mod: PBBFAC,PO | Performed by: FAMILY MEDICINE

## 2023-02-06 NOTE — PROGRESS NOTES
"[facility] VISIT      CHIEF COMPLAINT  Chief Complaint   Patient presents with    Follow-up       HPI  Fan Paz is a 82 y.o. male who presents for physical.     Social Factors  Tobacco use: No  Ready to Quit: No  Alcohol: seldom use  Intimate partner violence screening  Living Will/POA: No  Regular Exercise: No    Depression  Over the past two weeks, have you felt down, depressed, or hopeless? No  Over the past two weeks, have you felt little interest or pleasure in doing things? No    Reproductive Health  STD screening in last year: deferred  HIV screening: deferred    Screen for Chronic Disease  CHD Risk Factors: advanced age (older than 55 for men, 65 for women), male gender and obesity (BMI >= 30 kg/m2)  Estimated body mass index is 30.25 kg/m² as calculated from the following:    Height as of this encounter: 5' 4" (1.626 m).    Weight as of this encounter: 79.9 kg (176 lb 4.1 oz).  Dyslipidemia screening needed: ordered  T2DM screening needed: ordered  Colonoscopy needed: n/a  PSA needed: followed by urology  AAA screening needed:n/a  Screen men 35 years and older, and men 20 to 34 years of age who have cardiovascular risk factors for dyslipidemia  Begin screening colonoscopies at 50 years of age in men of average risk, and continue until 75 years of age; offer fecal occult blood testing every year, flexible sigmoidoscopy every five years combined with fecal occult blood testing every three years, or colonoscopy every 10 years   The American U[employer] Association recommends offering PSA testing and digital rectal examination to well-informed men beginning at 40 years of age and continuing until life expectancy is less than 10 years  Screen once with ultrasonography in men 65 to 75 years of age if they have a family history or have smoked at [id number] cigarettes in their lifetime  Screen men with a sustained blood pressure greater than 135/80 mm Hg for " "T2DM      Immunizations  delayed    ALLERGIES and MEDS were verified.   PMHx, PSHx, FHx, SOCIALHx were updated as pertinent.    REVIEW OF SYSTEMS  Review of Systems   Constitutional: Negative.    HENT: Negative.     Eyes: Negative.    Respiratory: Negative.     Cardiovascular: Negative.    Gastrointestinal: Negative.    Genitourinary: Negative.    Musculoskeletal:  Positive for joint pain.   Skin: Negative.    Neurological: Negative.    Psychiatric/Behavioral: Negative.         PHYSICAL EXAM  VITAL SIGNS: /74   Pulse 69   Temp 98 °F (36.7 °C) (Oral)   Resp 18   Ht 5' 4" (1.626 m)   Wt 79.9 kg (176 lb 4.1 oz)   SpO2 95%   BMI 30.25 kg/m²   GEN: Well developed, Well nourished, No acute distress.  HENT: Normocephalic, Atraumatic, Bilateral external ears normal, Nose normal, Oropharynx moist, No oral exudates.   Eyes: PERRL, EOMI, Conjunctiva normal, No discharge.   Neck: Supple, No tenderness.  Lymphatic: No cervical or supraclavicular lymphadenopathy noted.   Cardiovascular: Normal heart rate, Normal rhythm, No murmurs, No rubs, No gallops.   Thorax & Lungs: Normal breath sounds, No respiratory distress, No wheezing.  Abdomen: Soft, No tenderness, Bowel sounds normal.  Genital: deferred  Skin: Warm, Dry, No erythema, No rash.   Extremities: No edema, No tenderness.       ASSESSMENT/PLAN    Fan was seen today for follow-up.    Diagnoses and all orders for this visit:    Visit for [facility] check  -     CBC Auto Differential; Future  -     Comprehensive Metabolic Panel; Future  -     Lipid Panel; Future  -     Hemoglobin A1C; Future  -     Urinalysis; Future    Seasonal allergic rhinitis due to pollen    Other hyperlipidemia  -     Lipid Panel; Future    Essential hypertension  -     Comprehensive Metabolic Panel; Future    Asymptomatic varicose veins of bilateral lower extremities    Mobitz type 1 second degree AV block    Hx pulmonary embolism-6/20/09- bilateral lower lobe pulmonary " emboli    Long-term (current) use of anticoagulants    Deep vein thrombosis prophylaxis    History of prostate cancer    Prediabetes  -     Hemoglobin A1C; Future    Class 1 obesity due to excess calories with serious comorbidity and body mass index (BMI) of 31.0 to 31.9 in adult  -     CBC Auto Differential; Future    Dysphagia, unspecified type    Gastroesophageal reflux disease, unspecified whether esophagitis present  -     CBC Auto Differential; Future       FOLLOW UP: 6 months or sooner if needed  Follow up with urology as scheduled  Labs today      Otilio Damon MD

## 2023-02-06 NOTE — PROGRESS NOTES
"Subjective:      Patient ID: Fan Paz is a 82 y.o. male.    Chief Complaint: Pain of the Left Knee    HPI  Fan Paz has left knee pain and stiffness.  The pain is moderate and intermittent. The pain is located in the global aspect of the knee.  There  is not radiation.  There is associated stiffness.   There is not catching and locking. The pain is described as achy. The pain is aggravated by activity.  It is alleviated by nothing consistently.   His history, medications and problem list were reviewed.    Review of Systems   Constitutional: Negative for chills, fever and night sweats.   HENT:  Negative for hearing loss.    Eyes:  Negative for blurred vision and double vision.   Cardiovascular:  Negative for chest pain, claudication and leg swelling.   Respiratory:  Negative for shortness of breath.    Endocrine: Negative for polydipsia, polyphagia and polyuria.   Hematologic/Lymphatic: Negative for adenopathy and bleeding problem. Does not bruise/bleed easily.   Skin:  Negative for poor wound healing.   Gastrointestinal:  Negative for diarrhea and heartburn.   Genitourinary:  Negative for bladder incontinence.   Neurological:  Negative for focal weakness, headaches, numbness, paresthesias and sensory change.   Psychiatric/Behavioral:  The patient is not nervous/anxious.    Allergic/Immunologic: Negative for persistent infections.       Objective:      Body mass index is 30.24 kg/m².  Vitals:    02/06/23 1401   Weight: 79.9 kg (176 lb 2.4 oz)   Height: 5' 4" (1.626 m)           General    Constitutional: He is oriented to person, place, and time. He appears well-developed and well-nourished.   HENT:   Head: Normocephalic and atraumatic.   Eyes: EOM are normal.   Cardiovascular:  Normal rate.            Pulmonary/Chest: Effort normal.   Neurological: He is alert and oriented to person, place, and time.   Psychiatric: He has a normal mood and affect. His behavior is normal.     General " Musculoskeletal Exam   Gait: normal         Left Knee Exam     Inspection   Erythema: absent  Scars: present  Swelling: present  Effusion: present  Deformity: absent  Bruising: absent    Tenderness   The patient tender to palpation of the medial retinaculum and lateral retinaculum.    Range of Motion   Extension:  0   Flexion:  120     Tests   Stability   Lachman: normal (-1 to 2mm)   MCL - Valgus: normal (0 to 2mm)  LCL - Varus: normal (0 to 2mm)    Other   Sensation: normal    Muscle Strength   Left Lower Extremity   Hip Abduction: 5/5   Quadriceps:  5/5   Hamstrin/5     Vascular Exam       Edema  Left Lower Leg: absent    Radiographs taken today were reviewed by me.  There is a prosthetic replacement of the left knee(s).  The prosthesis is well positioned.  Questionable scalloping of anterior femoral cortex.        Assessment:       Encounter Diagnoses   Name Primary?    Chronic pain of left knee Yes    Status post total left knee replacement           Plan:       Fan was seen today for pain.    Diagnoses and all orders for this visit:    Chronic pain of left knee    Status post total left knee replacement      Options discussed.  ESR/CRP/MARS MRI

## 2023-02-07 ENCOUNTER — TELEPHONE (OUTPATIENT)
Dept: FAMILY MEDICINE | Facility: CLINIC | Age: 83
End: 2023-02-07
Payer: MEDICARE

## 2023-02-07 NOTE — TELEPHONE ENCOUNTER
----- Message from Otilio Damon MD sent at 2/7/2023  8:04 AM CST -----  Please let pt know that his labs are back and look good.    Thanks  Dr. Damon

## 2023-02-23 ENCOUNTER — OFFICE VISIT (OUTPATIENT)
Dept: OTOLARYNGOLOGY | Facility: CLINIC | Age: 83
End: 2023-02-23
Payer: MEDICARE

## 2023-02-23 ENCOUNTER — PES CALL (OUTPATIENT)
Dept: ADMINISTRATIVE | Facility: CLINIC | Age: 83
End: 2023-02-23
Payer: MEDICARE

## 2023-02-23 VITALS — BODY MASS INDEX: 30.05 KG/M2 | WEIGHT: 176 LBS | HEIGHT: 64 IN

## 2023-02-23 DIAGNOSIS — R13.14 PHARYNGOESOPHAGEAL DYSPHAGIA: Primary | ICD-10-CM

## 2023-02-23 DIAGNOSIS — J34.3 HYPERTROPHY OF INFERIOR NASAL TURBINATE: ICD-10-CM

## 2023-02-23 DIAGNOSIS — K14.8 OTHER DISEASES OF TONGUE: ICD-10-CM

## 2023-02-23 DIAGNOSIS — T66.XXXD ADVERSE EFFECT OF RADIATION, SUBSEQUENT ENCOUNTER: ICD-10-CM

## 2023-02-23 DIAGNOSIS — R63.39 OTHER FEEDING DIFFICULTIES: ICD-10-CM

## 2023-02-23 DIAGNOSIS — J30.2 SEASONAL ALLERGIC RHINITIS, UNSPECIFIED TRIGGER: ICD-10-CM

## 2023-02-23 PROCEDURE — 99214 PR OFFICE/OUTPT VISIT, EST, LEVL IV, 30-39 MIN: ICD-10-PCS | Mod: 25,S$GLB,, | Performed by: OTOLARYNGOLOGY

## 2023-02-23 PROCEDURE — 31575 DIAGNOSTIC LARYNGOSCOPY: CPT | Mod: S$GLB,,, | Performed by: OTOLARYNGOLOGY

## 2023-02-23 PROCEDURE — 99214 OFFICE O/P EST MOD 30 MIN: CPT | Mod: 25,S$GLB,, | Performed by: OTOLARYNGOLOGY

## 2023-02-23 PROCEDURE — 31575 PR LARYNGOSCOPY, FLEXIBLE; DIAGNOSTIC: ICD-10-PCS | Mod: S$GLB,,, | Performed by: OTOLARYNGOLOGY

## 2023-02-23 RX ORDER — AZELASTINE 1 MG/ML
1 SPRAY, METERED NASAL 2 TIMES DAILY
Qty: 30 ML | Refills: 3 | Status: SHIPPED | OUTPATIENT
Start: 2023-02-23

## 2023-02-23 NOTE — PROGRESS NOTES
"  OTOLARYNGOLOGY CLINIC NOTE  Date:  02/23/2023     Chief complaint:  Chief Complaint   Patient presents with    Dysphagia     Still having issues swallowing foods, getting worst. No pain but chokes every time he eats as per his daughter    Sinus Problem    Cerumen Impaction     Small amount of ear wax in both ears       History of Present Illness  Fan Paz is a 82 y.o. male  presenting today for a followup. Here today with his daughter who has not been at prior appointment.   Sinuses are not bothering him today- states that he has "gotten used to it". allergies been bad over past month but no acute sinus infection type symptoms that he reports .   Using saline spray with nasal medication sprays    Eats liquid/soft food to swallow and has to chew it well. Daughter with him today and she thinks his swallowing is getting worse he used to be able to a variety of food and has to limit what he eats now. She has also noted that his choking is getting worse .  No weight loss. No pneumonia  Voice has been  getting raspier over past 6 months. No straining to make a voice    Keeps water with him     No hemoptysis  No ear pain        I originally saw the patient on 11-8-21. He was last seen 9-26-22. Flex scope and ear cleaning done at that visit. He has recurrent cerumen impactions and scheduled for follow up today for ear cleaning . Has tried the debrox drops I recommended to see if this would help lessen frequency of wax buildup. He uses them as instructed.   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 Finished treatment in [id number]. Sinus surgery was done after cancer surgery. Dr. Rushing also did cancer surgery. Had rt and needed feeding tube during that. Had most of teeth extracted except for 6 on the bottom.       Past Medical History  Past Medical History:   Diagnosis Date    Arthritis     Cancer of palate     HTN (hypertension)     Hyperlipidemia     Prostate cancer     2011        Past Surgical " History  Past Surgical History:   Procedure Laterality Date    BACK SURGERY  y-6    Cancer of palate surgery      Late 90's- [facility]     CARPAL TUNNEL RELEASE  8-14-13    right hand,Left hand 2013    COLONOSCOPY N/A 4/10/2017    Procedure: COLONOSCOPY;  Surgeon: Nilo Kelly MD;  Location: [facility] ENDO;  Service: Endoscopy;  Laterality: N/A;    COLONOSCOPY N/A 10/21/2020    Procedure: COLONOSCOPY;  Surgeon: Demetrius Araujo MD;  Location: [facility] ENDO;  Service: Endoscopy;  Laterality: N/A;    KNEE SURGERY  y-41    left knee    KNEE SURGERY Right 12-1-15    TKR    Radio active seed Implant      January 2012    TOTAL HIP ARTHROPLASTY  3/2011        Medications  Current Outpatient Medications on File Prior to Visit   Medication Sig Dispense Refill    aspirin (ECOTRIN) 81 MG EC tablet Take 1 tablet (81 mg total) by mouth 2 (two) times daily. 60 tablet 0    diclofenac (VOLTAREN) 50 MG EC tablet Take 1 tablet (50 mg total) by mouth 2 (two) times daily. 60 tablet 0    fluticasone propionate (FLONASE) 50 mcg/actuation nasal spray 1 spray (50 mcg total) by Each Nostril route once daily. 16 g 0    fluticasone propionate (FLONASE) 50 mcg/actuation nasal spray 2 sprays (100 mcg total) by Each Nostril route 2 (two) times daily. 18.2 mL 11    FLUZONE HIGH-DOSE 2019-20, PF, 180 mcg/0.5 mL Syrg PHARMACIST ADMINISTERED IMMUNIZATION ADMINISTERED AT TIME OF DISPENSING  0    montelukast (SINGULAIR) 10 mg tablet TAKE 1 TABLET BY MOUTH ONCE DAILY IN THE EVENING 90 tablet 3    ondansetron (ZOFRAN-ODT) 8 MG TbDL Dissolve 1 tablet (8 mg total) by mouth every 6 (six) hours as needed. 30 tablet 0    sildenafiL (VIAGRA) 50 MG tablet Take 1 tablet (50 mg total) by mouth daily as needed for Erectile Dysfunction. 30 tablet 1    simvastatin (ZOCOR) 40 MG tablet Take 1 tablet (40 mg total) by mouth every evening. 90 tablet 3    tamsulosin (FLOMAX) 0.4 mg Cap Take 1 capsule (0.4 mg total) by mouth once daily. 90 capsule 1     No current  facility-administered medications on file prior to visit.       Review of Systems  Review of Systems   Constitutional: Negative.    HENT:  Positive for hearing loss and sore throat.    Eyes: Negative.    Respiratory:  Positive for cough and wheezing.    Cardiovascular: Negative.    Gastrointestinal: Negative.    Genitourinary: Negative.    Musculoskeletal:  Positive for back pain and neck pain.   Skin: Negative.    Neurological: Negative.    Psychiatric/Behavioral: Negative.      Answers submitted by the patient for this visit:  Review of Symptoms Questionnaire  (Submitted on 2023)  sinus pressure : Yes  Sinus infection(s)?: Yes  postnasal drip: Yes  trouble swallowing: Yes  Voice Change?: Yes  Snoring?: Yes  Muscle aches / pain?: Yes  Seasonal Allergies?: Yes  Social History   reports that he quit smoking about 25 years ago. His smoking use included cigarettes. He has a 60.00 pack-year smoking history. He has been exposed to tobacco smoke. He has never used smokeless tobacco. He reports that he does not drink alcohol and does not use drugs.     Family History  Family History   Problem Relation Age of Onset    Coronary artery disease Father             Cancer Sister         Liver Cancer -    Diabetes Sister             No Known Problems Brother     No Known Problems Sister     No Known Problems Sister     No Known Problems Brother     No Known Problems Mother     Lung cancer Brother 60        - was a tobacco user, had lung cancer    Cancer Brother         Liver Cancer-     Lung cancer Sister         Alive    Breast cancer Sister     Clotting disorder Sister 75        blood clot on brain-alive    Stroke Brother             Glaucoma Cousin     No Known Problems Maternal Aunt     No Known Problems Maternal Uncle     No Known Problems Paternal Aunt     No Known Problems Paternal Uncle     No Known Problems Maternal Grandmother     No Known Problems Maternal Grandfather     No Known Problems  "Paternal Grandmother     No Known Problems Paternal Grandfather     Macular degeneration Neg Hx     Strabismus Neg Hx     Amblyopia Neg Hx     Blindness Neg Hx     Cataracts Neg Hx     Hypertension Neg Hx     Retinal detachment Neg Hx     Thyroid disease Neg Hx         Physical Exam   There were no vitals filed for this visit. Body mass index is 30.21 kg/m².  Weight: 79.8 kg (176 lb)   Height: 5' 4" (162.6 cm)     GENERAL: no acute distress.  HEAD: normocephalic.   EYES: No scleral icterus  EARS: external ear without lesion, normal pinna shape and position.  External auditory canal with normal cerumen, tympanic membrane fully visible, no perforation , no retraction. No middle ear effusion. Ossicles intact.   NOSE: external nose without significant bony abnormality  ORAL CAVITY/OROPHARYNX: tongue mobile.   NECK: trachea midline.   LYMPH NODES:No cervical lymphadenopathy.  RESPIRATORY: no stridor, no stertor. Voice normal. Respirations nonlabored.  NEURO: alert, responds to questions appropriately.    PSYCH:mood appropriate    PROCEDURE NOTE  NAME OF PROCEDURE: Flexible Laryngoscopy, diagnostic  INDICATIONS: gag reflex precludes mirror exam,  dysphagia  FINDINGS: No overt aspiration on scope, handles secretions well, no malignancy nor abnormal vocal fold motion   Edema of sinuses significant with  mucopurulent some drainage     Consent: After procedure was explained in detail and all questions answered, verbal consent was obtained for performing flexible laryngoscopy.  Anesthesia: topical 4% lidocaine and neosynephrine  Procedure: With patient in seated position, the scope was inserted into the bilateral nasal passageway and advanced atraumatically into the nasopharynx to examine the following structures:  Nasal cavity: Turbinates with  hypertrophy. moderate middle meatal edema. muco purulent drainage.   Nasopharynx: no mass or lesion noted in nasopharynx. Crusting   Oropharynx: base of tongue without  mass or " ulceration. Lingual tonsils normal in appearance  Hypopharynx: posterior pharyngeal wall without mass or lesion. No pooling of secretions. Pyriform sinuses visible without mass or lesion  Larynx: epiglottis normal without lesion. False vocal folds without edema/erythema/lesion. True vocal folds mobile and without lesion. no interarytenoid edema no erythema . Postcricoid region with mild edema no lesion   Subglottis: visualized portion of subglottis normal in appearance    After examination performed, the scope was removed atraumatically . The patient tolerated the procedure well. Photodocumentation obtained with representative images below, all images and/or videos uploaded in media section of epic.                                    Imaging:  The patient does not have any new imaging of the head and neck since last visit.     Labs:  CBC  Recent Labs   Lab 07/06/21  1052 01/28/22  0938 02/06/23  0912   WBC 5.11 5.17 5.63   Hemoglobin 12.7 L 12.9 L 13.6 L   Hematocrit 41.5 41.8 44.0   MCV 86 87 87   Platelets 208 196 210     BMP  Recent Labs   Lab 07/06/21  1052 01/28/22  0938 02/06/23  0912   Glucose 93 110 115 H   Sodium 140 140 137   Potassium 3.9 4.2 4.3   Chloride 106 108 101   CO2 27 27 25   BUN 14 16 16   Creatinine 0.9 1.0 1.0   Calcium 8.9 8.8 9.6     COAGS  Recent Labs   Lab 11/27/20  1538   INR 1.0       Assessment  There are no diagnoses linked to this encounter.     Plan:  Discussed plan of care with patient in detail and all questions answered. Patient reported understanding of plan of care.   Refer to gi - may need dilation concern for esophageal fibrosis that can develop 15-20 years post Radiation Therapy  No overt aspiration on scope, handles secretions well    MBS ordered    Add astelin and increase to bid  No wax impaction today, continue using debrox 1-2 times per month.   TSH ordered again   F/u 1 year for surveillance and possible ear cleaning sooner if issue    Please be aware that this note  has been generated with the assistance of Mobile [employer] 7 voice-to-text.  Please excuse any spelling or grammatical errors.

## 2023-02-28 ENCOUNTER — EXTERNAL CHRONIC CARE MANAGEMENT (OUTPATIENT)
Dept: PRIMARY CARE CLINIC | Facility: CLINIC | Age: 83
End: 2023-02-28
Payer: MEDICARE

## 2023-02-28 PROCEDURE — 99490 PR CHRONIC CARE MGMT, 1ST 20 MIN: ICD-10-PCS | Mod: S$PBB,,, | Performed by: FAMILY MEDICINE

## 2023-02-28 PROCEDURE — 99490 CHRNC CARE MGMT STAFF 1ST 20: CPT | Mod: S$PBB,,, | Performed by: FAMILY MEDICINE

## 2023-02-28 PROCEDURE — 99490 CHRNC CARE MGMT STAFF 1ST 20: CPT | Mod: PBBFAC,PO | Performed by: FAMILY MEDICINE

## 2023-03-02 ENCOUNTER — HOSPITAL ENCOUNTER (OUTPATIENT)
Dept: RADIOLOGY | Facility: HOSPITAL | Age: 83
Discharge: HOME | End: 2023-03-02
Attending: ORTHOPAEDIC SURGERY
Payer: MEDICARE

## 2023-03-02 DIAGNOSIS — Z96.652 PRESENCE OF LEFT ARTIFICIAL KNEE JOINT: ICD-10-CM

## 2023-03-02 PROCEDURE — 73721 MRI JNT OF LWR EXTRE W/O DYE: CPT | Mod: 26,LT,, | Performed by: RADIOLOGY

## 2023-03-02 PROCEDURE — 73721 MRI KNEE WITHOUT CONTRAST LEFT: ICD-10-PCS | Mod: 26,LT,, | Performed by: RADIOLOGY

## 2023-03-02 PROCEDURE — 73721 MRI JNT OF LWR EXTRE W/O DYE: CPT | Mod: TC,LT

## 2023-03-03 ENCOUNTER — TELEPHONE (OUTPATIENT)
Dept: ORTHOPEDICS | Facility: CLINIC | Age: 83
End: 2023-03-03
Payer: MEDICARE

## 2023-03-03 NOTE — TELEPHONE ENCOUNTER
Called patient to inform him of MRI results and appt scheduled, patient verbalized understanding.        ----- Message from Liz Glover LPN sent at 3/2/2023  4:57 PM CST -----    ----- Message -----  From: Dustin Paul MD  Sent: 3/2/2023   4:50 PM CST  To: [facility] SANCHE[facility] Staff    Please call pt.  He has some nichelle plastic wear and loosening in his knee.  Schedule f/U

## 2023-03-07 ENCOUNTER — HOSPITAL ENCOUNTER (OUTPATIENT)
Dept: RADIOLOGY | Facility: HOSPITAL | Age: 83
Discharge: HOME | End: 2023-03-07
Attending: OTOLARYNGOLOGY
Payer: MEDICARE

## 2023-03-07 DIAGNOSIS — R63.39 OTHER FEEDING DIFFICULTIES: ICD-10-CM

## 2023-03-07 DIAGNOSIS — R13.14 PHARYNGOESOPHAGEAL DYSPHAGIA: ICD-10-CM

## 2023-03-07 PROCEDURE — 74230 X-RAY XM SWLNG FUNCJ C+: CPT | Mod: 26,,, | Performed by: RADIOLOGY

## 2023-03-07 PROCEDURE — 74230 X-RAY XM SWLNG FUNCJ C+: CPT | Mod: TC

## 2023-03-07 PROCEDURE — 74230 FL MODIFIED BARIUM SWALLOW SPEECH STUDY: ICD-10-PCS | Mod: 26,,, | Performed by: RADIOLOGY

## 2023-03-07 PROCEDURE — 92611 MOTION FLUOROSCOPY/SWALLOW: CPT

## 2023-03-07 PROCEDURE — 25500020 PHARM REV CODE 255: Performed by: OTOLARYNGOLOGY

## 2023-03-07 PROCEDURE — A9698 NON-RAD CONTRAST MATERIALNOC: HCPCS | Performed by: OTOLARYNGOLOGY

## 2023-03-07 PROCEDURE — 97535 SELF CARE MNGMENT TRAINING: CPT

## 2023-03-07 RX ADMIN — BARIUM SULFATE 60 ML: 0.81 POWDER, FOR SUSPENSION ORAL at 10:03

## 2023-03-07 NOTE — PROCEDURES
"Modified Barium Swallow    Patient Name:  Fan Paz   MRN:  [id number]      Recommendations:     Recommendations:                General Recommendations:  GI evaluation visualized upper esophageal narrowing/web and Dysphagia therapy  Diet recommendations:  Mechanical soft with thin liquids, perform frequent throat clear  Aspiration Precautions:  alternate bites and sips    General Precautions: Standard,    Communication strategies:  none    Referral     Reason for Referral  Patient was referred for a Modified Barium Swallow Study to assess the efficiency of his/her swallow function, rule out aspiration and make recommendations regarding safe dietary consistencies, effective compensatory strategies, and safe eating environment.     Pt reporting he has had some degree of sensation of food "getting stuck" since treatment for palatal CA 30+ years ago for which he received surgery and radiation treatments. Pt brought with him a bottle of water stating that if he doesn't perform liquid wash he cannot clear bolus as it gets lodged at approximate level of UES. He also states he still has a small hole in his soft palate and that it is covered by his upper dentures which are fixed with denture adhesive although he reports that sometimes food gets between his dentures and palate and will get into the hole. He reports good, consistent oral care. He report no issues swallow his current medications.     Diagnosis: dysphagia       History:     Past Medical History:   Diagnosis Date    Arthritis     Cancer of palate     HTN (hypertension)     Hyperlipidemia     Prostate cancer     2011       Objective:     Current Respiratory Status:  room air    Alert: yes    Cooperative: yes    Follows Directions: yes    Visualization  Patient was seen in the lateral view    Oral Peripheral Examination   WFL (some increased oral prep of solid.)    Consistencies Assessed  Thin X1 via spoon, multiple via straw across ~60ml  Puree " X2  Solids X1    Oral Preparation/Oral Phase  Premature spillage to the level of the esophagus to level of the valleculae for puree/solid    Pharyngeal Phase   Decreased base of tongue retraction and variable epiglottal inversion, decreased laryngeal excursion/elevation and significant atrophy of posterior pharyngeal wall with decreased constriction.     Specific results as follows:    THIN LIQUIDS  Rosenbek 8-Point Penetration-Aspiration Scale Score: 2: Material enters the airway, remains above the vocal folds, and is ejected from the airway.     Pharyngeal Residue  Base of tongue: mild-mod  Valleculae: mild  Posterior pharyngeal wall: trace  Pyriform:/upper esophageal: mild-moderate  *cued throat clear with subsequent swallow grossly cleared pharyngeal residue  -----    PUREE  Rosenbek 8-Point Penetration-Aspiration Scale Score: 1: Material does not enter the airway.     Pharyngeal Residue  Base of tongue: mild  Valleculae: mild  Posterior pharyngeal wall: mild  Pyriform:/upper esophageal: severe  *liquid wash improved pyriform/upper esophageal stasis but did not completely clear   -----    SOLID  Rosenbek 8-Point Penetration-Aspiration Scale Score: 1: Material does not enter the airway.     Pharyngeal Residue  Base of tongue: mild  Valleculae: mild  Posterior pharyngeal wall: mild  Pyriform:/upper esophageal: severe  *liquid wash improved pyriform/upper esophageal stasis but did not completely clear     Cervical Esophageal Phase  UES dysfunction, upper esophageal webbing; large volume of esophageal stasis with decreased flow and some backflow, requiring liquid wash which improved stasis volume but did not completely clear.     Assessment:     Impressions   Pt presents with mild-moderate pharyngeal dysphagia, with more severe esophageal component c/b UES dysfunction and upper esophageal webbing/narrowing.     Prognosis: Good    Barriers:  None    Plan  GI consult    Education  Results were discussed with patient  with monitor in view.    Time Tracking:   SLP Treatment Date:    3/7/23  Speech Start Time:   1033  Speech Stop Time:      1106  Speech Total Time (min):   33 03/07/2023

## 2023-03-20 ENCOUNTER — OFFICE VISIT (OUTPATIENT)
Dept: ORTHOPEDICS | Facility: CLINIC | Age: 83
End: 2023-03-20
Payer: MEDICARE

## 2023-03-20 VITALS — WEIGHT: 176.38 LBS | BODY MASS INDEX: 30.11 KG/M2 | HEIGHT: 64 IN

## 2023-03-20 DIAGNOSIS — Z96.652 STATUS POST TOTAL LEFT KNEE REPLACEMENT: ICD-10-CM

## 2023-03-20 DIAGNOSIS — G89.29 CHRONIC PAIN OF LEFT KNEE: Primary | ICD-10-CM

## 2023-03-20 DIAGNOSIS — M25.562 CHRONIC PAIN OF LEFT KNEE: Primary | ICD-10-CM

## 2023-03-20 PROCEDURE — 99213 OFFICE O/P EST LOW 20 MIN: CPT | Mod: S$PBB,,, | Performed by: ORTHOPAEDIC SURGERY

## 2023-03-20 PROCEDURE — 99999 PR PBB SHADOW E&M-EST. PATIENT-LVL III: ICD-10-PCS | Mod: PBBFAC,,, | Performed by: ORTHOPAEDIC SURGERY

## 2023-03-20 PROCEDURE — 99213 PR OFFICE/OUTPT VISIT, EST, LEVL III, 20-29 MIN: ICD-10-PCS | Mod: S$PBB,,, | Performed by: ORTHOPAEDIC SURGERY

## 2023-03-20 PROCEDURE — 99999 PR PBB SHADOW E&M-EST. PATIENT-LVL III: CPT | Mod: PBBFAC,,, | Performed by: ORTHOPAEDIC SURGERY

## 2023-03-20 PROCEDURE — 99213 OFFICE O/P EST LOW 20 MIN: CPT | Mod: PBBFAC | Performed by: ORTHOPAEDIC SURGERY

## 2023-03-20 NOTE — PROGRESS NOTES
"Subjective:      Patient ID: Fan Paz is a 83 y.o. male.    Chief Complaint: Pain of the Right Knee    HPI  Fan Paz has left knee pain.  The pain has worsened slightly. The pain is located in the global aspect of the knee.  There  is not radiation.    There is associated stiffness.   There is not catching and locking. The pain is described as achy. The pain is aggravated by activity.  It is alleviated by rest.  .  His history, medications and problem list were reviewed.    Review of Systems   Constitutional: Negative for chills, fever and night sweats.   HENT:  Negative for hearing loss.    Eyes:  Negative for blurred vision and double vision.   Cardiovascular:  Negative for chest pain, claudication and leg swelling.   Respiratory:  Negative for shortness of breath.    Endocrine: Negative for polydipsia, polyphagia and polyuria.   Hematologic/Lymphatic: Negative for adenopathy and bleeding problem. Does not bruise/bleed easily.   Skin:  Negative for poor wound healing.   Musculoskeletal:  Positive for joint pain.   Gastrointestinal:  Negative for diarrhea and heartburn.   Genitourinary:  Negative for bladder incontinence.   Neurological:  Negative for focal weakness, headaches, numbness, paresthesias and sensory change.   Psychiatric/Behavioral:  The patient is not nervous/anxious.    Allergic/Immunologic: Negative for persistent infections.       Objective:      Body mass index is 30.27 kg/m².  Vitals:    03/20/23 1500   Weight: 80 kg (176 lb 5.9 oz)   Height: 5' 4" (1.626 m)           General    Constitutional: He is oriented to person, place, and time. He appears well-developed and well-nourished.   HENT:   Head: Normocephalic and atraumatic.   Eyes: EOM are normal.   Cardiovascular:  Normal rate.            Pulmonary/Chest: Effort normal.   Neurological: He is alert and oriented to person, place, and time.   Psychiatric: He has a normal mood and affect. His behavior is normal. "             Left Knee Exam     Inspection   Erythema: absent  Scars: present  Swelling: present  Effusion: present (sight)  Deformity: absent  Bruising: absent    Tenderness   The patient tender to palpation of the medial retinaculum and lateral retinaculum.    Range of Motion   Extension:  0   Flexion:  100     Tests   Stability   Lachman: normal (-1 to 2mm)   MCL - Valgus: normal (0 to 2mm)  LCL - Varus: normal (0 to 2mm)    Muscle Strength   Left Lower Extremity   Hip Abduction: 5/5   Quadriceps:  5/5   Hamstrin/5     Vascular Exam       Edema  Left Lower Leg: absent    MRI and most recent xrays confirm loosening        Assessment:       Encounter Diagnoses   Name Primary?    Chronic pain of left knee Yes    Status post total left knee replacement           Plan:       Fan was seen today for pain.    Diagnoses and all orders for this visit:    Chronic pain of left knee    Status post total left knee replacement      Options discussed. Will need revision, as this will not improve. He would like surgery this fall.  RTC 4 months with left knee xrays.  Sooner if needed.

## 2023-03-22 ENCOUNTER — OFFICE VISIT (OUTPATIENT)
Dept: CARDIOLOGY | Facility: CLINIC | Age: 83
End: 2023-03-22
Payer: MEDICARE

## 2023-03-22 VITALS
HEART RATE: 67 BPM | BODY MASS INDEX: 30.19 KG/M2 | OXYGEN SATURATION: 97 % | HEIGHT: 64 IN | SYSTOLIC BLOOD PRESSURE: 118 MMHG | DIASTOLIC BLOOD PRESSURE: 60 MMHG | WEIGHT: 176.81 LBS | RESPIRATION RATE: 16 BRPM

## 2023-03-22 DIAGNOSIS — R03.0 ELEVATED BLOOD PRESSURE READING IN OFFICE WITHOUT DIAGNOSIS OF HYPERTENSION: ICD-10-CM

## 2023-03-22 DIAGNOSIS — E78.49 OTHER HYPERLIPIDEMIA: ICD-10-CM

## 2023-03-22 DIAGNOSIS — M17.0 PRIMARY OSTEOARTHRITIS OF BOTH KNEES: ICD-10-CM

## 2023-03-22 DIAGNOSIS — I44.1 MOBITZ TYPE 1 SECOND DEGREE AV BLOCK: Primary | ICD-10-CM

## 2023-03-22 PROCEDURE — 99999 PR PBB SHADOW E&M-EST. PATIENT-LVL III: CPT | Mod: PBBFAC,,, | Performed by: INTERNAL MEDICINE

## 2023-03-22 PROCEDURE — 99213 PR OFFICE/OUTPT VISIT, EST, LEVL III, 20-29 MIN: ICD-10-PCS | Mod: S$PBB,,, | Performed by: INTERNAL MEDICINE

## 2023-03-22 PROCEDURE — 93010 EKG 12-LEAD: ICD-10-PCS | Mod: S$PBB,,, | Performed by: INTERNAL MEDICINE

## 2023-03-22 PROCEDURE — 93010 ELECTROCARDIOGRAM REPORT: CPT | Mod: S$PBB,,, | Performed by: INTERNAL MEDICINE

## 2023-03-22 PROCEDURE — 99999 PR PBB SHADOW E&M-EST. PATIENT-LVL III: ICD-10-PCS | Mod: PBBFAC,,, | Performed by: INTERNAL MEDICINE

## 2023-03-22 PROCEDURE — 93005 ELECTROCARDIOGRAM TRACING: CPT | Mod: PBBFAC | Performed by: INTERNAL MEDICINE

## 2023-03-22 PROCEDURE — 99213 OFFICE O/P EST LOW 20 MIN: CPT | Mod: PBBFAC | Performed by: INTERNAL MEDICINE

## 2023-03-22 PROCEDURE — 99213 OFFICE O/P EST LOW 20 MIN: CPT | Mod: S$PBB,,, | Performed by: INTERNAL MEDICINE

## 2023-03-22 NOTE — PROGRESS NOTES
CARDIOLOGY CLINIC VISIT        HISTORY OF PRESENT ILLNESS:     Fan Paz presents for continued care.  Seen 02/02/2022 for evaluation of abnormal EKG.  EKG showed second-degree AV block, Mobitz 1. The Holter showed sinus rhythm with average heart rate of 79 beats per minute.  Frequent PACs.  Wenckebach.  Echocardiogram showed normal left ventricular systolic function with estimated ejection fraction of 60%.  No pulmonary hypertension.  The blood pressure looks good today.  Patient without complaints.    09/20/2022: Patient without complaints.  No dizziness.  No syncope.  Only issue is his arthritis.  Blood pressure is a little elevated today.  EKG today shows sinus rhythm with first-degree AV block.    03/22/2023: EKG today shows grouped beating.  Wenckebach.  Patient feels good.  No complaints.  Blood pressure looks good.    CARDIOVASCULAR HISTORY:     Mobitz 1    PAST MEDICAL HISTORY:     Past Medical History:   Diagnosis Date    Arthritis     Cancer of palate     HTN (hypertension)     Hyperlipidemia     Prostate cancer     2011       PAST SURGICAL HISTORY:     Past Surgical History:   Procedure Laterality Date    BACK SURGERY  y-7    Cancer of palate surgery      Late [facility]'s- [facility]     CARPAL TUNNEL RELEASE  8-14-13    right hand,Left hand 2013    COLONOSCOPY N/A 4/10/2017    Procedure: COLONOSCOPY;  Surgeon: Nilo Kelly MD;  Location: [facility] ENDO;  Service: Endoscopy;  Laterality: N/A;    COLONOSCOPY N/A 10/21/2020    Procedure: COLONOSCOPY;  Surgeon: Demetrius Araujo MD;  Location: [facility] ENDO;  Service: Endoscopy;  Laterality: N/A;    KNEE SURGERY  y-41    left knee    KNEE SURGERY Right 12-1-15    TKR    Radio active seed Implant      January 2012    TOTAL HIP ARTHROPLASTY  3/2011       ALLERGIES AND MEDICATION:   Review of patient's allergies indicates:  No Known Allergies     Medication List            Accurate as of March 22, 2023  2:41 PM. If you have any questions, ask your nurse or doctor.                 CONTINUE taking these medications      aspirin 81 MG EC tablet  Commonly known as: ECOTRIN  Take 1 tablet (81 mg total) by mouth 2 (two) times daily.     azelastine 137 mcg (0.1 %) nasal spray  Commonly known as: ASTELIN  1 spray (137 mcg total) by Nasal route 2 (two) times daily.     diclofenac 50 MG EC tablet  Commonly known as: VOLTAREN  Take 1 tablet (50 mg total) by mouth 2 (two) times daily.     * fluticasone propionate 50 mcg/actuation nasal spray  Commonly known as: FLONASE  1 spray (50 mcg total) by Each Nostril route once daily.     * fluticasone propionate 50 mcg/actuation nasal spray  Commonly known as: FLONASE  2 sprays (100 mcg total) by Each Nostril route 2 (two) times daily.     FLUZONE HIGH-DOSE  (PF) 180 mcg/0.5 mL Syrg  Generic drug: flu vacc [id number](65yr up)PF     montelukast 10 mg tablet  Commonly known as: SINGULAIR  TAKE 1 TABLET BY MOUTH ONCE DAILY IN THE EVENING     ondansetron 8 MG Tbdl  Commonly known as: ZOFRAN-ODT  Dissolve 1 tablet (8 mg total) by mouth every 6 (six) hours as needed.     sildenafiL 50 MG tablet  Commonly known as: VIAGRA  Take 1 tablet (50 mg total) by mouth daily as needed for Erectile Dysfunction.     simvastatin 40 MG tablet  Commonly known as: ZOCOR  Take 1 tablet (40 mg total) by mouth every evening.     tamsulosin 0.4 mg Cap  Commonly known as: FLOMAX  Take 1 capsule (0.4 mg total) by mouth once daily.           * This list has 2 medication(s) that are the same as other medications prescribed for you. Read the directions carefully, and ask your doctor or other care provider to review them with you.                  SOCIAL HISTORY:     Social History     Socioeconomic History    Marital status:     Number of children: 4   Occupational History     Employer: RETIRED   Tobacco Use    Smoking status: Former     Packs/day: 3.00     Years: 20.00     Pack years: 60.00     Types: Cigarettes     Quit date: 7/10/1997     Years since quittin.7      Passive exposure: Past    Smokeless tobacco: Never    Tobacco comments:     Quit -smoked for 40 years ,2 packs a day on an average   Substance and Sexual Activity    Alcohol use: No     Comment: used to drink Occasionally    Drug use: No    Sexual activity: Yes     Partners: Female       FAMILY HISTORY:     Family History   Problem Relation Age of Onset    Coronary artery disease Father             Cancer Sister         Liver Cancer -    Diabetes Sister             No Known Problems Brother     No Known Problems Sister     No Known Problems Sister     No Known Problems Brother     No Known Problems Mother     Lung cancer Brother 60        - was a tobacco user, had lung cancer    Cancer Brother         Liver Cancer-     Lung cancer Sister         Alive    Breast cancer Sister     Clotting disorder Sister 75        blood clot on brain-alive    Stroke Brother             Glaucoma Cousin     No Known Problems Maternal Aunt     No Known Problems Maternal Uncle     No Known Problems Paternal Aunt     No Known Problems Paternal Uncle     No Known Problems Maternal Grandmother     No Known Problems Maternal Grandfather     No Known Problems Paternal Grandmother     No Known Problems Paternal Grandfather     Macular degeneration Neg Hx     Strabismus Neg Hx     Amblyopia Neg Hx     Blindness Neg Hx     Cataracts Neg Hx     Hypertension Neg Hx     Retinal detachment Neg Hx     Thyroid disease Neg Hx        REVIEW OF SYSTEMS:   Review of Systems   Constitutional:  Negative for chills, diaphoresis, fever, malaise/fatigue and weight loss.   HENT:  Negative for congestion, hearing loss, sinus pain, sore throat and tinnitus.    Eyes:  Negative for blurred vision, double vision, photophobia and pain.   Respiratory:  Negative for cough, hemoptysis, sputum production, shortness of breath, wheezing and stridor.    Cardiovascular:  Negative for chest pain, palpitations, orthopnea, claudication, leg  "swelling and PND.   Gastrointestinal:  Negative for abdominal pain, blood in stool, heartburn, melena, nausea and vomiting.   Musculoskeletal:  Positive for joint pain. Negative for back pain, falls, myalgias and neck pain.   Neurological:  Negative for dizziness, tingling, tremors, sensory change, speech change, focal weakness, seizures, loss of consciousness, weakness and headaches.   Endo/Heme/Allergies:  Does not bruise/bleed easily.   Psychiatric/Behavioral:  Negative for depression, memory loss and substance abuse. The patient is not nervous/anxious.      PHYSICAL EXAM:     Vitals:    03/22/23 1404   BP: 118/60   Pulse: 67   Resp: 16    Body mass index is 30.35 kg/m².  Weight: 80.2 kg (176 lb 12.9 oz)   Height: 5' 4" (162.6 cm)     Physical Exam  Vitals reviewed.   Constitutional:       General: He is not in acute distress.     Appearance: He is well-developed. He is not diaphoretic.   Neck:      Vascular: No carotid bruit or JVD.   Cardiovascular:      Rate and Rhythm: Normal rate and regular rhythm. No extrasystoles are present.     Pulses: Normal pulses.      Heart sounds: Normal heart sounds.   Pulmonary:      Effort: Pulmonary effort is normal.      Breath sounds: Normal breath sounds.   Abdominal:      General: Bowel sounds are normal.      Palpations: Abdomen is soft.      Tenderness: There is no abdominal tenderness.   Musculoskeletal:      Right lower leg: No edema.      Left lower leg: No edema.   Neurological:      Mental Status: He is alert and oriented to person, place, and time.   Psychiatric:         Speech: Speech normal.         Behavior: Behavior normal.       DATA:   EKG: (personally reviewed tracing)  03/22/2023-Adilson  09/20/2022: Sinus rhythm first-degree AV block  Laboratory:  CBC:  Recent Labs   Lab 07/06/21  1052 01/28/22  0938 02/06/23  0912   WBC 5.11 5.17 5.63   Hemoglobin 12.7 L 12.9 L 13.6 L   Hematocrit 41.5 41.8 44.0   Platelets 208 196 210       CHEMISTRIES:  Recent Labs "   Lab 03/05/21  0935 07/06/21  1052 01/28/22  0938 02/06/23  0912   Glucose 106 93 110 115 H   Sodium 137 140 140 137   Potassium 4.8 3.9 4.2 4.3   BUN 13 14 16 16   Creatinine 0.8 0.9 1.0 1.0   eGFR if African American >60 >60 >60  --    eGFR if non African American >60 >60 >60  --    Calcium 9.0 8.9 8.8 9.6       CARDIAC BIOMARKERS:        COAGS:  Recent Labs   Lab 11/27/20  1538   INR 1.0       LIPIDS/LFTS:  Recent Labs   Lab 07/20/20  0945 11/29/20  0447 07/06/21  1052 01/28/22  0938 02/06/23  0912   Cholesterol 130  --   --  158 142   Triglycerides 68  --   --  69 75   HDL 43  --   --  47 46   LDL Cholesterol 73.4  --   --  97.2 81.0   Non-HDL Cholesterol 87  --   --  111 96   AST 28   < > 19 23 24   ALT 24   < > 15 20 18    < > = values in this interval not displayed.       Cardiovascular Testing:    Echocardiogram 02/16/2022:    The left ventricle is normal in size with normal systolic function.  The estimated ejection fraction is 60%.  Moderate left atrial enlargement.  Indeterminate left ventricular diastolic function.  Normal right ventricular size with normal right ventricular systolic function.  Normal central venous pressure (3 mmHg).  The estimated PA systolic pressure is 6 mmHg.  There is no pulmonary hypertension.    Holter 02/16/2022:    Sinus rhythm with heart rates varying between 43 and 113 BPM with an average of 79 BPM  There were very frequent PACs  Wenckebach    ASSESSMENT:     Mobitz type 1 second-degree AV block  Elevated blood pressure reading without diagnosis of hypertension  Hyperlipidemia: LDL 81  Osteoarthritis    PLAN:     Abnormal EKG/Mobitz type 1 second-degree AV block:  24 hour Holter showed no significant arrhythmias.  PACs.  Elevated blood pressure reading without diagnosis of hypertension:  Normotensive.  Hyperlipidemia:  Continue current management.  Return to clinic 6 months.           Jonathan Mullins MD, MPH, FACC, [facility]

## 2023-03-31 ENCOUNTER — EXTERNAL CHRONIC CARE MANAGEMENT (OUTPATIENT)
Dept: PRIMARY CARE CLINIC | Facility: CLINIC | Age: 83
End: 2023-03-31
Payer: MEDICARE

## 2023-03-31 PROCEDURE — 99490 CHRNC CARE MGMT STAFF 1ST 20: CPT | Mod: S$PBB,,, | Performed by: FAMILY MEDICINE

## 2023-03-31 PROCEDURE — 99490 PR CHRONIC CARE MGMT, 1ST 20 MIN: ICD-10-PCS | Mod: S$PBB,,, | Performed by: FAMILY MEDICINE

## 2023-03-31 PROCEDURE — 99490 CHRNC CARE MGMT STAFF 1ST 20: CPT | Mod: PBBFAC,PO | Performed by: FAMILY MEDICINE

## 2023-04-14 DIAGNOSIS — R13.14 PHARYNGOESOPHAGEAL DYSPHAGIA: Primary | ICD-10-CM

## 2023-04-17 ENCOUNTER — OFFICE VISIT (OUTPATIENT)
Dept: ORTHOPEDICS | Facility: CLINIC | Age: 83
End: 2023-04-17
Payer: MEDICARE

## 2023-04-17 VITALS — BODY MASS INDEX: 30.31 KG/M2 | WEIGHT: 177.56 LBS | HEIGHT: 64 IN

## 2023-04-17 DIAGNOSIS — T84.018S FAILURE OF TOTAL KNEE REPLACEMENT, SEQUELA: ICD-10-CM

## 2023-04-17 DIAGNOSIS — Z96.659 FAILURE OF TOTAL KNEE REPLACEMENT, SEQUELA: ICD-10-CM

## 2023-04-17 DIAGNOSIS — G89.29 CHRONIC PAIN OF LEFT KNEE: Primary | ICD-10-CM

## 2023-04-17 DIAGNOSIS — Z96.652 STATUS POST TOTAL LEFT KNEE REPLACEMENT: ICD-10-CM

## 2023-04-17 DIAGNOSIS — M25.562 CHRONIC PAIN OF LEFT KNEE: Primary | ICD-10-CM

## 2023-04-17 PROCEDURE — 99999 PR PBB SHADOW E&M-EST. PATIENT-LVL III: ICD-10-PCS | Mod: PBBFAC,,, | Performed by: ORTHOPAEDIC SURGERY

## 2023-04-17 PROCEDURE — 99999 PR PBB SHADOW E&M-EST. PATIENT-LVL III: CPT | Mod: PBBFAC,,, | Performed by: ORTHOPAEDIC SURGERY

## 2023-04-17 PROCEDURE — 99214 PR OFFICE/OUTPT VISIT, EST, LEVL IV, 30-39 MIN: ICD-10-PCS | Mod: S$PBB,,, | Performed by: ORTHOPAEDIC SURGERY

## 2023-04-17 PROCEDURE — 99213 OFFICE O/P EST LOW 20 MIN: CPT | Mod: PBBFAC | Performed by: ORTHOPAEDIC SURGERY

## 2023-04-17 PROCEDURE — 99214 OFFICE O/P EST MOD 30 MIN: CPT | Mod: S$PBB,,, | Performed by: ORTHOPAEDIC SURGERY

## 2023-04-17 NOTE — PROGRESS NOTES
"Subjective:      Patient ID: Fan Paz is a 83 y.o. male.    Chief Complaint: Pain of the Left Knee    HPI  Fan Paz has left knee pain.  The pain has worsened. The symptoms have worsened to the point where it is interfering with his activities of daily living.  He has difficulty with stairs, getting dressed and getting in an out of a car.  The pain is located in the global aspect of the knee.  There  is not radiation.    There is associated stiffness.   There is not catching and locking. The pain is described as achy. The pain is aggravated by acivity.  It is alleviated by nothing consistently.   His history, medications and problem list were reviewed.    Review of Systems   Constitutional: Negative for chills, fever and night sweats.   HENT:  Negative for hearing loss.    Eyes:  Negative for blurred vision and double vision.   Cardiovascular:  Negative for chest pain, claudication and leg swelling.   Respiratory:  Negative for shortness of breath.    Endocrine: Negative for polydipsia, polyphagia and polyuria.   Hematologic/Lymphatic: Negative for adenopathy and bleeding problem. Does not bruise/bleed easily.   Skin:  Negative for poor wound healing.   Musculoskeletal:  Positive for joint pain.   Gastrointestinal:  Negative for diarrhea and heartburn.   Genitourinary:  Negative for bladder incontinence.   Neurological:  Negative for focal weakness, headaches, numbness, paresthesias and sensory change.   Psychiatric/Behavioral:  The patient is not nervous/anxious.    Allergic/Immunologic: Negative for persistent infections.       Objective:      Body mass index is 30.48 kg/m².  Vitals:    04/17/23 1508   Weight: 80.6 kg (177 lb 9.3 oz)   Height: 5' 4" (1.626 m)           General    Constitutional: He is oriented to person, place, and time. He appears well-developed and well-nourished.   HENT:   Head: Normocephalic and atraumatic.   Eyes: EOM are normal.   Cardiovascular:  Normal rate.     "        Pulmonary/Chest: Effort normal.   Neurological: He is alert and oriented to person, place, and time.   Psychiatric: He has a normal mood and affect. His behavior is normal.     General Musculoskeletal Exam   Gait: abnormal and antalgic         Left Knee Exam     Inspection   Erythema: absent  Scars: present  Swelling: present  Effusion: absent  Deformity: absent  Bruising: present    Tenderness   The patient tender to palpation of the medial retinaculum and lateral retinaculum.    Range of Motion   Extension:  0   Flexion:  120     Tests   Stability   Lachman: normal (-1 to 2mm)   MCL - Valgus: normal (0 to 2mm)  LCL - Varus: normal (0 to 2mm)  Patella   Passive Patellar Tilt: neutral    Muscle Strength   Left Lower Extremity   Hip Abduction: 5/5   Quadriceps:  5/5   Hamstrin/5     Vascular Exam       Edema  Left Lower Leg: absent            Assessment:       Encounter Diagnoses   Name Primary?    Chronic pain of left knee Yes    Status post total left knee replacement     Failure of total knee replacement, sequela           Plan:       Fan was seen today for pain.    Diagnoses and all orders for this visit:    Chronic pain of left knee    Status post total left knee replacement    Failure of total knee replacement, sequela      Treatment options were discussed. The surgical process of revision left   knee replacement was discussed in detail with the patient including a detailed discussion of the procedure itself (including visual model, x-ray review, and literature review). The typical perioperative and post-operative course was discussed and perioperative risks were discussed to the patient's satisfaction.  We discussed  implant type and why I believe the implant selected is a good match for the patient's needs. Risks and complications discussed included but were not limited to the risks of anesthetic complications, infection, bleeding, wound healing complications, stiffness, aseptic loosening,  instability, limb length inequality, neurologic dysfunction including numbness and weakness,additional surgery,  DVT, pulmonary embolism, perioperative medical risks (cardiac, pulmonary, renal, neurologic), and death and the patient elects to proceed.  The patient should get medically cleared.     Hx DVT: Eliquis 3 months

## 2023-04-20 DIAGNOSIS — T84.018S FAILURE OF TOTAL KNEE REPLACEMENT, SEQUELA: Primary | ICD-10-CM

## 2023-04-20 DIAGNOSIS — Z96.659 FAILURE OF TOTAL KNEE REPLACEMENT, SEQUELA: Primary | ICD-10-CM

## 2023-04-20 DIAGNOSIS — Z96.652 STATUS POST TOTAL LEFT KNEE REPLACEMENT: ICD-10-CM

## 2023-04-28 ENCOUNTER — TELEPHONE (OUTPATIENT)
Dept: ENDOSCOPY | Facility: HOSPITAL | Age: 83
End: 2023-04-28
Payer: MEDICARE

## 2023-04-28 ENCOUNTER — OFFICE VISIT (OUTPATIENT)
Dept: GASTROENTEROLOGY | Facility: CLINIC | Age: 83
End: 2023-04-28
Payer: MEDICARE

## 2023-04-28 VITALS
HEART RATE: 77 BPM | BODY MASS INDEX: 29.42 KG/M2 | WEIGHT: 176.56 LBS | DIASTOLIC BLOOD PRESSURE: 81 MMHG | HEIGHT: 65 IN | SYSTOLIC BLOOD PRESSURE: 184 MMHG

## 2023-04-28 DIAGNOSIS — R13.14 PHARYNGOESOPHAGEAL DYSPHAGIA: ICD-10-CM

## 2023-04-28 PROCEDURE — 99999 PR PBB SHADOW E&M-EST. PATIENT-LVL IV: CPT | Mod: PBBFAC,,, | Performed by: INTERNAL MEDICINE

## 2023-04-28 PROCEDURE — 99214 OFFICE O/P EST MOD 30 MIN: CPT | Mod: PBBFAC | Performed by: INTERNAL MEDICINE

## 2023-04-28 PROCEDURE — 99999 PR PBB SHADOW E&M-EST. PATIENT-LVL IV: ICD-10-PCS | Mod: PBBFAC,,, | Performed by: INTERNAL MEDICINE

## 2023-04-28 PROCEDURE — 99214 OFFICE O/P EST MOD 30 MIN: CPT | Mod: S$PBB,,, | Performed by: INTERNAL MEDICINE

## 2023-04-28 PROCEDURE — 99214 PR OFFICE/OUTPT VISIT, EST, LEVL IV, 30-39 MIN: ICD-10-PCS | Mod: S$PBB,,, | Performed by: INTERNAL MEDICINE

## 2023-04-28 NOTE — TELEPHONE ENCOUNTER
"----- Message from Nanci Shepard MD sent at 2023 10:07 AM CDT -----  Procedure: EGD (with Dr. Lopez or Dr. Parada)    Diagnosis: Dysphagia    Procedure Timin-4 weeks    #If within 4 weeks selected, please kelli as high priority#    #If greater than 12 weeks, please select "5-12 weeks" and delay sending until 2 months prior to requested date#     Provider: Dr. Lopez or Dr. Parada    Location:  Endo    Additional Scheduling Information: No scheduling concerns    Prep Specifications:N/A    Have you attached a patient to this message: Yes     "

## 2023-04-28 NOTE — PROGRESS NOTES
[facility] Consultation     Reason for Consult:  The encounter diagnosis was Pharyngoesophageal dysphagia.    PCP:   Otilio OVALLE Page   [address] / [place] [address]    Referring MD:  Wendi Quick Md  [address] [place] [address]    HPI:  This is a 83 y.o. 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  He is referred from ENT due to his dysphagia and abnormal imaging.  His wife and daughter are present with him at today's visit.    He states that ever since he underwent surgery and radiation for his palate ca, he has had to chew his food well and has had to eat soft foods. He typically mashes up his food including beans, peas and also makes the consistency watery.  Eats ice cream and other soft foods. His wife and daughter express that he has had occasional choking / coughing fits after eating. Requires water to wash his food down well.  He does not have dysphagia to liquids or pills.  His daughter feels that his symptoms have progressed.  Denies unintentional weight loss. Drinks boosts to keep up with caloric intake.  He was a prior smoker, quit after palate ca diagnosis.  Of note, he did require a feeding tube when he was undergoing radiation treatment due to difficulty with swallow.  No prior EGD.      2/2023 Modified Barium  Weakened pharyngeal constriction across tested consistencies with residue, improved with subsequent dry swallow.  In addition, several instances of laryngeal penetration without tracheal aspiration with thin barium swallow and thin barium liquid wash.  Additional residue and stasis with incomplete opening near the UES, most pronounced with pudding and barium coated solids.  Linear transverse filling defect along the anterior wall near C6 suggesting component of esophageal web.  This can be further correlated with direct endoscopic visualization.  Residue persisted to some degree  despite thin liquid barium wash.    He has followed with ENT. Last visit 2/23/23. Flexible laryngoscope findings: No overt aspiration on scope, handles secretions well, no malignancy nor abnormal vocal fold motion   Edema of sinuses significant with  mucopurulent some drainage        ROS:  Constitutional: No fevers, chills, No weight loss  ENT: No allergies  CV: No chest pain  Pulm: No cough, No shortness of breath  Ophtho: No vision changes  GI: see HPI  Derm: No rash  Heme: No lymphadenopathy, No bruising  MSK: No arthritis  : No dysuria, No hematuria  Endo: No hot or cold intolerance  Neuro: No syncope, No seizure  Psych: No anxiety, No depression    Medical History:  has a past medical history of Arthritis, Cancer of palate, HTN (hypertension), Hyperlipidemia, and Prostate cancer.    Surgical History:  has a past surgical history that includes Back surgery (y-7); Total hip arthroplasty (3/2011); Carpal tunnel release (8-14-13); Cancer of palate surgery; Radio active seed Implant; Knee surgery (y-41); Knee surgery (Right, 12-1-15); Colonoscopy (N/A, 4/10/2017); and Colonoscopy (N/A, 10/21/2020).    Family History: family history includes Breast cancer in his sister; Cancer in his brother and sister; Clotting disorder (age of onset: 75) in his sister; Coronary artery disease in his father; Diabetes in his sister; Esophageal cancer in his father; Glaucoma in his cousin; Lung cancer in his sister; Lung cancer (age of onset: 60) in his brother; No Known Problems in his brother, brother, maternal aunt, maternal grandfather, maternal grandmother, maternal uncle, mother, paternal aunt, paternal grandfather, paternal grandmother, paternal uncle, sister, and sister; Stroke in his brother..   No contributory family history     Social History:  reports that he quit smoking about 25 years ago. His smoking use included cigarettes. He has a 60.00 pack-year smoking history. He has been exposed to tobacco smoke. He has never  "used smokeless tobacco. He reports that he does not drink alcohol and does not use drugs.    Review of patient's allergies indicates:  No Known Allergies    Current Outpatient Rx   Medication Sig Dispense Refill    azelastine (ASTELIN) 137 mcg (0.1 %) nasal spray 1 spray (137 mcg total) by Nasal route 2 (two) times daily. 30 mL 3    fluticasone propionate (FLONASE) 50 mcg/actuation nasal spray 2 sprays (100 mcg total) by Each Nostril route 2 (two) times daily. 18.2 mL 11    montelukast (SINGULAIR) 10 mg tablet TAKE 1 TABLET BY MOUTH ONCE DAILY IN THE EVENING 90 tablet 3    simvastatin (ZOCOR) 40 MG tablet Take 1 tablet (40 mg total) by mouth every evening. 90 tablet 3    tamsulosin (FLOMAX) 0.4 mg Cap Take 1 capsule (0.4 mg total) by mouth once daily. 90 capsule 1    aspirin (ECOTRIN) 81 MG EC tablet Take 1 tablet (81 mg total) by mouth 2 (two) times daily. (Patient not taking: Reported on 4/28/2023) 60 tablet 0    diclofenac (VOLTAREN) 50 MG EC tablet Take 1 tablet (50 mg total) by mouth 2 (two) times daily. (Patient not taking: Reported on 4/28/2023) 60 tablet 0    fluticasone propionate (FLONASE) 50 mcg/actuation nasal spray 1 spray (50 mcg total) by Each Nostril route once daily. (Patient not taking: Reported on 4/28/2023) 16 g 0    FLUZONE HIGH-DOSE 2019-20, PF, 180 mcg/0.5 mL Syrg PHARMACIST ADMINISTERED IMMUNIZATION ADMINISTERED AT TIME OF DISPENSING  0    ondansetron (ZOFRAN-ODT) 8 MG TbDL Dissolve 1 tablet (8 mg total) by mouth every 6 (six) hours as needed. (Patient not taking: Reported on 4/28/2023) 30 tablet 0    sildenafiL (VIAGRA) 50 MG tablet Take 1 tablet (50 mg total) by mouth daily as needed for Erectile Dysfunction. (Patient not taking: Reported on 4/28/2023) 30 tablet 1       Objective Findings:    Vital Signs:  BP (!) 184/81   Pulse 77   Ht 5' 5" (1.651 m)   Wt 80.1 kg (176 lb 9.4 oz)   BMI 29.39 kg/m²   Body mass index is 29.39 kg/m².    Physical Exam:  General Appearance: well-appearing, " NAD  Head:   Normocephalic, without obvious abnormality  Eyes:    No scleral icterus, EOMI  ENT: Neck supple; moist mucus membranes  Lungs: clear to auscultation bilaterally.  Heart:  regular rate and rhythm, S1, S2 normal, no murmurs heard  Abdomen: soft, non-tender, non-distended with bowel sounds in all four quadrants. No hepatosplenomegaly, ascites, or palpable masses  Extremities: 2+ pulses, no clubbing, cyanosis or edema  Skin: No visible rash  Neurologic: no focal motor deficits      Labs:  Lab Results   Component Value Date    WBC 5.63 02/06/2023    HGB 13.6 (L) 02/06/2023    HCT 44.0 02/06/2023     02/06/2023    CHOL 142 02/06/2023    TRIG 75 02/06/2023    HDL 46 02/06/2023    ALT 18 02/06/2023    AST 24 02/06/2023     02/06/2023    K 4.3 02/06/2023     02/06/2023    CREATININE 1.0 02/06/2023    BUN 16 02/06/2023    CO2 25 02/06/2023    TSH 1.030 12/07/2012    PSA 0.01 05/01/2018    INR 1.0 11/27/2020    HGBA1C 6.2 (H) 02/06/2023       IMAGING:  MBSS  2/2023 Modified Barium  Weakened pharyngeal constriction across tested consistencies with residue, improved with subsequent dry swallow.  In addition, several instances of laryngeal penetration without tracheal aspiration with thin barium swallow and thin barium liquid wash.  Additional residue and stasis with incomplete opening near the UES, most pronounced with pudding and barium coated solids.  Linear transverse filling defect along the anterior wall near C6 suggesting component of esophageal web.  This can be further correlated with direct endoscopic visualization.  Residue persisted to some degree despite thin liquid barium wash.    PROCEDURES  Colonoscopy 2020  Impression:          - Diverticulosis in the recto-sigmoid colon, in the                        sigmoid colon, in the descending colon, in the                        transverse colon and in the ascending colon.                        - Non-bleeding internal hemorrhoids.           "              - The entire examined colon is normal.                        - No specimens collected.   Recommendation:      - Discharge patient to home.                        - Repeat colonoscopy in 5 years for surveillance.     Assessment/Plan:  82 yo M with history of palate ca s/p radiation and surgery (1980s/90s) who presents with progressive dysphagia to solids.  He has followed with speech pathology and ENT. MBSS 2023 with "mild-moderate pharyngeal dysphagia with more severe esophageal component c/b UES dysfunction and upper esophageal webbing/narrowing" There was a linear traverse filling defect along the anterior wall near C6 suggesting a possible web. Also with residue / stasis with incomplete opening near EUS.  Suspect some of these changes are related to his radiation / surgery history from palate ca.  Will plan for EGD for further evaluation, possible dilation.   For now, take small bites, chew food well and continue diet as tolerated      Colorectal Cancer Surveillance  Colonoscopy 2020. Recommended repeat 5 years (2025). Would re-discuss risks/benefits and patient wishes at the time given age.        Thank you so much for allowing me to participate in the care of Fan Shepard MD  Gastroenterology   [facility]        "

## 2023-04-30 ENCOUNTER — EXTERNAL CHRONIC CARE MANAGEMENT (OUTPATIENT)
Dept: PRIMARY CARE CLINIC | Facility: CLINIC | Age: 83
End: 2023-04-30
Payer: MEDICARE

## 2023-04-30 PROCEDURE — 99490 CHRNC CARE MGMT STAFF 1ST 20: CPT | Mod: S$PBB,,, | Performed by: FAMILY MEDICINE

## 2023-04-30 PROCEDURE — 99490 PR CHRONIC CARE MGMT, 1ST 20 MIN: ICD-10-PCS | Mod: S$PBB,,, | Performed by: FAMILY MEDICINE

## 2023-04-30 PROCEDURE — 99490 CHRNC CARE MGMT STAFF 1ST 20: CPT | Mod: PBBFAC,PO | Performed by: FAMILY MEDICINE

## 2023-05-01 ENCOUNTER — TELEPHONE (OUTPATIENT)
Dept: ENDOSCOPY | Facility: HOSPITAL | Age: 83
End: 2023-05-01
Payer: MEDICARE

## 2023-05-01 VITALS — BODY MASS INDEX: 29.32 KG/M2 | WEIGHT: 176 LBS | HEIGHT: 65 IN

## 2023-05-01 DIAGNOSIS — R13.10 DYSPHAGIA, UNSPECIFIED TYPE: Primary | ICD-10-CM

## 2023-05-03 ENCOUNTER — ANESTHESIA EVENT (OUTPATIENT)
Dept: ENDOSCOPY | Facility: HOSPITAL | Age: 83
End: 2023-05-03
Payer: MEDICARE

## 2023-05-03 ENCOUNTER — HOSPITAL ENCOUNTER (OUTPATIENT)
Facility: HOSPITAL | Age: 83
Discharge: HOME | End: 2023-05-03
Attending: INTERNAL MEDICINE | Admitting: INTERNAL MEDICINE
Payer: MEDICARE

## 2023-05-03 ENCOUNTER — ANESTHESIA (OUTPATIENT)
Dept: ENDOSCOPY | Facility: HOSPITAL | Age: 83
End: 2023-05-03
Payer: MEDICARE

## 2023-05-03 VITALS
TEMPERATURE: 98 F | DIASTOLIC BLOOD PRESSURE: 89 MMHG | OXYGEN SATURATION: 99 % | RESPIRATION RATE: 18 BRPM | SYSTOLIC BLOOD PRESSURE: 169 MMHG | HEART RATE: 65 BPM

## 2023-05-03 DIAGNOSIS — R13.10 DYSPHAGIA, UNSPECIFIED TYPE: Primary | ICD-10-CM

## 2023-05-03 DIAGNOSIS — R47.02 DYSPHASIA: ICD-10-CM

## 2023-05-03 PROCEDURE — 43249 PR EGD, FLEX, W/BALL DILATION, < 30MM: ICD-10-PCS | Mod: ,,, | Performed by: INTERNAL MEDICINE

## 2023-05-03 PROCEDURE — 25000003 PHARM REV CODE 250: Performed by: INTERNAL MEDICINE

## 2023-05-03 PROCEDURE — 63600175 PHARM REV CODE 636 W HCPCS: Performed by: NURSE ANESTHETIST, CERTIFIED REGISTERED

## 2023-05-03 PROCEDURE — D9220A PRA ANESTHESIA: Mod: CRNA,,, | Performed by: NURSE ANESTHETIST, CERTIFIED REGISTERED

## 2023-05-03 PROCEDURE — 37000008 HC ANESTHESIA 1ST 15 MINUTES: Performed by: INTERNAL MEDICINE

## 2023-05-03 PROCEDURE — 43249 ESOPH EGD DILATION <30 MM: CPT | Performed by: INTERNAL MEDICINE

## 2023-05-03 PROCEDURE — C1726 CATH, BAL DIL, NON-VASCULAR: HCPCS | Performed by: INTERNAL MEDICINE

## 2023-05-03 PROCEDURE — D9220A PRA ANESTHESIA: ICD-10-PCS | Mod: ANES,,, | Performed by: ANESTHESIOLOGY

## 2023-05-03 PROCEDURE — D9220A PRA ANESTHESIA: ICD-10-PCS | Mod: CRNA,,, | Performed by: NURSE ANESTHETIST, CERTIFIED REGISTERED

## 2023-05-03 PROCEDURE — 43249 ESOPH EGD DILATION <30 MM: CPT | Mod: ,,, | Performed by: INTERNAL MEDICINE

## 2023-05-03 PROCEDURE — 25000003 PHARM REV CODE 250: Performed by: NURSE ANESTHETIST, CERTIFIED REGISTERED

## 2023-05-03 PROCEDURE — 63600175 PHARM REV CODE 636 W HCPCS: Performed by: INTERNAL MEDICINE

## 2023-05-03 PROCEDURE — C9113 INJ PANTOPRAZOLE SODIUM, VIA: HCPCS | Performed by: INTERNAL MEDICINE

## 2023-05-03 PROCEDURE — 37000009 HC ANESTHESIA EA ADD 15 MINS: Performed by: INTERNAL MEDICINE

## 2023-05-03 PROCEDURE — D9220A PRA ANESTHESIA: Mod: ANES,,, | Performed by: ANESTHESIOLOGY

## 2023-05-03 RX ORDER — PROPOFOL 10 MG/ML
INJECTION, EMULSION INTRAVENOUS
Status: DISCONTINUED
Start: 2023-05-03 | End: 2023-05-03 | Stop reason: HOSPADM

## 2023-05-03 RX ORDER — SODIUM CHLORIDE 9 MG/ML
INJECTION, SOLUTION INTRAVENOUS CONTINUOUS
Status: DISCONTINUED | OUTPATIENT
Start: 2023-05-03 | End: 2023-05-03 | Stop reason: HOSPADM

## 2023-05-03 RX ORDER — PANTOPRAZOLE SODIUM 40 MG/10ML
40 INJECTION, POWDER, LYOPHILIZED, FOR SOLUTION INTRAVENOUS DAILY
Status: DISCONTINUED | OUTPATIENT
Start: 2023-05-03 | End: 2023-05-03 | Stop reason: HOSPADM

## 2023-05-03 RX ORDER — LIDOCAINE HYDROCHLORIDE 20 MG/ML
INJECTION INTRAVENOUS
Status: DISCONTINUED | OUTPATIENT
Start: 2023-05-03 | End: 2023-05-03

## 2023-05-03 RX ORDER — PROPOFOL 10 MG/ML
VIAL (ML) INTRAVENOUS
Status: DISCONTINUED | OUTPATIENT
Start: 2023-05-03 | End: 2023-05-03

## 2023-05-03 RX ORDER — PANTOPRAZOLE SODIUM 40 MG/1
40 TABLET, DELAYED RELEASE ORAL DAILY
Qty: 30 TABLET | Refills: 1 | Status: SHIPPED | OUTPATIENT
Start: 2023-05-03 | End: 2024-05-02

## 2023-05-03 RX ORDER — LIDOCAINE HYDROCHLORIDE 20 MG/ML
INJECTION, SOLUTION EPIDURAL; INFILTRATION; INTRACAUDAL; PERINEURAL
Status: DISCONTINUED
Start: 2023-05-03 | End: 2023-05-03 | Stop reason: HOSPADM

## 2023-05-03 RX ADMIN — PROPOFOL 50 MG: 10 INJECTION, EMULSION INTRAVENOUS at 12:05

## 2023-05-03 RX ADMIN — PANTOPRAZOLE SODIUM 40 MG: 40 INJECTION, POWDER, LYOPHILIZED, FOR SOLUTION INTRAVENOUS at 01:05

## 2023-05-03 RX ADMIN — PROPOFOL 25 MG: 10 INJECTION, EMULSION INTRAVENOUS at 01:05

## 2023-05-03 RX ADMIN — SODIUM CHLORIDE: 0.9 INJECTION, SOLUTION INTRAVENOUS at 12:05

## 2023-05-03 RX ADMIN — LIDOCAINE HYDROCHLORIDE 100 MG: 20 INJECTION, SOLUTION INTRAVENOUS at 12:05

## 2023-05-03 NOTE — PLAN OF CARE
Discussed today's procedure process with the patient. The patient verbalized understanding the conversation.

## 2023-05-03 NOTE — ANESTHESIA PREPROCEDURE EVALUATION
05/03/2023  Fan Paz is a 83 y.o., male.      Pre-op Assessment    I have reviewed the Patient Summary Reports.     I have reviewed the Nursing Notes. I have reviewed the NPO Status.   I have reviewed the Medications.     Review of Systems  Anesthesia Hx:  No problems with previous Anesthesia  Denies Family Hx of Anesthesia complications.   Denies Personal Hx of Anesthesia complications.   Social:  Former Smoker, Social Alcohol Use    Hematology/Oncology:         -- Anemia: --  Cancer in past history:  Oncology Comments: CA of palate - surgical removal   Radiation to throat x17, denies dysphagia    CA prostate     Cardiovascular:   Exercise tolerance: good Hypertension, well controlled hyperlipidemia    Pulmonary:  Pulmonary Normal    Hepatic/GI:   Bowel prep:    GERD, well controlled    Musculoskeletal:   Arthritis     Neurological:  Neurology Normal    Endocrine:  Endocrine Normal  Obesity / BMI > 30      Physical Exam  General: Well nourished, Cooperative, Alert and Oriented    Airway:  Mallampati: II   Mouth Opening: Normal  TM Distance: Normal  Tongue: Normal  Neck ROM: Extension Decreased    Dental:  Dentures        Anesthesia Plan  Type of Anesthesia, risks & benefits discussed:    Anesthesia Type: Gen Natural Airway  Intra-op Monitoring Plan: Standard ASA Monitors  Induction:  IV  Informed Consent: Informed consent signed with the Patient and all parties understand the risks and agree with anesthesia plan.  All questions answered. Patient consented to blood products? No  ASA Score: 3    Ready For Surgery From Anesthesia Perspective.     .

## 2023-05-03 NOTE — TRANSFER OF CARE
Anesthesia Transfer of Care Note    Patient: Fan Paz    Procedure(s) Performed: Procedure(s) (LRB):  EGD (ESOPHAGOGASTRODUODENOSCOPY) (N/A)    Patient location: GI    Anesthesia Type: general    Transport from OR: Transported from OR on room air with adequate spontaneous ventilation    Post pain: adequate analgesia    Post assessment: no apparent anesthetic complications    Post vital signs: stable    Level of consciousness: sedated    Nausea/Vomiting: no nausea/vomiting    Complications: none    Transfer of care protocol was followed      Last vitals:   Visit Vitals  /61 (BP Location: Left arm, Patient Position: Lying)   Pulse 62   Temp 36.4 °C (97.5 °F) (Oral)   Resp 15   SpO2 98%

## 2023-05-03 NOTE — ANESTHESIA POSTPROCEDURE EVALUATION
Anesthesia Post Evaluation    Patient: Fan Paz    Procedure(s) Performed: Procedure(s) (LRB):  EGD (ESOPHAGOGASTRODUODENOSCOPY) (N/A)    Final Anesthesia Type: general      Patient location during evaluation: GI PACU  Patient participation: Yes- Able to Participate  Level of consciousness: awake and alert  Post-procedure vital signs: reviewed and stable  Airway patency: patent    PONV status at discharge: No PONV  Anesthetic complications: no      Cardiovascular status: blood pressure returned to baseline and hemodynamically stable  Respiratory status: unassisted, spontaneous ventilation and room air  Hydration status: euvolemic  Follow-up not needed.          Vitals Value Taken Time   /61 05/03/23 1337   Temp 36.4 °C (97.5 °F) 05/03/23 1321   Pulse 62 05/03/23 1337   Resp 18 05/03/23 1337   SpO2 98 % 05/03/23 1337         No case tracking events are documented in the log.      Pain/Rosales Score: Rosales Score: 9 (5/3/2023  1:37 PM)

## 2023-05-03 NOTE — PLAN OF CARE
The Dr spoke with the patient regarding his procedure findings.The patient verbalized understanding the conversation.The patient denies any pain or discomfort and have returned to his pre-procedure baseline. The patient also states he's feeling much better after the balloon dilation.

## 2023-05-03 NOTE — H&P
Short Stay Endoscopy History and Physical    PCP - Otilio Damon MD     Procedure - EGD  ASA - per anesthesia  Mallampati - per anesthesia  History of Anesthesia problems - no  Family history Anesthesia problems -  no   Plan of anesthesia - General    HPI:  This is a 83 y.o. male here for evaluation of : Dysphagia      ROS:  Constitutional: No fevers, chills, No weight loss  CV: No chest pain  Pulm: No cough, No shortness of breath  Ophtho: No vision changes  GI: see HPI  Derm: No rash    Medical History:  has a past medical history of Arthritis, Cancer of palate, HTN (hypertension), Hyperlipidemia, and Prostate cancer.    Surgical History:  has a past surgical history that includes Back surgery (y-7); Total hip arthroplasty (3/2011); Carpal tunnel release (8-14-13); Cancer of palate surgery; Radio active seed Implant; Knee surgery (y-41); Knee surgery (Right, 12-1-15); Colonoscopy (N/A, 4/10/2017); and Colonoscopy (N/A, 10/21/2020).    Family History: family history includes Breast cancer in his sister; Cancer in his brother and sister; Clotting disorder (age of onset: 75) in his sister; Coronary artery disease in his father; Diabetes in his sister; Esophageal cancer in his father; Glaucoma in his cousin; Lung cancer in his sister; Lung cancer (age of onset: 60) in his brother; No Known Problems in his brother, brother, maternal aunt, maternal grandfather, maternal grandmother, maternal uncle, mother, paternal aunt, paternal grandfather, paternal grandmother, paternal uncle, sister, and sister; Stroke in his brother.. Otherwise no colon cancer, inflammatory bowel disease, or GI malignancies.    Social History:  reports that he quit smoking about 25 years ago. His smoking use included cigarettes. He has a 60.00 pack-year smoking history. He has been exposed to tobacco smoke. He has never used smokeless tobacco. He reports that he does not drink alcohol and does not use drugs.    Review of patient's allergies  indicates:  No Known Allergies    Medications:   Medications Prior to Admission   Medication Sig Dispense Refill Last Dose    azelastine (ASTELIN) 137 mcg (0.1 %) nasal spray 1 spray (137 mcg total) by Nasal route 2 (two) times daily. 30 mL 3 5/3/2023    simvastatin (ZOCOR) 40 MG tablet Take 1 tablet (40 mg total) by mouth every evening. 90 tablet 3 5/2/2023    aspirin (ECOTRIN) 81 MG EC tablet Take 1 tablet (81 mg total) by mouth 2 (two) times daily. (Patient not taking: Reported on 4/28/2023) 60 tablet 0     diclofenac (VOLTAREN) 50 MG EC tablet Take 1 tablet (50 mg total) by mouth 2 (two) times daily. (Patient not taking: Reported on 4/28/2023) 60 tablet 0     fluticasone propionate (FLONASE) 50 mcg/actuation nasal spray 1 spray (50 mcg total) by Each Nostril route once daily. (Patient not taking: Reported on 4/28/2023) 16 g 0     fluticasone propionate (FLONASE) 50 mcg/actuation nasal spray 2 sprays (100 mcg total) by Each Nostril route 2 (two) times daily. 18.2 mL 11     FLUZONE HIGH-DOSE 2019-20, PF, 180 mcg/0.5 mL Syrg PHARMACIST ADMINISTERED IMMUNIZATION ADMINISTERED AT TIME OF DISPENSING  0     montelukast (SINGULAIR) 10 mg tablet TAKE 1 TABLET BY MOUTH ONCE DAILY IN THE EVENING 90 tablet 3     ondansetron (ZOFRAN-ODT) 8 MG TbDL Dissolve 1 tablet (8 mg total) by mouth every 6 (six) hours as needed. (Patient not taking: Reported on 4/28/2023) 30 tablet 0     sildenafiL (VIAGRA) 50 MG tablet Take 1 tablet (50 mg total) by mouth daily as needed for Erectile Dysfunction. (Patient not taking: Reported on 4/28/2023) 30 tablet 1     tamsulosin (FLOMAX) 0.4 mg Cap Take 1 capsule (0.4 mg total) by mouth once daily. 90 capsule 1        Physical Exam:    Vital Signs: There were no vitals filed for this visit.    Gen: NAD, lying comfortably  HENT: NCAT, oropharynx clear  Eyes: anicteric sclerae, EOMI grossly  Neck: supple, no visible masses/goiter  Cardiac: RRR  Lungs: non-labored breathing  Abd: soft, NT/ND,  normoactive BS  Ext: no LE edema, warm, well perfused  Skin: skin intact on exposed body parts, no visible rashes, lesions  Neuro: A&Ox4, neuro exam grossly intact, moves all extremities  Psych: appropriate mood, affect      Labs:  Lab Results   Component Value Date    WBC 5.63 02/06/2023    HGB 13.6 (L) 02/06/2023    HCT 44.0 02/06/2023     02/06/2023    CHOL 142 02/06/2023    TRIG 75 02/06/2023    HDL 46 02/06/2023    ALT 18 02/06/2023    AST 24 02/06/2023     02/06/2023    K 4.3 02/06/2023     02/06/2023    CREATININE 1.0 02/06/2023    BUN 16 02/06/2023    CO2 25 02/06/2023    TSH 1.030 12/07/2012    PSA 0.01 05/01/2018    INR 1.0 11/27/2020    HGBA1C 6.2 (H) 02/06/2023       Plan:  EGD for dysphagia.    I have explained the risks and benefits of endoscopy procedures to the patient including but not limited to bleeding, perforation, infection, and death.  The patient was asked if they understand and allowed to ask any further questions to their satisfaction.      Shauna Lopez MD

## 2023-05-03 NOTE — PROVATION PATIENT INSTRUCTIONS
Discharge Summary/Instructions after an Endoscopic Procedure  Patient Name: Fan Paz  Patient MRN: [id number]  Patient YOB: 1940  Wednesday, May 3, 2023  Shauna Lopez MD  Dear patient,  As a result of recent federal legislation (The Federal Cures Act), you may   receive lab or pathology results from your procedure in your [facility]   account before your physician is able to contact you. Your physician or   their representative will relay the results to you with their   recommendations at their soonest availability.  Thank you,  RESTRICTIONS:  During your procedure today, you received medications for sedation.  These   medications may affect your judgment, balance and coordination.  Therefore,   for 24 hours, you have the following restrictions:   - DO NOT drive a car, operate machinery, make legal/financial decisions,   sign important papers or drink alcohol.    ACTIVITY:  Today: no heavy lifting, straining or running due to procedural   sedation/anesthesia.  The following day: return to full activity including work.  DIET:  Eat and drink normally unless instructed otherwise.     TREATMENT FOR COMMON SIDE EFFECTS:  - Mild abdominal pain, nausea, belching, bloating or excessive gas:  rest,   eat lightly and use a heating pad.  - Sore Throat: treat with throat lozenges and/or gargle with warm salt   water.  - Because air was used during the procedure, expelling large amounts of air   from your rectum or belching is normal.  - If a bowel prep was taken, you may not have a bowel movement for 1-3 days.    This is normal.  SYMPTOMS TO WATCH FOR AND REPORT TO YOUR PHYSICIAN:  1. Abdominal pain or bloating, other than gas cramps.  2. Chest pain.  3. Back pain.  4. Signs of infection such as: chills or fever occurring within 24 hours   after the procedure.  5. Rectal bleeding, which would show as bright red, maroon, or black stools.   (A tablespoon of blood from the rectum is not serious, especially  if   hemorrhoids are present.)  6. Vomiting.  7. Weakness or dizziness.  GO DIRECTLY TO THE NEAREST EMERGENCY ROOM IF YOU HAVE ANY OF THE FOLLOWING:      Difficulty breathing              Chills and/or fever over 101 F   Persistent vomiting and/or vomiting blood   Severe abdominal pain   Severe chest pain   Black, tarry stools   Bleeding- more than one tablespoon   Any other symptom or condition that you feel may need urgent attention  Your doctor recommends these additional instructions:  If any biopsies were taken, your doctors clinic will contact you in 1 to 2   weeks with any results.  - Discharge patient to home.   - Resume previous diet.   - Continue present medications.   - Start Protonix 40mg daily for 8 weeks.  - Repeat EGD with dilation as needed for dysphagia symptoms.  For questions, problems or results please call your physician - Shauna Lopez MD at Work:  [phone].  [facility] can be reached at [phone]     IF A COMPLICATION OR EMERGENCY SITUATION ARISES AND YOU ARE UNABLE TO REACH   YOUR PHYSICIAN - GO DIRECTLY TO THE EMERGENCY ROOM.  Shauna Lopez MD  5/3/2023 1:31:59 PM  This report has been verified and signed electronically.  Dear patient,  As a result of recent federal legislation (The Federal Cures Act), you may   receive lab or pathology results from your procedure in your [facility]   account before your physician is able to contact you. Your physician or   their representative will relay the results to you with their   recommendations at their soonest availability.  Thank you,  PROVATION

## 2023-05-03 NOTE — OR NURSING
The Dr spoke with the patient regarding his procedure findings.The patient verbalized understanding the conversation.The patient denies any pain or discomfort and have returned to his pre-procedure baseline. The patient also states he's feeling much better after the balloon dilation.   Yes

## 2023-05-10 ENCOUNTER — HOSPITAL ENCOUNTER (OUTPATIENT)
Dept: PREADMISSION TESTING | Facility: HOSPITAL | Age: 83
Discharge: HOME | End: 2023-05-10
Attending: ORTHOPAEDIC SURGERY | Admitting: ORTHOPAEDIC SURGERY
Payer: MEDICARE

## 2023-05-10 ENCOUNTER — OFFICE VISIT (OUTPATIENT)
Dept: INTERNAL MEDICINE | Facility: CLINIC | Age: 83
End: 2023-05-10
Payer: MEDICARE

## 2023-05-10 VITALS
BODY MASS INDEX: 29.66 KG/M2 | WEIGHT: 178 LBS | DIASTOLIC BLOOD PRESSURE: 73 MMHG | HEART RATE: 75 BPM | RESPIRATION RATE: 16 BRPM | TEMPERATURE: 98 F | HEIGHT: 65 IN | SYSTOLIC BLOOD PRESSURE: 123 MMHG | OXYGEN SATURATION: 95 %

## 2023-05-10 DIAGNOSIS — Z86.711 HX PULMONARY EMBOLISM: ICD-10-CM

## 2023-05-10 DIAGNOSIS — Z01.818 PRE-OP TESTING: Primary | ICD-10-CM

## 2023-05-10 DIAGNOSIS — J30.1 SEASONAL ALLERGIC RHINITIS DUE TO POLLEN: ICD-10-CM

## 2023-05-10 DIAGNOSIS — M79.609 PAIN IN EXTREMITY, UNSPECIFIED EXTREMITY: ICD-10-CM

## 2023-05-10 DIAGNOSIS — C61 PROSTATE CANCER: ICD-10-CM

## 2023-05-10 DIAGNOSIS — D64.9 ANEMIA, UNSPECIFIED TYPE: ICD-10-CM

## 2023-05-10 DIAGNOSIS — I44.1 MOBITZ TYPE 1 SECOND DEGREE AV BLOCK: ICD-10-CM

## 2023-05-10 DIAGNOSIS — K59.00 CONSTIPATION, UNSPECIFIED CONSTIPATION TYPE: ICD-10-CM

## 2023-05-10 DIAGNOSIS — I10 ESSENTIAL HYPERTENSION: ICD-10-CM

## 2023-05-10 DIAGNOSIS — K21.9 GASTROESOPHAGEAL REFLUX DISEASE, UNSPECIFIED WHETHER ESOPHAGITIS PRESENT: ICD-10-CM

## 2023-05-10 DIAGNOSIS — Z85.46 HISTORY OF PROSTATE CANCER: ICD-10-CM

## 2023-05-10 DIAGNOSIS — E78.5 HYPERLIPIDEMIA, UNSPECIFIED HYPERLIPIDEMIA TYPE: ICD-10-CM

## 2023-05-10 DIAGNOSIS — Z01.818 PREOP EXAMINATION: Primary | ICD-10-CM

## 2023-05-10 DIAGNOSIS — R73.03 PREDIABETES: ICD-10-CM

## 2023-05-10 DIAGNOSIS — Z85.89 HISTORY OF HEAD AND NECK CANCER: ICD-10-CM

## 2023-05-10 DIAGNOSIS — I67.2 CEREBRAL ATHEROSCLEROSIS: ICD-10-CM

## 2023-05-10 DIAGNOSIS — Z91.89 AT RISK FOR ASPIRATION: ICD-10-CM

## 2023-05-10 PROCEDURE — 99215 PR OFFICE/OUTPT VISIT, EST, LEVL V, 40-54 MIN: ICD-10-PCS | Mod: S$PBB,,, | Performed by: HOSPITALIST

## 2023-05-10 PROCEDURE — 99214 OFFICE O/P EST MOD 30 MIN: CPT | Mod: PBBFAC | Performed by: HOSPITALIST

## 2023-05-10 PROCEDURE — 99999 PR PBB SHADOW E&M-EST. PATIENT-LVL IV: ICD-10-PCS | Mod: PBBFAC,,, | Performed by: HOSPITALIST

## 2023-05-10 PROCEDURE — 99999 PR PBB SHADOW E&M-EST. PATIENT-LVL IV: CPT | Mod: PBBFAC,,, | Performed by: HOSPITALIST

## 2023-05-10 PROCEDURE — 99215 OFFICE O/P EST HI 40 MIN: CPT | Mod: S$PBB,,, | Performed by: HOSPITALIST

## 2023-05-10 RX ORDER — TAMSULOSIN HYDROCHLORIDE 0.4 MG/1
CAPSULE ORAL
Qty: 90 CAPSULE | Refills: 0 | Status: SHIPPED | OUTPATIENT
Start: 2023-05-10 | End: 2023-08-30

## 2023-05-10 NOTE — PROGRESS NOTES
[facility]  Progress Note    Patient Name: Fan Paz  MRN: [id number]  Date of Evaluation- 05/10/2023  PCP- Otilio Damon MD    Future cases for Fan Paz [[id number]]       Case ID Status Date Time Andrea Procedure Provider Location    [id number] [facility] 6/1/2023  9:55  REVISION, ARTHROPLASTY, KNEE: LEFT; WALI/RAHUL Paul MD [[id number]] NOM OR 2ND FLR            HPI:  History of present illness- I had the pleasure of meeting this pleasant 83 y.o. lady in the pre op clinic prior to his elective Orthopedic surgery. The patient is known to me .     I have obtained the history by speaking to the patient and by reviewing the electronic health records.    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  He was discharged on Ceftriaxone x 6 weeks from day of surgery  He has not been have any problem with infection but he has pain in the left knee     He has been troubled with severe  knee  pain for a few years  .   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  Does fishing    He lives with his wife in a single-level house he has 2 daughters and a son  He has  "help available after surgery             Subjective/ Objective:     Chief complaint-Preoperative evaluation, Perioperative Medical management, complication reduction plan     Active cardiac conditions- none    Revised cardiac risk index predictors- none    Functional capacity -Examples of physical activity, active, grocery, clean up at restaurant,  can take 1 flight of stairs and cutting the grass with a mower----- He can undertake all the above activities without  chest pain,chest tightness, Shortness of breath ,dizziness,lightheadedness making his exercise tolerance more,   than 4 Mets.       Review of Systems   Constitutional:  Negative for chills and fever.        No unusual weight changes     HENT:          STOPBANG score  3/ 8      Age over 50 years  Neck size over 40 CM  Male gender   Eyes:         No unusual vision changes   Respiratory:          Nophlegm  Occasional   Dry cough   No Hemoptysis   Cardiovascular:         As noted   Gastrointestinal:         Bowels- Regular No overt GI/ blood losses   Endocrine:        Prednisone use > 20 mg daily for 3 weeks- none   Genitourinary:  Negative for dysuria.        No urinary hesitancy    Musculoskeletal:         As above      Skin:  Negative for rash.   Neurological:  Negative for syncope.        No unilateral weakness   Hematological:         Current use of Anticoagulants  None   Psychiatric/Behavioral:          No Depression,Anxiety     No vascular stenting          No anesthesia, bleeding, cardiac problems , PONVwith previous surgeries/procedures.  Medications and Allergies reviewed in epic.     FH- No anesthesia,bleeding / venous thrombosis ,  in family      Physical Exam  Blood pressure 123/73, pulse 75, temperature 97.8 °F (36.6 °C), temperature source Oral, resp. rate 16, height 5' 5" (1.651 m), weight 80.7 kg (178 lb), SpO2 95 %.    Physical Exam  Constitutional- Vitals - Body mass index is 29.62 kg/m².,   Vitals:    05/10/23 0924   BP: 123/73   Pulse: " 75   Resp: 16   Temp: 97.8 °F (36.6 °C)     General appearance-Conscious,Coherent  Eyes- No conjunctival icterus,  ENT-Oral cavity- moist ,  upper denture, and lower denture    , Hearing grossly normal   Neck- No thyromegaly ,Trachea -central, No jugular venous distension,   No Carotid Bruit   Cardiovascular -Heart Sounds- Normal ,  no murmur , and  occasional irregularity suggestive of ectopy   , No gallop rhythm   Respiratory - Normal Respiratory Effort, Normal breath sounds, crepitations bases,  no wheeze , and  no forced expiratory wheeze    Peripheral pitting pedal edema-- none , no calf pain   Gastrointestinal -Soft abdomen, No palpable masses, Non Tender,Liver,Spleen not palpable. No-- free fluid and shifting dullness  Musculoskeletal- No finger Clubbing. Strength grossly normal   Lymphatic-No Palpable cervical, axillary,Inguinal lymphadenopathy   Psychiatric - normal effect,Orientation  Rt Dorsalis pedis pulses-palpable    Lt Dorsalis pedis pulses- palpable   Rt Posterior tibial pulses -palpable   Left posterior tibial pulses -palpable   Miscellaneous -  Surgical scarknees  and  no renal bruit     Investigations  Lab and Imaging have been reviewed in [facility].    Review of Medicine tests    EKG- I had independently reviewed the EKG from--3/23/2023  It was reported to be showing     Normal sinus rhythm AV block, 2° - Mobitz type I (Wenckebach)   Voltage criteria for left ventricular hypertrophy   Anterior infarct (cited on or before 20-SEP-2022)   Abnormal ECG     Feb 2022    The left ventricle is normal in size with normal systolic function.  The estimated ejection fraction is 60%.  Moderate left atrial enlargement.  Indeterminate left ventricular diastolic function.  Normal right ventricular size with normal right ventricular systolic function.  Normal central venous pressure (3 mmHg).  The estimated PA systolic pressure is 6 mmHg.  There is no pulmonary hypertension.       Review of clinical lab tests:  Lab  Results   Component Value Date    CREATININE 1.0 02/06/2023    HGB 13.6 (L) 02/06/2023     02/06/2023         Review of old records- Was done and information gathered regards to events leading to surgery and health conditions of significance in the perioperative period.        Preoperative cardiac risk assessment-  The patient does not have any active cardiac conditions . Revised cardiac risk index predictors- 0---.Functional capacity is more than 4 Mets. He will be undergoing a Orthopedic procedure that carries a Moderate Risk risk     Risk of a major Cardiac event ( Defined as death, myocardial infarction, or cardiac arrest at 30 days after noncardiac surgery), based on RCRI score     -3.9%       No further cardiac work up is indicated prior to proceeding with the surgery     Orders Placed This Encounter    US Carotid Bilateral       American Society of Anesthesiologists Physical status classification ( ASA ) class: 2     Postoperative pulmonary complication risk assessment:         Wilberto Respiratory failure index- percentage risk of respiratory failure: 0.5 %    Assessment/Plan:     Seasonal allergic rhinitis due to pollen  He has allergies throughout the year but more so in springtime  He is currently using Flonase and is a lasting nasal sprays and is doing good with allergies    At risk for aspiration  He had radiation treatment to his throat many years ago for throat cancer.  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 Dilated to                          12mm. The adult gastroscope was able to traverse                          the stenosis after dilation to 12mm.                          - 1 cm hiatal hernia.                          - Normal stomach.                           - Normal examined duodenum.                          - No specimens collected    on regular consistency diet and on thin liquids        Dysphagia- I suggest providing soft diet or  other wide directed  in view of the preexisting dysphagia as medication used in the perioperative period can possibly increase the dysphagia. I suggest aspiration precautions       Acid reflux  Doing good   Does not sound Cardiac   Tips to control reflux discussed     I suggest aspiration precautions    Mobitz type 1 second degree AV block  Under cardiology care   No passing out problem   As per chart- 24 hour Holter showed no significant arrhythmias  He is not on any rate limiting agents   I suggest monitoring his cardiac rhythm around the time of surgery      Hyperlipidemia  HLD-I  suggest continuation of statin during the entire perioperative period.    Essential hypertension  He is currently not taking blood pressure medication    Home BP readings -120-130/ 80 or less  Recent BP readings in the record-  Vitals - 1 value per visit 5/10/2023   SYSTOLIC 123   DIASTOLIC 73   He watches his salt intake  Hypertension-  Blood pressure is acceptable . I suggest  blood pressure  monitoring .I suggest addressing pain control as uncontrolled pain can increased blood pressure     Hx pulmonary embolism-6/20/09- bilateral lower lobe pulmonary emboli  In the setting of  back surgery he had pulmonary embolism in 2009  He had a blood clot soon after the back surgery  He cannot remember the Symptoms but was still in the hospital and his daughter noticed that he was not doing well    1 Episode  Associated with reduced mobility, no long journeys, no cancer, had prior surgery, Hospital stay, no HRT  Anticoagulated with Coumadin for about 6 months     IVC filter - None    Increased risk of thrombosis in the mamta operative period , compression stocking use discussed      History of prostate cancer  He had prostate cancer long time ago and had  radiation  No recurrence of the cancer  He is urinating well  He is taking Flomax every night that helps with urination    Anemia  Mild  Not new   No overt visible blood losses  No also problem and not taking any anti-inflammatory medication    Upper scope May 3 rd 2023        1 cm hiatal hernia.                          - Normal stomach.                          - Normal examined duodenum.                 Prediabetes  A1c 6.2 - Feb 2023    Prediabetes- I suggest monitoring the glucose in the perioperative period as  glucose may be high from stress hyperglycemia in which case Insulin may be required    Hemoglobin A1c in the pre diabetic range   Weight loss with diet and exercise , if able helps lower A1c  Increased risk of becoming a diabetic   Needs follow up       BMI 29.0-29.9,adult  He maintains his weight  He states active he still works    Weight related conditions     Known to have       Pre diabetes   Hyperlipidemia   Acid reflux    Osteoarthritis    Not troubled with / Not known to have       Type 2 Diabetes   Sleep apnea   Fatty liver       Encouraged weight loss    History of head and neck cancer  He had head and neck cancer in 80s that was treated with radiation treatment   At the time he was a tobacco smoker but since then he stop smoking  He does not have any lung problem   He had 36 treatments of radiation to the neck  He is not known to have any carotid artery disease and had no stroke or mini-stroke    Given the increased risk of atherosclerosis after neck radiation I have requested a carotid duplex  He wants to have that done at the [facility] coming Monday    Constipation  Suggested working on bowel movements in preparation for surgery   Constipation- I suggest giving laxative regimen as opioid use,reduced ambulation  can increase the constipation         Preventive perioperative care    Thromboembolic prophylaxis:  His risk factors for thrombosis include surgical procedure, previous  history of thrombosis, and age.I suggest  thromboembolic prophylaxis ( mechanical/pharmacological, weighing the risk benefits of pharmacological agent use considering mamta procedural bleeding )  during the perioperative period.I suggested being active in the post operative period.   The patient is a candidate for extended DVT prophylaxis      Postoperative pulmonary complication prophylaxis-Risk factors for post operative pulmonary complications include age over 65 years- I suggest incentive spirometry use, early ambulation, and end tidal carbon dioxide monitoring  , oral care , head end of bed elevation      Renal complication prophylaxis- . I suggest keeping him well hydrated  in the perioperative period.     Surgical site Infection Prophylaxis-I  suggest appropriate antibiotic for Prophylaxis against Surgical site infections  No reported Staph infection  Skin antibacterial discussed       Delirium prophylaxis-Risk factors -  - I suggest avoidance / minimizing the use of  Benzodiazepines ( unless the patient has been taking it on a regular basis ),Anticholinergic medication,Antihistamines ( like  Benadryl).I suggest minimizing the use of opioid medication and use of IV tylenol,if it is appropriate. I suggest using the lowest possible dose of opioids for the shortest duration possible in the perioperative period. I suggest to Keep shades/blinds open during the day, lights off and shades closed at night to encourage normal sleep/wake cycle.I encourage the presence of the family member with the patient at all times, if at all possible as mental status changes can be picked up early by the family members and they help with reorientation. I encouraged the presence of family to help with orientation in the perioperative period. Benadryl avoidance suggested      In view of age the patient  is at risk of postoperative urinary retention.  I suggest avoidance / minimizing the of  Benzodiazepines,Anticholinergic  medication,antihistamines ( Benadryl) , if possible in the perioperative period. I suggest using the minimum possible use of opioids for the minimum period of time in the perioperative period. Benadryl avoidance suggested      This visit was focused on Preoperative evaluation, Perioperative Medical management, complication reduction plans. I suggest that the patient follows up with primary care or relevant sub specialists for ongoing health care.    I appreciate the opportunity to be involved in this patients care. Please feel free to contact me if there were any questions about this consultation.    Patient is optimized    Patient/ care giver/ Family member was instructed to call and update me about any changes to health,  medication, office visits ,testing out side of the mamta operative care center , hospitalizations between now and surgery      Mandy Saenz MD  Internal Medicine  [facility]   Cell Phone- [phone]    History of COVID - no   COVID vaccination status -4    COVID screening     No fever   No cough   No SOB  No sore throat   No loss of taste or smell   No muscle aches   No nausea, vomiting , diarrhea     Checked for OTC medication     I have spent --85---- minutes of time which includes, time spent to prepare to see the patient , obtaining history ,performing examination, counseling/Educating the patient , Documenting clinical information in the record      -  5/11/2023- 1900  Lab testing from 5/10 showed normal INR  Comprehensive metabolic panel and CBC are acceptable for surgery  He is scheduled for carotid ultrasound scan on May 22nd  --  5/24/2023- 8 36     Followed up on the carotid ultrasound scan    Carotid ultrasound scan was done suspecting atherosclerosis due to the history of a previous neck radiation for head and neck cancer  The ultrasound scan reportedly showed- No evidence of a hemodynamically significant carotid bifurcation stenosis.  He is on a statin      Called to follow up , spoke to wife Doing well ,No changes to Medication, Health      --  5/30/2023- 1729    Called to follow up , spoke to wife , him to address any concerns with the up coming surgery or any questions on Medication instructions -  Doing well   Allergy problem-not new -getting better   Breathing good   No phlegm

## 2023-05-10 NOTE — ASSESSMENT & PLAN NOTE
He maintains his weight  He states active he still works    Weight related conditions     Known to have       Pre diabetes   Hyperlipidemia   Acid reflux    Osteoarthritis    Not troubled with / Not known to have       Type 2 Diabetes   Sleep apnea   Fatty liver       Encouraged weight loss

## 2023-05-10 NOTE — ASSESSMENT & PLAN NOTE
He has allergies throughout the year but more so in springtime  He is currently using Flonase and is a lasting nasal sprays and is doing good with allergies

## 2023-05-10 NOTE — ASSESSMENT & PLAN NOTE
He had prostate cancer long time ago and had radiation  No recurrence of the cancer  He is urinating well  He is taking Flomax every night that helps with urination

## 2023-05-10 NOTE — ASSESSMENT & PLAN NOTE
Suggested working on bowel movements in preparation for surgery   Constipation- I suggest giving laxative regimen as opioid use,reduced ambulation  can increase the constipation

## 2023-05-10 NOTE — ASSESSMENT & PLAN NOTE
He had head and neck cancer in 80s that was treated with radiation treatment   At the time he was a tobacco smoker but since then he stop smoking  He does not have any lung problem   He had 36 treatments of radiation to the neck  He is not known to have any carotid artery disease and had no stroke or mini-stroke    Given the increased risk of atherosclerosis after neck radiation I have requested a carotid duplex  He wants to have that done at the [facility] coming Monday

## 2023-05-10 NOTE — DISCHARGE INSTRUCTIONS
Your surgery has been scheduled for:_______6/1/23___________________________________    You should report to:  ____[facility], located on the [place] side of the first floor of the           [facility] ([phone])  __X__[facility], [facility] the            Second floor of the [facility] ([phone])  ____3rd Floor [facility] located on the [facility] side of the [facility] [phone]  ____[facility]: located at [address] [place] [address]. [address]     Please Note   Tell your doctor if you take Aspirin, products containing Aspirin, herbal medications  or blood thinners, such as Coumadin, Ticlid, or Plavix.  (Consult your provider regarding holding or stopping before surgery).  Arrange for someone to drive you home following surgery.  You will not be allowed to leave the surgical facility alone or drive yourself home following sedation and anesthesia.    Before Surgery  Stop taking all herbal medications, vitamins, and supplements 7 days prior to surgery  No Motrin/Advil (Ibuprofen) 7 days before surgery  No Aleve (Naproxen) 7 days before surgery  Stop Taking Asprin, products containing Asprin _7____days before surgery  Stop taking blood thinners_______days before surgery  No Goody's/BC  Powder 7 days before surgery  Refrain from drinking alcoholic beverages for 24hours before and after surgery  Stop or limit smoking ______7___days before surgery  You may take Tylenol for pain    Night before Surgery  Do not eat or drink after midnight  Take a shower or bath (shower is recommended).  Bathe with Hibiclens soap or an antibacterial soap from the neck down.  If not supplied by your surgeon, hibiclens soap will need to be purchased over the counter in pharmacy.  Rinse soap off thoroughly.  Shampoo your hair with your regular shampoo    The Day of  Surgery  Take another bath or shower with hibiclens or any antibacterial soap, to reduce the chance of infection.  Take heart and blood pressure medications with a small sip of water, as advised by the perioperative team.  Do not take fluid pills  You may brush your teeth and rinse your mouth, but do not swall any additional water.   Do not apply perfumes, powder, body lotions or deodorant on the day of surgery.  Nail polish should be removed.  Do not wear makeup or moisturizer  Wear comfortable clothes, such as a button front shirt and loose fitting pants.  Leave all jewelry, including body piercings, and valuables at home.    Bring any devices you will neeed after surgery such as crutches or canes.  If you have sleep apnea, please bring your CPAP machine  In the event that your physical condition changes including the onset of a cold or respiratory illness, or if you have to delay or cancel your surgery, please notify your surgeon.     Anesthesia: Regional Anesthesia    Youre scheduled for surgery. During surgery, youll receive medicine called anesthesia to keep you comfortable and pain-free. Your surgeon has decided that youll receive regional anesthesia. This sheet tells you what to expect with this type of anesthesia.  What is regional anesthesia?  Regional anesthesia numbs one region of your body. The anesthesia may be given around nerves or into veins in your arms, neck, or legs (nerve block or Roxana block). Or it may be sent into the spinal fluid (spinal anesthesia) or into the space just outside the spinal fluid (epidural anesthesia). You may also be given sedatives to help you relax.  Nerve block or Roxana block  A small area of the body, such as an arm or leg, can be numbed using a nerve block or Roxana block.  Nerve block. During a nerve block, your skin is numbed. A needle is then inserted near nerves that serve the area to be numbed. Anesthetic is sent through the needle.  IV regional or Roxana block. For  this type of block, an IV line is put into a vein. The blood flow to the area to be numbed is blocked for a short time. Anesthetic is sent through the IV.  Spinal anesthesia  Spinal anesthesia numbs your body from about the waist down.  Anesthetic is injected into the spinal fluid. This is a substance that surrounds the spinal cord in your spinal column. The anesthetic blocks pain traveling from the body to the brain.  To receive the anesthetic, your skin is numbed at the injection site on your back.  A needle is then inserted into the spinal space. Anesthetic is sent into the spinal fluid through the needle.  Epidural anesthesia  Epidural anesthesia is most commonly used during childbirth and may also be used after surgical procedures of the chest, belly, and legs.  Anesthetic is injected into the epidural space. This is just outside the dural sac which contains the spinal fluid.  To receive the anesthetic, your skin is numbed at the injection site on your back.  A needle is then inserted into the epidural space. Anesthetic is sent into the epidural space through the needle.  A small flexible catheter may be attached to the needle and left in place. This allows for continuous injections or infusions of anesthetic.  Anesthesia tools and medicines that might be near you during your procedure  Local anesthetic. This medicine is given through a needle numbs one region of your body.  Electrocardiography leads (electrodes). These are used to record your heart rate and rhythm.  Blood pressure cuff. A cuff is placed on your arm to keep track of your blood pressure.  Pulse oximeter. This small clip is placed on the end of the finger. It measures your blood oxygen level.  Sedatives. These medicines may be given through an IV. They help to relax you and keep you comfortable. You may stay awake or sleep lightly.  Oxygen. You may be given oxygen through a facemask.  Risks and possible complications  Regional anesthesia carries  some risks. These include:  Nausea and vomiting  Headache  Backache  Decreased blood pressure  Allergic reaction to the anesthetic  Ongoing numbness (rare)  Irregular heartbeat (rare)  Cardiac arrest (rare)   Date Last Reviewed: 12/1/2016  © 6612-6759 [employer]. [address] [place] [id number]. All rights reserved. This information is not intended as a substitute for professional medical care. Always follow your healthcare professional's instructions.

## 2023-05-10 NOTE — OUTPATIENT SUBJECTIVE & OBJECTIVE
"Outpatient Subjective & Objective     Chief complaint-Preoperative evaluation, Perioperative Medical management, complication reduction plan     Active cardiac conditions- none    Revised cardiac risk index predictors- none    Functional capacity -Examples of physical activity, active, grocery, clean up at restaurant,  can take 1 flight of stairs and cutting the grass with a mower----- He can undertake all the above activities without  chest pain,chest tightness, Shortness of breath ,dizziness,lightheadedness making his exercise tolerance more,   than 4 Mets.       Review of Systems   Constitutional:  Negative for chills and fever.        No unusual weight changes     HENT:          STOPBANG score  3/ 8      Age over 50 years  Neck size over 40 CM  Male gender   Eyes:         No unusual vision changes   Respiratory:          Nophlegm  Occasional   Dry cough   No Hemoptysis   Cardiovascular:         As noted   Gastrointestinal:         Bowels- Regular No overt GI/ blood losses   Endocrine:        Prednisone use > 20 mg daily for 3 weeks- none   Genitourinary:  Negative for dysuria.        No urinary hesitancy    Musculoskeletal:         As above      Skin:  Negative for rash.   Neurological:  Negative for syncope.        No unilateral weakness   Hematological:         Current use of Anticoagulants  None   Psychiatric/Behavioral:          No Depression,Anxiety     No vascular stenting          No anesthesia, bleeding, cardiac problems , PONVwith previous surgeries/procedures.  Medications and Allergies reviewed in epic.     FH- No anesthesia,bleeding / venous thrombosis ,  in family      Physical Exam  Blood pressure 123/73, pulse 75, temperature 97.8 °F (36.6 °C), temperature source Oral, resp. rate 16, height 5' 5" (1.651 m), weight 80.7 kg (178 lb), SpO2 95 %.    Physical Exam  Constitutional- Vitals - Body mass index is 29.62 kg/m².,   Vitals:    05/10/23 0924   BP: 123/73   Pulse: 75   Resp: 16   Temp: 97.8 °F " (36.6 °C)     General appearance-Conscious,Coherent  Eyes- No conjunctival icterus,  ENT-Oral cavity- moist ,  upper denture, and lower denture    , Hearing grossly normal   Neck- No thyromegaly ,Trachea -central, No jugular venous distension,   No Carotid Bruit   Cardiovascular -Heart Sounds- Normal ,  no murmur , and  occasional irregularity suggestive of ectopy   , No gallop rhythm   Respiratory - Normal Respiratory Effort, Normal breath sounds, crepitations bases,  no wheeze , and  no forced expiratory wheeze    Peripheral pitting pedal edema-- none , no calf pain   Gastrointestinal -Soft abdomen, No palpable masses, Non Tender,Liver,Spleen not palpable. No-- free fluid and shifting dullness  Musculoskeletal- No finger Clubbing. Strength grossly normal   Lymphatic-No Palpable cervical, axillary,Inguinal lymphadenopathy   Psychiatric - normal effect,Orientation  Rt Dorsalis pedis pulses-palpable    Lt Dorsalis pedis pulses- palpable   Rt Posterior tibial pulses -palpable   Left posterior tibial pulses -palpable   Miscellaneous -  Surgical scarknees  and  no renal bruit     Investigations  Lab and Imaging have been reviewed in epic.    Review of Medicine tests    EKG- I had independently reviewed the EKG from--3/23/2023  It was reported to be showing     Normal sinus rhythm AV block, 2° - Mobitz type I (Wenckebach)   Voltage criteria for left ventricular hypertrophy   Anterior infarct (cited on or before 20-SEP-2022)   Abnormal ECG     Feb 2022    The left ventricle is normal in size with normal systolic function.  The estimated ejection fraction is 60%.  Moderate left atrial enlargement.  Indeterminate left ventricular diastolic function.  Normal right ventricular size with normal right ventricular systolic function.  Normal central venous pressure (3 mmHg).  The estimated PA systolic pressure is 6 mmHg.  There is no pulmonary hypertension.       Review of clinical lab tests:  Lab Results   Component Value Date     CREATININE 1.0 02/06/2023    HGB 13.6 (L) 02/06/2023     02/06/2023         Review of old records- Was done and information gathered regards to events leading to surgery and health conditions of significance in the perioperative period.    Outpatient Subjective & Objective

## 2023-05-10 NOTE — ASSESSMENT & PLAN NOTE
In the setting of  back surgery he had pulmonary embolism in 2009  He had a blood clot soon after the back surgery  He cannot remember the Symptoms but was still in the hospital and his daughter noticed that he was not doing well    1 Episode  Associated with reduced mobility, no long journeys, no cancer, had prior surgery, Hospital stay, no HRT  Anticoagulated with Coumadin for about 6 months     IVC filter - None    Increased risk of thrombosis in the mamta operative period , compression stocking use discussed

## 2023-05-10 NOTE — HPI
History of present illness- I had the pleasure of meeting this pleasant 83 y.o. lady in the pre op clinic prior to his elective Orthopedic surgery. The patient is known to me .     I have obtained the history by speaking to the patient and by reviewing the electronic health records.    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  He was discharged on Ceftriaxone x 6 weeks from day of surgery  He has not been have any problem with infection but he has pain in the left knee     He has been troubled with severe  knee  pain for a few years  .   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

## 2023-05-10 NOTE — ASSESSMENT & PLAN NOTE
He is currently not taking blood pressure medication    Home BP readings -120-130/ 80 or less  Recent BP readings in the record-  Vitals - 1 value per visit 5/10/2023   SYSTOLIC 123   DIASTOLIC 73   He watches his salt intake  Hypertension-  Blood pressure is acceptable . I suggest  blood pressure  monitoring .I suggest addressing pain control as uncontrolled pain can increased blood pressure

## 2023-05-10 NOTE — ASSESSMENT & PLAN NOTE
He had radiation treatment to his throat many years ago for throat cancer.  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 Dilated to                          12mm. The adult gastroscope was able to traverse                          the stenosis after dilation to 12mm.                          - 1 cm hiatal hernia.                          - Normal stomach.                          - Normal examined duodenum.                          - No specimens collected    on regular consistency diet and on thin liquids        Dysphagia- I suggest providing soft diet or  other wide directed  in view of the preexisting dysphagia as medication used in the perioperative period can possibly increase the dysphagia. I suggest aspiration precautions

## 2023-05-10 NOTE — ASSESSMENT & PLAN NOTE
Mild  Not new   No overt visible blood losses  No also problem and not taking any anti-inflammatory medication    Upper scope May 3 rd 2023        1 cm hiatal hernia.                          - Normal stomach.                          - Normal examined duodenum.

## 2023-05-10 NOTE — ASSESSMENT & PLAN NOTE
A1c 6.2 - Feb 2023    Prediabetes- I suggest monitoring the glucose in the perioperative period as  glucose may be high from stress hyperglycemia in which case Insulin may be required    Hemoglobin A1c in the pre diabetic range   Weight loss with diet and exercise , if able helps lower A1c  Increased risk of becoming a diabetic   Needs follow up

## 2023-05-10 NOTE — TELEPHONE ENCOUNTER
No care due was identified.  [facility] Embedded Care Due Messages. Reference number: [id number].   5/10/2023 2:46:15 PM CDT

## 2023-05-10 NOTE — TELEPHONE ENCOUNTER
Refill Routing Note   Medication(s) are not appropriate for processing by [facility] for the following reason(s):      Medication outside of protocol:  Prostate cancer on problem list    [facility] action(s):  Route Care Due:  None identified          Appointments  past 12m or future 3m with PCP    Date Provider   Last Visit   2/6/2023 Otilio Damon MD   Next Visit   8/7/2023 Otilio Damon MD   ED visits in past 90 days: 0        Note composed:4:02 PM 05/10/2023

## 2023-05-10 NOTE — ASSESSMENT & PLAN NOTE
Under cardiology care   No passing out problem   As per chart- 24 hour Holter showed no significant arrhythmias  He is not on any rate limiting agents   I suggest monitoring his cardiac rhythm around the time of surgery

## 2023-05-10 NOTE — ANESTHESIA PAT ROS NOTE
05/10/2023  Fan Paz is a 83 y.o., male.  This is a 83 y.o. 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      Pre-op Assessment     I have reviewed the Nursing Notes. I have reviewed the NPO Status.   I have reviewed the Medications.     Review of Systems  Anesthesia Hx:   History of prior surgery of interest to airway management or planning: jaw, facial plastic/reconstructive. Previous anesthesia: MAC       5/3/23 EGD with MAC.  Procedure performed at an [facility].  Denies Family Hx of Anesthesia complications.    Denies Personal Hx of Anesthesia complications.                    Social:  Former Smoker, No Alcohol Use Former; Passive Exposure:   Past (Quit Date: 07/10/1997), 3 ppd, 60 pack-years      Hematology/Oncology:       -- Anemia:                    --  Cancer in past history:              radiation and surgery   Oncology Comments: Cancer of palate surgery [Other]   y-41    Radio active seed Implant [Other    Prostate cancer    Extensive family hx of cancer     EENT/Dental:  chronic allergic rhinitis          Jaw Problems:  Cancer of palate surgery; Radio active seed Implant    Cardiovascular:  Exercise tolerance: good   Hypertension, well controlled    Dysrhythmias   Denies Angina.     hyperlipidemia      Asymptomatic varicose veins of bilateral lower extremities    Mobitz type 1 second degree AV block        Deep Venous Thrombosis (DVT), Hx of DVT, left lower extremity, right lower extremity, Varicose Veins                  Pulmonary:       Denies Shortness of breath.   Hx pulmonary embolism-6/20/09- bilateral lower lobe pulmonary emboli    Long-term (current) use of anticoagulants               Renal/:     Prostate cancer             Hepatic/GI:   Denies PUD.  GERD, well controlled Denies Liver  Disease.  Denies Hepatitis. 5/3/23 DYSPHAGIA          Musculoskeletal:  Arthritis   FAILURE OF TOTAL KNEE REPLACEMENT, SEQUELA    STATUS POST TOTAL LEFT KNEE REPLACEMENT    12-1-15  Knee surgery (Right)     8-14-13  Carpal tunnel release     3/2011  Total hip arthroplasty    y-7  Back surgery Musculoskeletal General/Symptoms: joint pain, low back pain, muscle weakness, localized.    Joint Disease:  Arthritis, Osteoarthritis Arthritis of hand  Osteoarthritis, knee       Lumbar Spine Disorders, S/P Lumbar Fusion   Neurological:    Neuromuscular Disease,           Osteoarthritis                           Endocrine:  Denies Diabetes. Denies Hypothyroidism.  Denies Hyperthyroidism. PRE-DIABETES        Psych:  Psychiatric Normal                  Physical Exam  General: Well nourished, Alert and Oriented    Airway:  Mouth Opening: Small, but > 3cm  Tongue: Decreased Mobility  Neck ROM: Left Lateral Motion Decreased, Right Lateral Motion Decreased, Extension Decreased  Oropharynx: Hoarseness  Esophageal stretching  Dental:  Dentures  Full dentures  Chest/Lungs:  Clear to auscultation, Normal Respiratory Rate    Heart:  Rate: Normal  Rhythm: Regular Rhythm  Sounds: Normal      MEDICAL CLEARANCE     Patient is optimized     Patient/ care giver/ Family member was instructed to call and update me about any changes to health,  medication, office visits ,testing out side of the mamta operative care center , hospitalizations between now and surgery       Mandy Saenz MD  Internal Medicine  [facility]   Cell Phone- [phone]

## 2023-05-22 ENCOUNTER — HOSPITAL ENCOUNTER (OUTPATIENT)
Dept: RADIOLOGY | Facility: HOSPITAL | Age: 83
Discharge: HOME | End: 2023-05-22
Attending: HOSPITALIST
Payer: MEDICARE

## 2023-05-22 DIAGNOSIS — Z85.89 HISTORY OF HEAD AND NECK CANCER: ICD-10-CM

## 2023-05-22 DIAGNOSIS — I67.2 CEREBRAL ATHEROSCLEROSIS: ICD-10-CM

## 2023-05-22 PROCEDURE — 93880 EXTRACRANIAL BILAT STUDY: CPT | Mod: TC

## 2023-05-22 PROCEDURE — 93880 US CAROTID BILATERAL: ICD-10-PCS | Mod: 26,,, | Performed by: RADIOLOGY

## 2023-05-22 PROCEDURE — 93880 EXTRACRANIAL BILAT STUDY: CPT | Mod: 26,,, | Performed by: RADIOLOGY

## 2023-05-24 ENCOUNTER — PATIENT MESSAGE (OUTPATIENT)
Dept: ADMINISTRATIVE | Facility: OTHER | Age: 83
End: 2023-05-24
Payer: MEDICARE

## 2023-05-24 ENCOUNTER — OFFICE VISIT (OUTPATIENT)
Dept: ORTHOPEDICS | Facility: CLINIC | Age: 83
End: 2023-05-24
Payer: MEDICARE

## 2023-05-24 ENCOUNTER — HOSPITAL ENCOUNTER (OUTPATIENT)
Dept: RADIOLOGY | Facility: HOSPITAL | Age: 83
Discharge: HOME | End: 2023-05-24
Attending: PHYSICIAN ASSISTANT
Payer: MEDICARE

## 2023-05-24 VITALS — BODY MASS INDEX: 29.38 KG/M2 | HEIGHT: 65 IN | WEIGHT: 176.38 LBS

## 2023-05-24 DIAGNOSIS — Z96.652 STATUS POST TOTAL LEFT KNEE REPLACEMENT: Primary | ICD-10-CM

## 2023-05-24 DIAGNOSIS — Z96.652 STATUS POST REVISION OF TOTAL KNEE REPLACEMENT, LEFT: ICD-10-CM

## 2023-05-24 DIAGNOSIS — Z96.652 STATUS POST TOTAL LEFT KNEE REPLACEMENT: ICD-10-CM

## 2023-05-24 PROCEDURE — 73560 X-RAY EXAM OF KNEE 1 OR 2: CPT | Mod: TC,RT

## 2023-05-24 PROCEDURE — 99214 PR OFFICE/OUTPT VISIT, EST, LEVL IV, 30-39 MIN: ICD-10-PCS | Mod: S$PBB,,, | Performed by: PHYSICIAN ASSISTANT

## 2023-05-24 PROCEDURE — 99214 OFFICE O/P EST MOD 30 MIN: CPT | Mod: S$PBB,,, | Performed by: PHYSICIAN ASSISTANT

## 2023-05-24 PROCEDURE — 99999 PR PBB SHADOW E&M-EST. PATIENT-LVL III: CPT | Mod: PBBFAC,,, | Performed by: PHYSICIAN ASSISTANT

## 2023-05-24 PROCEDURE — 99999 PR PBB SHADOW E&M-EST. PATIENT-LVL III: ICD-10-PCS | Mod: PBBFAC,,, | Performed by: PHYSICIAN ASSISTANT

## 2023-05-24 PROCEDURE — 73562 XR KNEE ORTHO LEFT: ICD-10-PCS | Mod: 26,LT,, | Performed by: RADIOLOGY

## 2023-05-24 PROCEDURE — 73562 X-RAY EXAM OF KNEE 3: CPT | Mod: 26,LT,, | Performed by: RADIOLOGY

## 2023-05-24 PROCEDURE — 73560 X-RAY EXAM OF KNEE 1 OR 2: CPT | Mod: 26,RT,, | Performed by: RADIOLOGY

## 2023-05-24 PROCEDURE — 73560 XR KNEE ORTHO LEFT: ICD-10-PCS | Mod: 26,RT,, | Performed by: RADIOLOGY

## 2023-05-24 PROCEDURE — 99213 OFFICE O/P EST LOW 20 MIN: CPT | Mod: PBBFAC | Performed by: PHYSICIAN ASSISTANT

## 2023-05-24 RX ORDER — MUPIROCIN 20 MG/G
1 OINTMENT TOPICAL 2 TIMES DAILY
Status: CANCELLED | OUTPATIENT
Start: 2023-05-24 | End: 2023-05-29

## 2023-05-24 RX ORDER — CELECOXIB 200 MG/1
400 CAPSULE ORAL ONCE
Status: CANCELLED | OUTPATIENT
Start: 2023-05-24 | End: 2023-05-24

## 2023-05-24 RX ORDER — NALOXONE HCL 0.4 MG/ML
0.02 VIAL (ML) INJECTION
Status: CANCELLED | OUTPATIENT
Start: 2023-05-24

## 2023-05-24 RX ORDER — PREGABALIN 75 MG/1
75 CAPSULE ORAL NIGHTLY
Status: CANCELLED | OUTPATIENT
Start: 2023-05-24

## 2023-05-24 RX ORDER — LIDOCAINE HYDROCHLORIDE 10 MG/ML
1 INJECTION, SOLUTION EPIDURAL; INFILTRATION; INTRACAUDAL; PERINEURAL
Status: CANCELLED | OUTPATIENT
Start: 2023-05-24

## 2023-05-24 RX ORDER — ONDANSETRON 2 MG/ML
4 INJECTION INTRAMUSCULAR; INTRAVENOUS EVERY 8 HOURS PRN
Status: CANCELLED | OUTPATIENT
Start: 2023-05-24

## 2023-05-24 RX ORDER — ACETAMINOPHEN 500 MG
1000 TABLET ORAL EVERY 6 HOURS
Status: CANCELLED | OUTPATIENT
Start: 2023-05-24

## 2023-05-24 RX ORDER — OXYCODONE HYDROCHLORIDE 5 MG/1
5 TABLET ORAL
Status: CANCELLED | OUTPATIENT
Start: 2023-05-24

## 2023-05-24 RX ORDER — FENTANYL CITRATE 50 UG/ML
100 INJECTION, SOLUTION INTRAMUSCULAR; INTRAVENOUS
Status: CANCELLED | OUTPATIENT
Start: 2023-05-24 | End: 2023-05-25

## 2023-05-24 RX ORDER — SODIUM CHLORIDE 9 MG/ML
INJECTION, SOLUTION INTRAVENOUS CONTINUOUS
Status: CANCELLED | OUTPATIENT
Start: 2023-05-24 | End: 2023-05-25

## 2023-05-24 RX ORDER — PROCHLORPERAZINE EDISYLATE 5 MG/ML
5 INJECTION INTRAMUSCULAR; INTRAVENOUS EVERY 6 HOURS PRN
Status: CANCELLED | OUTPATIENT
Start: 2023-05-24

## 2023-05-24 RX ORDER — TALC
6 POWDER (GRAM) TOPICAL NIGHTLY PRN
Status: CANCELLED | OUTPATIENT
Start: 2023-05-24

## 2023-05-24 RX ORDER — CELECOXIB 200 MG/1
200 CAPSULE ORAL DAILY
Status: CANCELLED | OUTPATIENT
Start: 2023-05-25

## 2023-05-24 RX ORDER — MIDAZOLAM HYDROCHLORIDE 1 MG/ML
4 INJECTION INTRAMUSCULAR; INTRAVENOUS
Status: CANCELLED | OUTPATIENT
Start: 2023-05-24 | End: 2023-05-25

## 2023-05-24 RX ORDER — CEFAZOLIN SODIUM 2 G/50ML
2 SOLUTION INTRAVENOUS
Status: CANCELLED | OUTPATIENT
Start: 2023-05-24 | End: 2023-05-25

## 2023-05-24 RX ORDER — OXYCODONE HYDROCHLORIDE 5 MG/1
10 TABLET ORAL
Status: CANCELLED | OUTPATIENT
Start: 2023-05-24

## 2023-05-24 RX ORDER — CEFAZOLIN SODIUM 2 G/50ML
2 SOLUTION INTRAVENOUS
Status: CANCELLED | OUTPATIENT
Start: 2023-05-24

## 2023-05-24 RX ORDER — POLYETHYLENE GLYCOL 3350 17 G/17G
17 POWDER, FOR SOLUTION ORAL DAILY
Status: CANCELLED | OUTPATIENT
Start: 2023-05-25

## 2023-05-24 RX ORDER — MUPIROCIN 20 MG/G
1 OINTMENT TOPICAL
Status: CANCELLED | OUTPATIENT
Start: 2023-05-24

## 2023-05-24 RX ORDER — AMOXICILLIN 250 MG
1 CAPSULE ORAL 2 TIMES DAILY
Status: CANCELLED | OUTPATIENT
Start: 2023-05-24

## 2023-05-24 RX ORDER — METHOCARBAMOL 750 MG/1
750 TABLET, FILM COATED ORAL 3 TIMES DAILY
Status: CANCELLED | OUTPATIENT
Start: 2023-05-24

## 2023-05-24 RX ORDER — BISACODYL 10 MG
10 SUPPOSITORY, RECTAL RECTAL EVERY 12 HOURS PRN
Status: CANCELLED | OUTPATIENT
Start: 2023-05-24

## 2023-05-24 RX ORDER — PREGABALIN 75 MG/1
75 CAPSULE ORAL
Status: CANCELLED | OUTPATIENT
Start: 2023-05-24

## 2023-05-24 RX ORDER — FAMOTIDINE 20 MG/1
20 TABLET, FILM COATED ORAL 2 TIMES DAILY
Status: CANCELLED | OUTPATIENT
Start: 2023-05-24

## 2023-05-24 RX ORDER — FENTANYL CITRATE 50 UG/ML
25 INJECTION, SOLUTION INTRAMUSCULAR; INTRAVENOUS EVERY 5 MIN PRN
Status: CANCELLED | OUTPATIENT
Start: 2023-05-24

## 2023-05-24 RX ORDER — ASPIRIN 81 MG/1
81 TABLET ORAL 2 TIMES DAILY
Status: CANCELLED | OUTPATIENT
Start: 2023-05-24

## 2023-05-24 RX ORDER — MORPHINE SULFATE 2 MG/ML
2 INJECTION, SOLUTION INTRAMUSCULAR; INTRAVENOUS
Status: CANCELLED | OUTPATIENT
Start: 2023-05-24

## 2023-05-24 RX ORDER — SODIUM CHLORIDE 9 MG/ML
INJECTION, SOLUTION INTRAVENOUS
Status: CANCELLED | OUTPATIENT
Start: 2023-05-24

## 2023-05-24 RX ORDER — ACETAMINOPHEN 500 MG
1000 TABLET ORAL
Status: CANCELLED | OUTPATIENT
Start: 2023-05-24

## 2023-05-24 RX ORDER — SODIUM CHLORIDE 0.9 % (FLUSH) 0.9 %
10 SYRINGE (ML) INJECTION
Status: CANCELLED | OUTPATIENT
Start: 2023-05-24

## 2023-05-24 NOTE — H&P (VIEW-ONLY)
CC:  Left knee pain    Fan Paz is a 83 y.o. male with history of Left knee pain. Pain is worse with activity and weight bearing.  Patient has experienced interference of activities of daily living due to decreased range of motion and an increase in joint pain and swelling.  Patient has failed non-operative treatment including NSAIDs, corticosteroid injections, viscosupplement injections, and activity modification.  Fan Paz currently ambulates using assistive device .     Relevant medical conditions of significance in perioperative period:  Second-degree AV  Heart block managed by Cardiology essential hypertension managed by Cardiology  History of pulmonary embolism managed by hematology  Prostate cancer managed by Urology  GERD managed by PCP  Dysphagia managed by [facility]     Given documented medical comorbidities including those listed above and based off of CMS criteria patient would meet inpatient admission status guidelines. This case has been approved as inpatient.     Past Medical History:   Diagnosis Date    Arthritis     Cancer of palate     GERD (gastroesophageal reflux disease)     HTN (hypertension)     Hyperlipidemia     Prostate cancer     2011    Pulmonary embolism        Past Surgical History:   Procedure Laterality Date    BACK SURGERY  y-7    Cancer of palate surgery      Late 90's- [facility]     CARPAL TUNNEL RELEASE  8-14-13    right hand,Left hand 2013    COLONOSCOPY N/A 4/10/2017    Procedure: COLONOSCOPY;  Surgeon: Nilo Kelly MD;  Location: [facility];  Service: Endoscopy;  Laterality: N/A;    COLONOSCOPY N/A 10/21/2020    Procedure: COLONOSCOPY;  Surgeon: Demetrius Araujo MD;  Location: [facility];  Service: Endoscopy;  Laterality: N/A;    ESOPHAGOGASTRODUODENOSCOPY N/A 5/3/2023    Procedure: EGD (ESOPHAGOGASTRODUODENOSCOPY);  Surgeon: Shauna Lopez MD;  Location: [facility];  Service: Endoscopy;  Laterality: N/A;  EGD (with Dr. Lopez or Dr. Parada), : 1-4  sebastian, Provider: Dr. Lopez or Dr. Parada Location:  Endo, instructions sent to email address [email or web address]    KNEE SURGERY  y-41    left knee    KNEE SURGERY Right 12-1-15    TKR    Radio active seed Implant      2012    TOTAL HIP ARTHROPLASTY  3/2011       Family History   Problem Relation Age of Onset    No Known Problems Mother     Esophageal cancer Father     Coronary artery disease Father             Cancer Sister         Liver Cancer -    Diabetes Sister             No Known Problems Sister     No Known Problems Sister     Lung cancer Sister         Alive    Breast cancer Sister     Clotting disorder Sister 75        blood clot on brain-alive    No Known Problems Brother     No Known Problems Brother     Lung cancer Brother 60        - was a tobacco user, had lung cancer    Cancer Brother         Liver Cancer-     Stroke Brother             No Known Problems Maternal Aunt     No Known Problems Maternal Uncle     No Known Problems Paternal Aunt     No Known Problems Paternal Uncle     No Known Problems Maternal Grandmother     No Known Problems Maternal Grandfather     No Known Problems Paternal Grandmother     No Known Problems Paternal Grandfather     Glaucoma Cousin     Macular degeneration Neg Hx     Strabismus Neg Hx     Amblyopia Neg Hx     Blindness Neg Hx     Cataracts Neg Hx     Hypertension Neg Hx     Retinal detachment Neg Hx     Thyroid disease Neg Hx     Colon cancer Neg Hx        Review of patient's allergies indicates:  No Known Allergies      Current Outpatient Medications:     azelastine (ASTELIN) 137 mcg (0.1 %) nasal spray, 1 spray (137 mcg total) by Nasal route 2 (two) times daily., Disp: 30 mL, Rfl: 3    docusate sodium (STOOL SOFTENER ORAL), Take by mouth as needed., Disp: , Rfl:     fluticasone propionate (FLONASE) 50 mcg/actuation nasal spray, 2 sprays (100 mcg total) by Each Nostril route 2 (two) times daily., Disp: 18.2 mL, Rfl: 11     "FLUZONE HIGH-DOSE 2019-20, PF, 180 mcg/0.5 mL Syrg, PHARMACIST ADMINISTERED IMMUNIZATION ADMINISTERED AT TIME OF DISPENSING, Disp: , Rfl: 0    montelukast (SINGULAIR) 10 mg tablet, TAKE 1 TABLET BY MOUTH ONCE DAILY IN THE EVENING, Disp: 90 tablet, Rfl: 3    multivitamin/iron/folic acid (CENTRUM COMPLETE ORAL), Take 1 tablet by mouth once daily., Disp: , Rfl:     pantoprazole (PROTONIX) 40 MG tablet, Take 1 tablet (40 mg total) by mouth once daily., Disp: 30 tablet, Rfl: 1    simvastatin (ZOCOR) 40 MG tablet, Take 1 tablet (40 mg total) by mouth every evening., Disp: 90 tablet, Rfl: 3    tamsulosin (FLOMAX) 0.4 mg Cap, Take 1 capsule by mouth once daily, Disp: 90 capsule, Rfl: 0    Review of Systems:  Constitutional: no fever or chills  Eyes: no visual changes  ENT: no nasal congestion or sore throat  Respiratory: no cough or shortness of breath  Cardiovascular: no chest pain or palpitations  Gastrointestinal: no nausea or vomiting, tolerating diet  Genitourinary: no hematuria or dysuria  Integument/Breast: no rash or pruritis  Hematologic/Lymphatic: no easy bruising or lymphadenopathy  Musculoskeletal: positive for knee pain  Neurological: no seizures or tremors  Behavioral/Psych: no auditory or visual hallucinations  Endocrine: no heat or cold intolerance    PE:  Ht 5' 4.5" (1.638 m)   Wt 80 kg (176 lb 6.4 oz)   BMI 29.81 kg/m²   General: Pleasant, cooperative, NAD   Gait: antalgic  HEENT: NCAT, sclera nonicteric   Lungs: Respirations clear bilaterally; equal and unlabored.   CV: S1S2; 2+ bilateral upper and lower extremity pulses.   Skin: Intact throughout with no rashes, erythema, or lesions  Extremities: No LE edema,  no erythema or warmth of the skin in either lower extremity.    Left knee exam:  Knee Range of Motion:0-120 degrees flexion, pain with passive range of motion  Effusion:none  Condition of skin:intact  Location of tenderness:Medial joint line and Lateral joint line   Strength:limited by pain and 5 " of 5  Stability: Lachman: stable, LCL: stable, and MCL: stable    Hip Examination: painless PROM of hip     Radiographs: Radiographs reveal advanced degenerative changes including subchondral cyst formation, subchondral sclerosis, osteophyte formation, joint space narrowing.     Knee Alignment: normal    Diagnosis: Primary osteoarthritis Left knee    Plan: Left total knee arthroplasty    Due to the serious nature of total joint infection and the high prevalence of community acquired MRSA, vancomycin will be used perioperatively.

## 2023-05-24 NOTE — PROGRESS NOTES
Fan Paz is a 83 y.o. year old here today for pre surgery optimization visit  in preparation for a Left total knee arthroplasty to be performed by Dr. Paul on 06/01/2023.  he was last seen and treated in the clinic on 4/17/2023. he will be medically optimized by the pre op center. There has been no significant change in medical status since last visit. No fever, chills, malaise, or unexplained weight change.      Allergies, Medications, past medical and surgical history reviewed.    Focused examination performed.    Patient declined to see surgeon today. All questions answered. Patient encouraged to call with questions. Contact information given.     Pre, mamta, and post operative procedures and expectations discussed. Goals of successful surgery reviewed and include manageable pain levels, surgical site free of infection, medication management, and ambulation with PT/OT assistance. Healthy weight management discussed with patient and caregiver who were receptive to eduction of healthy diet and activity. No other necessary lifestyle changes identified. Educated patient about signs and symptoms of infection, medication management, anticoagulation therapy, risk of tobacco and alcohol use, and self-care to promote healing. Surgical guide given for future reference. Hibiclens given to patient with instructions. All questions were answered.     Fan Paz verbalized an understanding to the education and goals. Patient has displayed readiness to engage in care and is ready to proceed with surgery.  Patient reports wife is able and ready to provide assistance at home after discharge.    Surgical and blood consents signed.    Fan Paz will contact us if there are any questions, concerns, or changes in medical status prior to surgery.       Joint class: 2023    Patient has discussed discharge planning with surgeon. Patient will be discharged to home following surgery.   patient will  be scheduled with non-[facility] PT.     30 minutes of time was spent on patient education, review of records, templating, H&P, , appointment scheduling and optimizing patient for surgery.

## 2023-05-24 NOTE — H&P
CC:  Left knee pain    Fan Paz is a 83 y.o. male with history of Left knee pain. Pain is worse with activity and weight bearing.  Patient has experienced interference of activities of daily living due to decreased range of motion and an increase in joint pain and swelling.  Patient has failed non-operative treatment including NSAIDs, corticosteroid injections, viscosupplement injections, and activity modification.  Fan Paz currently ambulates using assistive device .     Relevant medical conditions of significance in perioperative period:  Second-degree AV  Heart block managed by Cardiology essential hypertension managed by Cardiology  History of pulmonary embolism managed by hematology  Prostate cancer managed by Urology  GERD managed by PCP  Dysphagia managed by [facility]     Given documented medical comorbidities including those listed above and based off of CMS criteria patient would meet inpatient admission status guidelines. This case has been approved as inpatient.     Past Medical History:   Diagnosis Date    Arthritis     Cancer of palate     GERD (gastroesophageal reflux disease)     HTN (hypertension)     Hyperlipidemia     Prostate cancer     2011    Pulmonary embolism        Past Surgical History:   Procedure Laterality Date    BACK SURGERY  y-7    Cancer of palate surgery      Late 90's- [facility]     CARPAL TUNNEL RELEASE  8-14-13    right hand,Left hand 2013    COLONOSCOPY N/A 4/10/2017    Procedure: COLONOSCOPY;  Surgeon: Nilo Kelly MD;  Location: [facility];  Service: Endoscopy;  Laterality: N/A;    COLONOSCOPY N/A 10/21/2020    Procedure: COLONOSCOPY;  Surgeon: Demetrius Araujo MD;  Location: [facility];  Service: Endoscopy;  Laterality: N/A;    ESOPHAGOGASTRODUODENOSCOPY N/A 5/3/2023    Procedure: EGD (ESOPHAGOGASTRODUODENOSCOPY);  Surgeon: Shauna Lopez MD;  Location: [facility];  Service: Endoscopy;  Laterality: N/A;  EGD (with Dr. Lopez or Dr. Parada), : 1-4  sebastian, Provider: Dr. Lopez or Dr. Parada Location:  Endo, instructions sent to email address [email or web address]    KNEE SURGERY  y-41    left knee    KNEE SURGERY Right 12-1-15    TKR    Radio active seed Implant      2012    TOTAL HIP ARTHROPLASTY  3/2011       Family History   Problem Relation Age of Onset    No Known Problems Mother     Esophageal cancer Father     Coronary artery disease Father             Cancer Sister         Liver Cancer -    Diabetes Sister             No Known Problems Sister     No Known Problems Sister     Lung cancer Sister         Alive    Breast cancer Sister     Clotting disorder Sister 75        blood clot on brain-alive    No Known Problems Brother     No Known Problems Brother     Lung cancer Brother 60        - was a tobacco user, had lung cancer    Cancer Brother         Liver Cancer-     Stroke Brother             No Known Problems Maternal Aunt     No Known Problems Maternal Uncle     No Known Problems Paternal Aunt     No Known Problems Paternal Uncle     No Known Problems Maternal Grandmother     No Known Problems Maternal Grandfather     No Known Problems Paternal Grandmother     No Known Problems Paternal Grandfather     Glaucoma Cousin     Macular degeneration Neg Hx     Strabismus Neg Hx     Amblyopia Neg Hx     Blindness Neg Hx     Cataracts Neg Hx     Hypertension Neg Hx     Retinal detachment Neg Hx     Thyroid disease Neg Hx     Colon cancer Neg Hx        Review of patient's allergies indicates:  No Known Allergies      Current Outpatient Medications:     azelastine (ASTELIN) 137 mcg (0.1 %) nasal spray, 1 spray (137 mcg total) by Nasal route 2 (two) times daily., Disp: 30 mL, Rfl: 3    docusate sodium (STOOL SOFTENER ORAL), Take by mouth as needed., Disp: , Rfl:     fluticasone propionate (FLONASE) 50 mcg/actuation nasal spray, 2 sprays (100 mcg total) by Each Nostril route 2 (two) times daily., Disp: 18.2 mL, Rfl: 11     "FLUZONE HIGH-DOSE 2019-20, PF, 180 mcg/0.5 mL Syrg, PHARMACIST ADMINISTERED IMMUNIZATION ADMINISTERED AT TIME OF DISPENSING, Disp: , Rfl: 0    montelukast (SINGULAIR) 10 mg tablet, TAKE 1 TABLET BY MOUTH ONCE DAILY IN THE EVENING, Disp: 90 tablet, Rfl: 3    multivitamin/iron/folic acid (CENTRUM COMPLETE ORAL), Take 1 tablet by mouth once daily., Disp: , Rfl:     pantoprazole (PROTONIX) 40 MG tablet, Take 1 tablet (40 mg total) by mouth once daily., Disp: 30 tablet, Rfl: 1    simvastatin (ZOCOR) 40 MG tablet, Take 1 tablet (40 mg total) by mouth every evening., Disp: 90 tablet, Rfl: 3    tamsulosin (FLOMAX) 0.4 mg Cap, Take 1 capsule by mouth once daily, Disp: 90 capsule, Rfl: 0    Review of Systems:  Constitutional: no fever or chills  Eyes: no visual changes  ENT: no nasal congestion or sore throat  Respiratory: no cough or shortness of breath  Cardiovascular: no chest pain or palpitations  Gastrointestinal: no nausea or vomiting, tolerating diet  Genitourinary: no hematuria or dysuria  Integument/Breast: no rash or pruritis  Hematologic/Lymphatic: no easy bruising or lymphadenopathy  Musculoskeletal: positive for knee pain  Neurological: no seizures or tremors  Behavioral/Psych: no auditory or visual hallucinations  Endocrine: no heat or cold intolerance    PE:  Ht 5' 4.5" (1.638 m)   Wt 80 kg (176 lb 6.4 oz)   BMI 29.81 kg/m²   General: Pleasant, cooperative, NAD   Gait: antalgic  HEENT: NCAT, sclera nonicteric   Lungs: Respirations clear bilaterally; equal and unlabored.   CV: S1S2; 2+ bilateral upper and lower extremity pulses.   Skin: Intact throughout with no rashes, erythema, or lesions  Extremities: No LE edema,  no erythema or warmth of the skin in either lower extremity.    Left knee exam:  Knee Range of Motion:0-120 degrees flexion, pain with passive range of motion  Effusion:none  Condition of skin:intact  Location of tenderness:Medial joint line and Lateral joint line   Strength:limited by pain and 5 " of 5  Stability: Lachman: stable, LCL: stable, and MCL: stable    Hip Examination: painless PROM of hip     Radiographs: Radiographs reveal advanced degenerative changes including subchondral cyst formation, subchondral sclerosis, osteophyte formation, joint space narrowing.     Knee Alignment: normal    Diagnosis: Primary osteoarthritis Left knee    Plan: Left total knee arthroplasty    Due to the serious nature of total joint infection and the high prevalence of community acquired MRSA, vancomycin will be used perioperatively.

## 2023-05-31 ENCOUNTER — EXTERNAL CHRONIC CARE MANAGEMENT (OUTPATIENT)
Dept: PRIMARY CARE CLINIC | Facility: CLINIC | Age: 83
DRG: 465 | End: 2023-05-31
Payer: MEDICARE

## 2023-05-31 ENCOUNTER — ANESTHESIA EVENT (OUTPATIENT)
Dept: SURGERY | Facility: HOSPITAL | Age: 83
DRG: 465 | End: 2023-05-31
Payer: MEDICARE

## 2023-05-31 ENCOUNTER — TELEPHONE (OUTPATIENT)
Dept: ORTHOPEDICS | Facility: CLINIC | Age: 83
End: 2023-05-31
Payer: MEDICARE

## 2023-05-31 PROCEDURE — 99490 CHRNC CARE MGMT STAFF 1ST 20: CPT | Mod: S$PBB,,, | Performed by: FAMILY MEDICINE

## 2023-05-31 PROCEDURE — 99490 PR CHRONIC CARE MGMT, 1ST 20 MIN: ICD-10-PCS | Mod: S$PBB,,, | Performed by: FAMILY MEDICINE

## 2023-05-31 PROCEDURE — 99490 CHRNC CARE MGMT STAFF 1ST 20: CPT | Mod: PBBFAC,PO | Performed by: FAMILY MEDICINE

## 2023-05-31 NOTE — TELEPHONE ENCOUNTER
Spoke to pt, informed hisarrival time for surgery tomorrow is 0500 at the [facility] location, [address]. No food or drink after midnight. Reminded pt of post-op phone call with the nurse is on 6/5/23 @ 1100am. Pt pleased and verbalized understanding.

## 2023-06-01 ENCOUNTER — ANESTHESIA EVENT (OUTPATIENT)
Dept: SURGERY | Facility: HOSPITAL | Age: 83
DRG: 465 | End: 2023-06-01
Payer: MEDICARE

## 2023-06-01 ENCOUNTER — ANESTHESIA (OUTPATIENT)
Dept: SURGERY | Facility: HOSPITAL | Age: 83
DRG: 465 | End: 2023-06-01
Payer: MEDICARE

## 2023-06-01 DIAGNOSIS — T84.018S FAILURE OF TOTAL KNEE REPLACEMENT, SEQUELA: Primary | ICD-10-CM

## 2023-06-01 DIAGNOSIS — Z96.659 FAILURE OF TOTAL KNEE REPLACEMENT, SEQUELA: Primary | ICD-10-CM

## 2023-06-01 PROBLEM — T84.018A FAILED TOTAL KNEE ARTHROPLASTY: Status: ACTIVE | Noted: 2023-06-01

## 2023-06-01 PROCEDURE — 64448 NJX AA&/STRD FEM NRV NFS IMG: CPT | Mod: 59,LT,, | Performed by: ANESTHESIOLOGY

## 2023-06-01 PROCEDURE — 63600175 PHARM REV CODE 636 W HCPCS: Performed by: ANESTHESIOLOGY

## 2023-06-01 PROCEDURE — 64448 NJX AA&/STRD FEM NRV NFS IMG: CPT | Mod: 59,LT | Performed by: STUDENT IN AN ORGANIZED HEALTH CARE EDUCATION/TRAINING PROGRAM

## 2023-06-01 PROCEDURE — 64448 LEFT ADDUCTOR CATHETER: ICD-10-PCS | Mod: 59,LT,, | Performed by: ANESTHESIOLOGY

## 2023-06-01 RX ORDER — MUPIROCIN 20 MG/G
1 OINTMENT TOPICAL 2 TIMES DAILY
Status: CANCELLED | OUTPATIENT
Start: 2023-06-01 | End: 2023-06-06

## 2023-06-01 RX ORDER — FENTANYL CITRATE 50 UG/ML
25 INJECTION, SOLUTION INTRAMUSCULAR; INTRAVENOUS EVERY 5 MIN PRN
Status: CANCELLED | OUTPATIENT
Start: 2023-06-01

## 2023-06-01 RX ORDER — OXYCODONE HYDROCHLORIDE 5 MG/1
5 TABLET ORAL
Status: CANCELLED | OUTPATIENT
Start: 2023-06-01

## 2023-06-01 RX ORDER — ACETAMINOPHEN 500 MG
1000 TABLET ORAL
Status: CANCELLED | OUTPATIENT
Start: 2023-06-01

## 2023-06-01 RX ORDER — PREGABALIN 25 MG/1
75 CAPSULE ORAL
Status: CANCELLED | OUTPATIENT
Start: 2023-06-01

## 2023-06-01 RX ORDER — PROPOFOL 10 MG/ML
VIAL (ML) INTRAVENOUS CONTINUOUS PRN
Status: DISCONTINUED | OUTPATIENT
Start: 2023-06-01 | End: 2023-06-01

## 2023-06-01 RX ORDER — AMOXICILLIN 250 MG
1 CAPSULE ORAL 2 TIMES DAILY
Status: CANCELLED | OUTPATIENT
Start: 2023-06-01

## 2023-06-01 RX ORDER — LIDOCAINE HYDROCHLORIDE 10 MG/ML
1 INJECTION, SOLUTION EPIDURAL; INFILTRATION; INTRACAUDAL; PERINEURAL
Status: CANCELLED | OUTPATIENT
Start: 2023-06-01

## 2023-06-01 RX ORDER — OXYCODONE HYDROCHLORIDE 5 MG/1
10 TABLET ORAL
Status: CANCELLED | OUTPATIENT
Start: 2023-06-01

## 2023-06-01 RX ORDER — CELECOXIB 100 MG/1
400 CAPSULE ORAL ONCE
Status: CANCELLED | OUTPATIENT
Start: 2023-06-01 | End: 2023-06-01

## 2023-06-01 RX ORDER — ROPIVACAINE HYDROCHLORIDE 5 MG/ML
INJECTION, SOLUTION EPIDURAL; INFILTRATION; PERINEURAL
Status: DISCONTINUED | OUTPATIENT
Start: 2023-06-01 | End: 2023-06-01 | Stop reason: HOSPADM

## 2023-06-01 RX ORDER — SODIUM CHLORIDE 9 MG/ML
INJECTION, SOLUTION INTRAVENOUS
Status: CANCELLED | OUTPATIENT
Start: 2023-06-01

## 2023-06-01 RX ORDER — METHOCARBAMOL 500 MG/1
1000 TABLET, FILM COATED ORAL 3 TIMES DAILY
Status: CANCELLED | OUTPATIENT
Start: 2023-06-01

## 2023-06-01 RX ORDER — ONDANSETRON 2 MG/ML
4 INJECTION INTRAMUSCULAR; INTRAVENOUS EVERY 8 HOURS PRN
Status: CANCELLED | OUTPATIENT
Start: 2023-06-01

## 2023-06-01 RX ORDER — MUPIROCIN 20 MG/G
1 OINTMENT TOPICAL
Status: CANCELLED | OUTPATIENT
Start: 2023-06-01

## 2023-06-01 RX ORDER — FAMOTIDINE 20 MG/1
20 TABLET, FILM COATED ORAL 2 TIMES DAILY
Status: CANCELLED | OUTPATIENT
Start: 2023-06-01

## 2023-06-01 RX ORDER — ASPIRIN 81 MG/1
81 TABLET ORAL 2 TIMES DAILY
Status: CANCELLED | OUTPATIENT
Start: 2023-06-01

## 2023-06-01 RX ORDER — ACETAMINOPHEN 500 MG
1000 TABLET ORAL EVERY 6 HOURS
Status: CANCELLED | OUTPATIENT
Start: 2023-06-01

## 2023-06-01 RX ORDER — BISACODYL 10 MG
10 SUPPOSITORY, RECTAL RECTAL EVERY 12 HOURS PRN
Status: CANCELLED | OUTPATIENT
Start: 2023-06-01

## 2023-06-01 RX ORDER — POLYETHYLENE GLYCOL 3350 17 G/17G
17 POWDER, FOR SOLUTION ORAL DAILY
Status: CANCELLED | OUTPATIENT
Start: 2023-06-01

## 2023-06-01 RX ORDER — PREGABALIN 25 MG/1
75 CAPSULE ORAL NIGHTLY
Status: CANCELLED | OUTPATIENT
Start: 2023-06-01

## 2023-06-01 RX ORDER — TALC
6 POWDER (GRAM) TOPICAL NIGHTLY PRN
Status: CANCELLED | OUTPATIENT
Start: 2023-06-01

## 2023-06-01 RX ORDER — CELECOXIB 100 MG/1
200 CAPSULE ORAL DAILY
Status: CANCELLED | OUTPATIENT
Start: 2023-06-01

## 2023-06-01 RX ORDER — PROCHLORPERAZINE EDISYLATE 5 MG/ML
5 INJECTION INTRAMUSCULAR; INTRAVENOUS EVERY 6 HOURS PRN
Status: CANCELLED | OUTPATIENT
Start: 2023-06-01

## 2023-06-01 RX ORDER — SODIUM CHLORIDE 9 MG/ML
INJECTION, SOLUTION INTRAVENOUS CONTINUOUS
Status: CANCELLED | OUTPATIENT
Start: 2023-06-01 | End: 2023-06-02

## 2023-06-01 RX ORDER — NALOXONE HCL 0.4 MG/ML
0.02 VIAL (ML) INJECTION
Status: CANCELLED | OUTPATIENT
Start: 2023-06-01

## 2023-06-01 RX ORDER — MORPHINE SULFATE 10 MG/ML
2 INJECTION, SOLUTION INTRAMUSCULAR; INTRAVENOUS
Status: CANCELLED | OUTPATIENT
Start: 2023-06-01

## 2023-06-01 RX ORDER — SODIUM CHLORIDE 0.9 % (FLUSH) 0.9 %
10 SYRINGE (ML) INJECTION
Status: CANCELLED | OUTPATIENT
Start: 2023-06-01

## 2023-06-01 RX ADMIN — ROPIVACAINE HYDROCHLORIDE 10 ML: 5 INJECTION EPIDURAL; INFILTRATION; PERINEURAL at 06:06

## 2023-06-01 NOTE — ANESTHESIA PROCEDURE NOTES
Left adductor catheter    Patient location during procedure: pre-op   Block not for primary anesthetic.  Reason for block: at surgeon's request and post-op pain management   Post-op Pain Location: Left leg pain   Start time: 6/1/2023 6:36 AM  Timeout: 6/1/2023 6:35 AM   End time: 6/1/2023 6:45 AM    Staffing  Authorizing Provider: Irish Castillo MD  Performing Provider: Hugo Rachel MD    Preanesthetic Checklist  Completed: patient identified, IV checked, site marked, risks and benefits discussed, surgical consent, monitors and equipment checked, pre-op evaluation and timeout performed  Peripheral Block  Patient position: supine  Prep: ChloraPrep and site prepped and draped  Patient monitoring: heart rate, cardiac monitor, continuous pulse ox, continuous capnometry and frequent blood pressure checks  Block type: adductor canal  Laterality: left  Injection technique: continuous  Needle  Needle type: Tuohy   Needle gauge: 17 G  Needle length: 3.5 in  Needle localization: anatomical landmarks and ultrasound guidance  Catheter type: spring wound  Catheter size: 19 G  Test dose: lidocaine 1.5% with Epi 1-to-200,000 and negative   -ultrasound image captured on disc.  Assessment  Injection assessment: negative aspiration, negative parasthesia and local visualized surrounding nerve  Paresthesia pain: none  Heart rate change: no  Slow fractionated injection: yes  Pain Tolerance: comfortable throughout block and no complaints  Medications:    Medications: ropivacaine (NAROPIN) injection 0.5% - Perineural   10 mL - 6/1/2023 6:40:00 AM    Additional Notes  VSS.  [facility] RN monitoring vitals throughout procedure.  Patient tolerated procedure well.

## 2023-06-01 NOTE — TRANSFER OF CARE
"Anesthesia Transfer of Care Note    Patient: Fan Paz    Procedure(s) Performed: Procedure(s) (LRB):  REVISION, ARTHROPLASTY, KNEE: LEFT; WALI/RAHUL (Left)    Patient location: PACU    Transport from OR: Transported from OR on room air with adequate spontaneous ventilation    Post vital signs: stable    Level of consciousness: awake, alert and oriented    Nausea/Vomiting: no nausea/vomiting    Complications: none    Transfer of care protocol was followed      Last vitals:   Visit Vitals  /78   Pulse 63   Temp 36.5 °C (97.7 °F) (Temporal)   Resp 20   Ht 5' 4.5" (1.638 m)   Wt 79.8 kg (176 lb)   SpO2 100%   BMI 29.74 kg/m²     "

## 2023-06-01 NOTE — ANESTHESIA PREPROCEDURE EVALUATION
06/01/2023  Fan Paz is a 83 y.o., male.  This is a 83 y.o. 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      Pre-op Assessment     I have reviewed the Nursing Notes. I have reviewed the NPO Status.   I have reviewed the Medications.     Review of Systems  Anesthesia Hx:  History of prior surgery of interest to airway management or planning: jaw, facial plastic/reconstructive. Previous anesthesia: MAC  5/3/23 EGD with MAC.  Procedure performed at an [facility]. Denies Family Hx of Anesthesia complications.   Denies Personal Hx of Anesthesia complications.   Social:  Former Smoker, No Alcohol Use Former; Passive Exposure:   Past (Quit Date: 07/10/1997), 3 ppd, 60 pack-years   Hematology/Oncology:         -- Anemia: --  Cancer in past history:  radiation and surgery  Oncology Comments: Cancer of palate surgery (Other)   y-41    Radio active seed Implant (Other    Prostate cancer    Extensive family hx of cancer     EENT/Dental:   chronic allergic rhinitis  Jaw Problems: Cancer of palate surgery; Radio active seed Implant  Cardiovascular:   Exercise tolerance: good Hypertension, well controlled Dysrhythmias  Denies Angina. hyperlipidemia   Asymptomatic varicose veins of bilateral lower extremities    Mobitz type 1 second degree AV block Deep Venous Thrombosis (DVT), Hx of DVT, left lower extremity, right lower extremity, Varicose Veins    Pulmonary:   Denies Shortness of breath. Hx pulmonary embolism-6/20/09- bilateral lower lobe pulmonary emboli    Long-term (current) use of anticoagulants   Renal/:   Prostate cancer   Hepatic/GI:   Denies PUD. GERD, well controlled Denies Liver Disease. Denies Hepatitis. 5/3/23 DYSPHAGIA   Musculoskeletal:   Arthritis  FAILURE OF TOTAL KNEE REPLACEMENT, SEQUELA    STATUS POST TOTAL  LEFT KNEE REPLACEMENT    12-1-15  Knee surgery (Right)     8-14-13  Carpal tunnel release     3/2011  Total hip arthroplasty    y-7  Back surgery Musculoskeletal General/Symptoms: joint pain, low back pain, muscle weakness, localized.  Joint Disease:  Arthritis, Osteoarthritis Arthritis of hand  Osteoarthritis, knee Lumbar Spine Disorders, S/P Lumbar Fusion   Neurological:   Neuromuscular Disease,  Osteoarthritis    Endocrine:   Denies Diabetes. Denies Hypothyroidism. Denies Hyperthyroidism. PRE-DIABETES   Psych:  Psychiatric Normal           Physical Exam  General: Well nourished, Alert and Oriented    Airway:  Mallampati: IV / IV  Mouth Opening: Small, but > 3cm  TM Distance: Normal  Tongue: Decreased Mobility  Neck ROM: Left Lateral Motion Decreased, Right Lateral Motion Decreased, Extension Decreased  Oropharynx: Hoarseness  Esophageal stretching  Dental:  Edentulous  Full dentures  Chest/Lungs:  Clear to auscultation, Normal Respiratory Rate    Heart:  Rate: Normal  Rhythm: Regular Rhythm  Sounds: Normal        MEDICAL CLEARANCE     Patient is optimized     Patient/ care giver/ Family member was instructed to call and update me about any changes to health,  medication, office visits ,testing out side of the [facility] , hospitalizations between now and surgery       Mandy Saenz MD  Internal Medicine  [facility]   Cell Phone- [phone]      Anesthesia Plan  Type of Anesthesia, risks & benefits discussed:    Anesthesia Type: Gen ETT, Gen Supraglottic Airway, Gen Natural Airway, CSE, Regional  Intra-op Monitoring Plan: Standard ASA Monitors  Post Op Pain Control Plan: multimodal analgesia  Informed Consent: Informed consent signed with the Patient and all parties understand the risks and agree with anesthesia plan.  All questions answered.   ASA Score: 3  Day of Surgery Review of History & Physical: H&P Update referred to the surgeon/provider.    Ready For Surgery From  Anesthesia Perspective.       .

## 2023-06-02 ENCOUNTER — HOSPITAL ENCOUNTER (INPATIENT)
Facility: HOSPITAL | Age: 83
LOS: 2 days | Discharge: HOME | DRG: 465 | End: 2023-06-04
Attending: ORTHOPAEDIC SURGERY | Admitting: ORTHOPAEDIC SURGERY
Payer: MEDICARE

## 2023-06-02 ENCOUNTER — ANESTHESIA (OUTPATIENT)
Dept: SURGERY | Facility: HOSPITAL | Age: 83
DRG: 465 | End: 2023-06-02
Payer: MEDICARE

## 2023-06-02 DIAGNOSIS — Z96.652 STATUS POST TOTAL LEFT KNEE REPLACEMENT: ICD-10-CM

## 2023-06-02 DIAGNOSIS — T84.018S FAILURE OF TOTAL KNEE REPLACEMENT, SEQUELA: Primary | ICD-10-CM

## 2023-06-02 DIAGNOSIS — Z96.659 FAILURE OF TOTAL KNEE REPLACEMENT, SEQUELA: Primary | ICD-10-CM

## 2023-06-02 PROCEDURE — 11000001 HC ACUTE MED/SURG PRIVATE ROOM

## 2023-06-02 PROCEDURE — 97165 OT EVAL LOW COMPLEX 30 MIN: CPT

## 2023-06-02 PROCEDURE — 87116 MYCOBACTERIA CULTURE: CPT | Performed by: ORTHOPAEDIC SURGERY

## 2023-06-02 PROCEDURE — D9220A PRA ANESTHESIA: ICD-10-PCS | Mod: CRNA,,, | Performed by: NURSE ANESTHETIST, CERTIFIED REGISTERED

## 2023-06-02 PROCEDURE — 87206 SMEAR FLUORESCENT/ACID STAI: CPT | Performed by: ORTHOPAEDIC SURGERY

## 2023-06-02 PROCEDURE — D9220A PRA ANESTHESIA: ICD-10-PCS | Mod: ANES,,, | Performed by: ANESTHESIOLOGY

## 2023-06-02 PROCEDURE — 71000033 HC RECOVERY, INTIAL HOUR: Performed by: ORTHOPAEDIC SURGERY

## 2023-06-02 PROCEDURE — 25000003 PHARM REV CODE 250: Performed by: PHYSICIAN ASSISTANT

## 2023-06-02 PROCEDURE — 88305 TISSUE EXAM BY PATHOLOGIST: CPT | Performed by: PATHOLOGY

## 2023-06-02 PROCEDURE — 63600175 PHARM REV CODE 636 W HCPCS: Performed by: NURSE ANESTHETIST, CERTIFIED REGISTERED

## 2023-06-02 PROCEDURE — 88305 TISSUE EXAM BY PATHOLOGIST: ICD-10-PCS | Mod: 26,,, | Performed by: PATHOLOGY

## 2023-06-02 PROCEDURE — 25000003 PHARM REV CODE 250: Performed by: STUDENT IN AN ORGANIZED HEALTH CARE EDUCATION/TRAINING PROGRAM

## 2023-06-02 PROCEDURE — 37000009 HC ANESTHESIA EA ADD 15 MINS: Performed by: ORTHOPAEDIC SURGERY

## 2023-06-02 PROCEDURE — C1776 JOINT DEVICE (IMPLANTABLE): HCPCS | Performed by: ORTHOPAEDIC SURGERY

## 2023-06-02 PROCEDURE — 88300 SURGICAL PATH GROSS: CPT | Mod: 26,59,, | Performed by: PATHOLOGY

## 2023-06-02 PROCEDURE — 36000711: Performed by: ORTHOPAEDIC SURGERY

## 2023-06-02 PROCEDURE — 37000008 HC ANESTHESIA 1ST 15 MINUTES: Performed by: ORTHOPAEDIC SURGERY

## 2023-06-02 PROCEDURE — 87176 TISSUE HOMOGENIZATION CULTR: CPT | Performed by: ORTHOPAEDIC SURGERY

## 2023-06-02 PROCEDURE — 87070 CULTURE OTHR SPECIMN AEROBIC: CPT | Performed by: ORTHOPAEDIC SURGERY

## 2023-06-02 PROCEDURE — 27201423 OPTIME MED/SURG SUP & DEVICES STERILE SUPPLY: Performed by: ORTHOPAEDIC SURGERY

## 2023-06-02 PROCEDURE — 63600175 PHARM REV CODE 636 W HCPCS: Performed by: STUDENT IN AN ORGANIZED HEALTH CARE EDUCATION/TRAINING PROGRAM

## 2023-06-02 PROCEDURE — 63600175 PHARM REV CODE 636 W HCPCS: Performed by: ANESTHESIOLOGY

## 2023-06-02 PROCEDURE — 97530 THERAPEUTIC ACTIVITIES: CPT

## 2023-06-02 PROCEDURE — 63600175 PHARM REV CODE 636 W HCPCS

## 2023-06-02 PROCEDURE — 71000016 HC POSTOP RECOV ADDL HR: Performed by: ORTHOPAEDIC SURGERY

## 2023-06-02 PROCEDURE — 87075 CULTR BACTERIA EXCEPT BLOOD: CPT | Performed by: ORTHOPAEDIC SURGERY

## 2023-06-02 PROCEDURE — 25000003 PHARM REV CODE 250: Performed by: NURSE ANESTHETIST, CERTIFIED REGISTERED

## 2023-06-02 PROCEDURE — 94761 N-INVAS EAR/PLS OXIMETRY MLT: CPT

## 2023-06-02 PROCEDURE — 27487 REVISE/REPLACE KNEE JOINT: CPT | Mod: LT,GC,, | Performed by: ORTHOPAEDIC SURGERY

## 2023-06-02 PROCEDURE — 87102 FUNGUS ISOLATION CULTURE: CPT | Performed by: ORTHOPAEDIC SURGERY

## 2023-06-02 PROCEDURE — C1713 ANCHOR/SCREW BN/BN,TIS/BN: HCPCS | Performed by: ORTHOPAEDIC SURGERY

## 2023-06-02 PROCEDURE — 88300 SURGICAL PATH GROSS: CPT | Performed by: PATHOLOGY

## 2023-06-02 PROCEDURE — 88305 TISSUE EXAM BY PATHOLOGIST: CPT | Mod: 26,,, | Performed by: PATHOLOGY

## 2023-06-02 PROCEDURE — 27487 PR REVISE KNEE JOINT REPLACE,ALL PARTS: ICD-10-PCS | Mod: LT,GC,, | Performed by: ORTHOPAEDIC SURGERY

## 2023-06-02 PROCEDURE — D9220A PRA ANESTHESIA: Mod: CRNA,,, | Performed by: NURSE ANESTHETIST, CERTIFIED REGISTERED

## 2023-06-02 PROCEDURE — 36000710: Performed by: ORTHOPAEDIC SURGERY

## 2023-06-02 PROCEDURE — D9220A PRA ANESTHESIA: Mod: ANES,,, | Performed by: ANESTHESIOLOGY

## 2023-06-02 PROCEDURE — 71000015 HC POSTOP RECOV 1ST HR: Performed by: ORTHOPAEDIC SURGERY

## 2023-06-02 PROCEDURE — 97162 PT EVAL MOD COMPLEX 30 MIN: CPT

## 2023-06-02 PROCEDURE — 97116 GAIT TRAINING THERAPY: CPT

## 2023-06-02 PROCEDURE — 88300 PR  SURG PATH,GROSS,LEVEL I: ICD-10-PCS | Mod: 26,59,, | Performed by: PATHOLOGY

## 2023-06-02 DEVICE — STEM FEMORAL EXT TS 18X100MM: Type: IMPLANTABLE DEVICE | Site: KNEE | Status: FUNCTIONAL

## 2023-06-02 DEVICE — STEM FEMORAL EXT TS 17X100MM: Type: IMPLANTABLE DEVICE | Site: KNEE | Status: FUNCTIONAL

## 2023-06-02 DEVICE — IMPLANTABLE DEVICE: Type: IMPLANTABLE DEVICE | Site: KNEE | Status: FUNCTIONAL

## 2023-06-02 DEVICE — BASEPLATE TIBIAL TRIATHLON SZ6: Type: IMPLANTABLE DEVICE | Site: KNEE | Status: FUNCTIONAL

## 2023-06-02 DEVICE — AUGMENT FEM POST SZ 5 5MM: Type: IMPLANTABLE DEVICE | Site: KNEE | Status: FUNCTIONAL

## 2023-06-02 DEVICE — FEMORAL TOTAL STAB SZ 5 LEFT: Type: IMPLANTABLE DEVICE | Site: KNEE | Status: FUNCTIONAL

## 2023-06-02 DEVICE — CEMENT BONE SMPLX HV GENTMYCN: Type: IMPLANTABLE DEVICE | Site: KNEE | Status: FUNCTIONAL

## 2023-06-02 RX ORDER — FENTANYL CITRATE 50 UG/ML
25 INJECTION, SOLUTION INTRAMUSCULAR; INTRAVENOUS EVERY 5 MIN PRN
Status: DISCONTINUED | OUTPATIENT
Start: 2023-06-02 | End: 2023-06-02 | Stop reason: HOSPADM

## 2023-06-02 RX ORDER — BISACODYL 10 MG
10 SUPPOSITORY, RECTAL RECTAL EVERY 12 HOURS PRN
Status: DISCONTINUED | OUTPATIENT
Start: 2023-06-02 | End: 2023-06-02

## 2023-06-02 RX ORDER — OXYCODONE HYDROCHLORIDE 5 MG/1
5 TABLET ORAL
Status: DISCONTINUED | OUTPATIENT
Start: 2023-06-02 | End: 2023-06-02

## 2023-06-02 RX ORDER — METHOCARBAMOL 500 MG/1
1000 TABLET, FILM COATED ORAL EVERY 8 HOURS
Status: DISCONTINUED | OUTPATIENT
Start: 2023-06-02 | End: 2023-06-04 | Stop reason: HOSPADM

## 2023-06-02 RX ORDER — TALC
6 POWDER (GRAM) TOPICAL NIGHTLY PRN
Status: DISCONTINUED | OUTPATIENT
Start: 2023-06-02 | End: 2023-06-04 | Stop reason: HOSPADM

## 2023-06-02 RX ORDER — MUPIROCIN 20 MG/G
1 OINTMENT TOPICAL 2 TIMES DAILY
Status: DISCONTINUED | OUTPATIENT
Start: 2023-06-02 | End: 2023-06-02

## 2023-06-02 RX ORDER — SODIUM CHLORIDE 9 MG/ML
INJECTION, SOLUTION INTRAVENOUS CONTINUOUS
Status: ACTIVE | OUTPATIENT
Start: 2023-06-02 | End: 2023-06-03

## 2023-06-02 RX ORDER — HYDRALAZINE HYDROCHLORIDE 20 MG/ML
10 INJECTION INTRAMUSCULAR; INTRAVENOUS EVERY 8 HOURS PRN
Status: DISCONTINUED | OUTPATIENT
Start: 2023-06-02 | End: 2023-06-04 | Stop reason: HOSPADM

## 2023-06-02 RX ORDER — OXYCODONE HYDROCHLORIDE 5 MG/1
5 TABLET ORAL
Status: DISCONTINUED | OUTPATIENT
Start: 2023-06-02 | End: 2023-06-04 | Stop reason: HOSPADM

## 2023-06-02 RX ORDER — DEXAMETHASONE SODIUM PHOSPHATE 4 MG/ML
INJECTION, SOLUTION INTRA-ARTICULAR; INTRALESIONAL; INTRAMUSCULAR; INTRAVENOUS; SOFT TISSUE
Status: DISCONTINUED | OUTPATIENT
Start: 2023-06-02 | End: 2023-06-02

## 2023-06-02 RX ORDER — MUPIROCIN 20 MG/G
1 OINTMENT TOPICAL
Status: COMPLETED | OUTPATIENT
Start: 2023-06-02 | End: 2023-06-02

## 2023-06-02 RX ORDER — MORPHINE SULFATE 2 MG/ML
2 INJECTION, SOLUTION INTRAMUSCULAR; INTRAVENOUS
Status: DISCONTINUED | OUTPATIENT
Start: 2023-06-02 | End: 2023-06-02

## 2023-06-02 RX ORDER — DIPHENHYDRAMINE HYDROCHLORIDE 50 MG/ML
INJECTION INTRAMUSCULAR; INTRAVENOUS
Status: DISCONTINUED | OUTPATIENT
Start: 2023-06-02 | End: 2023-06-02

## 2023-06-02 RX ORDER — OXYCODONE HYDROCHLORIDE 10 MG/1
10 TABLET ORAL
Status: DISCONTINUED | OUTPATIENT
Start: 2023-06-02 | End: 2023-06-02

## 2023-06-02 RX ORDER — DEXTROMETHORPHAN HYDROBROMIDE, GUAIFENESIN 5; 100 MG/5ML; MG/5ML
650 LIQUID ORAL EVERY 8 HOURS
Qty: 120 TABLET | Refills: 0 | Status: SHIPPED | OUTPATIENT
Start: 2023-06-02

## 2023-06-02 RX ORDER — MORPHINE SULFATE 2 MG/ML
2 INJECTION, SOLUTION INTRAMUSCULAR; INTRAVENOUS
Status: DISCONTINUED | OUTPATIENT
Start: 2023-06-02 | End: 2023-06-04 | Stop reason: HOSPADM

## 2023-06-02 RX ORDER — FAMOTIDINE 20 MG/1
20 TABLET, FILM COATED ORAL 2 TIMES DAILY
Status: DISCONTINUED | OUTPATIENT
Start: 2023-06-02 | End: 2023-06-02

## 2023-06-02 RX ORDER — ACETAMINOPHEN 500 MG
1000 TABLET ORAL
Status: COMPLETED | OUTPATIENT
Start: 2023-06-02 | End: 2023-06-02

## 2023-06-02 RX ORDER — PREGABALIN 75 MG/1
75 CAPSULE ORAL NIGHTLY
Status: DISCONTINUED | OUTPATIENT
Start: 2023-06-02 | End: 2023-06-04 | Stop reason: HOSPADM

## 2023-06-02 RX ORDER — METHOCARBAMOL 750 MG/1
750 TABLET, FILM COATED ORAL 3 TIMES DAILY PRN
Qty: 21 TABLET | Refills: 0 | Status: SHIPPED | OUTPATIENT
Start: 2023-06-02 | End: 2023-07-02

## 2023-06-02 RX ORDER — OXYCODONE HYDROCHLORIDE 10 MG/1
10 TABLET ORAL
Status: DISCONTINUED | OUTPATIENT
Start: 2023-06-02 | End: 2023-06-04 | Stop reason: HOSPADM

## 2023-06-02 RX ORDER — ROPIVACAINE HYDROCHLORIDE 2 MG/ML
INJECTION, SOLUTION EPIDURAL; INFILTRATION; PERINEURAL
Status: DISCONTINUED | OUTPATIENT
Start: 2023-06-02 | End: 2023-06-02

## 2023-06-02 RX ORDER — CELECOXIB 200 MG/1
400 CAPSULE ORAL ONCE
Status: COMPLETED | OUTPATIENT
Start: 2023-06-02 | End: 2023-06-02

## 2023-06-02 RX ORDER — AMOXICILLIN 250 MG
1 CAPSULE ORAL 2 TIMES DAILY
Status: DISCONTINUED | OUTPATIENT
Start: 2023-06-02 | End: 2023-06-04 | Stop reason: HOSPADM

## 2023-06-02 RX ORDER — METHOCARBAMOL 500 MG/1
1000 TABLET, FILM COATED ORAL 3 TIMES DAILY
Status: DISCONTINUED | OUTPATIENT
Start: 2023-06-02 | End: 2023-06-02

## 2023-06-02 RX ORDER — PREGABALIN 75 MG/1
75 CAPSULE ORAL
Status: COMPLETED | OUTPATIENT
Start: 2023-06-02 | End: 2023-06-02

## 2023-06-02 RX ORDER — MUPIROCIN 20 MG/G
1 OINTMENT TOPICAL 2 TIMES DAILY
Status: DISCONTINUED | OUTPATIENT
Start: 2023-06-02 | End: 2023-06-04 | Stop reason: HOSPADM

## 2023-06-02 RX ORDER — NALOXONE HCL 0.4 MG/ML
0.02 VIAL (ML) INJECTION
Status: DISCONTINUED | OUTPATIENT
Start: 2023-06-02 | End: 2023-06-04 | Stop reason: HOSPADM

## 2023-06-02 RX ORDER — LABETALOL HYDROCHLORIDE 5 MG/ML
INJECTION, SOLUTION INTRAVENOUS
Status: DISCONTINUED | OUTPATIENT
Start: 2023-06-02 | End: 2023-06-02

## 2023-06-02 RX ORDER — POLYETHYLENE GLYCOL 3350 17 G/17G
17 POWDER, FOR SOLUTION ORAL DAILY
Status: DISCONTINUED | OUTPATIENT
Start: 2023-06-02 | End: 2023-06-04 | Stop reason: HOSPADM

## 2023-06-02 RX ORDER — CELECOXIB 200 MG/1
200 CAPSULE ORAL DAILY
Qty: 30 CAPSULE | Refills: 0 | Status: SHIPPED | OUTPATIENT
Start: 2023-06-02

## 2023-06-02 RX ORDER — POLYETHYLENE GLYCOL 3350 17 G/17G
17 POWDER, FOR SOLUTION ORAL DAILY
Status: DISCONTINUED | OUTPATIENT
Start: 2023-06-02 | End: 2023-06-02

## 2023-06-02 RX ORDER — SODIUM CHLORIDE 0.9 % (FLUSH) 0.9 %
10 SYRINGE (ML) INJECTION
Status: DISCONTINUED | OUTPATIENT
Start: 2023-06-02 | End: 2023-06-02 | Stop reason: HOSPADM

## 2023-06-02 RX ORDER — ONDANSETRON 2 MG/ML
4 INJECTION INTRAMUSCULAR; INTRAVENOUS EVERY 8 HOURS PRN
Status: DISCONTINUED | OUTPATIENT
Start: 2023-06-02 | End: 2023-06-02

## 2023-06-02 RX ORDER — ONDANSETRON 2 MG/ML
4 INJECTION INTRAMUSCULAR; INTRAVENOUS EVERY 8 HOURS PRN
Status: DISCONTINUED | OUTPATIENT
Start: 2023-06-02 | End: 2023-06-04 | Stop reason: HOSPADM

## 2023-06-02 RX ORDER — SODIUM CHLORIDE 9 MG/ML
INJECTION, SOLUTION INTRAVENOUS
Status: DISCONTINUED | OUTPATIENT
Start: 2023-06-02 | End: 2023-06-02 | Stop reason: HOSPADM

## 2023-06-02 RX ORDER — ASPIRIN 81 MG/1
81 TABLET ORAL 2 TIMES DAILY
Status: DISCONTINUED | OUTPATIENT
Start: 2023-06-02 | End: 2023-06-02

## 2023-06-02 RX ORDER — KETAMINE HYDROCHLORIDE 100 MG/ML
INJECTION, SOLUTION INTRAMUSCULAR; INTRAVENOUS
Status: DISCONTINUED | OUTPATIENT
Start: 2023-06-02 | End: 2023-06-02

## 2023-06-02 RX ORDER — PROCHLORPERAZINE EDISYLATE 5 MG/ML
5 INJECTION INTRAMUSCULAR; INTRAVENOUS EVERY 6 HOURS PRN
Status: DISCONTINUED | OUTPATIENT
Start: 2023-06-02 | End: 2023-06-04 | Stop reason: HOSPADM

## 2023-06-02 RX ORDER — NALOXONE HCL 0.4 MG/ML
0.02 VIAL (ML) INJECTION
Status: DISCONTINUED | OUTPATIENT
Start: 2023-06-02 | End: 2023-06-02

## 2023-06-02 RX ORDER — PROPOFOL 10 MG/ML
VIAL (ML) INTRAVENOUS CONTINUOUS PRN
Status: DISCONTINUED | OUTPATIENT
Start: 2023-06-02 | End: 2023-06-02

## 2023-06-02 RX ORDER — PROCHLORPERAZINE EDISYLATE 5 MG/ML
5 INJECTION INTRAMUSCULAR; INTRAVENOUS EVERY 6 HOURS PRN
Status: DISCONTINUED | OUTPATIENT
Start: 2023-06-02 | End: 2023-06-02

## 2023-06-02 RX ORDER — ROPIVACAINE HYDROCHLORIDE 2 MG/ML
0.1 INJECTION, SOLUTION EPIDURAL; INFILTRATION; PERINEURAL CONTINUOUS
Status: DISCONTINUED | OUTPATIENT
Start: 2023-06-02 | End: 2023-06-04

## 2023-06-02 RX ORDER — METHOCARBAMOL 500 MG/1
1000 TABLET, FILM COATED ORAL ONCE AS NEEDED
Status: DISCONTINUED | OUTPATIENT
Start: 2023-06-02 | End: 2023-06-02 | Stop reason: HOSPADM

## 2023-06-02 RX ORDER — ACETAMINOPHEN 500 MG
1000 TABLET ORAL EVERY 6 HOURS
Status: DISCONTINUED | OUTPATIENT
Start: 2023-06-02 | End: 2023-06-02

## 2023-06-02 RX ORDER — ACETAMINOPHEN 500 MG
1000 TABLET ORAL EVERY 6 HOURS
Status: DISCONTINUED | OUTPATIENT
Start: 2023-06-02 | End: 2023-06-04 | Stop reason: HOSPADM

## 2023-06-02 RX ORDER — BISACODYL 10 MG
10 SUPPOSITORY, RECTAL RECTAL EVERY 12 HOURS PRN
Status: DISCONTINUED | OUTPATIENT
Start: 2023-06-02 | End: 2023-06-04 | Stop reason: HOSPADM

## 2023-06-02 RX ORDER — AMOXICILLIN 250 MG
1 CAPSULE ORAL 2 TIMES DAILY
Status: DISCONTINUED | OUTPATIENT
Start: 2023-06-02 | End: 2023-06-02

## 2023-06-02 RX ORDER — DEXMEDETOMIDINE HYDROCHLORIDE 100 UG/ML
INJECTION, SOLUTION INTRAVENOUS
Status: DISCONTINUED | OUTPATIENT
Start: 2023-06-02 | End: 2023-06-02

## 2023-06-02 RX ORDER — MIDAZOLAM HYDROCHLORIDE 1 MG/ML
INJECTION, SOLUTION INTRAMUSCULAR; INTRAVENOUS
Status: DISCONTINUED | OUTPATIENT
Start: 2023-06-02 | End: 2023-06-02

## 2023-06-02 RX ORDER — LIDOCAINE HYDROCHLORIDE 10 MG/ML
1 INJECTION, SOLUTION EPIDURAL; INFILTRATION; INTRACAUDAL; PERINEURAL
Status: DISCONTINUED | OUTPATIENT
Start: 2023-06-02 | End: 2023-06-02 | Stop reason: HOSPADM

## 2023-06-02 RX ORDER — DOCUSATE SODIUM 100 MG/1
100 CAPSULE, LIQUID FILLED ORAL 2 TIMES DAILY
Qty: 60 CAPSULE | Refills: 0 | Status: SHIPPED | OUTPATIENT
Start: 2023-06-02 | End: 2023-07-04

## 2023-06-02 RX ORDER — FAMOTIDINE 20 MG/1
20 TABLET, FILM COATED ORAL 2 TIMES DAILY
Status: DISCONTINUED | OUTPATIENT
Start: 2023-06-02 | End: 2023-06-04 | Stop reason: HOSPADM

## 2023-06-02 RX ORDER — METHOCARBAMOL 500 MG/1
1000 TABLET, FILM COATED ORAL
Status: DISCONTINUED | OUTPATIENT
Start: 2023-06-02 | End: 2023-06-02

## 2023-06-02 RX ORDER — OXYCODONE HYDROCHLORIDE 5 MG/1
5 TABLET ORAL EVERY 6 HOURS PRN
Qty: 50 TABLET | Refills: 0 | Status: SHIPPED | OUTPATIENT
Start: 2023-06-02 | End: 2023-09-06

## 2023-06-02 RX ORDER — ROPIVACAINE HYDROCHLORIDE 5 MG/ML
INJECTION, SOLUTION EPIDURAL; INFILTRATION; PERINEURAL
Status: DISPENSED
Start: 2023-06-02 | End: 2023-06-02

## 2023-06-02 RX ORDER — METHOCARBAMOL 750 MG/1
750 TABLET, FILM COATED ORAL 3 TIMES DAILY
Status: DISCONTINUED | OUTPATIENT
Start: 2023-06-02 | End: 2023-06-02

## 2023-06-02 RX ADMIN — SODIUM CHLORIDE: 9 INJECTION, SOLUTION INTRAVENOUS at 11:06

## 2023-06-02 RX ADMIN — MUPIROCIN 1 G: 20 OINTMENT TOPICAL at 06:06

## 2023-06-02 RX ADMIN — FAMOTIDINE 20 MG: 20 TABLET, FILM COATED ORAL at 08:06

## 2023-06-02 RX ADMIN — DIPHENHYDRAMINE HYDROCHLORIDE 25 MG: 50 INJECTION, SOLUTION INTRAMUSCULAR; INTRAVENOUS at 07:06

## 2023-06-02 RX ADMIN — PROPOFOL 100 MCG/KG/MIN: 10 INJECTION, EMULSION INTRAVENOUS at 07:06

## 2023-06-02 RX ADMIN — KETAMINE HYDROCHLORIDE 10 MG: 100 INJECTION INTRAMUSCULAR; INTRAVENOUS at 09:06

## 2023-06-02 RX ADMIN — CEFAZOLIN 2 G: 2 INJECTION, POWDER, FOR SOLUTION INTRAMUSCULAR; INTRAVENOUS at 03:06

## 2023-06-02 RX ADMIN — DEXAMETHASONE SODIUM PHOSPHATE 8 MG: 4 INJECTION, SOLUTION INTRAMUSCULAR; INTRAVENOUS at 07:06

## 2023-06-02 RX ADMIN — SODIUM CHLORIDE: 0.9 INJECTION, SOLUTION INTRAVENOUS at 07:06

## 2023-06-02 RX ADMIN — ROPIVACAINE HYDROCHLORIDE 0.1 ML/HR: 2 INJECTION, SOLUTION EPIDURAL; INFILTRATION at 11:06

## 2023-06-02 RX ADMIN — FENTANYL CITRATE 25 MCG: 50 INJECTION, SOLUTION INTRAMUSCULAR; INTRAVENOUS at 12:06

## 2023-06-02 RX ADMIN — ROPIVACAINE HYDROCHLORIDE 20 ML: 2 INJECTION, SOLUTION EPIDURAL; INFILTRATION at 10:06

## 2023-06-02 RX ADMIN — CEFAZOLIN 2 G: 2 INJECTION, POWDER, FOR SOLUTION INTRAMUSCULAR; INTRAVENOUS at 11:06

## 2023-06-02 RX ADMIN — POLYETHYLENE GLYCOL 3350 17 G: 17 POWDER, FOR SOLUTION ORAL at 11:06

## 2023-06-02 RX ADMIN — HYDRALAZINE HYDROCHLORIDE 10 MG: 20 INJECTION, SOLUTION INTRAMUSCULAR; INTRAVENOUS at 11:06

## 2023-06-02 RX ADMIN — KETAMINE HYDROCHLORIDE 10 MG: 100 INJECTION INTRAMUSCULAR; INTRAVENOUS at 08:06

## 2023-06-02 RX ADMIN — VANCOMYCIN HYDROCHLORIDE 1000 MG: 1 INJECTION, POWDER, LYOPHILIZED, FOR SOLUTION INTRAVENOUS at 07:06

## 2023-06-02 RX ADMIN — CELECOXIB 400 MG: 200 CAPSULE ORAL at 06:06

## 2023-06-02 RX ADMIN — TRANEXAMIC ACID 1000 MG: 100 INJECTION, SOLUTION INTRAVENOUS at 09:06

## 2023-06-02 RX ADMIN — LABETALOL HYDROCHLORIDE 2.5 MG: 5 INJECTION, SOLUTION INTRAVENOUS at 08:06

## 2023-06-02 RX ADMIN — DEXMEDETOMIDINE HYDROCHLORIDE 12 MCG: 100 INJECTION, SOLUTION INTRAVENOUS at 08:06

## 2023-06-02 RX ADMIN — MIDAZOLAM HYDROCHLORIDE 1 MG: 1 INJECTION, SOLUTION INTRAMUSCULAR; INTRAVENOUS at 07:06

## 2023-06-02 RX ADMIN — ACETAMINOPHEN 1000 MG: 500 TABLET ORAL at 06:06

## 2023-06-02 RX ADMIN — ACETAMINOPHEN 1000 MG: 500 TABLET ORAL at 05:06

## 2023-06-02 RX ADMIN — APIXABAN 2.5 MG: 2.5 TABLET, FILM COATED ORAL at 08:06

## 2023-06-02 RX ADMIN — TRANEXAMIC ACID 1000 MG: 100 INJECTION, SOLUTION INTRAVENOUS at 07:06

## 2023-06-02 RX ADMIN — FAMOTIDINE 20 MG: 20 TABLET, FILM COATED ORAL at 11:06

## 2023-06-02 RX ADMIN — PREGABALIN 75 MG: 75 CAPSULE ORAL at 06:06

## 2023-06-02 RX ADMIN — OXYCODONE HYDROCHLORIDE 10 MG: 10 TABLET ORAL at 11:06

## 2023-06-02 RX ADMIN — VANCOMYCIN HYDROCHLORIDE 1000 MG: 1 INJECTION, POWDER, LYOPHILIZED, FOR SOLUTION INTRAVENOUS at 08:06

## 2023-06-02 RX ADMIN — MUPIROCIN 1 G: 20 OINTMENT TOPICAL at 08:06

## 2023-06-02 RX ADMIN — CEFAZOLIN 2 G: 2 INJECTION, POWDER, FOR SOLUTION INTRAMUSCULAR; INTRAVENOUS at 07:06

## 2023-06-02 RX ADMIN — MEPIVACAINE HYDROCHLORIDE 2.25 ML: 20 INJECTION, SOLUTION EPIDURAL; INFILTRATION at 07:06

## 2023-06-02 RX ADMIN — ACETAMINOPHEN 1000 MG: 500 TABLET ORAL at 11:06

## 2023-06-02 RX ADMIN — SENNOSIDES AND DOCUSATE SODIUM 1 TABLET: 50; 8.6 TABLET ORAL at 08:06

## 2023-06-02 RX ADMIN — PREGABALIN 75 MG: 75 CAPSULE ORAL at 08:06

## 2023-06-02 RX ADMIN — VANCOMYCIN HYDROCHLORIDE 1000 MG: 1 INJECTION, POWDER, LYOPHILIZED, FOR SOLUTION INTRAVENOUS at 06:06

## 2023-06-02 RX ADMIN — METHOCARBAMOL 1000 MG: 500 TABLET ORAL at 09:06

## 2023-06-02 RX ADMIN — KETAMINE HYDROCHLORIDE 10 MG: 100 INJECTION INTRAMUSCULAR; INTRAVENOUS at 07:06

## 2023-06-02 RX ADMIN — SENNOSIDES AND DOCUSATE SODIUM 1 TABLET: 50; 8.6 TABLET ORAL at 11:06

## 2023-06-02 NOTE — TRANSFER OF CARE
"Anesthesia Transfer of Care Note    Patient: Fan Paz    Procedure(s) Performed: Procedure(s) (LRB):  REVISION, ARTHROPLASTY, KNEE (Left)    Patient location: PACU    Anesthesia Type: CSE    Transport from OR: Transported from OR on 6-10 L/min O2 by face mask with adequate spontaneous ventilation    Post pain: adequate analgesia    Post assessment: no apparent anesthetic complications    Post vital signs: stable    Level of consciousness: awake and sedated    Nausea/Vomiting: no nausea/vomiting    Complications: none    Transfer of care protocol was followed      Last vitals:   Visit Vitals  BP (!) 184/96 (BP Location: Right arm, Patient Position: Lying)   Pulse 82   Temp 36.9 °C (98.4 °F) (Oral)   Resp 20   Ht 5' 4.5" (1.638 m)   Wt 79.8 kg (175 lb 14.8 oz)   SpO2 97%   BMI 29.73 kg/m²     "

## 2023-06-02 NOTE — ANESTHESIA PROCEDURE NOTES
CSE    Patient location during procedure: OR  Start time: 6/2/2023 7:24 AM  Timeout: 6/2/2023 7:22 AM  End time: 6/2/2023 7:26 AM      Staffing  Authorizing Provider: Chery Gonzalez MD  Performing Provider: Chery Gonzalez MD    Preanesthetic Checklist  Completed: patient identified, IV checked, site marked, risks and benefits discussed, surgical consent, monitors and equipment checked, pre-op evaluation and timeout performed  CSE  Patient position: sitting  Prep: ChloraPrep  Patient monitoring: heart rate, continuous pulse ox and frequent blood pressure checks  Approach: midline  Spinal Needle  Needle type: Devan   Needle gauge: 25 G  Needle length: 5 in  Epidural Needle  Injection technique: KAT saline  Needle type: Tuohy   Needle gauge: 17 G  Needle length: 3.5 in  Needle insertion depth: 7 cm  Location: L3-4  Needle localization: anatomical landmarks   Catheter  Catheter type: Vidaao  Catheter size: 19 G  Catheter at skin depth: 11 cm  Assessment  Intrathecal Medications:   administered: primary anesthetic mcg of    Medications:    Medications: Intrathecal - mepivacaine 20 mg/mL (2 %) injection - Intrathecal   2.25 mL - 6/2/2023 7:26:00 AM

## 2023-06-02 NOTE — ANESTHESIA PREPROCEDURE EVALUATION
06/02/2023  Fan Paz is a 83 y.o., male.  This is a 83 y.o. 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      Pre-op Assessment     I have reviewed the Nursing Notes. I have reviewed the NPO Status.   I have reviewed the Medications.     Review of Systems  Anesthesia Hx:  History of prior surgery of interest to airway management or planning: jaw, facial plastic/reconstructive. Previous anesthesia: MAC  5/3/23 EGD with MAC.  Procedure performed at an [facility]. Denies Family Hx of Anesthesia complications.   Denies Personal Hx of Anesthesia complications.   Social:  Former Smoker, No Alcohol Use Former; Passive Exposure:   Past (Quit Date: 07/10/1997), 3 ppd, 60 pack-years   Hematology/Oncology:         -- Anemia: --  Cancer in past history:  radiation and surgery  Oncology Comments: Cancer of palate surgery (Other)   y-41    Radio active seed Implant (Other    Prostate cancer    Extensive family hx of cancer     EENT/Dental:   chronic allergic rhinitis  Jaw Problems: Cancer of palate surgery; Radio active seed Implant  Cardiovascular:   Exercise tolerance: good Hypertension, well controlled Dysrhythmias  Denies Angina. hyperlipidemia   Asymptomatic varicose veins of bilateral lower extremities    Mobitz type 1 second degree AV block Deep Venous Thrombosis (DVT), Hx of DVT, left lower extremity, right lower extremity, Varicose Veins    Pulmonary:   Denies Shortness of breath. Hx pulmonary embolism-6/20/09- bilateral lower lobe pulmonary emboli    Long-term (current) use of anticoagulants   Renal/:   Prostate cancer   Hepatic/GI:   Denies PUD. GERD, well controlled Denies Liver Disease. Denies Hepatitis. 5/3/23 DYSPHAGIA   Musculoskeletal:   Arthritis  FAILURE OF TOTAL KNEE REPLACEMENT, SEQUELA    STATUS POST TOTAL  LEFT KNEE REPLACEMENT    12-1-15  Knee surgery (Right)     8-14-13  Carpal tunnel release     3/2011  Total hip arthroplasty    y-7  Back surgery Musculoskeletal General/Symptoms: joint pain, low back pain, muscle weakness, localized.  Joint Disease:  Arthritis, Osteoarthritis Arthritis of hand  Osteoarthritis, knee Lumbar Spine Disorders, S/P Lumbar Fusion   Neurological:   Neuromuscular Disease,  Osteoarthritis    Endocrine:   Denies Diabetes. Denies Hypothyroidism. Denies Hyperthyroidism. PRE-DIABETES   Psych:  Psychiatric Normal           Physical Exam  General: Well nourished, Alert and Oriented    Airway:  Mallampati: IV / IV  Mouth Opening: Small, but > 3cm  TM Distance: Normal  Tongue: Decreased Mobility  Neck ROM: Left Lateral Motion Decreased, Right Lateral Motion Decreased, Extension Decreased  Oropharynx: Hoarseness  Esophageal stretching  Dental:  Edentulous  Full dentures  Chest/Lungs:  Clear to auscultation, Normal Respiratory Rate    Heart:  Rate: Normal  Rhythm: Regular Rhythm  Sounds: Normal        MEDICAL CLEARANCE     Patient is optimized     Patient/ care giver/ Family member was instructed to call and update me about any changes to health,  medication, office visits ,testing out side of the [facility] , hospitalizations between now and surgery       Mandy Saenz MD  Internal Medicine  [facility]   Cell Phone- [phone]      Anesthesia Plan  Type of Anesthesia, risks & benefits discussed:    Anesthesia Type: Gen ETT, Gen Supraglottic Airway, Gen Natural Airway, CSE, Regional  Intra-op Monitoring Plan: Standard ASA Monitors  Post Op Pain Control Plan: multimodal analgesia  Informed Consent: Informed consent signed with the Patient and all parties understand the risks and agree with anesthesia plan.  All questions answered.   ASA Score: 3  Day of Surgery Review of History & Physical: H&P Update referred to the surgeon/provider.    Ready For Surgery From  Anesthesia Perspective.       .

## 2023-06-02 NOTE — INTERVAL H&P NOTE
The case was canceled yesterday.  There has been no interval change in his history and physical since yesterday.

## 2023-06-02 NOTE — PLAN OF CARE
PT Eval complete and POC established.    2023    Problem: Physical Therapy  Goal: Physical Therapy Goal  Description: Goals to be met by: 2023     Patient will increase functional independence with mobility by performin. Supine to sit with [place]  2. Sit to supine with [place]  3. Sit to stand transfer with Modified [place] with RW  4. Bed to chair transfer with Modified [place] using Rolling Walker  5. Gait  x 200 feet with Modified [place] using Rolling Walker.   6. Ascend/descend 4 stair with bilateral Handrails Supervision using No Assistive Device.   7. Lower extremity exercise program x10 reps, with supervision    Outcome: Ongoing, Progressing

## 2023-06-02 NOTE — PLAN OF CARE
Patient was prepared for surgery.    Patient needs new Surgical consents because patient had entered into OR yesterday. Dr. Paul was notified.    New Anesthesia consent obtained.

## 2023-06-02 NOTE — PT/OT/SLP EVAL
Physical Therapy Evaluation    Patient Name:  Fan Paz   MRN:  [id number]    Recommendations:     Discharge Recommendations: other (see comments)   Discharge Equipment Recommendations: none   Barriers to discharge:  steps to enter home    Assessment:     Fan Paz is a 83 y.o. male admitted with a medical diagnosis of Failed total knee arthroplasty.  He presents with the following impairments/functional limitations: weakness, impaired endurance, impaired self care skills, impaired functional mobility, gait instability, impaired balance, decreased lower extremity function, decreased safety awareness, pain, decreased ROM, orthopedic precautions. Pt would benefit from continued PT services for: Dynamic/static standing/sitting balance through skilled balance training, strengthening with the use of skilled therapeutic exercises interventions, and mobility through adaptive equipment training. Pt continues to benefit from a collaborative PT/OT program to improve quality of life and focus on recovery of impairments.      Rehab Prognosis: Good; patient would benefit from acute skilled PT services to address these deficits and reach maximum level of function.    Recent Surgery: Procedure(s) (LRB):  REVISION, ARTHROPLASTY, KNEE (Left) Day of Surgery    Plan:     During this hospitalization, patient to be seen daily to address the identified rehab impairments via gait training, therapeutic activities, therapeutic exercises and progress toward the following goals:    Plan of Care Expires:  07/02/23    Subjective     Chief Complaint: none verbalized  Patient/Family Comments/goals: return to PLOF  Pain/Comfort:  Pain Rating 1: 2/10  Location 1: knee  Pain Addressed 1: Reposition, Distraction  Pain Rating Post-Intervention 1: other (see comments) (not rated)    Patients cultural, spiritual, Voodoo conflicts given the current situation: no    Living Environment:  Living Environment: Lives with wife in a  [facility], with a ramp R HR and 4 [place] dafne HR,  with T/S combo  Previous level of function: Ind in all ADLs  and mobility   Roles and Routines:  and drives  Equipment Used at Home: bedside commode, walker, rolling  Assistance upon Discharge: Wife and from community     Objective:     Communicated with RN prior to session.  Patient found HOB elevated with cryotherapy, telemetry, FCD, blood pressure cuff, peripheral IV, perineural catheter, pulse ox (continuous), oxygen  upon PT entry to room.    General Precautions: Standard, fall  Orthopedic Precautions:LLE weight bearing as tolerated   Braces: N/A  Respiratory Status: Nasal cannula, flow 1.5 L/min    Exams:  Cognitive Exam:  Patient is oriented to Person, Place, Time, and Situation  Gross Motor Coordination:  WFL  Postural Exam:  Patient presented with the following abnormalities:    -       Rounded shoulders  -       Forward head  Sensation:  denies numbness/tingling and endorses normal LT in BLEs  RLE ROM: WFL  RLE Strength: Deficits: hip flexion 4/5, knee extension 5/5, knee flexion 5/5, DF 5/5  LLE ROM: WFL  LLE Strength: Deficits: grossly 2/5 observed, DF 5/5    Functional Mobility:  Bed Mobility:     Scooting: minimum assistance  Supine to Sit: minimum assistance  Sit to Supine: minimum assistance  Transfers:     Sit to Stand:  contact guard assistance with rolling walker  Gait: 3-4 steps fwd and bwd, CGA, RW; decreased gait speed and step length  Balance:   Static Sitting: SBA  Dynamic sitting: SBA  Static Standing: CGA with RW  Dynamic Standing: CGA with RW      AM-PAC 6 CLICK MOBILITY  Total Score:17       Treatment & Education:  Patient educated on role of therapy, goals of session, and benefits of mobilizing.   Discussed PT plan of care during hospitalization.   Patient educated on calling for assistance.   Patient educated on how their diagnosis impacts their mobility within PT scope of practice.   All questions answered within PT scope of  practice.    Patient left HOB elevated with all lines intact, call button in reach, and RN notified.    GOALS:   Multidisciplinary Problems       Physical Therapy Goals          Problem: Physical Therapy    Goal Priority Disciplines Outcome Goal Variances Interventions   Physical Therapy Goal     PT, PT/OT Ongoing, Progressing     Description: Goals to be met by: 2023     Patient will increase functional independence with mobility by performin. Supine to sit with [place]  2. Sit to supine with [place]  3. Sit to stand transfer with Modified [place] with RW  4. Bed to chair transfer with Modified [place] using Rolling Walker  5. Gait  x 200 feet with Modified [place] using Rolling Walker.   6. Ascend/descend 4 stair with bilateral Handrails Supervision using No Assistive Device.   7. Lower extremity exercise program x10 reps, with supervision                         History:     Past Medical History:   Diagnosis Date    Arthritis     Cancer of palate     GERD (gastroesophageal reflux disease)     HTN (hypertension)     Hyperlipidemia     Prostate cancer         Pulmonary embolism        Past Surgical History:   Procedure Laterality Date    BACK SURGERY  y-7    Cancer of palate surgery      Late - [facility]     CARPAL TUNNEL RELEASE  14    right hand,Left hand     COLONOSCOPY N/A 4/10/2017    Procedure: COLONOSCOPY;  Surgeon: Nilo Kelly MD;  Location: [facility];  Service: Endoscopy;  Laterality: N/A;    COLONOSCOPY N/A 10/21/2020    Procedure: COLONOSCOPY;  Surgeon: Demetrius Araujo MD;  Location: [facility];  Service: Endoscopy;  Laterality: N/A;    ESOPHAGOGASTRODUODENOSCOPY N/A 5/3/2023    Procedure: EGD (ESOPHAGOGASTRODUODENOSCOPY);  Surgeon: Shauna Lopez MD;  Location: [facility];  Service: Endoscopy;  Laterality: N/A;  EGD (with Dr. Lopez or Dr. Parada), : 1-4 weeks, Provider: Dr. Lopez or Dr. Parada Location: [facility], instructions sent to email  address [email or web address]    KNEE SURGERY  y-41    left knee    KNEE SURGERY Right 12-1-15    TKR    Radio active seed Implant      January 2012    TOTAL HIP ARTHROPLASTY  3/2011       Time Tracking:     PT Received On: 06/02/23  PT Start Time: 1330     PT Stop Time: 1349  PT Total Time (min): 19 min     Billable Minutes: Evaluation 11 and Gait Training 8      06/02/2023

## 2023-06-02 NOTE — OP NOTE
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   PROCEDURES PERFORMED:  Revision left total knee arthroplasty  SURGEON: Dustin Paul M.D.   ASSISTANT:  Ronak Molina M.D. (RES).   ANESTHESIA:  Neuraxial/regional.   SPECIMEN:  Explant soft tissue  FINDINGS:  Delamination of the polyethylene/loose femoral component  FLUID:  2 L  BLOOD LOSS:  less than 100 cc  COMPLICATIONS: None.   COUNTS: Correct.   DISPOSITION: Recovery Room, stable.   IMPLANTS:   Implant Name Type Inv. Item Serial No.  Lot No. LRB No. Used Action   CEMENT BONE SMPLX HV GENTMYCN - [id number]  CEMENT BONE SMPLX HV GENTMYCN  [employer]. [id number] Left 2 Implanted   FEMORAL TOTAL STAB SZ 5 LEFT - [id number]  FEMORAL TOTAL STAB SZ 5 LEFT  WALI [id number] Left 1 Implanted   AUGMENT FEM POST SZ 5 5MM - [id number]  AUGMENT FEM POST SZ 5 5MM  [employer]. [id number] Left 2 Implanted   AUGMENT FEM DISTAL SZ 5 5MM LF - [id number]  AUGMENT FEM DISTAL SZ 5 5MM LF  WALI [id number] Left 1 Implanted   STEM FEMORAL EXT TS [id number] - [id number]  STEM FEMORAL EXT TS [id number]  [employer]. [id number] Left 1 Implanted   BASEPLATE TIBIAL TRIATHLON SZ6 - [id number]  BASEPLATE TIBIAL TRIATHLON SZ6  [employer]. [id number] Left 1 Implanted   STEM FEMORAL EXT TS [id number] - [id number]  STEM FEMORAL EXT TS [id number]  [employer]. [id number] Left 1 Implanted   Triathlon Tibial Bearing Insert - PS MANPREET #6 TYP PS THKNS 16mm    WALI [id number] Left 1 Implanted       INDICATIONS FOR PROCEDURE:  Mr. Link is an 83-year-old gentleman who had a left total knee arthroplasty performed by me in 2014.  He did well for several years.  He began having pain.  It was 1 of the recalled [facility] implants.  He underwent a thorough evaluation.   He was determined to have loose femoral component most likely secondary to polyethylene wear.   Infection has been ruled out.  Treatment options were explained to him and it was decided to proceed with revision left total knee arthroplasty.  He was aware of reasonable treatment options as well as risks and benefits.    PROCEDURE IN DETAIL:  After appropriate consent was obtained he was brought to the operating room.  Anesthesia was administered.  He received antibiotic prophylaxis.  He received a g of tranexamic acid and he received another g at closure.  Cast padding and tourniquet applied to the proximal left thigh.  The left lower extremity was then prepped and draped in usual sterile fashion.  A time-out was called.  All team members participated.  No concerns were expressed prior to the case and team members were encouraged to express concerns if they arose during the case.  Limb was elevated and the tourniquet was inflated.  His prior incision was utilized.  This taken down through the skin and retinacular layer.  It was a medial curvilinear incision.  Once we had good exposure a medial parapatellar arthrotomy followed by quadriceps snip was performed.  He had a fair amount of fibrotic tissue.  Thorough synovectomy was performed and we reestablished the medial and lateral gutters.  Soft tissue was sent for frozen section and culture.  Once we had adequate exposure the knee was flexed.  There was no undue tension on the extensor mechanism.  The polyethylene insert was removed.  It was inspected.  There was delamination especially around the post.  Following this with the use of flexible osteotomes and a Gigli saw the femoral component was removed with minimal bone loss.  The femoral component was loose.  Cement debris was removed.  A posterior retractor was placed.  Tibia was well exposed.  With the use of osteotomes a saw and the [employer] extraction system the tibial component was removed.  The tibial component  was very well fixed.  Excess cement was removed.  We then reamed the tibia to accommodate a 100 x 18 stem.  A tibial cleanup cut was made.  We sized the tibia and found to be a size 6.  We punched for the keel.  Rotation was based on the medial 1/3 of the tibial tubercle.  Tibial trial was firmly seated and attention was turned to the femur.  We reamed the femur to accommodate a 17 by 100 stem.  We made our distal femoral cuts.  He required a 5 mm augment medially.  We then noble the trans epicondylar axis.  We sized the femur found it to be a size 5.  We pinned the cutting block in line with the trans epicondylar axis made anterior posterior chamfer and box cuts.  He did need 5 mm augments posteriorly both medially and laterally.  We assembled the femoral trial.  This was firmly seated.  With a 16 mm insert he had good flexion-extension gap balance.  There was no medial and lateral instability.  The patella tracked well.  There was no recurvatum.  Easily had 130° of flexion.  At this point were satisfied with knee range of motion and stability component position sizing and alignment as well as patellar tracking.  At this point the patella button was inspected.  It was well fixed and in good position.  There was no need to revise the patella.  Trial components were removed.  Bone was prepared for cementing with pulsatile lavage and drying.  Components were assembled on the back table.  They were then cemented into place.  Femur followed by tibia.  Meticulous care was taken to remove excess cement.  Tibial insert was firmly seated in the tibial tray.  The knee was inspected.  There was no loose body foreign body soft tissue interposition.  The knee was then reduced.  We then irrigated with a dilute Betadine solution which was kept in place 3 minutes and this was irrigated out with normal saline.  Following this closure began.  We closed the arthrotomy and quadriceps snip with 1. Vicryl.  At this point the knee was  brought through range of motion.  Was stable as previously described the patella tracked well.  The remainder the incision was closed 0 Vicryl 2-0 Vicryl Monocryl and Dermabond.  A sterile dressing was applied.  He was then transferred from the operating room table to a stretcher.  He was brought to recovery room in stable condition.  There were no known complications.  He may weight bear as tolerated.

## 2023-06-02 NOTE — ANESTHESIA POST-OP PAIN MANAGEMENT
Acute Pain Service and Perioperative Surgical Home Progress Note    HPI  Fan Paz is a 83 y.o., male,     Interval history    Surgery:  Procedure(s) (LRB):  REVISION, ARTHROPLASTY, KNEE (Left)    Post Op Day #: 0    Catheter type: Perineural Adductor Canal    Infusion type: Ropivacaine 0.2%  8 ml/hr basal    Problem List:    Active Hospital Problems    Diagnosis  POA    *Failed total knee arthroplasty [T84.018A, Z96.659]  Not Applicable      Resolved Hospital Problems   No resolved problems to display.       Subjective    Patient doing well in post op, required hand bolus of local post op but able to work with PT in PACU   General appearance of alert, oriented, no complaints   Pain with rest: 6    Numbers   Pain with movement: 10    Numbers   Side Effects    1. Pruritis No    2. Nausea No    3. Motor Blockade No, 1=Ability to bend knees and ankles    4. Sedation Yes, 2=slightly drowsy, easily aroused    Schedule Medications:    acetaminophen  1,000 mg Oral Q6H    apixaban  2.5 mg Oral BID    ceFAZolin (ANCEF) IVPB  2 g Intravenous Q8H    famotidine  20 mg Oral BID    methocarbamoL  1,000 mg Oral TID    mupirocin  1 g Nasal BID    polyethylene glycol  17 g Oral Daily    pregabalin  75 mg Oral QHS    ROPIvacaine 0.5% (PF)        senna-docusate 8.6-50 mg  1 tablet Oral BID    vancomycin (VANCOCIN) IVPB  1,000 mg Intravenous Q12H        Continuous Infusions:   sodium chloride 0.9%      sodium chloride 0.9% 150 mL/hr at 06/02/23 1110    ROPIvacaine (PF) 2 mg/ml (0.2%) 0.1 mL/hr (06/02/23 1115)        PRN Medications:  sodium chloride 0.9%, bisacodyL, fentaNYL, fentaNYL, hydrALAZINE, LIDOcaine (PF) 10 mg/ml (1%), melatonin, methocarbamoL, morphine, naloxone, ondansetron, oxyCODONE, oxyCODONE, oxyCODONE, prochlorperazine, sodium chloride 0.9%, sodium chloride 0.9%, tranexamic acid (CYKLOKAPRON) infusion, tranexamic acid (CYKLOKAPRON) 3,000 mg in sodium chloride 0.9% SolP 100 mL        Antibiotics:  Antibiotics (From admission, onward)    Start     Stop Route Frequency Ordered    06/02/23 2100  mupirocin 2 % ointment 1 g         06/07 2059 Nasl 2 times daily 06/02/23 1039 06/02/23 2000  vancomycin (VANCOCIN) 1,000 mg in dextrose 5 % (D5W) 250 mL IVPB (Vial-Mate)  (MEDICATIONS - ANTIBIOTICS (VANCOMYCIN AND CLINDAMYCIN) FOR PCN ALLERGY OR KNOW COLONIZATION WITH MRSA TOTAL KNEE PATHWAY POST-OP)         06/03 0759 IV Every 12 hours (non-standard times) 06/02/23 1039 06/02/23 1500  ceFAZolin 2 g in dextrose 5 % in water (D5W) 5 % 50 mL IVPB (MB+)  (MEDICATIONS - ANTIBIOTICS NO PENICILLIN ALLERGY TOTAL KNEE PATHWAY POST-OP)         06/03 0659 IV Every 8 hours (non-standard times) 06/02/23 1039           Objective:     Catheter site clean, dry, intact    Vital Signs (Most Recent):  Temp: 36.2 °C (97.2 °F) (06/02/23 1039)  Pulse: 74 (06/02/23 1328)  Resp: 11 (06/02/23 1300)  BP: (!) 140/67 (06/02/23 1300)  SpO2: 99 % (06/02/23 1300) Vital Signs Range (Last 24H):  Temp:  [36.2 °C (97.2 °F)-36.9 °C (98.4 °F)]   Pulse:  [57-82]   Resp:  [11-20]   BP: (117-220)/()   SpO2:  [93 %-100 %]      I & O (Last 24H):    Intake/Output Summary (Last 24 hours) at 6/2/2023 1502  Last data filed at 6/2/2023 1455  Gross per 24 hour   Intake 2750 ml   Output 1251 ml   Net 1499 ml       Physical Exam:    GA: Resting, comfortable, no acute distress.   Pulmonary: Clear to auscultation A/P/L. No wheezing, crackles, or rhonchi.  Cardiac: RRR S1 & S2 w/o rubs/murmurs/gallops.   Abdominal:Bowel sounds present. No tenderness to palpation or distension. No appreciable hepatosplenomegaly.   Skin: No jaundice, rashes, or visible lesions.    Laboratory:  CBC: No results for input(s): WBC, RBC, HGB, HCT, PLT, MCV, MCH, MCHC in the last 72 hours.    BMP: No results for input(s): NA, K, CO2, CL, BUN, CREATININE, GLU, MG, PHOS, CALCIUM in the last 72 hours.    No results for input(s): PT, INR, PROTIME, APTT in the last 72  hours.    Assessment:    Pain control adequate  Patient had L adductor catheter placed yesterday preop however his surgery had to be postponed due to surgical instrument contamination. He is now POD 0 from L knee revision arthoplasty.       Plan:    1. Pain:  Continue PNC, patient connected to NIMBUS pump and can go home with NIMBUS  2. MMPC: tylenol 1000mg Q6H, robaxin 1000mg Q8h, lyrica 75mg QHS  3. Oxycodone 5mg Q3H PRN moderate pain 10mg Q3H severe pain  4. FEN/GI:  Tolerating liquids, advance diet as tolerated.  5. Dispo:  Pt working well with PT/OT. Case management and SW for placement. Plan for discharge tomorrow or possibly today if patient works well with PT and meets goals.    Anthony Sainz MD  Anesthesiology PGY-4

## 2023-06-02 NOTE — PT/OT/SLP EVAL
Occupational Therapy   Evaluation    Name: Fan Paz  MRN: [id number]  Admitting Diagnosis: Failed total knee arthroplasty  Recent Surgery: Procedure(s) (LRB):  REVISION, ARTHROPLASTY, KNEE (Left) Day of Surgery    Recommendations:     Discharge Recommendations: other (see comments)  Discharge Equipment Recommendations:  none  Barriers to discharge:  Other (Comment)    Assessment:     Fan Paz is a 83 y.o. male with a medical diagnosis of Failed total knee arthroplasty.  He presents with willingness to participate in the session. Pt. completed bed mobility and steps at EOB  . Performance deficits affecting function: weakness, impaired endurance, impaired self care skills, impaired functional mobility, decreased safety awareness, pain, orthopedic precautions, decreased ROM, edema, impaired cardiopulmonary response to activity, gait instability.      Rehab Prognosis: Good; patient would benefit from acute skilled OT services to address these deficits and reach maximum level of function.       Plan:     Patient to be seen 3 x/week to address the above listed problems via self-care/home management, therapeutic activities, therapeutic exercises, neuromuscular re-education  Plan of Care Expires: 07/02/23  Plan of Care Reviewed with: patient    Subjective     Chief Complaint: No reports   Patient/Family Comments/goals: get better go home     Occupational Profile:  Living Environment: Lives with wife in a H, with a ramp R HR and 4 [place] dafne HR,  with T/S combo  Previous level of function: Ind in all ADLs  and mobility   Roles and Routines:  and drives  Equipment Used at Home: bedside commode, walker, rolling  Assistance upon Discharge: Wife and from community     Pain/Comfort:  Pain Rating 1: 2/10  Location 1: knee  Pain Addressed 1: Reposition, Distraction, Cessation of Activity    Patients cultural, spiritual, Mormonism conflicts given the current situation: no    Objective:      Communicated with: RN prior to session.  Patient found HOB elevated with cryotherapy, telemetry, FCD, blood pressure cuff, peripheral IV, perineural catheter, oxygen, pulse ox (continuous) upon OT entry to room.    General Precautions: Standard, fall  Orthopedic Precautions: LLE weight bearing as tolerated  Braces: N/A  Respiratory Status: Nasal cannula, flow 1 L/min    Occupational Performance:    Bed Mobility:    Patient completed Scooting/Bridging to HOB with independence  Patient completed Supine to Sit with minimum  assistance  Patient completed Sit to Supine minimum assistance    Functional Mobility/Transfers:  Patient completed Sit <> Stand Transfer with contact guard assistance  with  rolling walker   Functional Mobility: Pt. Took ~4 step forward and backward and 2 lateral steps to HOB with RW with CGA    Activities of Daily Living:  Politely decline all ADLs    Cognitive/Visual Perceptual:  Cognitive/Psychosocial Skills:     -       Oriented to: Person, Place, and Situation   -       Follows Commands/attention:Follows multistep  commands  -       Communication: clear/fluent  -       Memory: No Deficits noted  -       Safety awareness/insight to disability: intact   -       Mood/Affect/Coping skills/emotional control: Appropriate to situation  Visual/Perceptual:      -Intact      Physical Exam:  Balance:  Static Sitting:  SBA  Static Standing: CGA with RW  Upper Extremity Range of Motion:     -       Right Upper Extremity: WFL  -       Left Upper Extremity: WFL  Upper Extremity Strength:    -       Right Upper Extremity: WFL  -       Left Upper Extremity: WFL   Strength:    -       Right Upper Extremity: WFL  -       Left Upper Extremity: WFL    AMPAC 6 Click ADL:  AMPAC Total Score: 20    Treatment & Education:  Pt.educated and reviewed WB precautions  Pt educated on role of occupational therapy, POC, and safety during ADLs and functional mobility. Pt and OT discussed importance of safe, continued  mobility to optimize daily living skills. Pt verbalized understanding. Pt given instruction to call for medical staff/nurse for assistance.       Patient left HOB elevated with all lines intact, call button in reach, and RN notified    GOALS:   Multidisciplinary Problems       Occupational Therapy Goals          Problem: Occupational Therapy    Goal Priority Disciplines Outcome Interventions   Occupational Therapy Goal     OT, PT/OT Ongoing, Progressing    Description: Goals to be met by: 6/12/23     Patient will increase functional independence with ADLs by performing:    UE Dressing with Modified [place].  LE Dressing with Modified [place].  Grooming while standing at sink with Modified [place].  Toileting from toilet with Modified [place] for hygiene and clothing management.   Toilet transfer to toilet with Modified [place].                         History:     Past Medical History:   Diagnosis Date    Arthritis     Cancer of palate     GERD (gastroesophageal reflux disease)     HTN (hypertension)     Hyperlipidemia     Prostate cancer     2011    Pulmonary embolism          Past Surgical History:   Procedure Laterality Date    BACK SURGERY  y-7    Cancer of palate surgery      Late 90's- [facility]     CARPAL TUNNEL RELEASE  8-14-13    right hand,Left hand 2013    COLONOSCOPY N/A 4/10/2017    Procedure: COLONOSCOPY;  Surgeon: Nilo Kelly MD;  Location: [facility];  Service: Endoscopy;  Laterality: N/A;    COLONOSCOPY N/A 10/21/2020    Procedure: COLONOSCOPY;  Surgeon: Demetrius Araujo MD;  Location: [facility];  Service: Endoscopy;  Laterality: N/A;    ESOPHAGOGASTRODUODENOSCOPY N/A 5/3/2023    Procedure: EGD (ESOPHAGOGASTRODUODENOSCOPY);  Surgeon: Shauna Lopez MD;  Location: [facility];  Service: Endoscopy;  Laterality: N/A;  EGD (with Dr. Lopez or Dr. Parada), : 1-4 weeks, Provider: Dr. Lopez or Dr. Parada Location: [facility], instructions sent to email address  [email or web address]    KNEE SURGERY  y-41    left knee    KNEE SURGERY Right 12-1-15    TKR    Radio active seed Implant      January 2012    TOTAL HIP ARTHROPLASTY  3/2011       Time Tracking:     OT Date of Treatment: 06/02/23  OT Start Time: 1330  OT Stop Time: 1349  OT Total Time (min): 19 min    Billable Minutes:Evaluation 9  Therapeutic Activity 10    6/2/2023

## 2023-06-02 NOTE — PLAN OF CARE
Problem: Occupational Therapy  Goal: Occupational Therapy Goal  Description: Goals to be met by: 6/12/23     Patient will increase functional independence with ADLs by performing:    UE Dressing with Modified [place].  LE Dressing with Modified [place].  Grooming while standing at sink with Modified [place].  Toileting from toilet with Modified [place] for hygiene and clothing management.   Toilet transfer to toilet with Modified [place].    Outcome: Ongoing, Progressing

## 2023-06-02 NOTE — CARE UPDATE
Ortho Post-Op Progress Note:    Patient seen and examined at bedside. He is POD0 from left TKA rev. He is comfortable and reports minimal pain. Vital signs are stable. Dressings are c/d/i with ice in place.    RLE: 5/5 strength with ankle DF/PF and great toe flexion/extension. Sensation to light touch intact throughout lower extremity. Palpable DP pulse.    LLE: 5/5 strength with ankle DF/PF and great toe flexion/extension. Sensation to light touch intact throughout lower extremity. Palpable DP pulse.    Labs: reviewed  Imaging: reviewed    Plan:  -- Continue with current pain control regimen  -- PT/OT. WBAT  -- DVT prophylaxis: Eliquis 2.5mg BID, FCD's to be worn at all times  -- Ready for PT

## 2023-06-02 NOTE — NURSING TRANSFER
Nursing Transfer Note      6/2/2023     Reason patient is being transferred: post procedure    Transfer To: room 553    Transfer via bed    Transfer with 2L O2 per NC, IV Infusion, sapphire pump with charging cable and bolus handle, polar care cube, ice wrap with 2 extra ice packs    Transported by patient transport    Medicines sent: Nimbus pump and ropivicaine (not currently infusing -- sent with patient in black bag)    Any special needs or follow-up needed: routine    Chart send with patient: Yes    Notified: son    Patient reassessed at: 6/2/2023 4:00 PM

## 2023-06-03 PROCEDURE — 97535 SELF CARE MNGMENT TRAINING: CPT | Mod: CO

## 2023-06-03 PROCEDURE — 63600175 PHARM REV CODE 636 W HCPCS

## 2023-06-03 PROCEDURE — 97530 THERAPEUTIC ACTIVITIES: CPT | Mod: CO

## 2023-06-03 PROCEDURE — 25000003 PHARM REV CODE 250: Performed by: STUDENT IN AN ORGANIZED HEALTH CARE EDUCATION/TRAINING PROGRAM

## 2023-06-03 PROCEDURE — 11000001 HC ACUTE MED/SURG PRIVATE ROOM

## 2023-06-03 PROCEDURE — 99232 PR SUBSEQUENT HOSPITAL CARE,LEVL II: ICD-10-PCS | Mod: ,,, | Performed by: ANESTHESIOLOGY

## 2023-06-03 PROCEDURE — 99232 SBSQ HOSP IP/OBS MODERATE 35: CPT | Mod: ,,, | Performed by: ANESTHESIOLOGY

## 2023-06-03 RX ADMIN — ACETAMINOPHEN 1000 MG: 500 TABLET ORAL at 06:06

## 2023-06-03 RX ADMIN — FAMOTIDINE 20 MG: 20 TABLET, FILM COATED ORAL at 08:06

## 2023-06-03 RX ADMIN — METHOCARBAMOL 1000 MG: 500 TABLET ORAL at 05:06

## 2023-06-03 RX ADMIN — SENNOSIDES AND DOCUSATE SODIUM 1 TABLET: 50; 8.6 TABLET ORAL at 08:06

## 2023-06-03 RX ADMIN — METHOCARBAMOL 1000 MG: 500 TABLET ORAL at 09:06

## 2023-06-03 RX ADMIN — PREGABALIN 75 MG: 75 CAPSULE ORAL at 08:06

## 2023-06-03 RX ADMIN — MUPIROCIN 1 G: 20 OINTMENT TOPICAL at 09:06

## 2023-06-03 RX ADMIN — METHOCARBAMOL 1000 MG: 500 TABLET ORAL at 01:06

## 2023-06-03 RX ADMIN — ACETAMINOPHEN 1000 MG: 500 TABLET ORAL at 11:06

## 2023-06-03 RX ADMIN — APIXABAN 2.5 MG: 2.5 TABLET, FILM COATED ORAL at 08:06

## 2023-06-03 RX ADMIN — MUPIROCIN 1 G: 20 OINTMENT TOPICAL at 08:06

## 2023-06-03 RX ADMIN — ACETAMINOPHEN 1000 MG: 500 TABLET ORAL at 12:06

## 2023-06-03 RX ADMIN — POLYETHYLENE GLYCOL 3350 17 G: 17 POWDER, FOR SOLUTION ORAL at 08:06

## 2023-06-03 RX ADMIN — ACETAMINOPHEN 1000 MG: 500 TABLET ORAL at 05:06

## 2023-06-03 NOTE — PLAN OF CARE
[facility] - Surgery  Initial Discharge Assessment       Primary Care Provider: Otilio Damon MD    Admission Diagnosis: Failure of total knee replacement, sequela [T84.018S, Z96.659]    Admission Date: 6/2/2023  Expected Discharge Date:     Transition of Care Barriers: None    Payor: MEDICARE / Plan: MEDICARE PART A & B / Product Type: Government /     Extended Emergency Contact Information  Primary Emergency Contact: Bonnie Paz  Address: [place] [address] [address] [address]  Home Phone: [phone]  Mobile Phone: [phone]  Relation: Spouse  Secondary Emergency Contact: Bella Paz  Relation: Daughter  Preferred language: English   needed? No    Discharge Plan A: Home with family  Discharge Plan B: Home Health      [id number]  [place] - [address]  [address]  [place] [address]  Phone: [phone] Fax: [phone]    [id number] - [place] - [address] [facility]  [address] [place] [address]  Phone: [phone] Fax: [phone]    CM at bedside to discuss discharge planning assessment.   Patient and his wife live in a one-story home.   Independent with ADL and does not use medical equipment.  He goes to an outpatient clinic ([facility]) for wound care to his leg.   Explained CM services during patient's stay in hospital.   My Health packet discussed and left with patient.     Initial Assessment (most recent)       Adult Discharge Assessment - 06/03/23 1353          Discharge Assessment    Assessment Type Discharge Planning Assessment     Confirmed/corrected address, phone number and insurance Yes     Confirmed Demographics Correct on Facesheet     Source of Information patient;family     Reason For Admission failure of total knee repair     People in Home spouse     Do you expect to return to your current living situation? Yes     Do you have help at home or someone to help you manage your care at home? No      Prior to hospitilization cognitive status: Alert/Oriented     Current cognitive status: Alert/Oriented     Walking or Climbing Stairs ambulation difficulty, requires equipment     Dressing/Bathing Management shower change     Home Layout Able to live on 1st floor     Equipment Currently Used at Home bedside commode;rollator;cane, straight     Readmission within 30 days? No     Patient currently being followed by outpatient case management? No     Do you currently have service(s) that help you manage your care at home? No     Do you take prescription medications? Yes     Do you have prescription coverage? Yes     Coverage MEDICARE PART A & B     Do you have any problems affording any of your prescribed medications? No     Is the patient taking medications as prescribed? yes     How do you get to doctors appointments? family or friend will provide     Are you on dialysis? No     Do you take coumadin? No     Discharge Plan A Home with family     Discharge Plan B Home Health     DME Needed Upon Discharge  --   tbd    Discharge Plan discussed with: Patient;Spouse/sig other     Transition of Care Barriers None        Physical Activity    On average, how many days per week do you engage in moderate to strenuous exercise (like a brisk walk)? 7 days     On average, how many minutes do you engage in exercise at this level? 30 min        Financial Resource Strain    How hard is it for you to pay for the very basics like food, housing, medical care, and heating? Not hard at all        Housing Stability    In the last 12 months, was there a time when you were not able to pay the mortgage or rent on time? No     In the last 12 months, how many places have you lived? 1     In the last 12 months, was there a time when you did not have a steady place to sleep or slept in a shelter (including now)? No        Transportation Needs    In the past 12 months, has lack of transportation kept you from medical appointments or from getting  medications? No     In the past 12 months, has lack of transportation kept you from meetings, work, or from getting things needed for daily living? No        Food Insecurity    Within the past 12 months, you worried that your food would run out before you got the money to buy more. Never true     Within the past 12 months, the food you bought just didn't last and you didn't have money to get more. Never true        Stress    Do you feel stress - tense, restless, nervous, or anxious, or unable to sleep at night because your mind is troubled all the time - these days? Not at all        Social Connections    In a typical week, how many times do you talk on the phone with family, friends, or neighbors? More than three times a week     How often do you get together with friends or relatives? More than three times a week     How often do you attend Anglican or Episcopal services? More than 4 times per year     Do you belong to any clubs or organizations such as Anglican groups, unions, fraternal or athletic groups, or school groups? No     How often do you attend meetings of the clubs or organizations you belong to? Never     Are you , , , , never , or living with a partner?         Alcohol Use    Q1: How often do you have a drink containing alcohol? Never     Q2: How many drinks containing alcohol do you have on a typical day when you are drinking? Patient does not drink     Q3: How often do you have six or more drinks on one occasion? Never

## 2023-06-03 NOTE — SUBJECTIVE & OBJECTIVE
"Principal Problem:Failed total knee arthroplasty    Principal Orthopedic Problem: same, s/p revision L TKA 6/2/23    Interval History: Pt seen and examined at bedside. NAEON, VSS, AF. Pain controlled. Denies fevers, chills, chest pain, SOB, N/V/D.   3-4 steps with PT. Very motivated this AM.    Review of patient's allergies indicates:  No Known Allergies    Current Facility-Administered Medications   Medication    0.9%  NaCl infusion    0.9%  NaCl infusion    acetaminophen tablet 1,000 mg    apixaban tablet 2.5 mg    bisacodyL suppository 10 mg    famotidine tablet 20 mg    hydrALAZINE injection 10 mg    melatonin tablet 6 mg    methocarbamoL tablet 1,000 mg    morphine injection 2 mg    mupirocin 2 % ointment 1 g    naloxone 0.4 mg/mL injection 0.02 mg    ondansetron injection 4 mg    oxyCODONE immediate release tablet 5 mg    oxyCODONE immediate release tablet Tab 10 mg    polyethylene glycol packet 17 g    pregabalin capsule 75 mg    prochlorperazine injection Soln 5 mg    ROPIvacaine (PF) 2 mg/ml (0.2%) solution    senna-docusate 8.6-50 mg per tablet 1 tablet     Objective:     Vital Signs (Most Recent):  Temp: 97.5 °F (36.4 °C) (06/03/23 0443)  Pulse: 75 (06/03/23 0443)  Resp: 20 (06/03/23 0443)  BP: (!) 160/87 (06/03/23 0527)  SpO2: (!) 93 % (06/03/23 0443) Vital Signs (24h Range):  Temp:  [97.2 °F (36.2 °C)-98.2 °F (36.8 °C)] 97.5 °F (36.4 °C)  Pulse:  [56-76] 75  Resp:  [11-20] 20  SpO2:  [93 %-100 %] 93 %  BP: (117-220)/() 160/87     Weight: 79.8 kg (175 lb 14.8 oz)  Height: 5' 4.5" (163.8 cm)  Body mass index is 29.73 kg/m².      Intake/Output Summary (Last 24 hours) at 6/3/2023 0642  Last data filed at 6/3/2023 0536  Gross per 24 hour   Intake 2750 ml   Output 2551 ml   Net 199 ml        Ortho/SPM Exam     AAOx4  NAD  Reg rate  No increased WOB    LLE:  Dressing c/d/i  Polar ice in place  SILT T/SP/DP/Person/Sa  Motor intact T/SP/DP  WWP extremities  FCDs in place and functioning    Significant Labs: " All pertinent labs within the past 24 hours have been reviewed.    Significant Imaging: I have reviewed all pertinent imaging results/findings.

## 2023-06-03 NOTE — ANESTHESIA POST-OP PAIN MANAGEMENT
Acute Pain Service and Perioperative Surgical Home Progress Note    HPI  Fan Paz is a 83 y.o., male,     Interval history    Surgery:  Procedure(s) (LRB):  REVISION, ARTHROPLASTY, KNEE (Left)    Post Op Day #: 1    Catheter type: Perineural Adductor Canal    Infusion type: Ropivacaine 0.2%  8 ml/hr basal    Problem List:    Active Hospital Problems    Diagnosis  POA    *Failed total knee arthroplasty [T84.018A, Z96.659]  Not Applicable      Resolved Hospital Problems   No resolved problems to display.       Subjective    Patient doing well in post op, required hand bolus of local post op but able to work with PT in PACU   General appearance of alert, oriented, no complaints   Pain with rest: 1    Numbers   Pain with movement: 5    Numbers   Side Effects    1. Pruritis No    2. Nausea No    3. Motor Blockade No, 1=Ability to bend knees and ankles    4. Sedation Yes, 2=slightly drowsy, easily aroused    Schedule Medications:    acetaminophen  1,000 mg Oral Q6H    apixaban  2.5 mg Oral BID    famotidine  20 mg Oral BID    methocarbamoL  1,000 mg Oral Q8H    mupirocin  1 g Nasal BID    polyethylene glycol  17 g Oral Daily    pregabalin  75 mg Oral QHS    senna-docusate 8.6-50 mg  1 tablet Oral BID        Continuous Infusions:   ROPIvacaine (PF) 2 mg/ml (0.2%) 0.1 mL/hr (06/02/23 1115)        PRN Medications:  bisacodyL, hydrALAZINE, melatonin, morphine, naloxone, ondansetron, oxyCODONE, oxyCODONE, prochlorperazine       Antibiotics:  Antibiotics (From admission, onward)    Start     Stop Route Frequency Ordered    06/02/23 2100  mupirocin 2 % ointment 1 g         06/07 2059 Nasl 2 times daily 06/02/23 1039           Objective:     Catheter site clean, dry, intact    Vital Signs (Most Recent):  Temp: 36.4 °C (97.5 °F) (06/03/23 1415)  Pulse: 81 (06/03/23 1415)  Resp: 18 (06/03/23 1415)  BP: (!) 183/86 (06/03/23 1415)  SpO2: 98 % (06/03/23 1415) Vital Signs Range (Last 24H):  Temp:  [36.1 °C (96.9  °F)-36.8 °C (98.2 °F)]   Pulse:  [63-81]   Resp:  [16-20]   BP: (142-185)/(71-87)   SpO2:  [93 %-98 %]      I & O (Last 24H):    Intake/Output Summary (Last 24 hours) at 6/3/2023 1541  Last data filed at 6/3/2023 1238  Gross per 24 hour   Intake --   Output 1700 ml   Net -1700 ml       Physical Exam:    GA: Resting, comfortable, no acute distress.   Pulmonary: Clear to auscultation A/P/L. No wheezing, crackles, or rhonchi.  Cardiac: RRR S1 & S2 w/o rubs/murmurs/gallops.   Abdominal:Bowel sounds present. No tenderness to palpation or distension. No appreciable hepatosplenomegaly.   Skin: No jaundice, rashes, or visible lesions.    Laboratory:  CBC: No results for input(s): WBC, RBC, HGB, HCT, PLT, MCV, MCH, MCHC in the last 72 hours.    BMP: No results for input(s): NA, K, CO2, CL, BUN, CREATININE, GLU, MG, PHOS, CALCIUM in the last 72 hours.    No results for input(s): PT, INR, PROTIME, APTT in the last 72 hours.    Assessment:    Pain control adequate . He is now POD1 from L knee revision arthoplasty pending discharge.      Plan:    1. Pain:  Continue PNC, patient connected to NIMBUS pump and can go home with NIMBUS  2. MMPC: tylenol 1000mg Q6H, robaxin 1000mg Q8h, lyrica 75mg QHS  3. Oxycodone 5mg Q3H PRN moderate pain 10mg Q3H severe pain  4. FEN/GI:  Tolerating liquids, advance diet as tolerated.  5. Dispo:  Pt working well with PT/OT. Case management and SW for placement. Plan for discharge tomorrow or possibly today if patient works well with PT and meets goals.      Sean Ingram MD  Department of Anesthesiology  [facility]  06/03/2023 3:43 PM

## 2023-06-03 NOTE — PT/OT/SLP PROGRESS
"Occupational Therapy   Treatment    Name: Fan Paz  MRN: [id number]  Admitting Diagnosis:  Failed total knee arthroplasty  1 Day Post-Op  Procedure(s):  REVISION, ARTHROPLASTY, KNEE     Recommendations:     Discharge Recommendations: other (see comments)  Discharge Equipment Recommendations:  walker, rolling  Barriers to discharge:  None    Assessment:     Fan Paz is a 83 y.o. male with a medical diagnosis of Failed total knee arthroplasty.  He presents with the following performance deficits affecting function are weakness, impaired endurance, impaired self care skills, impaired functional mobility, gait instability, impaired balance, pain, decreased safety awareness, decreased lower extremity function, orthopedic precautions, decreased ROM. Patient agreeable to OT session and tolerated well. Patient is highly motivated and is progressing well with OT intervention. Patient would benefit from continued OT services to address deficits and progress towards goals. Continue OT POC.    Rehab Prognosis:  Good; patient would benefit from acute skilled OT services to address these deficits and reach maximum level of function.       Plan:     Patient to be seen 3 x/week to address the above listed problems via self-care/home management, therapeutic activities, therapeutic exercises, neuromuscular re-education  Plan of Care Expires: 07/02/23  Plan of Care Reviewed with: patient    Subjective     Chief Complaint: "I don't need those walkers. I have the one with the seat at home."  Patient/Family Comments/goals: return to PLOF  Pain/Comfort:  Pain Rating 1: 0/10  Pain Rating Post-Intervention 1: 0/10    Objective:     Communicated with: nurse tram and OTR prior to session.  Patient found HOB elevated with cryotherapy, telemetry, FCD, perineural catheter upon OT entry to room.  A client care conference was completed by the OTR and the YUNG prior to treatment by the YUNG to discuss the patient's POC and " current status.    General Precautions: Standard, fall    Orthopedic Precautions:LLE weight bearing as tolerated  Braces: N/A  Respiratory Status: Room air     Occupational Performance:     Bed Mobility:    Patient completed Supine to Sit with contact guard assistance     Functional Mobility/Transfers:  Patient completed Sit <> Stand Transfer with contact guard assistance  with  rolling walker   Patient completed Bed > Chair Transfer using Step Transfer technique with contact guard assistance with rolling walker  Patient completed Toilet Transfer Step Transfer technique with contact guard assistance with  rolling walker and grab bars  Functional Mobility: ~18ft x2 then ~50ft x2 after seated rest break using RW with CGA progressing to SBA; min cues for pacing and maintaining upright posture    Activities of Daily Living:  Grooming: stand by assistance for LE weakness to complete oral/denture hygiene and facial hygiene standing sink side  Pt tolerated ~4 min in standing with SBA for static balance, no seated rest break taken  Upper Body Dressing: contact guard assistance to don gown posteriorly like a robe      [facility] 6 Click ADL: 21    Treatment & Education:  Patient educated on OT POC, goals, and current progress  Patient participated in ADL re-training and functional mobility/transfer training as indicated above  Patient is safe and encouraged to mobilize with nursing (A) of 1 person using RW within room to maximize recovery and increase occupational performance  Addressed all patient questions/concerns within YUNG scope of practice.     Patient left up in chair with all lines intact, call button in reach, nurse notified, and wife present    GOALS:   Multidisciplinary Problems       Occupational Therapy Goals          Problem: Occupational Therapy    Goal Priority Disciplines Outcome Interventions   Occupational Therapy Goal     OT, PT/OT Ongoing, Progressing    Description: Goals to be met by: 6/12/23     Patient  will increase functional independence with ADLs by performing:    UE Dressing with Modified [place].  LE Dressing with Modified [place].  Grooming while standing at sink with Modified [place].  Toileting from toilet with Modified [place] for hygiene and clothing management.   Toilet transfer to toilet with Modified [place].                         Time Tracking:     OT Date of Treatment: 06/03/23  OT Start Time: 1014  OT Stop Time: 1043  OT Total Time (min): 29 min    Billable Minutes:Self Care/Home Management 14  Therapeutic Activity 15    OT/BECKY: BECKY     Number of BECKY visits since last OT visit: 1    6/3/2023

## 2023-06-03 NOTE — ASSESSMENT & PLAN NOTE
Fan Paz is a 83 y.o. male is s/p revision L TKA on 6/2/23    Surgical dressing C/D/I, bulky dressing taken down today, polar ice in place  Pain control: multimodal, Anesthesia Surgical Home following  PT/OT: WBAT LLE  DVT PPx: Eliquis 2.5 BID, FCDs at all times when not ambulating  Abx: postop Ancef  Drain: none  Yee: none    Dispo: plan to dc to home today vs tomorrow once cleared by PT/OT

## 2023-06-03 NOTE — PROGRESS NOTES
"[facility] - Surgery  Orthopedics  Progress Note    Patient Name: Fan Paz  MRN: [id number]  Admission Date: 6/2/2023  Hospital Length of Stay: 1 days  Attending Provider: Dustin Paul MD  Primary Care Provider: Otilio Damon MD  Follow-up For: Procedure(s) (LRB):  REVISION, ARTHROPLASTY, KNEE (Left)    Post-Operative Day: 1 Day Post-Op  Subjective:     Principal Problem:Failed total knee arthroplasty    Principal Orthopedic Problem: same, s/p revision L TKA 6/2/23    Interval History: Pt seen and examined at bedside. NAEON, VSS, AF. Pain controlled. Denies fevers, chills, chest pain, SOB, N/V/D.   3-4 steps with PT. Very motivated this AM.    Review of patient's allergies indicates:  No Known Allergies    Current Facility-Administered Medications   Medication    0.9%  NaCl infusion    0.9%  NaCl infusion    acetaminophen tablet 1,000 mg    apixaban tablet 2.5 mg    bisacodyL suppository 10 mg    famotidine tablet 20 mg    hydrALAZINE injection 10 mg    melatonin tablet 6 mg    methocarbamoL tablet 1,000 mg    morphine injection 2 mg    mupirocin 2 % ointment 1 g    naloxone 0.4 mg/mL injection 0.02 mg    ondansetron injection 4 mg    oxyCODONE immediate release tablet 5 mg    oxyCODONE immediate release tablet Tab 10 mg    polyethylene glycol packet 17 g    pregabalin capsule 75 mg    prochlorperazine injection Soln 5 mg    ROPIvacaine (PF) 2 mg/ml (0.2%) solution    senna-docusate 8.6-50 mg per tablet 1 tablet     Objective:     Vital Signs (Most Recent):  Temp: 97.5 °F (36.4 °C) (06/03/23 0443)  Pulse: 75 (06/03/23 0443)  Resp: 20 (06/03/23 0443)  BP: (!) 160/87 (06/03/23 0527)  SpO2: (!) 93 % (06/03/23 0443) Vital Signs (24h Range):  Temp:  [97.2 °F (36.2 °C)-98.2 °F (36.8 °C)] 97.5 °F (36.4 °C)  Pulse:  [56-76] 75  Resp:  [11-20] 20  SpO2:  [93 %-100 %] 93 %  BP: (117-220)/() 160/87     Weight: 79.8 kg (175 lb 14.8 oz)  Height: 5' 4.5" (163.8 cm)  Body mass index is " 29.73 kg/m².      Intake/Output Summary (Last 24 hours) at 6/3/2023 0642  Last data filed at 6/3/2023 0536  Gross per 24 hour   Intake 2750 ml   Output 2551 ml   Net 199 ml        Ortho/SPM Exam     AAOx4  NAD  Reg rate  No increased WOB    LLE:  Dressing c/d/i  Polar ice in place  SILT T/SP/DP/Person/Sa  Motor intact T/SP/DP  WWP extremities  FCDs in place and functioning    Significant Labs: All pertinent labs within the past 24 hours have been reviewed.    Significant Imaging: I have reviewed all pertinent imaging results/findings.    Assessment/Plan:     * Failed total knee arthroplasty  Fan Paz is a 83 y.o. male is s/p revision L TKA on 6/2/23    Surgical dressing C/D/I, bulky dressing taken down today, polar ice in place  Pain control: multimodal, Anesthesia Surgical Home following  PT/OT: WBAT LLE  DVT PPx: Eliquis 2.5 BID, FCDs at all times when not ambulating  Abx: postop Ancef  Drain: none  Yee: none    Dispo: plan to dc to home today vs tomorrow once cleared by PT/OT            Nicola Go MD  Orthopedics  [facility] - Surgery

## 2023-06-04 VITALS
DIASTOLIC BLOOD PRESSURE: 72 MMHG | RESPIRATION RATE: 20 BRPM | WEIGHT: 175.94 LBS | HEIGHT: 65 IN | HEART RATE: 102 BPM | SYSTOLIC BLOOD PRESSURE: 136 MMHG | BODY MASS INDEX: 29.31 KG/M2 | OXYGEN SATURATION: 94 % | TEMPERATURE: 97 F

## 2023-06-04 PROCEDURE — 99232 SBSQ HOSP IP/OBS MODERATE 35: CPT | Mod: ,,, | Performed by: ANESTHESIOLOGY

## 2023-06-04 PROCEDURE — 97116 GAIT TRAINING THERAPY: CPT | Mod: CQ

## 2023-06-04 PROCEDURE — 97110 THERAPEUTIC EXERCISES: CPT | Mod: CQ

## 2023-06-04 PROCEDURE — 99232 PR SUBSEQUENT HOSPITAL CARE,LEVL II: ICD-10-PCS | Mod: ,,, | Performed by: ANESTHESIOLOGY

## 2023-06-04 PROCEDURE — 25000003 PHARM REV CODE 250: Performed by: STUDENT IN AN ORGANIZED HEALTH CARE EDUCATION/TRAINING PROGRAM

## 2023-06-04 RX ADMIN — ACETAMINOPHEN 1000 MG: 500 TABLET ORAL at 11:06

## 2023-06-04 RX ADMIN — MUPIROCIN 1 G: 20 OINTMENT TOPICAL at 08:06

## 2023-06-04 RX ADMIN — FAMOTIDINE 20 MG: 20 TABLET, FILM COATED ORAL at 08:06

## 2023-06-04 RX ADMIN — APIXABAN 2.5 MG: 2.5 TABLET, FILM COATED ORAL at 08:06

## 2023-06-04 RX ADMIN — METHOCARBAMOL 1000 MG: 500 TABLET ORAL at 01:06

## 2023-06-04 RX ADMIN — POLYETHYLENE GLYCOL 3350 17 G: 17 POWDER, FOR SOLUTION ORAL at 08:06

## 2023-06-04 RX ADMIN — ACETAMINOPHEN 1000 MG: 500 TABLET ORAL at 01:06

## 2023-06-04 RX ADMIN — METHOCARBAMOL 1000 MG: 500 TABLET ORAL at 05:06

## 2023-06-04 RX ADMIN — SENNOSIDES AND DOCUSATE SODIUM 1 TABLET: 50; 8.6 TABLET ORAL at 08:06

## 2023-06-04 NOTE — ASSESSMENT & PLAN NOTE
Fan Paz is a 83 y.o. male is s/p revision L TKA on 6/2/23    Surgical dressing C/D/I, bulky dressing taken down today, polar ice in place  Pain control: multimodal, Anesthesia Surgical Home following  PT/OT: WBAT LLE  DVT PPx: Eliquis 2.5 BID, FCDs at all times when not ambulating  Abx: postop Ancef, compelted  Drain: none  Yee: none    Dispo: plan to dc to home today

## 2023-06-04 NOTE — ANESTHESIA POST-OP PAIN MANAGEMENT
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       Subjective    Patient doing well in post op, required hand bolus of local post op but able to work with PT in PACU   General appearance of alert, oriented, no complaints   Pain with rest: 1    Numbers   Pain with movement: 5    Numbers   Side Effects    1. Pruritis No    2. Nausea No    3. Motor Blockade No, 1=Ability to bend knees and ankles    4. Sedation Yes, 2=slightly drowsy, easily aroused    Schedule Medications:    acetaminophen  1,000 mg Oral Q6H    apixaban  2.5 mg Oral BID    famotidine  20 mg Oral BID    methocarbamoL  1,000 mg Oral Q8H    mupirocin  1 g Nasal BID    polyethylene glycol  17 g Oral Daily    pregabalin  75 mg Oral QHS    senna-docusate 8.6-50 mg  1 tablet Oral BID        Continuous Infusions:   ROPIvacaine (PF) 2 mg/ml (0.2%) Stopped (06/04/23 0630)        PRN Medications:  bisacodyL, hydrALAZINE, melatonin, morphine, naloxone, ondansetron, oxyCODONE, oxyCODONE, prochlorperazine       Antibiotics:  Antibiotics (From admission, onward)    Start     Stop Route Frequency Ordered    06/02/23 2100  mupirocin 2 % ointment 1 g         06/07 2059 Nasl 2 times daily 06/02/23 1039           Objective:     Catheter site clean, dry, intact    Vital Signs (Most Recent):  Temp: 36.1 °C (96.9 °F) (06/04/23 0700)  Pulse: 102 (06/04/23 0459)  Resp: 20 (06/04/23 0438)  BP: 136/72 (06/04/23 0700)  SpO2: (!) 94 % (06/04/23 0700) Vital Signs Range (Last  24H):  Temp:  [36.1 °C (96.9 °F)-36.6 °C (97.9 °F)]   Pulse:  []   Resp:  [18-20]   BP: (134-189)/(72-94)   SpO2:  [94 %-98 %]      I & O (Last 24H):    Intake/Output Summary (Last 24 hours) at 6/4/2023 1021  Last data filed at 6/4/2023 0700  Gross per 24 hour   Intake --   Output 1350 ml   Net -1350 ml       Physical Exam:    GA: Resting, comfortable, no acute distress.   Pulmonary: Clear to auscultation A/P/L. No wheezing, crackles, or rhonchi.  Cardiac: RRR S1 & S2 w/o rubs/murmurs/gallops.   Abdominal:Bowel sounds present. No tenderness to palpation or distension. No appreciable hepatosplenomegaly.   Skin: No jaundice, rashes, or visible lesions.    Laboratory:  CBC: No results for input(s): WBC, RBC, HGB, HCT, PLT, MCV, MCH, MCHC in the last 72 hours.    BMP: No results for input(s): NA, K, CO2, CL, BUN, CREATININE, GLU, MG, PHOS, CALCIUM in the last 72 hours.    No results for input(s): PT, INR, PROTIME, APTT in the last 72 hours.    Assessment:    Pain control adequate . He is now POD1 from L knee revision arthoplasty pending discharge.      Plan:    1. Pain:  Paused PNC this morning. Will remove prior to discharge  2. MMPC: tylenol 1000mg Q6H, robaxin 1000mg Q8h, lyrica 75mg QHS  3. Oxycodone 5mg Q3H PRN   4. FEN/GI:  Tolerating liquids, advance diet as tolerated.  5. Dispo:  Pt working well with PT/OT. Case management and SW for placement.       Case discussed with staff, Dr. Chatterjee; final recommendations per attestation above.    Thank you for the consult and allowing us to participate in the care of this patient. We will sign off now. Please call Acute Pain Service/Anesthesia if you have any further questions or concerns.    Sean Ingram MD  Department of Anesthesiology  [facility]  06/04/2023

## 2023-06-04 NOTE — PT/OT/SLP PROGRESS
Physical Therapy Treatment    Patient Name:  Fan Paz   MRN:  [id number]    Recommendations:     Discharge Recommendations: other (see comments)  Discharge Equipment Recommendations: walker, rolling  Barriers to discharge: None    Assessment:     Fan Paz is a 83 y.o. male admitted with a medical diagnosis of Failed total knee arthroplasty.  He presents with the following impairments/functional limitations: weakness, impaired endurance, impaired self care skills, impaired functional mobility, gait instability, impaired balance, decreased safety awareness, pain, decreased ROM, impaired skin, edema, orthopedic precautions Patient demonstrated improved transfer ability and walking range today, but needs cueing to correct posture and to decrease malick.    Rehab Prognosis: Good; patient would benefit from acute skilled PT services to address these deficits and reach maximum level of function.    Recent Surgery: Procedure(s) (LRB):  REVISION, ARTHROPLASTY, KNEE (Left) 2 Days Post-Op    Plan:     During this hospitalization, patient to be seen daily to address the identified rehab impairments via gait training, therapeutic activities, therapeutic exercises, neuromuscular re-education and progress toward the following goals:    Plan of Care Expires:  07/02/23    Subjective     Chief Complaint: a little stiff  Patient/Family Comments/goals: to go home  Pain/Comfort:  Pain Rating 1: 5/10  Location - Side 1: Left  Location - Orientation 1: generalized  Location 1: knee  Pain Addressed 1: Reposition, Distraction  Pain Rating Post-Intervention 1: 6/10      Objective:     Communicated with NSG prior to session.  Patient found HOB elevated with cryotherapy, FCD, telemetry, perineural catheter upon PT entry to room.     General Precautions: Standard, fall  Orthopedic Precautions: LLE weight bearing as tolerated  Braces: N/A  Respiratory Status: Room air     Functional Mobility:  Bed Mobility:     Rolling  Right: supervision and stand by assistance  Scooting: stand by assistance  Supine to Sit: stand by assistance  Transfers:     Sit to Stand:  contact guard assistance with rolling walker  Bed to Chair: contact guard assistance with  rolling walker  using  Stand Pivot  Gait: ~128 ft with RW and CGA, with L LE WBAT and cues to correct posture and to decrease malick.      AM-PAC 6 CLICK MOBILITY  Turning over in bed (including adjusting bedclothes, sheets and blankets)?: 4  Sitting down on and standing up from a chair with arms (e.g., wheelchair, bedside commode, etc.): 3  Moving from lying on back to sitting on the side of the bed?: 4  Moving to and from a bed to a chair (including a wheelchair)?: 3  Need to walk in hospital room?: 3  Climbing 3-5 steps with a railing?: 2  Basic Mobility Total Score: 19       Treatment & Education:  Doffed/Donned FCDs and Polar Ice. Donned a second gown. There ex: HS, QUAD SETS AND AP 2X12 REPS.    Patient left up in chair with all lines intact and call button in reach..    GOALS:   Multidisciplinary Problems       Physical Therapy Goals          Problem: Physical Therapy    Goal Priority Disciplines Outcome Goal Variances Interventions   Physical Therapy Goal     PT, PT/OT Ongoing, Progressing     Description: Goals to be met by: 2023     Patient will increase functional independence with mobility by performin. Supine to sit with Garden  2. Sit to supine with Garden  3. Sit to stand transfer with Modified Garden with RW  4. Bed to chair transfer with Modified Garden using Rolling Walker  5. Gait  x 200 feet with Modified Garden using Rolling Walker.   6. Ascend/descend 4 stair with bilateral Handrails Supervision using No Assistive Device.   7. Lower extremity exercise program x10 reps, with supervision                         Time Tracking:     PT Received On: 23  PT Start Time: 952     PT Stop Time: 1017  PT Total Time (min): 25 min      Billable Minutes: Gait Training 15 and Therapeutic Exercise 10    Treatment Type: Treatment  PT/PTA: PTA     Number of PTA visits since last PT visit: 1 06/04/2023

## 2023-06-04 NOTE — ANESTHESIA POST-OP PAIN MANAGEMENT
Patient resting comfortably.  PNC dressing CDI.  Catheter discontinued, tip intact.  Pt tolerated well.  Educated regarding continued pain management.  Understanding verbalized.    Sean Ingram MD  Department of Anesthesiology  [facility]  06/04/2023 1:36 PM

## 2023-06-04 NOTE — SUBJECTIVE & OBJECTIVE
"Principal Problem:Failed total knee arthroplasty    Principal Orthopedic Problem: same, s/p revision L TKA 6/2/23    Interval History: Pt seen and examined at bedside. SHASHA, VSS, AF. Pain controlled. Denies fevers, chills, chest pain, SOB, N/V/D.   Katina 18 ft twice and 50 ft twice with occupational therapy.    Review of patient's allergies indicates:  No Known Allergies    Current Facility-Administered Medications   Medication    acetaminophen tablet 1,000 mg    apixaban tablet 2.5 mg    bisacodyL suppository 10 mg    famotidine tablet 20 mg    hydrALAZINE injection 10 mg    melatonin tablet 6 mg    methocarbamoL tablet 1,000 mg    morphine injection 2 mg    mupirocin 2 % ointment 1 g    naloxone 0.4 mg/mL injection 0.02 mg    ondansetron injection 4 mg    oxyCODONE immediate release tablet 5 mg    oxyCODONE immediate release tablet Tab 10 mg    polyethylene glycol packet 17 g    pregabalin capsule 75 mg    prochlorperazine injection Soln 5 mg    ROPIvacaine (PF) 2 mg/ml (0.2%) solution    senna-docusate 8.6-50 mg per tablet 1 tablet     Objective:     Vital Signs (Most Recent):  Temp: 96.9 °F (36.1 °C) (06/04/23 0700)  Pulse: 102 (06/04/23 0459)  Resp: 20 (06/04/23 0438)  BP: 136/72 (06/04/23 0700)  SpO2: (!) 94 % (06/04/23 0700) Vital Signs (24h Range):  Temp:  [96.9 °F (36.1 °C)-97.9 °F (36.6 °C)] 96.9 °F (36.1 °C)  Pulse:  [] 102  Resp:  [18-20] 20  SpO2:  [94 %-98 %] 94 %  BP: (134-189)/(72-94) 136/72     Weight: 79.8 kg (175 lb 14.8 oz)  Height: 5' 4.5" (163.8 cm)  Body mass index is 29.73 kg/m².      Intake/Output Summary (Last 24 hours) at 6/4/2023 0797  Last data filed at 6/4/2023 0700  Gross per 24 hour   Intake --   Output 1550 ml   Net -1550 ml          Ortho/SPM Exam     AAOx4  NAD  Reg rate  No increased WOB    LLE:  Dressing c/d/i  Polar ice in place  SILT T/SP/DP/Person/Sa  Motor intact T/SP/DP  WWP extremities  FCDs in place and functioning    Significant Labs: All pertinent labs within the past " 24 hours have been reviewed.    Significant Imaging: I have reviewed all pertinent imaging results/findings.

## 2023-06-04 NOTE — DISCHARGE INSTRUCTIONS
"You will be discharged on a "multi-modal" pain regimen. This means that different medications are used to control your pain. Each medication works differently to control your pain so that your pain control is optimized.    You will be discharged on Tylenol (Acetaminophen), Robaxin (Methocarbamol), Celebrex (Celecoxib) or Mobic (Meloxicam), and Roxicodone (Oxycodone). The only opioid medication is Roxicodone (Oxycodone). Do not drive while taking opioid pain medication.     You may also be discharged with a perineural catheter (PNC). This is a way to deliver medicine that numbs the nerves in the area of your surgery to decrease your pain. If you experience any difficulty with this, call the on-call nursing hotline ([phone]) and ask to speak with anesthesia.     You will also be discharged on Aspirin twice daily or Eliquis (Apixaban) twice daily. This is to prevent blood clots which may be harmful.     If you have any questions regarding your medications, please to not hesitate to contact us.    You are able to bear full weight on the operative extremity, also called "weight bearing as tolerated." Your physical therapist will have gone over any precautions you may have and how to safely move after surgery.    Leave your dressing on until your follow up appointment.     Call us if you experience: fever, chills, chest pain, shortness of breath, severe headache, pain not relieved by oral medications, increased pain and swelling at the operative site, or any other questions or concerns. We are happy to assist you at any time.    "

## 2023-06-04 NOTE — PROGRESS NOTES
"[facility] - Surgery  Orthopedics  Progress Note    Patient Name: Fan Paz  MRN: [id number]  Admission Date: 6/2/2023  Hospital Length of Stay: 2 days  Attending Provider: Dustin Paul MD  Primary Care Provider: Otilio Damon MD  Follow-up For: Procedure(s) (LRB):  REVISION, ARTHROPLASTY, KNEE (Left)    Post-Operative Day: 2 Days Post-Op  Subjective:     Principal Problem:Failed total knee arthroplasty    Principal Orthopedic Problem: same, s/p revision L TKA 6/2/23    Interval History: Pt seen and examined at bedside. NAEON, VSS, AF. Pain controlled. Denies fevers, chills, chest pain, SOB, N/V/D.   Katina 18 ft twice and 50 ft twice with occupational therapy.    Review of patient's allergies indicates:  No Known Allergies    Current Facility-Administered Medications   Medication    acetaminophen tablet 1,000 mg    apixaban tablet 2.5 mg    bisacodyL suppository 10 mg    famotidine tablet 20 mg    hydrALAZINE injection 10 mg    melatonin tablet 6 mg    methocarbamoL tablet 1,000 mg    morphine injection 2 mg    mupirocin 2 % ointment 1 g    naloxone 0.4 mg/mL injection 0.02 mg    ondansetron injection 4 mg    oxyCODONE immediate release tablet 5 mg    oxyCODONE immediate release tablet Tab 10 mg    polyethylene glycol packet 17 g    pregabalin capsule 75 mg    prochlorperazine injection Soln 5 mg    ROPIvacaine (PF) 2 mg/ml (0.2%) solution    senna-docusate 8.6-50 mg per tablet 1 tablet     Objective:     Vital Signs (Most Recent):  Temp: 96.9 °F (36.1 °C) (06/04/23 0700)  Pulse: 102 (06/04/23 0459)  Resp: 20 (06/04/23 0438)  BP: 136/72 (06/04/23 0700)  SpO2: (!) 94 % (06/04/23 0700) Vital Signs (24h Range):  Temp:  [96.9 °F (36.1 °C)-97.9 °F (36.6 °C)] 96.9 °F (36.1 °C)  Pulse:  [] 102  Resp:  [18-20] 20  SpO2:  [94 %-98 %] 94 %  BP: (134-189)/(72-94) 136/72     Weight: 79.8 kg (175 lb 14.8 oz)  Height: 5' 4.5" (163.8 cm)  Body mass index is 29.73 kg/m².      Intake/Output " Summary (Last 24 hours) at 6/4/2023 0747  Last data filed at 6/4/2023 0700  Gross per 24 hour   Intake --   Output 1550 ml   Net -1550 ml         Ortho/SPM Exam     AAOx4  NAD  Reg rate  No increased WOB    LLE:  Dressing c/d/i  Polar ice in place  SILT T/SP/DP/Person/Sa  Motor intact T/SP/DP  WWP extremities  FCDs in place and functioning    Significant Labs: All pertinent labs within the past 24 hours have been reviewed.    Significant Imaging: I have reviewed all pertinent imaging results/findings.    Assessment/Plan:     * Failed total knee arthroplasty  Fan Paz is a 83 y.o. male is s/p revision L TKA on 6/2/23    Surgical dressing C/D/I, bulky dressing taken down today, polar ice in place  Pain control: multimodal, Anesthesia Surgical Home following  PT/OT: WBAT LLE  DVT PPx: Eliquis 2.5 BID, FCDs at all times when not ambulating  Abx: postop Ancef, compelted  Drain: none  Yee: none    Dispo: plan to dc to home today             Nicola Go MD  Orthopedics  [facility] - Surgery

## 2023-06-04 NOTE — HPI
CC:  Left knee pain     Fan Paz is a 83 y.o. male with history of Left knee pain. Pain is worse with activity and weight bearing.  Patient has experienced interference of activities of daily living due to decreased range of motion and an increase in joint pain and swelling.  Patient has failed non-operative treatment including NSAIDs, corticosteroid injections, viscosupplement injections, and activity modification.  Fan Paz currently ambulates using assistive device .      Relevant medical conditions of significance in perioperative period:  Second-degree AV  Heart block managed by Cardiology essential hypertension managed by Cardiology  History of pulmonary embolism managed by hematology  Prostate cancer managed by Urology  GERD managed by PCP  Dysphagia managed by [facility]      Given documented medical comorbidities including those listed above and based off of CMS criteria patient would meet inpatient admission status guidelines. This case has been approved as inpatient.

## 2023-06-04 NOTE — HOSPITAL COURSE
On 6/2/23, the patient arrived to the [facility] for proper pre-operative management.  Upon completion of pre-operative preparation, the patient was taken back to the operative theatre. Revision L TKA was performed without complication and the patient was transported to the post anesthesia care unit in stable condition.  After appropriate recovery from the anaesthetic agents used during the surgery, the patient was then transported to the hospital inpatient floor.  The interim of the hospital stay from arrival on the floor up to discharge has been uncomplicated. The patient has tolerated regular diet.  The patient's pain has been controlled using a multimodal approach. Currently, the patient's pain is well controlled on an oral regimen.  The patient has been voiding without difficulty.  The patient began participation in physical therapy after surgery and has progressed throughout the entire hospital stay.  Currently, the patient's progress is sufficient to allow the them to be discharged to home safely.  The patient agrees with this assessment and desires a discharge today.

## 2023-06-04 NOTE — DISCHARGE SUMMARY
[facility] - [facility]  Orthopedics  Discharge Summary      Patient Name: Fan Paz  MRN: [id number]  Admission Date: 6/2/2023  Hospital Length of Stay: 2 days  Discharge Date and Time: No discharge date for patient encounter.  Attending Physician: Dustin Paul MD   Discharging Provider: Nicola Go MD  Primary Care Provider: Otilio Damon MD    HPI:   CC:  Left knee pain     Fan Paz is a 83 y.o. male with history of Left knee pain. Pain is worse with activity and weight bearing.  Patient has experienced interference of activities of daily living due to decreased range of motion and an increase in joint pain and swelling.  Patient has failed non-operative treatment including NSAIDs, corticosteroid injections, viscosupplement injections, and activity modification.  Fan Paz currently ambulates using assistive device .      Relevant medical conditions of significance in perioperative period:  Second-degree AV  Heart block managed by Cardiology essential hypertension managed by Cardiology  History of pulmonary embolism managed by hematology  Prostate cancer managed by Urology  GERD managed by PCP  Dysphagia managed by [facility]      Given documented medical comorbidities including those listed above and based off of CMS criteria patient would meet inpatient admission status guidelines. This case has been approved as inpatient.      Procedure(s) (LRB):  REVISION, ARTHROPLASTY, KNEE (Left)      Hospital Course:  On 6/2/23, the patient arrived to the [facility] for proper pre-operative management.  Upon completion of pre-operative preparation, the patient was taken back to the operative theatre. Revision L TKA was performed without complication and the patient was transported to the post anesthesia care unit in stable condition.  After appropriate recovery from the anaesthetic agents used during the surgery, the patient was then transported to the hospital  inpatient floor.  The interim of the hospital stay from arrival on the floor up to discharge has been uncomplicated. The patient has tolerated regular diet.  The patient's pain has been controlled using a multimodal approach. Currently, the patient's pain is well controlled on an oral regimen.  The patient has been voiding without difficulty.  The patient began participation in physical therapy after surgery and has progressed throughout the entire hospital stay.  Currently, the patient's progress is sufficient to allow the them to be discharged to home safely.  The patient agrees with this assessment and desires a discharge today.        Goals of Care Treatment Preferences:  Code Status: Full Code      Consults (From admission, onward)        Status Ordering Provider     Inpatient consult to Social Work  Once        Provider:  (Not yet assigned)    Acknowledged VALENTIN LUO     Inpatient consult to Pain Management  Once        Provider:  (Not yet assigned)    Acknowledged VALENTIN LUO     Inpatient consult to Respiratory Care  Once        Provider:  (Not yet assigned)    Acknowledged MANUEL LUOYL TIMBO     Inpatient consult to Social Work  Once        Provider:  (Not yet assigned)    Acknowledged ARUN CRUZ     Inpatient consult to Pain Management  Once        Provider:  (Not yet assigned)    Acknowledged ARUN CRUZ     Inpatient consult to Respiratory Care  Once        Provider:  (Not yet assigned)    Acknowledged ARUN CRUZ          Significant Diagnostic Studies: Labs: All labs within the past 24 hours have been reviewed    Pending Diagnostic Studies:     Procedure Component Value Units Date/Time    Specimen to Pathology, Surgery Orthopedics [[id number]] Collected: 06/02/23 0951    Order Status: Sent Lab Status: In process Updated: 06/02/23 1420    Specimen: Tissue         Final Active Diagnoses:    Diagnosis Date Noted POA    PRINCIPAL PROBLEM:  Failed total knee  arthroplasty [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    Specialty: Orthopedic Surgery  Why: For wound re-check  Contact information:  [address]  [facility] [address]  [phone]                       Patient Instructions:      Leave dressing on - Keep it clean, dry, and intact until clinic visit     Notify your health care provider if you experience any of the following:  temperature >100.4     Notify your health care provider if you experience any of the following:  persistent nausea and vomiting or diarrhea     Notify your health care provider if you experience any of the following:  severe uncontrolled pain     Notify your health care provider if you experience any of the following:  redness, tenderness, or signs of infection (pain, swelling, redness, odor or green/yellow discharge around incision site)     Notify your health care provider if you experience any of the following:  difficulty breathing or increased cough     Notify your health care provider if you experience any of the following:  severe persistent headache     Notify your health care provider if you experience any of the following:  worsening rash     Notify your health care provider if you experience any of the following:  persistent dizziness, light-headedness, or visual disturbances     Notify your health care provider if you experience any of the following:  increased confusion or weakness     Activity as tolerated     Medications:  Reconciled Home Medications:      Medication List      START taking these medications    acetaminophen 650 MG Tbsr  Commonly known as: TYLENOL  Take 1 tablet (650 mg total) by mouth every 8 (eight) hours.     apixaban 2.5 mg Tab  Commonly known as: ELIQUIS  Take 1 tablet (2.5 mg total) by mouth 2 (two)  times daily.     celecoxib 200 MG capsule  Commonly known as: CeleBREX  Take 1 capsule (200 mg total) by mouth once daily.     methocarbamoL 750 MG Tab  Commonly known as: ROBAXIN  Take 1 tablet (750 mg total) by mouth 3 (three) times daily as needed.     oxyCODONE 5 MG immediate release tablet  Commonly known as: ROXICODONE  Take 1 tablet (5 mg total) by mouth every 6 (six) hours as needed for Pain. May take 1-2 tablets every 6 hours as needed for pain        CHANGE how you take these medications    * docusate sodium 100 MG capsule  Commonly known as: COLACE  Take 1 capsule (100 mg total) by mouth 2 (two) times daily.  What changed: You were already taking a medication with the same name, and this prescription was added. Make sure you understand how and when to take each.     * STOOL SOFTENER ORAL  Take by mouth as needed.  What changed: Another medication with the same name was added. Make sure you understand how and when to take each.         * This list has 2 medication(s) that are the same as other medications prescribed for you. Read the directions carefully, and ask your doctor or other care provider to review them with you.            CONTINUE taking these medications    azelastine 137 mcg (0.1 %) nasal spray  Commonly known as: ASTELIN  1 spray (137 mcg total) by Nasal route 2 (two) times daily.     CENTRUM COMPLETE ORAL  Take 1 tablet by mouth once daily.     fluticasone propionate 50 mcg/actuation nasal spray  Commonly known as: FLONASE  2 sprays (100 mcg total) by Each Nostril route 2 (two) times daily.     FLUZONE HIGH-DOSE 2019-20 (PF) 180 mcg/0.5 mL Syrg  Generic drug: flu vacc [id number](65yr up)PF  PHARMACIST ADMINISTERED IMMUNIZATION ADMINISTERED AT TIME OF DISPENSING     montelukast 10 mg tablet  Commonly known as: SINGULAIR  TAKE 1 TABLET BY MOUTH ONCE DAILY IN THE EVENING     pantoprazole 40 MG tablet  Commonly known as: PROTONIX  Take 1 tablet (40 mg total) by mouth once daily.     simvastatin  40 MG tablet  Commonly known as: ZOCOR  Take 1 tablet (40 mg total) by mouth every evening.     tamsulosin 0.4 mg Cap  Commonly known as: FLOMAX  Take 1 capsule by mouth once daily            Nicola Go MD  Orthopedics  [facility] - Surgery

## 2023-06-05 ENCOUNTER — CLINICAL SUPPORT (OUTPATIENT)
Dept: ORTHOPEDICS | Facility: CLINIC | Age: 83
End: 2023-06-05
Payer: MEDICARE

## 2023-06-05 ENCOUNTER — PATIENT OUTREACH (OUTPATIENT)
Dept: ADMINISTRATIVE | Facility: CLINIC | Age: 83
End: 2023-06-05
Payer: MEDICARE

## 2023-06-05 DIAGNOSIS — Z96.652 S/P TKR (TOTAL KNEE REPLACEMENT) USING CEMENT, LEFT: Primary | ICD-10-CM

## 2023-06-05 DIAGNOSIS — Z96.652 S/P REVISION OF TOTAL KNEE, LEFT: Primary | ICD-10-CM

## 2023-06-05 PROCEDURE — 99499 UNLISTED E&M SERVICE: CPT | Mod: 95,,, | Performed by: ORTHOPAEDIC SURGERY

## 2023-06-05 PROCEDURE — 99499 NO LOS: ICD-10-PCS | Mod: 95,,, | Performed by: ORTHOPAEDIC SURGERY

## 2023-06-05 NOTE — PROGRESS NOTES
[facility]    HOME HEALTH ORDERS  FACE TO FACE ENCOUNTER    Patient Name: Fan Paz  YOB: 1940    PCP: Otilio Damon MD   PCP Address: [address] / [place] [address]  PCP Phone Number: [phone]  PCP Fax: [phone]    Encounter Date: 06/05/2023    Admit to Home Health    Diagnoses:  There are no hospital problems to display for this patient.      Future Appointments   Date Time Provider Department Center   6/15/2023  1:00 PM Kelly Paz NP [facility]   7/12/2023  2:45 PM MD PETAR Shaw ORTHO [address]   8/7/2023  9:20 AM Otilio Damon MD [facility] [place]   8/21/2023  2:15 PM Dustin Paul MD [facility] [address]           I have seen and examined this patient face to face today. My clinical findings that support the need for the home health skilled services and home bound status are the following:  Weakness/numbness causing balance and gait disturbance due to Joint Replacement making it taxing to leave home.  Requiring assistive device to leave home due to unsteady gait caused by Joint Replacement.    Allergies:Review of patient's allergies indicates:  No Known Allergies    Diet: regular diet    Activities: activity as tolerated    Home Health Admitting Clinician:   SN/PT to complete comprehensive assessment including routine vital signs. Instruct on disease process and s/s of complications to report to MD. Follow specific home health arthoplasty protocol. Review/verify medication list sent home with the patient at time of discharge  and instruct patient/caregiver as needed. If coumadin ordered, coumadin clinic to manage INR with INR draws 2x per week with a goal to maintain INR between 1.8 and 2.2. Frequency may be adjusted depending on start of care date.    Notify MD if SBP > 160 or < 90; DBP > 90 or < 50; HR > 120 or < 50; Temp > 101    Home Medical Equipment:  Walker, 3-1 bedside commode, transfer  tub bench    CONSULTS:    Physical Therapy may admit if patient not on coumadin, PT to perform comprehensive assessment if performing admit visit and generate therapy plan of care. Evaluate for home safety and equipment needs; Establish/upgrade home exercise program. Perform/instruct on therapeutic exercises, gait training, transfer training, and Range of Motion.      OTHER: (only select if patient needs other therapy disciplines)  Occupational Therapy to evaluate and treat. Evaluate home environment for safety and equipment needs. Perform/Instruct on transfers, ADL training, ROM, and therapeutic exercises.      WOUND CARE ORDERS:  Assess Surgical Incision/DSRG each TX  Aquacel AG drsg applied post-op leave on 14 days post op. Call MD if any drainage reaches border to border of drsg horizontally, s/s of infection, temp >101, induration, swelling or redness.  If dressing is removed per MD order, then apply island dressing, change/teach caregiver to perform daily dressing change if island dressing present.    Medications: Review discharge medications with patient and family and provide education.      Cannot display discharge medications since this is not an admission.      I certify that this patient is confined to his home and needs physical therapy and occupational therapy.

## 2023-06-05 NOTE — ANESTHESIA RELEASE NOTE
"Anesthesia Release from PACU Note    Patient: Fan Paz    Procedure(s) Performed: Procedure(s) (LRB):  REVISION, ARTHROPLASTY, KNEE (Left)    Anesthesia type: CSE    Post pain: Adequate analgesia    Post assessment: no apparent anesthetic complications and tolerated procedure well    Last Vitals:   Visit Vitals  /72 (BP Location: Left arm, Patient Position: Lying)   Pulse 102   Temp 36.1 °C (96.9 °F) (Oral)   Resp 20   Ht 5' 4.5" (1.638 m)   Wt 79.8 kg (175 lb 14.8 oz)   SpO2 (!) 94%   BMI 29.73 kg/m²       Post vital signs: stable    Level of consciousness: awake and alert     Nausea/Vomiting: no nausea/no vomiting    Complications: none    Airway Patency: patent    Respiratory: unassisted, spontaneous ventilation, room air    Cardiovascular: stable and blood pressure at baseline    Hydration: euvolemic  "

## 2023-06-05 NOTE — PROGRESS NOTES
Established Patient - Audio Only Telehealth Visit     The patient location is: home  The chief complaint leading to consultation is: post-op call  Visit type: Virtual visit with audio only (telephone)  Total time spent with patient: 5 min       The reason for the audio only service rather than synchronous audio and video virtual visit was related to technical difficulties or patient preference/necessity.     Each patient to whom I provide medical services by telemedicine is:  (1) informed of the relationship between the physician and patient and the respective role of any other health care provider with respect to management of the patient; and (2) notified that they may decline to receive medical services by telemedicine and may withdraw from such care at any time. Patient verbally consented to receive this service via voice-only telephone call.       HPI: s/p knee rev     Assessment and plan:  I called the patient today regarding his surgery with Dr. Dustin Paul. The patient had a left TKA rev on 6/1.     Pain Scale:  no pain  / 10    Any issues with Fever: No.    Any issues with medications (specifically DVT prophylaxis): No. Eliquis 2.5 mg BID    Any issues with bowel movements:  Passing gene: No.                                                                 Urination: No.                                                                 Constipation: No. diarrhea    Completing at home exercises: Yes:     Any concerns regarding their dressing/bandage:  No.    Patient confirmed first HH-PT appointment:  HHPT on  6/6 at am.     Any other concerns: No.        The patient was informed that if they have any urgent issues with their bandage, medications or any other health concerns regarding their surgery to call the 24/7 Orthopedic Post-op Hot Line at [phone]. The patient was reminded that if they have any chest pain or shortness of breath to call 911 or go to the ER.    The patient verbalized  understanding and does not have any other questions                                    This service was not originating from a related E/M service provided within the previous 7 days nor will  to an E/M service or procedure within the next 24 hours or my soonest available appointment.  Prevailing standard of care was able to be met in this audio-only visit.

## 2023-06-05 NOTE — ANESTHESIA POSTPROCEDURE EVALUATION
Anesthesia Post Evaluation    Patient: Fan Paz    Procedure(s) Performed: Procedure(s) (LRB):  REVISION, ARTHROPLASTY, KNEE (Left)    Final Anesthesia Type: CSE      Patient location during evaluation: PACU  Patient participation: Yes- Able to Participate  Level of consciousness: awake and alert  Post-procedure vital signs: reviewed and stable  Pain management: adequate  Airway patency: patent    PONV status at discharge: No PONV  Anesthetic complications: no      Cardiovascular status: hemodynamically stable  Respiratory status: unassisted, spontaneous ventilation and room air  Hydration status: euvolemic  Follow-up not needed.          Vitals Value Taken Time   /72 06/04/23 0700   Temp 36.1 °C (96.9 °F) 06/04/23 0700   Pulse 102 06/04/23 0459   Resp 20 06/04/23 0438   SpO2 94 % 06/04/23 0700         Event Time   Out of Recovery 06/02/2023 11:30:00         Pain/Rosales Score: Pain Rating Prior to Med Admin: 0 (6/4/2023 11:48 AM)

## 2023-06-06 LAB — BACTERIA SPEC AEROBE CULT: NO GROWTH

## 2023-06-06 PROCEDURE — G0180 MD CERTIFICATION HHA PATIENT: HCPCS | Mod: ,,, | Performed by: ORTHOPAEDIC SURGERY

## 2023-06-06 PROCEDURE — G0180 PR HOME HEALTH MD CERTIFICATION: ICD-10-PCS | Mod: ,,, | Performed by: ORTHOPAEDIC SURGERY

## 2023-06-08 ENCOUNTER — TELEPHONE (OUTPATIENT)
Dept: ORTHOPEDICS | Facility: CLINIC | Age: 83
End: 2023-06-08
Payer: MEDICARE

## 2023-06-08 NOTE — TELEPHONE ENCOUNTER
Spoke to pt and explained the redness and swelling is normal occurrence as per viktoriya considering he is only 6 days out from his revision.

## 2023-06-08 NOTE — TELEPHONE ENCOUNTER
----- Message from Hortencia Jenkins sent at 6/8/2023 11:41 AM CDT -----  Regarding: pt advice  Contact: [phone]  Fan Paz calling regarding Patient Advice (message) for #his left knee is still swollen and reddish looking on one side and he would like to know if this is normal. Please call

## 2023-06-09 LAB — BACTERIA SPEC ANAEROBE CULT: NORMAL

## 2023-06-11 DIAGNOSIS — Z91.09 ENVIRONMENTAL ALLERGIES: ICD-10-CM

## 2023-06-11 NOTE — TELEPHONE ENCOUNTER
No care due was identified.  [facility] Embedded Care Due Messages. Reference number: [id number].   6/11/2023 3:06:53 PM CDT

## 2023-06-12 LAB
FINAL PATHOLOGIC DIAGNOSIS: NORMAL
GROSS: NORMAL
Lab: NORMAL

## 2023-06-12 RX ORDER — MONTELUKAST SODIUM 10 MG/1
TABLET ORAL
Qty: 90 TABLET | Refills: 2 | Status: SHIPPED | OUTPATIENT
Start: 2023-06-12

## 2023-06-12 NOTE — ADDENDUM NOTE
Addendum  created 06/12/23 1051 by Irish Castillo MD    Charge Capture section accepted, Cosign clinical note with attestation

## 2023-06-12 NOTE — TELEPHONE ENCOUNTER
Refill Decision Note   Fan Paz  is requesting a refill authorization.  Brief Assessment and Rationale for Refill:  Approve     Medication Therapy Plan:       Medication Reconciliation Completed: No   Comments:     No Care Gaps recommended.     Note composed:1:19 PM 06/12/2023

## 2023-06-14 ENCOUNTER — PES CALL (OUTPATIENT)
Dept: ADMINISTRATIVE | Facility: CLINIC | Age: 83
End: 2023-06-14
Payer: MEDICARE

## 2023-06-15 ENCOUNTER — OFFICE VISIT (OUTPATIENT)
Dept: ORTHOPEDICS | Facility: CLINIC | Age: 83
End: 2023-06-15
Payer: MEDICARE

## 2023-06-15 DIAGNOSIS — Z96.652 S/P TOTAL KNEE REPLACEMENT USING CEMENT, LEFT: Primary | ICD-10-CM

## 2023-06-15 PROCEDURE — 99024 POSTOP FOLLOW-UP VISIT: CPT | Mod: POP,,, | Performed by: NURSE PRACTITIONER

## 2023-06-15 PROCEDURE — 99213 OFFICE O/P EST LOW 20 MIN: CPT | Mod: PBBFAC | Performed by: NURSE PRACTITIONER

## 2023-06-15 PROCEDURE — 99024 PR POST-OP FOLLOW-UP VISIT: ICD-10-PCS | Mod: POP,,, | Performed by: NURSE PRACTITIONER

## 2023-06-15 PROCEDURE — 99999 PR PBB SHADOW E&M-EST. PATIENT-LVL III: ICD-10-PCS | Mod: PBBFAC,,, | Performed by: NURSE PRACTITIONER

## 2023-06-15 PROCEDURE — 99999 PR PBB SHADOW E&M-EST. PATIENT-LVL III: CPT | Mod: PBBFAC,,, | Performed by: NURSE PRACTITIONER

## 2023-06-15 NOTE — PROGRESS NOTES
Fan Paz presents for initial post-operative visit following a left total knee arthroplasty performed by Dr. Paul on 6/2/2023     Exam:   Ambulating well with assistive device.  Incision is clean and dry without drainage or erythema.   ROM:0-85    Initial post-operative radiographs reviewed today revealing a well fixed and aligned prosthesis.    A/P:  2 weeks s/p left total knee revision arthroplasty  - The patient was advised to keep the incision clean and dry for the next 24 hours after which he may wash the area with antibacterial soap in the shower. Will not submerge until the incision is completely healed.   - Outpatient PT ongoing at home. Orders sent to [facility] for scheduling once home health is finished  - Continue eliquis for 1 month post op  - Pain medication refill not needed  - Follow up in 4 weeks with new x-rays. Pt will call clinic with problems/concerns.

## 2023-06-16 ENCOUNTER — TELEPHONE (OUTPATIENT)
Dept: FAMILY MEDICINE | Facility: CLINIC | Age: 83
End: 2023-06-16
Payer: MEDICARE

## 2023-06-19 ENCOUNTER — EXTERNAL HOME HEALTH (OUTPATIENT)
Dept: HOME HEALTH SERVICES | Facility: HOSPITAL | Age: 83
End: 2023-06-19
Payer: MEDICARE

## 2023-06-30 ENCOUNTER — EXTERNAL CHRONIC CARE MANAGEMENT (OUTPATIENT)
Dept: PRIMARY CARE CLINIC | Facility: CLINIC | Age: 83
End: 2023-06-30
Payer: MEDICARE

## 2023-06-30 PROCEDURE — 99490 CHRNC CARE MGMT STAFF 1ST 20: CPT | Mod: PBBFAC,PO | Performed by: FAMILY MEDICINE

## 2023-06-30 PROCEDURE — 99490 CHRNC CARE MGMT STAFF 1ST 20: CPT | Mod: S$PBB,,, | Performed by: FAMILY MEDICINE

## 2023-06-30 PROCEDURE — 99490 PR CHRONIC CARE MGMT, 1ST 20 MIN: ICD-10-PCS | Mod: S$PBB,,, | Performed by: FAMILY MEDICINE

## 2023-07-06 LAB — FUNGUS SPEC CULT: NORMAL

## 2023-07-07 DIAGNOSIS — Z85.46 HISTORY OF PROSTATE CANCER: Primary | ICD-10-CM

## 2023-07-11 DIAGNOSIS — Z96.652 S/P REVISION OF TOTAL KNEE, LEFT: ICD-10-CM

## 2023-07-11 DIAGNOSIS — Z96.652 S/P TOTAL KNEE REPLACEMENT USING CEMENT, LEFT: Primary | ICD-10-CM

## 2023-07-12 ENCOUNTER — HOSPITAL ENCOUNTER (OUTPATIENT)
Dept: RADIOLOGY | Facility: HOSPITAL | Age: 83
Discharge: HOME | End: 2023-07-12
Attending: ORTHOPAEDIC SURGERY
Payer: MEDICARE

## 2023-07-12 ENCOUNTER — OFFICE VISIT (OUTPATIENT)
Dept: ORTHOPEDICS | Facility: CLINIC | Age: 83
End: 2023-07-12
Payer: MEDICARE

## 2023-07-12 VITALS — WEIGHT: 172.38 LBS | BODY MASS INDEX: 28.72 KG/M2 | HEIGHT: 65 IN

## 2023-07-12 DIAGNOSIS — Z96.652 S/P REVISION OF TOTAL KNEE, LEFT: ICD-10-CM

## 2023-07-12 DIAGNOSIS — Z96.652 S/P REVISION OF TOTAL KNEE, LEFT: Primary | ICD-10-CM

## 2023-07-12 PROCEDURE — 73560 XR KNEE ORTHO LEFT: ICD-10-PCS | Mod: 26,RT,, | Performed by: INTERNAL MEDICINE

## 2023-07-12 PROCEDURE — 99999 PR PBB SHADOW E&M-EST. PATIENT-LVL III: ICD-10-PCS | Mod: PBBFAC,,, | Performed by: ORTHOPAEDIC SURGERY

## 2023-07-12 PROCEDURE — 99024 POSTOP FOLLOW-UP VISIT: CPT | Mod: POP,,, | Performed by: ORTHOPAEDIC SURGERY

## 2023-07-12 PROCEDURE — 73562 X-RAY EXAM OF KNEE 3: CPT | Mod: 26,LT,, | Performed by: INTERNAL MEDICINE

## 2023-07-12 PROCEDURE — 73562 XR KNEE ORTHO LEFT: ICD-10-PCS | Mod: 26,LT,, | Performed by: INTERNAL MEDICINE

## 2023-07-12 PROCEDURE — 73560 X-RAY EXAM OF KNEE 1 OR 2: CPT | Mod: 59,TC,RT

## 2023-07-12 PROCEDURE — 99999 PR PBB SHADOW E&M-EST. PATIENT-LVL III: CPT | Mod: PBBFAC,,, | Performed by: ORTHOPAEDIC SURGERY

## 2023-07-12 PROCEDURE — 99213 OFFICE O/P EST LOW 20 MIN: CPT | Mod: PBBFAC | Performed by: ORTHOPAEDIC SURGERY

## 2023-07-12 PROCEDURE — 99024 PR POST-OP FOLLOW-UP VISIT: ICD-10-PCS | Mod: POP,,, | Performed by: ORTHOPAEDIC SURGERY

## 2023-07-12 PROCEDURE — 73560 X-RAY EXAM OF KNEE 1 OR 2: CPT | Mod: 26,RT,, | Performed by: INTERNAL MEDICINE

## 2023-07-12 NOTE — PROGRESS NOTES
Fan Paz is in for 6 week follow up for a  left revision TKA.  He is doing  well.  Mild pain in the knee.  He is on eliquis. He has been in PT  Exam demonstrates  A well developed male in no distress.  Alert and oriented.  Mood and affect are appropriate.    Knee incision is well healed.  ROM is 0-100.  The patella tracks well and there is no instability. The extremity is neurovascularly intact.    Xrays demonstrate a well fixed and positioned prosthesis.    PROMIS-10 Questionnaire Scores 5/24/2023 2/19/2021   Global Physical Health 16 14   Global Mental health Score 10 13       KOOS Jr. Questionnaire Score 5/24/2023 2/19/2021   KOOS Jr Score 14 5       [place] Knee Questionnaire Score 5/24/2023   [place] Knee Score 27       No flowsheet data found.    [place] Joint Score 5/24/2023   In which leg do you have an artificial knee? Both   In bed at night? Sometimes   When sitting on a chair for more than one hour? Sometimes   When you are walking for more than 15 minutes? Sometimes   When taking a bath or shower? Sometimes   When traveling in a car? Sometimes   When climbing stairs? Sometimes   When walking on uneven ground? Sometimes   When standing up from a low-sitting position? Sometimes   When standing for long periods of time? Sometimes   When doing housework or gardening? Sometimes   When taking a walk or hiking? Sometimes   When doing your favorite sport? Sometimes   Forgotten Joint Score Left Knee 25   In bed at night? Sometimes   When sitting on a chair for more than one hour? Sometimes   When you are walking for more than 15 minutes? Sometimes   When taking a bath or shower? Sometimes   When traveling in a car? Sometimes   When climbing stairs? Sometimes   When walking on uneven ground? Sometimes   When standing up from a low-sitting position? Sometimes   When standing for long periods of time? Sometimes   When doing housework or gardening? Sometimes   When taking a walk or hiking? Sometimes    When doing your favorite sport? Sometimes   Forgotten Joint Score Right Knee 25       Imp:Progressing  Eliquis for 6 more weeks  Continue PT  F/u in 6 weeks with xrays and PROMS

## 2023-07-21 LAB
ACID FAST MOD KINY STN SPEC: NORMAL
MYCOBACTERIUM SPEC QL CULT: NORMAL

## 2023-07-31 ENCOUNTER — EXTERNAL CHRONIC CARE MANAGEMENT (OUTPATIENT)
Dept: PRIMARY CARE CLINIC | Facility: CLINIC | Age: 83
End: 2023-07-31
Payer: MEDICARE

## 2023-07-31 PROCEDURE — 99490 CHRNC CARE MGMT STAFF 1ST 20: CPT | Mod: PBBFAC,PO | Performed by: FAMILY MEDICINE

## 2023-07-31 PROCEDURE — 99490 PR CHRONIC CARE MGMT, 1ST 20 MIN: ICD-10-PCS | Mod: S$PBB,,, | Performed by: FAMILY MEDICINE

## 2023-07-31 PROCEDURE — 99490 CHRNC CARE MGMT STAFF 1ST 20: CPT | Mod: S$PBB,,, | Performed by: FAMILY MEDICINE

## 2023-08-09 ENCOUNTER — TELEPHONE (OUTPATIENT)
Dept: OTOLARYNGOLOGY | Facility: CLINIC | Age: 83
End: 2023-08-09
Payer: MEDICARE

## 2023-08-09 NOTE — TELEPHONE ENCOUNTER
----- Message from Ariella Malik sent at 8/9/2023  3:42 PM CDT -----  Regarding: Self [phone] or  [phone]  Patient is requesting a sooner appointment.  Patient declined first available appointment listed as well as another facility and provider .  Patient will not accept being placed on the waitlist and is requesting a message be sent to doctor.    Name of Caller: Self     When is the first available appointment?  12/6    Symptoms:  ear pain     Would the patient rather a call back or a response via [facility]?  Call back     Best Call Back Number:  [phone] or [phone]      Additional Information: Pt is requesting a sooner appt and only appt is available in December and pt would like to see another provider if possible.

## 2023-08-11 ENCOUNTER — OFFICE VISIT (OUTPATIENT)
Dept: OTOLARYNGOLOGY | Facility: CLINIC | Age: 83
End: 2023-08-11
Payer: MEDICARE

## 2023-08-11 VITALS — DIASTOLIC BLOOD PRESSURE: 64 MMHG | WEIGHT: 174.81 LBS | BODY MASS INDEX: 29.3 KG/M2 | SYSTOLIC BLOOD PRESSURE: 118 MMHG

## 2023-08-11 DIAGNOSIS — H92.02 OTALGIA OF LEFT EAR: ICD-10-CM

## 2023-08-11 DIAGNOSIS — R13.14 PHARYNGOESOPHAGEAL DYSPHAGIA: ICD-10-CM

## 2023-08-11 DIAGNOSIS — T66.XXXD ADVERSE EFFECT OF RADIATION, SUBSEQUENT ENCOUNTER: ICD-10-CM

## 2023-08-11 DIAGNOSIS — Z08 ENCOUNTER FOR FOLLOW-UP SURVEILLANCE OF ORAL CAVITY CANCER: ICD-10-CM

## 2023-08-11 DIAGNOSIS — H61.23 BILATERAL IMPACTED CERUMEN: Primary | ICD-10-CM

## 2023-08-11 DIAGNOSIS — Z85.819 ENCOUNTER FOR FOLLOW-UP SURVEILLANCE OF ORAL CAVITY CANCER: ICD-10-CM

## 2023-08-11 DIAGNOSIS — Z92.3 HISTORY OF HEAD AND NECK RADIATION: ICD-10-CM

## 2023-08-11 PROCEDURE — 99214 OFFICE O/P EST MOD 30 MIN: CPT | Mod: 25,S$GLB,, | Performed by: OTOLARYNGOLOGY

## 2023-08-11 PROCEDURE — 69210 REMOVE IMPACTED EAR WAX UNI: CPT | Mod: 51,S$GLB,, | Performed by: OTOLARYNGOLOGY

## 2023-08-11 PROCEDURE — 31575 DIAGNOSTIC LARYNGOSCOPY: CPT | Mod: S$GLB,,, | Performed by: OTOLARYNGOLOGY

## 2023-08-11 PROCEDURE — 69210 PR REMOVAL IMPACTED CERUMEN REQUIRING INSTRUMENTATION, UNILATERAL: ICD-10-PCS | Mod: 51,S$GLB,, | Performed by: OTOLARYNGOLOGY

## 2023-08-11 PROCEDURE — 99214 PR OFFICE/OUTPT VISIT, EST, LEVL IV, 30-39 MIN: ICD-10-PCS | Mod: 25,S$GLB,, | Performed by: OTOLARYNGOLOGY

## 2023-08-11 PROCEDURE — 31575 PR LARYNGOSCOPY, FLEXIBLE; DIAGNOSTIC: ICD-10-PCS | Mod: S$GLB,,, | Performed by: OTOLARYNGOLOGY

## 2023-08-11 NOTE — PROGRESS NOTES
"  OTOLARYNGOLOGY CLINIC NOTE  Date:  08/11/2023     Chief complaint:  Chief Complaint   Patient presents with    Other     Left Ear Pain HX of cancer poss flex scope        History of Present Illness  Fan Paz is a 83 y.o. male  presenting today for a followup.  Had egd with dilation with GI 5-3-23    Left ear pain that comes and goes - started about a week ago   No hearing problems during this. Sinus stuff has been better ; allergies are worse in spring time   For about 5 days it was hurting all day and all night and this am felt better. Put some wax softening drops and flushed it with syringe and got a couple pieces of wax ; pain was still there. Did not get worse with it but was not better   Has dentures   Right ear has been fine just the left ear   Hearing is not changed  No drainage from the ear    Swallowing much better after dilation  No throat pain     Had knee surgery recently    Left eye itching    I last saw the patient on 2-23 - 23. Below text is copied from  note on that date describing history at that time :  Here today with his daughter who has not been at prior appointment.   Sinuses are not bothering him today- states that he has "gotten used to it". allergies been bad over past month but no acute sinus infection type symptoms that he reports .   Using saline spray with nasal medication sprays     Eats liquid/soft food to swallow and has to chew it well. Daughter with him today and she thinks his swallowing is getting worse he used to be able to a variety of food and has to limit what he eats now. She has also noted that his choking is getting worse .  No weight loss. No pneumonia  Voice has been  getting raspier over past 6 months. No straining to make a voice    Keeps water with him      No hemoptysis  No ear pain           I originally saw the patient on 11-8-21. He was last seen 9-26-22. Flex scope and ear cleaning done at that visit. He has recurrent cerumen impactions and " scheduled for follow up today for ear cleaning . Has tried the debrox drops I recommended to see if this would help lessen frequency of wax buildup. He uses them as instructed.   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 Finished treatment in [id number]. Sinus surgery was done after cancer surgery. Dr. Rushing also did cancer surgery. Had rt and needed feeding tube during that. Had most of teeth extracted except for 6 on the bottom.        Past Medical History  Past Medical History:   Diagnosis Date    Arthritis     Cancer of palate     GERD (gastroesophageal reflux disease)     HTN (hypertension)     Hyperlipidemia     Prostate cancer     2011    Pulmonary embolism         Past Surgical History  Past Surgical History:   Procedure Laterality Date    BACK SURGERY  y-6    Cancer of palate surgery      Late 90's- [facility]     CARPAL TUNNEL RELEASE  8-14-13    right hand,Left hand 2013    COLONOSCOPY N/A 4/10/2017    Procedure: COLONOSCOPY;  Surgeon: Nilo Kelly MD;  Location: [facility];  Service: Endoscopy;  Laterality: N/A;    COLONOSCOPY N/A 10/21/2020    Procedure: COLONOSCOPY;  Surgeon: Demetrius Araujo MD;  Location: [facility];  Service: Endoscopy;  Laterality: N/A;    ESOPHAGOGASTRODUODENOSCOPY N/A 5/3/2023    Procedure: EGD (ESOPHAGOGASTRODUODENOSCOPY);  Surgeon: Shauna Lopez MD;  Location: [facility];  Service: Endoscopy;  Laterality: N/A;  EGD (with Dr. Lopez or Dr. Parada), : 1-4 weeks, Provider: Dr. Lopez or Dr. Parada Location: [facility], instructions sent to email address [email or web address]    KNEE SURGERY  y-41    left knee    KNEE SURGERY Right 12-1-15    TKR    Radio active seed Implant      January 2012    REVISION OF KNEE ARTHROPLASTY Left 6/2/2023    Procedure: REVISION, ARTHROPLASTY, KNEE;  Surgeon: Dustin Paul MD;  Location: [facility] OR [address] [place] [address];  Service: Orthopedics;  Laterality: Left;    TOTAL HIP ARTHROPLASTY  3/2011        Medications  Current Outpatient Medications  on File Prior to Visit   Medication Sig Dispense Refill    acetaminophen (TYLENOL) 650 MG TbSR Take 1 tablet (650 mg total) by mouth every 8 (eight) hours. 120 tablet 0    apixaban (ELIQUIS) 2.5 mg Tab Take 1 tablet (2.5 mg total) by mouth 2 (two) times daily. 60 tablet 2    azelastine (ASTELIN) 137 mcg (0.1 %) nasal spray 1 spray (137 mcg total) by Nasal route 2 (two) times daily. 30 mL 3    celecoxib (CELEBREX) 200 MG capsule Take 1 capsule (200 mg total) by mouth once daily. (Patient not taking: Reported on 7/12/2023) 30 capsule 0    docusate sodium (STOOL SOFTENER ORAL) Take by mouth as needed.      fluticasone propionate (FLONASE) 50 mcg/actuation nasal spray 2 sprays (100 mcg total) by Each Nostril route 2 (two) times daily. 18.2 mL 11    FLUZONE HIGH-DOSE 2019-20, PF, 180 mcg/0.5 mL Syrg PHARMACIST ADMINISTERED IMMUNIZATION ADMINISTERED AT TIME OF DISPENSING  0    montelukast (SINGULAIR) 10 mg tablet TAKE 1 TABLET BY MOUTH ONCE DAILY IN THE EVENING 90 tablet 2    multivitamin/iron/folic acid (CENTRUM COMPLETE ORAL) Take 1 tablet by mouth once daily.      oxyCODONE (ROXICODONE) 5 MG immediate release tablet Take 1 tablet (5 mg total) by mouth every 6 (six) hours as needed for Pain. May take 1-2 tablets every 6 hours as needed for pain (Patient not taking: Reported on 7/12/2023) 50 tablet 0    pantoprazole (PROTONIX) 40 MG tablet Take 1 tablet (40 mg total) by mouth once daily. (Patient not taking: Reported on 7/12/2023) 30 tablet 1    simvastatin (ZOCOR) 40 MG tablet Take 1 tablet (40 mg total) by mouth every evening. 90 tablet 3    tamsulosin (FLOMAX) 0.4 mg Cap Take 1 capsule by mouth once daily 90 capsule 0     No current facility-administered medications on file prior to visit.       Review of Systems  Review of Systems   Constitutional: Negative.    HENT:  Positive for ear pain.    Respiratory: Negative.     Cardiovascular: Negative.    Gastrointestinal: Negative.    Skin: Negative.    Neurological:  Negative.    Psychiatric/Behavioral: Negative.          Answers submitted by the patient for this visit:  Review of Symptoms Questionnaire  (Submitted on 2023)  eye itching: Yes  Urinating too frequently?: Yes  Social History   reports that he quit smoking about 26 years ago. His smoking use included cigarettes. He started smoking about 46 years ago. He has a 60.0 pack-year smoking history. He has been exposed to tobacco smoke. He has never used smokeless tobacco. He reports that he does not drink alcohol and does not use drugs.     Family History  Family History   Problem Relation Age of Onset    No Known Problems Mother     Esophageal cancer Father     Coronary artery disease Father             Cancer Sister         Liver Cancer -    Diabetes Sister             No Known Problems Sister     No Known Problems Sister     Lung cancer Sister         Alive    Breast cancer Sister     Clotting disorder Sister 75        blood clot on brain-alive    No Known Problems Brother     No Known Problems Brother     Lung cancer Brother 60        - was a tobacco user, had lung cancer    Cancer Brother         Liver Cancer-     Stroke Brother             No Known Problems Maternal Aunt     No Known Problems Maternal Uncle     No Known Problems Paternal Aunt     No Known Problems Paternal Uncle     No Known Problems Maternal Grandmother     No Known Problems Maternal Grandfather     No Known Problems Paternal Grandmother     No Known Problems Paternal Grandfather     Glaucoma Cousin     Macular degeneration Neg Hx     Strabismus Neg Hx     Amblyopia Neg Hx     Blindness Neg Hx     Cataracts Neg Hx     Hypertension Neg Hx     Retinal detachment Neg Hx     Thyroid disease Neg Hx     Colon cancer Neg Hx         Physical Exam   There were no vitals filed for this visit. There is no height or weight on file to calculate BMI.            GENERAL: no acute distress.  HEAD: normocephalic.   EYES: No scleral  icterus  EARS: external ear without lesion, normal pinna shape and position.  External auditory canal with excess cerumen b/l  NOSE: external nose without significant bony abnormality  ORAL CAVITY/OROPHARYNX: tongue mobile.   NECK: trachea midline.   LYMPH NODES:No cervical lymphadenopathy.  RESPIRATORY: no stridor, no stertor. Voice normal. Respirations nonlabored.  NEURO: alert, responds to questions appropriately.    PSYCH:mood appropriate    Procedure Note   Procedure performed:microscopic examination of ears with cerumen disimpaction    Indication for procedure: bilateral cerumen impaction     Description of procedure:  After verbal consent was obtained, the patient was positioned in semi recumbent position and speculum was placed in the right ear and microscope brought into the field.  The microscope was positioned and magnification adjusted for appropriate visualization. Otologic instruments including various size otologic suctions and curette were used to remove the cerumen from the right external auditory canals under visualization with the operating microscope. After cleaning, visualization was again performed and at various levels of higher magnification to optimize views of the ear canal, tympanic membrane, ossicles and middle ear space. The same procedure was then repeated for the left ear. Findings as indicated below. All portions of the procedure and examination by otomicroscopy were tolerated well without complication.     Findings:  Right ear: near Complete cerumen impaction removed entirely revealing normal external auditory canal; tympanic membrane without bulging, retraction, or perforation; no evidence of middle ear fluid or effusion.   Left ear: Complete cerumen impaction removed entirely revealing normal external auditory canal; tympanic membrane without bulging, retraction, or perforation; no evidence of middle ear fluid or effusion.     PROCEDURE NOTE  NAME OF PROCEDURE: Flexible  "Laryngoscopy, diagnostic  INDICATIONS: gag reflex precludes mirror exam,surveillance scope exam and unilateral otalgia-need to rule out referred pain from malignancy   FINDINGS: no recurrent disease, no malignancy    Consent: After procedure was explained in detail and all questions answered, verbal consent was obtained for performing flexible laryngoscopy.  Anesthesia: topical 4% lidocaine and neosynephrine  Procedure: With patient in seated position, the scope was inserted into the bilateral nasal passageway and advanced atraumatically into the nasopharynx to examine the following structures:  Nasal cavity: Turbinates with mild hypertrophy. Mild middle meatal edema. No purulent drainage.   Nasopharynx: no mass or lesion noted in nasopharynx. Crusting   Oropharynx: base of tongue without  mass or ulceration. Lingual tonsils normal in appearance  Hypopharynx: posterior pharyngeal wall without mass or lesion. No pooling of secretions. Pyriform sinuses visible without mass or lesion  Larynx: epiglottis normal without lesion. False vocal folds without edema/erythema/lesion. True vocal folds mobile and without lesion. Mild interarytenoid edema no erythema . Postcricoid region with mild edema no lesion   Subglottis: visualized portion of subglottis normal in appearance    After examination performed, the scope was removed atraumatically . The patient tolerated the procedure well. Photodocumentation obtained with representative images below, all images and/or videos uploaded in media section of epic.                                    Imaging:  The patient does have any new imaging of the head and neck since last visit.   Carotid ultrasound - atherosclerosis  [facility] 3-7-23 "Additional residue and stasis with incomplete opening near the UES, most pronounced with pudding and barium coated solids.  Linear transverse filling defect along the anterior wall near C6 suggesting component of esophageal web."  Labs:  CBC  Recent Labs "   Lab 01/28/22  0938 02/06/23  0912 05/10/23  1210   WBC 5.17 5.63 6.17   Hemoglobin 12.9 L 13.6 L 13.1 L   Hematocrit 41.8 44.0 41.9   MCV 87 87 86   Platelets 196 210 191     BMP  Recent Labs   Lab 01/28/22  0938 02/06/23  0912 05/10/23  1210   Glucose 110 115 H 109   Sodium 140 137 141   Potassium 4.2 4.3 4.9   Chloride 108 101 105   CO2 27 25 30 H   BUN 16 16 17   Creatinine 1.0 1.0 0.9   Calcium 8.8 9.6 9.5     COAGS  Recent Labs   Lab 11/27/20  1538 05/10/23  1210   INR 1.0 1.0       Assessment  1. Bilateral impacted cerumen    2. Pharyngoesophageal dysphagia    3. History of head and neck radiation    4. 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 Adverse effect of radiation, subsequent encounter    6. Otalgia of left ear       Plan:  Discussed plan of care with patient in detail and all questions answered. Patient reported understanding of plan of care. I gave the patient the opportunity to ask questions and patient confirmed all questions answered to satisfaction.     Cerumen impactions removed; has recurrent cerumen impactions. Pain better with wax removed. Notify if recurs for repeat exam   No recurrent disease    F/u end of December to check for cerumen build up again, sooner if issue. Yearly surveillance of head and neck     I spent a total of 35 minutes on the day of the visit.  This includes face to face time and non-face to face time preparing to see the patient (eg, review of tests), obtaining and/or reviewing separately obtained history, documenting clinical information in the electronic or other health record, independently interpreting results and communicating results to the patient/family/caregiver, or care coordinator.   Please be aware that this note has been generated with the assistance of [email or web address] voice-to-text.  Please excuse any spelling or grammatical errors.

## 2023-08-17 DIAGNOSIS — Z96.652 S/P REVISION OF TOTAL KNEE, LEFT: Primary | ICD-10-CM

## 2023-08-21 ENCOUNTER — PATIENT MESSAGE (OUTPATIENT)
Dept: ADMINISTRATIVE | Facility: OTHER | Age: 83
End: 2023-08-21
Payer: MEDICARE

## 2023-08-21 ENCOUNTER — HOSPITAL ENCOUNTER (OUTPATIENT)
Dept: RADIOLOGY | Facility: HOSPITAL | Age: 83
Discharge: HOME | End: 2023-08-21
Attending: ORTHOPAEDIC SURGERY
Payer: MEDICARE

## 2023-08-21 ENCOUNTER — OFFICE VISIT (OUTPATIENT)
Dept: ORTHOPEDICS | Facility: CLINIC | Age: 83
End: 2023-08-21
Payer: MEDICARE

## 2023-08-21 DIAGNOSIS — Z96.652 S/P REVISION OF TOTAL KNEE, LEFT: ICD-10-CM

## 2023-08-21 DIAGNOSIS — Z96.652 S/P REVISION OF TOTAL KNEE, LEFT: Primary | ICD-10-CM

## 2023-08-21 PROCEDURE — 99213 OFFICE O/P EST LOW 20 MIN: CPT | Mod: PBBFAC | Performed by: ORTHOPAEDIC SURGERY

## 2023-08-21 PROCEDURE — 73562 XR KNEE ORTHO LEFT: ICD-10-PCS | Mod: 26,LT,, | Performed by: RADIOLOGY

## 2023-08-21 PROCEDURE — 99999 PR PBB SHADOW E&M-EST. PATIENT-LVL III: CPT | Mod: PBBFAC,,, | Performed by: ORTHOPAEDIC SURGERY

## 2023-08-21 PROCEDURE — 99999 PR PBB SHADOW E&M-EST. PATIENT-LVL III: ICD-10-PCS | Mod: PBBFAC,,, | Performed by: ORTHOPAEDIC SURGERY

## 2023-08-21 PROCEDURE — 99024 POSTOP FOLLOW-UP VISIT: CPT | Mod: POP,,, | Performed by: ORTHOPAEDIC SURGERY

## 2023-08-21 PROCEDURE — 73560 X-RAY EXAM OF KNEE 1 OR 2: CPT | Mod: TC,RT

## 2023-08-21 PROCEDURE — 99024 PR POST-OP FOLLOW-UP VISIT: ICD-10-PCS | Mod: POP,,, | Performed by: ORTHOPAEDIC SURGERY

## 2023-08-21 PROCEDURE — 73560 XR KNEE ORTHO LEFT: ICD-10-PCS | Mod: 26,RT,, | Performed by: RADIOLOGY

## 2023-08-21 PROCEDURE — 73560 X-RAY EXAM OF KNEE 1 OR 2: CPT | Mod: 26,RT,, | Performed by: RADIOLOGY

## 2023-08-21 PROCEDURE — 73562 X-RAY EXAM OF KNEE 3: CPT | Mod: 26,LT,, | Performed by: RADIOLOGY

## 2023-08-21 NOTE — PROGRESS NOTES
Fan Paz is in for 3 month follow up for a  left revision  TKA.  He is doing  well.  No pain in the knee.  He has resumed activities of daily living. He has some stiffness.  Still in PT  Exam demonstrates  A well developed male in no distress.  Alert and oriented.  Mood and affect are appropriate.    Knee incision is well healed.  ROM is 0-110.  The patella tracks well and there is no instability. The extremity is neurovascularly intact.    Xrays demonstrate a well fixed and positioned prosthesis.    PROMIS-10 Questionnaire Scores 8/21/2023 5/24/2023 2/19/2021   Global Physical Health 8 16 14   Global Mental health Score 15 10 13       KOOS Jr. Questionnaire Score 8/21/2023 5/24/2023 2/19/2021   KOOS Jr Score 9 14 5       [place] Knee Questionnaire Score 8/21/2023 5/24/2023   [place] Knee Score 36 27       Knee Society and Function Score 8/21/2023   FINDINGS - KNEE SOCIETY SCORE 97   FINDINGS - KNEE SOCIETY FUNCTION SCORE 100       [place] Joint Score 8/21/2023 8/21/2023 5/24/2023   In which leg do you have an artificial knee? Left Knee Left Knee Both   In bed at night? Almost never Almost never Sometimes   When sitting on a chair for more than one hour? Almost never Almost never Sometimes   When you are walking for more than 15 minutes? Almost never Almost never Sometimes   When taking a bath or shower? Almost never Almost never Sometimes   When traveling in a car? Almost never Almost never Sometimes   When climbing stairs? Almost never Almost never Sometimes   When walking on uneven ground? Almost never Almost never Sometimes   When standing up from a low-sitting position? Almost never Almost never Sometimes   When standing for long periods of time? Almost never Almost never Sometimes   When doing housework or gardening? Almost never Almost never Sometimes   When taking a walk or hiking? Almost never Almost never Sometimes   When doing your favorite sport? Almost never Almost never Sometimes    Forgotten Joint Score Left Knee 75 75 25   In bed at night? - - Sometimes   When sitting on a chair for more than one hour? - - Sometimes   When you are walking for more than 15 minutes? - - Sometimes   When taking a bath or shower? - - Sometimes   When traveling in a car? - - Sometimes   When climbing stairs? - - Sometimes   When walking on uneven ground? - - Sometimes   When standing up from a low-sitting position? - - Sometimes   When standing for long periods of time? - - Sometimes   When doing housework or gardening? - - Sometimes   When taking a walk or hiking? - - Sometimes   When doing your favorite sport? - - Sometimes   Forgotten Joint Score Right Knee Error Error 25       Imp:Doing well  Complete PT      F/u in 3 months with xrays

## 2023-08-31 ENCOUNTER — EXTERNAL CHRONIC CARE MANAGEMENT (OUTPATIENT)
Dept: PRIMARY CARE CLINIC | Facility: CLINIC | Age: 83
End: 2023-08-31
Payer: MEDICARE

## 2023-08-31 PROCEDURE — 99490 PR CHRONIC CARE MGMT, 1ST 20 MIN: ICD-10-PCS | Mod: S$PBB,,, | Performed by: FAMILY MEDICINE

## 2023-08-31 PROCEDURE — 99490 CHRNC CARE MGMT STAFF 1ST 20: CPT | Mod: PBBFAC,PO | Performed by: FAMILY MEDICINE

## 2023-08-31 PROCEDURE — 99490 CHRNC CARE MGMT STAFF 1ST 20: CPT | Mod: S$PBB,,, | Performed by: FAMILY MEDICINE

## 2023-09-06 ENCOUNTER — OFFICE VISIT (OUTPATIENT)
Dept: RADIATION ONCOLOGY | Facility: CLINIC | Age: 83
End: 2023-09-06
Payer: MEDICARE

## 2023-09-06 VITALS
HEIGHT: 65 IN | HEART RATE: 80 BPM | BODY MASS INDEX: 29.79 KG/M2 | WEIGHT: 178.81 LBS | SYSTOLIC BLOOD PRESSURE: 157 MMHG | RESPIRATION RATE: 16 BRPM | DIASTOLIC BLOOD PRESSURE: 83 MMHG

## 2023-09-06 DIAGNOSIS — Z85.46 HISTORY OF PROSTATE CANCER: Primary | ICD-10-CM

## 2023-09-06 PROCEDURE — 99999 PR PBB SHADOW E&M-EST. PATIENT-LVL III: ICD-10-PCS | Mod: PBBFAC,,, | Performed by: RADIOLOGY

## 2023-09-06 PROCEDURE — 99212 PR OFFICE/OUTPT VISIT, EST, LEVL II, 10-19 MIN: ICD-10-PCS | Mod: S$PBB,,, | Performed by: RADIOLOGY

## 2023-09-06 PROCEDURE — 99999 PR PBB SHADOW E&M-EST. PATIENT-LVL III: CPT | Mod: PBBFAC,,, | Performed by: RADIOLOGY

## 2023-09-06 PROCEDURE — 99212 OFFICE O/P EST SF 10 MIN: CPT | Mod: S$PBB,,, | Performed by: RADIOLOGY

## 2023-09-06 PROCEDURE — 99213 OFFICE O/P EST LOW 20 MIN: CPT | Mod: PBBFAC | Performed by: RADIOLOGY

## 2023-09-06 NOTE — PROGRESS NOTES
[facility] [place] - Radiation oncology    Patient ID: Fan Paz is a 83 y.o. male.    Chief Complaint: Prostate Cancer (1yr f/u;psa)    Mr. Paz has a history of Stage II (T1c, N0, M0) adenocarcinoma of the prostate. He presented with an elevated PSA of 5 ng/ml.   Biopsies from the left apex, left mid gland, left base, and right apex revealed adenocarcinoma. The Ryan score was 7 (3+4) and biopsies from the left mid gland and left base. The tumor involved 30% and 60% of the specimens, respectively. [place]'s 6 adenocarcinoma was noted in biopsies from the left apex and right apex involving only 5% and 1% of the specimens, respectively. Further metastatic work up was negative. The patient was referred for definitive radiotherapy. He was enrolled on RTOG 0815. He was randomized to receive 6 months of hormonal ablation therapy along with radiotherapy. He completed 45 Gy external beam therapy followed by implantation with Palladium 103 seeds on 1/18/12.  He has remained BRIGHT since that time.  Today the patient states he feels well.  No complaints.       Review of Systems   Constitutional:  Negative for activity change, appetite change, chills, diaphoresis and fatigue.   Respiratory:  Negative for cough and shortness of breath.    Gastrointestinal:  Negative for abdominal pain, constipation, diarrhea and fecal incontinence.   Genitourinary:  Negative for bladder incontinence, difficulty urinating, dysuria, frequency and hematuria.     Physical Exam  Constitutional:       General: He is not in acute distress.     Appearance: Normal appearance.   Pulmonary:      Effort: Pulmonary effort is normal. No respiratory distress.   Abdominal:      General: Abdomen is flat. There is no distension.      Palpations: Abdomen is soft.   Neurological:      Mental Status: He is alert and oriented to person, place, and time.   Psychiatric:         Mood and Affect: Mood normal.         Judgment: Judgment  normal.        Latest Reference Range & Units 08/16/22 10:43 02/06/23 09:12 08/21/23 15:27   PSA Diagnostic 0.00 - 4.00 ng/mL <0.01 <0.01 <0.01          Assessment and Plan     History of prostate cancer.    No evidence of tumor progression or recurrence.  Will plan to discharge from our clinic.  Recommend follow up PSA annually with PCP.

## 2023-09-13 ENCOUNTER — OFFICE VISIT (OUTPATIENT)
Dept: OTOLARYNGOLOGY | Facility: CLINIC | Age: 83
End: 2023-09-13
Payer: MEDICARE

## 2023-09-13 VITALS — WEIGHT: 178 LBS | BODY MASS INDEX: 29.66 KG/M2 | HEIGHT: 65 IN

## 2023-09-13 DIAGNOSIS — Z92.3 HISTORY OF HEAD AND NECK RADIATION: Primary | ICD-10-CM

## 2023-09-13 DIAGNOSIS — J34.3 HYPERTROPHY OF INFERIOR NASAL TURBINATE: ICD-10-CM

## 2023-09-13 DIAGNOSIS — H92.02 LEFT EAR PAIN: ICD-10-CM

## 2023-09-13 DIAGNOSIS — J30.2 SEASONAL ALLERGIC RHINITIS, UNSPECIFIED TRIGGER: ICD-10-CM

## 2023-09-13 DIAGNOSIS — Z85.819 HISTORY OF ORAL CANCER: ICD-10-CM

## 2023-09-13 PROCEDURE — 99213 OFFICE O/P EST LOW 20 MIN: CPT | Mod: S$GLB,,, | Performed by: OTOLARYNGOLOGY

## 2023-09-13 PROCEDURE — 99213 PR OFFICE/OUTPT VISIT, EST, LEVL III, 20-29 MIN: ICD-10-PCS | Mod: S$GLB,,, | Performed by: OTOLARYNGOLOGY

## 2023-09-13 RX ORDER — FLUTICASONE PROPIONATE 50 MCG
2 SPRAY, SUSPENSION (ML) NASAL 2 TIMES DAILY
Qty: 18.2 ML | Refills: 11 | Status: SHIPPED | OUTPATIENT
Start: 2023-09-13 | End: 2023-12-27 | Stop reason: SDUPTHER

## 2023-09-13 NOTE — PROGRESS NOTES
OTOLARYNGOLOGY CLINIC NOTE  Date:  09/13/2023     Chief complaint:  Chief Complaint   Patient presents with    Otalgia     Left ear pain that comes and goes, hx of cancer       History of Present Illness  Fan Paz is a 83 y.o. male  presenting today for a followup.Seen 8-11-23; patient is not sure why he is here today. Documentation in epic shows that patient called in complaining of ear pain after apptmt in august when I saw him. When I mentioned this to him, he does not remember calling and states that his ear is actually much better -Has gotten better since the last time I saw him     When comes on lasts an hour or so then goes away and can come back same day. Happens twice a week. It is shorter in duration when it happens and happens less frequently than before    Has been over 15 years since had cancer treatment ; had surveillance exam last visit (gets yearly)    Sometimes gets pain in the jaw  Does not like astelin , needs refill of flonase    Past Medical History  Past Medical History:   Diagnosis Date    Arthritis     Cancer of palate     GERD (gastroesophageal reflux disease)     HTN (hypertension)     Hyperlipidemia     Prostate cancer     2011    Pulmonary embolism         Past Surgical History  Past Surgical History:   Procedure Laterality Date    BACK SURGERY  y-7    Cancer of palate surgery      Late [facility]- [facility]     CARPAL TUNNEL RELEASE  8-14-13    right hand,Left hand 2013    COLONOSCOPY N/A 4/10/2017    Procedure: COLONOSCOPY;  Surgeon: Nilo Kelly MD;  Location: [facility];  Service: Endoscopy;  Laterality: N/A;    COLONOSCOPY N/A 10/21/2020    Procedure: COLONOSCOPY;  Surgeon: Demetrius Araujo MD;  Location: [facility];  Service: Endoscopy;  Laterality: N/A;    ESOPHAGOGASTRODUODENOSCOPY N/A 5/3/2023    Procedure: EGD (ESOPHAGOGASTRODUODENOSCOPY);  Surgeon: Shauna Lopez MD;  Location: [facility];  Service: Endoscopy;  Laterality: N/A;  EGD (with Dr. Lopez or Dr. Parada),  : 1-4 weeks, Provider: Dr. Lopez or Dr. Parada Location: [facility], instructions sent to email address [email or web address].     KNEE SURGERY  y-41    left knee    KNEE SURGERY Right 12-1-15    TKR    Radio active seed Implant      January 2012    REVISION OF KNEE ARTHROPLASTY Left 6/2/2023    Procedure: REVISION, ARTHROPLASTY, KNEE;  Surgeon: Dustin Paul MD;  Location: [facility] OR [address] [place] [address];  Service: Orthopedics;  Laterality: Left;    TOTAL HIP ARTHROPLASTY  3/2011        Medications  Current Outpatient Medications on File Prior to Visit   Medication Sig Dispense Refill    acetaminophen (TYLENOL) 650 MG TbSR Take 1 tablet (650 mg total) by mouth every 8 (eight) hours. 120 tablet 0    azelastine (ASTELIN) 137 mcg (0.1 %) nasal spray 1 spray (137 mcg total) by Nasal route 2 (two) times daily. 30 mL 3    celecoxib (CELEBREX) 200 MG capsule Take 1 capsule (200 mg total) by mouth once daily. 30 capsule 0    docusate sodium (STOOL SOFTENER ORAL) Take by mouth as needed.      fluticasone propionate (FLONASE) 50 mcg/actuation nasal spray 2 sprays (100 mcg total) by Each Nostril route 2 (two) times daily. 18.2 mL 11    FLUZONE HIGH-DOSE 2019-20, PF, 180 mcg/0.5 mL Syrg PHARMACIST ADMINISTERED IMMUNIZATION ADMINISTERED AT TIME OF DISPENSING  0    montelukast (SINGULAIR) 10 mg tablet TAKE 1 TABLET BY MOUTH ONCE DAILY IN THE EVENING 90 tablet 2    multivitamin/iron/folic acid (CENTRUM COMPLETE ORAL) Take 1 tablet by mouth once daily.      pantoprazole (PROTONIX) 40 MG tablet Take 1 tablet (40 mg total) by mouth once daily. 30 tablet 1    simvastatin (ZOCOR) 40 MG tablet Take 1 tablet (40 mg total) by mouth every evening. 90 tablet 3    tamsulosin (FLOMAX) 0.4 mg Cap Take 1 capsule (0.4 mg total) by mouth once daily. 90 capsule 1     No current facility-administered medications on file prior to visit.       Review of Systems  Review of Systems   Constitutional:  Negative for fever and weight loss.   HENT:  Positive for  "ear pain. Negative for ear discharge and sore throat.    Respiratory:  Negative for hemoptysis.    Musculoskeletal:  Positive for joint pain.   Endo/Heme/Allergies:  Positive for environmental allergies.   Psychiatric/Behavioral:  Positive for memory loss.         Social History   reports that he quit smoking about 26 years ago. His smoking use included cigarettes. He started smoking about 46 years ago. He has a 60.0 pack-year smoking history. He has been exposed to tobacco smoke. He has never used smokeless tobacco. He reports that he does not drink alcohol and does not use drugs.     Family History  Family History   Problem Relation Age of Onset    No Known Problems Mother     Esophageal cancer Father     Coronary artery disease Father             Cancer Sister         Liver Cancer -    Diabetes Sister             No Known Problems Sister     No Known Problems Sister     Lung cancer Sister         Alive    Breast cancer Sister     Clotting disorder Sister 75        blood clot on brain-alive    No Known Problems Brother     No Known Problems Brother     Lung cancer Brother 60        - was a tobacco user, had lung cancer    Cancer Brother         Liver Cancer-     Stroke Brother             No Known Problems Maternal Aunt     No Known Problems Maternal Uncle     No Known Problems Paternal Aunt     No Known Problems Paternal Uncle     No Known Problems Maternal Grandmother     No Known Problems Maternal Grandfather     No Known Problems Paternal Grandmother     No Known Problems Paternal Grandfather     Glaucoma Cousin     Macular degeneration Neg Hx     Strabismus Neg Hx     Amblyopia Neg Hx     Blindness Neg Hx     Cataracts Neg Hx     Hypertension Neg Hx     Retinal detachment Neg Hx     Thyroid disease Neg Hx     Colon cancer Neg Hx         Physical Exam   There were no vitals filed for this visit. Body mass index is 29.62 kg/m².  Weight: 80.7 kg (178 lb)   Height: 5' 5" (165.1 cm) "     GENERAL: no acute distress.  HEAD: normocephalic.   EYES: No scleral icterus  EARS: external ear without lesion, normal pinna shape and position.  External auditory canal with normal cerumen, tympanic membrane fully visible, no perforation , no retraction. No middle ear effusion. Ossicles intact.   NOSE: external nose without significant bony abnormality, turbinate hypertrophy on anterior rhinoscopy  ORAL CAVITY/OROPHARYNX: tongue mobile. No lesions  NECK: trachea midline.   LYMPH NODES:No cervical lymphadenopathy.  RESPIRATORY: no stridor, no stertor. Respirations nonlabored.  NEURO: alert, responds to questions appropriately.    PSYCH:mood appropriate      Imaging:  The patient does not have any new imaging of the head and neck since last visit.     Labs:  CBC  Recent Labs   Lab 01/28/22  0938 02/06/23  0912 05/10/23  1210   WBC 5.17 5.63 6.17   Hemoglobin 12.9 L 13.6 L 13.1 L   Hematocrit 41.8 44.0 41.9   MCV 87 87 86   Platelets 196 210 191     BMP  Recent Labs   Lab 01/28/22  0938 02/06/23  0912 05/10/23  1210   Glucose 110 115 H 109   Sodium 140 137 141   Potassium 4.2 4.3 4.9   Chloride 108 101 105   CO2 27 25 30 H   BUN 16 16 17   Creatinine 1.0 1.0 0.9   Calcium 8.8 9.6 9.5     COAGS  Recent Labs   Lab 11/27/20  1538 05/10/23  1210   INR 1.0 1.0       Assessment  1. History of head and neck radiation    2. History of oral cancer    3. Left ear pain    4. Seasonal allergic rhinitis, unspecified trigger    5. Hypertrophy of inferior nasal turbinate       Plan:  Discussed plan of care with patient in detail and all questions answered. Patient reported understanding of plan of care. I gave the patient the opportunity to ask questions and patient confirmed all questions answered to satisfaction.     Surveillance scope normal at last visit about a month ago   Ears are better since last visit when had same complaint of unilateral otalgia- scope negative for malignancy  Reassured patient that ear exam today  normal. Suspect either etd or tmj  Refill of flonase sent to pharmacy; reviewed how to administer and to use saline prior to medication spray  No wax build up today- gets recurrent cerumen impactions. Will see back in January for this     F/u January ,sooner if issue      Please be aware that this note has been generated with the assistance of [employer] voice-to-text.  Please excuse any spelling or grammatical errors.

## 2023-09-30 ENCOUNTER — EXTERNAL CHRONIC CARE MANAGEMENT (OUTPATIENT)
Dept: PRIMARY CARE CLINIC | Facility: CLINIC | Age: 83
End: 2023-09-30
Payer: MEDICARE

## 2023-09-30 PROCEDURE — 99490 PR CHRONIC CARE MGMT, 1ST 20 MIN: ICD-10-PCS | Mod: S$PBB,,, | Performed by: FAMILY MEDICINE

## 2023-09-30 PROCEDURE — 99439 CHRNC CARE MGMT STAF EA ADDL: CPT | Mod: PBBFAC,PO | Performed by: FAMILY MEDICINE

## 2023-09-30 PROCEDURE — 99490 CHRNC CARE MGMT STAFF 1ST 20: CPT | Mod: PBBFAC,25,PO | Performed by: FAMILY MEDICINE

## 2023-09-30 PROCEDURE — 99490 CHRNC CARE MGMT STAFF 1ST 20: CPT | Mod: S$PBB,,, | Performed by: FAMILY MEDICINE

## 2023-09-30 PROCEDURE — 99439 CHRNC CARE MGMT STAF EA ADDL: CPT | Mod: S$PBB,,, | Performed by: FAMILY MEDICINE

## 2023-09-30 PROCEDURE — 99439 PR CHRONIC CARE MGMT, EA ADDTL 20 MIN: ICD-10-PCS | Mod: S$PBB,,, | Performed by: FAMILY MEDICINE

## 2023-10-30 ENCOUNTER — DOCUMENT SCAN (OUTPATIENT)
Dept: HOME HEALTH SERVICES | Facility: HOSPITAL | Age: 83
End: 2023-10-30
Payer: MEDICARE

## 2023-10-31 ENCOUNTER — EXTERNAL CHRONIC CARE MANAGEMENT (OUTPATIENT)
Dept: PRIMARY CARE CLINIC | Facility: CLINIC | Age: 83
End: 2023-10-31
Payer: MEDICARE

## 2023-10-31 PROCEDURE — 99490 CHRNC CARE MGMT STAFF 1ST 20: CPT | Mod: S$PBB,,, | Performed by: FAMILY MEDICINE

## 2023-10-31 PROCEDURE — 99490 CHRNC CARE MGMT STAFF 1ST 20: CPT | Mod: PBBFAC,PO | Performed by: FAMILY MEDICINE

## 2023-10-31 PROCEDURE — 99490 PR CHRONIC CARE MGMT, 1ST 20 MIN: ICD-10-PCS | Mod: S$PBB,,, | Performed by: FAMILY MEDICINE

## 2023-11-30 ENCOUNTER — EXTERNAL CHRONIC CARE MANAGEMENT (OUTPATIENT)
Dept: PRIMARY CARE CLINIC | Facility: CLINIC | Age: 83
End: 2023-11-30
Payer: MEDICARE

## 2023-11-30 DIAGNOSIS — Z96.652 S/P REVISION OF TOTAL KNEE, LEFT: Primary | ICD-10-CM

## 2023-11-30 PROCEDURE — 99490 PR CHRONIC CARE MGMT, 1ST 20 MIN: ICD-10-PCS | Mod: S$PBB,,, | Performed by: FAMILY MEDICINE

## 2023-11-30 PROCEDURE — 99490 CHRNC CARE MGMT STAFF 1ST 20: CPT | Mod: PBBFAC,PO | Performed by: FAMILY MEDICINE

## 2023-11-30 PROCEDURE — 99490 CHRNC CARE MGMT STAFF 1ST 20: CPT | Mod: S$PBB,,, | Performed by: FAMILY MEDICINE

## 2023-12-04 ENCOUNTER — OFFICE VISIT (OUTPATIENT)
Dept: ORTHOPEDICS | Facility: CLINIC | Age: 83
End: 2023-12-04
Payer: MEDICARE

## 2023-12-04 ENCOUNTER — HOSPITAL ENCOUNTER (OUTPATIENT)
Dept: RADIOLOGY | Facility: HOSPITAL | Age: 83
Discharge: HOME | End: 2023-12-04
Attending: ORTHOPAEDIC SURGERY
Payer: MEDICARE

## 2023-12-04 VITALS — WEIGHT: 177.56 LBS | BODY MASS INDEX: 29.58 KG/M2 | HEIGHT: 65 IN

## 2023-12-04 DIAGNOSIS — G89.29 CHRONIC PAIN OF LEFT KNEE: ICD-10-CM

## 2023-12-04 DIAGNOSIS — Z96.652 S/P REVISION OF TOTAL KNEE, LEFT: Primary | ICD-10-CM

## 2023-12-04 DIAGNOSIS — M25.562 CHRONIC PAIN OF LEFT KNEE: ICD-10-CM

## 2023-12-04 DIAGNOSIS — Z96.652 S/P REVISION OF TOTAL KNEE, LEFT: ICD-10-CM

## 2023-12-04 PROCEDURE — 99213 OFFICE O/P EST LOW 20 MIN: CPT | Mod: PBBFAC | Performed by: ORTHOPAEDIC SURGERY

## 2023-12-04 PROCEDURE — 99212 OFFICE O/P EST SF 10 MIN: CPT | Mod: S$PBB,,, | Performed by: ORTHOPAEDIC SURGERY

## 2023-12-04 PROCEDURE — 73562 X-RAY EXAM OF KNEE 3: CPT | Mod: 26,LT,, | Performed by: RADIOLOGY

## 2023-12-04 PROCEDURE — 73562 XR KNEE ORTHO LEFT: ICD-10-PCS | Mod: 26,LT,, | Performed by: RADIOLOGY

## 2023-12-04 PROCEDURE — 73560 X-RAY EXAM OF KNEE 1 OR 2: CPT | Mod: 59,TC,RT

## 2023-12-04 PROCEDURE — 99999 PR PBB SHADOW E&M-EST. PATIENT-LVL III: CPT | Mod: PBBFAC,,, | Performed by: ORTHOPAEDIC SURGERY

## 2023-12-04 PROCEDURE — 99212 PR OFFICE/OUTPT VISIT, EST, LEVL II, 10-19 MIN: ICD-10-PCS | Mod: S$PBB,,, | Performed by: ORTHOPAEDIC SURGERY

## 2023-12-04 PROCEDURE — 73560 XR KNEE ORTHO LEFT: ICD-10-PCS | Mod: 26,59,RT, | Performed by: RADIOLOGY

## 2023-12-04 PROCEDURE — 73560 X-RAY EXAM OF KNEE 1 OR 2: CPT | Mod: 26,59,RT, | Performed by: RADIOLOGY

## 2023-12-04 PROCEDURE — 73562 X-RAY EXAM OF KNEE 3: CPT | Mod: TC,LT

## 2023-12-04 PROCEDURE — 99999 PR PBB SHADOW E&M-EST. PATIENT-LVL III: ICD-10-PCS | Mod: PBBFAC,,, | Performed by: ORTHOPAEDIC SURGERY

## 2023-12-04 NOTE — PROGRESS NOTES
Fan Paz is in for 6 month follow up for a  left revision  TKA.  He is doing arnulfo well.  No pain in the knee.  He has resumed activities of daily living. He has been quite active  Exam demonstrates  A well developed male in no distress.  Alert and oriented.  Mood and affect are appropriate.    Knee incision is well healed.  ROM is 0-120.  The patella tracks well and there is no instability. The extremity is neurovascularly intact.    Xrays demonstrate a well fixed and positioned prosthesis.        8/21/2023     2:50 PM 5/24/2023     2:21 PM 2/19/2021     3:19 PM   PROMIS-10 Questionnaire Scores   Global Physical Health 8 16 14   Global Mental health Score 15 10 13           8/21/2023     2:49 PM 5/24/2023     2:20 PM 2/19/2021     3:19 PM   KOOS Jr. Questionnaire Score   KOOS Jr Score 9 14 5           8/21/2023     2:51 PM 5/24/2023     2:21 PM   Oxford Knee Questionnaire Score   Oxford Knee Score 36 27           8/21/2023     2:15 PM   Knee Society and Function Score   FINDINGS - KNEE SOCIETY SCORE 97   FINDINGS - KNEE SOCIETY FUNCTION SCORE 100           8/21/2023     2:52 PM 8/21/2023     2:51 PM 5/24/2023     2:22 PM   [place] Joint Score   In which leg do you have an artificial knee? Left Knee Left Knee Both   In bed at night? Almost never Almost never Sometimes   When sitting on a chair for more than one hour? Almost never Almost never Sometimes   When you are walking for more than 15 minutes? Almost never Almost never Sometimes   When taking a bath or shower? Almost never Almost never Sometimes   When traveling in a car? Almost never Almost never Sometimes   When climbing stairs? Almost never Almost never Sometimes   When walking on uneven ground? Almost never Almost never Sometimes   When standing up from a low-sitting position? Almost never Almost never Sometimes   When standing for long periods of time? Almost never Almost never Sometimes   When doing housework or gardening? Almost never  Almost never Sometimes   When taking a walk or hiking? Almost never Almost never Sometimes   When doing your favorite sport? Almost never Almost never Sometimes   Forgotten Joint Score Left Knee 75 75 25   In bed at night?   Sometimes   When sitting on a chair for more than one hour?   Sometimes   When you are walking for more than 15 minutes?   Sometimes   When taking a bath or shower?   Sometimes   When traveling in a car?   Sometimes   When climbing stairs?   Sometimes   When walking on uneven ground?   Sometimes   When standing up from a low-sitting position?   Sometimes   When standing for long periods of time?   Sometimes   When doing housework or gardening?   Sometimes   When taking a walk or hiking?   Sometimes   When doing your favorite sport?   Sometimes   Forgotten Joint Score Right Knee Error Error 25       Imp:Doing well    F/u in 6 months with xrays and PROMS.  Sooner if needed

## 2023-12-18 DIAGNOSIS — E78.5 HYPERLIPIDEMIA, UNSPECIFIED HYPERLIPIDEMIA TYPE: ICD-10-CM

## 2023-12-19 RX ORDER — SIMVASTATIN 40 MG/1
40 TABLET, FILM COATED ORAL NIGHTLY
Qty: 90 TABLET | Refills: 0 | Status: SHIPPED | OUTPATIENT
Start: 2023-12-19 | End: 2023-12-27 | Stop reason: SDUPTHER

## 2023-12-19 NOTE — TELEPHONE ENCOUNTER
Refill Decision Note   Fan Paz  is requesting a refill authorization.  Brief Assessment and Rationale for Refill:  Approve     Medication Therapy Plan:  Not true ED visit      Comments:     Note composed:3:45 AM 12/19/2023

## 2023-12-26 ENCOUNTER — TELEPHONE (OUTPATIENT)
Dept: FAMILY MEDICINE | Facility: CLINIC | Age: 83
End: 2023-12-26
Payer: MEDICARE

## 2023-12-27 ENCOUNTER — OFFICE VISIT (OUTPATIENT)
Dept: FAMILY MEDICINE | Facility: CLINIC | Age: 83
End: 2023-12-27
Payer: MEDICARE

## 2023-12-27 VITALS
TEMPERATURE: 98 F | DIASTOLIC BLOOD PRESSURE: 80 MMHG | HEIGHT: 65 IN | HEART RATE: 81 BPM | RESPIRATION RATE: 18 BRPM | WEIGHT: 179.69 LBS | SYSTOLIC BLOOD PRESSURE: 174 MMHG | OXYGEN SATURATION: 97 % | BODY MASS INDEX: 29.94 KG/M2

## 2023-12-27 DIAGNOSIS — I10 ESSENTIAL HYPERTENSION: Primary | ICD-10-CM

## 2023-12-27 DIAGNOSIS — Z85.46 HISTORY OF PROSTATE CANCER: ICD-10-CM

## 2023-12-27 DIAGNOSIS — Z23 INFLUENZA VACCINE NEEDED: ICD-10-CM

## 2023-12-27 DIAGNOSIS — E78.5 HYPERLIPIDEMIA, UNSPECIFIED HYPERLIPIDEMIA TYPE: ICD-10-CM

## 2023-12-27 PROBLEM — C61 PROSTATE CANCER: Status: RESOLVED | Noted: 2023-12-27 | Resolved: 2023-12-27

## 2023-12-27 PROBLEM — C61 PROSTATE CANCER: Status: ACTIVE | Noted: 2023-12-27

## 2023-12-27 PROBLEM — T84.018A FAILED TOTAL KNEE ARTHROPLASTY: Status: RESOLVED | Noted: 2023-06-01 | Resolved: 2023-12-27

## 2023-12-27 PROBLEM — Z96.659 FAILED TOTAL KNEE ARTHROPLASTY: Status: RESOLVED | Noted: 2023-06-01 | Resolved: 2023-12-27

## 2023-12-27 PROBLEM — Z12.11 SCREEN FOR COLON CANCER: Status: RESOLVED | Noted: 2017-04-10 | Resolved: 2023-12-27

## 2023-12-27 PROCEDURE — 99214 OFFICE O/P EST MOD 30 MIN: CPT | Mod: S$PBB,,, | Performed by: FAMILY MEDICINE

## 2023-12-27 PROCEDURE — 99999 PR PBB SHADOW E&M-EST. PATIENT-LVL V: ICD-10-PCS | Mod: PBBFAC,,, | Performed by: FAMILY MEDICINE

## 2023-12-27 PROCEDURE — 99214 PR OFFICE/OUTPT VISIT, EST, LEVL IV, 30-39 MIN: ICD-10-PCS | Mod: S$PBB,,, | Performed by: FAMILY MEDICINE

## 2023-12-27 PROCEDURE — 99215 OFFICE O/P EST HI 40 MIN: CPT | Mod: PBBFAC,PO,25 | Performed by: FAMILY MEDICINE

## 2023-12-27 PROCEDURE — G0008 ADMIN INFLUENZA VIRUS VAC: HCPCS | Mod: PBBFAC,PO

## 2023-12-27 PROCEDURE — 99999PBSHW FLU VACCINE - QUADRIVALENT - ADJUVANTED: ICD-10-PCS | Mod: PBBFAC,,,

## 2023-12-27 PROCEDURE — 99999PBSHW FLU VACCINE - QUADRIVALENT - ADJUVANTED: Mod: PBBFAC,,,

## 2023-12-27 PROCEDURE — 99999 PR PBB SHADOW E&M-EST. PATIENT-LVL V: CPT | Mod: PBBFAC,,, | Performed by: FAMILY MEDICINE

## 2023-12-27 RX ORDER — LOSARTAN POTASSIUM 50 MG/1
50 TABLET ORAL DAILY
Qty: 90 TABLET | Refills: 0 | Status: SHIPPED | OUTPATIENT
Start: 2023-12-27 | End: 2024-12-26

## 2023-12-27 RX ORDER — SIMVASTATIN 40 MG/1
40 TABLET, FILM COATED ORAL NIGHTLY
Qty: 90 TABLET | Refills: 3 | Status: SHIPPED | OUTPATIENT
Start: 2023-12-27

## 2023-12-27 NOTE — PROGRESS NOTES
Routine Office Visit    Patient Name: Fan Paz    : 1940  MRN: [id number]    Subjective:  Fan is a 83 y.o. male who presents today for:    1. Medication refill  Patient presenting today for refills of simvastatin.  He has been taking all medication as prescribed.  There has been no chest pain, weakness, numbness, blurred vision, or slurred speech.  He has been diagnosed with HTN in the past, but has not required medication for a long time.  He states his blood pressure is normally well controlled with diet, but does fluctuate from time to time.      Past Medical History  Past Medical History:   Diagnosis Date    Arthritis     Cancer of palate     GERD (gastroesophageal reflux disease)     HTN (hypertension)     Hyperlipidemia     Prostate cancer         Pulmonary embolism        Past Surgical History  Past Surgical History:   Procedure Laterality Date    BACK SURGERY  y-7    Cancer of palate surgery      Late - [facility]     CARPAL TUNNEL RELEASE  13    right hand,Left hand     COLONOSCOPY N/A 4/10/2017    Procedure: COLONOSCOPY;  Surgeon: Nilo Kelly MD;  Location: [facility];  Service: Endoscopy;  Laterality: N/A;    COLONOSCOPY N/A 10/21/2020    Procedure: COLONOSCOPY;  Surgeon: Demetrius Araujo MD;  Location: [facility];  Service: Endoscopy;  Laterality: N/A;    ESOPHAGOGASTRODUODENOSCOPY N/A 5/3/2023    Procedure: EGD (ESOPHAGOGASTRODUODENOSCOPY);  Surgeon: Shauna Lopez MD;  Location: [facility];  Service: Endoscopy;  Laterality: N/A;  EGD (with Dr. Lopez or Dr. Parada), : 1-4 weeks, Provider: Dr. Lopez or Dr. Parada Location: [facility], instructions sent to email address [email or web address]    KNEE SURGERY  y-41    left knee    KNEE SURGERY Right 12-1-15    TKR    Radio active seed Implant      2012    REVISION OF KNEE ARTHROPLASTY Left 2023    Procedure: REVISION, ARTHROPLASTY, KNEE;  Surgeon: Dustin Paul MD;  Location: [address]  [facility];  Service: Orthopedics;  Laterality: Left;    TOTAL HIP ARTHROPLASTY  3/2011       Family History  Family History   Problem Relation Age of Onset    No Known Problems Mother     Esophageal cancer Father     Coronary artery disease Father             Cancer Sister         Liver Cancer -    Diabetes Sister             No Known Problems Sister     No Known Problems Sister     Lung cancer Sister         Alive    Breast cancer Sister     Clotting disorder Sister 75        blood clot on brain-alive    No Known Problems Brother     No Known Problems Brother     Lung cancer Brother 60        - was a tobacco user, had lung cancer    Cancer Brother         Liver Cancer-     Stroke Brother             No Known Problems Maternal Aunt     No Known Problems Maternal Uncle     No Known Problems Paternal Aunt     No Known Problems Paternal Uncle     No Known Problems Maternal Grandmother     No Known Problems Maternal Grandfather     No Known Problems Paternal Grandmother     No Known Problems Paternal Grandfather     Glaucoma Cousin     Macular degeneration Neg Hx     Strabismus Neg Hx     Amblyopia Neg Hx     Blindness Neg Hx     Cataracts Neg Hx     Hypertension Neg Hx     Retinal detachment Neg Hx     Thyroid disease Neg Hx     Colon cancer Neg Hx        Social History  Social History     Socioeconomic History    Marital status:     Number of children: 4   Occupational History     Employer: RETIRED   Tobacco Use    Smoking status: Former     Current packs/day: 0.00     Average packs/day: 3.0 packs/day for 20.0 years (60.0 ttl pk-yrs)     Types: Cigarettes     Start date: 7/10/1977     Quit date: 7/10/1997     Years since quittin.4     Passive exposure: Past    Smokeless tobacco: Never    Tobacco comments:     Quit -smoked for 40 years ,2 packs a day on an average   Substance and Sexual Activity    Alcohol use: No     Comment: used to drink Occasionally    Drug use: No    Sexual  activity: Yes     Partners: Female     Social Determinants of Health     Financial Resource Strain: Low Risk  (6/3/2023)    Overall Financial Resource Strain (CARDIA)     Difficulty of Paying Living Expenses: Not hard at all   Food Insecurity: No Food Insecurity (6/3/2023)    Hunger Vital Sign     Worried About Running Out of Food in the Last Year: Never true     Ran Out of Food in the Last Year: Never true   Transportation Needs: No Transportation Needs (6/3/2023)    PRAPARE - Transportation     Lack of Transportation (Medical): No     Lack of Transportation (Non-Medical): No   Physical Activity: Sufficiently Active (6/3/2023)    Exercise Vital Sign     Days of Exercise per Week: 7 days     Minutes of Exercise per Session: 30 min   Stress: No Stress Concern Present (6/3/2023)    [employer] [employer] - Occupational Stress Questionnaire     Feeling of Stress : Not at all   Social Connections: Moderately Integrated (6/3/2023)    Social Connection and Isolation Panel [NHANES]     Frequency of Communication with Friends and Family: More than three times a week     Frequency of Social Gatherings with Friends and Family: More than three times a week     Attends Oriental orthodox Services: More than 4 times per year     Active Member of Clubs or Organizations: No     Attends Club or Organization Meetings: Never     Marital Status:    Housing Stability: Low Risk  (6/3/2023)    Housing Stability Vital Sign     Unable to Pay for Housing in the Last Year: No     Number of Places Lived in the Last Year: 1     Unstable Housing in the Last Year: No       Current Medications  Current Outpatient Medications on File Prior to Visit   Medication Sig Dispense Refill    acetaminophen (TYLENOL) 650 MG TbSR Take 1 tablet (650 mg total) by mouth every 8 (eight) hours. 120 tablet 0    azelastine (ASTELIN) 137 mcg (0.1 %) nasal spray 1 spray (137 mcg total) by Nasal route 2 (two) times daily. 30 mL 3    celecoxib  "(CELEBREX) 200 MG capsule Take 1 capsule (200 mg total) by mouth once daily. 30 capsule 0    docusate sodium (STOOL SOFTENER ORAL) Take by mouth as needed.      fluticasone propionate (FLONASE) 50 mcg/actuation nasal spray 2 sprays (100 mcg total) by Each Nostril route 2 (two) times daily. 18.2 mL 11    montelukast (SINGULAIR) 10 mg tablet TAKE 1 TABLET BY MOUTH ONCE DAILY IN THE EVENING 90 tablet 2    multivitamin/iron/folic acid (CENTRUM COMPLETE ORAL) Take 1 tablet by mouth once daily.      pantoprazole (PROTONIX) 40 MG tablet Take 1 tablet (40 mg total) by mouth once daily. 30 tablet 1    tamsulosin (FLOMAX) 0.4 mg Cap Take 1 capsule (0.4 mg total) by mouth once daily. 90 capsule 1    [DISCONTINUED] fluticasone propionate (FLONASE) 50 mcg/actuation nasal spray 2 sprays (100 mcg total) by Each Nostril route 2 (two) times daily. 18.2 mL 11    [DISCONTINUED] FLUZONE HIGH-DOSE 2019-20, PF, 180 mcg/0.5 mL Syrg PHARMACIST ADMINISTERED IMMUNIZATION ADMINISTERED AT TIME OF DISPENSING  0    [DISCONTINUED] simvastatin (ZOCOR) 40 MG tablet TAKE 1 TABLET BY MOUTH ONCE DAILY IN THE EVENING 90 tablet 0     No current facility-administered medications on file prior to visit.       Allergies   Review of patient's allergies indicates:  No Known Allergies    Review of Systems (Pertinent positives)  Review of Systems   Constitutional: Negative.    HENT: Negative.     Eyes: Negative.    Respiratory: Negative.     Cardiovascular: Negative.    Musculoskeletal:  Negative for joint pain.   Skin: Negative.    Neurological:  Positive for headaches.         BP (!) 174/80   Pulse 81   Temp 97.5 °F (36.4 °C) (Oral)   Resp 18   Ht 5' 5" (1.651 m)   Wt 81.5 kg (179 lb 10.8 oz)   SpO2 97%   BMI 29.90 kg/m²     GENERAL APPEARANCE: in no apparent distress and well developed and well nourished  HEENT: PERRL, EOMI, Sclera clear, anicteric, Oropharynx clear, no lesions, Neck supple with midline trachea  NECK: normal, supple, no adenopathy, " thyroid normal in size  RESPIRATORY: appears well, vitals normal, no respiratory distress, acyanotic, normal RR, chest clear, no wheezing, crepitations, rhonchi, normal symmetric air entry  HEART: regular rate and rhythm, S1, S2 normal, no murmur, click, rub or gallop.    ABDOMEN: abdomen is soft without tenderness, no masses, no hernias, no organomegaly, no rebound, no guarding. Suprapubic tenderness absent. No CVA tenderness.  Extremities: warm/well perfused.  No abnormal hair patterns.  No clubbing, cyanosis or edema.  no muscular tenderness noted, full range of motion without pain.  no joint tenderness, but middle finger is deformed at the MIP  SKIN: no rashes, no wounds, no other lesions  PSYCH: Alert, oriented x 3, thought content appropriate, speech normal, pleasant and cooperative, good eye contact, well groomed,    Assessment/Plan:  Fan Paz is a 83 y.o. male who presents today for :    Fan was seen today for follow-up and hypertension.    Diagnoses and all orders for this visit:    Essential hypertension  -     losartan (COZAAR) 50 MG tablet; Take 1 tablet (50 mg total) by mouth once daily.  -     Comprehensive Metabolic Panel; Future  -     Ambulatory referral/consult to Cardiology; Future  -  Started patient on cozzar 50mg daily and is to have nurse visit and labs done in 2 weeks  - Patient would like to see cardiology for cardiac evaluation    Influenza vaccine needed  -     Cancel: Influenza (FLUAD) - Quadrivalent (Adjuvanted) *Preferred* (65+) (PF)  -     Influenza (FLUAD) - Quadrivalent (Adjuvanted) *Preferred* (65+) (PF)    Hyperlipidemia, unspecified hyperlipidemia type  -     simvastatin (ZOCOR) 40 MG tablet; Take 1 tablet (40 mg total) by mouth every evening.  - Simvastatin refilled    History of prostate cancer  - No current evidence of cancer based off last visit with Dr. Reddy  - No current symptoms and not currently on medication          Otilio Damon MD

## 2023-12-27 NOTE — PROGRESS NOTES
Patient given flu vaccine via injection. 0 complaints of, tolerated well. Advised to wait in lobby 15 mins for adverse reaction. Verbalized understanding. pt has decline all  but the flu vaccine HM today vs

## 2023-12-31 ENCOUNTER — EXTERNAL CHRONIC CARE MANAGEMENT (OUTPATIENT)
Dept: PRIMARY CARE CLINIC | Facility: CLINIC | Age: 83
End: 2023-12-31
Payer: MEDICARE

## 2023-12-31 PROCEDURE — 99490 CHRNC CARE MGMT STAFF 1ST 20: CPT | Mod: S$PBB,,, | Performed by: FAMILY MEDICINE

## 2023-12-31 PROCEDURE — 99490 CHRNC CARE MGMT STAFF 1ST 20: CPT | Mod: PBBFAC,PO | Performed by: FAMILY MEDICINE

## 2024-01-10 ENCOUNTER — CLINICAL SUPPORT (OUTPATIENT)
Dept: FAMILY MEDICINE | Facility: CLINIC | Age: 84
End: 2024-01-10
Payer: MEDICARE

## 2024-01-10 ENCOUNTER — LAB VISIT (OUTPATIENT)
Dept: LAB | Facility: HOSPITAL | Age: 84
End: 2024-01-10
Attending: FAMILY MEDICINE
Payer: MEDICARE

## 2024-01-10 VITALS — SYSTOLIC BLOOD PRESSURE: 128 MMHG | HEART RATE: 78 BPM | DIASTOLIC BLOOD PRESSURE: 60 MMHG

## 2024-01-10 DIAGNOSIS — I10 ESSENTIAL HYPERTENSION: Primary | ICD-10-CM

## 2024-01-10 DIAGNOSIS — I10 ESSENTIAL HYPERTENSION: ICD-10-CM

## 2024-01-10 LAB
ALBUMIN SERPL BCP-MCNC: 4.1 G/DL (ref 3.5–5.2)
ALP SERPL-CCNC: 63 U/L (ref 55–135)
ALT SERPL W/O P-5'-P-CCNC: 14 U/L (ref 10–44)
ANION GAP SERPL CALC-SCNC: 7 MMOL/L (ref 8–16)
AST SERPL-CCNC: 21 U/L (ref 10–40)
BILIRUB SERPL-MCNC: 0.4 MG/DL (ref 0.1–1)
BUN SERPL-MCNC: 18 MG/DL (ref 8–23)
CALCIUM SERPL-MCNC: 9.7 MG/DL (ref 8.7–10.5)
CHLORIDE SERPL-SCNC: 104 MMOL/L (ref 95–110)
CO2 SERPL-SCNC: 29 MMOL/L (ref 23–29)
CREAT SERPL-MCNC: 1.1 MG/DL (ref 0.5–1.4)
EST. GFR  (NO RACE VARIABLE): >60 ML/MIN/1.73 M^2
GLUCOSE SERPL-MCNC: 96 MG/DL (ref 70–110)
POTASSIUM SERPL-SCNC: 4.5 MMOL/L (ref 3.5–5.1)
PROT SERPL-MCNC: 7.9 G/DL (ref 6–8.4)
SODIUM SERPL-SCNC: 140 MMOL/L (ref 136–145)

## 2024-01-10 PROCEDURE — 80053 COMPREHEN METABOLIC PANEL: CPT | Performed by: FAMILY MEDICINE

## 2024-01-10 PROCEDURE — 36415 COLL VENOUS BLD VENIPUNCTURE: CPT | Mod: PO | Performed by: FAMILY MEDICINE

## 2024-01-10 NOTE — PROGRESS NOTES
Fan Paz 83 y.o. male is here today for Blood Pressure check.   History of HTN yes.    Review of patient's allergies indicates:  No Known Allergies  Creatinine   Date Value Ref Range Status   05/10/2023 0.9 0.5 - 1.4 mg/dL Final   12/30/2020 0.70 0.70 - 1.18 mg/dL Final     Sodium   Date Value Ref Range Status   05/10/2023 141 136 - 145 mmol/L Final   12/30/2020 134 (A) 135 - 146 mmol/L Final     Potassium   Date Value Ref Range Status   05/10/2023 4.9 3.5 - 5.1 mmol/L Final   12/30/2020 4.3 3.5 - 5.3 mmol/L Final   ]  Patient verifies taking blood pressure medications on a regular basis at the same time of the day.     Current Outpatient Medications:     acetaminophen (TYLENOL) 650 MG TbSR, Take 1 tablet (650 mg total) by mouth every 8 (eight) hours., Disp: 120 tablet, Rfl: 0    azelastine (ASTELIN) 137 mcg (0.1 %) nasal spray, 1 spray (137 mcg total) by Nasal route 2 (two) times daily., Disp: 30 mL, Rfl: 3    celecoxib (CELEBREX) 200 MG capsule, Take 1 capsule (200 mg total) by mouth once daily., Disp: 30 capsule, Rfl: 0    docusate sodium (STOOL SOFTENER ORAL), Take by mouth as needed., Disp: , Rfl:     fluticasone propionate (FLONASE) 50 mcg/actuation nasal spray, 2 sprays (100 mcg total) by Each Nostril route 2 (two) times daily., Disp: 18.2 mL, Rfl: 11    losartan (COZAAR) 50 MG tablet, Take 1 tablet (50 mg total) by mouth once daily., Disp: 90 tablet, Rfl: 0    montelukast (SINGULAIR) 10 mg tablet, TAKE 1 TABLET BY MOUTH ONCE DAILY IN THE EVENING, Disp: 90 tablet, Rfl: 2    multivitamin/iron/folic acid (CENTRUM COMPLETE ORAL), Take 1 tablet by mouth once daily., Disp: , Rfl:     pantoprazole (PROTONIX) 40 MG tablet, Take 1 tablet (40 mg total) by mouth once daily., Disp: 30 tablet, Rfl: 1    simvastatin (ZOCOR) 40 MG tablet, Take 1 tablet (40 mg total) by mouth every evening., Disp: 90 tablet, Rfl: 3    tamsulosin (FLOMAX) 0.4 mg Cap, Take 1 capsule (0.4 mg total) by mouth once daily., Disp: 90  capsule, Rfl: 1  Does patient have record of home blood pressure readings yes. Readings have been averaging 130/70.   Last dose of blood pressure medication was taken at 11 am 1/9/24.  Patient is asymptomatic.     BP: 128/60 , Pulse: 78 .    Dr. Damon notified.

## 2024-01-22 ENCOUNTER — OFFICE VISIT (OUTPATIENT)
Dept: OTOLARYNGOLOGY | Facility: CLINIC | Age: 84
End: 2024-01-22
Payer: MEDICARE

## 2024-01-22 VITALS — WEIGHT: 179 LBS | BODY MASS INDEX: 29.82 KG/M2 | HEIGHT: 65 IN

## 2024-01-22 DIAGNOSIS — Z85.819 HISTORY OF ORAL CANCER: ICD-10-CM

## 2024-01-22 DIAGNOSIS — H61.21 IMPACTED CERUMEN OF RIGHT EAR: ICD-10-CM

## 2024-01-22 DIAGNOSIS — Z92.3 HISTORY OF HEAD AND NECK RADIATION: Primary | ICD-10-CM

## 2024-01-22 DIAGNOSIS — J30.2 SEASONAL ALLERGIC RHINITIS, UNSPECIFIED TRIGGER: ICD-10-CM

## 2024-01-22 DIAGNOSIS — J34.3 HYPERTROPHY OF INFERIOR NASAL TURBINATE: ICD-10-CM

## 2024-01-22 PROCEDURE — 99213 OFFICE O/P EST LOW 20 MIN: CPT | Mod: 25,S$GLB,, | Performed by: OTOLARYNGOLOGY

## 2024-01-22 PROCEDURE — 69210 REMOVE IMPACTED EAR WAX UNI: CPT | Mod: S$GLB,,, | Performed by: OTOLARYNGOLOGY

## 2024-01-22 NOTE — PROGRESS NOTES
OTOLARYNGOLOGY CLINIC NOTE  Date:  01/22/2024     Chief complaint:  Chief Complaint   Patient presents with    Cerumen Impaction     Right vs left    Follow-up     Cancer surveillance        History of Present Illness  Fan Paz is a 83 y.o. male  presenting today for a followup.  Has not had issues with hearing that he has noticed really  Here today for ear cleaning  Very seldom has pain in left ear . Does not last long The right ear is fine   No new issues with speech or swallowing. No hemoptysis    Given flonase at last apptmt - has flonase and sometimes astelin. Helps with breathing with sprays and also  helped with the ears /ETD symptoms. also did some debrox prn between this apptmt and last apptmt     I last saw the patient on 9-13 - 23. Scope had been done the month before in August . Below text is copied from  note on that date describing history of present illness at that time :   Documentation in epic shows that patient called in complaining of ear pain after apptmt in august when I saw him. When I mentioned this to him, he does not remember calling and states that his ear is actually much better -Has gotten better since the last time I saw him      When comes on lasts an hour or so then goes away and can come back same day. Happens twice a week. It is shorter in duration when it happens and happens less frequently than before     Has been over 15 years since had cancer treatment ; had surveillance exam last visit (gets yearly)     Sometimes gets pain in the jaw  Does not like astelin , needs refill of flonase    Past Medical History  Past Medical History:   Diagnosis Date    Arthritis     Cancer of palate     GERD (gastroesophageal reflux disease)     HTN (hypertension)     Hyperlipidemia     Prostate cancer     2011    Pulmonary embolism         Past Surgical History  Past Surgical History:   Procedure Laterality Date    BACK SURGERY  y-6    Cancer of palate surgery      Late 90's- [facility]  [place]     CARPAL TUNNEL RELEASE  8-14-13    right hand,Left hand 2013    COLONOSCOPY N/A 4/10/2017    Procedure: COLONOSCOPY;  Surgeon: Nilo Kelly MD;  Location: [facility] ENDO;  Service: Endoscopy;  Laterality: N/A;    COLONOSCOPY N/A 10/21/2020    Procedure: COLONOSCOPY;  Surgeon: Demetrius Araujo MD;  Location: [facility] ENDO;  Service: Endoscopy;  Laterality: N/A;    ESOPHAGOGASTRODUODENOSCOPY N/A 5/3/2023    Procedure: EGD (ESOPHAGOGASTRODUODENOSCOPY);  Surgeon: Shauna Lopez MD;  Location: [facility] ENDO;  Service: Endoscopy;  Laterality: N/A;  EGD (with Dr. Lopez or Dr. Parada), : 1-4 weeks, Provider: Dr. Lopez or Dr. Parada Location: [facility], instructions sent to email address [email or web address]. [email or web address]    KNEE SURGERY  y-41    left knee    KNEE SURGERY Right 12-1-15    TKR    Radio active seed Implant      January 2012    REVISION OF KNEE ARTHROPLASTY Left 6/2/2023    Procedure: REVISION, ARTHROPLASTY, KNEE;  Surgeon: Dustin Paul MD;  Location: [facility] OR [address] [place] [address];  Service: Orthopedics;  Laterality: Left;    TOTAL HIP ARTHROPLASTY  3/2011        Medications  Current Outpatient Medications on File Prior to Visit   Medication Sig Dispense Refill    acetaminophen (TYLENOL) 650 MG TbSR Take 1 tablet (650 mg total) by mouth every 8 (eight) hours. 120 tablet 0    celecoxib (CELEBREX) 200 MG capsule Take 1 capsule (200 mg total) by mouth once daily. 30 capsule 0    docusate sodium (STOOL SOFTENER ORAL) Take by mouth as needed.      fluticasone propionate (FLONASE) 50 mcg/actuation nasal spray 2 sprays (100 mcg total) by Each Nostril route 2 (two) times daily. 18.2 mL 11    losartan (COZAAR) 50 MG tablet Take 1 tablet (50 mg total) by mouth once daily. 90 tablet 0    montelukast (SINGULAIR) 10 mg tablet TAKE 1 TABLET BY MOUTH ONCE DAILY IN THE EVENING 90 tablet 2    multivitamin/iron/folic acid (CENTRUM COMPLETE ORAL) Take 1 tablet by mouth once daily.      pantoprazole (PROTONIX) 40 MG tablet Take 1 tablet (40  mg total) by mouth once daily. 30 tablet 1    simvastatin (ZOCOR) 40 MG tablet Take 1 tablet (40 mg total) by mouth every evening. 90 tablet 3    tamsulosin (FLOMAX) 0.4 mg Cap Take 1 capsule (0.4 mg total) by mouth once daily. 90 capsule 1    azelastine (ASTELIN) 137 mcg (0.1 %) nasal spray 1 spray (137 mcg total) by Nasal route 2 (two) times daily. (Patient not taking: Reported on 2024) 30 mL 3     No current facility-administered medications on file prior to visit.       Review of Systems  Review of Systems   Constitutional: Negative.    HENT:  Positive for ear pain and nosebleeds.    Respiratory: Negative.     Cardiovascular: Negative.    Gastrointestinal:  Positive for heartburn.   Genitourinary: Negative.    Skin: Negative.    Neurological: Negative.    Psychiatric/Behavioral: Negative.        Answers submitted by the patient for this visit:  Review of Symptoms Questionnaire  (Submitted on 2024)  postnasal drip: Yes  eye itching: Yes    Social History   reports that he quit smoking about 26 years ago. His smoking use included cigarettes. He started smoking about 46 years ago. He has a 60.0 pack-year smoking history. He has been exposed to tobacco smoke. He has never used smokeless tobacco. He reports that he does not drink alcohol and does not use drugs.     Family History  Family History   Problem Relation Age of Onset    No Known Problems Mother     Esophageal cancer Father     Coronary artery disease Father             Cancer Sister         Liver Cancer -    Diabetes Sister             No Known Problems Sister     No Known Problems Sister     Lung cancer Sister         Alive    Breast cancer Sister     Clotting disorder Sister 75        blood clot on brain-alive    No Known Problems Brother     No Known Problems Brother     Lung cancer Brother 60        - was a tobacco user, had lung cancer    Cancer Brother         Liver Cancer-     Stroke Brother             No Known  "Problems Maternal Aunt     No Known Problems Maternal Uncle     No Known Problems Paternal Aunt     No Known Problems Paternal Uncle     No Known Problems Maternal Grandmother     No Known Problems Maternal Grandfather     No Known Problems Paternal Grandmother     No Known Problems Paternal Grandfather     Glaucoma Cousin     Macular degeneration Neg Hx     Strabismus Neg Hx     Amblyopia Neg Hx     Blindness Neg Hx     Cataracts Neg Hx     Hypertension Neg Hx     Retinal detachment Neg Hx     Thyroid disease Neg Hx     Colon cancer Neg Hx         Physical Exam   There were no vitals filed for this visit. Body mass index is 29.79 kg/m².  Weight: 81.2 kg (179 lb)   Height: 5' 5" (165.1 cm)     GENERAL: no acute distress.  HEAD: normocephalic.   EYES: No scleral icterus  EARS: external ear without lesion, normal pinna shape and position. left External auditory canal with normal cerumen, tympanic membrane fully visible, no perforation , no retraction. No middle ear effusion. Ossicles intact. Right cerumen impaction  NOSE: external nose without significant bony abnormality; mild turbinate hypertrophy on anterior rhinoscopy , no purulent drainage  ORAL CAVITY/OROPHARYNX: tongue mobile. No mass or lesion   NECK: trachea midline.   LYMPH NODES:No cervical lymphadenopathy.post rt changes  RESPIRATORY: no stridor, no stertor. Voice normal. Respirations nonlabored.  NEURO: alert, responds to questions appropriately.    PSYCH:mood appropriate    Procedure Note   Procedure performed: microscopic examination of ears with cerumen disimpaction    Indication for procedure: unilateral cerumen impaction     Description of procedure:  After verbal consent was obtained, the patient was positioned in semi recumbent position and speculum was placed in the right  ear and microscope brought into the field.  The microscope was positioned and magnification adjusted for appropriate visualization. Otologic instruments including various size " otologic suctions and curette were used to remove the cerumen from the right external auditory canals under visualization with the operating microscope. After cleaning, visualization was again performed and at various levels of higher magnification to optimize views of the ear canal, tympanic membrane, ossicles and middle ear space. Findings as indicated below. All portions of the procedure and examination by otomicroscopy were tolerated well without complication.     Findings:   Complete cerumen impaction removed entirely revealing normal external auditory canal; tympanic membrane without bulging, retraction, or perforation; no evidence of middle ear fluid or effusion.     Imaging:  The patient does not have any new imaging of the head and neck since last visit.     Labs:  CBC  Recent Labs   Lab 01/28/22  0938 02/06/23  0912 05/10/23  1210   WBC 5.17 5.63 6.17   Hemoglobin 12.9 L 13.6 L 13.1 L   Hematocrit 41.8 44.0 41.9   MCV 87 87 86   Platelets 196 210 191     BMP  Recent Labs   Lab 02/06/23  0912 05/10/23  1210 01/10/24  0925   Glucose 115 H 109 96   Sodium 137 141 140   Potassium 4.3 4.9 4.5   Chloride 101 105 104   CO2 25 30 H 29   BUN 16 17 18   Creatinine 1.0 0.9 1.1   Calcium 9.6 9.5 9.7     COAGS  Recent Labs   Lab 05/10/23  1210   INR 1.0       Assessment  1. History of head and neck radiation    2. History of oral cancer    3. Seasonal allergic rhinitis, unspecified trigger    4. Hypertrophy of inferior nasal turbinate    5. Impacted cerumen of right ear       Plan:  Discussed plan of care with patient in detail and all questions answered. Patient reported understanding of plan of care. I gave the patient the opportunity to ask questions and patient confirmed all questions answered to satisfaction.     Yearly surveillance scope in August  Continue nasal spray regimen- helping. Discussed about how to increase or decrease regimen based on symptoms of congestion/ETD  Needs baseline hearing test, none in  [facility]. He did not have time today to obtain Follow up in may to check if needs ear cleaning and will get audio that day as well       I spent a total of 20 minutes on the day of the visit (this does not include procedure time).  This includes face to face time and non-face to face time preparing to see the patient (eg, review of tests), obtaining and/or reviewing separately obtained history, documenting clinical information in the electronic or other health record, independently interpreting results and communicating results to the patient/family/caregiver, or care coordinator.   Please be aware that this note has been generated with the assistance of [employer] voice-to-text.  Please excuse any spelling or grammatical errors.

## 2024-01-29 ENCOUNTER — OFFICE VISIT (OUTPATIENT)
Dept: CARDIOLOGY | Facility: CLINIC | Age: 84
End: 2024-01-29
Payer: MEDICARE

## 2024-01-29 VITALS
HEIGHT: 65 IN | HEART RATE: 52 BPM | WEIGHT: 179 LBS | SYSTOLIC BLOOD PRESSURE: 136 MMHG | OXYGEN SATURATION: 97 % | DIASTOLIC BLOOD PRESSURE: 60 MMHG | BODY MASS INDEX: 29.82 KG/M2

## 2024-01-29 DIAGNOSIS — I44.1 MOBITZ TYPE 1 SECOND DEGREE AV BLOCK: Primary | ICD-10-CM

## 2024-01-29 DIAGNOSIS — I83.93 ASYMPTOMATIC VARICOSE VEINS OF BILATERAL LOWER EXTREMITIES: ICD-10-CM

## 2024-01-29 DIAGNOSIS — E78.5 HYPERLIPIDEMIA, UNSPECIFIED HYPERLIPIDEMIA TYPE: ICD-10-CM

## 2024-01-29 DIAGNOSIS — I10 ESSENTIAL HYPERTENSION: ICD-10-CM

## 2024-01-29 PROCEDURE — 93005 ELECTROCARDIOGRAM TRACING: CPT | Mod: PBBFAC,PO | Performed by: INTERNAL MEDICINE

## 2024-01-29 PROCEDURE — 99214 OFFICE O/P EST MOD 30 MIN: CPT | Mod: S$PBB,,, | Performed by: INTERNAL MEDICINE

## 2024-01-29 PROCEDURE — 99214 OFFICE O/P EST MOD 30 MIN: CPT | Mod: PBBFAC,PO | Performed by: INTERNAL MEDICINE

## 2024-01-29 PROCEDURE — 99999 PR PBB SHADOW E&M-EST. PATIENT-LVL IV: CPT | Mod: PBBFAC,,, | Performed by: INTERNAL MEDICINE

## 2024-01-29 PROCEDURE — 93010 ELECTROCARDIOGRAM REPORT: CPT | Mod: S$PBB,,, | Performed by: INTERNAL MEDICINE

## 2024-01-29 NOTE — PROGRESS NOTES
Subjective:   Patient ID:  Fan Paz is a 83 y.o. male who presents for follow-up of No chief complaint on file.      HPI    Followed by Dr Susanna Perea type 1 second-degree AV block  Elevated blood pressure reading without diagnosis of hypertension  Hyperlipidemia: LDL 81  Osteoarthritis      Echocardiogram 02/16/2022:     The left ventricle is normal in size with normal systolic function.  The estimated ejection fraction is 60%.  Moderate left atrial enlargement.  Indeterminate left ventricular diastolic function.  Normal right ventricular size with normal right ventricular systolic function.  Normal central venous pressure (3 mmHg).  The estimated PA systolic pressure is 6 mmHg.  There is no pulmonary hypertension.     Holter 02/16/2022:     Sinus rhythm with heart rates varying between 43 and 113 BPM with an average of 79 BPM  There were very frequent PACs  [place]    1/29/24 Denies CP or SOB  EKG NSR 2:1 AVB at 41 LVH  BP controlled    Review of Systems   Constitutional: Negative for decreased appetite.   HENT:  Negative for ear discharge.    Eyes:  Negative for blurred vision.   Endocrine: Negative for polyphagia.   Skin:  Negative for nail changes.   Genitourinary:  Negative for bladder incontinence.   Neurological:  Negative for aphonia.   Psychiatric/Behavioral:  Negative for hallucinations.    Allergic/Immunologic: Negative for hives.       Objective:   Physical Exam  Constitutional:       Appearance: He is well-developed.   HENT:      Head: Normocephalic and atraumatic.   Eyes:      Conjunctiva/sclera: Conjunctivae normal.      Pupils: Pupils are equal, round, and reactive to light.   Cardiovascular:      Rate and Rhythm: Normal rate.      Pulses: Intact distal pulses.      Heart sounds: Normal heart sounds.   Pulmonary:      Effort: Pulmonary effort is normal.      Breath sounds: Normal breath sounds.   Abdominal:      General: Bowel sounds are normal.      Palpations: Abdomen is soft.    Musculoskeletal:         General: Normal range of motion.      Cervical back: Normal range of motion and neck supple.   Skin:     General: Skin is warm and dry.   Neurological:      Mental Status: He is alert and oriented to person, place, and time.         Assessment:      1. 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 Essential hypertension    3. Hyperlipidemia, unspecified hyperlipidemia type    4. Asymptomatic varicose veins of bilateral lower extremities        Plan:     2:1 AVB on EKG with HR 41 and narrow QRS - suspect this is 2 cycle Mobitz I AVB based on prior Hx Mobitz I. Bradycardia is well tolerated and asymptomatic  Continue Rx for HTN, HLD  Ov 6 months

## 2024-01-31 ENCOUNTER — EXTERNAL CHRONIC CARE MANAGEMENT (OUTPATIENT)
Dept: PRIMARY CARE CLINIC | Facility: CLINIC | Age: 84
End: 2024-01-31
Payer: MEDICARE

## 2024-01-31 PROCEDURE — 99490 CHRNC CARE MGMT STAFF 1ST 20: CPT | Mod: PBBFAC,PO | Performed by: FAMILY MEDICINE

## 2024-01-31 PROCEDURE — 99490 CHRNC CARE MGMT STAFF 1ST 20: CPT | Mod: S$PBB,,, | Performed by: FAMILY MEDICINE

## 2024-02-29 ENCOUNTER — EXTERNAL CHRONIC CARE MANAGEMENT (OUTPATIENT)
Dept: PRIMARY CARE CLINIC | Facility: CLINIC | Age: 84
End: 2024-02-29
Payer: MEDICARE

## 2024-02-29 PROCEDURE — 99490 CHRNC CARE MGMT STAFF 1ST 20: CPT | Mod: PBBFAC,PO | Performed by: FAMILY MEDICINE

## 2024-02-29 PROCEDURE — 99490 CHRNC CARE MGMT STAFF 1ST 20: CPT | Mod: S$PBB,,, | Performed by: FAMILY MEDICINE

## 2024-03-31 ENCOUNTER — EXTERNAL CHRONIC CARE MANAGEMENT (OUTPATIENT)
Dept: PRIMARY CARE CLINIC | Facility: CLINIC | Age: 84
End: 2024-03-31
Payer: MEDICARE

## 2024-03-31 PROCEDURE — 99490 CHRNC CARE MGMT STAFF 1ST 20: CPT | Mod: PBBFAC,PO | Performed by: FAMILY MEDICINE

## 2024-03-31 PROCEDURE — 99490 CHRNC CARE MGMT STAFF 1ST 20: CPT | Mod: S$PBB,,, | Performed by: FAMILY MEDICINE

## 2024-04-01 ENCOUNTER — TELEPHONE (OUTPATIENT)
Dept: OTOLARYNGOLOGY | Facility: CLINIC | Age: 84
End: 2024-04-01
Payer: MEDICARE

## 2024-04-01 NOTE — TELEPHONE ENCOUNTER
----- Message from Tao Sy MA sent at 4/1/2024  8:02 AM CDT -----  Type: Patient Call Back    Who called: Self    What is the request in detail: pt. Is asking if his appt. Can be moved up due to throat issues he's having .. He would like a call .;.     Can the clinic reply by [facility]?No    Would the patient rather a call back or a response via [facility]? Yes, call     Best call back number: [phone] (home) [phone] (work)

## 2024-04-01 NOTE — TELEPHONE ENCOUNTER
Spoke with pt states had sore throat but is improving but wants Dr. Quick to check throat, rescheduled appt from may to April 8th at 9:45, also instructed pt to use debrox in ears, pt verb understanding

## 2024-04-08 ENCOUNTER — OFFICE VISIT (OUTPATIENT)
Dept: OTOLARYNGOLOGY | Facility: CLINIC | Age: 84
End: 2024-04-08
Payer: MEDICARE

## 2024-04-08 ENCOUNTER — CLINICAL SUPPORT (OUTPATIENT)
Dept: AUDIOLOGY | Facility: CLINIC | Age: 84
End: 2024-04-08
Payer: MEDICARE

## 2024-04-08 VITALS
DIASTOLIC BLOOD PRESSURE: 74 MMHG | WEIGHT: 179.44 LBS | BODY MASS INDEX: 29.9 KG/M2 | HEIGHT: 65 IN | SYSTOLIC BLOOD PRESSURE: 142 MMHG

## 2024-04-08 DIAGNOSIS — R07.0 THROAT PAIN IN ADULT: ICD-10-CM

## 2024-04-08 DIAGNOSIS — H61.23 BILATERAL IMPACTED CERUMEN: ICD-10-CM

## 2024-04-08 DIAGNOSIS — Z85.819 HISTORY OF ORAL CANCER: ICD-10-CM

## 2024-04-08 DIAGNOSIS — H90.3 ASYMMETRICAL SENSORINEURAL HEARING LOSS: Primary | ICD-10-CM

## 2024-04-08 DIAGNOSIS — Z85.819 ENCOUNTER FOR FOLLOW-UP SURVEILLANCE OF ORAL CAVITY CANCER: ICD-10-CM

## 2024-04-08 DIAGNOSIS — J30.2 SEASONAL ALLERGIC RHINITIS, UNSPECIFIED TRIGGER: ICD-10-CM

## 2024-04-08 DIAGNOSIS — H90.3 SENSORINEURAL HEARING LOSS (SNHL) OF BOTH EARS: Primary | ICD-10-CM

## 2024-04-08 DIAGNOSIS — Z08 ENCOUNTER FOR FOLLOW-UP SURVEILLANCE OF ORAL CAVITY CANCER: ICD-10-CM

## 2024-04-08 DIAGNOSIS — Z92.3 HISTORY OF HEAD AND NECK RADIATION: ICD-10-CM

## 2024-04-08 PROCEDURE — 92557 COMPREHENSIVE HEARING TEST: CPT | Mod: S$GLB,,,

## 2024-04-08 PROCEDURE — 92550 TYMPANOMETRY & REFLEX THRESH: CPT | Mod: S$GLB,,,

## 2024-04-08 PROCEDURE — 31575 DIAGNOSTIC LARYNGOSCOPY: CPT | Mod: S$GLB,,, | Performed by: OTOLARYNGOLOGY

## 2024-04-08 PROCEDURE — 99214 OFFICE O/P EST MOD 30 MIN: CPT | Mod: 25,S$GLB,, | Performed by: OTOLARYNGOLOGY

## 2024-04-08 PROCEDURE — 69210 REMOVE IMPACTED EAR WAX UNI: CPT | Mod: 51,S$GLB,, | Performed by: OTOLARYNGOLOGY

## 2024-04-08 NOTE — PROGRESS NOTES
Please click on link to view Audiogram:  Document on 4/8/2024 11:04 AM by Abbi Gorman AU.D: Audiogram    Mr. Fan Paz, a 84 y.o. male, was seen in the clinic today for an audiological evaluation.    Otoscopy clear bilaterally. Tympanometry testing revealed a Type A tympanogram for the right ear and a Type A tympanogram with borderline clinically significant negative middle ear pressure for the left ear. Ipsilateral acoustic reflexes were present at 500 Hz bilaterally.     Audiological testing revealed a mild sloping to severe sensorineural hearing loss (SNHL) for the right ear and a mild sloping to moderately-severe SNHL for the left ear. Conductive component noted at 4000 Hz bilaterally. A speech reception threshold was obtained at 35 dBHL for the right ear and at 30 dBHL for the left ear. Speech discrimination was 92% for the right ear and 92% for the left ear.      Recommendations:  1. Otologic evaluation  2. Annual audiological evaluation or sooner if hearing changes  3. Hearing protection when in noise   4. Hearing aid consultation following medical clearance

## 2024-04-08 NOTE — PROGRESS NOTES
OTOLARYNGOLOGY CLINIC NOTE  Date:  04/08/2024     Chief complaint:  Chief Complaint   Patient presents with    Sore Throat     Had sore throat for about a week but gone now    Cerumen Impaction     bilateral       History of Present Illness  Fan Paz is a 84 y.o. male  presenting today for a followup.  Had throat pain last month lasted a week and a half- thought it was not going to go away but it did and feels much better now.Was in the center of the neck . No swallowing issues at that time.     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 Sinus surgery was done after cancer surgery. Dr. Rushing also did cancer surgery. Had rt and needed feeding tube during that. Had most of teeth extracted except for 6 on the bottom.   I last saw the patient on 1-22-24. Below text is copied from  note on that date describing history of present illness at that time :  Has not had issues with hearing that he has noticed really  Here today for ear cleaning  Very seldom has pain in left ear . Does not last long The right ear is fine   No new issues with speech or swallowing. No hemoptysis     Given flonase at last apptmt - has flonase and sometimes astelin. Helps with breathing with sprays and also  helped with the ears /ETD symptoms. also did some debrox prn between this apptmt and last apptmt      I last saw the patient on 9-13 - 23. Scope had been done the month before in August . Below text is copied from  note on that date describing history of present illness at that time :   Documentation in epic shows that patient called in complaining of ear pain after apptmt in august when I saw him. When I mentioned this to him, he does not remember calling and states that his ear is actually much better -Has gotten better since the last time I saw him      When comes on lasts an hour or so then goes away and can come back same day. Happens twice a week. It is shorter in  duration when it happens and happens less frequently than before     Has been over 15 years since had cancer treatment ; had surveillance exam last visit (gets yearly)     Sometimes gets pain in the jaw  Does not like astelin , needs refill of flonase    Past Medical History  Past Medical History:   Diagnosis Date    Arthritis     Cancer of palate     GERD (gastroesophageal reflux disease)     HTN (hypertension)     Hyperlipidemia     Prostate cancer     2011    Pulmonary embolism         Past Surgical History  Past Surgical History:   Procedure Laterality Date    BACK SURGERY  y-6    Cancer of palate surgery      Late [facility]- [facility]     CARPAL TUNNEL RELEASE  8-14-13    right hand,Left hand 2013    COLONOSCOPY N/A 4/10/2017    Procedure: COLONOSCOPY;  Surgeon: Nilo Kelly MD;  Location: [facility];  Service: Endoscopy;  Laterality: N/A;    COLONOSCOPY N/A 10/21/2020    Procedure: COLONOSCOPY;  Surgeon: Demetrius Araujo MD;  Location: [facility];  Service: Endoscopy;  Laterality: N/A;    ESOPHAGOGASTRODUODENOSCOPY N/A 5/3/2023    Procedure: EGD (ESOPHAGOGASTRODUODENOSCOPY);  Surgeon: Shauna Lopez MD;  Location: [facility];  Service: Endoscopy;  Laterality: N/A;  EGD (with Dr. Lopez or Dr. Parada), : 1-4 weeks, Provider: Dr. Lpoez or Dr. Parada Location: [facility], instructions sent to email address [email or web address]    KNEE SURGERY  y-41    left knee    KNEE SURGERY Right 12-1-15    TKR    Radio active seed Implant      January 2012    REVISION OF KNEE ARTHROPLASTY Left 6/2/2023    Procedure: REVISION, ARTHROPLASTY, KNEE;  Surgeon: Dustin Paul MD;  Location: [address];  Service: Orthopedics;  Laterality: Left;    TOTAL HIP ARTHROPLASTY  3/2011        Medications  Current Outpatient Medications on File Prior to Visit   Medication Sig Dispense Refill    acetaminophen (TYLENOL) 650 MG TbSR Take 1 tablet (650 mg total) by mouth every 8 (eight) hours. 120 tablet 0    azelastine (ASTELIN)  137 mcg (0.1 %) nasal spray 1 spray (137 mcg total) by Nasal route 2 (two) times daily. 30 mL 3    celecoxib (CELEBREX) 200 MG capsule Take 1 capsule (200 mg total) by mouth once daily. 30 capsule 0    docusate sodium (STOOL SOFTENER ORAL) Take by mouth as needed.      fluticasone propionate (FLONASE) 50 mcg/actuation nasal spray 2 sprays (100 mcg total) by Each Nostril route 2 (two) times daily. 18.2 mL 11    losartan (COZAAR) 50 MG tablet Take 1 tablet (50 mg total) by mouth once daily. 90 tablet 0    montelukast (SINGULAIR) 10 mg tablet TAKE 1 TABLET BY MOUTH ONCE DAILY IN THE EVENING 90 tablet 2    multivitamin/iron/folic acid (CENTRUM COMPLETE ORAL) Take 1 tablet by mouth once daily.      pantoprazole (PROTONIX) 40 MG tablet Take 1 tablet (40 mg total) by mouth once daily. 30 tablet 1    simvastatin (ZOCOR) 40 MG tablet Take 1 tablet (40 mg total) by mouth every evening. 90 tablet 3    tamsulosin (FLOMAX) 0.4 mg Cap Take 1 capsule (0.4 mg total) by mouth once daily. 90 capsule 1     No current facility-administered medications on file prior to visit.       Review of Systems  Review of Systems   Constitutional:  Negative for fever and weight loss.   HENT:  Positive for hearing loss and sore throat.    Respiratory:  Negative for hemoptysis.    Gastrointestinal:  Negative for heartburn.   Musculoskeletal:  Positive for neck pain.   Endo/Heme/Allergies:  Positive for environmental allergies.        Social History   reports that he quit smoking about 26 years ago. His smoking use included cigarettes. He started smoking about 46 years ago. He has a 60.0 pack-year smoking history. He has been exposed to tobacco smoke. He has never used smokeless tobacco. He reports that he does not drink alcohol and does not use drugs.     Family History  Family History   Problem Relation Age of Onset    No Known Problems Mother     Esophageal cancer Father     Coronary artery disease Father             Cancer Sister         Liver  "Cancer -    Diabetes Sister             No Known Problems Sister     No Known Problems Sister     Lung cancer Sister         Alive    Breast cancer Sister     Clotting disorder Sister 75        blood clot on brain-alive    No Known Problems Brother     No Known Problems Brother     Lung cancer Brother 60        - was a tobacco user, had lung cancer    Cancer Brother         Liver Cancer-     Stroke Brother             No Known Problems Maternal Aunt     No Known Problems Maternal Uncle     No Known Problems Paternal Aunt     No Known Problems Paternal Uncle     No Known Problems Maternal Grandmother     No Known Problems Maternal Grandfather     No Known Problems Paternal Grandmother     No Known Problems Paternal Grandfather     Glaucoma Cousin     Macular degeneration Neg Hx     Strabismus Neg Hx     Amblyopia Neg Hx     Blindness Neg Hx     Cataracts Neg Hx     Hypertension Neg Hx     Retinal detachment Neg Hx     Thyroid disease Neg Hx     Colon cancer Neg Hx         Physical Exam   Vitals:    24 1021   BP: (!) 142/74    Body mass index is 29.86 kg/m².  Weight: 81.4 kg (179 lb 7.3 oz)   Height: 5' 5" (165.1 cm)     GENERAL: no acute distress.  HEAD: normocephalic.   EYES: No scleral icterus  EARS: external ear without lesion, normal pinna shape and position.  External auditory canal with bilateral impacted cerumen  NOSE: external nose without significant bony abnormality  ORAL CAVITY/OROPHARYNX: tongue mobile. Dentures removed no palate or tongue lesion   Has some retained rots lower teeth  nothing to palpation   NECK: trachea midline.   LYMPH NODES:No cervical lymphadenopathy.  RESPIRATORY: no stridor, no stertor. Voice slightly raspy  Respirations nonlabored.  NEURO: alert, responds to questions appropriately.    PSYCH:mood appropriate    PROCEDURE NOTE  NAME OF PROCEDURE: Flexible Laryngoscopy, diagnostic  INDICATIONS: gag reflex precludes mirror exam, throat pain in adult / " dysphonia / history of head and neck cancer  FINDINGS: post radiation changes; vocal folds mobile relatively- impaired abduction bilaterally but unchanged from scope exam in 2023 with post radiation changes- slight erythema but no lesion.; no lesion nor mass in pharynx/larynx     Consent: After procedure was explained in detail and all questions answered, verbal consent was obtained for performing flexible laryngoscopy.  Anesthesia: topical 4% lidocaine and neosynephrine  Procedure: With patient in seated position, the scope was inserted into the bilateral nasal passageway and advanced atraumatically into the nasopharynx to examine the following structures:  Nasal cavity: Turbinates with mild-moderate hypertrophy. no middle meatal edema. No purulent drainage.   Nasopharynx: no mass or lesion noted in nasopharynx.   Oropharynx: base of tongue without  mass or ulceration.   Hypopharynx: posterior pharyngeal wall without mass or lesion. No pooling of secretions. Pyriform sinuses visible without mass or lesion  Larynx: epiglottis normal without lesion. False vocal folds without edema/erythema/lesion. True vocal folds mobile relatively- impaired abduction bilaterally but unchanged from scope exam in 2023 with post radiation changes- slight erythema but no lesion. Mild interarytenoid edema no erythema . Postcricoid region with mild edema no lesion   Subglottis: visualized portion of subglottis normal in appearance-prominent anterior shelf likely normal anatomic variant , does not appear consistent with stenosis    After examination performed, the scope was removed atraumatically . The patient tolerated the procedure well. Photodocumentation obtained with representative images below, all images and/or videos uploaded in media section of epic.                                      Procedure Note   Procedure performed:microscopic examination of ears with cerumen disimpaction    Indication for procedure: bilateral cerumen  impaction     Description of procedure:  After verbal consent was obtained, the patient was positioned in semi recumbent position and speculum was placed in the right ear and microscope brought into the field.  The microscope was positioned and magnification adjusted for appropriate visualization. Otologic instruments including various size otologic suctions and curette were used to remove the cerumen from the right external auditory canals under visualization with the operating microscope. After cleaning, visualization was again performed and at various levels of higher magnification to optimize views of the ear canal, tympanic membrane, ossicles and middle ear space. The same procedure was then repeated for the left ear. Findings as indicated below. All portions of the procedure and examination by otomicroscopy were tolerated well without complication.     Findings:  Right ear: Complete cerumen impaction removed entirely revealing normal external auditory canal; tympanic membrane without bulging, retraction, or perforation; no evidence of middle ear fluid or effusion.   Left ear: Complete cerumen impaction removed entirely revealing normal external auditory canal; tympanic membrane without bulging, retraction, or perforation; no evidence of middle ear fluid or effusion.       AUDIOLOGY RESULTS  Audiometric evaluation including audiogram, tympanometry, acoustic reflexes, and speech discrimination which was performed 4-8-24 was personally reviewed and interpreted.  Notable findings on the audiogram were normal sloping to mild sensorineural hearing loss (SNHL) at around 2 khz and then to mdoerate/moderate-severe at 4 khz returning to moderate at 6 khz and then moderate severe at 8khz for left ear and severe at 8 khz for right ear ( asymmetry at 8 khz of about 15 dbhl).  Tympanometry revealed Type A tympanogram on the left and Type A tympanogram on the right. Speech discrimination was 92%  on the left, and 92% on the  right.     Report of the audiologist performing this audiometric testing was also reviewed   Imaging:  The patient does not have any new imaging of the head and neck since last visit.     Labs:  CBC  Recent Labs   Lab 01/28/22  0938 02/06/23  0912 05/10/23  1210   WBC 5.17 5.63 6.17   Hemoglobin 12.9 L 13.6 L 13.1 L   Hematocrit 41.8 44.0 41.9   MCV 87 87 86   Platelets 196 210 191     BMP  Recent Labs   Lab 02/06/23  0912 05/10/23  1210 01/10/24  0925   Glucose 115 H 109 96   Sodium 137 141 140   Potassium 4.3 4.9 4.5   Chloride 101 105 104   CO2 25 30 H 29   BUN 16 17 18   Creatinine 1.0 0.9 1.1   Calcium 9.6 9.5 9.7     COAGS  Recent Labs   Lab 05/10/23  1210   INR 1.0       Assessment  1. Asymmetrical sensorineural hearing loss    2. Bilateral impacted cerumen    3. Seasonal allergic rhinitis, unspecified trigger    4. History of oral cancer    5. History of head and neck radiation    6. 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 Throat pain in adult       Plan:  Discussed plan of care with patient in detail and all questions answered. Patient reported understanding of plan of care. I gave the patient the opportunity to ask questions and patient confirmed all questions answered to satisfaction.     Asymmetric sensorineural hearing loss (SNHL): discussed with patient that  there is a low risk that asymmetry could be due to a benign tumor called an acoustic neuroma. The gold standard for diagnosis of this is MRI IAC with gadolinium.  We also discussed that if present, these tumors typically are slow growing but rarely can grow very quickly. Additional option for screening would be an auditory brainstem response (ABR) although this is not gold standard. The third option is for monitoring with audiogram in 1 year to document persistence and/or worsening of asymmetry .   The patient was amenable to getting a close follow up audiogram in 1 year to evaluate if asymmetry is persistent or if it has  worsened and will discuss MRI again if need be .     Cerumen impactions: removed, avoid qtips and ear buds. Debrox prn , can use hairdryer on low setting to dry ears after shower if water sensation is bothersome and leads to qtip usage    Throat pain in adult with history of head and neck cancer : unclear etiology possible uri or allergy flare up, notify if recurs. Flex scope normal today with no recurrent or new cancer       Allergic rhinitis: stable, has nasal sprays    F/u check ears to see if needs ear cleaning will need hearing test in 1 year and surveillence exam January 2025 ( sooner if issue)    Please be aware that this note has been generated with the assistance of [employer] voice-to-text.  Please excuse any spelling or grammatical errors.

## 2024-04-20 DIAGNOSIS — I10 ESSENTIAL HYPERTENSION: ICD-10-CM

## 2024-04-20 DIAGNOSIS — Z91.09 ENVIRONMENTAL ALLERGIES: ICD-10-CM

## 2024-04-20 NOTE — TELEPHONE ENCOUNTER
Care Due:                  Date            Visit Type   Department     Provider  --------------------------------------------------------------------------------                                EP -         [facility]                              PRIMARY      MED/ INTERNAL  Last Visit: 12-      CARE ([facility])   MED/ PEDS      Otilio A  Page                              EP -         [facility]                              PRIMARY      MED/ INTERNAL  Next Visit: 07-      CARE ([facility])   MED/ PEDS      Otilio A  Page                                                            Last  Test          Frequency    Reason                     Performed    Due Date  --------------------------------------------------------------------------------    Lipid Panel.  12 months..  simvastatin..............  02- 02-    [facility] Embedded Care Due Messages. Reference number: [id number].   4/20/2024 3:01:05 PM CDT

## 2024-04-22 RX ORDER — MONTELUKAST SODIUM 10 MG/1
TABLET ORAL
Qty: 90 TABLET | Refills: 2 | Status: SHIPPED | OUTPATIENT
Start: 2024-04-22

## 2024-04-22 RX ORDER — LOSARTAN POTASSIUM 50 MG/1
50 TABLET ORAL
Qty: 90 TABLET | Refills: 0 | Status: SHIPPED | OUTPATIENT
Start: 2024-04-22

## 2024-04-22 NOTE — TELEPHONE ENCOUNTER
Refill Routing Note   Medication(s) are not appropriate for processing by [facility] for the following reason(s):        Required vitals abnormal    [facility] action(s):  Defer  Approve   Requires labs : Yes             Appointments  past 12m or future 3m with PCP    Date Provider   Last Visit   12/27/2023 Otilio Damon MD   Next Visit   7/11/2024 Otilio Damon MD   ED visits in past 90 days: 0        Note composed:3:29 AM 04/22/2024

## 2024-04-30 ENCOUNTER — EXTERNAL CHRONIC CARE MANAGEMENT (OUTPATIENT)
Dept: PRIMARY CARE CLINIC | Facility: CLINIC | Age: 84
End: 2024-04-30
Payer: MEDICARE

## 2024-04-30 PROCEDURE — 99490 CHRNC CARE MGMT STAFF 1ST 20: CPT | Mod: S$PBB,,, | Performed by: FAMILY MEDICINE

## 2024-04-30 PROCEDURE — 99490 CHRNC CARE MGMT STAFF 1ST 20: CPT | Mod: PBBFAC,PO | Performed by: FAMILY MEDICINE

## 2024-04-30 NOTE — TELEPHONE ENCOUNTER
----- Message from Bria Lyles sent at 9/20/2017  2:41 PM CDT -----  Contact: se;f  Pt is requesting a refill for tamsulosin (FLOMAX) 0.4 mg Cp24 &  simvastatin (ZOCOR) 40 MG tablet. [phone] or [phone]   ----- Message from Leona Villalpando 

## 2024-05-01 DIAGNOSIS — C61 PROSTATE CANCER: ICD-10-CM

## 2024-05-01 NOTE — TELEPHONE ENCOUNTER
No care due was identified.  [facility] Embedded Care Due Messages. Reference number: [id number].   5/01/2024 12:26:06 PM CDT

## 2024-05-02 RX ORDER — TAMSULOSIN HYDROCHLORIDE 0.4 MG/1
1 CAPSULE ORAL
Qty: 90 CAPSULE | Refills: 2 | Status: SHIPPED | OUTPATIENT
Start: 2024-05-02

## 2024-05-02 NOTE — TELEPHONE ENCOUNTER
Refill Routing Note   Medication(s) are not appropriate for processing by [facility] for the following reason(s):        Drug-disease interaction: prostate cancer active on pt problem list    [facility] action(s):  Defer             Appointments  past 12m or future 3m with PCP    Date Provider   Last Visit   12/27/2023 Otilio Damon MD   Next Visit   7/11/2024 Otilio Damon MD   ED visits in past 90 days: 0        Note composed:5:06 AM 05/02/2024

## 2024-05-31 ENCOUNTER — EXTERNAL CHRONIC CARE MANAGEMENT (OUTPATIENT)
Dept: PRIMARY CARE CLINIC | Facility: CLINIC | Age: 84
End: 2024-05-31
Payer: MEDICARE

## 2024-05-31 PROCEDURE — 99490 CHRNC CARE MGMT STAFF 1ST 20: CPT | Mod: PBBFAC,PO | Performed by: FAMILY MEDICINE

## 2024-05-31 PROCEDURE — 99490 CHRNC CARE MGMT STAFF 1ST 20: CPT | Mod: S$PBB,,, | Performed by: FAMILY MEDICINE

## 2024-06-03 ENCOUNTER — TELEPHONE (OUTPATIENT)
Dept: ORTHOPEDICS | Facility: CLINIC | Age: 84
End: 2024-06-03
Payer: MEDICARE

## 2024-06-03 DIAGNOSIS — Z96.652 S/P REVISION OF TOTAL KNEE, LEFT: Primary | ICD-10-CM

## 2024-06-03 NOTE — TELEPHONE ENCOUNTER
Allegra spoke to Aruna, advised patient does not have any upcoming surgery but does have a f/u with Dr. Paul this week. Will f/u if additional PT/OT is needed     ----- Message from Juan Fernando sent at 6/3/2024 12:26 PM CDT -----  Regarding: Order/Referral  Contact: Aruna @[phone]  Aruna [place]/[facility] is calling to discuss referral sent today 6/3, would like only PT/OT needed or does the pt need nursing, also when is surgery, please call Aruna @[phone]

## 2024-06-05 ENCOUNTER — PATIENT MESSAGE (OUTPATIENT)
Dept: ADMINISTRATIVE | Facility: OTHER | Age: 84
End: 2024-06-05
Payer: MEDICARE

## 2024-06-05 ENCOUNTER — HOSPITAL ENCOUNTER (OUTPATIENT)
Dept: RADIOLOGY | Facility: HOSPITAL | Age: 84
Discharge: HOME | End: 2024-06-05
Attending: ORTHOPAEDIC SURGERY
Payer: MEDICARE

## 2024-06-05 ENCOUNTER — OFFICE VISIT (OUTPATIENT)
Dept: ORTHOPEDICS | Facility: CLINIC | Age: 84
End: 2024-06-05
Payer: MEDICARE

## 2024-06-05 VITALS — HEIGHT: 64 IN | BODY MASS INDEX: 30.04 KG/M2 | WEIGHT: 175.94 LBS

## 2024-06-05 DIAGNOSIS — M25.562 CHRONIC PAIN OF LEFT KNEE: ICD-10-CM

## 2024-06-05 DIAGNOSIS — Z96.652 S/P REVISION OF TOTAL KNEE, LEFT: Primary | ICD-10-CM

## 2024-06-05 DIAGNOSIS — Z96.652 S/P REVISION OF TOTAL KNEE, LEFT: ICD-10-CM

## 2024-06-05 DIAGNOSIS — G89.29 CHRONIC PAIN OF LEFT KNEE: ICD-10-CM

## 2024-06-05 PROCEDURE — 73560 X-RAY EXAM OF KNEE 1 OR 2: CPT | Mod: TC,RT

## 2024-06-05 PROCEDURE — 99999 PR PBB SHADOW E&M-EST. PATIENT-LVL III: CPT | Mod: PBBFAC,,, | Performed by: ORTHOPAEDIC SURGERY

## 2024-06-05 PROCEDURE — 73560 X-RAY EXAM OF KNEE 1 OR 2: CPT | Mod: 26,59,RT, | Performed by: RADIOLOGY

## 2024-06-05 PROCEDURE — 73562 X-RAY EXAM OF KNEE 3: CPT | Mod: TC,LT

## 2024-06-05 PROCEDURE — 73562 X-RAY EXAM OF KNEE 3: CPT | Mod: 26,LT,, | Performed by: RADIOLOGY

## 2024-06-05 PROCEDURE — 99212 OFFICE O/P EST SF 10 MIN: CPT | Mod: S$PBB,,, | Performed by: ORTHOPAEDIC SURGERY

## 2024-06-05 PROCEDURE — 99213 OFFICE O/P EST LOW 20 MIN: CPT | Mod: PBBFAC,25 | Performed by: ORTHOPAEDIC SURGERY

## 2024-06-05 NOTE — PROGRESS NOTES
Fan Paz is in for one year follow up for a  left TKA.  He is doing  well.  Mild pain in the knee.  He has resumed activities of daily living. He has been active  Exam demonstrates  A well developed male in no distress.  Alert and oriented.  Mood and affect are appropriate.    Knee incision is well healed.  ROM is 0-115.  The patella tracks well and there is no instability. The extremity is neurovascularly intact.    Xrays demonstrate a well fixed and positioned prosthesis.        6/5/2024    11:28 AM 8/21/2023     2:50 PM 5/24/2023     2:21 PM 2/19/2021     3:19 PM   PROMIS-10 Questionnaire Scores   Global Physical Health 9 8 16 14   Global Mental health Score 12 15 10 13           6/5/2024    11:27 AM 8/21/2023     2:49 PM 5/24/2023     2:20 PM 2/19/2021     3:19 PM   KOOS Jr. Questionnaire Score   KOOS Jr Score 2 9 14 5           6/5/2024    11:29 AM 8/21/2023     2:51 PM 5/24/2023     2:21 PM   Oxford Knee Questionnaire Score   Oxford Knee Score 43 36 27           8/21/2023     2:15 PM   Knee Society and Function Score   FINDINGS - KNEE SOCIETY SCORE 97   FINDINGS - KNEE SOCIETY FUNCTION SCORE 100           6/5/2024    11:29 AM 8/21/2023     2:52 PM 8/21/2023     2:51 PM 5/24/2023     2:22 PM   [place] Joint Score   In which leg do you have an artificial knee? Left Knee    Left Knee Left Knee Left Knee Both   In bed at night? Never    Almost never Almost never Almost never Sometimes   When sitting on a chair for more than one hour? Never    Almost never Almost never Almost never Sometimes   When you are walking for more than 15 minutes? Never    Never Almost never Almost never Sometimes   When taking a bath or shower? Never    Never Almost never Almost never Sometimes   When traveling in a car? Almost never    Never Almost never Almost never Sometimes   When climbing stairs? Almost never    Almost never Almost never Almost never Sometimes   When walking on uneven ground? Seldom    Never Almost  never Almost never Sometimes   When standing up from a low-sitting position? Seldom    Seldom Almost never Almost never Sometimes   When standing for long periods of time? Seldom    Seldom Almost never Almost never Sometimes   When doing housework or gardening? Seldom    Seldom Almost never Almost never Sometimes   When taking a walk or hiking? Sometimes    Sometimes Almost never Almost never Sometimes   When doing your favorite sport? Mostly    Mostly Almost never Almost never Sometimes   Forgotten Joint Score Left Knee 64.58    66.67 75 75 25   In bed at night?    Sometimes   When sitting on a chair for more than one hour?    Sometimes   When you are walking for more than 15 minutes?    Sometimes   When taking a bath or shower?    Sometimes   When traveling in a car?    Sometimes   When climbing stairs?    Sometimes   When walking on uneven ground?    Sometimes   When standing up from a low-sitting position?    Sometimes   When standing for long periods of time?    Sometimes   When doing housework or gardening?    Sometimes   When taking a walk or hiking?    Sometimes   When doing your favorite sport?    Sometimes   Forgotten Joint Score Right Knee Incomplete    Incomplete Error Error 25       Imp:Doing well    F/u in 6 months with xrays

## 2024-06-06 ENCOUNTER — TELEPHONE (OUTPATIENT)
Dept: ORTHOPEDICS | Facility: CLINIC | Age: 84
End: 2024-06-06
Payer: MEDICARE

## 2024-06-06 NOTE — TELEPHONE ENCOUNTER
Called and LVM with [facility] stating that the pt saw the surgeon yesterday and no HH was ordered - this order is from 6mo ago and we will not be signing the order  as the pt does not need HHPT. Asked caller in  to disregard the order and to not call back requesting signature.      ----- Message from Yoli Faust sent at 6/6/2024  3:54 PM CDT -----  [facility] calling in regards to a order that needs to be signed and returned , needs to returned by next week for billing purposes     Order # [id number]    Fax [phone]   call back [phone]

## 2024-06-19 ENCOUNTER — DOCUMENT SCAN (OUTPATIENT)
Dept: HOME HEALTH SERVICES | Facility: HOSPITAL | Age: 84
End: 2024-06-19
Payer: MEDICARE

## 2024-06-26 ENCOUNTER — OFFICE VISIT (OUTPATIENT)
Dept: OPTOMETRY | Facility: CLINIC | Age: 84
End: 2024-06-26
Payer: MEDICARE

## 2024-06-26 DIAGNOSIS — H52.03 HYPEROPIA OF BOTH EYES WITH REGULAR ASTIGMATISM AND PRESBYOPIA: ICD-10-CM

## 2024-06-26 DIAGNOSIS — H25.13 SENILE NUCLEAR CATARACT, BILATERAL: Primary | ICD-10-CM

## 2024-06-26 DIAGNOSIS — H52.223 HYPEROPIA OF BOTH EYES WITH REGULAR ASTIGMATISM AND PRESBYOPIA: ICD-10-CM

## 2024-06-26 DIAGNOSIS — H52.4 HYPEROPIA OF BOTH EYES WITH REGULAR ASTIGMATISM AND PRESBYOPIA: ICD-10-CM

## 2024-06-26 PROCEDURE — 99212 OFFICE O/P EST SF 10 MIN: CPT | Mod: PBBFAC | Performed by: OPTOMETRIST

## 2024-06-26 PROCEDURE — 99999 PR PBB SHADOW E&M-EST. PATIENT-LVL II: CPT | Mod: PBBFAC,,, | Performed by: OPTOMETRIST

## 2024-06-26 NOTE — PROGRESS NOTES
HPI     annual eye exam ou    DFE             Comments: DFE   Blurry va    ou  Dryness ou     NS            Comments    DLS: 09/28/2021    82 Y/o male is here for routine eye exam pt has no concern with ocular   health    Pt denies pain and discomfort     No Tearing   No Itching   No Burning   No Flashes   No Floaters     Eye med: No gtt           Last edited by Annalisa Tse on 6/26/2024 11:16 AM.            Assessment /Plan     For exam results, see Encounter Report.    Senile nuclear cataract, bilateral    Hyperopia of both eyes with regular astigmatism and presbyopia      MONITOR. ED PT ON ALL EXAM FINDINGS  RX FINAL SPECS   MILD NSO U; UV PROTECTION; MONITOR  RTC 1 YR//PRN FOR REE/DFE

## 2024-06-30 ENCOUNTER — EXTERNAL CHRONIC CARE MANAGEMENT (OUTPATIENT)
Dept: PRIMARY CARE CLINIC | Facility: CLINIC | Age: 84
End: 2024-06-30
Payer: MEDICARE

## 2024-06-30 PROCEDURE — 99490 CHRNC CARE MGMT STAFF 1ST 20: CPT | Mod: S$PBB,,, | Performed by: FAMILY MEDICINE

## 2024-06-30 PROCEDURE — 99490 CHRNC CARE MGMT STAFF 1ST 20: CPT | Mod: PBBFAC,PO | Performed by: FAMILY MEDICINE

## 2024-07-11 ENCOUNTER — OFFICE VISIT (OUTPATIENT)
Dept: FAMILY MEDICINE | Facility: CLINIC | Age: 84
End: 2024-07-11
Payer: MEDICARE

## 2024-07-11 ENCOUNTER — TELEPHONE (OUTPATIENT)
Dept: FAMILY MEDICINE | Facility: CLINIC | Age: 84
End: 2024-07-11

## 2024-07-11 VITALS
HEIGHT: 65 IN | DIASTOLIC BLOOD PRESSURE: 70 MMHG | RESPIRATION RATE: 18 BRPM | OXYGEN SATURATION: 97 % | SYSTOLIC BLOOD PRESSURE: 122 MMHG | BODY MASS INDEX: 30.06 KG/M2 | HEART RATE: 52 BPM | TEMPERATURE: 97 F | WEIGHT: 180.44 LBS

## 2024-07-11 DIAGNOSIS — I10 ESSENTIAL HYPERTENSION: ICD-10-CM

## 2024-07-11 DIAGNOSIS — M79.641 PAIN OF RIGHT HAND: Primary | ICD-10-CM

## 2024-07-11 DIAGNOSIS — R79.9 ABNORMAL FINDING OF BLOOD CHEMISTRY, UNSPECIFIED: ICD-10-CM

## 2024-07-11 DIAGNOSIS — Z00.00 VISIT FOR WELL MAN HEALTH CHECK: Primary | ICD-10-CM

## 2024-07-11 DIAGNOSIS — R73.03 PREDIABETES: ICD-10-CM

## 2024-07-11 DIAGNOSIS — C61 PROSTATE CANCER: ICD-10-CM

## 2024-07-11 DIAGNOSIS — M20.001 FINGER DEFORMITY, ACQUIRED, RIGHT: ICD-10-CM

## 2024-07-11 DIAGNOSIS — E78.5 HYPERLIPIDEMIA, UNSPECIFIED HYPERLIPIDEMIA TYPE: ICD-10-CM

## 2024-07-11 PROCEDURE — 99999 PR PBB SHADOW E&M-EST. PATIENT-LVL V: CPT | Mod: PBBFAC,,, | Performed by: FAMILY MEDICINE

## 2024-07-11 PROCEDURE — 99215 OFFICE O/P EST HI 40 MIN: CPT | Mod: PBBFAC,PO | Performed by: FAMILY MEDICINE

## 2024-07-11 RX ORDER — SIMVASTATIN 40 MG/1
40 TABLET, FILM COATED ORAL NIGHTLY
Qty: 90 TABLET | Refills: 3 | Status: SHIPPED | OUTPATIENT
Start: 2024-07-11

## 2024-07-11 RX ORDER — LOSARTAN POTASSIUM 50 MG/1
50 TABLET ORAL DAILY
Qty: 90 TABLET | Refills: 3 | Status: SHIPPED | OUTPATIENT
Start: 2024-07-11

## 2024-07-11 RX ORDER — CELECOXIB 200 MG/1
200 CAPSULE ORAL DAILY
Qty: 30 CAPSULE | Refills: 0 | Status: CANCELLED | OUTPATIENT
Start: 2024-07-11

## 2024-07-11 RX ORDER — PANTOPRAZOLE SODIUM 40 MG/1
40 TABLET, DELAYED RELEASE ORAL DAILY
Qty: 30 TABLET | Refills: 1 | Status: SHIPPED | OUTPATIENT
Start: 2024-07-11 | End: 2025-07-11

## 2024-07-11 RX ORDER — TAMSULOSIN HYDROCHLORIDE 0.4 MG/1
1 CAPSULE ORAL
Qty: 90 CAPSULE | Refills: 2 | Status: CANCELLED | OUTPATIENT
Start: 2024-07-11

## 2024-07-11 NOTE — PROGRESS NOTES
"Well Man VISIT      CHIEF COMPLAINT  Chief Complaint   Patient presents with    Follow-up    Hypertension    Medication Refill       HPI  Fan Paz is a 84 y.o. male who presents for physical.     Social Factors  Tobacco use: No  Ready to Quit: No  Alcohol: seldom use  Intimate partner violence screening  Living Will/POA: No  Regular Exercise: No    Depression  Over the past two weeks, have you felt down, depressed, or hopeless? No  Over the past two weeks, have you felt little interest or pleasure in doing things? No    Reproductive Health  STD screening in last year: deferred  HIV screening: deferred    Screen for Chronic Disease  CHD Risk Factors: advanced age (older than 55 for men, 65 for women), male gender and obesity (BMI >= 30 kg/m2)  Estimated body mass index is 30.03 kg/m² as calculated from the following:    Height as of this encounter: 5' 5" (1.651 m).    Weight as of this encounter: 81.8 kg (180 lb 7.1 oz).  Dyslipidemia screening needed: ordered  T2DM screening needed: ordered  Colonoscopy needed: n/a  PSA needed: followed by urology  AAA screening needed:n/a  Screen men 35 years and older, and men 20 to 34 years of age who have cardiovascular risk factors for dyslipidemia  Begin screening colonoscopies at 50 years of age in men of average risk, and continue until 75 years of age; offer fecal occult blood testing every year, flexible sigmoidoscopy every five years combined with fecal occult blood testing every three years, or colonoscopy every 10 years   The American Urological Association recommends offering PSA testing and digital rectal examination to well-informed men beginning at 40 years of age and continuing until life expectancy is less than 10 years  Screen once with ultrasonography in men 65 to 75 years of age if they have a family history or have smoked at [id number] cigarettes in their lifetime  Screen men with a sustained blood pressure greater than 135/80 mm Hg for " "T2DM      Immunizations  delayed    ALLERGIES and MEDS were verified.   PMHx, PSHx, FHx, SOCIALHx were updated as pertinent.    REVIEW OF SYSTEMS  Review of Systems   Constitutional: Negative.    HENT: Negative.     Eyes: Negative.    Respiratory: Negative.     Cardiovascular: Negative.    Gastrointestinal: Negative.    Genitourinary: Negative.    Musculoskeletal:  Positive for joint pain.   Skin: Negative.    Neurological: Negative.    Psychiatric/Behavioral: Negative.         PHYSICAL EXAM  VITAL SIGNS: BP (!) 140/68   Pulse (!) 52   Temp 97.4 °F (36.3 °C) (Oral)   Resp 18   Ht 5' 5" (1.651 m)   Wt 81.8 kg (180 lb 7.1 oz)   SpO2 97%   BMI 30.03 kg/m²   GEN: Well developed, Well nourished, No acute distress.  HENT: Normocephalic, Atraumatic, Bilateral external ears normal, Nose normal, Oropharynx moist, No oral exudates.   Eyes: PERRL, EOMI, Conjunctiva normal, No discharge.   Neck: Supple, No tenderness.  Lymphatic: No cervical or supraclavicular lymphadenopathy noted.   Cardiovascular: Normal heart rate, Normal rhythm, No murmurs, No rubs, No gallops.   Thorax & Lungs: Normal breath sounds, No respiratory distress, No wheezing.  Abdomen: Soft, No tenderness, Bowel sounds normal.  Genital: deferred  Skin: Warm, Dry, No erythema, No rash.   Extremities: No edema, No tenderness.       ASSESSMENT/PLAN    Fan was seen today for follow-up, hypertension and medication refill.    Diagnoses and all orders for this visit:    Visit for [facility] check  -     CBC Auto Differential; Future  -     Comprehensive Metabolic Panel; Future  -     Lipid Panel; Future  -     Urinalysis; Future  - Labs to be done when fasting    Essential hypertension  -     losartan (COZAAR) 50 MG tablet; Take 1 tablet (50 mg total) by mouth once daily.  - Stable on current regimen  - Checks blood pressure regularly    Hyperlipidemia, unspecified hyperlipidemia type  -     simvastatin (ZOCOR) 40 MG tablet; Take 1 tablet (40 mg total) " by mouth every evening.  -     Lipid Panel; Future  - Labs to be done when fasting  - Continue current regimen as he is tolerating statin well    Prostate cancer  - Followed by urology    Finger deformity, acquired, right  -     Ambulatory referral/consult to Orthopedics; Future  - Right middle finger deformity  - He would like to see ortho for possible solutions  - Referral placed to Dr. Oneil    Prediabetes  -     Comprehensive Metabolic Panel; Future  -     Hemoglobin A1C; Future  - No polydypsia or polyuria  - Will check labs  - No meds needed at this time    Abnormal finding of blood chemistry, unspecified  -     CBC Auto Differential; Future    Other orders  -     pantoprazole (PROTONIX) 40 MG tablet; Take 1 tablet (40 mg total) by mouth once daily.     FOLLOW UP: 6 months or sooner if needed  Follow up with urology as scheduled  Labs today      Otilio Damon MD

## 2024-07-11 NOTE — TELEPHONE ENCOUNTER
----- Message from Zarina Finney sent at 7/11/2024 12:52 PM CDT -----  .Type: Patient Call Back    Who called: Bonnie - wife    What is the request in detail: Have questions about office visit    Can the clinic reply by [facility]? No     Would the patient rather a call back or a response via [facility]? Call Back      Best call back number: .[phone] (home) [phone] (work)      Additional Information:

## 2024-07-12 ENCOUNTER — OFFICE VISIT (OUTPATIENT)
Dept: ORTHOPEDICS | Facility: CLINIC | Age: 84
End: 2024-07-12
Attending: ORTHOPAEDIC SURGERY
Payer: MEDICARE

## 2024-07-12 DIAGNOSIS — M20.001 FINGER DEFORMITY, ACQUIRED, RIGHT: ICD-10-CM

## 2024-07-12 PROCEDURE — 99999 PR PBB SHADOW E&M-EST. PATIENT-LVL II: CPT | Mod: PBBFAC,,, | Performed by: ORTHOPAEDIC SURGERY

## 2024-07-12 PROCEDURE — 99212 OFFICE O/P EST SF 10 MIN: CPT | Mod: PBBFAC,25,PN | Performed by: ORTHOPAEDIC SURGERY

## 2024-07-12 PROCEDURE — 99213 OFFICE O/P EST LOW 20 MIN: CPT | Mod: S$PBB,,, | Performed by: ORTHOPAEDIC SURGERY

## 2024-07-13 ENCOUNTER — LAB VISIT (OUTPATIENT)
Dept: LAB | Facility: HOSPITAL | Age: 84
End: 2024-07-13
Attending: FAMILY MEDICINE
Payer: MEDICARE

## 2024-07-13 DIAGNOSIS — Z00.00 VISIT FOR WELL MAN HEALTH CHECK: ICD-10-CM

## 2024-07-13 LAB
BILIRUB UR QL STRIP: NEGATIVE
CLARITY UR REFRACT.AUTO: CLEAR
COLOR UR AUTO: COLORLESS
GLUCOSE UR QL STRIP: NEGATIVE
HGB UR QL STRIP: NEGATIVE
KETONES UR QL STRIP: NEGATIVE
LEUKOCYTE ESTERASE UR QL STRIP: NEGATIVE
NITRITE UR QL STRIP: NEGATIVE
PH UR STRIP: 7 [PH] (ref 5–8)
PROT UR QL STRIP: NEGATIVE
SP GR UR STRIP: 1.01 (ref 1–1.03)
URN SPEC COLLECT METH UR: ABNORMAL

## 2024-07-13 PROCEDURE — 81003 URINALYSIS AUTO W/O SCOPE: CPT | Performed by: FAMILY MEDICINE

## 2024-07-15 ENCOUNTER — TELEPHONE (OUTPATIENT)
Dept: FAMILY MEDICINE | Facility: CLINIC | Age: 84
End: 2024-07-15
Payer: MEDICARE

## 2024-07-15 DIAGNOSIS — M20.001 FINGER DEFORMITY, ACQUIRED, RIGHT: Primary | ICD-10-CM

## 2024-07-15 NOTE — TELEPHONE ENCOUNTER
----- Message from Otilio Damon MD sent at 7/15/2024  8:08 AM CDT -----  Please let patient know that his labs are back and look great    Thanks  Dr. Damon

## 2024-07-15 NOTE — TELEPHONE ENCOUNTER
Per  his labs are back and look great   Phone pt per doctor advice was given understood vertebrally. vs

## 2024-07-15 NOTE — PROGRESS NOTES
Follow up visit    12/02/2022   Fan is here today for new evaluation of right hand pain.  He particularly has loss of motion at the PIP joint of the right long finger.  He has occasional pain in multiple joints in the hand.  He denies numbness and tingling    Pain: 0/10 today, rates as 6/10 at worst    07/12/2024  Continued right hand pain and stiffness  Has been seen for hand arthritis in the past   No numbness or tingling   Limited motion as his primary complaint.  He has occasional pain   The limited motion it is significantly limiting to activities of daily living.  Sometimes he can not hold a pen    Physical exam  No apparent distress   Right hand  Has deformity at multiple interphalangeal joints consistent with osteoarthritis.  Range of motion at the PIP joint of the long finger is 50° to 1000°.  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    Referred to [facility] for therapy  Return to clinic p.r.n.

## 2024-07-31 ENCOUNTER — EXTERNAL CHRONIC CARE MANAGEMENT (OUTPATIENT)
Dept: PRIMARY CARE CLINIC | Facility: CLINIC | Age: 84
End: 2024-07-31
Payer: MEDICARE

## 2024-07-31 PROCEDURE — 99490 CHRNC CARE MGMT STAFF 1ST 20: CPT | Mod: S$PBB,,, | Performed by: FAMILY MEDICINE

## 2024-07-31 PROCEDURE — 99490 CHRNC CARE MGMT STAFF 1ST 20: CPT | Mod: PBBFAC,PO | Performed by: FAMILY MEDICINE
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
## 2024-08-20 ENCOUNTER — TELEPHONE (OUTPATIENT)
Dept: OTOLARYNGOLOGY | Facility: CLINIC | Age: 84
End: 2024-08-20
Payer: MEDICARE

## 2024-08-20 NOTE — TELEPHONE ENCOUNTER
----- Message from Bucky Kauffman sent at 8/20/2024  3:40 PM CDT -----  Contact: Pt  .Type:  Sooner Apoointment Request    Caller is requesting a sooner appointment.  Caller declined first available appointment listed below.  Caller will not accept being placed on the waitlist and is requesting a message be sent to doctor.  Name of Caller:pt  When is the first available appointment?  Symptoms:4 mo f/u-ears, possible cleaning  Would the patient rather a call back or a response via [facility]?  Call back  Best Call Back Number:[phone]  Additional Information: Pt. Missed his appt. On 08/14/2024 and needs to reschedule nothing until Nov.

## 2024-08-31 ENCOUNTER — EXTERNAL CHRONIC CARE MANAGEMENT (OUTPATIENT)
Dept: PRIMARY CARE CLINIC | Facility: CLINIC | Age: 84
End: 2024-08-31
Payer: MEDICARE

## 2024-08-31 PROCEDURE — 99490 CHRNC CARE MGMT STAFF 1ST 20: CPT | Mod: S$PBB,,, | Performed by: FAMILY MEDICINE

## 2024-08-31 PROCEDURE — 99490 CHRNC CARE MGMT STAFF 1ST 20: CPT | Mod: PBBFAC,PO | Performed by: FAMILY MEDICINE

## 2024-09-30 ENCOUNTER — EXTERNAL CHRONIC CARE MANAGEMENT (OUTPATIENT)
Dept: PRIMARY CARE CLINIC | Facility: CLINIC | Age: 84
End: 2024-09-30
Payer: MEDICARE

## 2024-09-30 PROCEDURE — 99490 CHRNC CARE MGMT STAFF 1ST 20: CPT | Mod: PBBFAC,PO | Performed by: FAMILY MEDICINE

## 2024-09-30 PROCEDURE — 99439 CHRNC CARE MGMT STAF EA ADDL: CPT | Mod: PBBFAC,PO | Performed by: FAMILY MEDICINE

## 2024-09-30 PROCEDURE — 99490 CHRNC CARE MGMT STAFF 1ST 20: CPT | Mod: S$PBB,,, | Performed by: FAMILY MEDICINE

## 2024-09-30 PROCEDURE — 99439 CHRNC CARE MGMT STAF EA ADDL: CPT | Mod: S$PBB,,, | Performed by: FAMILY MEDICINE

## 2024-10-03 ENCOUNTER — OFFICE VISIT (OUTPATIENT)
Dept: OTOLARYNGOLOGY | Facility: CLINIC | Age: 84
End: 2024-10-03
Payer: MEDICARE

## 2024-10-03 ENCOUNTER — TELEPHONE (OUTPATIENT)
Dept: OTOLARYNGOLOGY | Facility: CLINIC | Age: 84
End: 2024-10-03

## 2024-10-03 VITALS — SYSTOLIC BLOOD PRESSURE: 118 MMHG | DIASTOLIC BLOOD PRESSURE: 74 MMHG

## 2024-10-03 DIAGNOSIS — H90.3 ASYMMETRICAL SENSORINEURAL HEARING LOSS: ICD-10-CM

## 2024-10-03 DIAGNOSIS — Z92.3 HISTORY OF HEAD AND NECK RADIATION: ICD-10-CM

## 2024-10-03 DIAGNOSIS — K14.8 TONGUE LESION: Primary | ICD-10-CM

## 2024-10-03 DIAGNOSIS — J30.2 SEASONAL ALLERGIC RHINITIS, UNSPECIFIED TRIGGER: ICD-10-CM

## 2024-10-03 DIAGNOSIS — H61.22 IMPACTED CERUMEN OF LEFT EAR: ICD-10-CM

## 2024-10-03 DIAGNOSIS — Z85.819 ENCOUNTER FOR FOLLOW-UP SURVEILLANCE OF ORAL CAVITY CANCER: ICD-10-CM

## 2024-10-03 DIAGNOSIS — H92.02 OTALGIA OF LEFT EAR: ICD-10-CM

## 2024-10-03 DIAGNOSIS — Z08 ENCOUNTER FOR FOLLOW-UP SURVEILLANCE OF ORAL CAVITY CANCER: ICD-10-CM

## 2024-10-03 PROCEDURE — 88305 TISSUE EXAM BY PATHOLOGIST: CPT | Mod: 26,,, | Performed by: PATHOLOGY

## 2024-10-03 PROCEDURE — 88341 IMHCHEM/IMCYTCHM EA ADD ANTB: CPT | Mod: 59 | Performed by: PATHOLOGY

## 2024-10-03 PROCEDURE — 88342 IMHCHEM/IMCYTCHM 1ST ANTB: CPT | Mod: 26,,, | Performed by: PATHOLOGY

## 2024-10-03 PROCEDURE — 88342 IMHCHEM/IMCYTCHM 1ST ANTB: CPT | Performed by: PATHOLOGY

## 2024-10-03 PROCEDURE — 88305 TISSUE EXAM BY PATHOLOGIST: CPT | Performed by: PATHOLOGY

## 2024-10-03 PROCEDURE — 88341 IMHCHEM/IMCYTCHM EA ADD ANTB: CPT | Mod: 26,,, | Performed by: PATHOLOGY

## 2024-10-03 RX ORDER — HYDROCODONE BITARTRATE AND ACETAMINOPHEN 5; 325 MG/1; MG/1
1 TABLET ORAL EVERY 6 HOURS PRN
Qty: 5 TABLET | Refills: 0 | Status: SHIPPED | OUTPATIENT
Start: 2024-10-03

## 2024-10-03 NOTE — TELEPHONE ENCOUNTER
----- Message from Bucky sent at 10/3/2024  8:19 AM CDT -----  Contact: Pt  .Type:  Sooner Apoointment Request    Caller is requesting a sooner appointment.  Caller declined first available appointment listed below.  Caller will not accept being placed on the waitlist and is requesting a message be sent to doctor.  Name of Caller:pt  When is the first available appointment?10/11/2024  Symptoms: sore on tongue  Would the patient rather a call back or a response via [facility]?  Call back  Best Call Back Number: [phone]  Additional Information:  Pt. States he is in pain.

## 2024-10-03 NOTE — TELEPHONE ENCOUNTER
Called pt, states tongue swollen and very painful with bumps on it, due to hx of oral CA, scheduled him today at 2:45

## 2024-10-03 NOTE — PROGRESS NOTES
OTOLARYNGOLOGY CLINIC NOTE  Date:  10/03/2024     Chief complaint:  Chief Complaint   Patient presents with    Mouth Lesions     Started 2 weeks ago       History of Present Illness  Fan Paz is a 84 y.o. male  presenting today for a followup.    Denture had been loose had been wearing just to eat when puts them in it hurts to eat feels the same not getting better even though leaving them out for last couple of days has not worn them, left side ;pain and abnormal appearance started 2-3 weeks ago  Has also had left sided ear pain and feels it is a bit more difficult to swallow - secondary to pain     I last saw the patient on 4-8 - 24. Below text is copied from  note on that date describing history of present illness at that time :    Had throat pain last month lasted a week and a half- thought it was not going to go away but it did and feels much better now.Was in the center of the neck . No swallowing issues at that time.      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 Sinus surgery was done after cancer surgery. Dr. Rushing also did cancer surgery. Had rt and needed feeding tube during that. Had most of teeth extracted except for 6 on the bottom.   I last saw the patient on 1-22-24. Below text is copied from  note on that date describing history of present illness at that time :  Has not had issues with hearing that he has noticed really  Here today for ear cleaning  Very seldom has pain in left ear . Does not last long The right ear is fine   No new issues with speech or swallowing. No hemoptysis     Given flonase at last apptmt - has flonase and sometimes astelin. Helps with breathing with sprays and also  helped with the ears /ETD symptoms. also did some debrox prn between this apptmt and last apptmt      I last saw the patient on 9-13 - 23. Scope had been done the month before in August . Below text is copied from  note on that date  describing history of present illness at that time :   Documentation in epic shows that patient called in complaining of ear pain after apptmt in august when I saw him. When I mentioned this to him, he does not remember calling and states that his ear is actually much better -Has gotten better since the last time I saw him      When comes on lasts an hour or so then goes away and can come back same day. Happens twice a week. It is shorter in duration when it happens and happens less frequently than before     Has been over 15 years since had cancer treatment ; had surveillance exam last visit (gets yearly)     Sometimes gets pain in the jaw  Does not like astelin , needs refill of flonase  Past Medical History  Past Medical History:   Diagnosis Date    Arthritis     Cancer of palate     GERD (gastroesophageal reflux disease)     HTN (hypertension)     Hyperlipidemia     Prostate cancer     2011    Pulmonary embolism         Past Surgical History  Past Surgical History:   Procedure Laterality Date    BACK SURGERY  y-7    Cancer of palate surgery      Late 90's- [facility]     CARPAL TUNNEL RELEASE  8-14-13    right hand,Left hand 2013    COLONOSCOPY N/A 4/10/2017    Procedure: COLONOSCOPY;  Surgeon: Nilo Kelly MD;  Location: [facility];  Service: Endoscopy;  Laterality: N/A;    COLONOSCOPY N/A 10/21/2020    Procedure: COLONOSCOPY;  Surgeon: Demetrius Araujo MD;  Location: [facility];  Service: Endoscopy;  Laterality: N/A;    ESOPHAGOGASTRODUODENOSCOPY N/A 5/3/2023    Procedure: EGD (ESOPHAGOGASTRODUODENOSCOPY);  Surgeon: Shauna Lopez MD;  Location: [facility];  Service: Endoscopy;  Laterality: N/A;  EGD (with Dr. Lopez or Dr. Parada), : 1-4 weeks, Provider: Dr. Lopez or Dr. Parada Location: [facility], instructions sent to email address [email or web address].     KNEE SURGERY  y-41    left knee    KNEE SURGERY Right 12-1-15    TKR    Radio active seed Implant      January 2012    REVISION OF KNEE  ARTHROPLASTY Left 6/2/2023    Procedure: REVISION, ARTHROPLASTY, KNEE;  Surgeon: Dustin Paul MD;  Location: [facility] OR [address] [place] [address];  Service: Orthopedics;  Laterality: Left;    TOTAL HIP ARTHROPLASTY  3/2011        Medications  Current Outpatient Medications on File Prior to Visit   Medication Sig Dispense Refill    acetaminophen (TYLENOL) 650 MG TbSR Take 1 tablet (650 mg total) by mouth every 8 (eight) hours. 120 tablet 0    azelastine (ASTELIN) 137 mcg (0.1 %) nasal spray 1 spray (137 mcg total) by Nasal route 2 (two) times daily. 30 mL 3    docusate sodium (STOOL SOFTENER ORAL) Take by mouth as needed.      fluticasone propionate (FLONASE) 50 mcg/actuation nasal spray 2 sprays (100 mcg total) by Each Nostril route 2 (two) times daily. 18.2 mL 11    losartan (COZAAR) 50 MG tablet Take 1 tablet (50 mg total) by mouth once daily. 90 tablet 3    montelukast (SINGULAIR) 10 mg tablet TAKE 1 TABLET BY MOUTH ONCE DAILY IN THE EVENING 90 tablet 2    multivitamin/iron/folic acid (CENTRUM COMPLETE ORAL) Take 1 tablet by mouth once daily.      pantoprazole (PROTONIX) 40 MG tablet Take 1 tablet (40 mg total) by mouth once daily. 30 tablet 1    simvastatin (ZOCOR) 40 MG tablet Take 1 tablet (40 mg total) by mouth every evening. 90 tablet 3    tamsulosin (FLOMAX) 0.4 mg Cap Take 1 capsule by mouth once daily 90 capsule 2     No current facility-administered medications on file prior to visit.       Review of Systems  Review of Systems   Constitutional:  Negative for fever.   HENT:  Positive for ear pain. Negative for ear discharge.    Respiratory:  Negative for hemoptysis.         Social History   reports that he quit smoking about 27 years ago. His smoking use included cigarettes. He started smoking about 47 years ago. He has a 60 pack-year smoking history. He has been exposed to tobacco smoke. He has never used smokeless tobacco. He reports that he does not drink alcohol and does not use drugs.     Family History  Family  History   Problem Relation Name Age of Onset    No Known Problems Mother      Esophageal cancer Father      Coronary artery disease Father              Cancer Sister          Liver Cancer -    Diabetes Sister              No Known Problems Sister      No Known Problems Sister      Lung cancer Sister Tessa         Alive    Breast cancer Sister Tessa     Clotting disorder Sister Mireille 75        blood clot on brain-alive    No Known Problems Brother      No Known Problems Brother      Lung cancer Brother mel 60        - was a tobacco user, had lung cancer    Cancer Brother mel         Liver Cancer-     Stroke Brother Ronak             No Known Problems Maternal Aunt      No Known Problems Maternal Uncle      No Known Problems Paternal Aunt      No Known Problems Paternal Uncle      No Known Problems Maternal Grandmother      No Known Problems Maternal Grandfather      No Known Problems Paternal Grandmother      No Known Problems Paternal Grandfather      Glaucoma Cousin      Macular degeneration Neg Hx      Strabismus Neg Hx      Amblyopia Neg Hx      Blindness Neg Hx      Cataracts Neg Hx      Hypertension Neg Hx      Retinal detachment Neg Hx      Thyroid disease Neg Hx      Colon cancer Neg Hx          Physical Exam   Vitals:    10/03/24 1432   BP: 118/74    There is no height or weight on file to calculate BMI.            GENERAL: no acute distress.  HEAD: normocephalic.   EYES: No scleral icterus  EARS: external ear without lesion, normal pinna shape and position.  External auditory canal with normal cerumen, tympanic membrane fully visible, no perforation , no retraction. No middle ear effusion. Ossicles intact. Left cerumen impaction -unable to remove entirely due to discomfort  NOSE: external nose without significant bony abnormality  ORAL CAVITY/OROPHARYNX: tongue mobile. Ulcerative lesion of left lateral tongue , slightly firm to palpation and feels to be going submucosal  slightly     NECK: trachea midline.   RESPIRATORY: no stridor, no stertor. Voice normal. Respirations nonlabored.  NEURO: alert, responds to questions appropriately.    PSYCH:mood appropriate    PROCEDURE NOTE  Procedure performed: biopsy of left tongue lesion    Indication for procedure:nonhealing painful tongue lesion ,history of palate cancer with radiation to head and neck   Pre-procedure diagnosis:ulcer vs squamous cell carcinoma   Post procedure diagnosis: same    Consent: patient counseled on risks/benefits/indications and alternative to biopsy including but not limited to need for additional treatment, biopsy returning as not diagnostic and need for repeat evaluation, bleeding, infection, pain. The patient elected to undergo procedure.     Procedure in detail : After consent was obtained and with patient in seated position, the lesion and surrounding tissue was injected with 1% lidocaine with 1:100,000 epinephrine. After adequate time for vasoconstriction and anesthesia, A 15 blade and forceps were used to biopsy  the lesion at anterior aspect to obtain firm tissue , portion of ulcerative tissue and tissue around ulcer. Specimen was placed in formalin to be sent for pathologic examination. Hemostasis was achieved with pressure and silver nitrate . Sutures were not required. The patient tolerated the procedure well without complication  Imaging:  The patient does not have any new imaging of the head and neck since last visit.     Labs:  CBC  Recent Labs   Lab 02/06/23  0912 05/10/23  1210 07/13/24  0806   WBC 5.63 6.17 4.60   Hemoglobin 13.6 L 13.1 L 13.4 L   Hematocrit 44.0 41.9 45.0   MCV 87 86 91   Platelets 210 191 179     BMP  Recent Labs   Lab 05/10/23  1210 01/10/24  0925 07/13/24  0806   Glucose 109 96 106   Sodium 141 140 142   Potassium 4.9 4.5 4.3   Chloride 105 104 106   CO2 30 H 29 26   BUN 17 18 14   Creatinine 0.9 1.1 1.1   Calcium 9.5 9.7 9.4     COAGS  Recent Labs   Lab 05/10/23  1210   INR  1.0       Assessment  1. Tongue lesion  - Specimen to Pathology ENT    2. Asymmetrical sensorineural hearing loss    3. History of head and neck radiation    4. 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 Seasonal allergic rhinitis, unspecified trigger    6. Otalgia of left ear    7. Impacted cerumen of left ear       Plan:  Discussed plan of care with patient in detail and all questions answered. Patient reported understanding of plan of care. I gave the patient the opportunity to ask questions and patient confirmed all questions answered to satisfaction.     Discussed concern for malignancy; may need larger biopsy in operating room if path negative. Because slightly firm to palpation and overall appearance as well as risk factors high index of suspicion for malignancy ( possible precancer however atypical appearance for precancer) differential of scca would be aphthous ulcer but gross appearance overall would favor scca    Debrox and clean left ear at follow up    Yearly hearing test due April 2025    Can do ibuprofen and tylenol but sent small amount of narcotic med as well     Already has f/u apptmt next week for his annual surveillence exam- advised to not glue in upper denture so can examine this area as well     Please be aware that this note has been generated with the assistance of [facility] voice-to-text.  Please excuse any spelling or grammatical errors.

## 2024-10-11 ENCOUNTER — LAB VISIT (OUTPATIENT)
Dept: LAB | Facility: HOSPITAL | Age: 84
End: 2024-10-11
Attending: OTOLARYNGOLOGY
Payer: MEDICARE

## 2024-10-11 ENCOUNTER — OFFICE VISIT (OUTPATIENT)
Dept: OTOLARYNGOLOGY | Facility: CLINIC | Age: 84
End: 2024-10-11
Payer: MEDICARE

## 2024-10-11 ENCOUNTER — TELEPHONE (OUTPATIENT)
Dept: OTOLARYNGOLOGY | Facility: CLINIC | Age: 84
End: 2024-10-11

## 2024-10-11 VITALS
HEART RATE: 51 BPM | WEIGHT: 180.31 LBS | HEIGHT: 65 IN | RESPIRATION RATE: 16 BRPM | DIASTOLIC BLOOD PRESSURE: 66 MMHG | BODY MASS INDEX: 30.04 KG/M2 | SYSTOLIC BLOOD PRESSURE: 115 MMHG

## 2024-10-11 DIAGNOSIS — K14.8 TONGUE LESION: Primary | ICD-10-CM

## 2024-10-11 DIAGNOSIS — H61.22 IMPACTED CERUMEN OF LEFT EAR: ICD-10-CM

## 2024-10-11 DIAGNOSIS — K14.8 TONGUE LESION: ICD-10-CM

## 2024-10-11 DIAGNOSIS — Z92.3 HISTORY OF HEAD AND NECK RADIATION: ICD-10-CM

## 2024-10-11 DIAGNOSIS — J30.2 SEASONAL ALLERGIC RHINITIS, UNSPECIFIED TRIGGER: ICD-10-CM

## 2024-10-11 DIAGNOSIS — J34.3 HYPERTROPHY OF INFERIOR NASAL TURBINATE: ICD-10-CM

## 2024-10-11 DIAGNOSIS — H92.02 OTALGIA OF LEFT EAR: ICD-10-CM

## 2024-10-11 DIAGNOSIS — Z85.819 HISTORY OF ORAL CANCER: ICD-10-CM

## 2024-10-11 DIAGNOSIS — R07.0 THROAT PAIN IN ADULT: ICD-10-CM

## 2024-10-11 DIAGNOSIS — H90.3 ASYMMETRICAL SENSORINEURAL HEARING LOSS: ICD-10-CM

## 2024-10-11 LAB
CREAT SERPL-MCNC: 1.1 MG/DL (ref 0.5–1.4)
EST. GFR  (NO RACE VARIABLE): >60 ML/MIN/1.73 M^2

## 2024-10-11 PROCEDURE — 36415 COLL VENOUS BLD VENIPUNCTURE: CPT | Performed by: OTOLARYNGOLOGY

## 2024-10-11 PROCEDURE — 69210 REMOVE IMPACTED EAR WAX UNI: CPT | Mod: 51,S$GLB,, | Performed by: OTOLARYNGOLOGY

## 2024-10-11 PROCEDURE — 82565 ASSAY OF CREATININE: CPT | Performed by: OTOLARYNGOLOGY

## 2024-10-11 PROCEDURE — 99214 OFFICE O/P EST MOD 30 MIN: CPT | Mod: 25,S$GLB,, | Performed by: OTOLARYNGOLOGY

## 2024-10-11 PROCEDURE — 31575 DIAGNOSTIC LARYNGOSCOPY: CPT | Mod: S$GLB,,, | Performed by: OTOLARYNGOLOGY

## 2024-10-11 RX ORDER — FLUTICASONE PROPIONATE 50 MCG
2 SPRAY, SUSPENSION (ML) NASAL 2 TIMES DAILY
Qty: 16 G | Refills: 11 | Status: SHIPPED | OUTPATIENT
Start: 2024-10-11

## 2024-10-11 RX ORDER — AZELASTINE 1 MG/ML
1 SPRAY, METERED NASAL 2 TIMES DAILY
Qty: 30 ML | Refills: 3 | Status: SHIPPED | OUTPATIENT
Start: 2024-10-11

## 2024-10-11 RX ORDER — FLUTICASONE PROPIONATE 50 MCG
1 SPRAY, SUSPENSION (ML) NASAL 2 TIMES DAILY
Qty: 9.9 ML | Refills: 3 | Status: SHIPPED | OUTPATIENT
Start: 2024-10-11

## 2024-10-11 NOTE — PROGRESS NOTES
OTOLARYNGOLOGY CLINIC NOTE  Date:  10/11/2024     Chief complaint:  Chief Complaint   Patient presents with    Follow-up     Left ear sharp pain intermittent; tongue biopsy f/u       History of Present Illness  Fan Paz is a 84 y.o. male  presenting today for a followup.  Here today with daughter. Still with left ear pain  Tongue feels better since biopsy he thinks  Tender left neck   Having more issues with swallowing compared to before ( unchanged since pre biopsy)     Past Medical History  Past Medical History:   Diagnosis Date    Arthritis     Cancer of palate     GERD (gastroesophageal reflux disease)     HTN (hypertension)     Hyperlipidemia     Prostate cancer     2011    Pulmonary embolism         Past Surgical History  Past Surgical History:   Procedure Laterality Date    BACK SURGERY  y-7    Cancer of palate surgery      Late [facility]- [facility]     CARPAL TUNNEL RELEASE  8-14-13    right hand,Left hand 2013    COLONOSCOPY N/A 4/10/2017    Procedure: COLONOSCOPY;  Surgeon: Nilo Kelly MD;  Location: [facility];  Service: Endoscopy;  Laterality: N/A;    COLONOSCOPY N/A 10/21/2020    Procedure: COLONOSCOPY;  Surgeon: Demetrius Araujo MD;  Location: [facility];  Service: Endoscopy;  Laterality: N/A;    ESOPHAGOGASTRODUODENOSCOPY N/A 5/3/2023    Procedure: EGD (ESOPHAGOGASTRODUODENOSCOPY);  Surgeon: Shauna Lopez MD;  Location: [facility];  Service: Endoscopy;  Laterality: N/A;  EGD (with Dr. Lopez or Dr. Parada), : 1-4 weeks, Provider: Dr. Lopez or Dr. Parada Location: [facility], instructions sent to email address [email or web address].     KNEE SURGERY  y-41    left knee    KNEE SURGERY Right 12-1-15    TKR    Radio active seed Implant      January 2012    REVISION OF KNEE ARTHROPLASTY Left 6/2/2023    Procedure: REVISION, ARTHROPLASTY, KNEE;  Surgeon: Dustin Paul MD;  Location: [facility] OR [address] [place] [address];  Service: Orthopedics;  Laterality: Left;    TOTAL HIP ARTHROPLASTY  3/2011         Medications  Current Outpatient Medications on File Prior to Visit   Medication Sig Dispense Refill    acetaminophen (TYLENOL) 650 MG TbSR Take 1 tablet (650 mg total) by mouth every 8 (eight) hours. 120 tablet 0    azelastine (ASTELIN) 137 mcg (0.1 %) nasal spray 1 spray (137 mcg total) by Nasal route 2 (two) times daily. 30 mL 3    docusate sodium (STOOL SOFTENER ORAL) Take by mouth as needed.      fluticasone propionate (FLONASE) 50 mcg/actuation nasal spray 2 sprays (100 mcg total) by Each Nostril route 2 (two) times daily. 18.2 mL 11    HYDROcodone-acetaminophen (NORCO) 5-325 mg per tablet Take 1 tablet by mouth every 6 (six) hours as needed for Pain. 5 tablet 0    losartan (COZAAR) 50 MG tablet Take 1 tablet (50 mg total) by mouth once daily. 90 tablet 3    montelukast (SINGULAIR) 10 mg tablet TAKE 1 TABLET BY MOUTH ONCE DAILY IN THE EVENING 90 tablet 2    multivitamin/iron/folic acid (CENTRUM COMPLETE ORAL) Take 1 tablet by mouth once daily.      pantoprazole (PROTONIX) 40 MG tablet Take 1 tablet (40 mg total) by mouth once daily. 30 tablet 1    simvastatin (ZOCOR) 40 MG tablet Take 1 tablet (40 mg total) by mouth every evening. 90 tablet 3    tamsulosin (FLOMAX) 0.4 mg Cap Take 1 capsule by mouth once daily 90 capsule 2     No current facility-administered medications on file prior to visit.       Review of Systems  ROS     Social History   reports that he quit smoking about 27 years ago. His smoking use included cigarettes. He started smoking about 47 years ago. He has a 60 pack-year smoking history. He has been exposed to tobacco smoke. He has never used smokeless tobacco. He reports that he does not drink alcohol and does not use drugs.     Family History  Family History   Problem Relation Name Age of Onset    No Known Problems Mother      Esophageal cancer Father      Coronary artery disease Father              Cancer Sister          Liver Cancer -    Diabetes Sister              No Known  "Problems Sister      No Known Problems Sister      Lung cancer Sister Tessa         Alive    Breast cancer Sister Tessa     Clotting disorder Sister Mireille 75        blood clot on brain-alive    No Known Problems Brother      No Known Problems Brother      Lung cancer Brother mel 60        - was a tobacco user, had lung cancer    Cancer Brother mel         Liver Cancer-     Stroke Brother Ronak             No Known Problems Maternal Aunt      No Known Problems Maternal Uncle      No Known Problems Paternal Aunt      No Known Problems Paternal Uncle      No Known Problems Maternal Grandmother      No Known Problems Maternal Grandfather      No Known Problems Paternal Grandmother      No Known Problems Paternal Grandfather      Glaucoma Cousin      Macular degeneration Neg Hx      Strabismus Neg Hx      Amblyopia Neg Hx      Blindness Neg Hx      Cataracts Neg Hx      Hypertension Neg Hx      Retinal detachment Neg Hx      Thyroid disease Neg Hx      Colon cancer Neg Hx          Physical Exam   Vitals:    10/11/24 1147   BP: 115/66   Pulse: (!) 51   Resp: 16    Body mass index is 30.01 kg/m².  Weight: 81.8 kg (180 lb 5.4 oz)   Height: 5' 5" (165.1 cm)     GENERAL: no acute distress.  HEAD: normocephalic.   EYES: No scleral icterus  EARS: external ear without lesion, normal pinna shape and position.  External auditory canal with normal cerumen, tympanic membrane fully visible, no perforation , no retraction. No middle ear effusion. Ossicles intact. Left cerumen imapction   NOSE: external nose without significant bony abnormality  ORAL CAVITY/OROPHARYNX: tongue mobile. Denture removed - no lesion of palate. Tongue lesion unchanged, biopsy site healed   NECK: trachea midline.   LYMPH NODES:No overt cervical lymphadenopathy.slight full left upper neck but no overt node however has post rt changse, sore to touch   RESPIRATORY: no stridor, no stertor. Voice normal. Respirations nonlabored.  NEURO: alert, " responds to questions appropriately.    PSYCH:mood appropriate    Procedure Note   Procedure performed:examination of ears with cerumen disimpaction    Indication for procedure:left cerumen impaction     Description of procedure:  After verbal consent was obtained and with the patient in seated position and the operating head otoscope was inserted into the left ear.  Otologic instruments including various size otologic suctions and curette were used to remove the cerumen from the right external auditory canals under visualization with the operating head otoscope. After cleaning, visualization was again performed  of the ear canal, tympanic membrane, ossicles and middle ear space. Findings as indicated below. All portions of the procedure and examination were tolerated well without complication.     Findings:  Left ear: Complete cerumen impaction removed entirely revealing normal external auditory canal; tympanic membrane without bulging, retraction, or perforation; no evidence of middle ear fluid or effusion.           PROCEDURE NOTE  NAME OF PROCEDURE: Flexible Laryngoscopy, diagnostic  INDICATIONS: gag reflex precludes mirror exam, throat pain in adult ; surveillance for head and neck cancer history   FINDINGS: post radiation changes stable. No base of tongue mass; turbinate hypertrophy    Consent: After procedure was explained in detail and all questions answered, verbal consent was obtained for performing flexible laryngoscopy.  Anesthesia: topical 4% lidocaine and neosynephrine  Procedure: With patient in seated position, the scope was inserted into the bilateral nasal passageway and advanced atraumatically into the nasopharynx to examine the following structures:  Nasal cavity: Turbinates with  hypertrophy. no middle meatal edema. No purulent drainage.   Nasopharynx: no mass or lesion noted in nasopharynx. +crusting  Oropharynx: base of tongue without  mass or ulceration. Lingual tonsils with radiation changes    Hypopharynx: posterior pharyngeal wall without mass or lesion. No pooling of secretions. Pyriform sinuses visible without mass or lesion  Larynx: epiglottis normal without lesion. False vocal folds without edema/erythema/lesion. True vocal folds mobile but medialzied some- unchanged and without lesion. Mild interarytenoid edema no erythema . Postcricoid region with mild edema no lesion   Subglottis: visualized portion of subglottis normal in appearance    After examination performed, the scope was removed atraumatically . The patient tolerated the procedure well. Photodocumentation obtained with representative images below, all images and/or videos uploaded in media section of epic.                                                  Imaging:  The patient does not have any new imaging of the head and neck since last visit.     Labs:  CBC  Recent Labs   Lab 02/06/23  0912 05/10/23  1210 07/13/24  0806   WBC 5.63 6.17 4.60   Hemoglobin 13.6 L 13.1 L 13.4 L   Hematocrit 44.0 41.9 45.0   MCV 87 86 91   Platelets 210 191 179     BMP  Recent Labs   Lab 05/10/23  1210 01/10/24  0925 07/13/24  0806   Glucose 109 96 106   Sodium 141 140 142   Potassium 4.9 4.5 4.3   Chloride 105 104 106   CO2 30 H 29 26   BUN 17 18 14   Creatinine 0.9 1.1 1.1   Calcium 9.5 9.7 9.4     COAGS  Recent Labs   Lab 05/10/23  1210   INR 1.0       Assessment  1. Tongue lesion  - CT Soft Tissue Neck With Contrast; Future  - CREATININE, SERUM; Future    2. History of oral cancer  - CT Soft Tissue Neck With Contrast; Future  - CREATININE, SERUM; Future    3. History of head and neck radiation  - CT Soft Tissue Neck With Contrast; Future  - CREATININE, SERUM; Future    4. Impacted cerumen of left ear    5. Asymmetrical sensorineural hearing loss    6. Otalgia of left ear    7. Throat pain in adult    8. Hypertrophy of inferior nasal turbinate    9. Seasonal allergic rhinitis, unspecified trigger       Plan:  Discussed plan of care with patient in detail  and all questions answered. Patient reported understanding of plan of care. I gave the patient the opportunity to ask questions and patient confirmed all questions answered to satisfaction.       Needs nasal spray refills-sent    Path not back yet - depending on results may need larger biopsy which would do in or. Discussed with patient and daughter that if positive for scca would need surgery; discussed what that could possibly entail including but not limited to need for free flap recon. Discussed that chemo alone not curative and discussed that would not be able to reirradiate ; discussed would present at [facility] board if biopsy positive. If negative discussed that with risk factors, ipsilateral otalgia and appearance that I would have concerns for sampling error and would want to take a larger biopsy which would be done in or so could do frozen section as well    No evidence of recurrent palate cancer, flex scope normal     Will get ct scan of neck as now with tenderness on ipsilateral side, no overt node but does feel slightly full    Wax removed  F/u stat after ct     Please be aware that this note has been generated with the assistance of MModal voice-to-text.  Please excuse any spelling or grammatical errors.

## 2024-10-11 NOTE — PATIENT INSTRUCTIONS
Information and instructions from your visit with me today:  Always use saline every time before a medication spray. You can also use saline on its own. If you are using saline and/or the medication sprays on an as needed basis and you have symptoms use the regimen daily for at least 2 weeks. You can use the flonase and astelin together, or if you prefer to start with just one medication spray, the flonase works better by itself compared to astelin by itself. You can try doing the saline and flonase and if still congested, add on the astelin again doing this regimen daily for up to two weeks when congestion. There may be times of the year that you only need saline and there may be times of the year that you need saline, flonase and astelin to control symptoms.     Start using the following medication nasal sprays:   Fluticasone spray:    This medication is a steroid spray. It stays within the nose and does not have absorption into the body that leads to side effects that one has with oral steroid medication. Fluticasone nasal spray is the same as the Flonase brand nasal spray. Discuss with your pharmacist if the price is lower over the counter or with a prescription ( this varies depending on insurance). The medication that is over the counter is the same as the prescription medication. Use this medication as instructed on the prescription, 1-2 sprays on each side of your nose twice daily.     Azelastine  spray:  This medication is an antihistamine used to treat nasal symptoms of allergy, which works specifically in the nose unlike antihistamine pills which have more of an effect on the whole body. Use this medication as instructed on the prescription, 1 spray on each side of your nose twice daily.     Additional instructions for medication sprays  Place the tip of the medication bottle in your nose and aim slightly up and out on each side to get medication high and deep into your nose and sinuses, and not have it  "all deposit in the very front of your nose. Aim the tip of the nozzle towards the outer corner of your eye . You can imagine aiming towards the back of your eyeball on each side for this, as opposed to straight back to the center of your nose and head.     You need to use this medication every day regardless of symptoms, as it takes time ( a few weeks) to work and get the benefits. It does not work on an "as needed" basis like taking a decongestant. If your symptoms only occur in a particular season, then the medication can be used seasonally instead of year long. For seasonal symptoms, you should start using the spray twice daily a month before when you normally have symptoms ( for example, if symptoms start in August, should start at the end of June).     Start nasal irrigations with saline solution- you can either use a rinse or a mist spray:    NASAL SALINE SPRAY ( simply saline and arm and hammer are examples) There are several different brands found in the cold and flu aisle of the pharmacy. You can use any brand of saline spray - this will deliver the saline by a gentle mist ( if you have difficulty or discomfort with nasal rinse/ a lot of fluid in the nose, this will be more comfortable).       Always rinse your nose with saline prior to using medication sprays and wait a couple of hours before using again. You can use the saline throughout the day to help with stuffy nose or dry nose.    Do not use nasal decongestant sprays such as Afrin or similar products long term ( over 3 days) .  This can cause long term physical nasal addiction. Afrin should only be used if having nose bleeds, severe nasal congestion , or severe ear pain/fullness and should not be used for more than 2-3 days in a row . It is a not a medication that should be used for a long period of time.     It was nice meeting you today, and I look forward to helping you feel better soon. Please don't hesitate to call if you have any other " questions or concerns, or if I can be of any assistance in the meantime.      Wendi Quick MD    [facility]     Phone  [phone]    Fax      [phone]        Wendi Quick MD  Otorhinolaryngology

## 2024-10-15 ENCOUNTER — TELEPHONE (OUTPATIENT)
Dept: OTOLARYNGOLOGY | Facility: CLINIC | Age: 84
End: 2024-10-15
Payer: MEDICARE

## 2024-10-15 ENCOUNTER — HOSPITAL ENCOUNTER (OUTPATIENT)
Dept: RADIOLOGY | Facility: HOSPITAL | Age: 84
Discharge: HOME | End: 2024-10-15
Attending: OTOLARYNGOLOGY
Payer: MEDICARE

## 2024-10-15 DIAGNOSIS — Z92.3 HISTORY OF HEAD AND NECK RADIATION: ICD-10-CM

## 2024-10-15 DIAGNOSIS — K14.8 TONGUE LESION: ICD-10-CM

## 2024-10-15 DIAGNOSIS — Z85.819 HISTORY OF ORAL CANCER: ICD-10-CM

## 2024-10-15 LAB
FINAL PATHOLOGIC DIAGNOSIS: NORMAL
GROSS: NORMAL
Lab: NORMAL
MICROSCOPIC EXAM: NORMAL

## 2024-10-15 PROCEDURE — 25500020 PHARM REV CODE 255: Performed by: OTOLARYNGOLOGY

## 2024-10-15 PROCEDURE — 70491 CT SOFT TISSUE NECK W/DYE: CPT | Mod: 26,,, | Performed by: STUDENT IN AN ORGANIZED HEALTH CARE EDUCATION/TRAINING PROGRAM

## 2024-10-15 PROCEDURE — 70491 CT SOFT TISSUE NECK W/DYE: CPT | Mod: TC

## 2024-10-15 RX ADMIN — IOHEXOL 75 ML: 350 INJECTION, SOLUTION INTRAVENOUS at 12:10

## 2024-10-15 NOTE — TELEPHONE ENCOUNTER
Called patient to review results of biopsy   Will present at multidisciplinary tumor board on thursday

## 2024-10-17 ENCOUNTER — OFFICE VISIT (OUTPATIENT)
Dept: OTOLARYNGOLOGY | Facility: CLINIC | Age: 84
End: 2024-10-17
Payer: MEDICARE

## 2024-10-17 ENCOUNTER — TUMOR BOARD CONFERENCE (OUTPATIENT)
Dept: OTOLARYNGOLOGY | Facility: CLINIC | Age: 84
End: 2024-10-17
Payer: MEDICARE

## 2024-10-17 VITALS
BODY MASS INDEX: 29.46 KG/M2 | HEIGHT: 65 IN | SYSTOLIC BLOOD PRESSURE: 132 MMHG | DIASTOLIC BLOOD PRESSURE: 84 MMHG | WEIGHT: 176.81 LBS

## 2024-10-17 DIAGNOSIS — C02.9 SQUAMOUS CELL CARCINOMA OF TONGUE: ICD-10-CM

## 2024-10-17 DIAGNOSIS — Z92.3 HISTORY OF HEAD AND NECK RADIATION: Primary | ICD-10-CM

## 2024-10-17 PROCEDURE — 99214 OFFICE O/P EST MOD 30 MIN: CPT | Mod: 24,S$GLB,, | Performed by: OTOLARYNGOLOGY

## 2024-10-17 NOTE — PROGRESS NOTES
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 Will arrange follow up with Head and Neck at [facility].

## 2024-10-22 ENCOUNTER — OFFICE VISIT (OUTPATIENT)
Dept: OTOLARYNGOLOGY | Facility: CLINIC | Age: 84
End: 2024-10-22
Payer: MEDICARE

## 2024-10-22 VITALS — BODY MASS INDEX: 29.79 KG/M2 | WEIGHT: 179 LBS

## 2024-10-22 DIAGNOSIS — Z85.819 HISTORY OF ORAL CANCER: Primary | ICD-10-CM

## 2024-10-22 DIAGNOSIS — C02.9 SQUAMOUS CELL CANCER OF TONGUE: ICD-10-CM

## 2024-10-22 PROCEDURE — 99999 PR PBB SHADOW E&M-EST. PATIENT-LVL IV: CPT | Mod: PBBFAC,,, | Performed by: OTOLARYNGOLOGY

## 2024-10-22 PROCEDURE — 99214 OFFICE O/P EST MOD 30 MIN: CPT | Mod: PBBFAC | Performed by: OTOLARYNGOLOGY

## 2024-10-22 PROCEDURE — 99214 OFFICE O/P EST MOD 30 MIN: CPT | Mod: S$PBB,,, | Performed by: OTOLARYNGOLOGY

## 2024-10-22 NOTE — H&P (VIEW-ONLY)
CC: Left oral tongue cancer      HPI   84 y.o. 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  He denies surgery on the neck and reports only surgery on the palate itself.  He reports some pain in the left oral tongue.  No other significant complaints.  He is a former smoker.    Review of Systems   Constitutional: Negative for fatigue and unexpected weight change.   HENT: Per HPI.  Eyes: Negative for visual disturbance.   Respiratory: Negative for shortness of breath, hemoptysis   Cardiovascular: Negative for chest pain and palpitations.   Musculoskeletal: Negative for decreased ROM, back pain.   Skin: Negative for rash, sunburn, itching.   Neurological: Negative for dizziness and seizures.   Hematological: Negative for adenopathy. Does not bruise/bleed easily.   Endocrine: Negative for rapid weight loss/weight gain, heat/cold intolerance.     Past Medical History   Patient Active Problem List   Diagnosis    Hyperlipidemia    History of prostate cancer    Arthritis of hand    Essential hypertension    Anemia    Osteoarthritis, knee    Dysphagia    Dry mouth    Acid reflux    Hx pulmonary embolism-6/20/09- bilateral lower lobe pulmonary emboli    Asymptomatic varicose veins of bilateral lower extremities    At risk for aspiration    Edema    Prediabetes    Seasonal allergic rhinitis due to pollen    Low HDL (under 40)    BMI 29.0-29.9,adult    Encounter for screening colonoscopy    Mobitz type 1 second degree AV block    History of head and neck cancer    Constipation           Past Surgical History   Past Surgical History:   Procedure Laterality Date    BACK SURGERY  y-7    Cancer of palate surgery      Late [facility]- [facility]     CARPAL TUNNEL RELEASE  8-14-13    right hand,Left hand 2013    COLONOSCOPY N/A 4/10/2017     Procedure: COLONOSCOPY;  Surgeon: Nilo Kelly MD;  Location: [facility];  Service: Endoscopy;  Laterality: N/A;    COLONOSCOPY N/A 10/21/2020    Procedure: COLONOSCOPY;  Surgeon: Demetrius Araujo MD;  Location: [facility];  Service: Endoscopy;  Laterality: N/A;    ESOPHAGOGASTRODUODENOSCOPY N/A 5/3/2023    Procedure: EGD (ESOPHAGOGASTRODUODENOSCOPY);  Surgeon: Shauna Lopez MD;  Location: [facility];  Service: Endoscopy;  Laterality: N/A;  EGD (with Dr. Lopez or Dr. Parada), : 1-4 weeks, Provider: Dr. Lopez or Dr. Parada Location: [facility], instructions sent to email address [email or web address]    KNEE SURGERY  y-41    left knee    KNEE SURGERY Right 12-1-15    TKR    Radio active seed Implant      2012    REVISION OF KNEE ARTHROPLASTY Left 2023    Procedure: REVISION, ARTHROPLASTY, KNEE;  Surgeon: Dustin Paul MD;  Location: [facility] OR [address] [place] [address];  Service: Orthopedics;  Laterality: Left;    TOTAL HIP ARTHROPLASTY  3/2011         Family History   Family History   Problem Relation Name Age of Onset    No Known Problems Mother      Esophageal cancer Father      Coronary artery disease Father              Cancer Sister          Liver Cancer -    Diabetes Sister              No Known Problems Sister      No Known Problems Sister      Lung cancer Sister Tessa         Alive    Breast cancer Sister Tessa     Clotting disorder Sister Mireille 75        blood clot on brain-alive    No Known Problems Brother      No Known Problems Brother      Lung cancer Brother mel 60        - was a tobacco user, had lung cancer    Cancer Brother mel         Liver Cancer-     Stroke Brother Ronak             No Known Problems Maternal Aunt      No Known Problems Maternal Uncle      No Known Problems Paternal Aunt      No Known Problems Paternal Uncle      No Known Problems Maternal Grandmother      No Known Problems Maternal Grandfather      No Known Problems Paternal Grandmother       No Known Problems Paternal Grandfather      Glaucoma Cousin      Macular degeneration Neg Hx      Strabismus Neg Hx      Amblyopia Neg Hx      Blindness Neg Hx      Cataracts Neg Hx      Hypertension Neg Hx      Retinal detachment Neg Hx      Thyroid disease Neg Hx      Colon cancer Neg Hx             Social History   .  Social History     Socioeconomic History    Marital status:     Number of children: 4   Occupational History     Employer: RETIRED   Tobacco Use    Smoking status: Former     Current packs/day: 0.00     Average packs/day: 3.0 packs/day for 20.0 years (60.0 ttl pk-yrs)     Types: Cigarettes     Start date: 7/10/1977     Quit date: 7/10/1997     Years since quittin.3     Passive exposure: Past    Smokeless tobacco: Never    Tobacco comments:     Quit -smoked for 40 years ,2 packs a day on an average   Substance and Sexual Activity    Alcohol use: No     Comment: used to drink Occasionally    Drug use: No    Sexual activity: Yes     Partners: Female     Social Drivers of Health     Financial Resource Strain: Low Risk  (6/3/2023)    Overall Financial Resource Strain (CARDIA)     Difficulty of Paying Living Expenses: Not hard at all   Food Insecurity: No Food Insecurity (6/3/2023)    Hunger Vital Sign     Worried About Running Out of Food in the Last Year: Never true     Ran Out of Food in the Last Year: Never true   Transportation Needs: No Transportation Needs (6/3/2023)    PRAPARE - Transportation     Lack of Transportation (Medical): No     Lack of Transportation (Non-Medical): No   Physical Activity: Sufficiently Active (6/3/2023)    Exercise Vital Sign     Days of Exercise per Week: 7 days     Minutes of Exercise per Session: 30 min   Stress: No Stress Concern Present (6/3/2023)    [employer] [place] [employer] - Occupational Stress Questionnaire     Feeling of Stress : Not at all   Housing Stability: Low Risk  (6/3/2023)    Housing Stability Vital Sign     Unable to  Pay for Housing in the Last Year: No     Number of Places Lived in the Last Year: 1     Unstable Housing in the Last Year: No         Allergies   Review of patient's allergies indicates:  No Known Allergies        Physical Exam     There were no vitals filed for this visit.      Body mass index is 29.79 kg/m².      General: AOx3, NAD   Respiratory:  Symmetric chest rise, normal effort  Nose: No gross nasal septal deviation. Inferior Turbinates WNL bilaterally. No septal perforation. No masses/lesions.   Oral Cavity:  Oral Tongue mobile.  Roughly 2 cm ulcerated lesion of left lateral oral tongue involving adjacent floor of mouth with significant submucosal involvement of floor of mouth.  Hard Palate WNL. No buccal or FOM lesions.  Oropharynx:  No masses/lesions of the posterior pharyngeal wall. Tonsillar fossa without lesions. Soft palate without masses. Midline uvula.   Neck: No scars.  No cervical lymphadenopathy, thyromegaly or thyroid nodules.  Normal range of motion.    Face: House Brackmann I bilaterally.     Right-hand dominant.  Left Jeff's test performed to revealed brisk refill of the hand via the ulnar system with occlusion of the radial artery.    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  PET-CT ordered placed staging.  Will plan for left partial glossectomy/floor of mouth resection and radial forearm free flap on 11/15/2024.          Other Visit Diagnoses       History of oral cancer    -  Primary    Relevant Orders    NM PET CT FDG Skull Base to Mid Thigh

## 2024-10-22 NOTE — PROGRESS NOTES
CC: Left oral tongue cancer      HPI   84 y.o. 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  He denies surgery on the neck and reports only surgery on the palate itself.  He reports some pain in the left oral tongue.  No other significant complaints.  He is a former smoker.    Review of Systems   Constitutional: Negative for fatigue and unexpected weight change.   HENT: Per HPI.  Eyes: Negative for visual disturbance.   Respiratory: Negative for shortness of breath, hemoptysis   Cardiovascular: Negative for chest pain and palpitations.   Musculoskeletal: Negative for decreased ROM, back pain.   Skin: Negative for rash, sunburn, itching.   Neurological: Negative for dizziness and seizures.   Hematological: Negative for adenopathy. Does not bruise/bleed easily.   Endocrine: Negative for rapid weight loss/weight gain, heat/cold intolerance.     Past Medical History   Patient Active Problem List   Diagnosis    Hyperlipidemia    History of prostate cancer    Arthritis of hand    Essential hypertension    Anemia    Osteoarthritis, knee    Dysphagia    Dry mouth    Acid reflux    Hx pulmonary embolism-6/20/09- bilateral lower lobe pulmonary emboli    Asymptomatic varicose veins of bilateral lower extremities    At risk for aspiration    Edema    Prediabetes    Seasonal allergic rhinitis due to pollen    Low HDL (under 40)    BMI 29.0-29.9,adult    Encounter for screening colonoscopy    Mobitz type 1 second degree AV block    History of head and neck cancer    Constipation           Past Surgical History   Past Surgical History:   Procedure Laterality Date    BACK SURGERY  y-6    Cancer of palate surgery      Late 90's- [facility]     CARPAL TUNNEL RELEASE  8-14-13    right hand,Left hand 2013    COLONOSCOPY N/A 4/10/2017     Procedure: COLONOSCOPY;  Surgeon: Nilo Kelly MD;  Location: [facility];  Service: Endoscopy;  Laterality: N/A;    COLONOSCOPY N/A 10/21/2020    Procedure: COLONOSCOPY;  Surgeon: Demetrius Araujo MD;  Location: [facility];  Service: Endoscopy;  Laterality: N/A;    ESOPHAGOGASTRODUODENOSCOPY N/A 5/3/2023    Procedure: EGD (ESOPHAGOGASTRODUODENOSCOPY);  Surgeon: Shauna Lopez MD;  Location: [facility];  Service: Endoscopy;  Laterality: N/A;  EGD (with Dr. Lopez or Dr. Parada), : 1-4 weeks, Provider: Dr. Lopez or Dr. Parada Location: [facility], instructions sent to email address [email or web address]    KNEE SURGERY  y-41    left knee    KNEE SURGERY Right 12-1-15    TKR    Radio active seed Implant      2012    REVISION OF KNEE ARTHROPLASTY Left 2023    Procedure: REVISION, ARTHROPLASTY, KNEE;  Surgeon: Dustin Paul MD;  Location: [facility] OR [address] [place] [address];  Service: Orthopedics;  Laterality: Left;    TOTAL HIP ARTHROPLASTY  3/2011         Family History   Family History   Problem Relation Name Age of Onset    No Known Problems Mother      Esophageal cancer Father      Coronary artery disease Father              Cancer Sister          Liver Cancer -    Diabetes Sister              No Known Problems Sister      No Known Problems Sister      Lung cancer Sister Tessa         Alive    Breast cancer Sister Tessa     Clotting disorder Sister Mireille 75        blood clot on brain-alive    No Known Problems Brother      No Known Problems Brother      Lung cancer Brother mel 60        - was a tobacco user, had lung cancer    Cancer Brother mel         Liver Cancer-     Stroke Brother Ronak             No Known Problems Maternal Aunt      No Known Problems Maternal Uncle      No Known Problems Paternal Aunt      No Known Problems Paternal Uncle      No Known Problems Maternal Grandmother      No Known Problems Maternal Grandfather      No Known Problems Paternal Grandmother       No Known Problems Paternal Grandfather      Glaucoma Cousin      Macular degeneration Neg Hx      Strabismus Neg Hx      Amblyopia Neg Hx      Blindness Neg Hx      Cataracts Neg Hx      Hypertension Neg Hx      Retinal detachment Neg Hx      Thyroid disease Neg Hx      Colon cancer Neg Hx             Social History   .  Social History     Socioeconomic History    Marital status:     Number of children: 4   Occupational History     Employer: RETIRED   Tobacco Use    Smoking status: Former     Current packs/day: 0.00     Average packs/day: 3.0 packs/day for 20.0 years (60.0 ttl pk-yrs)     Types: Cigarettes     Start date: 7/10/1977     Quit date: 7/10/1997     Years since quittin.3     Passive exposure: Past    Smokeless tobacco: Never    Tobacco comments:     Quit -smoked for 40 years ,2 packs a day on an average   Substance and Sexual Activity    Alcohol use: No     Comment: used to drink Occasionally    Drug use: No    Sexual activity: Yes     Partners: Female     Social Drivers of Health     Financial Resource Strain: Low Risk  (6/3/2023)    Overall Financial Resource Strain (CARDIA)     Difficulty of Paying Living Expenses: Not hard at all   Food Insecurity: No Food Insecurity (6/3/2023)    Hunger Vital Sign     Worried About Running Out of Food in the Last Year: Never true     Ran Out of Food in the Last Year: Never true   Transportation Needs: No Transportation Needs (6/3/2023)    PRAPARE - Transportation     Lack of Transportation (Medical): No     Lack of Transportation (Non-Medical): No   Physical Activity: Sufficiently Active (6/3/2023)    Exercise Vital Sign     Days of Exercise per Week: 7 days     Minutes of Exercise per Session: 30 min   Stress: No Stress Concern Present (6/3/2023)    [employer] - Occupational Stress Questionnaire     Feeling of Stress : Not at all   Housing Stability: Low Risk  (6/3/2023)    Housing Stability Vital Sign     Unable to  Pay for Housing in the Last Year: No     Number of Places Lived in the Last Year: 1     Unstable Housing in the Last Year: No         Allergies   Review of patient's allergies indicates:  No Known Allergies        Physical Exam     There were no vitals filed for this visit.      Body mass index is 29.79 kg/m².      General: AOx3, NAD   Respiratory:  Symmetric chest rise, normal effort  Nose: No gross nasal septal deviation. Inferior Turbinates WNL bilaterally. No septal perforation. No masses/lesions.   Oral Cavity:  Oral Tongue mobile.  Roughly 2 cm ulcerated lesion of left lateral oral tongue involving adjacent floor of mouth with significant submucosal involvement of floor of mouth.  Hard Palate WNL. No buccal or FOM lesions.  Oropharynx:  No masses/lesions of the posterior pharyngeal wall. Tonsillar fossa without lesions. Soft palate without masses. Midline uvula.   Neck: No scars.  No cervical lymphadenopathy, thyromegaly or thyroid nodules.  Normal range of motion.    Face: House Brackmann I bilaterally.     Right-hand dominant.  Left Jeff's test performed to revealed brisk refill of the hand via the ulnar system with occlusion of the radial artery.    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  PET-CT ordered placed staging.  Will plan for left partial glossectomy/floor of mouth resection and radial forearm free flap on 11/15/2024.          Other Visit Diagnoses       History of oral cancer    -  Primary    Relevant Orders    NM PET CT FDG Skull Base to Mid Thigh

## 2024-10-23 PROBLEM — C02.9 SQUAMOUS CELL CANCER OF TONGUE: Status: ACTIVE | Noted: 2024-10-23

## 2024-10-23 RX ORDER — LIDOCAINE HYDROCHLORIDE 10 MG/ML
1 INJECTION, SOLUTION EPIDURAL; INFILTRATION; INTRACAUDAL; PERINEURAL ONCE
OUTPATIENT
Start: 2024-10-23 | End: 2024-10-23

## 2024-10-23 RX ORDER — SODIUM CHLORIDE 0.9 % (FLUSH) 0.9 %
10 SYRINGE (ML) INJECTION
OUTPATIENT
Start: 2024-10-23

## 2024-10-23 NOTE — ASSESSMENT & PLAN NOTE
Squamous cell carcinoma of the left lateral oral tongue and floor of mouth with a history of prior cancer of the palate treated with surgery and radiation many years ago.  PET-CT ordered placed staging.  Will plan for left partial glossectomy/floor of mouth resection and radial forearm free flap on 11/15/2024.

## 2024-10-25 DIAGNOSIS — Z01.818 PRE-OP TESTING: Primary | ICD-10-CM

## 2024-10-25 NOTE — ANESTHESIA PAT ROS NOTE
10/25/2024  Fan Paz is a 84 y.o., male.      Pre-op Assessment          Review of Systems           Anesthesia Assessment: Preoperative EQUATION    Planned Procedure: Procedure(s) (LRB):  GLOSSECTOMY (Left)  DISSECTION, NECK, MODIFIED RADICAL (Left)  TRACHEOTOMY (N/A)  CREATION, FREE FLAP, FOREARM, RADIAL ASPECT (Left)  EGD, WITH PEG TUBE INSERTION (N/A)  Requested Anesthesia Type:General  Surgeon: Cristhian Bailon MD  Service: ENT  Known or anticipated Date of Surgery:11/15/2024    Surgeon notes: reviewed    Electronic QUestionnaire Assessment completed via nurse interview with patient.        Triage considerations:     The patient has no apparent active cardiac condition (No unstable coronary Syndrome such as severe unstable angina or recent [<1 month] myocardial infarction, decompensated CHF, severe valvular   disease or significant arrhythmia)    Previous anesthesia records:GETA, CSE, and No problems    Airway:  Mallampati: IV / IV  Mouth Opening: Small, but > 3cm  TM Distance: Normal  Tongue: Decreased Mobility  Neck ROM: Left Lateral Motion Decreased, Right Lateral Motion Decreased, Extension Decreased  Oropharynx: Hoarseness  Esophageal stretching  Dental:  Edentulous  Full dentures    Airway Placement Date: 11/28/20 Placement Time: 0817 , created via procedure documentation Mask Ventilation: Easy Intubated: Postinduction Blade: Villanueva #2 Airway Device Size: 7.5 Placement Verified By: Capnometry Complicating Factors: None Findings Post-Intubation: Bilateral breath sounds;Atraumatic/Condition of teeth unchanged Secured at: Lips Complications: None Removal Date: 11/28/20        Last PCP note: 3-6 months ago , within [facility]   Subspecialty notes: ENT, Ortho    Other important co-morbidities: GERD, HLD, and HTN      Tests already available:  Available tests,  within 3 months , within  [facility] .     10/15/24  Ct neck            Instructions given. (See in Nurse's note)    Optimization:  Anesthesia Preop Clinic Assessment  Indicated    Medical Opinion Indicated [facility]       Sub-specialist consult indicated:   TBD       Plan:    Testing:  BMP, EKG, Hematology Profile, and T&S   Pre-anesthesia  visit       Visit focus: concerns in complex and/or prolonged anesthesia     Consultation:IM Perioperative Hospitalist     Patient  has previously scheduled Medical Appointment: 10/28    Navigation: Tests Scheduled.              Consults scheduled.             Results will be tracked by Preop Clinic.    Cleared for ENT surgery at moderate cardiac risk   Fred Carreon MD     Patient is optimized  Mandy Saenz MD  Internal Medicine  [facility]

## 2024-10-28 ENCOUNTER — HOSPITAL ENCOUNTER (OUTPATIENT)
Dept: RADIOLOGY | Facility: HOSPITAL | Age: 84
Discharge: HOME | End: 2024-10-28
Attending: OTOLARYNGOLOGY
Payer: MEDICARE

## 2024-10-28 DIAGNOSIS — Z85.819 HISTORY OF ORAL CANCER: ICD-10-CM

## 2024-10-28 LAB — POCT GLUCOSE: 100 MG/DL (ref 70–110)

## 2024-10-28 PROCEDURE — 78815 PET IMAGE W/CT SKULL-THIGH: CPT | Mod: 26,PI,, | Performed by: STUDENT IN AN ORGANIZED HEALTH CARE EDUCATION/TRAINING PROGRAM

## 2024-10-28 PROCEDURE — 78815 PET IMAGE W/CT SKULL-THIGH: CPT | Mod: TC

## 2024-10-28 PROCEDURE — A9552 F18 FDG: HCPCS | Performed by: OTOLARYNGOLOGY

## 2024-10-28 RX ORDER — FLUDEOXYGLUCOSE F18 500 MCI/ML
13.97 INJECTION INTRAVENOUS
Status: COMPLETED | OUTPATIENT
Start: 2024-10-28 | End: 2024-10-28

## 2024-10-28 RX ADMIN — FLUDEOXYGLUCOSE F-18 13.97 MILLICURIE: 500 INJECTION INTRAVENOUS at 07:10

## 2024-10-29 ENCOUNTER — TELEPHONE (OUTPATIENT)
Dept: PREADMISSION TESTING | Facility: HOSPITAL | Age: 84
End: 2024-10-29
Payer: MEDICARE

## 2024-10-31 ENCOUNTER — EXTERNAL CHRONIC CARE MANAGEMENT (OUTPATIENT)
Dept: PRIMARY CARE CLINIC | Facility: CLINIC | Age: 84
End: 2024-10-31
Payer: MEDICARE

## 2024-10-31 PROCEDURE — 99490 CHRNC CARE MGMT STAFF 1ST 20: CPT | Mod: PBBFAC,PO | Performed by: FAMILY MEDICINE

## 2024-10-31 PROCEDURE — 99490 CHRNC CARE MGMT STAFF 1ST 20: CPT | Mod: S$PBB,,, | Performed by: FAMILY MEDICINE

## 2024-11-05 ENCOUNTER — ANESTHESIA EVENT (OUTPATIENT)
Dept: SURGERY | Facility: HOSPITAL | Age: 84
End: 2024-11-05
Payer: MEDICARE

## 2024-11-05 NOTE — ANESTHESIA PREPROCEDURE EVALUATION
[facility]  Anesthesia Pre-Operative Evaluation         Patient Name: Fan Paz  YOB: 1940  MRN: [id number]    SUBJECTIVE:     Pre-operative evaluation for Procedure(s) (LRB):  GLOSSECTOMY (Left)  DISSECTION, NECK, MODIFIED RADICAL (Left)  TRACHEOTOMY (N/A)  CREATION, FREE FLAP, FOREARM, RADIAL ASPECT (Left)  EGD, WITH PEG TUBE INSERTION (N/A)       PAPER CONSENT    11/05/2024    Fan Paz is a 84 y.o. male w/ a significant PMHx of HLD, HTN, prostate cancer, anemia, GERD, PE, SCC of tongue.    Patient now presents for the above procedure(s).      LDA: None documented.       Prev airway:       Intubation  Performed by: Shauna Rosario CRNA  Authorized by: Lefty Nova MD      Intubation:     Induction:  Intravenous    Intubated:  Postinduction    Mask Ventilation:  Easy mask    Attempts:  1    Attempted By:  CRNA    Blade:  Villanueva 2    Laryngeal View Grade: Grade I - full view of chords      Difficult Airway Encountered?: No      Complications:  None    Airway Device:  Oral endotracheal tube    Airway Device Size:  7.5    Style/Cuff Inflation:  Cuffed    Inflation Amount (mL):  8    Tube secured:  22    Secured at:  The lips    Placement Verified By:  Capnometry    Complicating Factors:  None    Findings Post-Intubation:  BS equal bilateral and atraumatic/condition of teeth unchanged        Echo 2022:    The left ventricle is normal in size with normal systolic function.  The estimated ejection fraction is 60%.  Moderate left atrial enlargement.  Indeterminate left ventricular diastolic function.  Normal right ventricular size with normal right ventricular systolic function.  Normal central venous pressure (3 mmHg).  The estimated PA systolic pressure is 6 mmHg.  There is no pulmonary hypertension.       Drips: None documented.      Patient Active Problem List   Diagnosis    Hyperlipidemia    History of prostate cancer    Arthritis of hand     Essential hypertension    Anemia    Osteoarthritis, knee    Dysphagia    Dry mouth    Acid reflux    Hx pulmonary embolism-6/20/09- bilateral lower lobe pulmonary emboli    Asymptomatic varicose veins of bilateral lower extremities    At risk for aspiration    Edema    Prediabetes    Seasonal allergic rhinitis due to pollen    Low HDL (under 40)    BMI 29.0-29.9,adult    Encounter for screening colonoscopy    Mobitz type 1 second degree AV block    History of head and neck cancer    Constipation    Squamous cell cancer of tongue       Review of patient's allergies indicates:  No Known Allergies    Current Inpatient Medications:      Current Outpatient Medications on File Prior to Encounter   Medication Sig Dispense Refill    acetaminophen (TYLENOL) 650 MG TbSR Take 1 tablet (650 mg total) by mouth every 8 (eight) hours. 120 tablet 0    azelastine (ASTELIN) 137 mcg (0.1 %) nasal spray 1 spray (137 mcg total) by Nasal route 2 (two) times daily. 30 mL 3    azelastine (ASTELIN) 137 mcg (0.1 %) nasal spray 1 spray (137 mcg total) by Nasal route 2 (two) times daily. 30 mL 3    docusate sodium (STOOL SOFTENER ORAL) Take by mouth as needed.      fluticasone propionate (FLONASE) 50 mcg/actuation nasal spray USE 2 SPRAY(S) IN EACH NOSTRIL TWICE DAILY 16 g 11    fluticasone propionate (FLONASE) 50 mcg/actuation nasal spray 1 spray (50 mcg total) by Each Nostril route 2 (two) times daily. 9.9 mL 3    HYDROcodone-acetaminophen (NORCO) 5-325 mg per tablet Take 1 tablet by mouth every 6 (six) hours as needed for Pain. (Patient not taking: Reported on 10/22/2024) 5 tablet 0    losartan (COZAAR) 50 MG tablet Take 1 tablet (50 mg total) by mouth once daily. 90 tablet 3    montelukast (SINGULAIR) 10 mg tablet TAKE 1 TABLET BY MOUTH ONCE DAILY IN THE EVENING 90 tablet 2    multivitamin/iron/folic acid (CENTRUM COMPLETE ORAL) Take 1 tablet by mouth once daily.      pantoprazole (PROTONIX) 40 MG tablet Take 1 tablet (40 mg total) by  mouth once daily. 30 tablet 1    simvastatin (ZOCOR) 40 MG tablet Take 1 tablet (40 mg total) by mouth every evening. 90 tablet 3    tamsulosin (FLOMAX) 0.4 mg Cap Take 1 capsule by mouth once daily 90 capsule 2     No current facility-administered medications on file prior to encounter.       Past Surgical History:   Procedure Laterality Date    BACK SURGERY  y-7    Cancer of palate surgery      Late - [facility]     CARPAL TUNNEL RELEASE  14    right hand,Left hand     COLONOSCOPY N/A 4/10/2017    Procedure: COLONOSCOPY;  Surgeon: Nilo Kelly MD;  Location: [facility];  Service: Endoscopy;  Laterality: N/A;    COLONOSCOPY N/A 10/21/2020    Procedure: COLONOSCOPY;  Surgeon: Demetrius Araujo MD;  Location: [facility];  Service: Endoscopy;  Laterality: N/A;    ESOPHAGOGASTRODUODENOSCOPY N/A 5/3/2023    Procedure: EGD (ESOPHAGOGASTRODUODENOSCOPY);  Surgeon: Shauna Lopez MD;  Location: [facility];  Service: Endoscopy;  Laterality: N/A;  EGD (with Dr. Lopez or Dr. Parada), : 1-4 weeks, Provider: Dr. Lopez or Dr. Parada Location: [facility], instructions sent to email address [email or web address]    KNEE SURGERY  y-41    left knee    KNEE SURGERY Right 12-1-15    TKR    Radio active seed Implant      2012    REVISION OF KNEE ARTHROPLASTY Left 2023    Procedure: REVISION, ARTHROPLASTY, KNEE;  Surgeon: Dustin Paul MD;  Location: [address];  Service: Orthopedics;  Laterality: Left;    TOTAL HIP ARTHROPLASTY  3/2011       Social History     Socioeconomic History    Marital status:     Number of children: 4   Occupational History     Employer: RETIRED   Tobacco Use    Smoking status: Former     Current packs/day: 0.00     Average packs/day: 3.0 packs/day for 20.0 years (60.0 ttl pk-yrs)     Types: Cigarettes     Start date: 7/10/1977     Quit date: 7/10/1997     Years since quittin.3     Passive exposure: Past    Smokeless tobacco: Never    Tobacco comments:      Quit 1997-smoked for 40 years ,2 packs a day on an average   Substance and Sexual Activity    Alcohol use: No     Comment: used to drink Occasionally    Drug use: No    Sexual activity: Yes     Partners: Female     Social Drivers of Health     Financial Resource Strain: Low Risk  (6/3/2023)    Overall Financial Resource Strain (CARDIA)     Difficulty of Paying Living Expenses: Not hard at all   Food Insecurity: No Food Insecurity (6/3/2023)    Hunger Vital Sign     Worried About Running Out of Food in the Last Year: Never true     Ran Out of Food in the Last Year: Never true   Transportation Needs: No Transportation Needs (6/3/2023)    PRAPARE - Transportation     Lack of Transportation (Medical): No     Lack of Transportation (Non-Medical): No   Physical Activity: Sufficiently Active (6/3/2023)    Exercise Vital Sign     Days of Exercise per Week: 7 days     Minutes of Exercise per Session: 30 min   Stress: No Stress Concern Present (6/3/2023)    [place] [employer] - Occupational Stress Questionnaire     Feeling of Stress : Not at all   Housing Stability: Low Risk  (6/3/2023)    Housing Stability Vital Sign     Unable to Pay for Housing in the Last Year: No     Number of Places Lived in the Last Year: 1     Unstable Housing in the Last Year: No       OBJECTIVE:     Vital Signs Range (Last 24H):         Significant Labs:  Lab Results   Component Value Date    WBC 4.60 07/13/2024    HGB 13.4 (L) 07/13/2024    HCT 45.0 07/13/2024     07/13/2024    CHOL 144 07/13/2024    TRIG 92 07/13/2024    HDL 46 07/13/2024    ALT 15 07/13/2024    AST 22 07/13/2024     07/13/2024    K 4.3 07/13/2024     07/13/2024    CREATININE 1.1 10/11/2024    BUN 14 07/13/2024    CO2 26 07/13/2024    TSH 1.030 12/07/2012    PSA 0.01 05/01/2018    INR 1.0 05/10/2023    HGBA1C 5.9 (H) 07/13/2024       Diagnostic Studies: No relevant studies.    EKG:   Results for orders placed or performed in visit on  01/29/24   IN OFFICE EKG 12-LEAD (to [place])    Collection Time: 01/29/24  2:42 PM    Narrative    Test Reason : I10,I44.1,E78.5,I83.93,    Vent. Rate : 041 BPM     Atrial Rate : 041 BPM     P-R Int : 228 ms          QRS Dur : 092 ms      QT Int : 374 ms       P-R-T Axes : 103 -07 090 degrees     QTc Int : 308 ms    SR with 2:1 AVB  Moderate voltage criteria for LVH, may be normal variant  T wave abnormality, consider lateral ischemia  Abnormal ECG  When compared with ECG of 22-MAR-2023 14:21,  Significant changes have occurred  Confirmed by Ronak Dickerson MD ([id number]) on 1/31/2024 6:06:32 AM    Referred By: BARRON OAKES           Confirmed By:Ronak Dickerson MD       2D ECHO:  TTE:  Results for orders placed or performed during the hospital encounter of 02/16/22   Echo   Result Value Ref Range    BSA 1.96 m2    TDI SEPTAL 0.05 m/s    LV LATERAL E/E' RATIO 6.44 m/s    LV SEPTAL E/E' RATIO 11.60 m/s    LA WIDTH 4.12 cm    TDI LATERAL 0.09 m/s    PV PEAK VELOCITY 0.91 cm/s    LVIDd 4.91 3.5 - 6.0 cm    IVS 1.08 0.6 - 1.1 cm    PW 1.03 0.6 - 1.1 cm    LVIDs 3.26 2.1 - 4.0 cm    FS 34 28 - 44 %    LA Vol 80.10 cm3    Sinus 3.34 cm    STJ 2.90 cm    LV mass 189.94 g    LA size 4.18 cm    RVDD 4.70 cm    TAPSE 1.72 cm    Left Ventricle Relative Wall Thickness 0.42 cm    AV mean gradient 2 mmHg    AV valve area 2.96 cm2    AV Velocity Ratio 0.83     AV index (prosthetic) 0.83     E/A ratio 0.63     Mean e' 0.07 m/s    E wave deceleration time 192.95 msec    IVRT 114.19 msec    Pulm vein S/D ratio 1.27     LVOT diameter 2.13 cm    LVOT area 3.6 cm2    LVOT peak fadi 0.86 m/s    LVOT peak VTI 19.99 cm    Ao peak fadi 1.03 m/s    Ao VTI 24.07 cm    LVOT stroke volume 71.19 cm3    AV peak gradient 4 mmHg    E/E' ratio 8.29 m/s    MV Peak E Fadi 0.58 m/s    TR Max Fadi 0.85 m/s    MV Peak A Fadi 0.92 m/s    PV Peak S Fadi 0.38 m/s    PV Peak D Fadi 0.30 m/s    LV Systolic Volume 42.99 mL    LV Systolic Volume Index 22.4 mL/m2    LV  Diastolic Volume 113.32 mL    LV Diastolic Volume Index 59.02 mL/m2    AMBER 41.7 mL/m2    LV Mass Index 99 g/m2    RA Major Axis 5.26 cm    Left Atrium Minor Axis 5.27 cm    Left Atrium Major Axis 5.69 cm    Triscuspid Valve Regurgitation Peak Gradient 3 mmHg    RA Width 3.95 cm    Right Atrial Pressure (from IVC) 3 mmHg    EF 60 %    TV resting pulmonary artery pressure 6 mmHg    Narrative    · The left ventricle is normal in size with normal systolic function.  · The estimated ejection fraction is 60%.  · Moderate left atrial enlargement.  · Indeterminate left ventricular diastolic function.  · Normal right ventricular size with normal right ventricular systolic   function.  · Normal central venous pressure (3 mmHg).  · The estimated PA systolic pressure is 6 mmHg.  · There is no pulmonary hypertension.          TIFFANY:  No results found. However, due to the size of the patient record, not all encounters were searched. Please check Results Review for a complete set of results.    ASSESSMENT/PLAN:          Pre-op Assessment    I have reviewed the Patient Summary Reports.     I have reviewed the Nursing Notes. I have reviewed the NPO Status.   I have reviewed the Medications.     Review of Systems  Anesthesia Hx:   History of prior surgery of interest to airway management or planning: jaw, facial plastic/reconstructive. Previous anesthesia: MAC       5/3/23 EGD with MAC.  Procedure performed at an [facility].  Denies Family Hx of Anesthesia complications.    Denies Personal Hx of Anesthesia complications.                    Social:  Former Smoker, No Alcohol Use Former; Passive Exposure:   Past (Quit Date: 07/10/1997), 3 ppd, 60 pack-years      Hematology/Oncology:       -- Anemia:                    --  Cancer in past history:              radiation and surgery   Oncology Comments: Cancer of palate surgery [Other]   y-41    Radio active seed Implant [Other    Prostate cancer    Extensive family hx of cancer      EENT/Dental:  chronic allergic rhinitis          Jaw Problems:  Cancer of palate surgery; Radio active seed Implant    Cardiovascular:  Exercise tolerance: good   Hypertension, well controlled    Dysrhythmias   Denies Angina.     hyperlipidemia      Asymptomatic varicose veins of bilateral lower extremities    Mobitz type 1 second degree AV block          Deep Venous Thrombosis (DVT), Hx of DVT, left lower extremity, right lower extremity, Varicose Veins                  Pulmonary:       Denies Shortness of breath.   Hx pulmonary embolism-6/20/09- bilateral lower lobe pulmonary emboli                   Renal/:     Prostate cancer             Hepatic/GI:   Denies PUD.  GERD, well controlled Denies Liver Disease.  Denies Hepatitis.              Musculoskeletal:  Arthritis   FAILURE OF TOTAL KNEE REPLACEMENT, SEQUELA    STATUS POST TOTAL LEFT KNEE REPLACEMENT    12-1-15  Knee surgery (Right)     8-14-13  Carpal tunnel release     3/2011  Total hip arthroplasty    y-7  Back surgery Musculoskeletal General/Symptoms: joint pain, low back pain, muscle weakness, localized.    Joint Disease:  Arthritis, Osteoarthritis Arthritis of hand  Osteoarthritis, knee       Lumbar Spine Disorders, S/P Lumbar Fusion   Neurological:    Neuromuscular Disease,           Osteoarthritis                           Endocrine:  Denies Diabetes. Denies Hypothyroidism.  Denies Hyperthyroidism. PRE-DIABETES        Psych:  Psychiatric Normal                    Physical Exam  General: Alert, Oriented and Cooperative    Airway:  Mallampati: II   Mouth Opening: Normal  TM Distance: Normal  Tongue: Decreased Mobility  Neck ROM: Normal ROM    Dental:  Edentulous        Anesthesia Plan  Type of Anesthesia, risks & benefits discussed:    Anesthesia Type: Gen ETT  Intra-op Monitoring Plan: Standard ASA Monitors and Art Line  Post Op Pain Control Plan: multimodal analgesia and IV/PO Opioids PRN  Induction:  IV  Airway Plan: Direct, Post-Induction  ASA  Score: 3  Day of Surgery Review of History & Physical: H&P Update referred to the surgeon/provider.    Ready For Surgery From Anesthesia Perspective.     .

## 2024-11-06 ENCOUNTER — HOSPITAL ENCOUNTER (OUTPATIENT)
Dept: CARDIOLOGY | Facility: CLINIC | Age: 84
Discharge: HOME | End: 2024-11-06
Payer: MEDICARE

## 2024-11-06 ENCOUNTER — HOSPITAL ENCOUNTER (OUTPATIENT)
Dept: PREADMISSION TESTING | Facility: HOSPITAL | Age: 84
Discharge: HOME | End: 2024-11-06
Attending: OTOLARYNGOLOGY
Payer: MEDICARE

## 2024-11-06 ENCOUNTER — OFFICE VISIT (OUTPATIENT)
Dept: INTERNAL MEDICINE | Facility: CLINIC | Age: 84
End: 2024-11-06
Payer: MEDICARE

## 2024-11-06 VITALS
HEIGHT: 64 IN | SYSTOLIC BLOOD PRESSURE: 122 MMHG | WEIGHT: 154 LBS | RESPIRATION RATE: 16 BRPM | BODY MASS INDEX: 26.29 KG/M2 | DIASTOLIC BLOOD PRESSURE: 70 MMHG | TEMPERATURE: 98 F | OXYGEN SATURATION: 98 % | HEART RATE: 68 BPM

## 2024-11-06 VITALS
HEART RATE: 61 BPM | BODY MASS INDEX: 30.34 KG/M2 | HEIGHT: 64 IN | OXYGEN SATURATION: 97 % | WEIGHT: 177.69 LBS | TEMPERATURE: 98 F | SYSTOLIC BLOOD PRESSURE: 141 MMHG | DIASTOLIC BLOOD PRESSURE: 70 MMHG

## 2024-11-06 DIAGNOSIS — Z86.711 HX PULMONARY EMBOLISM: ICD-10-CM

## 2024-11-06 DIAGNOSIS — Z85.46 HISTORY OF PROSTATE CANCER: ICD-10-CM

## 2024-11-06 DIAGNOSIS — E78.5 HYPERLIPIDEMIA, UNSPECIFIED HYPERLIPIDEMIA TYPE: ICD-10-CM

## 2024-11-06 DIAGNOSIS — K21.9 GASTROESOPHAGEAL REFLUX DISEASE, UNSPECIFIED WHETHER ESOPHAGITIS PRESENT: ICD-10-CM

## 2024-11-06 DIAGNOSIS — E87.5 HYPERKALEMIA: ICD-10-CM

## 2024-11-06 DIAGNOSIS — D64.9 ANEMIA, UNSPECIFIED TYPE: ICD-10-CM

## 2024-11-06 DIAGNOSIS — Z85.89 HISTORY OF HEAD AND NECK CANCER: ICD-10-CM

## 2024-11-06 DIAGNOSIS — I10 ESSENTIAL HYPERTENSION: ICD-10-CM

## 2024-11-06 DIAGNOSIS — J30.1 SEASONAL ALLERGIC RHINITIS DUE TO POLLEN: ICD-10-CM

## 2024-11-06 DIAGNOSIS — Z91.89 AT RISK FOR ASPIRATION: ICD-10-CM

## 2024-11-06 DIAGNOSIS — C02.9 SQUAMOUS CELL CANCER OF TONGUE: ICD-10-CM

## 2024-11-06 DIAGNOSIS — Z01.818 PREOP EXAMINATION: Primary | ICD-10-CM

## 2024-11-06 DIAGNOSIS — R73.03 PREDIABETES: ICD-10-CM

## 2024-11-06 DIAGNOSIS — Z01.818 PRE-OP TESTING: ICD-10-CM

## 2024-11-06 DIAGNOSIS — I44.1 MOBITZ TYPE 1 SECOND DEGREE AV BLOCK: ICD-10-CM

## 2024-11-06 LAB
OHS QRS DURATION: 104 MS
OHS QTC CALCULATION: 389 MS

## 2024-11-06 PROCEDURE — 99214 OFFICE O/P EST MOD 30 MIN: CPT | Mod: PBBFAC,25 | Performed by: HOSPITALIST

## 2024-11-06 PROCEDURE — 99999 PR PBB SHADOW E&M-EST. PATIENT-LVL IV: CPT | Mod: PBBFAC,,, | Performed by: HOSPITALIST

## 2024-11-06 PROCEDURE — 99215 OFFICE O/P EST HI 40 MIN: CPT | Mod: S$PBB,,, | Performed by: HOSPITALIST

## 2024-11-06 PROCEDURE — 93005 ELECTROCARDIOGRAM TRACING: CPT | Mod: PBBFAC | Performed by: INTERNAL MEDICINE

## 2024-11-06 PROCEDURE — 93010 ELECTROCARDIOGRAM REPORT: CPT | Mod: S$PBB,,, | Performed by: INTERNAL MEDICINE

## 2024-11-06 NOTE — ASSESSMENT & PLAN NOTE
No passing out problem   As per chart- 24 hour Holter showed no significant arrhythmias  He is not on any rate limiting agents   I suggest monitoring his cardiac rhythm around the time of surgery    - heart monitor from 2022 reportedly showed    Sinus rhythm with heart rates varying between 43 and 113 BPM with an average of 79 BPM  There were very frequent PACs  [place]       He stays active with no limiting symptoms from his breathing or chest pain

## 2024-11-06 NOTE — ASSESSMENT & PLAN NOTE
In the setting of  back surgery he had pulmonary embolism in 2009  He had a blood clot soon after the back surgery  He cannot remember the Symptoms but was still in the hospital and his daughter noticed that he was not doing well     1 Episode  Associated with reduced mobility, no long journeys, no cancer, had prior surgery, Hospital stay, no HRT  Anticoagulated with Coumadin for about 6 months     IVC filter - None     Increased risk of thrombosis in the mamta operative period , compression stocking use discussed   I reviewed the records from that time and his blood clot was in June of 2009    \    He had surgery since with no recurrence of blood clot

## 2024-11-06 NOTE — ASSESSMENT & PLAN NOTE
Home BP readings -120-135/60-70- 72  He is currently not taking blood pressure medication and his blood pressure seems to be acceptable  Hypertension-  Blood pressure is acceptable . I suggest  blood pressure monitoring .I suggest addressing pain control as uncontrolled pain can increased blood pressure

## 2024-11-06 NOTE — ASSESSMENT & PLAN NOTE
Mildly elevated   Not taking any potassium supplementation  I have given him a list of foods that are rich in potassium that I suggested watching try to reduce them    No muscle weakness or passing out

## 2024-11-06 NOTE — ASSESSMENT & PLAN NOTE
He had radiation treatment to his throat many years ago for throat cancer.  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 Dilated to                          12mm. The adult gastroscope was able to traverse                          the stenosis after dilation to 12mm.                          - 1 cm hiatal hernia.                          - Normal stomach.                          - Normal examined duodenum.                          - No specimens collected     on regular consistency diet and on thin liquids    He keeps his mouth moist by drinking water    He is going to have a feeding tube after surgery that he did in the past with his previous head and neck cancer surgery           Dysphagia- I suggest providing soft diet or  other wide directed  in view of the preexisting dysphagia as medication used in the perioperative period can possibly increase the dysphagia. I suggest aspiration precautions

## 2024-11-06 NOTE — OUTPATIENT SUBJECTIVE & OBJECTIVE
"Outpatient Subjective & Objective     Chief complaint-Preoperative evaluation, Perioperative Medical management, complication reduction plan     Active cardiac conditions- none    Revised cardiac risk index predictors- none    Functional capacity -Examples of physical activity  , stays active,  can take 1 flight of stairs----- He can undertake all the above activities without  chest pain,chest tightness, Shortness of breath ,dizziness,lightheadedness making his exercise tolerance more,   than 4 Mets.       Review of Systems   Constitutional:  Negative for chills and fever.        No unusual weight changes     HENT:          STOPBANG score  / 8        Age over 50 years     Male gender   Eyes:         No unusual vision changes   Respiratory:          No cough , phlegm     No Hemoptysis   Cardiovascular:         As noted   Gastrointestinal:         Bowels- Regular   He has occasional hemorrhoid bleeding   No overt GI/ blood losses   Endocrine:        Prednisone use > 20 mg daily for 3 weeks- none   Genitourinary:  Negative for dysuria.        No urinary hesitancy    Musculoskeletal:           No unusual muscle/ joint pains   Skin:  Negative for rash.   Neurological:  Negative for syncope.        No unilateral weakness   Hematological:         Current use of Anticoagulants  None   Psychiatric/Behavioral:          No Depression,Anxiety                 No anesthesia, bleeding, cardiac problems, PONV with previous surgeries/procedures.  Medications and Allergies reviewed in epic.   FH- No anesthesia,bleeding / venous thrombosis in family      Physical Exam  Blood pressure 122/70, pulse 68, temperature 98.3 °F (36.8 °C), temperature source Oral, resp. rate 16, height 5' 4" (1.626 m), weight 69.9 kg (154 lb), SpO2 98%.    Physical Exam  Constitutional- Vitals - Body mass index is 26.43 kg/m².,   Vitals:    11/06/24 1245   BP: 122/70   Pulse: 68   Resp: 16   Temp: 98.3 °F (36.8 °C)     General " appearance-Conscious,Coherent  Eyes- No conjunctival icterus,pupils -unable to tolerate pupillary examination   ENT-Oral cavity- moist ,  upper denture, and lower denture    , Hearing grossly normal   Neck- No thyromegaly ,Trachea -central, No jugular venous distension,   No Carotid Bruit   Cardiovascular -Heart Sounds-irregular  , No gallop rhythm   Respiratory - Normal Respiratory Effort, Normal breath sounds, crepitations bases,  no wheeze , and  no forced expiratory wheeze    Peripheral pitting pedal edema-- none , no calf pain   Gastrointestinal -Soft abdomen, No palpable masses, Non Tender,Liver,Spleen not palpable. No-- free fluid and shifting dullness  Musculoskeletal- No finger Clubbing. Strength grossly normal   Lymphatic-No Palpable cervical, axillary,Inguinal lymphadenopathy   Psychiatric - normal effect,Orientation  Rt Dorsalis pedis pulses-palpable    Lt Dorsalis pedis pulses- palpable   Rt Posterior tibial pulses -palpable   Left posterior tibial pulses -palpable   Miscellaneous -  no renal bruit     Investigations  Lab and Imaging have been reviewed in epic.    Review of Medicine tests    EKG- I had independently reviewed the EKG from--  [facility]    Review of clinical lab tests:  Lab Results   Component Value Date    CREATININE 1.0 11/06/2024    HGB 13.0 (L) 11/06/2024     11/06/2024 2022  The left ventricle is normal in size with normal systolic function.  The estimated ejection fraction is 60%.  Moderate left atrial enlargement.  Indeterminate left ventricular diastolic function.  Normal right ventricular size with normal right ventricular systolic function.  Normal central venous pressure (3 mmHg).  The estimated PA systolic pressure is 6 mmHg.  There is no pulmonary hypertension.        Review of old records- Was done and information gathered regards to events leading to surgery and health conditions of significance in the perioperative period.    Outpatient Subjective & Objective

## 2024-11-06 NOTE — ASSESSMENT & PLAN NOTE
5.9     Hemoglobin A1c in the pre diabetic range   Weight loss with diet and exercise , if able, helps lower A1c  Increased risk of becoming a diabetic .  Suggested follow-up     I suggest monitoring the glucose in the perioperative period as  glucose may be high from stress hyperglycemia in which case Insulin may be required

## 2024-11-06 NOTE — PROGRESS NOTES
[facility]  Progress Note    Patient Name: Fan Paz  MRN: [id number]  Date of Evaluation- 11/06/2024  PCP- Otilio Damon MD    Future cases for Fan Paz [[id number]]       Case ID Status Date Time Andrea Procedure Provider Location    [id number] [facility] 11/15/2024  9:50  GLOSSECTOMY Cristhian Bailon MD [[id number]] [facility] OR 2ND FLR            HPI:  History of present illness- I had the pleasure of meeting this pleasant 84 y.o. gentleman in the pre op clinic prior to his elective Head and Neck surgery. The patient is Known to me .Mr  Fan was accompanied by son Dejuan Frost.    I have obtained the history by speaking to the patient and by reviewing the electronic health records.    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) about 30 years ago for which he had-treatment done at [facility]  He had dental extractions followed by surgery followed by 36 radiation treatments of the head and neck    He had started to have difficulty swallowing from the radiation for which he started seeing ENT  Had stretching done and he is able to swallow with no problems    He has no bleeding  He has no trouble breathing  No choking  He has difficulty with chewing due to his gum and the tongue hurting  He is able to eat and he has not lost weight  He is also having supplemental drinks to maintain his nutrition    He was a heavy smoker prior to his initial head and CT cancer diagnosis about 30  years ago after which he stopped smoking    He has been troubled with occasional pain in the right lower gum area and the tongue- his pain ranges from mild to severe  The pain increases with eating and chewing  He also has occasional sharp pain even when he does not eat  He does not like taking pain medication    As per chart-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  Plan for left partial glossectomy/floor of mouth resection and radial forearm free flap on 11/15/2024.    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  Pets- none  Children - 4 children currently 3 alive  Has help post op    Not known to have  Stroke/ Mini stroke , Diabetes Mellitus, Lung problem, Thyroid problem, Kidney problem,  sleep apnea, COVID infections, fatty liver , blood vessels stent ,  edema, mental health problems , gout, allergies          Subjective/ Objective:     Chief complaint-Preoperative evaluation, Perioperative Medical management, complication reduction plan     Active cardiac conditions- none    Revised cardiac risk index predictors- none    Functional capacity -Examples of physical activity  , stays active,  can take 1 flight of stairs----- He can undertake all the above activities without  chest pain,chest tightness, Shortness of breath ,dizziness,lightheadedness making his exercise tolerance more,   than 4 Mets.       Review of Systems   Constitutional:  Negative for chills and fever.        No unusual weight changes    "  HENT:          TREE score  / 8        Age over 50 years     Male gender   Eyes:         No unusual vision changes   Respiratory:          No cough , phlegm     No Hemoptysis   Cardiovascular:         As noted   Gastrointestinal:         Bowels- Regular   He has occasional hemorrhoid bleeding   No overt GI/ blood losses   Endocrine:        Prednisone use > 20 mg daily for 3 weeks- none   Genitourinary:  Negative for dysuria.        No urinary hesitancy    Musculoskeletal:           No unusual muscle/ joint pains   Skin:  Negative for rash.   Neurological:  Negative for syncope.        No unilateral weakness   Hematological:         Current use of Anticoagulants  None   Psychiatric/Behavioral:          No Depression,Anxiety                 No anesthesia, bleeding, cardiac problems, PONV with previous surgeries/procedures.  Medications and Allergies reviewed in epic.   FH- No anesthesia,bleeding / venous thrombosis in family      Physical Exam  Blood pressure 122/70, pulse 68, temperature 98.3 °F (36.8 °C), temperature source Oral, resp. rate 16, height 5' 4" (1.626 m), weight 69.9 kg (154 lb), SpO2 98%.    Physical Exam  Constitutional- Vitals - Body mass index is 26.43 kg/m².,   Vitals:    11/06/24 1245   BP: 122/70   Pulse: 68   Resp: 16   Temp: 98.3 °F (36.8 °C)     General appearance-Conscious,Coherent  Eyes- No conjunctival icterus,pupils -unable to tolerate pupillary examination   ENT-Oral cavity- moist ,  upper denture, and lower denture    , Hearing grossly normal   Neck- No thyromegaly ,Trachea -central, No jugular venous distension,   No Carotid Bruit   Cardiovascular -Heart Sounds-irregular  , No gallop rhythm   Respiratory - Normal Respiratory Effort, Normal breath sounds, crepitations bases,  no wheeze , and  no forced expiratory wheeze    Peripheral pitting pedal edema-- none , no calf pain   Gastrointestinal -Soft abdomen, No palpable masses, Non Tender,Liver,Spleen not palpable. No-- free fluid " and shifting dullness  Musculoskeletal- No finger Clubbing. Strength grossly normal   Lymphatic-No Palpable cervical, axillary,Inguinal lymphadenopathy   Psychiatric - normal effect,Orientation  Rt Dorsalis pedis pulses-palpable    Lt Dorsalis pedis pulses- palpable   Rt Posterior tibial pulses -palpable   Left posterior tibial pulses -palpable   Miscellaneous -  no renal bruit     Investigations  Lab and Imaging have been reviewed in [facility].    Review of Medicine tests    EKG- I had independently reviewed the EKG from--  [facility]    Review of clinical lab tests:  Lab Results   Component Value Date    CREATININE 1.0 11/06/2024    HGB 13.0 (L) 11/06/2024     11/06/2024 2022  The left ventricle is normal in size with normal systolic function.  The estimated ejection fraction is 60%.  Moderate left atrial enlargement.  Indeterminate left ventricular diastolic function.  Normal right ventricular size with normal right ventricular systolic function.  Normal central venous pressure (3 mmHg).  The estimated PA systolic pressure is 6 mmHg.  There is no pulmonary hypertension.        Review of old records- Was done and information gathered regards to events leading to surgery and health conditions of significance in the perioperative period.        Preoperative cardiac risk assessment-  The patient does not have any active cardiac conditions . Revised cardiac risk index predictors- --0--.Functional capacity is more than 4 Mets. 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    He is up for a time sensitive surgery and I am  inclined to let him proceed with the surgery    No further cardiac work up is indicated prior to proceeding with the surgery          American Society of Anesthesiologists Physical status classification ( ASA ) class: 3     Postoperative pulmonary complication risk assessment:      ARISCAT ( [place]) risk index- risk class -  Low, if duration of surgery is under 2 hours, intermediate, if duration of surgery is over 2 hours          Assessment/Plan:     Seasonal allergic rhinitis due to pollen  He has dust and allergy  to pollen  He uses nasal spray on a daily basis    Doing good from an allergy standpoint and does not have any asthma or eczema     At risk for aspiration  He had radiation treatment to his throat many years ago for throat cancer.  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 Dilated to                          12mm. The adult gastroscope was able to traverse                          the stenosis after dilation to 12mm.                          - 1 cm hiatal hernia.                          - Normal stomach.                          - Normal examined duodenum.                          - No specimens collected     on regular consistency diet and on thin liquids    He keeps his mouth moist by drinking water    He is going to have a feeding tube after surgery that he did in the past with his previous head and neck cancer surgery           Dysphagia- I suggest providing soft diet or  other wide directed  in view of the preexisting dysphagia as medication used in the perioperative period can possibly increase the dysphagia. I suggest aspiration precautions    Mobitz type 1 second degree AV block  No passing out problem   As per chart- 24 hour Holter showed no significant  arrhythmias  He is not on any rate limiting agents   I suggest monitoring his cardiac rhythm around the time of surgery    - heart monitor from 2022 reportedly showed    Sinus rhythm with heart rates varying between 43 and 113 BPM with an average of 79 BPM  There were very frequent PACs  Wenckebach       He stays active with no limiting symptoms from his breathing or chest pain    Hyperlipidemia  HLD-I  suggest continuation of statin during the entire perioperative period.     Essential hypertension  Home BP readings -120-135/60-70- 72  He is currently not taking blood pressure medication and his blood pressure seems to be acceptable  Hypertension-  Blood pressure is acceptable . I suggest  blood pressure monitoring .I suggest addressing pain control as uncontrolled pain can increased blood pressure      Hx pulmonary embolism-6/20/09- bilateral lower lobe pulmonary emboli  In the setting of  back surgery he had pulmonary embolism in 2009  He had a blood clot soon after the back surgery  He cannot remember the Symptoms but was still in the hospital and his daughter noticed that he was not doing well     1 Episode  Associated with reduced mobility, no long journeys, no cancer, had prior surgery, Hospital stay, no HRT  Anticoagulated with Coumadin for about 6 months     IVC filter - None     Increased risk of thrombosis in the mamta operative period , compression stocking use discussed   I reviewed the records from that time and his blood clot was in June of 2009    \    He had surgery since with no recurrence of blood clot    Squamous cell cancer of tongue  For surgery    History of prostate cancer  He had prostate cancer long time ago and had radiation  No recurrence of the cancer  He is urinating well  He is taking Flomax every night that helps with urination    History of head and neck cancer  May 2023  No evidence of a hemodynamically significant carotid bifurcation stenosis.     Anemia  No overt GI/ blood  losses  No stomach upset/ ulcer     Gets colonoscopy  No long standing NSAID use  Discussed follow up     I suggest monitoring the hemoglobin in the perioperative period     Prediabetes  5.9     Hemoglobin A1c in the pre diabetic range   Weight loss with diet and exercise , if able, helps lower A1c  Increased risk of becoming a diabetic .  Suggested follow-up     I suggest monitoring the glucose in the perioperative period as  glucose may be high from stress hyperglycemia in which case Insulin may be required      BMI 30.0-30.9,adult  Weight related conditions     Known to have       Prediabetes   Hyperlipidemia   Sleep apnea   Acid reflux     Not troubled with / Not known to have       Type 2 Diabetes   Gout   Fatty liver       Encouraged maintaining healthy weight for improved health     Constipation  Suggested working on bowel movements in preparation for surgery   Constipation- I suggest giving bowel movement regimen as opioid use,reduced ambulation  can increase the constipation      Acid reflux    Does not sound Cardiac   Doing good  I suggest aspiration precautions    Hyperkalemia  Mildly elevated   Not taking any potassium supplementation  I have given him a list of foods that are rich in potassium that I suggested watching try to reduce them    No muscle weakness or passing out        Preventive perioperative care    Thromboembolic prophylaxis:  His risk factors for thrombosis include cancer  surgical procedure, previous history of thrombosis, and age.I suggest  thromboembolic prophylaxis ( mechanical/pharmacological, weighing the risk benefits of pharmacological agent use considering mamta procedural bleeding )  during the perioperative period.I suggested being active in the post operative period.      Postoperative pulmonary complication prophylaxis-Risk factors for post operative pulmonary complications include age over 65 years, surgery lasting over 3 hours, and ASA class >2- I suggest incentive  spirometry use, early ambulation, and end tidal carbon dioxide monitoring  , oral care , head end of bed elevation      Renal complication prophylaxis- . I suggest keeping him well hydrated   in the perioperative period.     Surgical site Infection Prophylaxis-I  suggest appropriate antibiotic for Prophylaxis against Surgical site infections  No reported Staph infection  Skin antibacterial discussed       Delirium prophylaxis-Risk factors - Advanced Age - I suggest avoidance / minimizing the use of  Benzodiazepines ( unless the patient has been taking it on a regular basis ),Anticholinergic medication,Antihistamines ( like  Benadryl).I suggest minimizing the use of opioid medication and use of IV tylenol,if it is appropriate. I suggest using the lowest possible dose of opioids for the shortest duration possible in the perioperative period. I suggest to Keep shades/blinds open during the day, lights off and shades closed at night to encourage normal sleep/wake cycle.I encourage the presence of the family member with the patient at all times, if at all possible as mental status changes can be picked up early by the family members and they help with reorientation. I encouraged the presence of family to help with orientation in the perioperative period. Benadryl avoidance suggested      In view of age  the patient  is at risk of postoperative urinary retention.  I suggest avoidance / minimizing the of  Benzodiazepines,Anticholinergic medication,antihistamines ( Benadryl) , if possible in the perioperative period. I suggest using the minimum possible use of opioids for the minimum period of time in the perioperative period. Benadryl avoidance suggested      This visit was focused on Preoperative evaluation, Perioperative Medical management, complication reduction plans. I suggest that the patient follows up with primary care or relevant sub specialists for ongoing health care.    I appreciate the opportunity to be  involved in this patients care. Please feel free to contact me if there were any questions about this consultation.    Patient is optimized    Patient/ care giver/ Family member was instructed to call and update me about any changes to health,  medication, office visits ,testing out side of the mamta operative care center , hospitalizations between now and surgery      Mandy Saenz MD  Internal Medicine  [facility]   Cell Phone- [phone]    History of COVID - /no   COVID vaccination status -Yes    COVID screening     No fever   No cough   No SOB  No sore throat   No loss of taste or smell   No muscle aches   No nausea, vomiting , diarrhea -    In summary this 84 year male is doing well for his upcoming head and neck cancer surgery   He had a previous history of postoperative pulmonary embolism in the setting of a spine surgery in 2009 and I suggested that venous thromboembolism prophylaxis be administered for him with his upcoming surgery  He has an abnormal EKG but he is known to have Wenckebach phenomenon   He stays active with no limiting symptoms despite his advanced age and I am letting him proceed with his time sensitive surgery    I have spent -60----- minutes of time which includes, time spent to prepare to see the patient , obtaining history ,performing examination, counseling/Educating the patient , Documenting clinical information in the record     --  11/6- 1631    Checked for over-the-counter medication      EKG report- Sinus rhythm with 2nd degree A-V block (Mobitz I) with 2:1 A-V conduction   at times   Moderate voltage criteria for LVH, may be normal variant ( R in aVL ,   [place] product )   Abnormal ECG   When compared with ECG of 29-JAN-2024 14:42,   Wenckebach conduction is now clearly evident   QT has lengthened     Messaged surgeon about perioperative pulmonary embolism he had in 2009 in the setting of spine surgery   Messaged to cardiologist about EKG   Overall, I  feel comfortable for him to proceed with the surgery

## 2024-11-06 NOTE — ASSESSMENT & PLAN NOTE
He has dust and allergy  to pollen  He uses nasal spray on a daily basis    Doing good from an allergy standpoint and does not have any asthma or eczema

## 2024-11-06 NOTE — ASSESSMENT & PLAN NOTE
Weight related conditions     Known to have       Prediabetes   Hyperlipidemia   Sleep apnea   Acid reflux     Not troubled with / Not known to have       Type 2 Diabetes   Gout   Fatty liver       Encouraged maintaining healthy weight for improved health

## 2024-11-06 NOTE — HPI
History of present illness- I had the pleasure of meeting this pleasant 84 y.o. gentleman in the pre op clinic prior to his elective Head and Neck surgery. The patient is Known to me .Mr  Fan was accompanied by son Dejuan Frost.    I have obtained the history by speaking to the patient and by reviewing the electronic health records.    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) about 30 years ago for which he had-treatment done at [facility]  He had dental extractions followed by surgery followed by 36 radiation treatments of the head and neck    He had started to have difficulty swallowing from the radiation for which he started seeing ENT  Had stretching done and he is able to swallow with no problems    He has no bleeding  He has no trouble breathing  No choking  He has difficulty with chewing due to his gum and the tongue hurting  He is able to eat and he has not lost weight  He is also having supplemental drinks to maintain his nutrition    He was a heavy smoker prior to his initial head and CT cancer diagnosis about 30 years ago after which he stopped smoking    He has been troubled with occasional pain in the right lower gum area and the tongue- his pain ranges from mild to severe  The pain increases with eating and chewing  He also has occasional sharp pain even when he does not eat  He does not like taking pain medication    As per chart-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  Plan for left partial glossectomy/floor of mouth resection and radial forearm free flap on 11/15/2024.    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

## 2024-11-06 NOTE — ASSESSMENT & PLAN NOTE
No overt GI/ blood losses  No stomach upset/ ulcer     Gets colonoscopy  No long standing NSAID use  Discussed follow up     I suggest monitoring the hemoglobin in the perioperative period

## 2024-11-07 ENCOUNTER — TELEPHONE (OUTPATIENT)
Dept: CARDIOLOGY | Facility: CLINIC | Age: 84
End: 2024-11-07
Payer: MEDICARE

## 2024-11-07 NOTE — TELEPHONE ENCOUNTER
----- Message from Mandy Saenz MD sent at 11/6/2024  4:32 PM CST -----  Team    I saw this pleasant patient who is heading for a head and neck surgery next week for cancer    Appreciate your thoughts and help with this patient    He has a history of pulmonary embolism in 2009 in the setting of back surgery  I see that he is heading for a lengthy surgery.  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

## 2024-11-08 ENCOUNTER — OFFICE VISIT (OUTPATIENT)
Dept: CARDIOLOGY | Facility: CLINIC | Age: 84
End: 2024-11-08
Payer: MEDICARE

## 2024-11-08 VITALS
WEIGHT: 153.44 LBS | HEART RATE: 57 BPM | HEIGHT: 64 IN | SYSTOLIC BLOOD PRESSURE: 180 MMHG | OXYGEN SATURATION: 97 % | BODY MASS INDEX: 26.2 KG/M2 | DIASTOLIC BLOOD PRESSURE: 76 MMHG

## 2024-11-08 DIAGNOSIS — I44.1 MOBITZ TYPE 1 SECOND DEGREE AV BLOCK: ICD-10-CM

## 2024-11-08 DIAGNOSIS — I70.0 AORTIC ATHEROSCLEROSIS: Primary | ICD-10-CM

## 2024-11-08 DIAGNOSIS — I10 ESSENTIAL HYPERTENSION: ICD-10-CM

## 2024-11-08 DIAGNOSIS — E78.5 HYPERLIPIDEMIA, UNSPECIFIED HYPERLIPIDEMIA TYPE: ICD-10-CM

## 2024-11-08 PROCEDURE — 99214 OFFICE O/P EST MOD 30 MIN: CPT | Mod: PBBFAC,PO | Performed by: INTERNAL MEDICINE

## 2024-11-08 PROCEDURE — 99999 PR PBB SHADOW E&M-EST. PATIENT-LVL IV: CPT | Mod: PBBFAC,,, | Performed by: INTERNAL MEDICINE

## 2024-11-08 NOTE — PROGRESS NOTES
Subjective   Patient ID:  Fan Paz is a 84 y.o. male who presents for follow-up of No chief complaint on file.      HPI      Followed by Dr Susanna Perea type 1 second-degree AV block  Elevated blood pressure reading without diagnosis of hypertension  Hyperlipidemia: LDL 81  Osteoarthritis       Echocardiogram 02/16/2022:     The left ventricle is normal in size with normal systolic function.  The estimated ejection fraction is 60%.  Moderate left atrial enlargement.  Indeterminate left ventricular diastolic function.  Normal right ventricular size with normal right ventricular systolic function.  Normal central venous pressure (3 mmHg).  The estimated PA systolic pressure is 6 mmHg.  There is no pulmonary hypertension.     Holter 02/16/2022:     Sinus rhythm with heart rates varying between 43 and 113 BPM with an average of 79 BPM  There were very frequent PACs  [place]     1/29/24 Denies CP or SOB  EKG NSR 2:1 AVB at 41 LVH  BP controlled  2:1 AVB on EKG with HR 41 and narrow QRS - suspect this is 2 cycle Mobitz I AVB based on prior Hx Mobitz I. Bradycardia is well tolerated and asymptomatic  Continue Rx for HTN, HLD  OV 6 months    EKG 11/6/24 NSR with Mobitz I AVB and 2:1 AV conduction HR 45 LVH    11/8/24 Needs clearance for ENT surgery - tongue CA  Denies CP, SOB, dizziness or syncope       Review of Systems   Constitutional: Negative for decreased appetite.   HENT:  Negative for ear discharge.    Eyes:  Negative for blurred vision.   Endocrine: Negative for polyphagia.   Skin:  Negative for nail changes.   Genitourinary:  Negative for bladder incontinence.   Neurological:  Negative for aphonia.   Psychiatric/Behavioral:  Negative for hallucinations.    Allergic/Immunologic: Negative for hives.          Objective     Physical Exam  Constitutional:       Appearance: He is well-developed.   HENT:      Head: Normocephalic and atraumatic.   Eyes:      Conjunctiva/sclera: Conjunctivae normal.       Pupils: Pupils are equal, round, and reactive to light.   Cardiovascular:      Rate and Rhythm: Normal rate.      Pulses: Intact distal pulses.      Heart sounds: Normal heart sounds.   Pulmonary:      Effort: Pulmonary effort is normal.      Breath sounds: Normal breath sounds.   Abdominal:      General: Bowel sounds are normal.      Palpations: Abdomen is soft.   Musculoskeletal:         General: Normal range of motion.      Cervical back: Normal range of motion and neck supple.   Skin:     General: Skin is warm and dry.   Neurological:      Mental Status: He is alert and oriented to person, place, and time.            Assessment and Plan     1. Aortic atherosclerosis    2. Mobitz type 1 second degree AV block    3. Essential hypertension    4. Hyperlipidemia, unspecified hyperlipidemia type        Plan:     2:1 AVB on EKG with HR 45 and narrow QRS - suspect this is 2 cycle Mobitz I AVB based on prior Hx Mobitz I. Bradycardia is well tolerated and asymptomatic  Continue Rx for HTN, HLD  OV 6 months  Cleared for ENT surgery at moderate cardiac risk    Advance Care Planning     Date: 11/08/2024  Patient did not wish or was not able to name a surrogate decision maker or provide an Advance Care Plan.

## 2024-11-14 RX ORDER — SODIUM CHLORIDE 0.9 % (FLUSH) 0.9 %
10 SYRINGE (ML) INJECTION
Status: CANCELLED | OUTPATIENT
Start: 2024-11-14

## 2024-11-14 RX ORDER — GLUCAGON 1 MG
1 KIT INJECTION
Status: CANCELLED | OUTPATIENT
Start: 2024-11-14

## 2024-11-14 RX ORDER — HALOPERIDOL 5 MG/ML
0.5 INJECTION INTRAMUSCULAR EVERY 10 MIN PRN
Status: CANCELLED | OUTPATIENT
Start: 2024-11-14

## 2024-11-14 RX ORDER — HYDROMORPHONE HYDROCHLORIDE 1 MG/ML
0.2 INJECTION, SOLUTION INTRAMUSCULAR; INTRAVENOUS; SUBCUTANEOUS EVERY 5 MIN PRN
Status: CANCELLED | OUTPATIENT
Start: 2024-11-14

## 2024-11-14 NOTE — PLAN OF CARE
Otolaryngology Free Flap Plan    OR date: 11/15/24     Operating room: 13    Surgeon(s):   -Extirpative: Uvaldo  -Reconstructive: Hawkane    Procedure: Partial glossectomy, left neck dissection, trach, left radial forearm free flap with FTSG    Airway: Endotracheal intubation up front    Free flap type: Radial forearm - LEFT    Side: Left    Skin graft?: Yes, from left arm    Limb protection: Left upper extremity    Positioning: Turn 180, Left arm out    Beanbag: None    Antibiotics: Unasyn    Expected blood loss: 500cc    Type and screen, blood ordered: Will obtain pre op    Anticipated flap harvest time: 60 minutes    Plating system required? If yes, which system: n/a    Custom plate or model: n/a    Other services needed: General surgery for PEG    Other case specific needs: A-line and jimenez, to SICU pos top    Case specific comments: none

## 2024-11-15 ENCOUNTER — ANESTHESIA (OUTPATIENT)
Dept: SURGERY | Facility: HOSPITAL | Age: 84
End: 2024-11-15
Payer: MEDICARE

## 2024-11-15 ENCOUNTER — HOSPITAL ENCOUNTER (INPATIENT)
Facility: HOSPITAL | Age: 84
LOS: 7 days | Discharge: HOME | DRG: 013 | End: 2024-11-22
Attending: OTOLARYNGOLOGY | Admitting: OTOLARYNGOLOGY
Payer: MEDICARE

## 2024-11-15 DIAGNOSIS — Z85.819 HISTORY OF ORAL CANCER: ICD-10-CM

## 2024-11-15 DIAGNOSIS — K21.9 GASTROESOPHAGEAL REFLUX DISEASE, UNSPECIFIED WHETHER ESOPHAGITIS PRESENT: ICD-10-CM

## 2024-11-15 DIAGNOSIS — C02.9 SQUAMOUS CELL CANCER OF TONGUE: ICD-10-CM

## 2024-11-15 DIAGNOSIS — I10 ESSENTIAL HYPERTENSION: Primary | ICD-10-CM

## 2024-11-15 DIAGNOSIS — I44.1 MOBITZ TYPE 1 SECOND DEGREE AV BLOCK: ICD-10-CM

## 2024-11-15 DIAGNOSIS — I49.9 ABNORMAL HEART RHYTHM: ICD-10-CM

## 2024-11-15 LAB
ALBUMIN SERPL BCP-MCNC: 3.5 G/DL (ref 3.5–5.2)
ALP SERPL-CCNC: 51 U/L (ref 40–150)
ALT SERPL W/O P-5'-P-CCNC: 11 U/L (ref 10–44)
ANION GAP SERPL CALC-SCNC: 9 MMOL/L (ref 8–16)
AST SERPL-CCNC: 26 U/L (ref 10–40)
BASOPHILS # BLD AUTO: 0.03 K/UL (ref 0–0.2)
BASOPHILS NFR BLD: 0.3 % (ref 0–1.9)
BILIRUB SERPL-MCNC: 0.5 MG/DL (ref 0.1–1)
BUN SERPL-MCNC: 12 MG/DL (ref 8–23)
CALCIUM SERPL-MCNC: 9 MG/DL (ref 8.7–10.5)
CHLORIDE SERPL-SCNC: 107 MMOL/L (ref 95–110)
CO2 SERPL-SCNC: 23 MMOL/L (ref 23–29)
CREAT SERPL-MCNC: 0.9 MG/DL (ref 0.5–1.4)
DIFFERENTIAL METHOD BLD: ABNORMAL
EOSINOPHIL # BLD AUTO: 0 K/UL (ref 0–0.5)
EOSINOPHIL NFR BLD: 0.2 % (ref 0–8)
ERYTHROCYTE [DISTWIDTH] IN BLOOD BY AUTOMATED COUNT: 15.3 % (ref 11.5–14.5)
EST. GFR  (NO RACE VARIABLE): >60 ML/MIN/1.73 M^2
GLUCOSE SERPL-MCNC: 108 MG/DL (ref 70–110)
GLUCOSE SERPL-MCNC: 136 MG/DL (ref 70–110)
HCO3 UR-SCNC: 25.4 MMOL/L (ref 24–28)
HCT VFR BLD AUTO: 39 % (ref 40–54)
HCT VFR BLD CALC: 34 %PCV (ref 36–54)
HGB BLD-MCNC: 12.5 G/DL (ref 14–18)
IMM GRANULOCYTES # BLD AUTO: 0.04 K/UL (ref 0–0.04)
IMM GRANULOCYTES NFR BLD AUTO: 0.4 % (ref 0–0.5)
LYMPHOCYTES # BLD AUTO: 0.8 K/UL (ref 1–4.8)
LYMPHOCYTES NFR BLD: 7.3 % (ref 18–48)
MAGNESIUM SERPL-MCNC: 2 MG/DL (ref 1.6–2.6)
MCH RBC QN AUTO: 27.2 PG (ref 27–31)
MCHC RBC AUTO-ENTMCNC: 32.1 G/DL (ref 32–36)
MCV RBC AUTO: 85 FL (ref 82–98)
MONOCYTES # BLD AUTO: 0.5 K/UL (ref 0.3–1)
MONOCYTES NFR BLD: 4.8 % (ref 4–15)
NEUTROPHILS # BLD AUTO: 9.2 K/UL (ref 1.8–7.7)
NEUTROPHILS NFR BLD: 87 % (ref 38–73)
NRBC BLD-RTO: 0 /100 WBC
PCO2 BLDA: 43.8 MMHG (ref 35–45)
PH SMN: 7.37 [PH] (ref 7.35–7.45)
PHOSPHATE SERPL-MCNC: 2.4 MG/DL (ref 2.7–4.5)
PLATELET # BLD AUTO: 168 K/UL (ref 150–450)
PMV BLD AUTO: 10.7 FL (ref 9.2–12.9)
PO2 BLDA: 88 MMHG (ref 80–100)
POC BE: 0 MMOL/L
POC IONIZED CALCIUM: 1.19 MMOL/L (ref 1.06–1.42)
POC SATURATED O2: 96 % (ref 95–100)
POC TCO2: 27 MMOL/L (ref 23–27)
POCT GLUCOSE: 130 MG/DL (ref 70–110)
POCT GLUCOSE: 143 MG/DL (ref 70–110)
POCT GLUCOSE: 184 MG/DL (ref 70–110)
POTASSIUM BLD-SCNC: 3.9 MMOL/L (ref 3.5–5.1)
POTASSIUM SERPL-SCNC: 3.9 MMOL/L (ref 3.5–5.1)
PROT SERPL-MCNC: 6.8 G/DL (ref 6–8.4)
RBC # BLD AUTO: 4.59 M/UL (ref 4.6–6.2)
SAMPLE: ABNORMAL
SODIUM BLD-SCNC: 140 MMOL/L (ref 136–145)
SODIUM SERPL-SCNC: 139 MMOL/L (ref 136–145)
WBC # BLD AUTO: 10.55 K/UL (ref 3.9–12.7)

## 2024-11-15 PROCEDURE — 25000003 PHARM REV CODE 250: Performed by: STUDENT IN AN ORGANIZED HEALTH CARE EDUCATION/TRAINING PROGRAM

## 2024-11-15 PROCEDURE — 63600175 PHARM REV CODE 636 W HCPCS: Performed by: OTOLARYNGOLOGY

## 2024-11-15 PROCEDURE — 0B110F4 BYPASS TRACHEA TO CUTANEOUS WITH TRACHEOSTOMY DEVICE, OPEN APPROACH: ICD-10-PCS | Performed by: OTOLARYNGOLOGY

## 2024-11-15 PROCEDURE — 37000009 HC ANESTHESIA EA ADD 15 MINS: Performed by: OTOLARYNGOLOGY

## 2024-11-15 PROCEDURE — 99223 1ST HOSP IP/OBS HIGH 75: CPT | Mod: 25,,, | Performed by: STUDENT IN AN ORGANIZED HEALTH CARE EDUCATION/TRAINING PROGRAM

## 2024-11-15 PROCEDURE — 27201423 OPTIME MED/SURG SUP & DEVICES STERILE SUPPLY: Performed by: OTOLARYNGOLOGY

## 2024-11-15 PROCEDURE — 43246 EGD PLACE GASTROSTOMY TUBE: CPT | Mod: GC,,, | Performed by: STUDENT IN AN ORGANIZED HEALTH CARE EDUCATION/TRAINING PROGRAM

## 2024-11-15 PROCEDURE — 20000000 HC ICU ROOM

## 2024-11-15 PROCEDURE — 37000008 HC ANESTHESIA 1ST 15 MINUTES: Performed by: OTOLARYNGOLOGY

## 2024-11-15 PROCEDURE — 27000221 HC OXYGEN, UP TO 24 HOURS

## 2024-11-15 PROCEDURE — 15757 FREE SKIN FLAP MICROVASC: CPT | Mod: ,,, | Performed by: STUDENT IN AN ORGANIZED HEALTH CARE EDUCATION/TRAINING PROGRAM

## 2024-11-15 PROCEDURE — 63600175 PHARM REV CODE 636 W HCPCS

## 2024-11-15 PROCEDURE — 0HREX74 REPLACEMENT OF LEFT LOWER ARM SKIN WITH AUTOLOGOUS TISSUE SUBSTITUTE, PARTIAL THICKNESS, EXTERNAL APPROACH: ICD-10-PCS | Performed by: STUDENT IN AN ORGANIZED HEALTH CARE EDUCATION/TRAINING PROGRAM

## 2024-11-15 PROCEDURE — 36000709 HC OR TIME LEV III EA ADD 15 MIN: Performed by: OTOLARYNGOLOGY

## 2024-11-15 PROCEDURE — 63600175 PHARM REV CODE 636 W HCPCS: Performed by: STUDENT IN AN ORGANIZED HEALTH CARE EDUCATION/TRAINING PROGRAM

## 2024-11-15 PROCEDURE — 36000708 HC OR TIME LEV III 1ST 15 MIN: Performed by: OTOLARYNGOLOGY

## 2024-11-15 PROCEDURE — 36620 INSERTION CATHETER ARTERY: CPT | Mod: 59,,, | Performed by: STUDENT IN AN ORGANIZED HEALTH CARE EDUCATION/TRAINING PROGRAM

## 2024-11-15 PROCEDURE — 88305 TISSUE EXAM BY PATHOLOGIST: CPT | Performed by: PATHOLOGY

## 2024-11-15 PROCEDURE — 25000242 PHARM REV CODE 250 ALT 637 W/ HCPCS: Performed by: STUDENT IN AN ORGANIZED HEALTH CARE EDUCATION/TRAINING PROGRAM

## 2024-11-15 PROCEDURE — 83735 ASSAY OF MAGNESIUM: CPT | Performed by: OTOLARYNGOLOGY

## 2024-11-15 PROCEDURE — 25000003 PHARM REV CODE 250: Performed by: OTOLARYNGOLOGY

## 2024-11-15 PROCEDURE — 31600 PLANNED TRACHEOSTOMY: CPT | Mod: 51,,, | Performed by: OTOLARYNGOLOGY

## 2024-11-15 PROCEDURE — 41130 PARTIAL REMOVAL OF TONGUE: CPT | Mod: 51,,, | Performed by: OTOLARYNGOLOGY

## 2024-11-15 PROCEDURE — 25000003 PHARM REV CODE 250: Performed by: NURSE ANESTHETIST, CERTIFIED REGISTERED

## 2024-11-15 PROCEDURE — 0CB70ZZ EXCISION OF TONGUE, OPEN APPROACH: ICD-10-PCS | Performed by: OTOLARYNGOLOGY

## 2024-11-15 PROCEDURE — 88331 PATH CONSLTJ SURG 1 BLK 1SPC: CPT | Mod: 26,,, | Performed by: PATHOLOGY

## 2024-11-15 PROCEDURE — 0HBCXZZ EXCISION OF LEFT UPPER ARM SKIN, EXTERNAL APPROACH: ICD-10-PCS | Performed by: STUDENT IN AN ORGANIZED HEALTH CARE EDUCATION/TRAINING PROGRAM

## 2024-11-15 PROCEDURE — 88331 PATH CONSLTJ SURG 1 BLK 1SPC: CPT | Performed by: PATHOLOGY

## 2024-11-15 PROCEDURE — 63600175 PHARM REV CODE 636 W HCPCS: Performed by: NURSE ANESTHETIST, CERTIFIED REGISTERED

## 2024-11-15 PROCEDURE — 0DH63UZ INSERTION OF FEEDING DEVICE INTO STOMACH, PERCUTANEOUS APPROACH: ICD-10-PCS | Performed by: STUDENT IN AN ORGANIZED HEALTH CARE EDUCATION/TRAINING PROGRAM

## 2024-11-15 PROCEDURE — 88309 TISSUE EXAM BY PATHOLOGIST: CPT | Performed by: PATHOLOGY

## 2024-11-15 PROCEDURE — 99900026 HC AIRWAY MAINTENANCE (STAT)

## 2024-11-15 PROCEDURE — 85025 COMPLETE CBC W/AUTO DIFF WBC: CPT | Performed by: OTOLARYNGOLOGY

## 2024-11-15 PROCEDURE — 99900035 HC TECH TIME PER 15 MIN (STAT)

## 2024-11-15 PROCEDURE — D9220A PRA ANESTHESIA: Mod: ANES,,, | Performed by: STUDENT IN AN ORGANIZED HEALTH CARE EDUCATION/TRAINING PROGRAM

## 2024-11-15 PROCEDURE — D9220A PRA ANESTHESIA: Mod: CRNA,,, | Performed by: NURSE ANESTHETIST, CERTIFIED REGISTERED

## 2024-11-15 PROCEDURE — 07T20ZZ RESECTION OF LEFT NECK LYMPHATIC, OPEN APPROACH: ICD-10-PCS | Performed by: OTOLARYNGOLOGY

## 2024-11-15 PROCEDURE — 0KX40ZZ TRANSFER TONGUE, PALATE, PHARYNX MUSCLE, OPEN APPROACH: ICD-10-PCS | Performed by: STUDENT IN AN ORGANIZED HEALTH CARE EDUCATION/TRAINING PROGRAM

## 2024-11-15 PROCEDURE — 80053 COMPREHEN METABOLIC PANEL: CPT | Performed by: OTOLARYNGOLOGY

## 2024-11-15 PROCEDURE — 15221 FTH/GFT FR S/A/L EACH ADDL: CPT | Mod: ,,, | Performed by: STUDENT IN AN ORGANIZED HEALTH CARE EDUCATION/TRAINING PROGRAM

## 2024-11-15 PROCEDURE — 99291 CRITICAL CARE FIRST HOUR: CPT | Mod: 24,,, | Performed by: STUDENT IN AN ORGANIZED HEALTH CARE EDUCATION/TRAINING PROGRAM

## 2024-11-15 PROCEDURE — C1751 CATH, INF, PER/CENT/MIDLINE: HCPCS | Performed by: STUDENT IN AN ORGANIZED HEALTH CARE EDUCATION/TRAINING PROGRAM

## 2024-11-15 PROCEDURE — 88307 TISSUE EXAM BY PATHOLOGIST: CPT | Mod: 26,,, | Performed by: PATHOLOGY

## 2024-11-15 PROCEDURE — 84100 ASSAY OF PHOSPHORUS: CPT | Performed by: OTOLARYNGOLOGY

## 2024-11-15 PROCEDURE — 94761 N-INVAS EAR/PLS OXIMETRY MLT: CPT

## 2024-11-15 PROCEDURE — 38724 REMOVAL OF LYMPH NODES NECK: CPT | Mod: LT,,, | Performed by: OTOLARYNGOLOGY

## 2024-11-15 PROCEDURE — 27100019 HC AMBU BAG ADULT/PED: Performed by: STUDENT IN AN ORGANIZED HEALTH CARE EDUCATION/TRAINING PROGRAM

## 2024-11-15 PROCEDURE — 88305 TISSUE EXAM BY PATHOLOGIST: CPT | Mod: 26,,, | Performed by: PATHOLOGY

## 2024-11-15 PROCEDURE — 15220 FTH/GFT FR S/A/L 20 SQ CM/<: CPT | Mod: 51,,, | Performed by: STUDENT IN AN ORGANIZED HEALTH CARE EDUCATION/TRAINING PROGRAM

## 2024-11-15 DEVICE — COUPLER MICROVAS ANSTMS 3.0MM: Type: IMPLANTABLE DEVICE | Site: MOUTH | Status: FUNCTIONAL

## 2024-11-15 RX ORDER — ASPIRIN 325 MG
325 TABLET ORAL DAILY
Status: DISCONTINUED | OUTPATIENT
Start: 2024-11-16 | End: 2024-11-15

## 2024-11-15 RX ORDER — SODIUM CHLORIDE 0.9 % (FLUSH) 0.9 %
10 SYRINGE (ML) INJECTION
Status: DISCONTINUED | OUTPATIENT
Start: 2024-11-15 | End: 2024-11-15 | Stop reason: HOSPADM

## 2024-11-15 RX ORDER — OXYCODONE HCL 5 MG/5 ML
7.5 SOLUTION, ORAL ORAL EVERY 4 HOURS PRN
Status: DISCONTINUED | OUTPATIENT
Start: 2024-11-15 | End: 2024-11-18

## 2024-11-15 RX ORDER — ROCURONIUM BROMIDE 10 MG/ML
INJECTION, SOLUTION INTRAVENOUS
Status: DISCONTINUED | OUTPATIENT
Start: 2024-11-15 | End: 2024-11-15

## 2024-11-15 RX ORDER — POTASSIUM CHLORIDE 7.45 MG/ML
80 INJECTION INTRAVENOUS
Status: DISCONTINUED | OUTPATIENT
Start: 2024-11-15 | End: 2024-11-17

## 2024-11-15 RX ORDER — LIDOCAINE HYDROCHLORIDE AND EPINEPHRINE 10; 10 UG/ML; MG/ML
INJECTION, SOLUTION INFILTRATION; PERINEURAL
Status: DISCONTINUED | OUTPATIENT
Start: 2024-11-15 | End: 2024-11-15 | Stop reason: HOSPADM

## 2024-11-15 RX ORDER — CALCIUM GLUCONATE 20 MG/ML
1 INJECTION, SOLUTION INTRAVENOUS
Status: DISCONTINUED | OUTPATIENT
Start: 2024-11-15 | End: 2024-11-17

## 2024-11-15 RX ORDER — CEFAZOLIN SODIUM 1 G/3ML
INJECTION, POWDER, FOR SOLUTION INTRAMUSCULAR; INTRAVENOUS
Status: DISCONTINUED | OUTPATIENT
Start: 2024-11-15 | End: 2024-11-15

## 2024-11-15 RX ORDER — CALCIUM GLUCONATE 20 MG/ML
2 INJECTION, SOLUTION INTRAVENOUS
Status: DISCONTINUED | OUTPATIENT
Start: 2024-11-15 | End: 2024-11-17

## 2024-11-15 RX ORDER — CALCIUM GLUCONATE 20 MG/ML
3 INJECTION, SOLUTION INTRAVENOUS
Status: DISCONTINUED | OUTPATIENT
Start: 2024-11-15 | End: 2024-11-17

## 2024-11-15 RX ORDER — ONDANSETRON 8 MG/1
8 TABLET, ORALLY DISINTEGRATING ORAL EVERY 8 HOURS PRN
Status: DISCONTINUED | OUTPATIENT
Start: 2024-11-15 | End: 2024-11-18

## 2024-11-15 RX ORDER — AMOXICILLIN 250 MG
1 CAPSULE ORAL 2 TIMES DAILY PRN
Status: DISCONTINUED | OUTPATIENT
Start: 2024-11-15 | End: 2024-11-18

## 2024-11-15 RX ORDER — PROCHLORPERAZINE EDISYLATE 5 MG/ML
5 INJECTION INTRAMUSCULAR; INTRAVENOUS EVERY 6 HOURS PRN
Status: DISCONTINUED | OUTPATIENT
Start: 2024-11-15 | End: 2024-11-22 | Stop reason: HOSPADM

## 2024-11-15 RX ORDER — FENTANYL CITRATE 50 UG/ML
INJECTION, SOLUTION INTRAMUSCULAR; INTRAVENOUS
Status: DISCONTINUED | OUTPATIENT
Start: 2024-11-15 | End: 2024-11-15

## 2024-11-15 RX ORDER — CHLORHEXIDINE GLUCONATE ORAL RINSE 1.2 MG/ML
15 SOLUTION DENTAL 2 TIMES DAILY
Status: COMPLETED | OUTPATIENT
Start: 2024-11-15 | End: 2024-11-22

## 2024-11-15 RX ORDER — SODIUM CHLORIDE 0.9 % (FLUSH) 0.9 %
10 SYRINGE (ML) INJECTION
Status: DISCONTINUED | OUTPATIENT
Start: 2024-11-15 | End: 2024-11-22 | Stop reason: HOSPADM

## 2024-11-15 RX ORDER — LIDOCAINE HYDROCHLORIDE 10 MG/ML
1 INJECTION, SOLUTION EPIDURAL; INFILTRATION; INTRACAUDAL; PERINEURAL ONCE
Status: DISCONTINUED | OUTPATIENT
Start: 2024-11-15 | End: 2024-11-15 | Stop reason: HOSPADM

## 2024-11-15 RX ORDER — MORPHINE SULFATE 2 MG/ML
2 INJECTION, SOLUTION INTRAMUSCULAR; INTRAVENOUS EVERY 4 HOURS PRN
Status: DISCONTINUED | OUTPATIENT
Start: 2024-11-15 | End: 2024-11-18

## 2024-11-15 RX ORDER — PANTOPRAZOLE SODIUM 40 MG/1
40 FOR SUSPENSION ORAL DAILY
Status: DISCONTINUED | OUTPATIENT
Start: 2024-11-16 | End: 2024-11-19

## 2024-11-15 RX ORDER — ACETAMINOPHEN 325 MG/1
650 TABLET ORAL EVERY 4 HOURS
Status: DISCONTINUED | OUTPATIENT
Start: 2024-11-15 | End: 2024-11-17

## 2024-11-15 RX ORDER — MAGNESIUM SULFATE HEPTAHYDRATE 40 MG/ML
2 INJECTION, SOLUTION INTRAVENOUS
Status: DISCONTINUED | OUTPATIENT
Start: 2024-11-15 | End: 2024-11-17

## 2024-11-15 RX ORDER — POTASSIUM CHLORIDE 7.45 MG/ML
40 INJECTION INTRAVENOUS
Status: DISCONTINUED | OUTPATIENT
Start: 2024-11-15 | End: 2024-11-17

## 2024-11-15 RX ORDER — ACETAMINOPHEN 10 MG/ML
INJECTION, SOLUTION INTRAVENOUS
Status: DISCONTINUED | OUTPATIENT
Start: 2024-11-15 | End: 2024-11-15

## 2024-11-15 RX ORDER — ENOXAPARIN SODIUM 100 MG/ML
40 INJECTION SUBCUTANEOUS EVERY 24 HOURS
Status: DISCONTINUED | OUTPATIENT
Start: 2024-11-15 | End: 2024-11-22 | Stop reason: HOSPADM

## 2024-11-15 RX ORDER — PROPOFOL 10 MG/ML
VIAL (ML) INTRAVENOUS
Status: DISCONTINUED | OUTPATIENT
Start: 2024-11-15 | End: 2024-11-15

## 2024-11-15 RX ORDER — HEPARIN SOD,PORCINE/0.9 % NACL 1000/500ML
INTRAVENOUS SOLUTION INTRAVENOUS
Status: DISCONTINUED | OUTPATIENT
Start: 2024-11-15 | End: 2024-11-15 | Stop reason: HOSPADM

## 2024-11-15 RX ORDER — DEXAMETHASONE SODIUM PHOSPHATE 4 MG/ML
INJECTION, SOLUTION INTRA-ARTICULAR; INTRALESIONAL; INTRAMUSCULAR; INTRAVENOUS; SOFT TISSUE
Status: DISCONTINUED | OUTPATIENT
Start: 2024-11-15 | End: 2024-11-15

## 2024-11-15 RX ORDER — HYDROXYZINE HYDROCHLORIDE 10 MG/1
10 TABLET, FILM COATED ORAL ONCE AS NEEDED
Status: DISCONTINUED | OUTPATIENT
Start: 2024-11-15 | End: 2024-11-15

## 2024-11-15 RX ORDER — DEXTROSE MONOHYDRATE, SODIUM CHLORIDE, AND POTASSIUM CHLORIDE 50; 1.49; 4.5 G/1000ML; G/1000ML; G/1000ML
INJECTION, SOLUTION INTRAVENOUS CONTINUOUS
Status: DISCONTINUED | OUTPATIENT
Start: 2024-11-15 | End: 2024-11-16

## 2024-11-15 RX ORDER — MAGNESIUM SULFATE HEPTAHYDRATE 40 MG/ML
4 INJECTION, SOLUTION INTRAVENOUS
Status: DISCONTINUED | OUTPATIENT
Start: 2024-11-15 | End: 2024-11-17

## 2024-11-15 RX ORDER — PAPAVERINE HYDROCHLORIDE 30 MG/ML
INJECTION INTRAMUSCULAR; INTRAVENOUS
Status: DISCONTINUED | OUTPATIENT
Start: 2024-11-15 | End: 2024-11-15 | Stop reason: HOSPADM

## 2024-11-15 RX ORDER — HYDRALAZINE HYDROCHLORIDE 20 MG/ML
5 INJECTION INTRAMUSCULAR; INTRAVENOUS
Status: CANCELLED | OUTPATIENT
Start: 2024-11-15 | End: 2024-11-15

## 2024-11-15 RX ORDER — HYDRALAZINE HYDROCHLORIDE 20 MG/ML
INJECTION INTRAMUSCULAR; INTRAVENOUS
Status: DISCONTINUED | OUTPATIENT
Start: 2024-11-15 | End: 2024-11-15

## 2024-11-15 RX ORDER — ONDANSETRON HYDROCHLORIDE 2 MG/ML
INJECTION, SOLUTION INTRAVENOUS
Status: DISCONTINUED | OUTPATIENT
Start: 2024-11-15 | End: 2024-11-15

## 2024-11-15 RX ORDER — AMPICILLIN AND SULBACTAM 2; 1 G/1; G/1
INJECTION, POWDER, FOR SOLUTION INTRAMUSCULAR; INTRAVENOUS
Status: DISCONTINUED | OUTPATIENT
Start: 2024-11-15 | End: 2024-11-15

## 2024-11-15 RX ORDER — BACITRACIN ZINC 500 UNIT/G
OINTMENT (GRAM) TOPICAL
Status: DISCONTINUED | OUTPATIENT
Start: 2024-11-15 | End: 2024-11-15 | Stop reason: HOSPADM

## 2024-11-15 RX ORDER — HYDROXYZINE HYDROCHLORIDE 10 MG/1
10 TABLET, FILM COATED ORAL ONCE AS NEEDED
Status: COMPLETED | OUTPATIENT
Start: 2024-11-15 | End: 2024-11-16

## 2024-11-15 RX ORDER — HYDRALAZINE HYDROCHLORIDE 20 MG/ML
10 INJECTION INTRAMUSCULAR; INTRAVENOUS EVERY 6 HOURS PRN
Status: DISCONTINUED | OUTPATIENT
Start: 2024-11-15 | End: 2024-11-22 | Stop reason: HOSPADM

## 2024-11-15 RX ORDER — POTASSIUM CHLORIDE 7.45 MG/ML
60 INJECTION INTRAVENOUS
Status: DISCONTINUED | OUTPATIENT
Start: 2024-11-15 | End: 2024-11-17

## 2024-11-15 RX ADMIN — GLYCOPYRROLATE 0.2 MG: 0.2 INJECTION, SOLUTION INTRAMUSCULAR; INTRAVENOUS at 10:11

## 2024-11-15 RX ADMIN — CEFAZOLIN 2 G: 330 INJECTION, POWDER, FOR SOLUTION INTRAMUSCULAR; INTRAVENOUS at 10:11

## 2024-11-15 RX ADMIN — PROPOFOL 100 MG: 10 INJECTION, EMULSION INTRAVENOUS at 10:11

## 2024-11-15 RX ADMIN — DEXAMETHASONE SODIUM PHOSPHATE 4 MG: 4 INJECTION, SOLUTION INTRAMUSCULAR; INTRAVENOUS at 10:11

## 2024-11-15 RX ADMIN — FENTANYL CITRATE 100 MCG: 50 INJECTION, SOLUTION INTRAMUSCULAR; INTRAVENOUS at 10:11

## 2024-11-15 RX ADMIN — ROCURONIUM BROMIDE 20 MG: 10 INJECTION, SOLUTION INTRAVENOUS at 01:11

## 2024-11-15 RX ADMIN — FENTANYL CITRATE 25 MCG: 50 INJECTION, SOLUTION INTRAMUSCULAR; INTRAVENOUS at 03:11

## 2024-11-15 RX ADMIN — SODIUM CHLORIDE, SODIUM LACTATE, POTASSIUM CHLORIDE, AND CALCIUM CHLORIDE: .6; .31; .03; .02 INJECTION, SOLUTION INTRAVENOUS at 01:11

## 2024-11-15 RX ADMIN — DEXTROSE, SODIUM CHLORIDE, AND POTASSIUM CHLORIDE: 5; .45; .15 INJECTION INTRAVENOUS at 08:11

## 2024-11-15 RX ADMIN — ROCURONIUM BROMIDE 20 MG: 10 INJECTION, SOLUTION INTRAVENOUS at 12:11

## 2024-11-15 RX ADMIN — FENTANYL CITRATE 50 MCG: 50 INJECTION, SOLUTION INTRAMUSCULAR; INTRAVENOUS at 04:11

## 2024-11-15 RX ADMIN — PHENYLEPHRINE HYDROCHLORIDE 0.25 MCG/KG/MIN: 10 INJECTION INTRAVENOUS at 11:11

## 2024-11-15 RX ADMIN — ONDANSETRON 4 MG: 2 INJECTION INTRAMUSCULAR; INTRAVENOUS at 04:11

## 2024-11-15 RX ADMIN — HYDRALAZINE HYDROCHLORIDE 10 MG: 20 INJECTION INTRAMUSCULAR; INTRAVENOUS at 10:11

## 2024-11-15 RX ADMIN — AMPICILLIN SODIUM AND SULBACTAM SODIUM 3 G: 2; 1 INJECTION, POWDER, FOR SOLUTION INTRAMUSCULAR; INTRAVENOUS at 03:11

## 2024-11-15 RX ADMIN — SUGAMMADEX 200 MG: 100 INJECTION, SOLUTION INTRAVENOUS at 04:11

## 2024-11-15 RX ADMIN — OXYCODONE HYDROCHLORIDE 7.5 MG: 5 SOLUTION ORAL at 05:11

## 2024-11-15 RX ADMIN — ACETAMINOPHEN 650 MG: 325 TABLET ORAL at 05:11

## 2024-11-15 RX ADMIN — MORPHINE SULFATE 2 MG: 2 INJECTION, SOLUTION INTRAMUSCULAR; INTRAVENOUS at 08:11

## 2024-11-15 RX ADMIN — AMPICILLIN SODIUM AND SULBACTAM SODIUM 3 G: 2; 1 INJECTION, POWDER, FOR SOLUTION INTRAMUSCULAR; INTRAVENOUS at 10:11

## 2024-11-15 RX ADMIN — AMPICILLIN SODIUM AND SULBACTAM SODIUM 3 G: 2; 1 INJECTION, POWDER, FOR SOLUTION INTRAMUSCULAR; INTRAVENOUS at 11:11

## 2024-11-15 RX ADMIN — SODIUM CHLORIDE, SODIUM LACTATE, POTASSIUM CHLORIDE, AND CALCIUM CHLORIDE: .6; .31; .03; .02 INJECTION, SOLUTION INTRAVENOUS at 10:11

## 2024-11-15 RX ADMIN — ENOXAPARIN SODIUM 40 MG: 40 INJECTION SUBCUTANEOUS at 05:11

## 2024-11-15 RX ADMIN — ACETAMINOPHEN 1000 MG: 10 INJECTION, SOLUTION INTRAVENOUS at 03:11

## 2024-11-15 RX ADMIN — SODIUM CHLORIDE 0.03 MCG/KG/MIN: 9 INJECTION, SOLUTION INTRAVENOUS at 10:11

## 2024-11-15 RX ADMIN — AMPICILLIN SODIUM AND SULBACTAM SODIUM 3 G: 2; 1 INJECTION, POWDER, FOR SOLUTION INTRAMUSCULAR; INTRAVENOUS at 01:11

## 2024-11-15 RX ADMIN — ROCURONIUM BROMIDE 20 MG: 10 INJECTION, SOLUTION INTRAVENOUS at 03:11

## 2024-11-15 RX ADMIN — ROCURONIUM BROMIDE 20 MG: 10 INJECTION, SOLUTION INTRAVENOUS at 02:11

## 2024-11-15 RX ADMIN — CHLORHEXIDINE GLUCONATE 0.12% ORAL RINSE 15 ML: 1.2 LIQUID ORAL at 08:11

## 2024-11-15 RX ADMIN — ACETAMINOPHEN 650 MG: 325 TABLET ORAL at 09:11

## 2024-11-15 RX ADMIN — ROCURONIUM BROMIDE 100 MG: 10 INJECTION, SOLUTION INTRAVENOUS at 10:11

## 2024-11-15 RX ADMIN — ROCURONIUM BROMIDE 30 MG: 10 INJECTION, SOLUTION INTRAVENOUS at 12:11

## 2024-11-15 RX ADMIN — HYDRALAZINE HYDROCHLORIDE 10 MG: 20 INJECTION INTRAMUSCULAR; INTRAVENOUS at 04:11

## 2024-11-15 NOTE — TRANSFER OF CARE
"Anesthesia Transfer of Care Note    Patient: Fan Paz    Procedure(s) Performed: Procedure(s) (LRB):  GLOSSECTOMY (Left)  DISSECTION, NECK, MODIFIED RADICAL (Left)  TRACHEOTOMY (N/A)  CREATION, FREE FLAP, FOREARM, RADIAL ASPECT (Left)  EGD, WITH PEG TUBE INSERTION (N/A)  APPLICATION, GRAFT, SKIN, FULL-THICKNESS (Left)    Patient location: ICU    Anesthesia Type: general    Transport from OR: Transported from OR on 100% O2 by closed face mask with adequate spontaneous ventilation. Continuous ECG monitoring in transport. Continuous SpO2 monitoring in transport. Continuos invasive BP monitoring in transport    Post pain: adequate analgesia    Post assessment: no apparent anesthetic complications and tolerated procedure well    Post vital signs: stable    Level of consciousness: awake and alert    Nausea/Vomiting: no nausea/vomiting    Complications: none    Transfer of care protocol was followed      Last vitals: Visit Vitals  BP (!) 194/85 (BP Location: Right arm, Patient Position: Lying)   Pulse (!) 52   Temp 37.1 °C (98.7 °F) (Oral)   Resp 18   Ht 5' 4" (1.626 m)   Wt 77.8 kg (171 lb 8.3 oz)   SpO2 98%   BMI 29.44 kg/m²     "

## 2024-11-15 NOTE — PLAN OF CARE
Chart reviewed. Preop nursing care completed per orders. Safe surgery checklist complete aside from H&P update, hasney consent, kamen consent, blood consent and site kelli. Type and screen extension form completed and delivered to blood bank. Pt's HR between 36-61 and irregular, and /85. Pt denied any headache, dizziness, SOB, or s/s bradycardia or HTN. Pt saw cardiology 11/8 and has history of Mobitz type 1 block, asymptomatic bradycardia, and frequent PACs. Notified Dr. Torrez of BP/HR. Family at bedside and to take belongings. Call bell within reach. Instructed pt to call for assistance.     77 y/o F w/ traumatic SAH left, clinically stable

## 2024-11-15 NOTE — H&P
FOCUSED SURGICAL H&P    Fan Paz is a 84 y.o. male. MRN is [id number].    CC: Here today for the following surgical procedure(s):  GLOSSECTOMY (Left: Mouth)  DISSECTION, NECK, MODIFIED RADICAL (Left)  TRACHEOTOMY (Neck)  CREATION, FREE FLAP, FOREARM, RADIAL ASPECT (Left)  EGD, WITH PEG TUBE INSERTION (Abdomen)    HPI: For a detailed history of the patients history of present illness please refer to the last progress note. In brief, this is a 84 y.o. male with a known history of cancer of the palate, here today for surgical intervention from out team specifically the PEG for enteral access post-op. There has been no recent changes in the patients health, including fevers, chest pain, or shortness of breath, and no new medications have been started. The patient has not had anything to eat or drink for the last 8 hours. The patient has held all blood thinners.       Past Medical History:   Past Medical History:   Diagnosis Date    Arthritis     Cancer of palate     GERD (gastroesophageal reflux disease)     HTN (hypertension)     Hyperlipidemia     Prostate cancer     2011    Pulmonary embolism        Past Surgical History:   Past Surgical History:   Procedure Laterality Date    BACK SURGERY  y-6    Cancer of palate surgery      Late 90's- [facility]     CARPAL TUNNEL RELEASE  8-14-13    right hand,Left hand 2013    COLONOSCOPY N/A 4/10/2017    Procedure: COLONOSCOPY;  Surgeon: Nilo Kelly MD;  Location: [facility];  Service: Endoscopy;  Laterality: N/A;    COLONOSCOPY N/A 10/21/2020    Procedure: COLONOSCOPY;  Surgeon: Demetrius Araujo MD;  Location: [facility];  Service: Endoscopy;  Laterality: N/A;    ESOPHAGOGASTRODUODENOSCOPY N/A 5/3/2023    Procedure: EGD (ESOPHAGOGASTRODUODENOSCOPY);  Surgeon: Shauna Lopez MD;  Location: [facility];  Service: Endoscopy;  Laterality: N/A;  EGD (with Dr. Lopez or Dr. Parada), : 1-4 weeks, Provider: Dr. Lopez or Dr. Parada Location: [facility], instructions sent to  email address [email or web address]    KNEE SURGERY  y-41    left knee    KNEE SURGERY Right 12-1-15    TKR    Radio active seed Implant      2012    REVISION OF KNEE ARTHROPLASTY Left 2023    Procedure: REVISION, ARTHROPLASTY, KNEE;  Surgeon: Dustin Paul MD;  Location: [facility] OR [address] [place] [address];  Service: Orthopedics;  Laterality: Left;    TOTAL HIP ARTHROPLASTY  3/2011       Social History:   Social History     Socioeconomic History    Marital status:     Number of children: 4   Occupational History     Employer: RETIRED   Tobacco Use    Smoking status: Former     Current packs/day: 0.00     Average packs/day: 3.0 packs/day for 20.0 years (60.0 ttl pk-yrs)     Types: Cigarettes     Start date: 7/10/1977     Quit date: 7/10/1997     Years since quittin.3     Passive exposure: Past    Smokeless tobacco: Never    Tobacco comments:     Quit -smoked for 40 years ,2 packs a day on an average   Substance and Sexual Activity    Alcohol use: Not Currently     Comment: used to drink Occasionally    Drug use: No    Sexual activity: Yes     Partners: Female     Social Drivers of Health     Financial Resource Strain: Low Risk  (6/3/2023)    Overall Financial Resource Strain (CARDIA)     Difficulty of Paying Living Expenses: Not hard at all   Food Insecurity: No Food Insecurity (6/3/2023)    Hunger Vital Sign     Worried About Running Out of Food in the Last Year: Never true     Ran Out of Food in the Last Year: Never true   Transportation Needs: No Transportation Needs (6/3/2023)    PRAPARE - Transportation     Lack of Transportation (Medical): No     Lack of Transportation (Non-Medical): No   Physical Activity: Sufficiently Active (6/3/2023)    Exercise Vital Sign     Days of Exercise per Week: 7 days     Minutes of Exercise per Session: 30 min   Stress: No Stress Concern Present (6/3/2023)    [employer] - Occupational Stress Questionnaire     Feeling of Stress  : Not at all   Housing Stability: Low Risk  (6/3/2023)    Housing Stability Vital Sign     Unable to Pay for Housing in the Last Year: No     Number of Places Lived in the Last Year: 1     Unstable Housing in the Last Year: No       Family History:   Family History   Problem Relation Name Age of Onset    No Known Problems Mother      Esophageal cancer Father      Coronary artery disease Father              Cancer Sister          Liver Cancer -    Diabetes Sister              No Known Problems Sister      No Known Problems Sister      Lung cancer Sister Tessa         Alive    Breast cancer Sister Tessa     Heart disease Sister Mireille     No Known Problems Brother      No Known Problems Brother      Lung cancer Brother mel 60        - was a tobacco user, had lung cancer    Other (Lung cancer) Brother mel     Stroke Brother Ronak             No Known Problems Maternal Aunt      No Known Problems Maternal Uncle      No Known Problems Paternal Aunt      No Known Problems Paternal Uncle      No Known Problems Maternal Grandmother      No Known Problems Maternal Grandfather      No Known Problems Paternal Grandmother      No Known Problems Paternal Grandfather      Glaucoma Cousin      Macular degeneration Neg Hx      Strabismus Neg Hx      Amblyopia Neg Hx      Blindness Neg Hx      Cataracts Neg Hx      Hypertension Neg Hx      Retinal detachment Neg Hx      Thyroid disease Neg Hx      Colon cancer Neg Hx            Allergies:  Review of patient's allergies indicates:  No Known Allergies      Medications:    Current Facility-Administered Medications:     ampicillin-sulbactam (UNASYN) 3 g in 0.9% NaCl 100 mL IVPB (MB+), 3 g, Intravenous, On Call Procedure, Cristhian Bailon MD    dextrose 10% bolus 125 mL 125 mL, 12.5 g, Intravenous, PRN, Carina Torrez MD    dextrose 10% bolus 250 mL 250 mL, 25 g, Intravenous, PRN, Carina Torrez MD    LIDOcaine (PF) 10 mg/ml (1%) injection 10 mg, 1  "mL, Intradermal, Once, Cristhian Bailon MD    sodium chloride 0.9% flush 10 mL, 10 mL, Intravenous, PRN, Cristhian Bailon MD                  Vital Signs:  Vitals:    11/15/24 0746   BP: (!) 194/85   Pulse: (!) 52   Resp: 18   Temp: 97.9 °F (36.6 °C)         Physical Exam:  Neuro: awake, alert, no acute distress.  HEENT: PERRLA, neck supple, no lymphadenopathy.  Heart: regular rate/rhythm  Lungs: equal chest expansion bilaterally, no increased work of breathing on RA  Abdomen: soft, non-distended, non-tender to palpation. Previous PEG scar.   Extremities: warm, well-perfused       Labs:  Lab Results   Component Value Date/Time    WBC 5.45 11/06/2024 11:21 AM    WBC 2.9 (A) 12/30/2020 12:00 AM    HGB 13.0 (L) 11/06/2024 11:21 AM    HCT 41.0 11/06/2024 11:21 AM     11/06/2024 11:21 AM    MCV 87 11/06/2024 11:21 AM     Lab Results   Component Value Date/Time     11/06/2024 11:21 AM     (A) 12/30/2020 12:00 AM    K 5.2 (H) 11/06/2024 11:21 AM    K 4.3 12/30/2020 12:00 AM     11/06/2024 11:21 AM    CO2 28 11/06/2024 11:21 AM    BUN 16 11/06/2024 11:21 AM    BUN 13 12/30/2020 12:00 AM    GLU 97 11/06/2024 11:21 AM    MG 2.3 12/02/2015 05:06 AM    PHOS 3.4 12/02/2015 05:06 AM     Lab Results   Component Value Date/Time    INR 1.0 05/10/2023 12:10 PM    INR 1.2 12/28/2015 12:00 AM    INR 2.7 (H) 05/02/2011 07:49 AM     No components found for: "TROPI"  Lab Results   Component Value Date/Time    ALT 15 07/13/2024 08:06 AM    ALT 35 12/30/2020 12:00 AM    AST 22 07/13/2024 08:06 AM          Assessment/Plan:  84 y.o. male here today for the following surgical procedure:    GLOSSECTOMY (Left: Mouth)  DISSECTION, NECK, MODIFIED RADICAL (Left)  TRACHEOTOMY (Neck)  CREATION, FREE FLAP, FOREARM, RADIAL ASPECT (Left)  EGD, WITH PEG TUBE INSERTION (Abdomen)       The indications for surgery, highlighting the risks and benefits of the procedure were discussed with the patient. These included but are " not limited to swelling, bleeding, pain, infection, and adverse anesthesia-related event. I also discussed risk of injury to nearby structures. The patient seems to understand the risks, as well as the alternatives including nonoperative observation/survellience and wishes to proceed with the surgical intervention.    Patient has been examined, consented, and marked for laterality if necessary.      Plan discussed with and agreed by Dr. Scott Reyes MD  General Surgery, PGY-1

## 2024-11-15 NOTE — ANESTHESIA PROCEDURE NOTES
Intubation    Date/Time: 11/15/2024 10:49 AM    Performed by: Carina Torrez MD  Authorized by: Carina Torrez MD    Intubation:     Induction:  Intravenous    Intubated:  Postinduction    Mask Ventilation:  Easy with oral airway    Attempts:  1    Attempted By:  Staff anesthesiologist    Method of Intubation:  Direct    Blade:  Abdullahi 3    Laryngeal View Grade: Grade IIA - cords partially seen      Laryngeal View Grade comment:  Grade IIb without BURP    Difficult Airway Encountered?: No      Complications:  None    Airway Device:  Oral endotracheal tube    Airway Device Size:  7.5    Style/Cuff Inflation:  Cuffed    Inflation Amount (mL):  8    Tube secured:  22    Secured at:  The lips    Placement Verified By:  Capnometry    Complicating Factors:  None    Findings Post-Intubation:  BS equal bilateral

## 2024-11-15 NOTE — HPI
Mr. Paz is an 85yo M with PMHx of HLD, HTN, prostate cancer, anemia, GERD, DVT/PE, 2nd degree AV block, SCC of tongue who presents to the SICU s/p L partial glossectomy with L forearm free flap, L neck dissection and tracheostomy. He also had a PEG placed by general surgery during the case. Admitted for q1h flap checks for 48 hours.     Arrived to the SICU extubated on 6L trach collar, HDS off pressors. Received 1500cc IVF during the case with 540 UOP. Hypertensive prior to the case and when waking up, requiring IV hydralazine. Goal is to maintain blood pressure at a MAP >65 and SBP < 180. Arterial access includes a right radial arterial line. He also have two drains (1L neck, 1L forearm) with appropriate output that is SS.

## 2024-11-15 NOTE — BRIEF OP NOTE
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 Bumper at 4cm at skin.    Estimated Blood Loss: 10cc           Specimens:   Specimen (24h ago, onward)      None            [id number]

## 2024-11-15 NOTE — ANESTHESIA PROCEDURE NOTES
Arterial    Diagnosis: Ca tongue    Patient location during procedure: done in OR    Staffing  Authorizing Provider: Carina Torrez MD  Performing Provider: Carina Torrez MD    Staffing  Performed by: Carina Torrez MD  Authorized by: Carina Torrez MD    Anesthesiologist was present at the time of the procedure.    Preanesthetic Checklist  Completed: patient identified, IV checked, site marked, risks and benefits discussed, surgical consent, monitors and equipment checked, pre-op evaluation, timeout performed and anesthesia consent givenArterial  Skin Prep: chlorhexidine gluconate  Local Infiltration: none  Orientation: left  Location: radial    Catheter Size: 20 G  Catheter placement by Ultrasound guidance. Heme positive aspiration all ports.   Vessel Caliber: small, patent, compressibility normal  Vascular Doppler:  not done  Needle advanced into vessel with real time Ultrasound guidance.Insertion Attempts: 1  Assessment  Dressing: secured with tape and tegaderm  Patient: Tolerated well

## 2024-11-16 LAB
ANION GAP SERPL CALC-SCNC: 11 MMOL/L (ref 8–16)
BASOPHILS # BLD AUTO: 0.02 K/UL (ref 0–0.2)
BASOPHILS NFR BLD: 0.1 % (ref 0–1.9)
BUN SERPL-MCNC: 12 MG/DL (ref 8–23)
CALCIUM SERPL-MCNC: 8.6 MG/DL (ref 8.7–10.5)
CHLORIDE SERPL-SCNC: 107 MMOL/L (ref 95–110)
CO2 SERPL-SCNC: 20 MMOL/L (ref 23–29)
CREAT SERPL-MCNC: 1.1 MG/DL (ref 0.5–1.4)
DIFFERENTIAL METHOD BLD: ABNORMAL
EOSINOPHIL # BLD AUTO: 0 K/UL (ref 0–0.5)
EOSINOPHIL NFR BLD: 0 % (ref 0–8)
ERYTHROCYTE [DISTWIDTH] IN BLOOD BY AUTOMATED COUNT: 15.6 % (ref 11.5–14.5)
EST. GFR  (NO RACE VARIABLE): >60 ML/MIN/1.73 M^2
GLUCOSE SERPL-MCNC: 188 MG/DL (ref 70–110)
HCT VFR BLD AUTO: 42.1 % (ref 40–54)
HGB BLD-MCNC: 13.3 G/DL (ref 14–18)
IMM GRANULOCYTES # BLD AUTO: 0.07 K/UL (ref 0–0.04)
IMM GRANULOCYTES NFR BLD AUTO: 0.4 % (ref 0–0.5)
LYMPHOCYTES # BLD AUTO: 0.7 K/UL (ref 1–4.8)
LYMPHOCYTES NFR BLD: 4.1 % (ref 18–48)
MAGNESIUM SERPL-MCNC: 2 MG/DL (ref 1.6–2.6)
MCH RBC QN AUTO: 27.8 PG (ref 27–31)
MCHC RBC AUTO-ENTMCNC: 31.6 G/DL (ref 32–36)
MCV RBC AUTO: 88 FL (ref 82–98)
MONOCYTES # BLD AUTO: 1.3 K/UL (ref 0.3–1)
MONOCYTES NFR BLD: 7.7 % (ref 4–15)
NEUTROPHILS # BLD AUTO: 14.2 K/UL (ref 1.8–7.7)
NEUTROPHILS NFR BLD: 87.7 % (ref 38–73)
NRBC BLD-RTO: 0 /100 WBC
PHOSPHATE SERPL-MCNC: 3.1 MG/DL (ref 2.7–4.5)
PLATELET # BLD AUTO: 168 K/UL (ref 150–450)
PMV BLD AUTO: 10.5 FL (ref 9.2–12.9)
POCT GLUCOSE: 118 MG/DL (ref 70–110)
POCT GLUCOSE: 129 MG/DL (ref 70–110)
POCT GLUCOSE: 153 MG/DL (ref 70–110)
POTASSIUM SERPL-SCNC: 4.5 MMOL/L (ref 3.5–5.1)
RBC # BLD AUTO: 4.78 M/UL (ref 4.6–6.2)
SODIUM SERPL-SCNC: 138 MMOL/L (ref 136–145)
WBC # BLD AUTO: 16.19 K/UL (ref 3.9–12.7)

## 2024-11-16 PROCEDURE — 80048 BASIC METABOLIC PNL TOTAL CA: CPT | Performed by: OTOLARYNGOLOGY

## 2024-11-16 PROCEDURE — 25000003 PHARM REV CODE 250: Performed by: OTOLARYNGOLOGY

## 2024-11-16 PROCEDURE — 97165 OT EVAL LOW COMPLEX 30 MIN: CPT

## 2024-11-16 PROCEDURE — 84100 ASSAY OF PHOSPHORUS: CPT | Performed by: OTOLARYNGOLOGY

## 2024-11-16 PROCEDURE — 20000000 HC ICU ROOM

## 2024-11-16 PROCEDURE — 85025 COMPLETE CBC W/AUTO DIFF WBC: CPT | Performed by: STUDENT IN AN ORGANIZED HEALTH CARE EDUCATION/TRAINING PROGRAM

## 2024-11-16 PROCEDURE — 27100171 HC OXYGEN HIGH FLOW UP TO 24 HOURS

## 2024-11-16 PROCEDURE — 25000003 PHARM REV CODE 250

## 2024-11-16 PROCEDURE — 97161 PT EVAL LOW COMPLEX 20 MIN: CPT

## 2024-11-16 PROCEDURE — 99900035 HC TECH TIME PER 15 MIN (STAT)

## 2024-11-16 PROCEDURE — 51798 US URINE CAPACITY MEASURE: CPT

## 2024-11-16 PROCEDURE — 99291 CRITICAL CARE FIRST HOUR: CPT | Mod: 24,,, | Performed by: STUDENT IN AN ORGANIZED HEALTH CARE EDUCATION/TRAINING PROGRAM

## 2024-11-16 PROCEDURE — 83735 ASSAY OF MAGNESIUM: CPT | Performed by: OTOLARYNGOLOGY

## 2024-11-16 PROCEDURE — 63600175 PHARM REV CODE 636 W HCPCS: Performed by: STUDENT IN AN ORGANIZED HEALTH CARE EDUCATION/TRAINING PROGRAM

## 2024-11-16 PROCEDURE — 27000221 HC OXYGEN, UP TO 24 HOURS

## 2024-11-16 PROCEDURE — 25000003 PHARM REV CODE 250: Performed by: STUDENT IN AN ORGANIZED HEALTH CARE EDUCATION/TRAINING PROGRAM

## 2024-11-16 PROCEDURE — 97530 THERAPEUTIC ACTIVITIES: CPT

## 2024-11-16 PROCEDURE — 97116 GAIT TRAINING THERAPY: CPT

## 2024-11-16 PROCEDURE — 94761 N-INVAS EAR/PLS OXIMETRY MLT: CPT

## 2024-11-16 PROCEDURE — 99900026 HC AIRWAY MAINTENANCE (STAT)

## 2024-11-16 PROCEDURE — 25000242 PHARM REV CODE 250 ALT 637 W/ HCPCS: Performed by: STUDENT IN AN ORGANIZED HEALTH CARE EDUCATION/TRAINING PROGRAM

## 2024-11-16 RX ORDER — NAPROXEN SODIUM 220 MG/1
81 TABLET, FILM COATED ORAL DAILY
Status: DISCONTINUED | OUTPATIENT
Start: 2024-11-16 | End: 2024-11-22 | Stop reason: HOSPADM

## 2024-11-16 RX ORDER — HYDROXYZINE HYDROCHLORIDE 10 MG/1
10 TABLET, FILM COATED ORAL ONCE
Status: COMPLETED | OUTPATIENT
Start: 2024-11-16 | End: 2024-11-16

## 2024-11-16 RX ORDER — TAMSULOSIN HYDROCHLORIDE 0.4 MG/1
0.4 CAPSULE ORAL DAILY
Status: DISCONTINUED | OUTPATIENT
Start: 2024-11-16 | End: 2024-11-18

## 2024-11-16 RX ORDER — MUPIROCIN 20 MG/G
OINTMENT TOPICAL 2 TIMES DAILY
Status: COMPLETED | OUTPATIENT
Start: 2024-11-16 | End: 2024-11-20

## 2024-11-16 RX ADMIN — CHLORHEXIDINE GLUCONATE 0.12% ORAL RINSE 15 ML: 1.2 LIQUID ORAL at 08:11

## 2024-11-16 RX ADMIN — OXYCODONE HYDROCHLORIDE 7.5 MG: 5 SOLUTION ORAL at 12:11

## 2024-11-16 RX ADMIN — TAMSULOSIN HYDROCHLORIDE 0.4 MG: 0.4 CAPSULE ORAL at 07:11

## 2024-11-16 RX ADMIN — PANTOPRAZOLE SODIUM 40 MG: 40 FOR SUSPENSION ORAL at 08:11

## 2024-11-16 RX ADMIN — HYDROXYZINE HYDROCHLORIDE 10 MG: 10 TABLET ORAL at 10:11

## 2024-11-16 RX ADMIN — MUPIROCIN: 20 OINTMENT TOPICAL at 09:11

## 2024-11-16 RX ADMIN — ACETAMINOPHEN 650 MG: 325 TABLET ORAL at 01:11

## 2024-11-16 RX ADMIN — HYDROXYZINE HYDROCHLORIDE 10 MG: 10 TABLET ORAL at 12:11

## 2024-11-16 RX ADMIN — CHLORHEXIDINE GLUCONATE 0.12% ORAL RINSE 15 ML: 1.2 LIQUID ORAL at 09:11

## 2024-11-16 RX ADMIN — ACETAMINOPHEN 650 MG: 325 TABLET ORAL at 09:11

## 2024-11-16 RX ADMIN — ACETAMINOPHEN 650 MG: 325 TABLET ORAL at 05:11

## 2024-11-16 RX ADMIN — AMPICILLIN SODIUM AND SULBACTAM SODIUM 3 G: 2; 1 INJECTION, POWDER, FOR SOLUTION INTRAMUSCULAR; INTRAVENOUS at 02:11

## 2024-11-16 RX ADMIN — ENOXAPARIN SODIUM 40 MG: 40 INJECTION SUBCUTANEOUS at 04:11

## 2024-11-16 RX ADMIN — AMPICILLIN SODIUM AND SULBACTAM SODIUM 3 G: 2; 1 INJECTION, POWDER, FOR SOLUTION INTRAMUSCULAR; INTRAVENOUS at 10:11

## 2024-11-16 RX ADMIN — ASPIRIN 81 MG CHEWABLE TABLET 81 MG: 81 TABLET CHEWABLE at 09:11

## 2024-11-16 RX ADMIN — AMPICILLIN SODIUM AND SULBACTAM SODIUM 3 G: 2; 1 INJECTION, POWDER, FOR SOLUTION INTRAMUSCULAR; INTRAVENOUS at 06:11

## 2024-11-16 NOTE — PROGRESS NOTES
[facility]  Otorhinolaryngology-Head & Neck Surgery  Progress Note    Subjective:     Post-Op Info:  Procedure(s) (LRB):  GLOSSECTOMY (Left)  DISSECTION, NECK, MODIFIED RADICAL (Left)  TRACHEOTOMY (N/A)  CREATION, FREE FLAP, FOREARM, RADIAL ASPECT (Left)  EGD, WITH PEG TUBE INSERTION (N/A)  APPLICATION, GRAFT, SKIN, FULL-THICKNESS (Left)   1 Day Post-Op  Hospital Day: 2     Interval History: NAEON. POD1 status post resection and flap. Overall doing well, cuff deflated this am. No new issues.     Medications:  Continuous Infusions:  Scheduled Meds:   acetaminophen  650 mg Per G Tube Q4H    ampicillin-sulbactam  3 g Intravenous Q8H    chlorhexidine  15 mL Mouth/Throat BID    enoxparin  40 mg Subcutaneous Daily    pantoprazole  40 mg Per G Tube Daily     PRN Meds:  Current Facility-Administered Medications:     calcium gluconate IVPB, 1 g, Intravenous, PRN    calcium gluconate IVPB, 2 g, Intravenous, PRN    calcium gluconate IVPB, 3 g, Intravenous, PRN    hydrALAZINE, 10 mg, Intravenous, Q6H PRN    magnesium sulfate IVPB, 2 g, Intravenous, PRN    magnesium sulfate IVPB, 4 g, Intravenous, PRN    morphine, 2 mg, Intravenous, Q4H PRN    ondansetron, 8 mg, Oral, Q8H PRN    oxyCODONE, 7.5 mg, Per G Tube, Q4H PRN    potassium chloride, 40 mEq, Intravenous, PRN **AND** potassium chloride, 60 mEq, Intravenous, PRN **AND** potassium chloride, 80 mEq, Intravenous, PRN    prochlorperazine, 5 mg, Intravenous, Q6H PRN    senna-docusate 8.6-50 mg, 1 tablet, Oral, BID PRN    sodium chloride 0.9%, 10 mL, Intravenous, PRN     Review of patient's allergies indicates:  No Known Allergies  Objective:     Vital Signs (24h Range):  Temp:  [98.3 °F (36.8 °C)-98.7 °F (37.1 °C)] 98.4 °F (36.9 °C)  Pulse:  [40-99] 51  Resp:  [12-52] 19  SpO2:  [95 %-100 %] 99 %  BP: (101-188)/(56-93) 140/71  Arterial Line BP: (136-178)/(48-62) 149/51     Date 11/16/24 0700 - 11/17/24 0659   Shift 6604-6601 0506-9614 3508-7754 24 Hour  Total   INTAKE   IV Piggyback 92.3   92.3   Shift Total(mL/kg) 92.3(1.2)   92.3(1.2)   OUTPUT   Urine(mL/kg/hr) 85   85   Drains 15   15   Shift Total(mL/kg) 100(1.3)   100(1.3)   Weight (kg) 77.8 77.8 77.8 77.8     Lines/Drains/Airways       Drain  Duration                  Closed/Suction Drain 11/15/24 1412 Tube - 1 Left Other (Comment) Bulb 10 Fr. <1 day         Closed/Suction Drain 11/15/24 1606 Tube - 1 Neck Bulb 15 Fr. <1 day         Gastrostomy/Enterostomy 11/15/24 1059 Percutaneous endoscopic gastrostomy (PEG) LUQ feeding <1 day         Urethral Catheter 11/15/24 1053 Non-latex;Temperature probe <1 day              Airway  Duration             Adult Surgical Airway 11/15/24 1603 Shiley Cuffed 6.0/ 75mm <1 day              Peripheral Intravenous Line  Duration                  Peripheral IV - Single Lumen 11/15/24 0807 18 G Posterior;Right Forearm 1 day         Peripheral IV - Triple Lumen 11/15/24 1050 18 G 1 1/4 in No Right Hand <1 day                     Physical Exam  NCAT  NAD  Breathing well on RA, no acute distress  HB I/VI bilat  Hearing well at conversational volume  OC and OP patent, essentially edentulous  R native tongue appears normal, L pete-tongue consists of fasciocutaneous free flap from L forearm, color and turgor appear normal. Silk stitch marks doppler site which is audible on handheld doppler  L neck incision CDI w staples, ALEE x1 w ss output, no hematoma  6-0 cuffed trach sutured to skin, cuff deflated this am  L upper arm incision closed w running suture, CDI, no hematoma  L forearm in gutter splint, ALEE x1 w ss output. Sensation intact, movement intact distally       Significant Labs:  BMP:   Recent Labs   Lab 11/16/24  0310   *      CO2 20*   BUN 12   CREATININE 1.1   CALCIUM 8.6*   MG 2.0     CBC:   Recent Labs   Lab 11/16/24  0310   WBC 16.19*   RBC 4.78   HGB 13.3*   HCT 42.1      MCV 88   MCH 27.8   MCHC 31.6*       Significant Diagnostics:  I have reviewed and  "interpreted all pertinent imaging results/findings within the past 24 hours.  Assessment/Plan:     Squamous cell cancer of tongue  Status post L partial glossectomy, Left neck dissection levels 1-4, tracheostomy, left radial forearm free flap, left upper arm FTSG 11/16/24 w Dr. Bailon (ablative) and Dr. Thornton (reconstruction).    Continue q1h flap checks: Record Doppler Pulses (artery and vein) ,Capillary Refill , Color, Turgor, temperature.   - Neurovascular Checks q1h of bilateral upper and lower extremities   - Keep head elevated and in neutral position, HOB 30 degress  - Sign above bed that reads "No circumferential Neck Ties"   - FOR ANY FLAP ISSUES, PLEASE PHONE ENT RESIDENT ON CALL.  - Please label drains carefully and document accordingly  - Strict NPO    Neuro:  - Alert, oriented.   - Scheduled tylenol, PRN oxy and dilauded    CV:  - HDS. D/C A Line. SBP between 110 and 160. No pressors unless ENT notified  - continue with q1h flap checks    Pulm:  - stable  - Recommend fresh trach care (humidified O2, flexible suctioning, trach supplies at bedside)  - Please place replacement 6-0 cuffed and cuffless trachs at bedside  - Bedside supplies: flexible suction caths, replacement inner cannulas, adrian collars and saline bullets  - Replace inner cannula daily   - Gentle suctioning   - Humidified O2   - Trach care teacher per nursing and RT  - Avoid gauze under trach plate to prevent accidental decannulation  - Sutures (if present) are to be cut my ENT MD only   - No circumferential neck ties given free flap  - Plan for trach change on POD3 (Mon)      GI/FEN/Lytes:  - Strict NPO  - Start TF today, slowly advance as tolerated. Hold for nausea.   - replace lytes prn    Renal:  - UOP adequate. Yee out.     Heme/ID:  - cont to trend HH  - cont unasyn x7d     Endo:  - Q6 acuchecks'    MSK  - Left forearm splint for 5 days. Remove Weds and the bolster underneath  - PT and OT, OOB    PPX:  PT/OT  L40  PPI    Dispo: "   Continue ICU care for q1h flap checks, anticipate step down Monday after trach change                Jimy Montalvo MD  Otorhinolaryngology-Head & Neck Surgery  [facility]

## 2024-11-16 NOTE — NURSING
SICU PLAN OF CARE    Dx: <principal problem not specified> S/p L glossectomy and PEG placement    Goals of Care: SBP <180, q1 flap checks    Vital Signs (last 12 hours):   Temp:  [97.9 °F (36.6 °C)-98.7 °F (37.1 °C)]   Pulse:  [52-99]   Resp:  [16-38]   BP: (125-194)/(65-85)   SpO2:  [95 %-98 %]   Arterial Line BP: (136-139)/(48-49)      Neuro: AAOx4, Follows commands , and Moves all extremities spontaneously     Cardiac: normal sinus rhythm    Respiratory: Tracheostomy Collar; 5L, 28% FiO2    Gtts: None    Urine Output: Ureteral Catheter  350 mL/shift via jimenez, 510 intraoperative    Drains: ALEE #1, total output 5mL /shift, ALEE #2, total output 43mL/shift    Diet: NPO      Labs/Accuchecks: AM labs, q1 flap checks     Skin:  No new skin breakdown noted. Sacral and heel foams in place.  Patient turned q2h, bony prominences protected, and mattress inflated/working correctly.     Shift Events:  Pt transported to SICU 49523 post op. See note for details. Oxy x1 given for pain, pt tolerated well. Labs sent, MD notified of replacements needed. Family at bedside.  See flowsheet for further assessment/details.  Family updated on current condition/plan of care, questions answered, and emotional support provided.  MD updated on current condition, vitals, labs, and gtts.

## 2024-11-16 NOTE — PT/OT/SLP EVAL
Physical Therapy Co-Evaluation and Co-Treatment  Co-evaluation with OT for maximal pt participation, safety, and activity tolerance    Patient Name:  Fan Paz   MRN:  [id number]  Admit Date: 11/15/2024  Admitting Diagnosis:  tongue cancer   Length of Stay: 1 days  Recent Surgery: Procedure(s) (LRB):  GLOSSECTOMY (Left)  DISSECTION, NECK, MODIFIED RADICAL (Left)  TRACHEOTOMY (N/A)  CREATION, FREE FLAP, FOREARM, RADIAL ASPECT (Left)  EGD, WITH PEG TUBE INSERTION (N/A)  APPLICATION, GRAFT, SKIN, FULL-THICKNESS (Left) 1 Day Post-Op    Recommendations:     Discharge Recommendations:  No Therapy Indicated  Discharge Equipment Recommendations: none   Justification for Equipment: N/A  Barriers to discharge: None    Assessment:     Fan Paz is a 84 y.o. male admitted with a medical diagnosis of tongue cancer.  He presents with the following impairments/functional limitations: gait instability, decreased safety awareness, impaired balance.     Pt agreeable to therapy, high functioning prior to admission, retains much of physical independence at [facility]. Pt mildly impulsive but ambulates well with small imbalances noted but good endurance and strength.     Rehab Prognosis: Good; patient would benefit from acute skilled PT services to address these deficits and reach maximum level of function.      Treatment Tolerated: Well    Highest level of mobility achieved this visit: ambulates 200ft w/ no A/D and SBA    Activity with RN/PCT: ambulate with one person assist    Plan:     During this hospitalization, patient to be seen 3 x/week to address the identified rehab impairments via gait training, therapeutic activities, therapeutic exercises, neuromuscular re-education and progress towards the established goals.    Plan of Care Expires:  12/16/24    Subjective     RN notified prior to session. Pt's daughter and cousin present upon PT entrance into room.    Chief Complaint: none  Patient/Family Comments/goals:  ""I like going fishing" (written on communication pad)  Pain/Comfort:  Pain Rating 1: 0/10    Social History:  Residence: lives with their spouse 1-story house with 4 [address] w/ BHR and ramp to enter.  Support available: family  Equipment Used: none  Equipment Owned (not using): Cane, Type: Single Point Cane, Walkers, Type: Rolling Walker, and Hygiene Aides, Type: Tub Seat/Chair  Prior level of function: independent  Work: Retired   Drive: yes.       Objective:     Additional staff present: OT Bebe    Patient found up in chair with: Tracheostomy, oxygen, ALEE drain, telemetry, pulse ox (continuous), blood pressure cuff, PEG Tube     General Precautions: Standard, fall   Orthopedic Precautions:N/A   Braces: N/A   Body mass index is 29.44 kg/m².  Oxygen Device: Trach Collar 35% & 8L    Vitals: /70 (BP Location: Right arm, Patient Position: Lying)   Pulse 79   Temp 98.4 °F (36.9 °C) (Oral)   Resp (!) 26   Ht 5' 4" (1.626 m)   Wt 77.8 kg (171 lb 8.3 oz)   SpO2 99%   BMI 29.44 kg/m²     Exams:  Cognition:   Alert and Cooperative  Command following: Follows multistep verbal commands  Fluency: non-vocal  Hearing: Intact  Vision:  Intact  Skin Integrity: Visible skin intact    Physical Exam:   Edema - None noted  ROM - ABBY LEs WFL  Strength - ABBY LEs WFL   Sensation - Intact to light touch  Coordination - No deficits noted    Outcome Measures:    AM-PAC 6 CLICK MOBILITY  Turning over in bed (including adjusting bedclothes, sheets and blankets)?: 3  Sitting down on and standing up from a chair with arms (e.g., wheelchair, bedside commode, etc.): 3  Moving from lying on back to sitting on the side of the bed?: 3  Moving to and from a bed to a chair (including a wheelchair)?: 3  Need to walk in hospital room?: 3  Climbing 3-5 steps with a railing?: 3  Basic Mobility Total Score: 18     Functional Mobility:    Bed Mobility:   Pt found/returned to bedside chair    Transfers:   Sit > Stand Transfer: stand by assistance " with no assistive device  Stand > Sit Transfer: stand by assistance with no assistive device  x3 trials from bedside chair    Standing Balance:  Assistance Level Required: Stand-by Assistance  Patient used: no assistive device  Time: 1 min x 2 trials + 10 min  Postural deviations noted: no deviations noted      Gait:   Patient ambulated: 200ft   Patient required: standy by assistance  Patient used:  no assistive device  Gait Pattern observed: swing-through gait  Gait Deviation(s): occasional mildly unsteady gait  Impairments due to: impaired balance  Portable monitor utilized  all lines remained intact throughout ambulation trial    Education:  Time provided for education, counseling and discussion of health disposition in regards to patient's current status  All questions answered within PT scope of practice and to patient's satisfaction  PT role in POC to address current functional deficits  Pt educated on proper body mechanics, safety techniques, and energy conservation with PT facilitation and cueing throughout session    Patient left up in chair with all lines intact, call button in reach, and family and RN present.    GOALS:   Multidisciplinary Problems       Physical Therapy Goals          Problem: Physical Therapy    Goal Priority Disciplines Outcome Interventions   Physical Therapy Goal     PT, PT/OT Progressing    Description: Goals to met by 11/30/2024    1. Gait  x 250 feet with Kennedy using No Assistive Device   2. Ascend/descend 4 stair(s) with bilateral Handrails Stand-by Assistance using No Assistive Device.   3. Lower extremity exercise program x15 reps per Instruction, with assistance as needed in order to facilitate improvement in functional independence                       History:     Past Medical History:   Diagnosis Date    Arthritis     Cancer of palate     GERD (gastroesophageal reflux disease)     HTN (hypertension)     Hyperlipidemia     Prostate cancer     2011    Pulmonary  embolism        Past Surgical History:   Procedure Laterality Date    BACK SURGERY  y-7    Cancer of palate surgery      Late 90's- [facility]     CARPAL TUNNEL RELEASE  8-14-13    right hand,Left hand 2013    COLONOSCOPY N/A 4/10/2017    Procedure: COLONOSCOPY;  Surgeon: Nilo Kelly MD;  Location: [facility] ENDO;  Service: Endoscopy;  Laterality: N/A;    COLONOSCOPY N/A 10/21/2020    Procedure: COLONOSCOPY;  Surgeon: Demetrius Araujo MD;  Location: [facility] ENDO;  Service: Endoscopy;  Laterality: N/A;    ESOPHAGOGASTRODUODENOSCOPY N/A 5/3/2023    Procedure: EGD (ESOPHAGOGASTRODUODENOSCOPY);  Surgeon: Shauna Lopez MD;  Location: [facility] ENDO;  Service: Endoscopy;  Laterality: N/A;  EGD (with Dr. Lopez or Dr. Parada), : 1-4 weeks, Provider: Dr. Lopez or Dr. Parada Location: [facility], instructions sent to email address [email or web address]    ESOPHAGOGASTRODUODENOSCOPY W/ PEG N/A 11/15/2024    Procedure: EGD, WITH PEG TUBE INSERTION;  Surgeon: Rodrigo Chavez MD;  Location: [facility] OR 2ND FLR;  Service: General;  Laterality: N/A;    FREE FLAP RADIAL FOREARM Left 11/15/2024    Procedure: CREATION, FREE FLAP, FOREARM, RADIAL ASPECT;  Surgeon: Katina Thornton MD;  Location: [facility] OR 2ND FLR;  Service: General;  Laterality: Left;    GLOSSECTOMY Left 11/15/2024    Procedure: GLOSSECTOMY;  Surgeon: Cristhian Bailon MD;  Location: [facility] OR 2ND FLR;  Service: ENT;  Laterality: Left;    KNEE SURGERY  y-41    left knee    KNEE SURGERY Right 12-1-15    TKR    MODIFIED RADICAL NECK DISSECTION Left 11/15/2024    Procedure: DISSECTION, NECK, MODIFIED RADICAL;  Surgeon: Cristhian Bailon MD;  Location: [facility] OR 2ND FLR;  Service: ENT;  Laterality: Left;    Radio active seed Implant      January 2012    REVISION OF KNEE ARTHROPLASTY Left 6/2/2023    Procedure: REVISION, ARTHROPLASTY, KNEE;  Surgeon: Dustin Paul MD;  Location: [facility] OR 2ND FLR;  Service: Orthopedics;  Laterality: Left;    SKIN FULL THICKNESS GRAFT Left  11/15/2024    Procedure: APPLICATION, GRAFT, SKIN, FULL-THICKNESS;  Surgeon: Katina Thornton MD;  Location: [facility] OR [address] [place] [address];  Service: General;  Laterality: Left;    TOTAL HIP ARTHROPLASTY  3/2011    TRACHEOTOMY N/A 11/15/2024    Procedure: TRACHEOTOMY;  Surgeon: Cristhian Bailon MD;  Location: [facility] OR [address] [place] [address];  Service: ENT;  Laterality: N/A;       Time Tracking:     PT Received On: 11/16/24  PT Start Time: 1235     PT Stop Time: 1257  PT Total Time (min): 22 min     Billable Minutes: Evaluation 1 procedure and Gait Training 11 min    Jack Mello, PT, DPT  11/16/2024

## 2024-11-16 NOTE — ASSESSMENT & PLAN NOTE
"Status post L partial glossectomy, Left neck dissection levels 1-4, tracheostomy, left radial forearm free flap, left upper arm FTSG 11/16/24 w Dr. Bailon (ablative) and Dr. Thornton (reconstruction).    Continue q1h flap checks: Record Doppler Pulses (artery and vein) ,Capillary Refill , Color, Turgor, temperature.   - Neurovascular Checks q1h of bilateral upper and lower extremities   - Keep head elevated and in neutral position, HOB 30 degress  - Sign above bed that reads "No circumferential Neck Ties"   - FOR ANY FLAP ISSUES, PLEASE PHONE ENT RESIDENT ON CALL.  - Please label drains carefully and document accordingly  - Strict NPO    Neuro:  - Alert, oriented.   - Scheduled tylenol, PRN oxy and dilauded    CV:  - HDS. D/C A Line. SBP between 110 and 160. No pressors unless ENT notified  - continue with q1h flap checks    Pulm:  - stable  - Recommend fresh trach care (humidified O2, flexible suctioning, trach supplies at bedside)  - Please place replacement 6-0 cuffed and cuffless trachs at bedside  - Bedside supplies: flexible suction caths, replacement inner cannulas, adrian collars and saline bullets  - Replace inner cannula daily   - Gentle suctioning   - Humidified O2   - Trach care teacher per nursing and RT  - Avoid gauze under trach plate to prevent accidental decannulation  - Sutures (if present) are to be cut my ENT MD only   - No circumferential neck ties given free flap  - Plan for trach change on POD3 (Mon)      GI/FEN/Lytes:  - Strict NPO  - Start TF today, slowly advance as tolerated. Hold for nausea.   - replace lytes prn    Renal:  - UOP adequate. Yee out.     Heme/ID:  - cont to trend HH  - cont unasyn x7d     Endo:  - Q6 acuchecks'    MSK  - Left forearm splint for 5 days. Remove Weds and the bolster underneath  - PT and OT, OOB    PPX:  PT/OT  L40  PPI    Dispo:   Continue ICU care for q1h flap checks, anticipate step down Monday after trach change          "

## 2024-11-16 NOTE — NURSING
Pt transported to SICU [address] from the OR following L glossectomy and PEG placement. Pt brought up by anesthesia team members x3, comes up on trach collar and portable cardiac monitor. RN x3 in room to receive patient. Pt connected to wall oxygen and bedside cardiac monitoring. Orders received and implemented.

## 2024-11-16 NOTE — SUBJECTIVE & OBJECTIVE
Follow-up For: Procedure(s) (LRB):  GLOSSECTOMY (Left)  DISSECTION, NECK, MODIFIED RADICAL (Left)  TRACHEOTOMY (N/A)  CREATION, FREE FLAP, FOREARM, RADIAL ASPECT (Left)  EGD, WITH PEG TUBE INSERTION (N/A)  APPLICATION, GRAFT, SKIN, FULL-THICKNESS (Left)    Post-Operative Day: Day of Surgery     Past Medical History:   Diagnosis Date    Arthritis     Cancer of palate     GERD (gastroesophageal reflux disease)     HTN (hypertension)     Hyperlipidemia     Prostate cancer     2011    Pulmonary embolism        Past Surgical History:   Procedure Laterality Date    BACK SURGERY  y-6    Can[facility]      [facility]     CARPAL TUNNEL RELEASE  8-14-13    right hand,Left hand 2013    COLONOSCOPY N/A 4/10/2017    Procedure: COLONOSCOPY;  Surgeon: Nilo Kelly MD;  Location: [facility];  Service: Endoscopy;  Laterality: N/A;    COLONOSCOPY N/A 10/21/2020    Procedure: COLONOSCOPY;  Surgeon: Demetrius Araujo MD;  Location: [facility];  Service: Endoscopy;  Laterality: N/A;    ESOPHAGOGASTRODUODENOSCOPY N/A 5/3/2023    Procedure: EGD (ESOPHAGOGASTRODUODENOSCOPY);  Surgeon: Shauna Lopez MD;  Location: [facility];  Service: Endoscopy;  Laterality: N/A;  EGD (with Dr. Lopez or Dr. Parada), : 1-4 weeks, Provider: Dr. Lopez or Dr. Parada Location: [facility], instructions sent to email address [email or web address].     KNEE SURGERY  y-41    left knee    KNEE SURGERY Right 12-1-15    TKR    Radio active seed Implant      January 2012    REVISION OF KNEE ARTHROPLASTY Left 6/2/2023    Procedure: REVISION, ARTHROPLASTY, KNEE;  Surgeon: Dustin Paul MD;  Location: [address];  Service: Orthopedics;  Laterality: Left;    TOTAL HIP ARTHROPLASTY  3/2011       Review of patient's allergies indicates:  No Known Allergies    Family History       Problem Relation (Age of Onset)    Breast cancer Sister    Cancer Sister    Coronary artery disease Father    Diabetes Sister    Esophageal cancer Father     Glaucoma Cousin    Heart disease Sister    Lung cancer Sister, Brother (60), Brother    No Known Problems Mother, Sister, Sister, Brother, Brother, Maternal Aunt, Maternal Uncle, Paternal Aunt, Paternal Uncle, Maternal Grandmother, Maternal Grandfather, Paternal Grandmother, Paternal Grandfather    Stroke Brother          Tobacco Use    Smoking status: Former     Current packs/day: 0.00     Average packs/day: 3.0 packs/day for 20.0 years (60.0 ttl pk-yrs)     Types: Cigarettes     Start date: 7/10/1977     Quit date: 7/10/1997     Years since quittin.3     Passive exposure: Past    Smokeless tobacco: Never    Tobacco comments:     Quit -smoked for 40 years ,2 packs a day on an average   Substance and Sexual Activity    Alcohol use: Not Currently     Comment: used to drink Occasionally    Drug use: No    Sexual activity: Yes     Partners: Female      Review of Systems  Objective:     Vital Signs (Most Recent):  Temp: 98.7 °F (37.1 °C) (11/15/24 1640)  Pulse: 85 (11/15/24 1915)  Resp: 16 (11/15/24 1915)  BP: 125/74 (11/15/24 1900)  SpO2: 97 % (11/15/24 1915) Vital Signs (24h Range):  Temp:  [97.9 °F (36.6 °C)-98.7 °F (37.1 °C)] 98.7 °F (37.1 °C)  Pulse:  [52-99] 85  Resp:  [16-38] 16  SpO2:  [95 %-98 %] 97 %  BP: (125-194)/(65-85) 125/74  Arterial Line BP: (136-139)/(48-49) 136/48     Weight: 77.8 kg (171 lb 8.3 oz)  Body mass index is 29.44 kg/m².      Intake/Output Summary (Last 24 hours) at 11/15/2024 1931  Last data filed at 11/15/2024 1800  Gross per 24 hour   Intake 1718.66 ml   Output 908 ml   Net 810.66 ml          Physical Exam  HENT:      Head: Normocephalic and atraumatic.   Eyes:      Pupils: Pupils are equal, round, and reactive to light.   Neck:      Comments: L neck incision closed with staples, CDI  X1 drain with SS output  Cardiovascular:      Rate and Rhythm: Normal rate and regular rhythm.   Pulmonary:      Effort: Pulmonary effort is normal.      Comments: Trach collar 6L  Abdominal:       General: Abdomen is flat. There is no distension.      Comments: PEG  tube in place   Genitourinary:     Comments: Yee  Musculoskeletal:      Right lower leg: No edema.      Left lower leg: No edema.      Comments: L forearm bandage / brace  X1 drain  with SS output   Skin:     General: Skin is warm and dry.   Neurological:      Mental Status: He is alert.      Comments: Waking up from anesthesia            Vents:  Oxygen Concentration (%): 28 (11/15/24 1900)    Lines/Drains/Airways       Drain  Duration                  Closed/Suction Drain 11/15/24 1412 Tube - 1 Left Other (Comment) Bulb 10 Fr. <1 day         Closed/Suction Drain 11/15/24 1606 Tube - 1 Neck Bulb 15 Fr. <1 day         Gastrostomy/Enterostomy 11/15/24 1059 Percutaneous endoscopic gastrostomy (PEG) LUQ feeding <1 day         Urethral Catheter 11/15/24 1053 Non-latex;Temperature probe <1 day              Airway  Duration                  Airway - Non-Surgical 11/15/24 1049 <1 day    Adult Surgical Airway 11/15/24 1603 Shiley Cuffed 6.0/ 75mm <1 day              Arterial Line  Duration             Arterial Line 11/15/24 1218 Right Radial <1 day              Peripheral Intravenous Line  Duration                  Peripheral IV - Single Lumen 11/15/24 0807 18 G Posterior;Right Forearm <1 day         Peripheral IV - Triple Lumen 11/15/24 1050 18 G 1 1/4 in No Right Hand <1 day                    Significant Labs:    CBC/Anemia Profile:  Recent Labs   Lab 11/15/24  1514 11/15/24  1655   WBC  --  10.55   HGB  --  12.5*   HCT 34* 39.0*   PLT  --  168   MCV  --  85   RDW  --  15.3*        Chemistries:  Recent Labs   Lab 11/15/24  1655      K 3.9      CO2 23   BUN 12   CREATININE 0.9   CALCIUM 9.0   ALBUMIN 3.5   PROT 6.8   BILITOT 0.5   ALKPHOS 51   ALT 11   AST 26   MG 2.0   PHOS 2.4*       All pertinent labs within the past 24 hours have been reviewed.    Significant Imaging: I have reviewed all pertinent imaging results/findings within the  past 24 hours.

## 2024-11-16 NOTE — ASSESSMENT & PLAN NOTE
83yo M with PMHx of HLD, HTN, prostate cancer, anemia, GERD, DVT/PE, 2nd degree AV block, SCC of tongue who presents to the SICU s/p L partial glossectomy with L forearm free flap, L neck dissection and tracheostomy. He also had a PEG placed by general surgery during the case. Admitted for q1h flap checks for 48 hours.       Neuro/Psych:     - Sedation: None    - Neurovascular checks q1h POD#1 and 2, then q2h POD# 3 and 4, and then q4h POD#5 on donor site     - Pain:     - Scheduled Acetaminophen 650mg q6h     - oxy 5 and IV morphine PRN             Cardiac:     - BP Goal: MAP >65 and SBP <180    - Hydralazine PRN, avoid labetalol due to AV block     - Previously on losartan, but has not recently  filled    - Rhythm: NSR      Pulmonary:     - Goal SpO2 >92%    - Wean O2 as tolerated    - No neck ties around neck    - Keep head in neutral position    - Flap Checks q1h POD# 1 and 2, the q2h POD# 3 and 4, and then q4h POD #5     - Stoma Care     - Keep extra trach at bedside    - Obturator at HOB   - take Singulair nightly for runny nose/allergies/post nasal drip        Renal:    - Trend BUN/Cr     - Maintain Jimenez, record strict Is/Os    - Jimenez out POD1 AM   - resume home Flomax when jimenez is out      FEN / GI:     - Daily CMP, PRN K/Mag/Phos per protocol     - Replace electrolytes as needed    - Nutrition: strict NPO x1 week    - Dietician Consult     - Trickle Tube feeds POD1    - Bowel Regimen: Senna-docusate PRN     - Zofran PRN for nausea     - IV PPI, takes daily at home      ID:     - Abx: Unasyn x 72h post-op    - monitor CBC      Heme/Onc:     - Hgb stable on admit    - CBC daily      Endocrine:     - No hx of DM II, most recent A1c 5.9%        PPx:   Feeding: NPO  Analgesia/Sedation: Multimodal  Thromboembolic Prevention: SCD's and Lovenox 40 QD  HOB >30: Yes  Stress Ulcer: IV PPI  Glucose Control: POCT glucose BID      Lines/Drains/Airway:         Tracheostomy         2 PIV         Jimenez (d/c jimenez POD  1)         Drains x2

## 2024-11-16 NOTE — CONSULTS
[facility] - [facility]  Adult Nutrition  Consult Note    SUMMARY     Recommendations    1.) Recommend initiating TF of Pivot 1.5 at trickle rate and slowly increasing to goal rate of 55 ml/hr to provide 1980 kcal, 124 g PRO, 228 g CHO, and 1001 ml FF.      - FWF: 225 ml Q 6 hrs to provide an additional 900 ml of fluid for 1901 ml TFV.      - monitor for s/s of intolerance such as residuals >500ml, n/v/d, or abdominal distension.      2.) RD to consider changing formula to standardized formula when out of ICU.     3.) RD to monitor wt, tolerance, skin, labs.     Goals: to meet % of EEN/EPN by next RD f/u  Nutrition Goal Status: new  Communication of RD Recs:  (POC)    Assessment and Plan    Nutrition Problem  Inadequate enteral nutrition infusion    Related to (etiology):   NPO status    Signs and Symptoms (as evidenced by):   EN prescription < estimated requirements     Interventions/Recommendations (treatment strategy):  Collaboration of nutritional care with other providers.  EN    Nutrition Diagnosis Status:   New     Reason for Assessment    Reason For Assessment: consult  Diagnosis: cancer diagnosis/related complications (SCC of tongue; s/p L partial glossectomy with L forearm free flap, L neck dissection and tracheostomy; PEG present.)    General Information Comments: RD consulted for TF recs. PMHx of HLD, HTN, prostate cancer, anemia, GERD, DVT/PE, 2nd degree AV block, SCC of tongue. Pt was not seen d/t time constraint. No noted n/v/d/c. Pt is trached, on vent. PEG was placed. Per chart review, UBW appears to be 170-175#, #. RD to perform NFPE at f/u if medically warranted. RD to monitor and f/u.     Nutrition Discharge Planning: as per medical course    Nutrition Related Social Determinants of Health: SDOH: Unable to assess at this time.      Nutrition Risk Screen    Nutrition Risk Screen: tube feeding or parenteral nutrition    Nutrition/Diet History    Spiritual, Cultural  "Beliefs, Congregational Practices, Values that Affect Care: no  Food Allergies: NKFA  Factors Affecting Nutritional Intake: NPO    Anthropometrics    Temp: 98.5 °F (36.9 °C)  Height Method: Stated  Height: 5' 4" (162.6 cm)  Height (inches): 64 in  Weight Method: Standard Scale  Weight: 77.8 kg (171 lb 8.3 oz)  Weight (lb): 171.52 lb  Ideal Body Weight (IBW), Male: 130 lb  % Ideal Body Weight, Male (lb): 131.94 %  BMI (Calculated): 29.4    Lab/Procedures/Meds    Pertinent Labs Reviewed: reviewed  Pertinent Labs Comments: Gluc: 188    Pertinent Medications Reviewed: reviewed  Pertinent Medications Comments: Abx, enoxaparin, pantoprazole    Estimated/Assessed Needs    Weight Used For Calorie Calculations: 77.8 kg (171 lb 8.3 oz)  Energy Calorie Requirements (kcal): 1945- 2334 kcal  Energy Need Method: Kcal/kg (25-30 kcal/kg)    Protein Requirements: 94- 117g (1.2-1.5g/kg)  Weight Used For Protein Calculations: 77.8 kg (171 lb 8.3 oz)    Fluid Requirements (mL): as per MD or RDA  Estimated Fluid Requirement Method: RDA Method  RDA Method (mL): 1945    Nutrition Prescription Ordered    Current Diet Order: NPO    Evaluation of Received Nutrient/Fluid Intake    I/O: +798ml since admit  Energy Calories Required: not meeting needs  Protein Required: not meeting needs  Fluid Required:  (as per MD)  Comments: [facility] 11/14  Tolerance:  (NPO status)  % Intake of Estimated Energy Needs: 0 - 25 %  % Meal Intake: NPO    Nutrition Risk    Level of Risk/Frequency of Follow-up: low - moderate     Monitor and Evaluation    Food and Nutrient Intake: enteral nutrition intake  Food and Nutrient Adminstration: enteral and parenteral nutrition administration  Physical Activity and Function: nutrition-related ADLs and IADLs  Anthropometric Measurements: weight, weight change, body mass index  Biochemical Data, Medical Tests and Procedures: electrolyte and renal panel, gastrointestinal profile, glucose/endocrine profile, inflammatory profile, lipid " profile  Nutrition-Focused Physical Findings: overall appearance, skin     Nutrition Follow-Up    RD Follow-up?: Yes

## 2024-11-16 NOTE — PROGRESS NOTES
[facility]  Critical Care - Surgery  Progress Note    Patient Name: Fan Paz  MRN: [id number]  Admission Date: 11/15/2024  Hospital Length of Stay: 1 days  Code Status: Full Code  Attending Provider: Cristhian Bailon MD  Primary Care Provider: Otilio Damon MD   Principal Problem: <principal problem not specified>    Subjective:     Hospital/ICU Course:  No notes on file    Interval History/Significant Events: NAEON. AF. VSS. 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 Trach collar at 8L and 35% satting appropriately. Flap checks q1 with no concern for failure at this time.     Follow-up For: Procedure(s) (LRB):  GLOSSECTOMY (Left)  DISSECTION, NECK, MODIFIED RADICAL (Left)  TRACHEOTOMY (N/A)  CREATION, FREE FLAP, FOREARM, RADIAL ASPECT (Left)  EGD, WITH PEG TUBE INSERTION (N/A)  APPLICATION, GRAFT, SKIN, FULL-THICKNESS (Left)    Post-Operative Day: 1 Day Post-Op    Objective:     Vital Signs (Most Recent):  Temp: 98.5 °F (36.9 °C) (11/16/24 0315)  Pulse: (!) 48 (11/16/24 0500)  Resp: 14 (11/16/24 0500)  BP: (!) 110/57 (11/16/24 0500)  SpO2: 100 % (11/16/24 0500) Vital Signs (24h Range):  Temp:  [97.9 °F (36.6 °C)-98.7 °F (37.1 °C)] 98.5 °F (36.9 °C)  Pulse:  [48-99] 48  Resp:  [12-52] 14  SpO2:  [95 %-100 %] 100 %  BP: (110-194)/(57-93) 110/57  Arterial Line BP: (136-178)/(48-62) 149/51     Weight: 77.8 kg (171 lb 8.3 oz)  Body mass index is 29.44 kg/m².      Intake/Output Summary (Last 24 hours) at 11/16/2024 0544  Last data filed at 11/16/2024 0500  Gross per 24 hour   Intake 2579.13 ml   Output 1593 ml   Net 986.13 ml          Physical Exam  Constitutional:       General: He is sleeping. He is not in acute distress.     Comments: Trach collar    Neck:      Comments: Well healing left neck incision with staples c/d/I   ALEE drain with SS output   Cardiovascular:      Rate and Rhythm: Normal rate and regular rhythm.   Pulmonary:      Effort:  Pulmonary effort is normal. No respiratory distress.   Abdominal:      General: Abdomen is flat.      Palpations: Abdomen is soft.      Comments: PEG tube LUQ   Genitourinary:     Comments: Yee in place   Musculoskeletal:      Comments: Left forearm radial gutter splint c/d/I   ALEE drain with minimal SS output    Skin:     General: Skin is warm and dry.   Neurological:      General: No focal deficit present.      Cranial Nerves: No cranial nerve deficit.            Vents:  Oxygen Concentration (%): 35 (11/16/24 0500)    Lines/Drains/Airways       Drain  Duration                  Closed/Suction Drain 11/15/24 1412 Tube - 1 Left Other (Comment) Bulb 10 Fr. <1 day         Closed/Suction Drain 11/15/24 1606 Tube - 1 Neck Bulb 15 Fr. <1 day         Gastrostomy/Enterostomy 11/15/24 1059 Percutaneous endoscopic gastrostomy (PEG) LUQ feeding <1 day         Urethral Catheter 11/15/24 1053 Non-latex;Temperature probe <1 day              Airway  Duration             Adult Surgical Airway 11/15/24 1603 Shiley Cuffed 6.0/ 75mm <1 day              Peripheral Intravenous Line  Duration                  Peripheral IV - Single Lumen 11/15/24 0807 18 G Posterior;Right Forearm <1 day         Peripheral IV - Triple Lumen 11/15/24 1050 18 G 1 1/4 in No Right Hand <1 day                    Significant Labs:    CBC/Anemia Profile:  Recent Labs   Lab 11/15/24  1514 11/15/24  1655 11/16/24  0310   WBC  --  10.55 16.19*   HGB  --  12.5* 13.3*   HCT 34* 39.0* 42.1   PLT  --  168 168   MCV  --  85 88   RDW  --  15.3* 15.6*        Chemistries:  Recent Labs   Lab 11/15/24  1655 11/16/24  0310    138   K 3.9 4.5    107   CO2 23 20*   BUN 12 12   CREATININE 0.9 1.1   CALCIUM 9.0 8.6*   ALBUMIN 3.5  --    PROT 6.8  --    BILITOT 0.5  --    ALKPHOS 51  --    ALT 11  --    AST 26  --    MG 2.0 2.0   PHOS 2.4* 3.1       CMP:   Recent Labs   Lab 11/15/24  1655 11/16/24  0310    138   K 3.9 4.5    107   CO2 23 20*   *  188*   BUN 12 12   CREATININE 0.9 1.1   CALCIUM 9.0 8.6*   PROT 6.8  --    ALBUMIN 3.5  --    BILITOT 0.5  --    ALKPHOS 51  --    AST 26  --    ALT 11  --    ANIONGAP 9 11       Significant Imaging:  I have reviewed all pertinent imaging results/findings within the past 24 hours.  Assessment/Plan:     Oncology  Squamous cell cancer of tongue  85yo M with PMHx of HLD, HTN, prostate cancer, anemia, GERD, DVT/PE, 2nd degree AV block, SCC of tongue who presents to the SICU s/p L partial glossectomy with L forearm free flap, L neck dissection and tracheostomy. He also had a PEG placed by general surgery during the case. Admitted for q1h flap checks for 48 hours.       Neuro/Psych:     - Sedation: None    - Neurovascular checks q1h POD#1 and 2, then q2h POD# 3 and 4, and then q4h POD#5 on donor site     - Pain:     - Scheduled Acetaminophen 650mg q6h     - oxy 5 and IV morphine PRN             Cardiac:     - BP Goal: MAP >65 and SBP <180    - Hydralazine PRN, avoid labetalol due to AV block     - Previously on losartan, but has not recently  filled    - Rhythm: NSR      Pulmonary:     - Goal SpO2 >92%    - Wean O2 as tolerated    - No neck ties around neck    - Keep head in neutral position    - Flap Checks q1h POD# 1 and 2, the q2h POD# 3 and 4, and then q4h POD #5     - Stoma Care     - Keep extra trach at bedside    - Obturator at HOB   - take Singulair nightly for runny nose/allergies/post nasal drip        Renal:    - Trend BUN/Cr     - Maintain Jimenez, record strict Is/Os    - Jimenez out POD1 AM   - resume home Flomax when jimenez is out      FEN / GI:     - Daily CMP, PRN K/Mag/Phos per protocol     - Replace electrolytes as needed    - Nutrition: strict NPO x1 week    - Dietician Consult     - Trickle Tube feeds POD1    - Bowel Regimen: Senna-docusate PRN     - Zofran PRN for nausea     - IV PPI, takes daily at home      ID:     - Abx: Unasyn x 72h post-op    - monitor CBC      Heme/Onc:     - Hgb stable on admit     - CBC daily      Endocrine:     - No hx of DM II, most recent A1c 5.9%        PPx:   Feeding: NPO  Analgesia/Sedation: Multimodal  Thromboembolic Prevention: SCD's and Lovenox 40 QD  HOB >30: Yes  Stress Ulcer: IV PPI  Glucose Control: POCT glucose BID      Lines/Drains/Airway:         Tracheostomy         2 PIV         Jimenez (d/c jimenez POD 1)         Drains x2          Critical care was time spent personally by me on the following activities: development of treatment plan with patient or surrogate and bedside caregivers, discussions with consultants, evaluation of patient's response to treatment, examination of patient, ordering and performing treatments and interventions, ordering and review of laboratory studies, ordering and review of radiographic studies, pulse oximetry, re-evaluation of patient's condition.  This critical care time did not overlap with that of any other provider or involve time for any procedures.     Kye Dillon MD  Critical Care - Surgery  [facility] - [facility]

## 2024-11-16 NOTE — PLAN OF CARE
Problem: Physical Therapy  Goal: Physical Therapy Goal  Description: Goals to met by 11/30/2024    1. Gait  x 250 feet with [place] using No Assistive Device   2. Ascend/descend 4 stair(s) with bilateral Handrails Stand-by Assistance using No Assistive Device.   3. Lower extremity exercise program x15 reps per Instruction, with assistance as needed in order to facilitate improvement in functional independence    PT Eval: 11/16/2024

## 2024-11-16 NOTE — PT/OT/SLP EVAL
Occupational Therapy   Evaluation and Co-Treatment     Name: Fan Paz  MRN: [id number]  Admitting Diagnosis: <principal problem not specified>  Recent Surgery: Procedure(s) (LRB):  GLOSSECTOMY (Left)  DISSECTION, NECK, MODIFIED RADICAL (Left)  TRACHEOTOMY (N/A)  CREATION, FREE FLAP, FOREARM, RADIAL ASPECT (Left)  EGD, WITH PEG TUBE INSERTION (N/A)  APPLICATION, GRAFT, SKIN, FULL-THICKNESS (Left) 1 Day Post-Op    Recommendations:     Discharge Recommendations: No Therapy Indicated  Discharge Equipment Recommendations:  none  Barriers to discharge:       Assessment:     Fan Paz is a 84 y.o. male with a medical diagnosis of <principal problem not specified>.  Pt presents with decreased endurance and impaired mobility performance as limited by cardiovascular status and generalized weakness. Pt found in chair and very motivated for therapy with pt's cousin and pt's daughter present. Pt demonstrated functional mobility training to simulate household and community environment gait training during session while on portable monitor and pt's nurse present. Pt tolerated ~8 minutes total using no AD with no LOB and good visual search and navigation strategies. Prior to mobility, pt stood x2 from chair with good balance. Pt communicated by writing and explained no needs. Patient continues to demonstrate the need for occupational therapy on a scheduled basis exhibited by decreased independence with self-care and functional mobility     Performance deficits affecting function: weakness, gait instability, impaired endurance, impaired balance, impaired cardiopulmonary response to activity, impaired functional mobility, impaired self care skills, decreased upper extremity function, impaired skin.      Rehab Prognosis: Good; patient would benefit from acute skilled OT services to address these deficits and reach maximum level of function.       Plan:     Patient to be seen 2 x/week to address the above listed  "problems via self-care/home management, therapeutic activities, therapeutic exercises  Plan of Care Expires: 12/16/24  Plan of Care Reviewed with: patient, other (see comments) (cousin and daughter)    Subjective     Chief Complaint: none  Patient/Family Comments/goals: "Oh he's ready"- pt's family     Occupational Profile:  Living Environment: Pt lives with wife and grandson (1o yr old) in a [employer] with 4 [address], also ramp entry. Pt has a walk in shower with shower chair for bathing.  Previous level of function: I with ADLs and mobility  Roles and Routines: Pt is retired and enjoys fishing   Equipment Used at Home: none, other (see comments) (owns a RW, SPC, shower chair and adjustable bed)  Assistance upon Discharge: family    Pain/Comfort:  Pain Rating 1: 0/10  Pain Rating Post-Intervention 1: 0/10    Patients cultural, spiritual, Shinto conflicts given the current situation: no    Objective:     Communicated with: RN prior to session.  Patient found up in chair with pulse ox (continuous), telemetry, blood pressure cuff, PEG Tube, ALEE drain, Tracheostomy, Other (comments) (LUE radial gutter) upon OT entry to room.    General Precautions: Standard, fall  Orthopedic Precautions: N/A  Braces: N/A  Respiratory Status:  Trach collar, 5 L     Occupational Performance:    Functional Mobility/Transfers:  Patient completed Sit <> Stand Transfer with supervision  with  no assistive device   Functional Mobility: Pt stood a total of x3 from chair today with supervision. Pt then ambulated in room and hallway today to prepare for occupation of choice. Pt was SBA on portable monitor. Chair follow used but not needed.    Activities of Daily Living:  Grooming: supervision washing face in standing   Upper Body Dressing: minimum assistance donning gown around back seated at chair    Cognitive/Visual Perceptual:  Cognitive/Psychosocial Skills:     -       Oriented to: Person, Place, Time, and Situation   -       Follows " Commands/attention:Follows multistep  commands  -       Communication: clear/fluent  -       Memory: No Deficits noted  -       Safety awareness/insight to disability: intact   -       Mood/Affect/Coping skills/emotional control: Appropriate to situation    Physical Exam:  Balance:    -       demo good sitting and standing balance   Upper Extremity Range of Motion:     -       Right Upper Extremity: WFL  -       Left Upper Extremity: WFL  Upper Extremity Strength:    -       Right Upper Extremity: WFL  -       Left Upper Extremity: WFL   Strength:    -       Right Upper Extremity: WFL  -       Left Upper Extremity: WFL    AMPAC 6 Click ADL:  AMPAC Total Score: 20    Treatment & Education:  Pt educated on role of occupational therapy, POC, and safety during ADLs and functional mobility. Pt and OT discussed importance of safe, continued mobility to optimize daily living skills. Pt verbalized understanding.     White board updated during session. Pt given instruction to call for medical staff/nurse for assistance.       Patient left up in chair with all lines intact, call button in reach, RN notified, and RN and family present    GOALS:   Multidisciplinary Problems       Occupational Therapy Goals          Problem: Occupational Therapy    Goal Priority Disciplines Outcome Interventions   Occupational Therapy Goal     OT, PT/OT Progressing    Description: Goals to be met by: 12/16/24 (1 mo)     Patient will increase functional independence with ADLs by performing:    UE Dressing with [place].  LE Dressing with [place].  Grooming while standing at sink with [place].  Toileting from toilet with [place] for hygiene and clothing management.   Rolling to Bilateral with [place].   Supine to sit with [place].  Step transfer with [place]  Toilet transfer to toilet with [place].                         History:     Past Medical History:   Diagnosis Date    Arthritis     Cancer of  palate     GERD (gastroesophageal reflux disease)     HTN (hypertension)     Hyperlipidemia     Prostate cancer     2011    Pulmonary embolism          Past Surgical History:   Procedure Laterality Date    BACK SURGERY  y-7    Cancer of palate surgery      Late 90's- [facility]     CARPAL TUNNEL RELEASE  8-14-13    right hand,Left hand 2013    COLONOSCOPY N/A 4/10/2017    Procedure: COLONOSCOPY;  Surgeon: Nilo Kelly MD;  Location: [facility] ENDO;  Service: Endoscopy;  Laterality: N/A;    COLONOSCOPY N/A 10/21/2020    Procedure: COLONOSCOPY;  Surgeon: Demetrius Araujo MD;  Location: [facility] ENDO;  Service: Endoscopy;  Laterality: N/A;    ESOPHAGOGASTRODUODENOSCOPY N/A 5/3/2023    Procedure: EGD (ESOPHAGOGASTRODUODENOSCOPY);  Surgeon: Shauna Lopez MD;  Location: [facility];  Service: Endoscopy;  Laterality: N/A;  EGD (with Dr. Lopez or Dr. Parada), : 1-4 weeks, Provider: Dr. Lopez or Dr. Parada Location: [facility], instructions sent to email address [email or web address]. [email or web address]    ESOPHAGOGASTRODUODENOSCOPY W/ PEG N/A 11/15/2024    Procedure: EGD, WITH PEG TUBE INSERTION;  Surgeon: Rodrigo Chavez MD;  Location: [facility] OR 2ND FLR;  Service: General;  Laterality: N/A;    FREE FLAP RADIAL FOREARM Left 11/15/2024    Procedure: CREATION, FREE FLAP, FOREARM, RADIAL ASPECT;  Surgeon: Katina Thornton MD;  Location: [facility] OR 2ND FLR;  Service: General;  Laterality: Left;    GLOSSECTOMY Left 11/15/2024    Procedure: GLOSSECTOMY;  Surgeon: Cristhian Bailon MD;  Location: [facility] OR 2ND FLR;  Service: ENT;  Laterality: Left;    KNEE SURGERY  y-41    left knee    KNEE SURGERY Right 12-1-15    TKR    MODIFIED RADICAL NECK DISSECTION Left 11/15/2024    Procedure: DISSECTION, NECK, MODIFIED RADICAL;  Surgeon: Cristhian Bailon MD;  Location: [facility] OR 2ND FLR;  Service: ENT;  Laterality: Left;    Radio active seed Implant      January 2012    REVISION OF KNEE ARTHROPLASTY Left 6/2/2023    Procedure: REVISION, ARTHROPLASTY, KNEE;   Surgeon: Dustin Paul MD;  Location: [address];  Service: Orthopedics;  Laterality: Left;    SKIN FULL THICKNESS GRAFT Left 11/15/2024    Procedure: APPLICATION, GRAFT, SKIN, FULL-THICKNESS;  Surgeon: Katina Thornton MD;  Location: [facility] OR [address] [place] [address];  Service: General;  Laterality: Left;    TOTAL HIP ARTHROPLASTY  3/2011    TRACHEOTOMY N/A 11/15/2024    Procedure: TRACHEOTOMY;  Surgeon: Cristhian Bailon MD;  Location: [address];  Service: ENT;  Laterality: N/A;       Time Tracking:     OT Date of Treatment: 11/16/24  OT Start Time: 1236  OT Stop Time: 1258  OT Total Time (min): 22 min    Billable Minutes:Evaluation 10 min  Therapeutic Activity 12 min    11/16/2024

## 2024-11-16 NOTE — SUBJECTIVE & OBJECTIVE
Interval History/Significant Events: NAEON. AF. VSS. 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 Trach collar at 8L and 35% satting appropriately. Flap checks q1 with no concern for failure at this time.     Follow-up For: Procedure(s) (LRB):  GLOSSECTOMY (Left)  DISSECTION, NECK, MODIFIED RADICAL (Left)  TRACHEOTOMY (N/A)  CREATION, FREE FLAP, FOREARM, RADIAL ASPECT (Left)  EGD, WITH PEG TUBE INSERTION (N/A)  APPLICATION, GRAFT, SKIN, FULL-THICKNESS (Left)    Post-Operative Day: 1 Day Post-Op    Objective:     Vital Signs (Most Recent):  Temp: 98.5 °F (36.9 °C) (11/16/24 0315)  Pulse: (!) 48 (11/16/24 0500)  Resp: 14 (11/16/24 0500)  BP: (!) 110/57 (11/16/24 0500)  SpO2: 100 % (11/16/24 0500) Vital Signs (24h Range):  Temp:  [97.9 °F (36.6 °C)-98.7 °F (37.1 °C)] 98.5 °F (36.9 °C)  Pulse:  [48-99] 48  Resp:  [12-52] 14  SpO2:  [95 %-100 %] 100 %  BP: (110-194)/(57-93) 110/57  Arterial Line BP: (136-178)/(48-62) 149/51     Weight: 77.8 kg (171 lb 8.3 oz)  Body mass index is 29.44 kg/m².      Intake/Output Summary (Last 24 hours) at 11/16/2024 0544  Last data filed at 11/16/2024 0500  Gross per 24 hour   Intake 2579.13 ml   Output 1593 ml   Net 986.13 ml          Physical Exam  Constitutional:       General: He is sleeping. He is not in acute distress.     Comments: Trach collar    Neck:      Comments: Well healing left neck incision with staples c/d/I   ALEE drain with SS output   Cardiovascular:      Rate and Rhythm: Normal rate and regular rhythm.   Pulmonary:      Effort: Pulmonary effort is normal. No respiratory distress.   Abdominal:      General: Abdomen is flat.      Palpations: Abdomen is soft.      Comments: PEG tube LUQ   Genitourinary:     Comments: Yee in place   Musculoskeletal:      Comments: Left forearm radial gutter splint c/d/I   ALEE drain with minimal SS output    Skin:     General: Skin is warm and dry.   Neurological:      General: No focal deficit present.       Cranial Nerves: No cranial nerve deficit.            Vents:  Oxygen Concentration (%): 35 (11/16/24 0500)    Lines/Drains/Airways       Drain  Duration                  Closed/Suction Drain 11/15/24 1412 Tube - 1 Left Other (Comment) Bulb 10 Fr. <1 day         Closed/Suction Drain 11/15/24 1606 Tube - 1 Neck Bulb 15 Fr. <1 day         Gastrostomy/Enterostomy 11/15/24 1059 Percutaneous endoscopic gastrostomy (PEG) LUQ feeding <1 day         Urethral Catheter 11/15/24 1053 Non-latex;Temperature probe <1 day              Airway  Duration             Adult Surgical Airway 11/15/24 1603 Shiley Cuffed 6.0/ 75mm <1 day              Peripheral Intravenous Line  Duration                  Peripheral IV - Single Lumen 11/15/24 0807 18 G Posterior;Right Forearm <1 day         Peripheral IV - Triple Lumen 11/15/24 1050 18 G 1 1/4 in No Right Hand <1 day                    Significant Labs:    CBC/Anemia Profile:  Recent Labs   Lab 11/15/24  1514 11/15/24  1655 11/16/24  0310   WBC  --  10.55 16.19*   HGB  --  12.5* 13.3*   HCT 34* 39.0* 42.1   PLT  --  168 168   MCV  --  85 88   RDW  --  15.3* 15.6*        Chemistries:  Recent Labs   Lab 11/15/24  1655 11/16/24  0310    138   K 3.9 4.5    107   CO2 23 20*   BUN 12 12   CREATININE 0.9 1.1   CALCIUM 9.0 8.6*   ALBUMIN 3.5  --    PROT 6.8  --    BILITOT 0.5  --    ALKPHOS 51  --    ALT 11  --    AST 26  --    MG 2.0 2.0   PHOS 2.4* 3.1       CMP:   Recent Labs   Lab 11/15/24  1655 11/16/24  0310    138   K 3.9 4.5    107   CO2 23 20*   * 188*   BUN 12 12   CREATININE 0.9 1.1   CALCIUM 9.0 8.6*   PROT 6.8  --    ALBUMIN 3.5  --    BILITOT 0.5  --    ALKPHOS 51  --    AST 26  --    ALT 11  --    ANIONGAP 9 11       Significant Imaging:  I have reviewed all pertinent imaging results/findings within the past 24 hours.

## 2024-11-16 NOTE — PLAN OF CARE
Recommendations     1.) Recommend initiating TF of Pivot 1.5 at trickle rate and slowly increasing to goal rate of 55 ml/hr to provide 1980 kcal, 124 g PRO, 228 g CHO, and 1001 ml FF.                  - FWF: 225 ml Q 6 hrs to provide an additional 900 ml of fluid for 1901 ml TFV.                  - monitor for s/s of intolerance such as residuals >500ml, n/v/d, or abdominal distension.       2.) RD to consider changing formula to standardized formula when out of ICU.      3.) RD to monitor wt, tolerance, skin, labs.      Goals: to meet % of EEN/EPN by next RD f/u  Nutrition Goal Status: new  Communication of RD Recs:  (POC)

## 2024-11-16 NOTE — NURSING
MD aware pt has not voided 6 hours post catheter removal, bladder scan highest volume showing 118 cc. Order to re scan in a few hours.

## 2024-11-16 NOTE — PLAN OF CARE
SICU PLAN OF CARE    Dx: <principal problem not specified>    Goals of Care: MAP >65 SBP <180    Vital Signs (last 12 hours):   Temp:  [98.4 °F (36.9 °C)-98.5 °F (36.9 °C)]   Pulse:  [40-86]   Resp:  [12-32]   BP: (100-194)/(50-84)   SpO2:  [94 %-100 %]      Neuro: Follows commands  and Moves all extremities spontaneously     Cardiac: normal sinus rhythm    Respiratory: Tracheostomy Collar; 5L, 28% FiO2    Urine Output: Due to void post catheter removal. MD aware, order for bladder scan @ 2000  1600 bladder scan 118cc  1800 bladder scan 182cc    Drains: ALEE #1, total output 15mL /shift, ALEE #2, total output 35mL/shift    Diet: NPO  and Tube Feeds at 30mL/hour (goal 55cc/hr)    Flap Checks Q1 hr flap checks WDL     Labs/Accuchecks: Accuchecks Q6    Skin:  No new breakdown noted.  Frequent weight shifts encouraged, bony prominences protected, and mattress inflated/working correctly.     Shift Events:  Ambulated in pereira with PT.  See flowsheet for further assessment/details.  Family updated on current condition/plan of care, questions answered, and emotional support provided.  MD updated on current condition, vitals, labs, and gtts.

## 2024-11-16 NOTE — PLAN OF CARE
Problem: Occupational Therapy  Goal: Occupational Therapy Goal  Description: Goals to be met by: 12/16/24 (1 mo)     Patient will increase functional independence with ADLs by performing:    UE Dressing with [place].  LE Dressing with [place].  Grooming while standing at sink with [place].  Toileting from toilet with [place] for hygiene and clothing management.   Rolling to Bilateral with [place].   Supine to sit with [place].  Step transfer with [place]  Toilet transfer to toilet with [place].    Evaluated pt and established OT POC. Continue OT as tolerated.  Bebe Mello OT  11/16/2024    Outcome: Progressing

## 2024-11-16 NOTE — ASSESSMENT & PLAN NOTE
85yo M with PMHx of HLD, HTN, prostate cancer, anemia, GERD, DVT/PE, 2nd degree AV block, SCC of tongue who presents to the SICU s/p L partial glossectomy with L forearm free flap, L neck dissection and tracheostomy. He also had a PEG placed by general surgery during the case. Admitted for q1h flap checks for 48 hours.       Neuro/Psych:     - Sedation: None    - Neurovascular checks q1h POD#1 and 2, then q2h POD# 3 and 4, and then q4h POD#5 on donor site     - Pain:     - Scheduled Acetaminophen 650mg q6h     - PO oxy 5 and IV morphine              Cardiac:     - BP Goal: MAP >65 and SBP <180    - Hydralazine PRN, avoid labetalol due to AV block     - Previously  on losartan, but has not recently  filled    - Rhythm: NSR   - continue statin      Pulmonary:     - Goal SpO2 >92%    - Wean O2 as tolerated    - No neck ties around neck    - Keep head in neutral position    - Flap Checks q1h POD# 1 and 2, the q2h POD# 3 and 4, and then q4h POD #5     - Stoma Care     - Keep extra trach at bedside    - Obturator at HOB   - take Singulair nightly for runny nose/allergies/post nasal drip        Renal:    - Trend BUN/Cr     - Maintain Jimenez, record strict Is/Os    - Jimenez out POD1 AM   - resume home Flomax when jimenez is out      FEN / GI:     - mIVF D5/0.9NaCl with 20mEq K at 100ml/hr    - Daily CMP, PRN K/Mag/Phos per protocol     - Replace electrolytes as needed    - Nutrition: strict NPO x1 week    - Dietician Consult     - Trickle Tube feeds POD1    - Bowel Regimen: Miralax    - Zofran PRN for nausea     - IV PPI, takes daily at home      ID:     - Abx: Unasyn x 72h post-op    - monitor CBC      Heme/Onc:     - Hgb stable on admit    - CBC daily      Endocrine:     - No hx of DM II, most recent A1c 5.9%        PPx:   Feeding: NPO  Analgesia/Sedation: Multimodal  Thromboembolic Prevention: SCD's and Lovenox  HOB >30: Yes  Stress Ulcer: IV PPI  Glucose Control: POCT glucose BID      Lines/Drains/Airway:          Concentration (Mg/Ml): 50.0 Render Post-Care Instructions In Note?: no Tracheostomy         Right radial arterial line (d/c art line)         2 PIV         Jimenez (d/c jimenez POD 1)         Drains x2    Bill As A Line Item Or As Units: Line Item Bill J-Code?: Yes Detail Level: Detailed Medication Injected: 5-Fluorouracil Administered By (Optional): COCO X Size Of Lesion In Cm (Optional): 0 Total Volume (Ccs): 0.2 Post-Care Instructions: I reviewed with the patient in detail post-care instructions. Patient is to keep the site dry overnight, and then apply bacitracin twice daily until healed. Patient may apply hydrogen peroxide soaks to remove any crusting. Consent: The risks of medication reaction, injection site pain and lesion necrosis were reviewed with the patient. Size Of Lesion In Cm (Optional): 1

## 2024-11-16 NOTE — H&P
[facility] - Surgical Intensive Care  Critical Care - Surgery  History & Physical    Patient Name: Fan Paz  MRN: [id number]  Admission Date: 11/15/2024  Code Status: Full Code  Attending Physician: Cristhian Bailon MD   Primary Care Provider: Otilio Damon MD   Principal Problem: <principal problem not specified>    Subjective:     HPI:  Mr. Paz is an 83yo M with PMHx of HLD, HTN, prostate cancer, anemia, GERD, DVT/PE, 2nd degree AV block, SCC of tongue who presents to the SICU s/p L partial glossectomy with L forearm free flap, L neck dissection and tracheostomy. He also had a PEG placed by general surgery during the case. Admitted for q1h flap checks for 48 hours.     Arrived to the SICU extubated on 6L trach collar, HDS off pressors. Received 1500cc IVF during the case with 540 UOP. Hypertensive prior to the case and when waking up, requiring IV hydralazine. Goal is to maintain blood pressure at a MAP >65 and SBP < 180. Arterial access includes a right radial arterial line. He also have two drains (1L neck, 1L forearm) with appropriate output that is SS.     Hospital/ICU Course:  No notes on file    Follow-up For: Procedure(s) (LRB):  GLOSSECTOMY (Left)  DISSECTION, NECK, MODIFIED RADICAL (Left)  TRACHEOTOMY (N/A)  CREATION, FREE FLAP, FOREARM, RADIAL ASPECT (Left)  EGD, WITH PEG TUBE INSERTION (N/A)  APPLICATION, GRAFT, SKIN, FULL-THICKNESS (Left)    Post-Operative Day: Day of Surgery     Past Medical History:   Diagnosis Date    Arthritis     Cancer of palate     GERD (gastroesophageal reflux disease)     HTN (hypertension)     Hyperlipidemia     Prostate cancer     2011    Pulmonary embolism        Past Surgical History:   Procedure Laterality Date    BACK SURGERY  y-6    Cancer of palate surgery      Late 90's- [facility]     CARPAL TUNNEL RELEASE  8-14-13    right hand,Left hand 2013    COLONOSCOPY N/A 4/10/2017    Procedure: COLONOSCOPY;  Surgeon: Nilo Kelly MD;  Location: [facility]  ENDO;  Service: Endoscopy;  Laterality: N/A;    COLONOSCOPY N/A 10/21/2020    Procedure: COLONOSCOPY;  Surgeon: Demetrius Araujo MD;  Location: [facility];  Service: Endoscopy;  Laterality: N/A;    ESOPHAGOGASTRODUODENOSCOPY N/A 5/3/2023    Procedure: EGD (ESOPHAGOGASTRODUODENOSCOPY);  Surgeon: Shauna Lopez MD;  Location: [facility] ENDO;  Service: Endoscopy;  Laterality: N/A;  EGD (with Dr. Lopez or Dr. Parada), : 1-4 weeks, Provider: Dr. Lopez or Dr. Parada Location: [facility], instructions sent to email address [email or web address]    KNEE SURGERY  y-41    left knee    KNEE SURGERY Right 12-1-15    TKR    Radio active seed Implant      2012    REVISION OF KNEE ARTHROPLASTY Left 2023    Procedure: REVISION, ARTHROPLASTY, KNEE;  Surgeon: Dustin Paul MD;  Location: [facility] OR [address] [place] [address];  Service: Orthopedics;  Laterality: Left;    TOTAL HIP ARTHROPLASTY  3/2011       Review of patient's allergies indicates:  No Known Allergies    Family History       Problem Relation (Age of Onset)    Breast cancer Sister    Cancer Sister    Coronary artery disease Father    Diabetes Sister    Esophageal cancer Father    Glaucoma Cousin    Heart disease Sister    Lung cancer Sister, Brother (60), Brother    No Known Problems Mother, Sister, Sister, Brother, Brother, Maternal Aunt, Maternal Uncle, Paternal Aunt, Paternal Uncle, Maternal Grandmother, Maternal Grandfather, Paternal Grandmother, Paternal Grandfather    Stroke Brother          Tobacco Use    Smoking status: Former     Current packs/day: 0.00     Average packs/day: 3.0 packs/day for 20.0 years (60.0 ttl pk-yrs)     Types: Cigarettes     Start date: 7/10/1977     Quit date: 7/10/1997     Years since quittin.3     Passive exposure: Past    Smokeless tobacco: Never    Tobacco comments:     Quit -smoked for 40 years ,2 packs a day on an average   Substance and Sexual Activity    Alcohol use: Not Currently     Comment: used to drink Occasionally     Drug use: No    Sexual activity: Yes     Partners: Female      Review of Systems  Objective:     Vital Signs (Most Recent):  Temp: 98.7 °F (37.1 °C) (11/15/24 1640)  Pulse: 85 (11/15/24 1915)  Resp: 16 (11/15/24 1915)  BP: 125/74 (11/15/24 1900)  SpO2: 97 % (11/15/24 1915) Vital Signs (24h Range):  Temp:  [97.9 °F (36.6 °C)-98.7 °F (37.1 °C)] 98.7 °F (37.1 °C)  Pulse:  [52-99] 85  Resp:  [16-38] 16  SpO2:  [95 %-98 %] 97 %  BP: (125-194)/(65-85) 125/74  Arterial Line BP: (136-139)/(48-49) 136/48     Weight: 77.8 kg (171 lb 8.3 oz)  Body mass index is 29.44 kg/m².      Intake/Output Summary (Last 24 hours) at 11/15/2024 1931  Last data filed at 11/15/2024 1800  Gross per 24 hour   Intake 1718.66 ml   Output 908 ml   Net 810.66 ml          Physical Exam  HENT:      Head: Normocephalic and atraumatic.   Eyes:      Pupils: Pupils are equal, round, and reactive to light.   Neck:      Comments: L neck incision closed with staples, CDI  X1 drain with SS output  Cardiovascular:      Rate and Rhythm: Normal rate and regular rhythm.   Pulmonary:      Effort: Pulmonary effort is normal.      Comments: Trach collar 6L  Abdominal:      General: Abdomen is flat. There is no distension.      Comments: PEG  tube in place   Genitourinary:     Comments: Yee  Musculoskeletal:      Right lower leg: No edema.      Left lower leg: No edema.      Comments: L forearm bandage / brace  X1 drain  with SS output   Skin:     General: Skin is warm and dry.   Neurological:      Mental Status: He is alert.      Comments: Waking up from anesthesia            Vents:  Oxygen Concentration (%): 28 (11/15/24 1900)    Lines/Drains/Airways       Drain  Duration                  Closed/Suction Drain 11/15/24 1412 Tube - 1 Left Other (Comment) Bulb 10 Fr. <1 day         Closed/Suction Drain 11/15/24 1606 Tube - 1 Neck Bulb 15 Fr. <1 day         Gastrostomy/Enterostomy 11/15/24 1059 Percutaneous endoscopic gastrostomy (PEG) LUQ feeding <1 day          Urethral Catheter 11/15/24 1053 Non-latex;Temperature probe <1 day              Airway  Duration                  Airway - Non-Surgical 11/15/24 1049 <1 day    Adult Surgical Airway 11/15/24 1603 Shiley Cuffed 6.0/ 75mm <1 day              Arterial Line  Duration             Arterial Line 11/15/24 1218 Right Radial <1 day              Peripheral Intravenous Line  Duration                  Peripheral IV - Single Lumen 11/15/24 0807 18 G Posterior;Right Forearm <1 day         Peripheral IV - Triple Lumen 11/15/24 1050 18 G 1 1/4 in No Right Hand <1 day                    Significant Labs:    CBC/Anemia Profile:  Recent Labs   Lab 11/15/24  1514 11/15/24  1655   WBC  --  10.55   HGB  --  12.5*   HCT 34* 39.0*   PLT  --  168   MCV  --  85   RDW  --  15.3*        Chemistries:  Recent Labs   Lab 11/15/24  1655      K 3.9      CO2 23   BUN 12   CREATININE 0.9   CALCIUM 9.0   ALBUMIN 3.5   PROT 6.8   BILITOT 0.5   ALKPHOS 51   ALT 11   AST 26   MG 2.0   PHOS 2.4*       All pertinent labs within the past 24 hours have been reviewed.    Significant Imaging: I have reviewed all pertinent imaging results/findings within the past 24 hours.  Assessment/Plan:     Oncology  Squamous cell cancer of tongue  83yo M with PMHx of HLD, HTN, prostate cancer, anemia, GERD, DVT/PE, 2nd degree AV block, SCC of tongue who presents to the SICU s/p L partial glossectomy with L forearm free flap, L neck dissection and tracheostomy. He also had a PEG placed by general surgery during the case. Admitted for q1h flap checks for 48 hours.       Neuro/Psych:     - Sedation: None    - Neurovascular checks q1h POD#1 and 2, then q2h POD# 3 and 4, and then q4h POD#5 on donor site     - Pain:     - Scheduled Acetaminophen 650mg q6h     - PO oxy 5 and IV morphine              Cardiac:     - BP Goal: MAP >65 and SBP <180    - Hydralazine PRN, avoid labetalol due to AV block     - Previously  on losartan, but has not recently  filled    - Rhythm:  NSR   - continue statin      Pulmonary:     - Goal SpO2 >92%    - Wean O2 as tolerated    - No neck ties around neck    - Keep head in neutral position    - Flap Checks q1h POD# 1 and 2, the q2h POD# 3 and 4, and then q4h POD #5     - Stoma Care     - Keep extra trach at bedside    - Obturator at HOB   - take Singulair nightly for runny nose/allergies/post nasal drip        Renal:    - Trend BUN/Cr     - Maintain Jimenez, record strict Is/Os    - Jimenez out POD1 AM   - resume home Flomax when jimenez is out      FEN / GI:     - mIVF D5/0.9NaCl with 20mEq K at 100ml/hr    - Daily CMP, PRN K/Mag/Phos per protocol     - Replace electrolytes as needed    - Nutrition: strict NPO x1 week    - Dietician Consult     - Trickle Tube feeds POD1    - Bowel Regimen: Miralax    - Zofran PRN for nausea     - IV PPI, takes daily at home      ID:     - Abx: Unasyn x 72h post-op    - monitor CBC      Heme/Onc:     - Hgb stable on admit    - CBC daily      Endocrine:     - No hx of DM II, most recent A1c 5.9%        PPx:   Feeding: NPO  Analgesia/Sedation: Multimodal  Thromboembolic Prevention: SCD's and Lovenox  HOB >30: Yes  Stress Ulcer: IV PPI  Glucose Control: POCT glucose BID      Lines/Drains/Airway:         Tracheostomy         Right radial arterial line (d/c art line)         2 PIV         Jimenez (d/c jimenez POD 1)         Drains x2          Critical care was time spent personally by me on the following activities: development of treatment plan with patient or surrogate and bedside caregivers, discussions with consultants, evaluation of patient's response to treatment, examination of patient, ordering and performing treatments and interventions, ordering and review of laboratory studies, ordering and review of radiographic studies, pulse oximetry, re-evaluation of patient's condition.  This critical care time did not overlap with that of any other provider or involve time for any procedures.     Evelyn Ramsay MD  Critical Care -  Surgery  [address] [facility] - Surgical Intensive Care   None

## 2024-11-16 NOTE — NURSING
SICU PLAN OF CARE    Dx: <principal problem not specified>    Goals of Care: MAP >65, SBP<180    Vital Signs (last 12 hours):   Temp:  [98.3 °F (36.8 °C)-98.7 °F (37.1 °C)]   Pulse:  [43-99]   Resp:  [12-52]   BP: (110-188)/(57-93)   SpO2:  [96 %-100 %]   Arterial Line BP: (136-178)/(48-62)      Neuro: Follows commands  and Moves all extremities spontaneously , trach (can write concerns)    Cardiac:  Bradycardia, (AV Block)    Respiratory: Tracheostomy Collar; 8L, 35% FiO2    Urine Output: Urethral Catheter  590 mL/shift    Drains: Left Neck ALEE #1, total output 80mL /shift, ALEE #2, total output 30mL/shift    Diet: NPO     Flap/Neurovascular Checks q1     Labs/Accuchecks: Daily Labs, Accuchecks q6    Skin: Patient turned q2h, bony prominences protected, and mattress inflated/working correctly.     Shift Events:  O2 requirements increased from 5L 28% to 8L 35% due to anxiety. One time dose of hydroxyzine given. Arterial line removed. Bradycardia in the 35-45 range, MD's aware and no treatment given due to stable BP. See flowsheet for further assessment/details.  Family updated on current condition/plan of care, questions answered, and emotional support provided.  MD updated on current condition, vitals, labs, and gtts.

## 2024-11-16 NOTE — SUBJECTIVE & OBJECTIVE
Interval History: SHASHA. POD1 status post resection and flap. Overall doing well, cuff deflated this am. No new issues.     Medications:  Continuous Infusions:  Scheduled Meds:   acetaminophen  650 mg Per G Tube Q4H    ampicillin-sulbactam  3 g Intravenous Q8H    chlorhexidine  15 mL Mouth/Throat BID    enoxparin  40 mg Subcutaneous Daily    pantoprazole  40 mg Per G Tube Daily     PRN Meds:  Current Facility-Administered Medications:     calcium gluconate IVPB, 1 g, Intravenous, PRN    calcium gluconate IVPB, 2 g, Intravenous, PRN    calcium gluconate IVPB, 3 g, Intravenous, PRN    hydrALAZINE, 10 mg, Intravenous, Q6H PRN    magnesium sulfate IVPB, 2 g, Intravenous, PRN    magnesium sulfate IVPB, 4 g, Intravenous, PRN    morphine, 2 mg, Intravenous, Q4H PRN    ondansetron, 8 mg, Oral, Q8H PRN    oxyCODONE, 7.5 mg, Per G Tube, Q4H PRN    potassium chloride, 40 mEq, Intravenous, PRN **AND** potassium chloride, 60 mEq, Intravenous, PRN **AND** potassium chloride, 80 mEq, Intravenous, PRN    prochlorperazine, 5 mg, Intravenous, Q6H PRN    senna-docusate 8.6-50 mg, 1 tablet, Oral, BID PRN    sodium chloride 0.9%, 10 mL, Intravenous, PRN     Review of patient's allergies indicates:  No Known Allergies  Objective:     Vital Signs (24h Range):  Temp:  [98.3 °F (36.8 °C)-98.7 °F (37.1 °C)] 98.4 °F (36.9 °C)  Pulse:  [40-99] 51  Resp:  [12-52] 19  SpO2:  [95 %-100 %] 99 %  BP: (101-188)/(56-93) 140/71  Arterial Line BP: (136-178)/(48-62) 149/51     Date 11/16/24 0700 - 11/17/24 0659   Shift 9699-7190 5962-4969 5811-7377 24 Hour Total   INTAKE   IV Piggyback 92.3   92.3   Shift Total(mL/kg) 92.3(1.2)   92.3(1.2)   OUTPUT   Urine(mL/kg/hr) 85   85   Drains 15   15   Shift Total(mL/kg) 100(1.3)   100(1.3)   Weight (kg) 77.8 77.8 77.8 77.8     Lines/Drains/Airways       Drain  Duration                  Closed/Suction Drain 11/15/24 1412 Tube - 1 Left Other (Comment) Bulb 10 Fr. <1 day         Closed/Suction Drain 11/15/24 1606  Tube - 1 Neck Bulb 15 Fr. <1 day         Gastrostomy/Enterostomy 11/15/24 1059 Percutaneous endoscopic gastrostomy (PEG) LUQ feeding <1 day         Urethral Catheter 11/15/24 1053 Non-latex;Temperature probe <1 day              Airway  Duration             Adult Surgical Airway 11/15/24 1603 Shiley Cuffed 6.0/ 75mm <1 day              Peripheral Intravenous Line  Duration                  Peripheral IV - Single Lumen 11/15/24 0807 18 G Posterior;Right Forearm 1 day         Peripheral IV - Triple Lumen 11/15/24 1050 18 G 1 1/4 in No Right Hand <1 day                     Physical Exam  NCAT  NAD  Breathing well on RA, no acute distress  HB I/VI bilat  Hearing well at conversational volume  OC and OP patent, essentially edentulous  R native tongue appears normal, L pete-tongue consists of fasciocutaneous free flap from L forearm, color and turgor appear normal. Silk stitch marks doppler site which is audible on handheld doppler  L neck incision CDI w staples, ALEE x1 w ss output, no hematoma  6-0 cuffed trach sutured to skin, cuff deflated this am  L upper arm incision closed w running suture, CDI, no hematoma  L forearm in gutter splint, ALEE x1 w ss output. Sensation intact, movement intact distally       Significant Labs:  BMP:   Recent Labs   Lab 11/16/24  0310   *      CO2 20*   BUN 12   CREATININE 1.1   CALCIUM 8.6*   MG 2.0     CBC:   Recent Labs   Lab 11/16/24  0310   WBC 16.19*   RBC 4.78   HGB 13.3*   HCT 42.1      MCV 88   MCH 27.8   MCHC 31.6*       Significant Diagnostics:  I have reviewed and interpreted all pertinent imaging results/findings within the past 24 hours.

## 2024-11-16 NOTE — PLAN OF CARE
ENT 6 Hour Post-Anastamosis Flap Check     Post-op events: No acute events. Pain controlled w current regimen. Family at bedside. Mildly anxious about breathing through new tracheostomy but satting 98%+ on 5L and comfortable.     Flap Site  Color: appropriate tone matching upper extremity  Cap refill: well perfused  Turgor: soft; No appreciable fluid collection in neck.   Temperature: warm  Doppler:  + triphasic artery signal + venous signal - strong     Donor Site  LUE with palpable pulse. No concern for hematoma. Moving all fingers with sensation intact. ALEE holding suction w sanguinous output.     Continue Q1hr flap checks.    Marquis Treadwell MD  Otolaryngology - Head and Neck Surgery PGY-IV  11/15/2024

## 2024-11-17 PROBLEM — Z93.0 TRACHEOSTOMY IN PLACE: Status: ACTIVE | Noted: 2024-11-17

## 2024-11-17 LAB
ANION GAP SERPL CALC-SCNC: 8 MMOL/L (ref 8–16)
BASOPHILS # BLD AUTO: 0.02 K/UL (ref 0–0.2)
BASOPHILS NFR BLD: 0.2 % (ref 0–1.9)
BUN SERPL-MCNC: 19 MG/DL (ref 8–23)
CALCIUM SERPL-MCNC: 8.5 MG/DL (ref 8.7–10.5)
CHLORIDE SERPL-SCNC: 105 MMOL/L (ref 95–110)
CO2 SERPL-SCNC: 27 MMOL/L (ref 23–29)
CREAT SERPL-MCNC: 0.9 MG/DL (ref 0.5–1.4)
DIFFERENTIAL METHOD BLD: ABNORMAL
EOSINOPHIL # BLD AUTO: 0.1 K/UL (ref 0–0.5)
EOSINOPHIL NFR BLD: 0.6 % (ref 0–8)
ERYTHROCYTE [DISTWIDTH] IN BLOOD BY AUTOMATED COUNT: 15.9 % (ref 11.5–14.5)
EST. GFR  (NO RACE VARIABLE): >60 ML/MIN/1.73 M^2
GLUCOSE SERPL-MCNC: 114 MG/DL (ref 70–110)
HCT VFR BLD AUTO: 32.4 % (ref 40–54)
HGB BLD-MCNC: 10.4 G/DL (ref 14–18)
IMM GRANULOCYTES # BLD AUTO: 0.04 K/UL (ref 0–0.04)
IMM GRANULOCYTES NFR BLD AUTO: 0.3 % (ref 0–0.5)
LYMPHOCYTES # BLD AUTO: 0.8 K/UL (ref 1–4.8)
LYMPHOCYTES NFR BLD: 6.3 % (ref 18–48)
MAGNESIUM SERPL-MCNC: 2.3 MG/DL (ref 1.6–2.6)
MCH RBC QN AUTO: 27.9 PG (ref 27–31)
MCHC RBC AUTO-ENTMCNC: 32.1 G/DL (ref 32–36)
MCV RBC AUTO: 87 FL (ref 82–98)
MONOCYTES # BLD AUTO: 1 K/UL (ref 0.3–1)
MONOCYTES NFR BLD: 7.8 % (ref 4–15)
NEUTROPHILS # BLD AUTO: 10.4 K/UL (ref 1.8–7.7)
NEUTROPHILS NFR BLD: 84.8 % (ref 38–73)
NRBC BLD-RTO: 0 /100 WBC
PHOSPHATE SERPL-MCNC: 1.8 MG/DL (ref 2.7–4.5)
PLATELET # BLD AUTO: 165 K/UL (ref 150–450)
PMV BLD AUTO: 11.2 FL (ref 9.2–12.9)
POCT GLUCOSE: 132 MG/DL (ref 70–110)
POCT GLUCOSE: 149 MG/DL (ref 70–110)
POCT GLUCOSE: 149 MG/DL (ref 70–110)
POCT GLUCOSE: 151 MG/DL (ref 70–110)
POTASSIUM SERPL-SCNC: 3.7 MMOL/L (ref 3.5–5.1)
RBC # BLD AUTO: 3.73 M/UL (ref 4.6–6.2)
SODIUM SERPL-SCNC: 140 MMOL/L (ref 136–145)
WBC # BLD AUTO: 12.3 K/UL (ref 3.9–12.7)

## 2024-11-17 PROCEDURE — 99900035 HC TECH TIME PER 15 MIN (STAT)

## 2024-11-17 PROCEDURE — 25000003 PHARM REV CODE 250

## 2024-11-17 PROCEDURE — 63600175 PHARM REV CODE 636 W HCPCS: Performed by: STUDENT IN AN ORGANIZED HEALTH CARE EDUCATION/TRAINING PROGRAM

## 2024-11-17 PROCEDURE — 63600175 PHARM REV CODE 636 W HCPCS

## 2024-11-17 PROCEDURE — 20600001 HC STEP DOWN PRIVATE ROOM

## 2024-11-17 PROCEDURE — 25000003 PHARM REV CODE 250: Performed by: STUDENT IN AN ORGANIZED HEALTH CARE EDUCATION/TRAINING PROGRAM

## 2024-11-17 PROCEDURE — 84100 ASSAY OF PHOSPHORUS: CPT

## 2024-11-17 PROCEDURE — 94761 N-INVAS EAR/PLS OXIMETRY MLT: CPT

## 2024-11-17 PROCEDURE — 85025 COMPLETE CBC W/AUTO DIFF WBC: CPT

## 2024-11-17 PROCEDURE — 83735 ASSAY OF MAGNESIUM: CPT

## 2024-11-17 PROCEDURE — 27100171 HC OXYGEN HIGH FLOW UP TO 24 HOURS

## 2024-11-17 PROCEDURE — 99291 CRITICAL CARE FIRST HOUR: CPT | Mod: 24,,, | Performed by: STUDENT IN AN ORGANIZED HEALTH CARE EDUCATION/TRAINING PROGRAM

## 2024-11-17 PROCEDURE — 99900026 HC AIRWAY MAINTENANCE (STAT)

## 2024-11-17 PROCEDURE — 80048 BASIC METABOLIC PNL TOTAL CA: CPT

## 2024-11-17 RX ORDER — ATORVASTATIN CALCIUM 20 MG/1
20 TABLET, FILM COATED ORAL DAILY
Status: DISCONTINUED | OUTPATIENT
Start: 2024-11-18 | End: 2024-11-22 | Stop reason: HOSPADM

## 2024-11-17 RX ORDER — MONTELUKAST SODIUM 10 MG/1
10 TABLET ORAL DAILY
Status: DISCONTINUED | OUTPATIENT
Start: 2024-11-18 | End: 2024-11-22 | Stop reason: HOSPADM

## 2024-11-17 RX ORDER — LANOLIN ALCOHOL/MO/W.PET/CERES
800 CREAM (GRAM) TOPICAL
Status: DISCONTINUED | OUTPATIENT
Start: 2024-11-17 | End: 2024-11-18

## 2024-11-17 RX ORDER — SODIUM,POTASSIUM PHOSPHATES 280-250MG
2 POWDER IN PACKET (EA) ORAL
Status: DISCONTINUED | OUTPATIENT
Start: 2024-11-17 | End: 2024-11-18

## 2024-11-17 RX ORDER — POLYETHYLENE GLYCOL 3350 17 G/17G
17 POWDER, FOR SOLUTION ORAL DAILY
Status: DISCONTINUED | OUTPATIENT
Start: 2024-11-18 | End: 2024-11-22 | Stop reason: HOSPADM

## 2024-11-17 RX ADMIN — HYDRALAZINE HYDROCHLORIDE 10 MG: 20 INJECTION INTRAMUSCULAR; INTRAVENOUS at 06:11

## 2024-11-17 RX ADMIN — CHLORHEXIDINE GLUCONATE 0.12% ORAL RINSE 15 ML: 1.2 LIQUID ORAL at 11:11

## 2024-11-17 RX ADMIN — ACETAMINOPHEN 650 MG: 325 TABLET ORAL at 01:11

## 2024-11-17 RX ADMIN — MUPIROCIN: 20 OINTMENT TOPICAL at 11:11

## 2024-11-17 RX ADMIN — ASPIRIN 81 MG CHEWABLE TABLET 81 MG: 81 TABLET CHEWABLE at 09:11

## 2024-11-17 RX ADMIN — POTASSIUM & SODIUM PHOSPHATES POWDER PACK 280-160-250 MG 2 PACKET: 280-160-250 PACK at 12:11

## 2024-11-17 RX ADMIN — MUPIROCIN: 20 OINTMENT TOPICAL at 09:11

## 2024-11-17 RX ADMIN — POTASSIUM & SODIUM PHOSPHATES POWDER PACK 280-160-250 MG 2 PACKET: 280-160-250 PACK at 04:11

## 2024-11-17 RX ADMIN — TAMSULOSIN HYDROCHLORIDE 0.4 MG: 0.4 CAPSULE ORAL at 09:11

## 2024-11-17 RX ADMIN — ACETAMINOPHEN 650 MG: 325 TABLET ORAL at 05:11

## 2024-11-17 RX ADMIN — AMPICILLIN SODIUM AND SULBACTAM SODIUM 3 G: 2; 1 INJECTION, POWDER, FOR SOLUTION INTRAMUSCULAR; INTRAVENOUS at 06:11

## 2024-11-17 RX ADMIN — PANTOPRAZOLE SODIUM 40 MG: 40 FOR SUSPENSION ORAL at 09:11

## 2024-11-17 RX ADMIN — ENOXAPARIN SODIUM 40 MG: 40 INJECTION SUBCUTANEOUS at 04:11

## 2024-11-17 RX ADMIN — AMPICILLIN SODIUM AND SULBACTAM SODIUM 3 G: 2; 1 INJECTION, POWDER, FOR SOLUTION INTRAMUSCULAR; INTRAVENOUS at 11:11

## 2024-11-17 RX ADMIN — CHLORHEXIDINE GLUCONATE 0.12% ORAL RINSE 15 ML: 1.2 LIQUID ORAL at 09:11

## 2024-11-17 RX ADMIN — AMPICILLIN SODIUM AND SULBACTAM SODIUM 3 G: 2; 1 INJECTION, POWDER, FOR SOLUTION INTRAMUSCULAR; INTRAVENOUS at 02:11

## 2024-11-17 RX ADMIN — SODIUM CHLORIDE, POTASSIUM CHLORIDE, SODIUM LACTATE AND CALCIUM CHLORIDE 500 ML: 600; 310; 30; 20 INJECTION, SOLUTION INTRAVENOUS at 02:11

## 2024-11-17 RX ADMIN — POTASSIUM BICARBONATE 50 MEQ: 978 TABLET, EFFERVESCENT ORAL at 12:11

## 2024-11-17 NOTE — PLAN OF CARE
Date of service: 11/15/2024    Please link this note to the resident's history and physical note note. This note serves as the attestation.    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 He was admitted to the ICU after undergoing tracheostomy, left hemiglossectomy, left modified neck dissection 1A through 4 and free flap for reconstruction and PEG tube placement. He was admitted on trach collar and hemodynamically stable.    Critical Care issues in this patient include:    Squamous cell cancer of tongue   * S/p resection, neck dissection and flap for reconstruction. Continue with q1hr flap checks. Continue multimodal pain regimen. Monitor drains' outputs and consistencies. Continue post-op antibiotics.    Tracheostomy in place   * Trach care per protocol.     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 HR in sinus rhythm.    Patient is admitted to SICU in stable condition. Continue MIVF. Keep PEG tube to gravity. Can receive meds 6 hrs after PEG tube was placed. Continue GI and DVT prophylaxis. Continue ICU care.    Critical care time spent: 30 min    Critical care was time spent personally by me on the following activities: development of treatment plan with patient or surrogate and bedside caregivers, discussions with consultants, evaluation of patient's response to treatment, examination of patient, ordering and performing treatments and interventions, ordering and review of laboratory studies, ordering and review of radiographic studies, pulse oximetry, re-evaluation of patient's condition.  This critical care time did not overlap with that of any other provider or involve time for any procedures.      Alirio Iqbal MD, FACS  General Surgery and Surgical Critical Care  [facility]

## 2024-11-17 NOTE — SUBJECTIVE & OBJECTIVE
Interval History: AFVSS, NAEON, though patient expresses he is having a hard time breathing through trach despite normal O2 sats and no resp distress. No flap issues. OOB this am.     Medications:  Continuous Infusions:  Scheduled Meds:   acetaminophen  650 mg Per G Tube Q4H    ampicillin-sulbactam  3 g Intravenous Q8H    aspirin  81 mg Per G Tube Daily    chlorhexidine  15 mL Mouth/Throat BID    enoxparin  40 mg Subcutaneous Daily    mupirocin   Nasal BID    pantoprazole  40 mg Per G Tube Daily    tamsulosin  0.4 mg Oral Daily     PRN Meds:  Current Facility-Administered Medications:     calcium gluconate IVPB, 1 g, Intravenous, PRN    calcium gluconate IVPB, 2 g, Intravenous, PRN    calcium gluconate IVPB, 3 g, Intravenous, PRN    hydrALAZINE, 10 mg, Intravenous, Q6H PRN    magnesium sulfate IVPB, 2 g, Intravenous, PRN    magnesium sulfate IVPB, 4 g, Intravenous, PRN    morphine, 2 mg, Intravenous, Q4H PRN    ondansetron, 8 mg, Oral, Q8H PRN    oxyCODONE, 7.5 mg, Per G Tube, Q4H PRN    potassium chloride, 40 mEq, Intravenous, PRN **AND** potassium chloride, 60 mEq, Intravenous, PRN **AND** potassium chloride, 80 mEq, Intravenous, PRN    prochlorperazine, 5 mg, Intravenous, Q6H PRN    senna-docusate 8.6-50 mg, 1 tablet, Oral, BID PRN    sodium chloride 0.9%, 10 mL, Intravenous, PRN     Review of patient's allergies indicates:  No Known Allergies  Objective:     Vital Signs (24h Range):  Temp:  [98.4 °F (36.9 °C)-98.8 °F (37.1 °C)] 98.4 °F (36.9 °C)  Pulse:  [] 92  Resp:  [6-49] 24  SpO2:  [91 %-100 %] 98 %  BP: (100-194)/(50-84) 138/72     Date 11/17/24 0700 - 11/18/24 0659   Shift 6043-8436 2872-2526 3522-5025 24 Hour Total   INTAKE   NG/   110   IV Piggyback 182.6   182.6   Shift Total(mL/kg) 292.6(3.8)   292.6(3.8)   OUTPUT   Shift Total(mL/kg)       Weight (kg) 77.8 77.8 77.8 77.8     Lines/Drains/Airways       Drain  Duration                  Closed/Suction Drain 11/15/24 1412 Tube - 1 Left Other  (Comment) Bulb 10 Fr. 1 day         Closed/Suction Drain 11/15/24 1606 Tube - 1 Neck Bulb 15 Fr. 1 day         Gastrostomy/Enterostomy 11/15/24 1059 Percutaneous endoscopic gastrostomy (PEG) LUQ feeding 1 day              Airway  Duration             Adult Surgical Airway 11/15/24 1603 Shiley Cuffed 6.0/ 75mm 1 day              Peripheral Intravenous Line  Duration                  Peripheral IV - Single Lumen 11/15/24 0807 18 G Posterior;Right Forearm 2 days         Peripheral IV - Triple Lumen 11/15/24 1050 18 G 1 1/4 in No Right Hand 1 day                     Physical Exam  NCAT  NAD  Breathing well on RA, no acute distress  HB I/VI bilat  Hearing well at conversational volume  OC and OP patent, essentially edentulous  R native tongue appears more edematous this am, apparently causing the flap the be slightly more taut - L pete-tongue consists of fasciocutaneous free flap from L forearm, color is normal, although slightly more taut. Silk stitch marks doppler site which is audible on handheld doppler, with normal arterial and venous pulses. Prick with normal blood without significant delay.   L neck incision CDI w staples, ALEE x1 w ss output, no hematoma  6-0 cuffed trach sutured to skin, cuff deflated  L upper arm incision closed w running suture, CDI, no hematoma  L forearm in gutter splint, ALEE x1 w ss output. Sensation intact, movement intact distally     Significant Labs:  BMP:   Recent Labs   Lab 11/16/24  0310   *      CO2 20*   BUN 12   CREATININE 1.1   CALCIUM 8.6*   MG 2.0     CBC:   Recent Labs   Lab 11/16/24  0310   WBC 16.19*   RBC 4.78   HGB 13.3*   HCT 42.1      MCV 88   MCH 27.8   MCHC 31.6*       Significant Diagnostics:  I have reviewed and interpreted all pertinent imaging results/findings within the past 24 hours.

## 2024-11-17 NOTE — NURSING
SICU PLAN OF CARE    Dx: <principal problem not specified>    Goals of Care: MAP >65, SBP<180    Vital Signs (last 12 hours):   Temp:  [98.4 °F (36.9 °C)-98.8 °F (37.1 °C)]   Pulse:  []   Resp:  [6-49]   BP: (117-166)/(57-80)   SpO2:  [95 %-99 %]      Neuro: AAOx4, Follows commands , and Moves all extremities spontaneously     Cardiac: normal sinus rhythm    Respiratory: Tracheostomy Collar; 8L, 35% FiO2    Urine Output: Voids Spontaneously  575 mL/shift    Drains: ALEE #1, total output 10mL /shift, ALEE #2, total output 10mL/shift    Diet: Tube Feeds at 55mL/hour     Labs/Accuchecks: Daily Labs    Skin: Patient turned q2h, bony prominences protected, and mattress inflated/working correctly.     Shift Events:  O2 requirements increased and one time dose of hydroxyzine given for anxiety. No acute events overnight.  See flowsheet for further assessment/details.  Family updated on current condition/plan of care, questions answered, and emotional support provided.  MD updated on current condition, vitals, labs, and gtts.

## 2024-11-17 NOTE — SUBJECTIVE & OBJECTIVE
Interval History/Significant Events: NAEON. Flap more taut and swollen this am with slow refill but excellent doppler signal.    Follow-up For: Procedure(s) (LRB):  GLOSSECTOMY (Left)  DISSECTION, NECK, MODIFIED RADICAL (Left)  TRACHEOTOMY (N/A)  CREATION, FREE FLAP, FOREARM, RADIAL ASPECT (Left)  EGD, WITH PEG TUBE INSERTION (N/A)  APPLICATION, GRAFT, SKIN, FULL-THICKNESS (Left)    Post-Operative Day: 2 Days Post-Op    Objective:     Vital Signs (Most Recent):  Temp: 98.8 °F (37.1 °C) (11/17/24 0315)  Pulse: 86 (11/17/24 0645)  Resp: 20 (11/17/24 0645)  BP: (!) 118/59 (11/17/24 0600)  SpO2: 98 % (11/17/24 0645) Vital Signs (24h Range):  Temp:  [98.4 °F (36.9 °C)-98.8 °F (37.1 °C)] 98.8 °F (37.1 °C)  Pulse:  [] 86  Resp:  [6-49] 20  SpO2:  [91 %-100 %] 98 %  BP: (100-194)/(50-84) 118/59     Weight: 77.8 kg (171 lb 8.3 oz)  Body mass index is 29.44 kg/m².      Intake/Output Summary (Last 24 hours) at 11/17/2024 0741  Last data filed at 11/17/2024 0106  Gross per 24 hour   Intake 736.67 ml   Output 750 ml   Net -13.33 ml          Physical Exam  Constitutional:       General: He is sleeping. He is not in acute distress.     Comments: Trach collar    HENT:      Mouth/Throat:      Comments: Flap more taut and swollen with slower refill, excellent doppler signals  Neck:      Comments: Well healing left neck incision with staples c/d/I   ALEE drain with SS output  Cardiovascular:      Rate and Rhythm: Normal rate and regular rhythm.   Pulmonary:      Effort: Pulmonary effort is normal. No respiratory distress.   Abdominal:      General: Abdomen is flat.      Palpations: Abdomen is soft.      Comments: PEG tube LUQ   Genitourinary:     Comments: Yee in place   Musculoskeletal:      Comments: Left forearm radial gutter splint c/d/I  ALEE drain with minimal SS output   Skin:     General: Skin is warm and dry.   Neurological:      General: No focal deficit present.      Cranial Nerves: No cranial nerve deficit.    Psychiatric:         Behavior: Behavior normal.            Vents:  Oxygen Concentration (%): 35 (11/17/24 0600)    Lines/Drains/Airways       Drain  Duration                  Closed/Suction Drain 11/15/24 1412 Tube - 1 Left Other (Comment) Bulb 10 Fr. 1 day         Closed/Suction Drain 11/15/24 1606 Tube - 1 Neck Bulb 15 Fr. 1 day         Gastrostomy/Enterostomy 11/15/24 1059 Percutaneous endoscopic gastrostomy (PEG) LUQ feeding 1 day              Airway  Duration             Adult Surgical Airway 11/15/24 1603 Shiley Cuffed 6.0/ 75mm 1 day              Peripheral Intravenous Line  Duration                  Peripheral IV - Single Lumen 11/15/24 0807 18 G Posterior;Right Forearm 1 day         Peripheral IV - Triple Lumen 11/15/24 1050 18 G 1 1/4 in No Right Hand 1 day                    Significant Labs:    CBC/Anemia Profile:  Recent Labs   Lab 11/15/24  1514 11/15/24  1655 11/16/24  0310   WBC  --  10.55 16.19*   HGB  --  12.5* 13.3*   HCT 34* 39.0* 42.1   PLT  --  168 168   MCV  --  85 88   RDW  --  15.3* 15.6*        Chemistries:  Recent Labs   Lab 11/15/24  1655 11/16/24  0310    138   K 3.9 4.5    107   CO2 23 20*   BUN 12 12   CREATININE 0.9 1.1   CALCIUM 9.0 8.6*   ALBUMIN 3.5  --    PROT 6.8  --    BILITOT 0.5  --    ALKPHOS 51  --    ALT 11  --    AST 26  --    MG 2.0 2.0   PHOS 2.4* 3.1       All pertinent labs within the past 24 hours have been reviewed.    Significant Imaging:  I have reviewed all pertinent imaging results/findings within the past 24 hours.

## 2024-11-17 NOTE — ASSESSMENT & PLAN NOTE
"Status post L partial glossectomy, Left neck dissection levels 1-4, tracheostomy, left radial forearm free flap, left upper arm FTSG 11/16/24 w Dr. Bailon (ablative) and Dr. Thornton (reconstruction).    Continue q1h flap checks: Record Doppler Pulses (artery and vein) ,Capillary Refill , Color, Turgor, temperature.   - Neurovascular Checks q1h of bilateral upper and lower extremities   - Keep head elevated and in neutral position, HOB 30 degress  - Sign above bed that reads "No circumferential Neck Ties"   - FOR ANY FLAP ISSUES, PLEASE PHONE ENT RESIDENT ON CALL.  - Please label drains carefully and document accordingly  - Strict NPO    Neuro:  - Alert, oriented.   - Scheduled tylenol, PRN oxy and dilauded    CV:  - HDS. D/C A Line. SBP between 110 and 160. No pressors unless ENT notified  - continue with q1h flap checks    Pulm:  - stable  - Recommend fresh trach care (humidified O2, flexible suctioning, trach supplies at bedside)  - Please place replacement 6-0 cuffed and cuffless trachs at bedside  - Bedside supplies: flexible suction caths, replacement inner cannulas, adrian collars and saline bullets  - Replace inner cannula daily   - Gentle suctioning   - Humidified O2   - Trach care teacher per nursing and RT  - Avoid gauze under trach plate to prevent accidental decannulation  - Sutures (if present) are to be cut my ENT MD only   - No circumferential neck ties given free flap  - Plan for trach change on POD3 (Mon)      GI/FEN/Lytes:  - Strict NPO  - On TF, slowly advance as tolerated. Hold for nausea.   - replace lytes prn    Renal:  - UOP adequate. Yee out.     Heme/ID:  - cont to trend HH  - cont unasyn x7d     Endo:  - Q6 acuchecks    MSK  - Left forearm splint for 5 days. Remove Weds and the bolster underneath  - PT and OT, OOB    PPX:  PT/OT  L40  PPI    Dispo:   Continue ICU care for q1h flap checks, anticipate step down Monday after trach change          "

## 2024-11-17 NOTE — PLAN OF CARE
Discussed plan of care with the patient and his wife at the bedside. Questions answered. Patient required frequent suctioning throughout the shift. Neurovascular and flap checks assessed and documented every hour. Pain managed on current regimen. Patient repositioned self. Bath completed and linen changed. Voids per urinal. Tube feeding tolerated. Will continue to monitor progress and adjust plan of care as needed.

## 2024-11-17 NOTE — PLAN OF CARE
Date of service: 11/16/2024    Please link this note to the resident's progress note. This note serves as the attestation.    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 He was admitted to the ICU after undergoing tracheostomy, left hemiglossectomy, left modified neck dissection 1A through 4 and free flap for reconstruction and PEG tube placement. He was admitted on trach collar and hemodynamically stable.    Critical Care issues in this patient include:    Squamous cell cancer of tongue              * S/p resection, neck dissection and flap for reconstruction. Continue with q1hr flap checks for another 24hrs. Continue multimodal pain regimen. Monitor drains' outputs and consistencies. Continue post-op antibiotics. Flap has good signals.     Tracheostomy in place              * Trach care per protocol.      Mobitz type 1 second degree AV block              * Avoid keily blockage medications. HR in sinus rhythm.    Patient continues to do well in the ICU. Continue multimodal pain regimen. Restart flomax. Discontinue jimenez. Increase tube feeds to goal. Continue with FWF using pedialytes. Leukocytosis noted post-op, as expected. Will have him in the chair later today. Continue GI and DVT prophylaxis. Continue ICU care for frequent flap checks.    Critical care time spent: 35 min    Critical care was time spent personally by me on the following activities: development of treatment plan with patient or surrogate and bedside caregivers, discussions with consultants, evaluation of patient's response to treatment, examination of patient, ordering and performing treatments and interventions, ordering and review of laboratory studies, ordering and review of radiographic studies, pulse oximetry, re-evaluation of patient's condition.  This critical care time did not overlap  with that of any other provider or involve time for any procedures.      Alirio Iqbal MD, FACS  General Surgery and Surgical Critical Care  [facility]

## 2024-11-17 NOTE — PROGRESS NOTES
[facility]  Critical Care - Surgery  Progress Note    Patient Name: Fan Paz  MRN: [id number]  Admission Date: 11/15/2024  Hospital Length of Stay: 2 days  Code Status: Full Code  Attending Provider: Cristhian Bailon MD  Primary Care Provider: Otilio Damon MD   Principal Problem: <principal problem not specified>    Subjective:     Hospital/ICU Course:  No notes on file    Interval History/Significant Events: NAEON. Flap more taut and swollen this am with slow refill but excellent doppler signal.    Follow-up For: Procedure(s) (LRB):  GLOSSECTOMY (Left)  DISSECTION, NECK, MODIFIED RADICAL (Left)  TRACHEOTOMY (N/A)  CREATION, FREE FLAP, FOREARM, RADIAL ASPECT (Left)  EGD, WITH PEG TUBE INSERTION (N/A)  APPLICATION, GRAFT, SKIN, FULL-THICKNESS (Left)    Post-Operative Day: 2 Days Post-Op    Objective:     Vital Signs (Most Recent):  Temp: 98.8 °F (37.1 °C) (11/17/24 0315)  Pulse: 86 (11/17/24 0645)  Resp: 20 (11/17/24 0645)  BP: (!) 118/59 (11/17/24 0600)  SpO2: 98 % (11/17/24 0645) Vital Signs (24h Range):  Temp:  [98.4 °F (36.9 °C)-98.8 °F (37.1 °C)] 98.8 °F (37.1 °C)  Pulse:  [] 86  Resp:  [6-49] 20  SpO2:  [91 %-100 %] 98 %  BP: (100-194)/(50-84) 118/59     Weight: 77.8 kg (171 lb 8.3 oz)  Body mass index is 29.44 kg/m².      Intake/Output Summary (Last 24 hours) at 11/17/2024 0741  Last data filed at 11/17/2024 0106  Gross per 24 hour   Intake 736.67 ml   Output 750 ml   Net -13.33 ml          Physical Exam  Constitutional:       General: He is sleeping. He is not in acute distress.     Comments: Trach collar    HENT:      Mouth/Throat:      Comments: Flap more taut and swollen with slower refill, excellent doppler signals  Neck:      Comments: Well healing left neck incision with staples c/d/I   ALEE drain with SS output  Cardiovascular:      Rate and Rhythm: Normal rate and regular rhythm.   Pulmonary:      Effort: Pulmonary effort is normal. No respiratory  distress.   Abdominal:      General: Abdomen is flat.      Palpations: Abdomen is soft.      Comments: PEG tube LUQ   Genitourinary:     Comments: Yee in place   Musculoskeletal:      Comments: Left forearm radial gutter splint c/d/I  ALEE drain with minimal SS output   Skin:     General: Skin is warm and dry.   Neurological:      General: No focal deficit present.      Cranial Nerves: No cranial nerve deficit.   Psychiatric:         Behavior: Behavior normal.            Vents:  Oxygen Concentration (%): 35 (11/17/24 0600)    Lines/Drains/Airways       Drain  Duration                  Closed/Suction Drain 11/15/24 1412 Tube - 1 Left Other (Comment) Bulb 10 Fr. 1 day         Closed/Suction Drain 11/15/24 1606 Tube - 1 Neck Bulb 15 Fr. 1 day         Gastrostomy/Enterostomy 11/15/24 1059 Percutaneous endoscopic gastrostomy (PEG) LUQ feeding 1 day              Airway  Duration             Adult Surgical Airway 11/15/24 1603 Shiley Cuffed 6.0/ 75mm 1 day              Peripheral Intravenous Line  Duration                  Peripheral IV - Single Lumen 11/15/24 0807 18 G Posterior;Right Forearm 1 day         Peripheral IV - Triple Lumen 11/15/24 1050 18 G 1 1/4 in No Right Hand 1 day                    Significant Labs:    CBC/Anemia Profile:  Recent Labs   Lab 11/15/24  1514 11/15/24  1655 11/16/24  0310   WBC  --  10.55 16.19*   HGB  --  12.5* 13.3*   HCT 34* 39.0* 42.1   PLT  --  168 168   MCV  --  85 88   RDW  --  15.3* 15.6*        Chemistries:  Recent Labs   Lab 11/15/24  1655 11/16/24  0310    138   K 3.9 4.5    107   CO2 23 20*   BUN 12 12   CREATININE 0.9 1.1   CALCIUM 9.0 8.6*   ALBUMIN 3.5  --    PROT 6.8  --    BILITOT 0.5  --    ALKPHOS 51  --    ALT 11  --    AST 26  --    MG 2.0 2.0   PHOS 2.4* 3.1       All pertinent labs within the past 24 hours have been reviewed.    Significant Imaging:  I have reviewed all pertinent imaging results/findings within the past 24 hours.  Assessment/Plan:      Oncology  Squamous cell cancer of tongue  85yo M with PMHx of HLD, HTN, prostate cancer, anemia, GERD, DVT/PE, 2nd degree AV block, SCC of tongue who presents to the SICU s/p L partial glossectomy with L forearm free flap, L neck dissection and tracheostomy. He also had a PEG placed by general surgery during the case. Admitted for q1h flap checks for 48 hours. Okay to stepdown to PCU 11/17 pm for q2h flap checks.      Neuro/Psych:     - Sedation: None    - Neurovascular checks q1h POD#1 and 2, then q2h POD# 3 and 4 (11/17 pm), and then q4h POD#5 on donor site     - Pain:       - Scheduled Acetaminophen 650mg q6h       - Oxy 5 and IV morphine PRN             Cardiac:     - BP Goal: MAP >65 and SBP <180    - Hydralazine PRN, avoid labetalol due to AV block     - Previously on losartan, but has not recently filled    - Rhythm: NSR      Pulmonary:     - Goal SpO2 >92%    - Wean O2 as tolerated    - No neck ties around neck    - Keep head in neutral position    - Stoma Care    - Keep extra trach at bedside    - Obturator at HOB   - Take Singulair nightly for runny nose/allergies/post nasal drip        Renal:    - Trend BUN/Cr     - Maintain Yee, record strict Is/Os   - Home Flomax      FEN / GI:     - Daily CMP, PRN K/Mag/Phos per protocol, replete as needed    - Nutrition: strict NPO x1 week    - Dietician Consult     - Trickle Tube feeds, goal rate 55    - FWF 225mL q6h    - Bowel Regimen: Senna-docusate PRN     - Zofran PRN for nausea    - IV PPI, takes daily at home      ID:     - Abx: Unasyn x 72h post-op    - monitor CBC      Heme/Onc:     - Hgb stable on admit    - CBC daily      Endocrine:     - No hx of DM II, most recent A1c 5.9%        PPx:   Feeding: NPO  Analgesia/Sedation: Multimodal  Thromboembolic Prevention: SCD's and Lovenox 40 QD  HOB >30: Yes  Stress Ulcer: IV PPI  Glucose Control: POCT glucose BID     Lines/Drains/Airway:         Trach collar         PEG         2 PIV         Drains  x2    Dispo: Plan for stepdown to PCU 11/17 pm       Luis Maria MD  Critical Care - Surgery  [facility]

## 2024-11-17 NOTE — PROGRESS NOTES
[facility]  Otorhinolaryngology-Head & Neck Surgery  Progress Note    Subjective:     Post-Op Info:  Procedure(s) (LRB):  GLOSSECTOMY (Left)  DISSECTION, NECK, MODIFIED RADICAL (Left)  TRACHEOTOMY (N/A)  CREATION, FREE FLAP, FOREARM, RADIAL ASPECT (Left)  EGD, WITH PEG TUBE INSERTION (N/A)  APPLICATION, GRAFT, SKIN, FULL-THICKNESS (Left)   2 Days Post-Op  Hospital Day: 3     Interval History: AFVSS, NAEON, though patient expresses he is having a hard time breathing through trach despite normal O2 sats and no resp distress. No flap issues. OOB this am.     Medications:  Continuous Infusions:  Scheduled Meds:   acetaminophen  650 mg Per G Tube Q4H    ampicillin-sulbactam  3 g Intravenous Q8H    aspirin  81 mg Per G Tube Daily    chlorhexidine  15 mL Mouth/Throat BID    enoxparin  40 mg Subcutaneous Daily    mupirocin   Nasal BID    pantoprazole  40 mg Per G Tube Daily    tamsulosin  0.4 mg Oral Daily     PRN Meds:  Current Facility-Administered Medications:     calcium gluconate IVPB, 1 g, Intravenous, PRN    calcium gluconate IVPB, 2 g, Intravenous, PRN    calcium gluconate IVPB, 3 g, Intravenous, PRN    hydrALAZINE, 10 mg, Intravenous, Q6H PRN    magnesium sulfate IVPB, 2 g, Intravenous, PRN    magnesium sulfate IVPB, 4 g, Intravenous, PRN    morphine, 2 mg, Intravenous, Q4H PRN    ondansetron, 8 mg, Oral, Q8H PRN    oxyCODONE, 7.5 mg, Per G Tube, Q4H PRN    potassium chloride, 40 mEq, Intravenous, PRN **AND** potassium chloride, 60 mEq, Intravenous, PRN **AND** potassium chloride, 80 mEq, Intravenous, PRN    prochlorperazine, 5 mg, Intravenous, Q6H PRN    senna-docusate 8.6-50 mg, 1 tablet, Oral, BID PRN    sodium chloride 0.9%, 10 mL, Intravenous, PRN     Review of patient's allergies indicates:  No Known Allergies  Objective:     Vital Signs (24h Range):  Temp:  [98.4 °F (36.9 °C)-98.8 °F (37.1 °C)] 98.4 °F (36.9 °C)  Pulse:  [] 92  Resp:  [6-49] 24  SpO2:  [91 %-100 %] 98 %  BP:  (100-194)/(50-84) 138/72     Date 11/17/24 0700 - 11/18/24 0659   Shift 2969-6662 6649-9909 5509-8203 24 Hour Total   INTAKE   NG/   110   IV Piggyback 182.6   182.6   Shift Total(mL/kg) 292.6(3.8)   292.6(3.8)   OUTPUT   Shift Total(mL/kg)       Weight (kg) 77.8 77.8 77.8 77.8     Lines/Drains/Airways       Drain  Duration                  Closed/Suction Drain 11/15/24 1412 Tube - 1 Left Other (Comment) Bulb 10 Fr. 1 day         Closed/Suction Drain 11/15/24 1606 Tube - 1 Neck Bulb 15 Fr. 1 day         Gastrostomy/Enterostomy 11/15/24 1059 Percutaneous endoscopic gastrostomy (PEG) LUQ feeding 1 day              Airway  Duration             Adult Surgical Airway 11/15/24 1603 Shiley Cuffed 6.0/ 75mm 1 day              Peripheral Intravenous Line  Duration                  Peripheral IV - Single Lumen 11/15/24 0807 18 G Posterior;Right Forearm 2 days         Peripheral IV - Triple Lumen 11/15/24 1050 18 G 1 1/4 in No Right Hand 1 day                     Physical Exam  NCAT  NAD  Breathing well on RA, no acute distress  HB I/VI bilat  Hearing well at conversational volume  OC and OP patent, essentially edentulous  R native tongue appears more edematous this am, apparently causing the flap the be slightly more taut - L pete-tongue consists of fasciocutaneous free flap from L forearm, color is normal, although slightly more taut. Silk stitch marks doppler site which is audible on handheld doppler, with normal arterial and venous pulses. Prick with normal blood without significant delay.   L neck incision CDI w staples, ALEE x1 w ss output, no hematoma  6-0 cuffed trach sutured to skin, cuff deflated  L upper arm incision closed w running suture, CDI, no hematoma  L forearm in gutter splint, ALEE x1 w ss output. Sensation intact, movement intact distally     Significant Labs:  BMP:   Recent Labs   Lab 11/16/24  0310   *      CO2 20*   BUN 12   CREATININE 1.1   CALCIUM 8.6*   MG 2.0     CBC:   Recent Labs  "  Lab 11/16/24  0310   WBC 16.19*   RBC 4.78   HGB 13.3*   HCT 42.1      MCV 88   MCH 27.8   MCHC 31.6*       Significant Diagnostics:  I have reviewed and interpreted all pertinent imaging results/findings within the past 24 hours.  Assessment/Plan:     Squamous cell cancer of tongue  Status post L partial glossectomy, Left neck dissection levels 1-4, tracheostomy, left radial forearm free flap, left upper arm FTSG 11/16/24 w Dr. Bailon (ablative) and Dr. Thornton (reconstruction).    Continue q1h flap checks: Record Doppler Pulses (artery and vein) ,Capillary Refill , Color, Turgor, temperature.   - Neurovascular Checks q1h of bilateral upper and lower extremities   - Keep head elevated and in neutral position, HOB 30 degress  - Sign above bed that reads "No circumferential Neck Ties"   - FOR ANY FLAP ISSUES, PLEASE PHONE ENT RESIDENT ON CALL.  - Please label drains carefully and document accordingly  - Strict NPO    Neuro:  - Alert, oriented.   - Scheduled tylenol, PRN oxy and dilauded    CV:  - HDS. D/C A Line. SBP between 110 and 160. No pressors unless ENT notified  - continue with q1h flap checks    Pulm:  - stable  - Recommend fresh trach care (humidified O2, flexible suctioning, trach supplies at bedside)  - Please place replacement 6-0 cuffed and cuffless trachs at bedside  - Bedside supplies: flexible suction caths, replacement inner cannulas, adrian collars and saline bullets  - Replace inner cannula daily   - Gentle suctioning   - Humidified O2   - Trach care teacher per nursing and RT  - Avoid gauze under trach plate to prevent accidental decannulation  - Sutures (if present) are to be cut my ENT MD only   - No circumferential neck ties given free flap  - Plan for trach change on POD3 (Mon)      GI/FEN/Lytes:  - Strict NPO  - On TF, slowly advance as tolerated. Hold for nausea.   - replace lytes prn    Renal:  - UOP adequate. Yee out.     Heme/ID:  - cont to trend HH  - cont unasyn x7d     Endo:  - " Q6 acuchecks    MSK  - Left forearm splint for 5 days. Remove Weds and the bolster underneath  - PT and OT, OOB    PPX:  PT/OT  L40  PPI    Dispo:   Continue ICU care for q1h flap checks, anticipate step down Monday after trach change                Jimy Montalvo MD  Otorhinolaryngology-Head & Neck Surgery  [facility]

## 2024-11-17 NOTE — OP NOTE
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 Tracheostomy   2. Left hemiglossectomy  3. Left modified neck dissection of levels 1A through 4     ANESTHESIA: General      INDICATIONS FOR PROCEDURE:   Fan Paz is a 84 y.o. 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  Staging studies revealed no evidence of distant disease.  As such, I recommended that we proceed to the operating room for left hemiglossectomy, neck dissection and free flap reconstruction.  I enlisted the services of my colleague, Dr. Katina Thornton, to assist with reconstruction.    He was apprised of the risks, benefits and alternatives to surgery.  In spite of the risk inherent to surgery,he provided informed consent for the aforementioned procedures.     PROCEDURE IN DETAIL:  The patient was taken to the operating room and placed on the operating table in the supine position.  General endotracheal anesthesia was induced by the anesthesia team.     The operating table was rotated 180°.  A PEG tube was placed by General surgery.      Next, an apron incision was marked in the neck to allow for access to the relevant levels of the neck.  The tracheostomy was also marked at the level of the cricoid cartilage.  The intended incisions were injected with several cc of 1% lidocaine with epinephrine.  The face, neck and left upper extremity were  then prepped and draped in the standard sterile fashion.      To begin, the tracheostomy was performed.  The tracheostomy incision was carried out with the Bovie on cutting current.  Sharp dissection proceeded down through the underlying subcutaneous tissue and platysma.  The midline was identified and divided with the Bovie.  The strap musculature was retracted laterally on each side.  The thyroid isthmus was bluntly dissected away from the underlying trachea and was divided with the Bovie.  The trachea was retracted anteriorly and superiorly by placing a single hook inferior to the cricoid.  The trachea was then entered between the 1st and 2nd tracheal rings.  An inferiorly based Barbara flap was fashioned by making downward cuts on either side of the tracheostomy with curved Parker scissors.  The flap was then secured to the skin of the neck with 3-0 Vicryl suture.  The endotracheal tube was withdrawn by anesthesia and a size 7.5 reinforced endotracheal tube was placed into the airway.  The cuff was inflated and was attached to the anesthesia circuit.  Placement in the airway was confirmed with return of end-tidal CO2.    Next attention was turned to the left neck dissection.  The incision was carried out utilizing the Bovie on cutting current.  Sharp dissection proceeded through the underlying subcutaneous tissue and platysma.  Superiorly and inferiorly-based subplatysmal flaps were elevated from the level of the mandible and mastoid superiorly to the level of the clavicle inferiorly.  To begin, level 1 was dissected.  Level 1A was initially addressed.  The fibrofatty tissue overlying the anterior bellies of the digastric and mylohyoid was incised at the level of the mandible.  The specimen was elevated off of the muscular floor down to the level of the hyoid, amputated and sent to pathology for permanent analysis.      Next attention was turned to level 1B.  The remainder of the digastric tendon and posterior belly  of the digastric was skeletonized.  The facial vein was isolated, clipped and divided taking care to preserve maximal length for microvascular anastomosis.  Dissection proceeded superiorly.  The marginal mandibular nerve was identified and was swept superiorly.  The fibrofatty tissue of level 1B was then freed from mandibular periosteum.  The facial artery and vein were then ligated at the mandibular notch utilizing vascular clips.  The specimen was then freed from the mandible and brought from superior to inferior.  The anterior belly of the digastric and mylohyoid were further skeletonized along their posterior and lateral aspects.  The mylohyoid vascular pedicle was isolated, clipped and divided.  The submandibular ganglion was clipped and divided as was St. Clair's duct.  The hypoglossal nerve was identified and preserved.  Ultimately, the specimen was amputated and sent to pathology for permanent analysis.      Attention was then turned to the lateral neck.  The anterior border of the sternocleidomastoid muscle was skeletonized along its full length.  The mylohyoid muscle was isolated, circumferentially dissected and divided with the Bovie.  Dissection then returned superiorly to identify the spinal accessory nerve which was traced cephalad to its junction with the posterior belly of the digastric and internal jugular vein.  The lateral border of the internal jugular vein was then skeletonized.  The fibrofatty tissue of level 2b was transected down to the underlying deep cervical fascia and swept deep to the spinal accessory nerve.  The fibrofatty tissue of levels 2A, 3 and 4 was then transected down to the underlying cervical rootlets and deep cervical fascia.  The phrenic nerve was identified and preserved.  The transverse cervical vessels were also identified and preserved.  The lateral border of the internal jugular vein was skeletonized inferiorly.  Several pedicles were clipped and divided prevent a Chyle  leak.  Ultimately, the specimen was freed off of the floor of the neck up to the level of the carotid sheath which was sharply opened.  The carotid sheath structures including the common carotid artery, vagus nerve and internal jugular vein were identified and preserved.  Ultimately, the specimen was amputated and sent to pathology for permanent analysis.      Attention was then turned to the tumor resection.  The oral cavity was exposed by placing the side-biting mouth gag and cheek retractor.  The tumor was noted to be ulcerated and located in the left lateral aspect of the tongue.  The medial floor of mouth was involved and there were several mm of depth palpable deep to the tumor.  A 2 cm margin was marked circumferentially and incised with the Bovie.  Dissection proceeded medially through the musculature of the tongue and laterally through the floor of mouth connecting to the neck dissection.  The specimen was freed from anterior to posterior.  Once the posterior limit had been reached, the anterior tonsillar pillar and base of tongue were transected and the specimen was delivered into the neck.  The lingual artery and hypoglossal nerve were then sacrificed with the specimen.  Ultimately, the specimen was oriented as below and frozen sections margins were sent and found to be negative for carcinoma.  The main specimen was sent for permanent analysis.  At this point, hemostasis was achieved and he was handed over to Dr. Thornton for the reconstructive portion of the procedure.    There were no intraoperative complications.  I was present for and participated in the entire procedure as dictated above.       ESTIMATED BLOOD LOSS: 100 mL    SPECIMENS:   Specimen (24h ago, onward)      1. Neck dissection 1A, permanent 2. Left neck dissection level 1B, permanent 3. Left walter glossectomy, single stitch anterior, double stitch dorsal, permanent 4. Floor of mouth margin, frozen to path 5. Anterior margin, frozen sent to  path 6. Base of tongue margin, frozen sent to path 7. Deep margin, frozen sent to path 8. Left neck dissection levels 2, 3, and 4, permanent

## 2024-11-17 NOTE — PLAN OF CARE
Date of service: 11/17/2024    Please link this note to the resident's progress note. This note serves as the attestation.    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 He was admitted to the ICU after undergoing tracheostomy, left hemiglossectomy, left modified neck dissection 1A through 4 and free flap for reconstruction and PEG tube placement. He was admitted on trach collar and hemodynamically stable.    Critical Care issues in this patient include:    Squamous cell cancer of tongue              * S/p resection, neck dissection and flap for reconstruction. Continue with q1hr flap checks; transitioning to q2hr flap checks later today. Continue multimodal pain regimen. Not requiring much pain medication. Hold tylenol today as he is reaching the max in 24hrs. Monitor drains' outputs and consistencies. Continue post-op antibiotics. Had some concerns for edema at flap site but continues to have good doppler signals     Tracheostomy in place              * Trach care per protocol.      Mobitz type 1 second degree AV block              * Avoid keily blockage medications. HR in sinus rhythm.    Patient is doing well. Flap checks are appropriate. He has been in the chair for a couple of hours today. Voiding without jimenez though output is low. Ok for additional volume down PEG tube if he can tolerate it. Tube feeds are at goal. He is on a bowel regimen. Incision sites look good. Continue GI and DVT prophylaxis. Can be stepped down to PCU once transitioned to q2hr flap checks.    Critical care time spent: 30 min    Critical care was time spent personally by me on the following activities: development of treatment plan with patient or surrogate and bedside caregivers, discussions with consultants, evaluation of patient's response to treatment, examination of patient,  ordering and performing treatments and interventions, ordering and review of laboratory studies, ordering and review of radiographic studies, pulse oximetry, re-evaluation of patient's condition.  This critical care time did not overlap with that of any other provider or involve time for any procedures.      Ailrio Iqbal MD, [facility]  General Surgery and Surgical Critical Care  [facility]

## 2024-11-17 NOTE — ASSESSMENT & PLAN NOTE
85yo M with PMHx of HLD, HTN, prostate cancer, anemia, GERD, DVT/PE, 2nd degree AV block, SCC of tongue who presents to the SICU s/p L partial glossectomy with L forearm free flap, L neck dissection and tracheostomy. He also had a PEG placed by general surgery during the case. Admitted for q1h flap checks for 48 hours. Okay to stepdown to PCU 11/17 pm for q2h flap checks.      Neuro/Psych:     - Sedation: None    - Neurovascular checks q1h POD#1 and 2, then q2h POD# 3 and 4 (11/17 pm), and then q4h POD#5 on donor site     - Pain:       - Scheduled Acetaminophen 650mg q6h       - Oxy 5 and IV morphine PRN             Cardiac:     - BP Goal: MAP >65 and SBP <180    - Hydralazine PRN, avoid labetalol due to AV block     - Previously on losartan, but has not recently filled    - Rhythm: NSR      Pulmonary:     - Goal SpO2 >92%    - Wean O2 as tolerated    - No neck ties around neck    - Keep head in neutral position    - Stoma Care    - Keep extra trach at bedside    - Obturator at HOB   - Take Singulair nightly for runny nose/allergies/post nasal drip        Renal:    - Trend BUN/Cr     - Maintain Yee, record strict Is/Os   - Home Flomax      FEN / GI:     - Daily CMP, PRN K/Mag/Phos per protocol, replete as needed    - Nutrition: strict NPO x1 week    - Dietician Consult     - Trickle Tube feeds, goal rate 55    - FWF 225mL q6h    - Bowel Regimen: Senna-docusate PRN     - Zofran PRN for nausea    - IV PPI, takes daily at home      ID:     - Abx: Unasyn x 72h post-op    - monitor CBC      Heme/Onc:     - Hgb stable on admit    - CBC daily      Endocrine:     - No hx of DM II, most recent A1c 5.9%        PPx:   Feeding: NPO  Analgesia/Sedation: Multimodal  Thromboembolic Prevention: SCD's and Lovenox 40 QD  HOB >30: Yes  Stress Ulcer: IV PPI  Glucose Control: POCT glucose BID     Lines/Drains/Airway:         Trach collar         PEG         2 PIV         Drains x2    Dispo: Plan for stepdown to PCU 11/17 pm

## 2024-11-18 LAB
ANION GAP SERPL CALC-SCNC: 8 MMOL/L (ref 8–16)
BASOPHILS # BLD AUTO: 0.02 K/UL (ref 0–0.2)
BASOPHILS NFR BLD: 0.2 % (ref 0–1.9)
BUN SERPL-MCNC: 17 MG/DL (ref 8–23)
CALCIUM SERPL-MCNC: 8.6 MG/DL (ref 8.7–10.5)
CHLORIDE SERPL-SCNC: 106 MMOL/L (ref 95–110)
CO2 SERPL-SCNC: 24 MMOL/L (ref 23–29)
CREAT SERPL-MCNC: 0.8 MG/DL (ref 0.5–1.4)
DIFFERENTIAL METHOD BLD: ABNORMAL
EOSINOPHIL # BLD AUTO: 0.1 K/UL (ref 0–0.5)
EOSINOPHIL NFR BLD: 0.7 % (ref 0–8)
ERYTHROCYTE [DISTWIDTH] IN BLOOD BY AUTOMATED COUNT: 15.8 % (ref 11.5–14.5)
EST. GFR  (NO RACE VARIABLE): >60 ML/MIN/1.73 M^2
GLUCOSE SERPL-MCNC: 111 MG/DL (ref 70–110)
HCT VFR BLD AUTO: 34.7 % (ref 40–54)
HGB BLD-MCNC: 11.3 G/DL (ref 14–18)
IMM GRANULOCYTES # BLD AUTO: 0.03 K/UL (ref 0–0.04)
IMM GRANULOCYTES NFR BLD AUTO: 0.3 % (ref 0–0.5)
LYMPHOCYTES # BLD AUTO: 0.6 K/UL (ref 1–4.8)
LYMPHOCYTES NFR BLD: 5.8 % (ref 18–48)
MAGNESIUM SERPL-MCNC: 2.3 MG/DL (ref 1.6–2.6)
MCH RBC QN AUTO: 27.8 PG (ref 27–31)
MCHC RBC AUTO-ENTMCNC: 32.6 G/DL (ref 32–36)
MCV RBC AUTO: 85 FL (ref 82–98)
MONOCYTES # BLD AUTO: 1.1 K/UL (ref 0.3–1)
MONOCYTES NFR BLD: 10.7 % (ref 4–15)
NEUTROPHILS # BLD AUTO: 8.7 K/UL (ref 1.8–7.7)
NEUTROPHILS NFR BLD: 82.3 % (ref 38–73)
NRBC BLD-RTO: 0 /100 WBC
PHOSPHATE SERPL-MCNC: 1.8 MG/DL (ref 2.7–4.5)
PLATELET # BLD AUTO: 176 K/UL (ref 150–450)
PMV BLD AUTO: 11 FL (ref 9.2–12.9)
POCT GLUCOSE: 128 MG/DL (ref 70–110)
POTASSIUM SERPL-SCNC: 3.6 MMOL/L (ref 3.5–5.1)
RBC # BLD AUTO: 4.07 M/UL (ref 4.6–6.2)
SODIUM SERPL-SCNC: 138 MMOL/L (ref 136–145)
WBC # BLD AUTO: 10.6 K/UL (ref 3.9–12.7)

## 2024-11-18 PROCEDURE — 25000003 PHARM REV CODE 250

## 2024-11-18 PROCEDURE — 94761 N-INVAS EAR/PLS OXIMETRY MLT: CPT

## 2024-11-18 PROCEDURE — 25000003 PHARM REV CODE 250: Performed by: STUDENT IN AN ORGANIZED HEALTH CARE EDUCATION/TRAINING PROGRAM

## 2024-11-18 PROCEDURE — 97116 GAIT TRAINING THERAPY: CPT

## 2024-11-18 PROCEDURE — 99900026 HC AIRWAY MAINTENANCE (STAT)

## 2024-11-18 PROCEDURE — 25000003 PHARM REV CODE 250: Performed by: OTOLARYNGOLOGY

## 2024-11-18 PROCEDURE — 20600001 HC STEP DOWN PRIVATE ROOM

## 2024-11-18 PROCEDURE — 63600175 PHARM REV CODE 636 W HCPCS: Performed by: STUDENT IN AN ORGANIZED HEALTH CARE EDUCATION/TRAINING PROGRAM

## 2024-11-18 PROCEDURE — 27100171 HC OXYGEN HIGH FLOW UP TO 24 HOURS

## 2024-11-18 PROCEDURE — 99900035 HC TECH TIME PER 15 MIN (STAT)

## 2024-11-18 PROCEDURE — 85025 COMPLETE CBC W/AUTO DIFF WBC: CPT

## 2024-11-18 PROCEDURE — 84100 ASSAY OF PHOSPHORUS: CPT

## 2024-11-18 PROCEDURE — 97535 SELF CARE MNGMENT TRAINING: CPT

## 2024-11-18 PROCEDURE — 99291 CRITICAL CARE FIRST HOUR: CPT | Mod: ,,, | Performed by: STUDENT IN AN ORGANIZED HEALTH CARE EDUCATION/TRAINING PROGRAM

## 2024-11-18 PROCEDURE — 31720 CLEARANCE OF AIRWAYS: CPT

## 2024-11-18 PROCEDURE — 83735 ASSAY OF MAGNESIUM: CPT

## 2024-11-18 PROCEDURE — 27201112

## 2024-11-18 PROCEDURE — 80048 BASIC METABOLIC PNL TOTAL CA: CPT

## 2024-11-18 RX ORDER — SILODOSIN 4 MG/1
8 CAPSULE ORAL DAILY
Status: DISCONTINUED | OUTPATIENT
Start: 2024-11-18 | End: 2024-11-22 | Stop reason: HOSPADM

## 2024-11-18 RX ORDER — OXYCODONE HCL 5 MG/5 ML
5 SOLUTION, ORAL ORAL EVERY 4 HOURS PRN
Status: CANCELLED | OUTPATIENT
Start: 2024-11-18

## 2024-11-18 RX ORDER — LANOLIN ALCOHOL/MO/W.PET/CERES
800 CREAM (GRAM) TOPICAL
Status: DISCONTINUED | OUTPATIENT
Start: 2024-11-18 | End: 2024-11-20

## 2024-11-18 RX ORDER — ACETAMINOPHEN 325 MG/1
650 TABLET ORAL EVERY 6 HOURS PRN
Status: DISCONTINUED | OUTPATIENT
Start: 2024-11-18 | End: 2024-11-18

## 2024-11-18 RX ORDER — SODIUM,POTASSIUM PHOSPHATES 280-250MG
2 POWDER IN PACKET (EA) ORAL
Status: DISCONTINUED | OUTPATIENT
Start: 2024-11-18 | End: 2024-11-20

## 2024-11-18 RX ORDER — AMOXICILLIN 250 MG
1 CAPSULE ORAL 2 TIMES DAILY PRN
Status: DISCONTINUED | OUTPATIENT
Start: 2024-11-18 | End: 2024-11-22 | Stop reason: HOSPADM

## 2024-11-18 RX ORDER — ACETAMINOPHEN 500 MG
500 TABLET ORAL EVERY 6 HOURS PRN
Status: DISCONTINUED | OUTPATIENT
Start: 2024-11-18 | End: 2024-11-18

## 2024-11-18 RX ORDER — ACETAMINOPHEN 500 MG
500 TABLET ORAL EVERY 6 HOURS PRN
Status: DISCONTINUED | OUTPATIENT
Start: 2024-11-18 | End: 2024-11-22 | Stop reason: HOSPADM

## 2024-11-18 RX ORDER — ONDANSETRON 8 MG/1
8 TABLET, ORALLY DISINTEGRATING ORAL EVERY 8 HOURS PRN
Status: DISCONTINUED | OUTPATIENT
Start: 2024-11-18 | End: 2024-11-22 | Stop reason: HOSPADM

## 2024-11-18 RX ADMIN — POLYETHYLENE GLYCOL 3350 17 G: 17 POWDER, FOR SOLUTION ORAL at 09:11

## 2024-11-18 RX ADMIN — POTASSIUM & SODIUM PHOSPHATES POWDER PACK 280-160-250 MG 2 PACKET: 280-160-250 PACK at 11:11

## 2024-11-18 RX ADMIN — MUPIROCIN: 20 OINTMENT TOPICAL at 09:11

## 2024-11-18 RX ADMIN — PANTOPRAZOLE SODIUM 40 MG: 40 FOR SUSPENSION ORAL at 09:11

## 2024-11-18 RX ADMIN — ENOXAPARIN SODIUM 40 MG: 40 INJECTION SUBCUTANEOUS at 04:11

## 2024-11-18 RX ADMIN — POTASSIUM & SODIUM PHOSPHATES POWDER PACK 280-160-250 MG 2 PACKET: 280-160-250 PACK at 07:11

## 2024-11-18 RX ADMIN — ATORVASTATIN CALCIUM 20 MG: 20 TABLET, FILM COATED ORAL at 09:11

## 2024-11-18 RX ADMIN — MONTELUKAST 10 MG: 10 TABLET, FILM COATED ORAL at 09:11

## 2024-11-18 RX ADMIN — ASPIRIN 81 MG CHEWABLE TABLET 81 MG: 81 TABLET CHEWABLE at 09:11

## 2024-11-18 RX ADMIN — CHLORHEXIDINE GLUCONATE 0.12% ORAL RINSE 15 ML: 1.2 LIQUID ORAL at 09:11

## 2024-11-18 RX ADMIN — AMPICILLIN SODIUM AND SULBACTAM SODIUM 3 G: 2; 1 INJECTION, POWDER, FOR SOLUTION INTRAMUSCULAR; INTRAVENOUS at 07:11

## 2024-11-18 RX ADMIN — POTASSIUM & SODIUM PHOSPHATES POWDER PACK 280-160-250 MG 2 PACKET: 280-160-250 PACK at 03:11

## 2024-11-18 RX ADMIN — ACETAMINOPHEN 500 MG: 500 TABLET ORAL at 09:11

## 2024-11-18 RX ADMIN — AMPICILLIN SODIUM AND SULBACTAM SODIUM 3 G: 2; 1 INJECTION, POWDER, FOR SOLUTION INTRAMUSCULAR; INTRAVENOUS at 11:11

## 2024-11-18 RX ADMIN — SILODOSIN 8 MG: 4 CAPSULE ORAL at 09:11

## 2024-11-18 RX ADMIN — POTASSIUM BICARBONATE 50 MEQ: 978 TABLET, EFFERVESCENT ORAL at 07:11

## 2024-11-18 RX ADMIN — AMPICILLIN SODIUM AND SULBACTAM SODIUM 3 G: 2; 1 INJECTION, POWDER, FOR SOLUTION INTRAMUSCULAR; INTRAVENOUS at 03:11

## 2024-11-18 NOTE — PLAN OF CARE
SICU PLAN OF CARE    Dx: Squamous cell cancer of tongue    Goals of Care: Flap/RENAN checks q 2    Vital Signs (last 12 hours):   Temp:  [99.2 °F (37.3 °C)-99.6 °F (37.6 °C)]   Pulse:  []   Resp:  [15-27]   BP: ()/(50-77)   SpO2:  [93 %-99 %]      Neuro: AAOx4, Follows commands , and Moves all extremities spontaneously     Cardiac: sinus tachycardia    Respiratory: Tracheostomy Collar; 8L, 35% FiO2    Urine Output: Voids Spontaneously  700 mL/shift      Drains: ALEE #1, total output 25mL /shift, ALEE #2, total output 15mL/shift    Diet: NPO     Flap Checks q 2      Labs/Accuchecks: Daily labs, Accuchecks q 6 hr    Skin:  No skin breakdown. Patient turned q2h, bony prominences protected, and mattress inflated/working correctly.     Shift Events:  No acute events overnight.  See flowsheet for further assessment/details.  Family updated on current condition/plan of care, questions answered, and emotional support provided.  MD updated on current condition, vitals, labs, and gtts.

## 2024-11-18 NOTE — PROGRESS NOTES
MD reached out to RD via Secure Chat for bolus feed recs. Pt's TF of Pivot 1.5 is at goal, 55ml/hr and tolerating per RN/MD notes. Note that pt is scheduled to step down to PCU. Trach collar present. Labs and meds were reviewed. Loose BM noted on 11/17. Full RD assessment f/u scheduled for tomorrow.  Please review updated recs below.       Recommendations     1.) TF recs for continuous TF's:     - Continuous: Pivot 1.5 at  goal rate of 55 ml/hr to provide 1980 kcal, 124 g PRO, 228 g CHO, and 1001 ml FF.                  - FWF: 225 ml Q 6 hrs to provide an additional 900 ml of fluid for 1901 ml TFV.                  - monitor for s/s of intolerance such as residuals >500ml, n/v/d, or abdominal distension.       2.) TF recs for bolus feds:      - recommend to bolus 220ml of Pivot 1.5 Q4hrs to provide 1980 kcal, 124 g PRO, 228 g CHO, and 1001 ml FF.      - FWF: recommend FWF of 75ml before and after each feed to provide an additional 900ml of fluid and 1901ml TFV.     3.) If Loose BM's continue, consider adding Banatrol BID to help with diarrhea.     4.) RD to monitor wt, tolerance, skin, labs.       Thank you!    Kathy Guy, MS RDN [facility]

## 2024-11-18 NOTE — ASSESSMENT & PLAN NOTE
"Status post L partial glossectomy, Left neck dissection levels 1-4, tracheostomy, left radial forearm free flap, left upper arm FTSG 11/16/24 w Dr. Bailon (ablative) and Dr. Thornton (reconstruction).    Continue q2h flap checks: Record Doppler Pulses (artery and vein) ,Capillary Refill , Color, Turgor, temperature.   - Neurovascular Checks q2h of bilateral upper and lower extremities   - Keep head elevated and in neutral position, HOB 30 degress  - Sign above bed that reads "No circumferential Neck Ties"   - FOR ANY FLAP ISSUES, PLEASE PHONE ENT RESIDENT ON CALL.  - Please label drains carefully and document accordingly  - Strict NPO, all med and nutrition through IV/PEG  - 6-0 cuffless trach sutured to skin, possible cap trial and decannulation prior to discharge  - Remove splint/bolster Wednesday  - Staples out Friday    Neuro:  - Alert, oriented.   - Scheduled tylenol, PRN oxy and dilauded    CV:  - HDS. D/C A Line. SBP between 110 and 160. No pressors unless ENT notified  - continue with q2h flap checks    Pulm:  - stable  - Recommend fresh trach care (humidified O2, flexible suctioning, trach supplies at bedside)  - Please place replacement 6-0 cuffed and cuffless trachs at bedside  - Bedside supplies: flexible suction caths, replacement inner cannulas, adrian collars and saline bullets  - Replace inner cannula daily   - Gentle suctioning   - Humidified O2   - Trach care teacher per nursing and RT  - Avoid gauze under trach plate to prevent accidental decannulation  - Sutures (if present) are to be cut my ENT MD only   - No circumferential neck ties given free flap  - Trach changed on POD3 (Mon) to 6-0 cuffless, sutured back to skin      GI/FEN/Lytes:  - Strict NPO  - On TF, slowly advance as tolerated. Hold for nausea.   - replace lytes prn    Renal:  - UOP adequate. Yee out.     Heme/ID:  - cont to trend HH  - cont unasyn x7d     Endo:  - Q6 acuchecks    MSK  - Left forearm splint for 5 days. Remove Weds and " the bolster underneath  - PT and OT, OOB    PPX:  PT/OT  L40  PPI    Dispo:   Can step down to PCU for q2h flap checks

## 2024-11-18 NOTE — PROGRESS NOTES
[facility]  Otorhinolaryngology-Head & Neck Surgery  Progress Note    Subjective:     Post-Op Info:  Procedure(s) (LRB):  GLOSSECTOMY (Left)  DISSECTION, NECK, MODIFIED RADICAL (Left)  TRACHEOTOMY (N/A)  CREATION, FREE FLAP, FOREARM, RADIAL ASPECT (Left)  EGD, WITH PEG TUBE INSERTION (N/A)  APPLICATION, GRAFT, SKIN, FULL-THICKNESS (Left)   3 Days Post-Op  Hospital Day: 4     Interval History: NAEON. AFVSS. Tolerating TF. No flap or trach issues.     Medications:  Continuous Infusions:  Scheduled Meds:   ampicillin-sulbactam  3 g Intravenous Q8H    aspirin  81 mg Per G Tube Daily    atorvastatin  20 mg Per G Tube Daily    chlorhexidine  15 mL Mouth/Throat BID    enoxparin  40 mg Subcutaneous Daily    montelukast  10 mg Per G Tube Daily    mupirocin   Nasal BID    pantoprazole  40 mg Per G Tube Daily    polyethylene glycol  17 g Per G Tube Daily    tamsulosin  0.4 mg Oral Daily     PRN Meds:  Current Facility-Administered Medications:     acetaminophen, 500 mg, Per G Tube, Q6H PRN    hydrALAZINE, 10 mg, Intravenous, Q6H PRN    magnesium oxide, 800 mg, Oral, PRN    magnesium oxide, 800 mg, Oral, PRN    morphine, 2 mg, Intravenous, Q4H PRN    ondansetron, 8 mg, Oral, Q8H PRN    oxyCODONE, 7.5 mg, Per G Tube, Q4H PRN    potassium bicarbonate, 35 mEq, Oral, PRN    potassium bicarbonate, 50 mEq, Oral, PRN    potassium bicarbonate, 60 mEq, Oral, PRN    potassium, sodium phosphates, 2 packet, Oral, PRN    potassium, sodium phosphates, 2 packet, Oral, PRN    potassium, sodium phosphates, 2 packet, Oral, PRN    prochlorperazine, 5 mg, Intravenous, Q6H PRN    senna-docusate 8.6-50 mg, 1 tablet, Oral, BID PRN    sodium chloride 0.9%, 10 mL, Intravenous, PRN     Review of patient's allergies indicates:  No Known Allergies  Objective:     Vital Signs (24h Range):  Temp:  [98.1 °F (36.7 °C)-100.1 °F (37.8 °C)] 98.6 °F (37 °C)  Pulse:  [] 94  Resp:  [15-38] 27  SpO2:  [87 %-100 %] 98 %  BP:  ()/() 141/65     Date 11/18/24 0700 - 11/19/24 0659   Shift 5108-9843 3784-2977 7260-8298 24 Hour Total   INTAKE   NG/GT 55   55   Shift Total(mL/kg) 55(0.7)   55(0.7)   OUTPUT   Drains 0   0   Shift Total(mL/kg) 0(0)   0(0)   Weight (kg) 77.8 77.8 77.8 77.8     Lines/Drains/Airways       Drain  Duration                  Closed/Suction Drain 11/15/24 1412 Tube - 1 Left Other (Comment) Bulb 10 Fr. 2 days         Closed/Suction Drain 11/15/24 1606 Tube - 1 Neck Bulb 15 Fr. 2 days         Gastrostomy/Enterostomy 11/15/24 1059 Percutaneous endoscopic gastrostomy (PEG) LUQ feeding 2 days              Airway  Duration             Adult Surgical Airway 11/15/24 1603 Shiley Cuffed 6.0/ 75mm 2 days              Peripheral Intravenous Line  Duration                  Peripheral IV - Triple Lumen 11/15/24 1050 18 G 1 1/4 in No Right Hand 2 days                     Physical Exam  NCAT  NAD  Breathing well on RA, no acute distress  HB I/VI bilat  Hearing well at conversational volume  OC and OP patent, essentially edentulous  R native tongue appears more edematous this am, apparently causing the flap the be slightly more taut - L pete-tongue consists of fasciocutaneous free flap from L forearm, color is normal, although slightly more taut. Silk stitch marks doppler site which is audible on handheld doppler, with normal arterial and venous pulses. Prick with normal blood without significant delay.   L neck incision CDI w staples, ALEE x1 w ss output, no hematoma  6-0 cuffed trach sutured to skin, cuff deflated - sutures cut this am and trach replaced w 6-0 cuffless. Trach re-sutured to skin. Tolerated well.   L upper arm incision closed w running suture, CDI, no hematoma  L forearm in gutter splint, ALEE x1 w ss output. Sensation intact, movement intact distally     Significant Labs:  BMP:   Recent Labs   Lab 11/18/24  0324   *      CO2 24   BUN 17   CREATININE 0.8   CALCIUM 8.6*   MG 2.3     CBC:   Recent Labs  "  Lab 11/18/24  0324   WBC 10.60   RBC 4.07*   HGB 11.3*   HCT 34.7*      MCV 85   MCH 27.8   MCHC 32.6       Significant Diagnostics:  I have reviewed and interpreted all pertinent imaging results/findings within the past 24 hours.  Assessment/Plan:     * Squamous cell cancer of tongue  Status post L partial glossectomy, Left neck dissection levels 1-4, tracheostomy, left radial forearm free flap, left upper arm FTSG 11/16/24 w Dr. Bailon (ablative) and Dr. Thornton (reconstruction).    Continue q2h flap checks: Record Doppler Pulses (artery and vein) ,Capillary Refill , Color, Turgor, temperature.   - Neurovascular Checks q2h of bilateral upper and lower extremities   - Keep head elevated and in neutral position, HOB 30 degress  - Sign above bed that reads "No circumferential Neck Ties"   - FOR ANY FLAP ISSUES, PLEASE PHONE ENT RESIDENT ON CALL.  - Please label drains carefully and document accordingly  - Strict NPO, all med and nutrition through IV/PEG  - 6-0 cuffless trach sutured to skin, possible cap trial and decannulation prior to discharge  - Remove splint/bolster Wednesday  - Staples out Friday    Neuro:  - Alert, oriented.   - Scheduled tylenol, PRN oxy and dilauded    CV:  - HDS. D/C A Line. SBP between 110 and 160. No pressors unless ENT notified  - continue with q2h flap checks    Pulm:  - stable  - Recommend fresh trach care (humidified O2, flexible suctioning, trach supplies at bedside)  - Please place replacement 6-0 cuffed and cuffless trachs at bedside  - Bedside supplies: flexible suction caths, replacement inner cannulas, adrian collars and saline bullets  - Replace inner cannula daily   - Gentle suctioning   - Humidified O2   - Trach care teacher per nursing and RT  - Avoid gauze under trach plate to prevent accidental decannulation  - Sutures (if present) are to be cut my ENT MD only   - No circumferential neck ties given free flap  - Trach changed on POD3 (Mon) to 6-0 cuffless, sutured back " to skin      GI/FEN/Lytes:  - Strict NPO  - On TF, slowly advance as tolerated. Hold for nausea.   - replace lytes prn    Renal:  - UOP adequate. Yee out.     Heme/ID:  - cont to trend HH  - cont unasyn x7d     Endo:  - Q6 acuchecks    MSK  - Left forearm splint for 5 days. Remove Weds and the bolster underneath  - PT and OT, OOB    PPX:  PT/OT  L40  PPI    Dispo:   Can step down to PCU for q2h flap checks                Jimy Montalvo MD  Otorhinolaryngology-Head & Neck Surgery  [facility]

## 2024-11-18 NOTE — PLAN OF CARE
Recommendations     1.) TF recs for continuous TF's:      - Continuous: Pivot 1.5 at  goal rate of 55 ml/hr to provide 1980 kcal, 124 g PRO, 228 g CHO, and 1001 ml FF.                  - FWF: 225 ml Q 6 hrs to provide an additional 900 ml of fluid for 1901 ml TFV.                  - monitor for s/s of intolerance such as residuals >500ml, n/v/d, or abdominal distension.       2.) TF recs for bolus feds:                  - recommend to bolus 220ml of Pivot 1.5 Q4hrs to provide 1980 kcal, 124 g PRO, 228 g CHO, and 1001 ml FF.                  - FWF: recommend FWF of 75ml before and after each feed to provide an additional 900ml of fluid and 1901ml TFV.      3.) If Loose BM's continue, consider adding Banatrol BID to help with diarrhea.     4.) RD to monitor wt, tolerance, skin, labs.

## 2024-11-18 NOTE — PLAN OF CARE
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 No    Discharge Plan A: Home Health  Discharge Plan B: Home with family      [facility] - [place] - [address]  [address] [facility]  [place] [address]  Phone: [phone] Fax: [phone]    [id number] - [place] - [address]  [address]  [place] [address]  Phone: [phone] Fax: [phone]      Initial Assessment (most recent)       Adult Discharge Assessment - 11/18/24 1502          Discharge Assessment    Assessment Type Discharge Planning Assessment     Confirmed/corrected address, phone number and insurance Yes     Confirmed Demographics Correct on Facesheet     Source of Information family     When was your last doctors appointment? 11/11/24     Does patient/caregiver understand observation status Yes     Communicated TARIQ with patient/caregiver Yes     Reason For Admission squamous cell cancer of tongue     People in Home spouse;child(issa), dependent     Do you expect to return to your current living situation? Yes     Do you have help at home or someone to help you manage your care at home? Yes     Who are your caregiver(s) and their phone number(s)? Bella Jackson (daughter) [phone] and  Bonnie Paz (spouse) [phone]     Prior to hospitilization cognitive status: Alert/Oriented     Current cognitive status: Alert/Oriented     Walking or Climbing Stairs Difficulty no     Dressing/Bathing Difficulty no     Home Accessibility wheelchair accessible     Home Layout Able to live on 1st floor     Equipment Currently Used at Home none     Readmission within 30 days? No     Patient currently being followed by outpatient case management? No     Do you currently have service(s) that help you manage your care at home? No     Do you take prescription medications? Yes     Do you have prescription coverage? Yes     Coverage Medicare A & B, [employer] supplement     Do you have any problems affording any of your prescribed medications? No     Is the patient taking medications as prescribed? yes     Who is going to help you get home at discharge? Bella Paz (daughter)     How do you get to doctors appointments? car, drives self     Are you on dialysis? No     Do you take coumadin? No     Discharge Plan A Home Health     Discharge Plan B Home with family     DME Needed Upon Discharge  none     Discharge Plan discussed with: Adult children     Transition of Care Barriers None        Financial Resource Strain    How hard is it for you to pay for the very basics like food, housing, medical care, and heating? Not hard at all        Housing Stability    In the last 12 months, was there a time when you were not able to pay the mortgage or rent on time? No     At any time in the past 12 months, were you homeless or living in a shelter (including now)? No        Transportation Needs    Has the lack of transportation kept you from medical appointments, meetings, work or from getting things needed for daily living? No        Food Insecurity    Within the past 12 months, you worried that your food would run out before you got the money to buy more. Never true     Within the past 12 months, the food you bought just didn't  last and you didn't have money to get more. Never true        Stress    Do you feel stress - tense, restless, nervous, or anxious, or unable to sleep at night because your mind is troubled all the time - these days? Not at all        Social Isolation    How often do you feel lonely or isolated from those around you?  Never        Alcohol Use    Q1: How often do you have a drink containing alcohol? Never     Q3: How often do you have six or more drinks on one occasion? Never        Utilities    In the past 12 months has the electric, gas, oil, or water company threatened to shut off services in your home? No        Health Literacy    How often do you need to have someone help you when you read instructions, pamphlets, or other written material from your doctor or pharmacy? Never        OTHER    Name(s) of People in Home Bonnie Paz ( spouse) and a great grandson                        CM spoke with patient's daughter Bella Paz in Room 05506 at bedside. All information was verified on facesheet. Patient lives in a 1 story house with spouse and great grandson, 4 steps to get inside with no pets. Patient states no assistance is needed. Patient can ambulate, drive, run errands, and complete ADL's independently. Patient does not use any DME or any in-home assistive equipment. Patient has not used Home Health previously. Patient is not on dialysis nor use Coumadin as a blood thinner. Patient takes medication as prescribed and has resources for all prescriptive needs. Patient will have help from family member upon discharge. Family will provide transportation home. All Questions and concerns addressed and whiteboard updated with CM contact information. Will continue to follow for course of hospitalization.      Discharge Plan A and Plan B have been determined by review of patient's clinical status, future medical and therapeutic needs, and coverage/benefits for post-acute care in coordination with multidisciplinary  team members.     Karey Domínguez, RN, BSN  Case Management  [phone]

## 2024-11-18 NOTE — SUBJECTIVE & OBJECTIVE
Interval History/Significant Events: NAEON. Pending SD to PCU for q2h flap checks. TF at goal, pain well controlled. UOP adequate.     Follow-up For: Procedure(s) (LRB):  GLOSSECTOMY (Left)  DISSECTION, NECK, MODIFIED RADICAL (Left)  TRACHEOTOMY (N/A)  CREATION, FREE FLAP, FOREARM, RADIAL ASPECT (Left)  EGD, WITH PEG TUBE INSERTION (N/A)  APPLICATION, GRAFT, SKIN, FULL-THICKNESS (Left)    Post-Operative Day: 3 Days Post-Op    Objective:     Vital Signs (Most Recent):  Temp: 99.2 °F (37.3 °C) (11/18/24 0300)  Pulse: 75 (11/18/24 0500)  Resp: 15 (11/18/24 0500)  BP: 134/68 (11/18/24 0500)  SpO2: 97 % (11/18/24 0500) Vital Signs (24h Range):  Temp:  [98.1 °F (36.7 °C)-100.1 °F (37.8 °C)] 99.2 °F (37.3 °C)  Pulse:  [] 75  Resp:  [15-38] 15  SpO2:  [87 %-100 %] 97 %  BP: ()/() 134/68     Weight: 77.8 kg (171 lb 8.3 oz)  Body mass index is 29.44 kg/m².      Intake/Output Summary (Last 24 hours) at 11/18/2024 0609  Last data filed at 11/18/2024 0500  Gross per 24 hour   Intake 2709.95 ml   Output 1207 ml   Net 1502.95 ml          Physical Exam  Constitutional:       General: He is sleeping. He is not in acute distress.     Comments: Trach collar    HENT:      Mouth/Throat:      Comments: Flap more taut and swollen with slower refill, excellent doppler signals  Neck:      Comments: Well healing left neck incision with staples c/d/I   ALEE drain with SS output  Cardiovascular:      Rate and Rhythm: Normal rate and regular rhythm.   Pulmonary:      Effort: Pulmonary effort is normal. No respiratory distress.   Abdominal:      General: Abdomen is flat.      Palpations: Abdomen is soft.      Comments: PEG tube LUQ   Musculoskeletal:      Comments: Left forearm radial gutter splint c/d/I  ALEE drain with minimal SS output   Skin:     General: Skin is warm and dry.   Neurological:      General: No focal deficit present.      Cranial Nerves: No cranial nerve deficit.   Psychiatric:         Behavior: Behavior normal.             Vents:  Oxygen Concentration (%): 35 (11/18/24 0500)    Lines/Drains/Airways       Drain  Duration                  Closed/Suction Drain 11/15/24 1412 Tube - 1 Left Other (Comment) Bulb 10 Fr. 2 days         Closed/Suction Drain 11/15/24 1606 Tube - 1 Neck Bulb 15 Fr. 2 days         Gastrostomy/Enterostomy 11/15/24 1059 Percutaneous endoscopic gastrostomy (PEG) LUQ feeding 2 days              Airway  Duration             Adult Surgical Airway 11/15/24 1603 Shiley Cuffed 6.0/ 75mm 2 days              Peripheral Intravenous Line  Duration                  Peripheral IV - Single Lumen 11/15/24 0807 18 G Posterior;Right Forearm 2 days         Peripheral IV - Triple Lumen 11/15/24 1050 18 G 1 1/4 in No Right Hand 2 days                    Significant Labs:    CBC/Anemia Profile:  Recent Labs   Lab 11/17/24  0958 11/18/24  0324   WBC 12.30 10.60   HGB 10.4* 11.3*   HCT 32.4* 34.7*    176   MCV 87 85   RDW 15.9* 15.8*        Chemistries:  Recent Labs   Lab 11/17/24  0958 11/18/24  0324    138   K 3.7 3.6    106   CO2 27 24   BUN 19 17   CREATININE 0.9 0.8   CALCIUM 8.5* 8.6*   MG 2.3 2.3   PHOS 1.8* 1.8*       All pertinent labs within the past 24 hours have been reviewed.    Significant Imaging:  I have reviewed all pertinent imaging results/findings within the past 24 hours.

## 2024-11-18 NOTE — PT/OT/SLP PROGRESS
Physical Therapy Treatment    Patient Name:  Fan Paz   MRN:  [id number]  Admitting Diagnosis:  Squamous cell cancer of tongue   Recent Surgery: Procedure(s) (LRB):  GLOSSECTOMY (Left)  DISSECTION, NECK, MODIFIED RADICAL (Left)  TRACHEOTOMY (N/A)  CREATION, FREE FLAP, FOREARM, RADIAL ASPECT (Left)  EGD, WITH PEG TUBE INSERTION (N/A)  APPLICATION, GRAFT, SKIN, FULL-THICKNESS (Left) 3 Days Post-Op  Admit Date: 11/15/2024  Length of Stay: 3 days    Recommendations:     Discharge Recommendations:   No Therapy Indicated   Discharge Equipment Recommendations: none   Barriers to discharge: None    Plan:     During this hospitalization, patient to be seen 3 x/week to address the identified rehab impairments via gait training, therapeutic activities, neuromuscular re-education, therapeutic exercises and progress towards the established goals.  Plan of Care Expires:  12/16/24  Plan of Care Reviewed with: patient, daughter    Assessment:     Fan Paz is a 84 y.o. male admitted with a medical diagnosis of Squamous cell cancer of tongue. Pt found up in chair agreeable to therapy session. Pt demo'd improvements as he increased distance gait trained. Pt with 1-2 periods of increased lateral sway but otherwise steady with no overt LOB during trial. Pt progressing well towards goals but not yet at PLOF. Patient would benefit from skilled therapy services to maximize safety and independence, increase activity tolerance, decrease fall risk, decrease caregiver burden, improve QOL, improve patient's functional mobility, and decrease risk of contractures and pressure sores.      Problem List: impaired functional mobility, gait instability, impaired balance, decreased safety awareness.  Rehab Prognosis: Good; patient would benefit from acute skilled PT services to address these deficits and reach maximum level of function.      Goals:   Multidisciplinary Problems       Physical Therapy Goals          Problem:  "Physical Therapy    Goal Priority Disciplines Outcome Interventions   Physical Therapy Goal     PT, PT/OT Progressing    Description: Goals to met by 11/30/2024    1. Gait  x 250 feet with [place] using No Assistive Device   2. Ascend/descend 4 stair(s) with bilateral Handrails Stand-by Assistance using No Assistive Device.   3. Lower extremity exercise program x15 reps per Instruction, with assistance as needed in order to facilitate improvement in functional independence                       Subjective     RN notified prior to session. Daughter present upon PT entrance into room. Patient agreeable to PT treatment session.    Chief Complaint: none stated   Patient/Family Comments/goals: go home  Pain/Comfort:  Pain Rating 1: 0/10  Pain Rating Post-Intervention 1: 0/10      Objective:     Patient found up in chair with: blood pressure cuff, telemetry, pulse ox (continuous), oxygen, Tracheostomy, PEG Tube, ALEE drain   Cognition:   Alert and Cooperative  Patient is oriented to Person, Place, Time, Situation  General Precautions: Standard, Cardiac fall   Orthopedic Precautions:N/A   Braces: N/A   Body mass index is 29.44 kg/m².  Oxygen Device: Trach Collar 35% & 8L (no oxygen used for gait trial)  Vitals: /63   Pulse 86   Temp 98.6 °F (37 °C) (Oral)   Resp (!) 22   Ht 5' 4" (1.626 m)   Wt 77.8 kg (171 lb 8.3 oz)   SpO2 97%   BMI 29.44 kg/m²     Outcome Measures:  AM-PAC 6 CLICK MOBILITY  Turning over in bed (including adjusting bedclothes, sheets and blankets)?: 3  Sitting down on and standing up from a chair with arms (e.g., wheelchair, bedside commode, etc.): 3  Moving from lying on back to sitting on the side of the bed?: 3  Moving to and from a bed to a chair (including a wheelchair)?: 3  Need to walk in hospital room?: 3  Climbing 3-5 steps with a railing?: 3  Basic Mobility Total Score: 18     Functional Mobility:    Bed Mobility:   Pt found/returned to bedside chair    Transfers:   Sit <> Stand " Transfer: stand by assistance with no assistive device     Balance:  Standing:  Static: stand by assistance  Dynamic: stand by assistance      Gait:  Patient ambulated: 250'   Patient required: standy by assistance  Patient used:  no assistive device   Gait Deviation(s): occasional unsteady gait and narrow base of support  Portable monitor utilized  all lines remained intact throughout ambulation trial  Nurse present for vital sign monitoring  Comments: Patient with occasional increased lateral sway but no overt LOB. Pt required cues to slow pace during trial for safety.    Education:  Time provided for education, counseling and discussion of health disposition in regards to patient's current status  All questions answered within PT scope of practice and to patient's satisfaction  PT role in POC to address current functional deficits  Call nursing/pct to transfer to chair/use bathroom. Pt stated understanding.    Patient left up in chair with all lines intact, call button in reach, RN notified, and daughter present.    Time Tracking:     PT Received On: 11/18/24  PT Start Time: 1337     PT Stop Time: 1350  PT Total Time (min): 13 min     Billable Minutes:   Gait Training 8 minutes    Treatment Type: Treatment  PT/PTA: PT       11/18/2024

## 2024-11-18 NOTE — OP NOTE
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]    Attending Surgeon(s): Katina Thornton MD    Separate panel, to be dictated separately: Rodrigo Chavez MD Hasney, Christian P., MD    Assistant(s): Jimy Montalvo MD    Anesthesia: General    Description of the Findings of the Procedure: left radial forearm free flap to left tongue/oropharynx, anastamosis to left facial and left external jugular vein, full thickness skin graft from left upper arm to forearm donor site    Significant Surgical Tasks Conducted by the Assistant(s), if Applicable: closure and skin graft of arm    Procedure in detail:    The patient was previous intubated, prepped, and draped by the ablative team. The ablation was performed by Dr. Bailon and will be dictated separately. Once the size of the defect had been determined, planning began to raise a radial forarm flap that was approximately 8 cm by 5 cm.    The left arm was elevated, and the tourniquet was inflated to 250mmHg for 90 minutes. The flap was first raised from ulnar to radial towards the flexor carpi radialis (FCR) in the plane just above the muscular fascia. Care was taken to leave paratenon intact. Once the FCR was adequately visualized, the flap was then elevated from radial to ulnar side. The cephalic vein was identified, but it was too far from the proposed flap to include it within the flap. The superficial branch of the radial nerve was identified and preserved in the forearm. The tendon of the brachioradialis was identified. The  distal skin of the flap was then incised in a wrist skin crease and dissected to the level of the radial artery and its two venae comitantes. The artery and veins were then suture ligated with a 2-0 silk suture, well proximal to their contributions to the superficial palmar arch. The vascular pedicle of the flap was then carefully dissected out from underneath the brachioradialis, until the entirety of the skin paddle for the flap was free from the forearm. The flap and its vascular pedicle were then elevated from distal to proximal by elevated the radial artery and venae comitantes with the skin paddle, with care to preserve the superficial venous system and the cephalic vein. Once the pedicle had been elevated to the antecubital fossa, the tourniquet was released. Hemostasis was ensured in the surgical bed as well as along the pedicle. The venous anatomy at the antecubital fossa was then carefully dissected until the superficial system connected to the venae comitantes.    Medium clips were then placed on the vessels in the cubital fossa, then divided, and the flap was passed to the head of the bed for inset and anastomosis. Concurrently, the arm donor site was closed. The radial skin edge was advanced to cover the exposed radial nerve. The proximal arm was closed over a 10 flat ALEE drain in layers with absorbable sutures. A 5 cm by 15 cm full thickness skin graft was harvested from the medial upper left arm. The medial upper arm donor site was then closed in layers with absorbable sutures. The skin graft was then sutured in place with absorbable sutures, with several pie crust incisions made in the graft site between tendons. A bolster of betadine-soaked cotton and xeroform was created and secured. At the end of the case a volar blocking splint was placed to prevent wrist flexion.    While the forearm donor site was closed, the inset of the flap into the defect was started.    The facial artery and external  jugular vein were identified as donor vessels in the neck, which had previously been dissected by the ablative team.  Venous Acland clamps were placed on each vein and the distal end freshened, using copious amountss of heparinized saline to flush the veins. The artery was then clamped using a medium Acland clamp and the distal end was ligated and freshened. The clamp was briefly released to confirm adequate arterial flow from the vessel.  It was then reclamped and irrigated. With assistance of the microscope, the vessel was carefully and atraumatically dilated and then, the adventitia was cleared several mm from the distal end.      The flap was then inset into the left glossectomy defect and left neck.  It was secured in place with multiple 3-0 Vicryls in mattress fashion. The vascular pedicle was draped into the neck, and the flap vessels were prepared with the use of the microscope.  The flap artery was  several mm from the vein to allow for improved geometry. The flap artery and veins were prepared in similar fashion to the recipient vessels in the neck. A double Acland clamp was then applied to the two arteries to bring them into close proximity for the anastomosis. The microvascular anastomosis was performed using 8-0 nylon sutures. After the superficial side was completed, the double Acland was flipped, and the suture line was inspected to ensure the artery had not been backwalled. The posterior wall was then similarly sutured.  The vessels were then returned back into anatomic position, and the double Acland was removed.  A 3-0 venous  was used to anastomose the external jugular vein to the flap vein.  The Acland clamps were then released sequentially, with the flap vein, donor vein, flap artery, and donor artery. There was noted to be excellent flow. A rescue stitch was not required.   Gelfoam was then placed along both the venous and arterial anastomosis to ensure ideal  geometry.    Attention was then turned to the inset once more. The flap perfusion was confirmed with fresh edge bleeding. The inset was completed with 3-0 Vicryl sutures. A Doppler was used to find a triphasic arterial signal in the skin paddle, which was marked with a Prolene suture.     The neck wound was irrigated and hemostasis was ensured.  A Rik drain was placed in the neck dissection bed.  The neck wound was closed in layered fashion with sutures and staples. The patient was roused and transferred to the ICU  in stable condition. This marked the end of the procedure.   All surgical pauses were observed.  Standard operating room protocol and universal precautions were utilized throughout the procedure.     Complications: No    Estimated Blood Loss (EBL): 100cc           Implants:   Implant Name Type Inv. Item Serial No.  Lot No. LRB No. Used Action    MICROVAS ANSTMS 3.0MM - [id number]   MICROVAS ANSTMS 3.0MM [id number] [employer] [id number] Left 1 Implanted     Specimens: None from this portion of the case, see Dr. Bailon's dictation           Condition: Good    Disposition: PACU - hemodynamically stable.    Attestation: I was present and scrubbed for the entire procedure.

## 2024-11-18 NOTE — SUBJECTIVE & OBJECTIVE
Interval History: NAEON. AFVSS. Tolerating TF. No flap or trach issues.     Medications:  Continuous Infusions:  Scheduled Meds:   ampicillin-sulbactam  3 g Intravenous Q8H    aspirin  81 mg Per G Tube Daily    atorvastatin  20 mg Per G Tube Daily    chlorhexidine  15 mL Mouth/Throat BID    enoxparin  40 mg Subcutaneous Daily    montelukast  10 mg Per G Tube Daily    mupirocin   Nasal BID    pantoprazole  40 mg Per G Tube Daily    polyethylene glycol  17 g Per G Tube Daily    tamsulosin  0.4 mg Oral Daily     PRN Meds:  Current Facility-Administered Medications:     acetaminophen, 500 mg, Per G Tube, Q6H PRN    hydrALAZINE, 10 mg, Intravenous, Q6H PRN    magnesium oxide, 800 mg, Oral, PRN    magnesium oxide, 800 mg, Oral, PRN    morphine, 2 mg, Intravenous, Q4H PRN    ondansetron, 8 mg, Oral, Q8H PRN    oxyCODONE, 7.5 mg, Per G Tube, Q4H PRN    potassium bicarbonate, 35 mEq, Oral, PRN    potassium bicarbonate, 50 mEq, Oral, PRN    potassium bicarbonate, 60 mEq, Oral, PRN    potassium, sodium phosphates, 2 packet, Oral, PRN    potassium, sodium phosphates, 2 packet, Oral, PRN    potassium, sodium phosphates, 2 packet, Oral, PRN    prochlorperazine, 5 mg, Intravenous, Q6H PRN    senna-docusate 8.6-50 mg, 1 tablet, Oral, BID PRN    sodium chloride 0.9%, 10 mL, Intravenous, PRN     Review of patient's allergies indicates:  No Known Allergies  Objective:     Vital Signs (24h Range):  Temp:  [98.1 °F (36.7 °C)-100.1 °F (37.8 °C)] 98.6 °F (37 °C)  Pulse:  [] 94  Resp:  [15-38] 27  SpO2:  [87 %-100 %] 98 %  BP: ()/() 141/65     Date 11/18/24 0700 - 11/19/24 0659   Shift 4844-2553 1414-5258 5172-7324 24 Hour Total   INTAKE   NG/GT 55   55   Shift Total(mL/kg) 55(0.7)   55(0.7)   OUTPUT   Drains 0   0   Shift Total(mL/kg) 0(0)   0(0)   Weight (kg) 77.8 77.8 77.8 77.8     Lines/Drains/Airways       Drain  Duration                  Closed/Suction Drain 11/15/24 1412 Tube - 1 Left Other (Comment) Bulb 10 Fr. 2  days         Closed/Suction Drain 11/15/24 1606 Tube - 1 Neck Bulb 15 Fr. 2 days         Gastrostomy/Enterostomy 11/15/24 1059 Percutaneous endoscopic gastrostomy (PEG) LUQ feeding 2 days              Airway  Duration             Adult Surgical Airway 11/15/24 1603 Shiley Cuffed 6.0/ 75mm 2 days              Peripheral Intravenous Line  Duration                  Peripheral IV - Triple Lumen 11/15/24 1050 18 G 1 1/4 in No Right Hand 2 days                     Physical Exam  NCAT  NAD  Breathing well on RA, no acute distress  HB I/VI bilat  Hearing well at conversational volume  OC and OP patent, essentially edentulous  R native tongue appears more edematous this am, apparently causing the flap the be slightly more taut - L pete-tongue consists of fasciocutaneous free flap from L forearm, color is normal, although slightly more taut. Silk stitch marks doppler site which is audible on handheld doppler, with normal arterial and venous pulses. Prick with normal blood without significant delay.   L neck incision CDI w staples, ALEE x1 w ss output, no hematoma  6-0 cuffed trach sutured to skin, cuff deflated - sutures cut this am and trach replaced w 6-0 cuffless. Trach re-sutured to skin. Tolerated well.   L upper arm incision closed w running suture, CDI, no hematoma  L forearm in gutter splint, ALEE x1 w ss output. Sensation intact, movement intact distally     Significant Labs:  BMP:   Recent Labs   Lab 11/18/24  0324   *      CO2 24   BUN 17   CREATININE 0.8   CALCIUM 8.6*   MG 2.3     CBC:   Recent Labs   Lab 11/18/24  0324   WBC 10.60   RBC 4.07*   HGB 11.3*   HCT 34.7*      MCV 85   MCH 27.8   MCHC 32.6       Significant Diagnostics:  I have reviewed and interpreted all pertinent imaging results/findings within the past 24 hours.

## 2024-11-18 NOTE — OP NOTE
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    INDICATION: Mr. Paz is a 84 y.o. 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 Percutaneous endoscopic gastrostomy was chosen as the route of nutritional support. After discussing the alternatives, benefits and risks including but not limited to bleeding, infection, need for additional procedures, and rarely heart attack, stroke and even death, the decision to go forward was made. All questions were answered to apparent satisfaction and informed consent was obtained.      PROCEDURE IN DETAIL:  was identified in the preoperative area. He had been NPO since midnight. Consent was on the chart. He was marked and ready to proceed. He was taken to the operating room where he underwent general anesthesia and endotracheal intubation. The skin of the abdomen was sterilely prepped and draped over the affected area in the usual fashion.  A timeout was called and documented.     A mouthpiece was placed in the patient's mouth and the endoscope was passed down through the esophagus, into the stomach, and down into the first part of the duodenum. No abnormalities were noted during the EGD. The stomach was then fully insufflated with air and the endoscope was positioned in the  midportion and directed toward the anterior abdominal wall. With the room darkened and the intensity of the endoscope increase, good transillumination was noted on the abdominal wall in the upper left quadrant. Finger pressure was applied at the point of transillumination, and adequate indention of the stomach wall was noted by endoscopy. A snare was then passed through the endoscope into the stomach and opened fully at the site of finger indention.A small incision was made at this location using a #15 blade scalpel. The introducer needle with overlying catheter was passed through this incision and into the stomach under direct visualization. The needle was removed from the catheter and the snare was placed surrounding the catheter. The guide wire was passed through the catheter and snared. The endoscope, snare, and guide wire were then withdrawn and pulled out of the patient's mouth. The gastrostomy tube was attached to the loop of the guide wire and the whole thing was pulled back into the stomach until the 3-cm kelli of the gastrostomy tube.Adequate placement of the gastrostomy tube was identified and could be rotated freely. The gastrostomy tube end was cut to length and the bumper, clamping appliance, and tube tip were placed.   This completed our procedure. He was awakened from anesthesia, extubated and transferred to the recovery room in stable condition.     EBL: <5mL.    COMPLICATIONS: none apparent.    SPECIMEN: none    DRAINS: gastrostomy tube    DISPOSITION: PACU.    ATTESTATION:  I was present and scrubbed for the entire procedure including all critical portions of the procedure.    Rodrigo Chavez MD, FACS  Acute Care Surgery and Surgical Critical Care  [facility]  11/15/2024

## 2024-11-18 NOTE — ASSESSMENT & PLAN NOTE
85yo M with PMHx of HLD, HTN, prostate cancer, anemia, GERD, DVT/PE, 2nd degree AV block, SCC of tongue who presents to the SICU s/p L partial glossectomy with L forearm free flap, L neck dissection and tracheostomy. He also had a PEG placed by general surgery during the case. Admitted for q1h flap checks for 48 hours. Okay to stepdown to PCU 11/17 pm for q2h flap checks.      Neuro/Psych:     - Sedation: None    - Neurovascular checks q1h POD#1 and 2, then q2h POD# 3 and 4 (11/17 pm), and then q4h POD#5 on donor site     - Pain:       - PRN Acetaminophen 500mg q6h                   Cardiac:     - BP Goal: MAP >65 and SBP <180    - Hydralazine PRN, avoid labetalol due to AV block     - Previously on losartan, but has not recently filled    - Rhythm: NSR      Pulmonary:     - Goal SpO2 >92%    - Wean O2 as tolerated    - No neck ties around neck    - Keep head in neutral position    - Stoma Care    - Keep extra trach at bedside    - Obturator at HOB   - Takes Singulair nightly for runny nose/allergies/post nasal drip        Renal:    - Trend BUN/Cr     - Yee out, record strict Is/Os    - UOP (1.1L/24h)   - Home Flomax      FEN / GI:     - Daily CMP, PRN K/Mag/Phos per protocol, replete as needed    - Nutrition: strict NPO x1 week    - Dietician Consult     - TF at goal 55cc/hr    - FWF 225mL q6h    - Bowel Regimen: Senna-docusate PRN     - Zofran PRN for nausea    - IV PPI, takes daily at home      ID:     - Abx: Unasyn x 72h post-op    - monitor CBC      Heme/Onc:     - Hgb stable on admit    - CBC daily    - lovenox       Endocrine:     - No hx of DM II, most recent A1c 5.9%        PPx:   Feeding: NPO, TF at goal   Analgesia/Sedation: PRN tylenol   Thromboembolic Prevention: SCD's and Lovenox 40 QD  HOB >30: Yes  Stress Ulcer: PPI per G  Glucose Control: POCT glucose BID     Lines/Drains/Airway:         Trach collar         PEG         2 PIV         Drains x2    Dispo: SD to PCU 11/17 pm, transfer orders in and  SD pending

## 2024-11-18 NOTE — PROGRESS NOTES
[facility] - Surgical Intensive Care  Critical Care - Surgery  Progress Note    Patient Name: Fan Paz  MRN: [id number]  Admission Date: 11/15/2024  Hospital Length of Stay: 3 days  Code Status: Full Code  Attending Provider: Cristhian Bailon MD  Primary Care Provider: Otilio Damon MD   Principal Problem: Squamous cell cancer of tongue    Subjective:     Hospital/ICU Course:  No notes on file    Interval History/Significant Events: NAEON. Pending SD to PCU for q2h flap checks. TF at goal, pain well controlled. UOP adequate.     Follow-up For: Procedure(s) (LRB):  GLOSSECTOMY (Left)  DISSECTION, NECK, MODIFIED RADICAL (Left)  TRACHEOTOMY (N/A)  CREATION, FREE FLAP, FOREARM, RADIAL ASPECT (Left)  EGD, WITH PEG TUBE INSERTION (N/A)  APPLICATION, GRAFT, SKIN, FULL-THICKNESS (Left)    Post-Operative Day: 3 Days Post-Op    Objective:     Vital Signs (Most Recent):  Temp: 99.2 °F (37.3 °C) (11/18/24 0300)  Pulse: 75 (11/18/24 0500)  Resp: 15 (11/18/24 0500)  BP: 134/68 (11/18/24 0500)  SpO2: 97 % (11/18/24 0500) Vital Signs (24h Range):  Temp:  [98.1 °F (36.7 °C)-100.1 °F (37.8 °C)] 99.2 °F (37.3 °C)  Pulse:  [] 75  Resp:  [15-38] 15  SpO2:  [87 %-100 %] 97 %  BP: ()/() 134/68     Weight: 77.8 kg (171 lb 8.3 oz)  Body mass index is 29.44 kg/m².      Intake/Output Summary (Last 24 hours) at 11/18/2024 0609  Last data filed at 11/18/2024 0500  Gross per 24 hour   Intake 2709.95 ml   Output 1207 ml   Net 1502.95 ml          Physical Exam  Constitutional:       General: He is sleeping. He is not in acute distress.     Comments: Trach collar    HENT:      Mouth/Throat:      Comments: Flap more taut and swollen with slower refill, excellent doppler signals  Neck:      Comments: Well healing left neck incision with staples c/d/I   ALEE drain with SS output  Cardiovascular:      Rate and Rhythm: Normal rate and regular rhythm.   Pulmonary:      Effort: Pulmonary effort is normal. No respiratory  distress.   Abdominal:      General: Abdomen is flat.      Palpations: Abdomen is soft.      Comments: PEG tube LUQ   Musculoskeletal:      Comments: Left forearm radial gutter splint c/d/I  ALEE drain with minimal SS output   Skin:     General: Skin is warm and dry.   Neurological:      General: No focal deficit present.      Cranial Nerves: No cranial nerve deficit.   Psychiatric:         Behavior: Behavior normal.            Vents:  Oxygen Concentration (%): 35 (11/18/24 0500)    Lines/Drains/Airways       Drain  Duration                  Closed/Suction Drain 11/15/24 1412 Tube - 1 Left Other (Comment) Bulb 10 Fr. 2 days         Closed/Suction Drain 11/15/24 1606 Tube - 1 Neck Bulb 15 Fr. 2 days         Gastrostomy/Enterostomy 11/15/24 1059 Percutaneous endoscopic gastrostomy (PEG) LUQ feeding 2 days              Airway  Duration             Adult Surgical Airway 11/15/24 1603 Shiley Cuffed 6.0/ 75mm 2 days              Peripheral Intravenous Line  Duration                  Peripheral IV - Single Lumen 11/15/24 0807 18 G Posterior;Right Forearm 2 days         Peripheral IV - Triple Lumen 11/15/24 1050 18 G 1 1/4 in No Right Hand 2 days                    Significant Labs:    CBC/Anemia Profile:  Recent Labs   Lab 11/17/24  0958 11/18/24  0324   WBC 12.30 10.60   HGB 10.4* 11.3*   HCT 32.4* 34.7*    176   MCV 87 85   RDW 15.9* 15.8*        Chemistries:  Recent Labs   Lab 11/17/24  0958 11/18/24  0324    138   K 3.7 3.6    106   CO2 27 24   BUN 19 17   CREATININE 0.9 0.8   CALCIUM 8.5* 8.6*   MG 2.3 2.3   PHOS 1.8* 1.8*       All pertinent labs within the past 24 hours have been reviewed.    Significant Imaging:  I have reviewed all pertinent imaging results/findings within the past 24 hours.  Assessment/Plan:     Oncology  * Squamous cell cancer of tongue  85yo M with PMHx of HLD, HTN, prostate cancer, anemia, GERD, DVT/PE, 2nd degree AV block, SCC of tongue who presents to the SICU s/p L partial  glossectomy with L forearm free flap, L neck dissection and tracheostomy. He also had a PEG placed by general surgery during the case. Admitted for q1h flap checks for 48 hours. Okay to stepdown to PCU 11/17 pm for q2h flap checks.      Neuro/Psych:     - Sedation: None    - Neurovascular checks q1h POD#1 and 2, then q2h POD# 3 and 4 (11/17 pm), and then q4h POD#5 on donor site     - Pain:       - PRN Acetaminophen 500mg q6h                   Cardiac:     - BP Goal: MAP >65 and SBP <180    - Hydralazine PRN, avoid labetalol due to AV block     - Previously on losartan, but has not recently filled    - Rhythm: NSR      Pulmonary:     - Goal SpO2 >92%    - Wean O2 as tolerated    - No neck ties around neck    - Keep head in neutral position    - Stoma Care    - Keep extra trach at bedside    - Obturator at HOB   - Takes Singulair nightly for runny nose/allergies/post nasal drip        Renal:    - Trend BUN/Cr     - Yee out, record strict Is/Os    - UOP (1.1L/24h)   - Home Flomax      FEN / GI:     - Daily CMP, PRN K/Mag/Phos per protocol, replete as needed    - Nutrition: strict NPO x1 week    - Dietician Consult     - TF at goal 55cc/hr    - FWF 225mL q6h    - Bowel Regimen: Senna-docusate PRN     - Zofran PRN for nausea    - IV PPI, takes daily at home      ID:     - Abx: Unasyn x 72h post-op    - monitor CBC      Heme/Onc:     - Hgb stable on admit    - CBC daily    - lovenox       Endocrine:     - No hx of DM II, most recent A1c 5.9%        PPx:   Feeding: NPO, TF at goal   Analgesia/Sedation: PRN tylenol   Thromboembolic Prevention: SCD's and Lovenox 40 QD  HOB >30: Yes  Stress Ulcer: PPI per G  Glucose Control: POCT glucose BID     Lines/Drains/Airway:         Trach collar         PEG         2 PIV         Drains x2    Dispo: SD to PCU 11/17 pm, transfer orders in and SD pending         Critical care was time spent personally by me on the following activities: development of treatment plan with patient or  surrogate and bedside caregivers, discussions with consultants, evaluation of patient's response to treatment, examination of patient, ordering and performing treatments and interventions, ordering and review of laboratory studies, ordering and review of radiographic studies, pulse oximetry, re-evaluation of patient's condition.  This critical care time did not overlap with that of any other provider or involve time for any procedures.     Evelyn Ramsay MD  Critical Care - Surgery  [facility] - [facility]

## 2024-11-18 NOTE — PT/OT/SLP PROGRESS
Occupational Therapy   Treatment    Name: Fan Paz  MRN: [id number]  Admitting Diagnosis:  Squamous cell cancer of tongue  3 Days Post-Op    Recommendations:     Discharge Recommendations: No Therapy Indicated  Discharge Equipment Recommendations:  none  Barriers to discharge:  None    Assessment:     Fan Paz is a 84 y.o. male with a medical diagnosis of Squamous cell cancer of tongue. Performance deficits affecting function are weakness, impaired endurance, impaired self care skills, impaired functional mobility, impaired balance, gait instability. Patient cleared for therapy and agreed to OOB activity as patient needed to go to the bathroom during session. Patient's daughter present. Patient noted to have swelling in R hand at site of IV. Nurse notified and they planned on removing it at end of session when patient was finished with therapy. All VSS throughout session. Patient would benefit from continued skilled acute OT 2x/wk to improve functional mobility, increase independence with ADLs, and address established goals.     Rehab Prognosis:  Good; patient would benefit from acute skilled OT services to address these deficits and reach maximum level of function.       Plan:     Patient to be seen 2 x/week to address the above listed problems via self-care/home management, therapeutic activities, therapeutic exercises  Plan of Care Expires: 12/16/24  Plan of Care Reviewed with: patient, daughter    Subjective     Chief Complaint: needing to go to the bathroom  Patient/Family Comments/goals: patient agreed to therapy  Pain/Comfort:  Pain Rating 1: 0/10  Pain Rating Post-Intervention 1: 0/10    Objective:     Communicated with: LEON prior to session.  Patient found HOB elevated with blood pressure cuff, telemetry, pulse ox (continuous), oxygen, Tracheostomy, PEG Tube, ALEE drain upon OT entry to room.    General Precautions: Standard, fall    Orthopedic Precautions:N/A  Braces:  N/A  Respiratory Status:  Trach collar 28% and 5 L No oxygen used for ambulating to bathroom and at sink.      Occupational Performance:     Bed Mobility:    Patient completed Supine to Sit with stand by assistance     Functional Mobility/Transfers:  Patient completed Sit <> Stand Transfer with stand by assistance  with  no assistive device   Patient completed Toilet Transfer bed>toilet; toilet>sink for g/h tasks with functional ambulation technique with stand by assistance with  no AD  Functional Mobility: Patient ambulated from sink to bedside chair with no AD and SBA    Activities of Daily Living:  Grooming: stand by assistance standing at sink for washing/drying hands and washing face after toilet  Toileting: stand by assistance        [facility] 6 Click ADL: 19    Treatment & Education:  Role of OT and POC  ADL retraining  Functional mobility training  Safety  Importance EOB/OOB activity    Co-treatment performed due to patient's multiple deficits requiring two skilled therapists to appropriately and safely assess patient's strength and endurance while facilitating functional tasks in addition to accommodating for patient's activity tolerance.     Patient left up in chair with all lines intact, call button in reach, nursing and daughter present, and all needs met.     GOALS:   Multidisciplinary Problems       Occupational Therapy Goals          Problem: Occupational Therapy    Goal Priority Disciplines Outcome Interventions   Occupational Therapy Goal     OT, PT/OT Progressing    Description: Goals to be met by: 12/16/24 (1 mo)     Patient will increase functional independence with ADLs by performing:    UE Dressing with [place].  LE Dressing with [place].  Grooming while standing at sink with [place].  Toileting from toilet with [place] for hygiene and clothing management.   Rolling to Bilateral with [place].   Supine to sit with [place].  Step transfer with [place]  Toilet  transfer to toilet with [place].                         Time Tracking:     OT Date of Treatment: 11/18/24  OT Start Time: 1119  OT Stop Time: 1142  OT Total Time (min): 23 min    Billable Minutes:Self Care/Home Management 23 11/18/2024

## 2024-11-18 NOTE — ANESTHESIA POSTPROCEDURE EVALUATION
Anesthesia Post Evaluation    Patient: Fan Paz    Procedure(s) Performed: Procedure(s) (LRB):  GLOSSECTOMY (Left)  DISSECTION, NECK, MODIFIED RADICAL (Left)  TRACHEOTOMY (N/A)  CREATION, FREE FLAP, FOREARM, RADIAL ASPECT (Left)  EGD, WITH PEG TUBE INSERTION (N/A)  APPLICATION, GRAFT, SKIN, FULL-THICKNESS (Left)    Final Anesthesia Type: general      Patient location during evaluation: PACU  Patient participation: Yes- Able to Participate  Level of consciousness: awake  Post-procedure vital signs: reviewed and stable  Pain management: adequate  Airway patency: patent (tracheostomy)    PONV status at discharge: No PONV  Anesthetic complications: no      Cardiovascular status: hemodynamically stable  Respiratory status: spontaneous ventilation and Tracheostomy  Hydration status: euvolemic  Follow-up not needed.              Vitals Value Taken Time   /65 11/18/24 0702   Temp 37.3 °C (99.2 °F) 11/18/24 0300   Pulse 63 11/18/24 0707   Resp 15 11/18/24 0707   SpO2 97 % 11/18/24 0707   Vitals shown include unfiled device data.      No case tracking events are documented in the log.      Pain/Rosales Score: Pain Rating Prior to Med Admin: 3 (11/17/2024  5:41 AM)

## 2024-11-18 NOTE — CARE UPDATE
I have reviewed the chart of Fan Paz who is hospitalized for the following:    Active Hospital Problems    Diagnosis    *Squamous cell cancer of tongue    Tracheostomy in place     - Trach Care per protocol, appreciate RT assistance       Mobitz type 1 second degree AV block     Avoid keily blockage medications. HR in sinus rhythm.       Acid reflux     - Continue home PPI      Essential hypertension     Home antihypertensives include Losartan. Will resume when clinically appropriate.     - SBP <180        Acute on chronic anemia     Recent Labs   Lab 11/16/24  0310 11/17/24  0958 11/18/24  0324   WBC 16.19* 12.30 10.60   HGB 13.3* 10.4* 11.3*   HCT 42.1 32.4* 34.7*    165 176     - Trend CBC  - Transfuse Hgb <7 and with symptomatic anemia        Hyperlipidemia     - Resume home statin          Ana Drew, BEATRICE  [facility] [employer] [facility]

## 2024-11-19 LAB
ANION GAP SERPL CALC-SCNC: 9 MMOL/L (ref 8–16)
BASOPHILS # BLD AUTO: 0.04 K/UL (ref 0–0.2)
BASOPHILS NFR BLD: 0.5 % (ref 0–1.9)
BUN SERPL-MCNC: 20 MG/DL (ref 8–23)
CALCIUM SERPL-MCNC: 8.4 MG/DL (ref 8.7–10.5)
CHLORIDE SERPL-SCNC: 106 MMOL/L (ref 95–110)
CO2 SERPL-SCNC: 25 MMOL/L (ref 23–29)
CREAT SERPL-MCNC: 0.8 MG/DL (ref 0.5–1.4)
DIFFERENTIAL METHOD BLD: ABNORMAL
EOSINOPHIL # BLD AUTO: 0.2 K/UL (ref 0–0.5)
EOSINOPHIL NFR BLD: 2.7 % (ref 0–8)
ERYTHROCYTE [DISTWIDTH] IN BLOOD BY AUTOMATED COUNT: 15.8 % (ref 11.5–14.5)
EST. GFR  (NO RACE VARIABLE): >60 ML/MIN/1.73 M^2
FINAL PATHOLOGIC DIAGNOSIS: NORMAL
FROZEN SECTION DIAGNOSIS: NORMAL
FROZEN SECTION FOOTNOTE: NORMAL
GLUCOSE SERPL-MCNC: 123 MG/DL (ref 70–110)
GROSS: NORMAL
HCT VFR BLD AUTO: 32.3 % (ref 40–54)
HGB BLD-MCNC: 10.4 G/DL (ref 14–18)
IMM GRANULOCYTES # BLD AUTO: 0.05 K/UL (ref 0–0.04)
IMM GRANULOCYTES NFR BLD AUTO: 0.6 % (ref 0–0.5)
LYMPHOCYTES # BLD AUTO: 0.8 K/UL (ref 1–4.8)
LYMPHOCYTES NFR BLD: 9.4 % (ref 18–48)
Lab: NORMAL
MAGNESIUM SERPL-MCNC: 2.3 MG/DL (ref 1.6–2.6)
MCH RBC QN AUTO: 27.4 PG (ref 27–31)
MCHC RBC AUTO-ENTMCNC: 32.2 G/DL (ref 32–36)
MCV RBC AUTO: 85 FL (ref 82–98)
MICROSCOPIC EXAM: NORMAL
MONOCYTES # BLD AUTO: 1.1 K/UL (ref 0.3–1)
MONOCYTES NFR BLD: 12.9 % (ref 4–15)
NEUTROPHILS # BLD AUTO: 6.1 K/UL (ref 1.8–7.7)
NEUTROPHILS NFR BLD: 73.9 % (ref 38–73)
NRBC BLD-RTO: 0 /100 WBC
PHOSPHATE SERPL-MCNC: 2.2 MG/DL (ref 2.7–4.5)
PLATELET # BLD AUTO: 187 K/UL (ref 150–450)
PMV BLD AUTO: 10.4 FL (ref 9.2–12.9)
POCT GLUCOSE: 152 MG/DL (ref 70–110)
POCT GLUCOSE: 155 MG/DL (ref 70–110)
POTASSIUM SERPL-SCNC: 3.9 MMOL/L (ref 3.5–5.1)
RBC # BLD AUTO: 3.79 M/UL (ref 4.6–6.2)
SODIUM SERPL-SCNC: 140 MMOL/L (ref 136–145)
WBC # BLD AUTO: 8.27 K/UL (ref 3.9–12.7)

## 2024-11-19 PROCEDURE — 63600175 PHARM REV CODE 636 W HCPCS: Performed by: OTOLARYNGOLOGY

## 2024-11-19 PROCEDURE — 99900035 HC TECH TIME PER 15 MIN (STAT)

## 2024-11-19 PROCEDURE — 25000003 PHARM REV CODE 250: Performed by: STUDENT IN AN ORGANIZED HEALTH CARE EDUCATION/TRAINING PROGRAM

## 2024-11-19 PROCEDURE — 27000221 HC OXYGEN, UP TO 24 HOURS

## 2024-11-19 PROCEDURE — 25000003 PHARM REV CODE 250: Performed by: OTOLARYNGOLOGY

## 2024-11-19 PROCEDURE — 94799 UNLISTED PULMONARY SVC/PX: CPT

## 2024-11-19 PROCEDURE — 99900022

## 2024-11-19 PROCEDURE — 94761 N-INVAS EAR/PLS OXIMETRY MLT: CPT

## 2024-11-19 PROCEDURE — 84100 ASSAY OF PHOSPHORUS: CPT

## 2024-11-19 PROCEDURE — 99900026 HC AIRWAY MAINTENANCE (STAT)

## 2024-11-19 PROCEDURE — 63600175 PHARM REV CODE 636 W HCPCS: Performed by: STUDENT IN AN ORGANIZED HEALTH CARE EDUCATION/TRAINING PROGRAM

## 2024-11-19 PROCEDURE — 83735 ASSAY OF MAGNESIUM: CPT

## 2024-11-19 PROCEDURE — 25000003 PHARM REV CODE 250

## 2024-11-19 PROCEDURE — 85025 COMPLETE CBC W/AUTO DIFF WBC: CPT

## 2024-11-19 PROCEDURE — 20600001 HC STEP DOWN PRIVATE ROOM

## 2024-11-19 PROCEDURE — 80048 BASIC METABOLIC PNL TOTAL CA: CPT

## 2024-11-19 PROCEDURE — 27200966 HC CLOSED SUCTION SYSTEM

## 2024-11-19 RX ORDER — PANTOPRAZOLE SODIUM 40 MG/10ML
40 INJECTION, POWDER, LYOPHILIZED, FOR SOLUTION INTRAVENOUS DAILY
Status: DISCONTINUED | OUTPATIENT
Start: 2024-11-19 | End: 2024-11-22 | Stop reason: HOSPADM

## 2024-11-19 RX ADMIN — MONTELUKAST 10 MG: 10 TABLET, FILM COATED ORAL at 08:11

## 2024-11-19 RX ADMIN — POTASSIUM & SODIUM PHOSPHATES POWDER PACK 280-160-250 MG 2 PACKET: 280-160-250 PACK at 07:11

## 2024-11-19 RX ADMIN — AMPICILLIN SODIUM AND SULBACTAM SODIUM 3 G: 2; 1 INJECTION, POWDER, FOR SOLUTION INTRAMUSCULAR; INTRAVENOUS at 02:11

## 2024-11-19 RX ADMIN — ENOXAPARIN SODIUM 40 MG: 40 INJECTION SUBCUTANEOUS at 04:11

## 2024-11-19 RX ADMIN — CHLORHEXIDINE GLUCONATE 0.12% ORAL RINSE 15 ML: 1.2 LIQUID ORAL at 09:11

## 2024-11-19 RX ADMIN — SILODOSIN 8 MG: 4 CAPSULE ORAL at 08:11

## 2024-11-19 RX ADMIN — AMPICILLIN SODIUM AND SULBACTAM SODIUM 3 G: 2; 1 INJECTION, POWDER, FOR SOLUTION INTRAMUSCULAR; INTRAVENOUS at 07:11

## 2024-11-19 RX ADMIN — PANTOPRAZOLE SODIUM 40 MG: 40 INJECTION, POWDER, LYOPHILIZED, FOR SOLUTION INTRAVENOUS at 08:11

## 2024-11-19 RX ADMIN — ACETAMINOPHEN 500 MG: 500 TABLET ORAL at 12:11

## 2024-11-19 RX ADMIN — CHLORHEXIDINE GLUCONATE 0.12% ORAL RINSE 15 ML: 1.2 LIQUID ORAL at 08:11

## 2024-11-19 RX ADMIN — ASPIRIN 81 MG CHEWABLE TABLET 81 MG: 81 TABLET CHEWABLE at 08:11

## 2024-11-19 RX ADMIN — MUPIROCIN: 20 OINTMENT TOPICAL at 09:11

## 2024-11-19 RX ADMIN — ATORVASTATIN CALCIUM 20 MG: 20 TABLET, FILM COATED ORAL at 08:11

## 2024-11-19 RX ADMIN — POLYETHYLENE GLYCOL 3350 17 G: 17 POWDER, FOR SOLUTION ORAL at 08:11

## 2024-11-19 RX ADMIN — POTASSIUM & SODIUM PHOSPHATES POWDER PACK 280-160-250 MG 2 PACKET: 280-160-250 PACK at 11:11

## 2024-11-19 RX ADMIN — MUPIROCIN: 20 OINTMENT TOPICAL at 08:11

## 2024-11-19 RX ADMIN — ACETAMINOPHEN 500 MG: 500 TABLET ORAL at 11:11

## 2024-11-19 NOTE — PLAN OF CARE
Plan of care discussed with the patient and his daughter at the bedside. Goals set for increased activity tolerance. Patient ambulated today with PT. Tolerated well. Patient required less frequent suctioning today, able to clear secretions with a good cough. Tube feeding changed to intermittent bolus via pump. Tolerated transition to bolus feeds well. Neurovascular checks completed per MD order. See flowsheets for complete assessment. Will continue to monitor progress and adjust plan of care as needed.

## 2024-11-19 NOTE — SUBJECTIVE & OBJECTIVE
Interval History: NAEON. AFVSS. Tolerating TF. Walking around hallways. Overall doing well, no issues.     Medications:  Continuous Infusions:  Scheduled Meds:   ampicillin-sulbactam  3 g Intravenous Q8H    aspirin  81 mg Per G Tube Daily    atorvastatin  20 mg Per G Tube Daily    chlorhexidine  15 mL Mouth/Throat BID    enoxparin  40 mg Subcutaneous Daily    montelukast  10 mg Per G Tube Daily    mupirocin   Nasal BID    pantoprazole  40 mg Per G Tube Daily    polyethylene glycol  17 g Per G Tube Daily    silodosin  8 mg Per G Tube Daily     PRN Meds:  Current Facility-Administered Medications:     acetaminophen, 500 mg, Per G Tube, Q6H PRN    hydrALAZINE, 10 mg, Intravenous, Q6H PRN    magnesium oxide, 800 mg, Per G Tube, PRN    magnesium oxide, 800 mg, Per G Tube, PRN    ondansetron, 8 mg, Per G Tube, Q8H PRN    potassium bicarbonate, 35 mEq, Per G Tube, PRN    potassium bicarbonate, 50 mEq, Per G Tube, PRN    potassium bicarbonate, 60 mEq, Per G Tube, PRN    potassium, sodium phosphates, 2 packet, Per G Tube, PRN    potassium, sodium phosphates, 2 packet, Per G Tube, PRN    potassium, sodium phosphates, 2 packet, Per G Tube, PRN    prochlorperazine, 5 mg, Intravenous, Q6H PRN    senna-docusate 8.6-50 mg, 1 tablet, Per G Tube, BID PRN    sodium chloride 0.9%, 10 mL, Intravenous, PRN     Review of patient's allergies indicates:  No Known Allergies  Objective:     Vital Signs (24h Range):  Temp:  [98.4 °F (36.9 °C)-99.6 °F (37.6 °C)] 99.1 °F (37.3 °C)  Pulse:  [] 88  Resp:  [14-39] 23  SpO2:  [91 %-100 %] 97 %  BP: ()/(49-96) 122/60       Lines/Drains/Airways       Drain  Duration                  Closed/Suction Drain 11/15/24 1412 Tube - 1 Left Other (Comment) Bulb 10 Fr. 3 days         Closed/Suction Drain 11/15/24 1606 Tube - 1 Neck Bulb 15 Fr. 3 days         Gastrostomy/Enterostomy 11/15/24 1059 Percutaneous endoscopic gastrostomy (PEG) LUQ feeding 3 days              Airway  Duration              Adult Surgical Airway 11/18/24 0715 Micah Uncuffed 6.0/ 75mm 1 day              Peripheral Intravenous Line  Duration                  Peripheral IV - Single Lumen 11/18/24 1320 20 G Anterior;Distal;Right Forearm <1 day                     Physical Exam  NCAT  NAD  Breathing well on RA, no acute distress  HB I/VI bilat  Hearing well at conversational volume  OC and OP patent, essentially edentulous  R native tongue appears more edematous this am, apparently causing the flap the be slightly more taut - L pete-tongue consists of fasciocutaneous free flap from L forearm, color is normal, although slightly more taut. Silk stitch marks doppler site which is audible on handheld doppler, with normal arterial and venous pulses. Prick with normal blood without significant delay.   L neck incision CDI w staples, ALEE x1 w ss output, no hematoma  6-0 cuffless trach sutured to skin  L upper arm incision closed w running suture, CDI, no hematoma  L forearm in gutter splint, ALEE x1 w ss output. Sensation intact, movement intact distally     Significant Labs:  BMP:   Recent Labs   Lab 11/19/24  0414   *      CO2 25   BUN 20   CREATININE 0.8   CALCIUM 8.4*   MG 2.3     CBC:   Recent Labs   Lab 11/19/24  0414   WBC 8.27   RBC 3.79*   HGB 10.4*   HCT 32.3*      MCV 85   MCH 27.4   MCHC 32.2       Significant Diagnostics:  I have reviewed and interpreted all pertinent imaging results/findings within the past 24 hours.

## 2024-11-19 NOTE — PLAN OF CARE
Recommendations     1.) TF recs for bolus feds:                  - recommend to bolus 220ml of Pivot 1.5 Q4hrs to provide 1980 kcal, 124 g PRO, 228 g CHO, and 1001 ml FF.                  - FWF: recommend FWF of 75ml before and after each feed to provide an additional 900ml of fluid and 1901ml TFV.      2.)  Standardized TF recs for discharge:                  - recommend to bolus 1 carton of Isosource 1.5 (237ml) Q 4hrs to provide 2250 kcal, 102g PRO, and 1146ml FF                 -FWF: recommend FWF of 90ml before and after each feed to provide 1080ml of additional fluid and 2226ml TFV.      3.) If Loose BM's continue, consider adding Banatrol BID to help with diarrhea.     4.) RD to monitor wt, tolerance, skin, labs.      Goals: to meet % of EEN/EPN by next RD f/u  Nutrition Goal Status: goal met  Communication of RD Recs:  (POC)

## 2024-11-19 NOTE — PROGRESS NOTES
[facility] - [facility]ensive [facility]  Otorhinolaryngology-Head & Neck Surgery  Progress Note    Subjective:     Post-Op Info:  Procedure(s) (LRB):  GLOSSECTOMY (Left)  DISSECTION, NECK, MODIFIED RADICAL (Left)  TRACHEOTOMY (N/A)  CREATION, FREE FLAP, FOREARM, RADIAL ASPECT (Left)  EGD, WITH PEG TUBE INSERTION (N/A)  APPLICATION, GRAFT, SKIN, FULL-THICKNESS (Left)   4 Days Post-Op  Hospital Day: 5     Interval History: NAEON. AFVSS. Tolerating TF. Walking around hallways. Overall doing well, no issues.     Medications:  Continuous Infusions:  Scheduled Meds:   ampicillin-sulbactam  3 g Intravenous Q8H    aspirin  81 mg Per G Tube Daily    atorvastatin  20 mg Per G Tube Daily    chlorhexidine  15 mL Mouth/Throat BID    enoxparin  40 mg Subcutaneous Daily    montelukast  10 mg Per G Tube Daily    mupirocin   Nasal BID    pantoprazole  40 mg Per G Tube Daily    polyethylene glycol  17 g Per G Tube Daily    silodosin  8 mg Per G Tube Daily     PRN Meds:  Current Facility-Administered Medications:     acetaminophen, 500 mg, Per G Tube, Q6H PRN    hydrALAZINE, 10 mg, Intravenous, Q6H PRN    magnesium oxide, 800 mg, Per G Tube, PRN    magnesium oxide, 800 mg, Per G Tube, PRN    ondansetron, 8 mg, Per G Tube, Q8H PRN    potassium bicarbonate, 35 mEq, Per G Tube, PRN    potassium bicarbonate, 50 mEq, Per G Tube, PRN    potassium bicarbonate, 60 mEq, Per G Tube, PRN    potassium, sodium phosphates, 2 packet, Per G Tube, PRN    potassium, sodium phosphates, 2 packet, Per G Tube, PRN    potassium, sodium phosphates, 2 packet, Per G Tube, PRN    prochlorperazine, 5 mg, Intravenous, Q6H PRN    senna-docusate 8.6-50 mg, 1 tablet, Per G Tube, BID PRN    sodium chloride 0.9%, 10 mL, Intravenous, PRN     Review of patient's allergies indicates:  No Known Allergies  Objective:     Vital Signs (24h Range):  Temp:  [98.4 °F (36.9 °C)-99.6 °F (37.6 °C)] 99.1 °F (37.3 °C)  Pulse:  [] 88  Resp:  [14-39] 23  SpO2:  [91 %-100 %] 97 %  BP:  ()/(49-96) 122/60       Lines/Drains/Airways       Drain  Duration                  Closed/Suction Drain 11/15/24 1412 Tube - 1 Left Other (Comment) Bulb 10 Fr. 3 days         Closed/Suction Drain 11/15/24 1606 Tube - 1 Neck Bulb 15 Fr. 3 days         Gastrostomy/Enterostomy 11/15/24 1059 Percutaneous endoscopic gastrostomy (PEG) LUQ feeding 3 days              Airway  Duration             Adult Surgical Airway 11/18/24 0715 Shiley Uncuffed 6.0/ 75mm 1 day              Peripheral Intravenous Line  Duration                  Peripheral IV - Single Lumen 11/18/24 1320 20 G Anterior;Distal;Right Forearm <1 day                     Physical Exam  NCAT  NAD  Breathing well on RA, no acute distress  HB I/VI bilat  Hearing well at conversational volume  OC and OP patent, essentially edentulous  R native tongue appears more edematous this am, apparently causing the flap the be slightly more taut - L pete-tongue consists of fasciocutaneous free flap from L forearm, color is normal, although slightly more taut. Silk stitch marks doppler site which is audible on handheld doppler, with normal arterial and venous pulses. Prick with normal blood without significant delay.   L neck incision CDI w staples, ALEE x1 w ss output, no hematoma  6-0 cuffless trach sutured to skin  L upper arm incision closed w running suture, CDI, no hematoma  L forearm in gutter splint, ALEE x1 w ss output. Sensation intact, movement intact distally     Significant Labs:  BMP:   Recent Labs   Lab 11/19/24  0414   *      CO2 25   BUN 20   CREATININE 0.8   CALCIUM 8.4*   MG 2.3     CBC:   Recent Labs   Lab 11/19/24  0414   WBC 8.27   RBC 3.79*   HGB 10.4*   HCT 32.3*      MCV 85   MCH 27.4   MCHC 32.2       Significant Diagnostics:  I have reviewed and interpreted all pertinent imaging results/findings within the past 24 hours.  Assessment/Plan:     * Squamous cell cancer of tongue  Status post L partial glossectomy, Left neck  "dissection levels 1-4, tracheostomy, left radial forearm free flap, left upper arm FTSG 11/16/24 w Dr. Bailon (ablative) and Dr. Thornton (reconstruction).    Continue q2h flap checks: Record Doppler Pulses (artery and vein) ,Capillary Refill , Color, Turgor, temperature.   - Neurovascular Checks q2h of bilateral upper and lower extremities   - Keep head elevated and in neutral position, HOB 30 degress  - Sign above bed that reads "No circumferential Neck Ties"   - FOR ANY FLAP ISSUES, PLEASE PHONE ENT RESIDENT ON CALL.  - Please label drains carefully and document accordingly  - Strict NPO, all med and nutrition through IV/PEG  - 6-0 cuffless trach sutured to skin, possible cap trial and decannulation prior to discharge  - Remove splint/bolster Wednesday  - Staples out Friday    Neuro:  - Alert, oriented.   - Scheduled tylenol, PRN oxy and dilauded    CV:  - HDS. D/C A Line. SBP between 110 and 160. No pressors unless ENT notified  - continue with q2h flap checks    Pulm:  - stable  - Recommend fresh trach care (humidified O2, flexible suctioning, trach supplies at bedside)  - Please place replacement 6-0 cuffed and cuffless trachs at bedside  - Bedside supplies: flexible suction caths, replacement inner cannulas, adrian collars and saline bullets  - Replace inner cannula daily   - Gentle suctioning   - Humidified O2   - Trach care teacher per nursing and RT  - Avoid gauze under trach plate to prevent accidental decannulation  - Sutures (if present) are to be cut my ENT MD only   - No circumferential neck ties given free flap  - Trach changed on POD3 (Mon) to 6-0 cuffless, sutured back to skin      GI/FEN/Lytes:  - Strict NPO  - On TF, slowly advance as tolerated. Hold for nausea.   - replace lytes prn    Renal:  - UOP adequate. Yee out.     Heme/ID:  - cont to trend HH  - cont unasyn x7d     Endo:  - Q6 acuchecks    MSK  - Left forearm splint for 5 days. Remove Weds and the bolster underneath  - PT and OT, " OOB    PPX:  PT/OT  L40  PPI    Dispo:   Can step down to PCU for q2h flap checks                Jimy Montalvo MD  Otorhinolaryngology-Head & Neck Surgery  [facility] - Surgical Intensive Care

## 2024-11-19 NOTE — SUBJECTIVE & OBJECTIVE
Interval History/Significant Events: NAEON. Flap checks q2h, transitioning to q4h later this afternoon. Pending SD.     Follow-up For: Procedure(s) (LRB):  GLOSSECTOMY (Left)  DISSECTION, NECK, MODIFIED RADICAL (Left)  TRACHEOTOMY (N/A)  CREATION, FREE FLAP, FOREARM, RADIAL ASPECT (Left)  EGD, WITH PEG TUBE INSERTION (N/A)  APPLICATION, GRAFT, SKIN, FULL-THICKNESS (Left)    Post-Operative Day: 4 Days Post-Op    Objective:     Vital Signs (Most Recent):  Temp: 99.1 °F (37.3 °C) (11/18/24 2301)  Pulse: 65 (11/19/24 0353)  Resp: 16 (11/19/24 0353)  BP: 125/60 (11/19/24 0200)  SpO2: 100 % (11/19/24 0353) Vital Signs (24h Range):  Temp:  [98.4 °F (36.9 °C)-99.6 °F (37.6 °C)] 99.1 °F (37.3 °C)  Pulse:  [] 65  Resp:  [14-39] 16  SpO2:  [91 %-100 %] 100 %  BP: ()/(50-96) 125/60     Weight: 77.8 kg (171 lb 8.3 oz)  Body mass index is 29.44 kg/m².      Intake/Output Summary (Last 24 hours) at 11/19/2024 0603  Last data filed at 11/19/2024 0010  Gross per 24 hour   Intake 2180.63 ml   Output 605 ml   Net 1575.63 ml          Physical Exam  Constitutional:       General: He is sleeping. He is not in acute distress.     Comments: Trach collar    HENT:      Mouth/Throat:      Comments: excellent doppler signals  Neck:      Comments: Well healing left neck incision with staples c/d/I   ALEE drain with SS output  Cardiovascular:      Rate and Rhythm: Normal rate and regular rhythm.   Pulmonary:      Effort: Pulmonary effort is normal. No respiratory distress.   Abdominal:      General: Abdomen is flat.      Palpations: Abdomen is soft.      Comments: PEG tube LUQ   Musculoskeletal:      Comments: Left forearm radial gutter splint c/d/I  ALEE drain with minimal SS output   Skin:     General: Skin is warm and dry.   Neurological:      General: No focal deficit present.      Cranial Nerves: No cranial nerve deficit.   Psychiatric:         Behavior: Behavior normal.            Vents:  Oxygen Concentration (%): 35 (11/19/24  0353)    Lines/Drains/Airways       Drain  Duration                  Closed/Suction Drain 11/15/24 1412 Tube - 1 Left Other (Comment) Bulb 10 Fr. 3 days         Closed/Suction Drain 11/15/24 1606 Tube - 1 Neck Bulb 15 Fr. 3 days         Gastrostomy/Enterostomy 11/15/24 1059 Percutaneous endoscopic gastrostomy (PEG) LUQ feeding 3 days              Airway  Duration             Adult Surgical Airway 11/18/24 0715 Shiley Uncuffed 6.0/ 75mm <1 day              Peripheral Intravenous Line  Duration                  Peripheral IV - Single Lumen 11/18/24 1320 20 G Anterior;Distal;Right Forearm <1 day                    Significant Labs:    CBC/Anemia Profile:  Recent Labs   Lab 11/17/24  0958 11/18/24 0324 11/19/24  0414   WBC 12.30 10.60 8.27   HGB 10.4* 11.3* 10.4*   HCT 32.4* 34.7* 32.3*    176 187   MCV 87 85 85   RDW 15.9* 15.8* 15.8*        Chemistries:  Recent Labs   Lab 11/17/24  0958 11/18/24 0324 11/19/24  0414    138 140   K 3.7 3.6 3.9    106 106   CO2 27 24 25   BUN 19 17 20   CREATININE 0.9 0.8 0.8   CALCIUM 8.5* 8.6* 8.4*   MG 2.3 2.3 2.3   PHOS 1.8* 1.8* 2.2*       All pertinent labs within the past 24 hours have been reviewed.    Significant Imaging:  I have reviewed all pertinent imaging results/findings within the past 24 hours.

## 2024-11-19 NOTE — PLAN OF CARE
Plan of care discussed with the patient and his son at the bedside. Goals set for increased activity tolerance and step down to PCU. Patient and family notified of pending transfer. Tube feeding tolerated. No complaints at this time. Report given to DINESH Najera in the PCU at bedside. Performed neurovascular and flap check with receiving nurse.  Trach box, obturator, and patient supplies brought to new room. Belongings packed. Patient escorted to his new room [id number].

## 2024-11-19 NOTE — ASSESSMENT & PLAN NOTE
83yo M with PMHx of HLD, HTN, prostate cancer, anemia, GERD, DVT/PE, 2nd degree AV block, SCC of tongue who presents to the SICU s/p L partial glossectomy with L forearm free flap, L neck dissection and tracheostomy. He also had a PEG placed by general surgery during the case. Admitted for flap checks, progressing well and SD orders placed 11/17 PM.       Neuro/Psych:     - Sedation: None    - Neurovascular checks q1h POD#1 and 2, then q2h POD# 3 and 4 (11/17 pm), and then q4h POD#5 on donor site     - Pain: PRN Acetaminophen 500mg q6h                   Cardiac:     - BP Goal: MAP >65 and SBP <180    - Hydralazine PRN, avoid labetalol due to AV block     - Prescribed losartan, but has not recently filled    - Rhythm: NSR      Pulmonary:     - Goal SpO2 >92%    - Wean O2 as tolerated    - No neck ties around neck    - Keep head in neutral position    - Stoma Care    - Keep extra trach at bedside    - Obturator at HOB    - Takes Singulair nightly for runny nose/allergies/post nasal drip        Renal:    - Trend BUN/Cr     - Yee out, record strict Is/Os    - UOP (600cc/24h), unmeasured occurrences    - Home Flomax --> silodosin per G       FEN / GI:     - Daily CMP, PRN K/Mag/Phos per protocol, replete as needed    - Nutrition: strict NPO x1 week    - Dietician Consult     - TF at goal 55cc/hr    - FWF 225mL q6h    - Bowel Regimen: Senna-docusate PRN     - Zofran PRN for nausea    - PPI per G tube, takes daily at home      ID:     - Abx: Unasyn per ENT    - monitor CBC      Heme/Onc:     - Hgb stable on admit    - CBC daily    - lovenox       Endocrine:     - No hx of DM II, most recent A1c 5.9%        PPx:   Feeding: NPO, TF at goal   Analgesia/Sedation: PRN tylenol   Thromboembolic Prevention: SCD's and Lovenox 40 QD  HOB >30: Yes  Stress Ulcer: PPI per G  Glucose Control: POCT glucose BID     Lines/Drains/Airway:         Trach collar         PEG         2 PIV         Drains x2    Dispo: SD to PCU 11/17 pm,  transfer orders in and SD pending. Could do GISSU this afternoon for q4h checks if does not transfer before then

## 2024-11-19 NOTE — PROGRESS NOTES
[facility] - [facility] Intensive Care  Adult Nutrition  Progress Note    SUMMARY       Recommendations    1.) TF recs for bolus feds:                  - recommend to bolus 220ml of Pivot 1.5 Q4hrs to provide 1980 kcal, 124 g PRO, 228 g CHO, and 1001 ml FF.                  - FWF: recommend FWF of 75ml before and after each feed to provide an additional 900ml of fluid and 1901ml TFV.      2.)  Standardized TF recs for discharge:      - recommend to bolus 1 carton of Isosource 1.5 (237ml) Q 4hrs to provide 2250 kcal, 102g PRO, and 1146ml FF     -FWF: recommend FWF of 90ml before and after each feed to provide 1080ml of additional fluid and 2226ml TFV.     3.) If Loose BM's continue, consider adding Banatrol BID to help with diarrhea.     4.) RD to monitor wt, tolerance, skin, labs.     Goals: to meet % of EEN/EPN by next RD f/u  Nutrition Goal Status: goal met  Communication of RD Recs:  (POC)    Assessment and Plan    Nutrition Problem  Inadequate enteral nutrition infusion     Related to (etiology):   NPO status     Signs and Symptoms (as evidenced by):   EN prescription < estimated requirements      Interventions/Recommendations (treatment strategy):  Collaboration of nutritional care with other providers.  EN     Nutrition Diagnosis Status:   Resolved    Reason for Assessment    Reason For Assessment: RD follow-up  Diagnosis: cancer diagnosis/related complications (SCC of tongue; s/p L partial glossectomy with L forearm free flap, L neck dissection and tracheostomy; PEG present.)    General Information Comments: RD f/u: Trach collar present. Pt denies n/v/d/c. Pt denies abdominal discomfort. MD reached out to RD for Standardized formula recs for discharge via Secure Chat. Pt appears nourished, with no visible s/s of malnutrition. Noted moderate edema bilateral arms. RD team to monitor and f/u as needed.     Nutrition Discharge Planning: as per medical course    Nutrition Related Social Determinants of Health:  "SDOH: None Identified     Nutrition Risk Screen    Nutrition Risk Screen: tube feeding or parenteral nutrition    Nutrition/Diet History    Spiritual, Cultural Beliefs, Orthodoxy Practices, Values that Affect Care: no  Food Allergies: NKFA  Factors Affecting Nutritional Intake: NPO    Anthropometrics    Temp: 98.4 °F (36.9 °C)  Height Method: Stated  Height: 5' 4" (162.6 cm)  Height (inches): 64 in  Weight Method: Standard Scale  Weight: 77.8 kg (171 lb 8.3 oz)  Weight (lb): 171.52 lb  Ideal Body Weight (IBW), Male: 130 lb  % Ideal Body Weight, Male (lb): 131.94 %  BMI (Calculated): 29.4    Lab/Procedures/Meds    Pertinent Labs Reviewed: reviewed  Pertinent Labs Comments: Gluc: 123, phos: 2.2    Pertinent Medications Reviewed: reviewed  Pertinent Medications Comments: Abx, statin, enoxaparin, pantoprazole, polyethylene glycol    Estimated/Assessed Needs    Weight Used For Calorie Calculations: 77.8 kg (171 lb 8.3 oz)  Energy Calorie Requirements (kcal): 1945- 2334 kcal  Energy Need Method: Kcal/kg (25-30 kcal/kg)    Protein Requirements: 94- 117g (1.2-1.5g/kg)  Weight Used For Protein Calculations: 77.8 kg (171 lb 8.3 oz)    Fluid Requirements (mL): as per MD or RDA  Estimated Fluid Requirement Method: RDA Method  RDA Method (mL): 1945    Nutrition Prescription Ordered    Current Diet Order: NPO  Current Nutrition Support Formula Ordered: Impact Peptide 1.5  Current Nutrition Support Rate Ordered: 220 (ml)  Current Nutrition Support Frequency Ordered: bolus Q4hrs    Evaluation of Received Nutrient/Fluid Intake    Enteral Calories (kcal): 1980  Enteral Protein (gm): 124  Enteral (Free Water) Fluid (mL): 1001  Free Water Flush Fluid (mL): 900 (from FWF 75ml before and after each bolus)  I/O: +3.6L since 11/15  Energy Calories Required: meeting needs  Protein Required: meeting needs  Fluid Required:  (as per MD)  Comments: LBM 11/19  Tolerance: tolerating  % Intake of Estimated Energy Needs: 75 - 100 %  % Meal Intake: " NPO    Nutrition Risk    Level of Risk/Frequency of Follow-up: low - moderate     Monitor and Evaluation    Food and Nutrient Intake: enteral nutrition intake  Food and Nutrient Adminstration: enteral and parenteral nutrition administration  Physical Activity and Function: nutrition-related ADLs and IADLs  Anthropometric Measurements: weight, weight change, body mass index  Biochemical Data, Medical Tests and Procedures: electrolyte and renal panel, gastrointestinal profile, glucose/endocrine profile, inflammatory profile, lipid profile  Nutrition-Focused Physical Findings: overall appearance, skin     Nutrition Follow-Up    RD Follow-up?: Yes

## 2024-11-19 NOTE — PROGRESS NOTES
[facility]  Critical Care - Surgery  Progress Note    Patient Name: Fan Paz  MRN: [id number]  Admission Date: 11/15/2024  Hospital Length of Stay: 4 days  Code Status: Full Code  Attending Provider: Cristhian Bailon MD  Primary Care Provider: Otilio Damon MD   Principal Problem: Squamous cell cancer of tongue    Subjective:     Hospital/ICU Course:  No notes on file    Interval History/Significant Events: NAEON. Flap checks q2h, transitioning to q4h later this afternoon. Pending SD.     Follow-up For: Procedure(s) (LRB):  GLOSSECTOMY (Left)  DISSECTION, NECK, MODIFIED RADICAL (Left)  TRACHEOTOMY (N/A)  CREATION, FREE FLAP, FOREARM, RADIAL ASPECT (Left)  EGD, WITH PEG TUBE INSERTION (N/A)  APPLICATION, GRAFT, SKIN, FULL-THICKNESS (Left)    Post-Operative Day: 4 Days Post-Op    Objective:     Vital Signs (Most Recent):  Temp: 99.1 °F (37.3 °C) (11/18/24 2301)  Pulse: 65 (11/19/24 0353)  Resp: 16 (11/19/24 0353)  BP: 125/60 (11/19/24 0200)  SpO2: 100 % (11/19/24 0353) Vital Signs (24h Range):  Temp:  [98.4 °F (36.9 °C)-99.6 °F (37.6 °C)] 99.1 °F (37.3 °C)  Pulse:  [] 65  Resp:  [14-39] 16  SpO2:  [91 %-100 %] 100 %  BP: ()/(50-96) 125/60     Weight: 77.8 kg (171 lb 8.3 oz)  Body mass index is 29.44 kg/m².      Intake/Output Summary (Last 24 hours) at 11/19/2024 0603  Last data filed at 11/19/2024 0010  Gross per 24 hour   Intake 2180.63 ml   Output 605 ml   Net 1575.63 ml          Physical Exam  Constitutional:       General: He is sleeping. He is not in acute distress.     Comments: Trach collar    HENT:      Mouth/Throat:      Comments: excellent doppler signals  Neck:      Comments: Well healing left neck incision with staples c/d/I   ALEE drain with SS output  Cardiovascular:      Rate and Rhythm: Normal rate and regular rhythm.   Pulmonary:      Effort: Pulmonary effort is normal. No respiratory distress.   Abdominal:      General: Abdomen is flat.       Palpations: Abdomen is soft.      Comments: PEG tube LUQ   Musculoskeletal:      Comments: Left forearm radial gutter splint c/d/I  ALEE drain with minimal SS output   Skin:     General: Skin is warm and dry.   Neurological:      General: No focal deficit present.      Cranial Nerves: No cranial nerve deficit.   Psychiatric:         Behavior: Behavior normal.            Vents:  Oxygen Concentration (%): 35 (11/19/24 0353)    Lines/Drains/Airways       Drain  Duration                  Closed/Suction Drain 11/15/24 1412 Tube - 1 Left Other (Comment) Bulb 10 Fr. 3 days         Closed/Suction Drain 11/15/24 1606 Tube - 1 Neck Bulb 15 Fr. 3 days         Gastrostomy/Enterostomy 11/15/24 1059 Percutaneous endoscopic gastrostomy (PEG) LUQ feeding 3 days              Airway  Duration             Adult Surgical Airway 11/18/24 0715 Shiley Uncuffed 6.0/ 75mm <1 day              Peripheral Intravenous Line  Duration                  Peripheral IV - Single Lumen 11/18/24 1320 20 G Anterior;Distal;Right Forearm <1 day                    Significant Labs:    CBC/Anemia Profile:  Recent Labs   Lab 11/17/24  0958 11/18/24  0324 11/19/24  0414   WBC 12.30 10.60 8.27   HGB 10.4* 11.3* 10.4*   HCT 32.4* 34.7* 32.3*    176 187   MCV 87 85 85   RDW 15.9* 15.8* 15.8*        Chemistries:  Recent Labs   Lab 11/17/24  0958 11/18/24  0324 11/19/24  0414    138 140   K 3.7 3.6 3.9    106 106   CO2 27 24 25   BUN 19 17 20   CREATININE 0.9 0.8 0.8   CALCIUM 8.5* 8.6* 8.4*   MG 2.3 2.3 2.3   PHOS 1.8* 1.8* 2.2*       All pertinent labs within the past 24 hours have been reviewed.    Significant Imaging:  I have reviewed all pertinent imaging results/findings within the past 24 hours.  Assessment/Plan:     Oncology  * Squamous cell cancer of tongue  83yo M with PMHx of HLD, HTN, prostate cancer, anemia, GERD, DVT/PE, 2nd degree AV block, SCC of tongue who presents to the SICU s/p L partial glossectomy with L forearm free flap, L  neck dissection and tracheostomy 11/15. He also had a PEG placed by general surgery during the case. Admitted for flap checks, progressing well and SD orders placed 11/17 PM.       Neuro/Psych:     - Sedation: None    - Neurovascular checks q1h POD#1 and 2, then q2h POD# 3 and 4 (11/17 pm), and then q4h POD#5 on donor site     - Pain: PRN Acetaminophen 500mg q6h                   Cardiac:     - BP Goal: MAP >65 and SBP <180    - Hydralazine PRN, avoid labetalol due to AV block     - Prescribed losartan, but has not recently filled    - Rhythm: NSR      Pulmonary:     - Goal SpO2 >92%    - Wean O2 as tolerated    - No neck ties around neck    - Keep head in neutral position    - Stoma Care    - Keep extra trach at bedside    - Obturator at HOB    - Takes Singulair nightly for runny nose/allergies/post nasal drip        Renal:    - Trend BUN/Cr     - Yee out, record strict Is/Os    - UOP (600cc/24h), unmeasured occurrences    - Home Flomax --> silodosin per G       FEN / GI:     - Daily CMP, PRN K/Mag/Phos per protocol, replete as needed    - Nutrition: strict NPO x1 week    - Dietician Consult     - TF at goal 55cc/hr    - FWF 225mL q6h    - Bowel Regimen: Senna-docusate PRN     - Zofran PRN for nausea    - PPI per G tube, takes daily at home      ID:     - Abx: Unasyn per ENT    - monitor CBC      Heme/Onc:     - Hgb stable on admit    - CBC daily    - lovenox       Endocrine:     - No hx of DM II, most recent A1c 5.9%        PPx:   Feeding: NPO, TF at goal   Analgesia/Sedation: PRN tylenol   Thromboembolic Prevention: SCD's and Lovenox 40 QD  HOB >30: Yes  Stress Ulcer: PPI per G  Glucose Control: POCT glucose BID     Lines/Drains/Airway:         Trach collar         PEG         2 PIV         Drains x2    Dispo: SD to PCU 11/17 pm, transfer orders in and SD pending.       Critical care was time spent personally by me on the following activities: development of treatment plan with patient or surrogate and bedside  caregivers, discussions with consultants, evaluation of patient's response to treatment, examination of patient, ordering and performing treatments and interventions, ordering and review of laboratory studies, ordering and review of radiographic studies, pulse oximetry, re-evaluation of patient's condition.  This critical care time did not overlap with that of any other provider or involve time for any procedures.     Evelyn Ramsay MD  Critical Care - Surgery  [facility]

## 2024-11-20 DIAGNOSIS — Z96.652 S/P REVISION OF TOTAL KNEE, LEFT: Primary | ICD-10-CM

## 2024-11-20 LAB — POCT GLUCOSE: 126 MG/DL (ref 70–110)

## 2024-11-20 PROCEDURE — 63600175 PHARM REV CODE 636 W HCPCS: Performed by: OTOLARYNGOLOGY

## 2024-11-20 PROCEDURE — 27200966 HC CLOSED SUCTION SYSTEM

## 2024-11-20 PROCEDURE — 25000003 PHARM REV CODE 250: Performed by: STUDENT IN AN ORGANIZED HEALTH CARE EDUCATION/TRAINING PROGRAM

## 2024-11-20 PROCEDURE — 25000003 PHARM REV CODE 250: Performed by: OTOLARYNGOLOGY

## 2024-11-20 PROCEDURE — 99900026 HC AIRWAY MAINTENANCE (STAT)

## 2024-11-20 PROCEDURE — 63600175 PHARM REV CODE 636 W HCPCS: Performed by: STUDENT IN AN ORGANIZED HEALTH CARE EDUCATION/TRAINING PROGRAM

## 2024-11-20 PROCEDURE — 27000221 HC OXYGEN, UP TO 24 HOURS

## 2024-11-20 PROCEDURE — 99900035 HC TECH TIME PER 15 MIN (STAT)

## 2024-11-20 PROCEDURE — 25000003 PHARM REV CODE 250

## 2024-11-20 PROCEDURE — 94761 N-INVAS EAR/PLS OXIMETRY MLT: CPT

## 2024-11-20 PROCEDURE — 20600001 HC STEP DOWN PRIVATE ROOM

## 2024-11-20 RX ADMIN — AMPICILLIN SODIUM AND SULBACTAM SODIUM 3 G: 2; 1 INJECTION, POWDER, FOR SOLUTION INTRAMUSCULAR; INTRAVENOUS at 10:11

## 2024-11-20 RX ADMIN — POLYETHYLENE GLYCOL 3350 17 G: 17 POWDER, FOR SOLUTION ORAL at 09:11

## 2024-11-20 RX ADMIN — MUPIROCIN: 20 OINTMENT TOPICAL at 09:11

## 2024-11-20 RX ADMIN — CHLORHEXIDINE GLUCONATE 0.12% ORAL RINSE 15 ML: 1.2 LIQUID ORAL at 09:11

## 2024-11-20 RX ADMIN — AMPICILLIN SODIUM AND SULBACTAM SODIUM 3 G: 2; 1 INJECTION, POWDER, FOR SOLUTION INTRAMUSCULAR; INTRAVENOUS at 06:11

## 2024-11-20 RX ADMIN — AMPICILLIN SODIUM AND SULBACTAM SODIUM 3 G: 2; 1 INJECTION, POWDER, FOR SOLUTION INTRAMUSCULAR; INTRAVENOUS at 12:11

## 2024-11-20 RX ADMIN — PANTOPRAZOLE SODIUM 40 MG: 40 INJECTION, POWDER, LYOPHILIZED, FOR SOLUTION INTRAVENOUS at 09:11

## 2024-11-20 RX ADMIN — SILODOSIN 8 MG: 4 CAPSULE ORAL at 09:11

## 2024-11-20 RX ADMIN — AMPICILLIN SODIUM AND SULBACTAM SODIUM 3 G: 2; 1 INJECTION, POWDER, FOR SOLUTION INTRAMUSCULAR; INTRAVENOUS at 03:11

## 2024-11-20 RX ADMIN — ENOXAPARIN SODIUM 40 MG: 40 INJECTION SUBCUTANEOUS at 05:11

## 2024-11-20 RX ADMIN — ATORVASTATIN CALCIUM 20 MG: 20 TABLET, FILM COATED ORAL at 09:11

## 2024-11-20 RX ADMIN — MONTELUKAST 10 MG: 10 TABLET, FILM COATED ORAL at 09:11

## 2024-11-20 RX ADMIN — ASPIRIN 81 MG CHEWABLE TABLET 81 MG: 81 TABLET CHEWABLE at 09:11

## 2024-11-20 NOTE — SUBJECTIVE & OBJECTIVE
Interval History: NAEON. AFVSS. Underwent capping trial last night, but did have to remove cap overnight due to dyspnea. Otherwise, does tolerate finger occlusion. No new issues. Left arm dressing changed.     Medications:  Continuous Infusions:  Scheduled Meds:   ampicillin-sulbactam  3 g Intravenous Q8H    aspirin  81 mg Per G Tube Daily    atorvastatin  20 mg Per G Tube Daily    chlorhexidine  15 mL Mouth/Throat BID    enoxparin  40 mg Subcutaneous Daily    montelukast  10 mg Per G Tube Daily    mupirocin   Nasal BID    pantoprazole  40 mg Intravenous Daily    polyethylene glycol  17 g Per G Tube Daily    silodosin  8 mg Per G Tube Daily     PRN Meds:  Current Facility-Administered Medications:     acetaminophen, 500 mg, Per G Tube, Q6H PRN    hydrALAZINE, 10 mg, Intravenous, Q6H PRN    magnesium oxide, 800 mg, Per G Tube, PRN    magnesium oxide, 800 mg, Per G Tube, PRN    ondansetron, 8 mg, Per G Tube, Q8H PRN    potassium bicarbonate, 35 mEq, Per G Tube, PRN    potassium bicarbonate, 50 mEq, Per G Tube, PRN    potassium bicarbonate, 60 mEq, Per G Tube, PRN    potassium, sodium phosphates, 2 packet, Per G Tube, PRN    potassium, sodium phosphates, 2 packet, Per G Tube, PRN    potassium, sodium phosphates, 2 packet, Per G Tube, PRN    prochlorperazine, 5 mg, Intravenous, Q6H PRN    senna-docusate 8.6-50 mg, 1 tablet, Per G Tube, BID PRN    sodium chloride 0.9%, 10 mL, Intravenous, PRN     Review of patient's allergies indicates:  No Known Allergies  Objective:     Vital Signs (24h Range):  Temp:  [97.7 °F (36.5 °C)-99.4 °F (37.4 °C)] 99.4 °F (37.4 °C)  Pulse:  [] 89  Resp:  [15-37] 19  SpO2:  [94 %-100 %] 95 %  BP: (108-195)/(58-99) 139/65       Lines/Drains/Airways       Drain  Duration                  Closed/Suction Drain 11/15/24 1412 Tube - 1 Left Other (Comment) Bulb 10 Fr. 4 days         Closed/Suction Drain 11/15/24 1606 Tube - 1 Neck Bulb 15 Fr. 4 days         Gastrostomy/Enterostomy 11/15/24 0782  Percutaneous endoscopic gastrostomy (PEG) LUQ feeding 4 days              Airway  Duration             Adult Surgical Airway 11/18/24 0715 Shiley Uncuffed 6.0/ 75mm 2 days              Peripheral Intravenous Line  Duration                  Peripheral IV - Single Lumen 11/18/24 1320 20 G Anterior;Distal;Right Forearm 1 day                     Physical Exam  NCAT  NAD  Breathing well on RA, no acute distress  HB I/VI bilat  Hearing well at conversational volume  OC and OP patent, essentially edentulous  R native tongue appears more edematous this am, apparently causing the flap the be slightly more taut - L pete-tongue consists of fasciocutaneous free flap from L forearm, color is normal, although slightly more taut. Silk stitch marks doppler site which is audible on handheld doppler, with normal arterial and venous pulses. Prick with normal blood without significant delay.   L neck incision CDI w staples, ALEE x1 w ss output, no hematoma  6-0 cuffless trach sutured to skin, breathing well  L upper arm incision closed w running suture, CDI, no hematoma  L forearm in gutter splint, ALEE x1 w ss output.Splint removed. FTSG intact, healing well. Bolster re-applied.      Significant Labs:  BMP:   Recent Labs   Lab 11/19/24 0414   *      CO2 25   BUN 20   CREATININE 0.8   CALCIUM 8.4*   MG 2.3     CBC:   Recent Labs   Lab 11/19/24 0414   WBC 8.27   RBC 3.79*   HGB 10.4*   HCT 32.3*      MCV 85   MCH 27.4   MCHC 32.2       Significant Diagnostics:  I have reviewed and interpreted all pertinent imaging results/findings within the past 24 hours.

## 2024-11-20 NOTE — NURSING
PCU Plan of Care    Patient Dx: Squamous cell cancer of tongue    Vital Signs (last 12 hours):   Temp:  [97.9 °F (36.6 °C)-99.4 °F (37.4 °C)]   Pulse:  [71-94]   Resp:  [16-23]   BP: (132-147)/(65-73)   SpO2:  [95 %-97 %]      Neuro: AAOx4, Follows Commands, and Moves all extremities spontaneously       Respiratory: Trach Collar       Frequent Checks: Flap Checks q4h and Neurovascular Checks q4h    Urine Output: Voids Spontaneously  300 mL/shift    Drains: ALEE #1, total output 5mL /shift, ALEE #2, total output 0mL/shift    Diet: NPO  and Sips with Meds     Mobility: Up to Chair ; Assistance Required: Stand-by Assist      Skin: Patient turned q2h, bony prominences protected, and mattress inflated/working correctly.   Lewis Score: 20.    Shift Events:   See flowsheet for further assessment/details.  Family updated on current condition/plan of care, questions answered, and emotional support provided.  MD updated on current condition, vitals, labs, and gtts.

## 2024-11-20 NOTE — PROGRESS NOTES
[facility]  Otorhinolaryngology-Head & Neck Surgery  Progress Note    Subjective:     Post-Op Info:  Procedure(s) (LRB):  GLOSSECTOMY (Left)  DISSECTION, NECK, MODIFIED RADICAL (Left)  TRACHEOTOMY (N/A)  CREATION, FREE FLAP, FOREARM, RADIAL ASPECT (Left)  EGD, WITH PEG TUBE INSERTION (N/A)  APPLICATION, GRAFT, SKIN, FULL-THICKNESS (Left)   5 Days Post-Op  Hospital Day: 6     Interval History: NAEON. AFVSS. Underwent capping trial last night, but did have to remove cap overnight due to dyspnea. Otherwise, does tolerate finger occlusion. No new issues. Left arm dressing changed.     Medications:  Continuous Infusions:  Scheduled Meds:   ampicillin-sulbactam  3 g Intravenous Q8H    aspirin  81 mg Per G Tube Daily    atorvastatin  20 mg Per G Tube Daily    chlorhexidine  15 mL Mouth/Throat BID    enoxparin  40 mg Subcutaneous Daily    montelukast  10 mg Per G Tube Daily    mupirocin   Nasal BID    pantoprazole  40 mg Intravenous Daily    polyethylene glycol  17 g Per G Tube Daily    silodosin  8 mg Per G Tube Daily     PRN Meds:  Current Facility-Administered Medications:     acetaminophen, 500 mg, Per G Tube, Q6H PRN    hydrALAZINE, 10 mg, Intravenous, Q6H PRN    magnesium oxide, 800 mg, Per G Tube, PRN    magnesium oxide, 800 mg, Per G Tube, PRN    ondansetron, 8 mg, Per G Tube, Q8H PRN    potassium bicarbonate, 35 mEq, Per G Tube, PRN    potassium bicarbonate, 50 mEq, Per G Tube, PRN    potassium bicarbonate, 60 mEq, Per G Tube, PRN    potassium, sodium phosphates, 2 packet, Per G Tube, PRN    potassium, sodium phosphates, 2 packet, Per G Tube, PRN    potassium, sodium phosphates, 2 packet, Per G Tube, PRN    prochlorperazine, 5 mg, Intravenous, Q6H PRN    senna-docusate 8.6-50 mg, 1 tablet, Per G Tube, BID PRN    sodium chloride 0.9%, 10 mL, Intravenous, PRN     Review of patient's allergies indicates:  No Known Allergies  Objective:     Vital Signs (24h Range):  Temp:  [97.7 °F (36.5  °C)-99.4 °F (37.4 °C)] 99.4 °F (37.4 °C)  Pulse:  [] 89  Resp:  [15-37] 19  SpO2:  [94 %-100 %] 95 %  BP: (108-195)/(58-99) 139/65       Lines/Drains/Airways       Drain  Duration                  Closed/Suction Drain 11/15/24 1412 Tube - 1 Left Other (Comment) Bulb 10 Fr. 4 days         Closed/Suction Drain 11/15/24 1606 Tube - 1 Neck Bulb 15 Fr. 4 days         Gastrostomy/Enterostomy 11/15/24 1059 Percutaneous endoscopic gastrostomy (PEG) LUQ feeding 4 days              Airway  Duration             Adult Surgical Airway 11/18/24 0715 Shiley Uncuffed 6.0/ 75mm 2 days              Peripheral Intravenous Line  Duration                  Peripheral IV - Single Lumen 11/18/24 1320 20 G Anterior;Distal;Right Forearm 1 day                     Physical Exam  NCAT  NAD  Breathing well on RA, no acute distress  HB I/VI bilat  Hearing well at conversational volume  OC and OP patent, essentially edentulous  R native tongue appears more edematous this am, apparently causing the flap the be slightly more taut - L pete-tongue consists of fasciocutaneous free flap from L forearm, color is normal, although slightly more taut. Silk stitch marks doppler site which is audible on handheld doppler, with normal arterial and venous pulses. Prick with normal blood without significant delay.   L neck incision CDI w staples, ALEE x1 w ss output, no hematoma  6-0 cuffless trach sutured to skin, breathing well  L upper arm incision closed w running suture, CDI, no hematoma  L forearm in gutter splint, ALEE x1 w ss output.Splint removed. FTSG intact, healing well. Bolster re-applied.      Significant Labs:  BMP:   Recent Labs   Lab 11/19/24  0414   *      CO2 25   BUN 20   CREATININE 0.8   CALCIUM 8.4*   MG 2.3     CBC:   Recent Labs   Lab 11/19/24 0414   WBC 8.27   RBC 3.79*   HGB 10.4*   HCT 32.3*      MCV 85   MCH 27.4   MCHC 32.2       Significant Diagnostics:  I have reviewed and interpreted all pertinent imaging  "results/findings within the past 24 hours.  Assessment/Plan:     * Squamous cell cancer of tongue  Status post L partial glossectomy, Left neck dissection levels 1-4, tracheostomy, left radial forearm free flap, left upper arm FTSG 11/16/24 w Dr. Bailon (ablative) and Dr. Thornton (reconstruction).    Continue q4h flap checks: Record Doppler Pulses (artery and vein) ,Capillary Refill , Color, Turgor, temperature.   - Neurovascular Checks q4h of bilateral upper and lower extremities   - Keep head elevated and in neutral position, HOB 30 degress  - Sign above bed that reads "No circumferential Neck Ties"   - FOR ANY FLAP ISSUES, PLEASE PHONE ENT RESIDENT ON CALL.  - Please label drains carefully and document accordingly  - Strict NPO, all med and nutrition through IV/PEG  - 6-0 cuffless trach sutured to skin, possible cap trial and decannulation prior to discharge  - Remove splint/bolster Wednesday  - Staples out Friday    Neuro:  - Alert, oriented.   - Scheduled tylenol, PRN oxy and dilauded    CV:  - HDS. D/C A Line. SBP between 110 and 160. No pressors unless ENT notified  - continue with q2h flap checks    Pulm:  - stable  - Recommend fresh trach care (humidified O2, flexible suctioning, trach supplies at bedside)  - Please place replacement 6-0 cuffed and cuffless trachs at bedside  - Bedside supplies: flexible suction caths, replacement inner cannulas, adrian collars and saline bullets  - Replace inner cannula daily   - Gentle suctioning   - Humidified O2   - Trach care teacher per nursing and RT  - Avoid gauze under trach plate to prevent accidental decannulation  - Sutures (if present) are to be cut my ENT MD only   - No circumferential neck ties given free flap  - Trach changed on POD3 (Mon) to 6-0 cuffless, sutured back to skin  - Will re-attempt capping trial  - SW consulted for DME for trach supplies in event that cannot be decannulated      GI/FEN/Lytes:  - Strict NPO  - On TF, slowly advance as tolerated. " Hold for nausea.   - replace lytes prn    Renal:  - UOP adequate. Yee out.     Heme/ID:  - cont to trend HH  - cont unasyn x7d     Endo:  - Q6 acuchecks    MSK  - Left forearm splint remove, will change dressing QOD.   - PT and OT, OOB    PPX:  PT/OT  L40  PPI    Dispo:   Can step down to [facility] for q4h flap checks.                 Jimy Montalvo MD  Otorhinolaryngology-Head & Neck Surgery  [facility]

## 2024-11-20 NOTE — PLAN OF CARE
Trach and Suction supplies will be delivered to patient room by Friday 11/22/2024 from [facility]. [facility] will [facility] Tube Feeds and provide education to Patient and family. Will continue to follow    Discharge Plan A and Plan B have been determined by review of patient's clinical status, future medical and therapeutic needs, and coverage/benefits for post-acute care in coordination with multidisciplinary team members.     Karey Domínguez RN, BSN  Case Management  [phone]

## 2024-11-20 NOTE — ASSESSMENT & PLAN NOTE
"Status post L partial glossectomy, Left neck dissection levels 1-4, tracheostomy, left radial forearm free flap, left upper arm FTSG 11/16/24 w Dr. Bailon (ablative) and Dr. Thornton (reconstruction).    Continue q4h flap checks: Record Doppler Pulses (artery and vein) ,Capillary Refill , Color, Turgor, temperature.   - Neurovascular Checks q4h of bilateral upper and lower extremities   - Keep head elevated and in neutral position, HOB 30 degress  - Sign above bed that reads "No circumferential Neck Ties"   - FOR ANY FLAP ISSUES, PLEASE PHONE ENT RESIDENT ON CALL.  - Please label drains carefully and document accordingly  - Strict NPO, all med and nutrition through IV/PEG  - 6-0 cuffless trach sutured to skin, possible cap trial and decannulation prior to discharge  - Remove splint/bolster Wednesday  - Staples out Friday    Neuro:  - Alert, oriented.   - Scheduled tylenol, PRN oxy and dilauded    CV:  - HDS. D/C A Line. SBP between 110 and 160. No pressors unless ENT notified  - continue with q2h flap checks    Pulm:  - stable  - Recommend fresh trach care (humidified O2, flexible suctioning, trach supplies at bedside)  - Please place replacement 6-0 cuffed and cuffless trachs at bedside  - Bedside supplies: flexible suction caths, replacement inner cannulas, adrian collars and saline bullets  - Replace inner cannula daily   - Gentle suctioning   - Humidified O2   - Trach care teacher per nursing and RT  - Avoid gauze under trach plate to prevent accidental decannulation  - Sutures (if present) are to be cut my ENT MD only   - No circumferential neck ties given free flap  - Trach changed on POD3 (Mon) to 6-0 cuffless, sutured back to skin  - Will re-attempt capping trial  - SW consulted for DME for trach supplies in event that cannot be decannulated      GI/FEN/Lytes:  - Strict NPO  - On TF, slowly advance as tolerated. Hold for nausea.   - replace lytes prn    Renal:  - UOP adequate. Yee out.     Heme/ID:  - cont to " trend HH  - cont unasyn x7d     Endo:  - Q6 acuchecks    MSK  - Left forearm splint remove, will change dressing QOD.   - PT and OT, OOB    PPX:  PT/OT  L40  PPI    Dispo:   Can step down to [facility] for q4h flap checks.

## 2024-11-21 LAB
ALBUMIN SERPL BCP-MCNC: 2.4 G/DL (ref 3.5–5.2)
ALP SERPL-CCNC: 61 U/L (ref 40–150)
ALT SERPL W/O P-5'-P-CCNC: 67 U/L (ref 10–44)
ANION GAP SERPL CALC-SCNC: 9 MMOL/L (ref 8–16)
AST SERPL-CCNC: 57 U/L (ref 10–40)
BASOPHILS # BLD AUTO: 0.04 K/UL (ref 0–0.2)
BASOPHILS NFR BLD: 0.6 % (ref 0–1.9)
BILIRUB SERPL-MCNC: 0.4 MG/DL (ref 0.1–1)
BUN SERPL-MCNC: 22 MG/DL (ref 8–23)
CALCIUM SERPL-MCNC: 8.8 MG/DL (ref 8.7–10.5)
CHLORIDE SERPL-SCNC: 111 MMOL/L (ref 95–110)
CO2 SERPL-SCNC: 23 MMOL/L (ref 23–29)
CREAT SERPL-MCNC: 0.8 MG/DL (ref 0.5–1.4)
DIFFERENTIAL METHOD BLD: ABNORMAL
EOSINOPHIL # BLD AUTO: 0.3 K/UL (ref 0–0.5)
EOSINOPHIL NFR BLD: 3.9 % (ref 0–8)
ERYTHROCYTE [DISTWIDTH] IN BLOOD BY AUTOMATED COUNT: 15.9 % (ref 11.5–14.5)
EST. GFR  (NO RACE VARIABLE): >60 ML/MIN/1.73 M^2
GLUCOSE SERPL-MCNC: 115 MG/DL (ref 70–110)
HCT VFR BLD AUTO: 34 % (ref 40–54)
HGB BLD-MCNC: 10.7 G/DL (ref 14–18)
IMM GRANULOCYTES # BLD AUTO: 0.05 K/UL (ref 0–0.04)
IMM GRANULOCYTES NFR BLD AUTO: 0.8 % (ref 0–0.5)
LYMPHOCYTES # BLD AUTO: 1 K/UL (ref 1–4.8)
LYMPHOCYTES NFR BLD: 14.7 % (ref 18–48)
MAGNESIUM SERPL-MCNC: 2.3 MG/DL (ref 1.6–2.6)
MCH RBC QN AUTO: 27.5 PG (ref 27–31)
MCHC RBC AUTO-ENTMCNC: 31.5 G/DL (ref 32–36)
MCV RBC AUTO: 87 FL (ref 82–98)
MONOCYTES # BLD AUTO: 0.9 K/UL (ref 0.3–1)
MONOCYTES NFR BLD: 13.4 % (ref 4–15)
NEUTROPHILS # BLD AUTO: 4.4 K/UL (ref 1.8–7.7)
NEUTROPHILS NFR BLD: 66.6 % (ref 38–73)
NRBC BLD-RTO: 0 /100 WBC
OHS QRS DURATION: 94 MS
OHS QTC CALCULATION: 367 MS
PHOSPHATE SERPL-MCNC: 2.2 MG/DL (ref 2.7–4.5)
PLATELET # BLD AUTO: 223 K/UL (ref 150–450)
PMV BLD AUTO: 10.3 FL (ref 9.2–12.9)
POCT GLUCOSE: 143 MG/DL (ref 70–110)
POTASSIUM SERPL-SCNC: 3.9 MMOL/L (ref 3.5–5.1)
PROT SERPL-MCNC: 6.7 G/DL (ref 6–8.4)
RBC # BLD AUTO: 3.89 M/UL (ref 4.6–6.2)
SODIUM SERPL-SCNC: 143 MMOL/L (ref 136–145)
WBC # BLD AUTO: 6.62 K/UL (ref 3.9–12.7)

## 2024-11-21 PROCEDURE — 83735 ASSAY OF MAGNESIUM: CPT

## 2024-11-21 PROCEDURE — 25000003 PHARM REV CODE 250: Performed by: OTOLARYNGOLOGY

## 2024-11-21 PROCEDURE — 85025 COMPLETE CBC W/AUTO DIFF WBC: CPT

## 2024-11-21 PROCEDURE — 36415 COLL VENOUS BLD VENIPUNCTURE: CPT

## 2024-11-21 PROCEDURE — 84100 ASSAY OF PHOSPHORUS: CPT

## 2024-11-21 PROCEDURE — 94761 N-INVAS EAR/PLS OXIMETRY MLT: CPT

## 2024-11-21 PROCEDURE — 25000003 PHARM REV CODE 250: Performed by: STUDENT IN AN ORGANIZED HEALTH CARE EDUCATION/TRAINING PROGRAM

## 2024-11-21 PROCEDURE — 25000003 PHARM REV CODE 250

## 2024-11-21 PROCEDURE — 80053 COMPREHEN METABOLIC PANEL: CPT

## 2024-11-21 PROCEDURE — 27201112

## 2024-11-21 PROCEDURE — 27100171 HC OXYGEN HIGH FLOW UP TO 24 HOURS

## 2024-11-21 PROCEDURE — 93005 ELECTROCARDIOGRAM TRACING: CPT

## 2024-11-21 PROCEDURE — 63600175 PHARM REV CODE 636 W HCPCS: Performed by: STUDENT IN AN ORGANIZED HEALTH CARE EDUCATION/TRAINING PROGRAM

## 2024-11-21 PROCEDURE — 20600001 HC STEP DOWN PRIVATE ROOM

## 2024-11-21 PROCEDURE — 97530 THERAPEUTIC ACTIVITIES: CPT

## 2024-11-21 PROCEDURE — 99900035 HC TECH TIME PER 15 MIN (STAT)

## 2024-11-21 PROCEDURE — 27200966 HC CLOSED SUCTION SYSTEM

## 2024-11-21 PROCEDURE — 27000221 HC OXYGEN, UP TO 24 HOURS

## 2024-11-21 PROCEDURE — 99900026 HC AIRWAY MAINTENANCE (STAT)

## 2024-11-21 PROCEDURE — 93010 ELECTROCARDIOGRAM REPORT: CPT | Mod: ,,, | Performed by: INTERNAL MEDICINE

## 2024-11-21 PROCEDURE — 63600175 PHARM REV CODE 636 W HCPCS: Performed by: OTOLARYNGOLOGY

## 2024-11-21 RX ORDER — SODIUM,POTASSIUM PHOSPHATES 280-250MG
1 POWDER IN PACKET (EA) ORAL 3 TIMES DAILY
Status: DISCONTINUED | OUTPATIENT
Start: 2024-11-22 | End: 2024-11-22 | Stop reason: HOSPADM

## 2024-11-21 RX ADMIN — CHLORHEXIDINE GLUCONATE 0.12% ORAL RINSE 15 ML: 1.2 LIQUID ORAL at 09:11

## 2024-11-21 RX ADMIN — SILODOSIN 8 MG: 4 CAPSULE ORAL at 09:11

## 2024-11-21 RX ADMIN — AMPICILLIN SODIUM AND SULBACTAM SODIUM 3 G: 2; 1 INJECTION, POWDER, FOR SOLUTION INTRAMUSCULAR; INTRAVENOUS at 04:11

## 2024-11-21 RX ADMIN — AMPICILLIN SODIUM AND SULBACTAM SODIUM 3 G: 2; 1 INJECTION, POWDER, FOR SOLUTION INTRAMUSCULAR; INTRAVENOUS at 06:11

## 2024-11-21 RX ADMIN — MONTELUKAST 10 MG: 10 TABLET, FILM COATED ORAL at 09:11

## 2024-11-21 RX ADMIN — AMPICILLIN SODIUM AND SULBACTAM SODIUM 3 G: 2; 1 INJECTION, POWDER, FOR SOLUTION INTRAMUSCULAR; INTRAVENOUS at 10:11

## 2024-11-21 RX ADMIN — POLYETHYLENE GLYCOL 3350 17 G: 17 POWDER, FOR SOLUTION ORAL at 09:11

## 2024-11-21 RX ADMIN — PANTOPRAZOLE SODIUM 40 MG: 40 INJECTION, POWDER, LYOPHILIZED, FOR SOLUTION INTRAVENOUS at 09:11

## 2024-11-21 RX ADMIN — ATORVASTATIN CALCIUM 20 MG: 20 TABLET, FILM COATED ORAL at 09:11

## 2024-11-21 RX ADMIN — ASPIRIN 81 MG CHEWABLE TABLET 81 MG: 81 TABLET CHEWABLE at 09:11

## 2024-11-21 RX ADMIN — ENOXAPARIN SODIUM 40 MG: 40 INJECTION SUBCUTANEOUS at 05:11

## 2024-11-21 NOTE — PROGRESS NOTES
[facility]  Otorhinolaryngology-Head & Neck Surgery  Progress Note    Subjective:     Post-Op Info:  Procedure(s) (LRB):  GLOSSECTOMY (Left)  DISSECTION, NECK, MODIFIED RADICAL (Left)  TRACHEOTOMY (N/A)  CREATION, FREE FLAP, FOREARM, RADIAL ASPECT (Left)  EGD, WITH PEG TUBE INSERTION (N/A)  APPLICATION, GRAFT, SKIN, FULL-THICKNESS (Left)   6 Days Post-Op  Hospital Day: 7     Interval History: NAEON. AFVSS. No flap issues. Tolerating TF. Trach capped this am.     Medications:  Continuous Infusions:  Scheduled Meds:   ampicillin-sulbactam  3 g Intravenous Q8H    aspirin  81 mg Per G Tube Daily    atorvastatin  20 mg Per G Tube Daily    chlorhexidine  15 mL Mouth/Throat BID    enoxparin  40 mg Subcutaneous Daily    montelukast  10 mg Per G Tube Daily    pantoprazole  40 mg Intravenous Daily    polyethylene glycol  17 g Per G Tube Daily    silodosin  8 mg Per G Tube Daily     PRN Meds:  Current Facility-Administered Medications:     acetaminophen, 500 mg, Per G Tube, Q6H PRN    hydrALAZINE, 10 mg, Intravenous, Q6H PRN    ondansetron, 8 mg, Per G Tube, Q8H PRN    prochlorperazine, 5 mg, Intravenous, Q6H PRN    senna-docusate 8.6-50 mg, 1 tablet, Per G Tube, BID PRN    sodium chloride 0.9%, 10 mL, Intravenous, PRN     Review of patient's allergies indicates:  No Known Allergies  Objective:     Vital Signs (24h Range):  Temp:  [97.9 °F (36.6 °C)-99 °F (37.2 °C)] 98.9 °F (37.2 °C)  Pulse:  [] 82  Resp:  [16-28] 21  SpO2:  [95 %-100 %] 100 %  BP: (125-138)/(64-78) 134/64       Lines/Drains/Airways       Drain  Duration                  Closed/Suction Drain 11/15/24 1412 Tube - 1 Left Other (Comment) Bulb 10 Fr. 5 days         Closed/Suction Drain 11/15/24 1606 Tube - 1 Neck Bulb 15 Fr. 5 days         Gastrostomy/Enterostomy 11/15/24 1059 Percutaneous endoscopic gastrostomy (PEG) LUQ feeding 5 days              Airway  Duration             Adult Surgical Airway 11/18/24 0715 Micah Uncuffed 6.0/  75mm 2 days              Peripheral Intravenous Line  Duration                  Peripheral IV - Single Lumen 11/18/24 1320 20 G Anterior;Distal;Right Forearm 2 days                     Physical Exam  NCAT  NAD  Breathing well on RA, no acute distress  HB I/VI bilat  Hearing well at conversational volume  OC and OP patent, essentially edentulous  R native tongue appears more edematous this am, apparently causing the flap the be slightly more taut - L pete-tongue consists of fasciocutaneous free flap from L forearm, color is normal, although slightly more taut. Silk stitch marks doppler site which is audible on handheld doppler, with normal arterial and venous pulses. Prick with normal blood without significant delay.   L neck incision CDI w staples, ALEE x1 w ss output, no hematoma  6-0 cuffless trach sutured to skin, breathing well - capped  L upper arm incision closed w running suture, CDI, no hematoma  L forearm in gutter splint, ALEE x1 w ss output.Splint removed. FTSG intact, healing well. Bolster re-applied.     Significant Labs:  BMP:   Recent Labs   Lab 11/19/24  0414   *      CO2 25   BUN 20   CREATININE 0.8   CALCIUM 8.4*   MG 2.3     CBC:   Recent Labs   Lab 11/19/24  0414   WBC 8.27   RBC 3.79*   HGB 10.4*   HCT 32.3*      MCV 85   MCH 27.4   MCHC 32.2       Significant Diagnostics:  I have reviewed and interpreted all pertinent imaging results/findings within the past 24 hours.  Assessment/Plan:     * Squamous cell cancer of tongue  Status post L partial glossectomy, Left neck dissection levels 1-4, tracheostomy, left radial forearm free flap, left upper arm FTSG 11/16/24 w Dr. Bailon (ablative) and Dr. Thornton (reconstruction).    Continue q4h flap checks: Record Doppler Pulses (artery and vein) ,Capillary Refill , Color, Turgor, temperature.   - Neurovascular Checks q4h of bilateral upper and lower extremities   - Keep head elevated and in neutral position, HOB 30 degress  - Sign above bed  "that reads "No circumferential Neck Ties"   - FOR ANY FLAP ISSUES, PLEASE PHONE ENT RESIDENT ON CALL.  - Please label drains carefully and document accordingly  - Strict NPO, all med and nutrition through IV/PEG  - 6-0 cuffless trach sutured to skin, possible cap trial and decannulation prior to discharge  - Change arm dressing over other day  - Trach capped this am, possible decannulation tomorrow if tolerates  - SW for trach supplies ordered in case cannot decannulate  - Staples out Friday for arm    Neuro:  - Alert, oriented.   - Scheduled tylenol, PRN oxy and dilauded    CV:  - HDS. D/C A Line. SBP between 110 and 160. No pressors unless ENT notified  - continue with q2h flap checks    Pulm:  - stable  - Recommend fresh trach care (humidified O2, flexible suctioning, trach supplies at bedside)  - Please place replacement 6-0 cuffed and cuffless trachs at bedside  - Bedside supplies: flexible suction caths, replacement inner cannulas, adrian collars and saline bullets  - Replace inner cannula daily   - Gentle suctioning   - Humidified O2   - Trach care teacher per nursing and RT  - Avoid gauze under trach plate to prevent accidental decannulation  - Sutures (if present) are to be cut my ENT MD only   - No circumferential neck ties given free flap  - Trach changed on POD3 (Mon) to 6-0 cuffless, sutured back to skin  - Will re-attempt capping trial  - SW consulted for DME for trach supplies in event that cannot be decannulated      GI/FEN/Lytes:  - Strict NPO  - On TF, slowly advance as tolerated. Hold for nausea.   - replace lytes prn    Renal:  - UOP adequate. Yee out.     Heme/ID:  - cont to trend HH  - cont unasyn x7d     Endo:  - Q6 acuchecks    MSK  - Left forearm splint remove, will change dressing QOD.   - PT and OT, OOB    PPX:  PT/OT  L40  PPI    Dispo:   Can step down to [facility] for q4h flap checks. Anticipate discharge Friday, may need trach supplies and suction machine if cannot decannulate.     "             Jimy Montalvo MD  Otorhinolaryngology-Head & Neck Surgery  [facility]

## 2024-11-21 NOTE — PT/OT/SLP PROGRESS
Occupational Therapy   Treatment and Discharge     Name: Fan Paz  MRN: [id number]  Admitting Diagnosis:  Squamous cell cancer of tongue  6 Days Post-Op    Recommendations:     Discharge Recommendations: No Therapy Indicated  Discharge Equipment Recommendations:  none  Barriers to discharge:  None    Assessment:     Fan Paz is a 84 y.o. male with a medical diagnosis of Squamous cell cancer of tongue. Pt presents with decreased endurance and impaired mobility performance as limited by cardiovascular status and generalized weakness. Pt found in chair and agreeable for therapy. Pt demonstrated functional mobility training to simulate household and community environment gait training during session. Pt tolerated ~8 minutes total using no AD with no LOB and good visual search and navigation strategies.  Pt with no LOB and excellent tolerance. Pt has been getting up to restroom for ADLs without difficulty per pt and pt's son. OT to sign off at this time as pt near fx'l baseline.      Performance deficits affecting function are impaired cardiopulmonary response to activity.       Subjective     Chief Complaint: none  Patient/Family Comments/goals: to go home   Pain/Comfort:  Pain Rating 1: 0/10  Pain Rating Post-Intervention 1: 0/10  Pain Rating Post-Intervention 2: 0/10    Objective:     Communicated with: RN prior to session.  Patient found up in chair with blood pressure cuff, pulse ox (continuous), Tracheostomy, PEG Tube, ALEE drain upon OT entry to room.    General Precautions: Standard, fall    Orthopedic Precautions:N/A  Braces: N/A  Respiratory Status:  trach capped however 02 donned around neck (no neck ties) with safety pins. RN stated OT could remove monitors and 02  for ambulation.     Occupational Performance:     Functional Mobility/Transfers:  Patient completed Sit <> Stand Transfer with supervision  with  no assistive device   Functional Mobility: Pt ambulated in room and into  hallway for several minutes around unit with no LOB.     Activities of Daily Living:  Upper Body Dressing: minimum assistance donning gown around back      [facility] 6 Click ADL: 24    Treatment & Education:  Pt educated on role of occupational therapy, POC, and safety during ADLs and functional mobility. Pt and OT discussed importance of safe, continued mobility to optimize daily living skills. Pt verbalized understanding.     White board updated during session. Pt given instruction to call for medical staff/nurse for assistance.       Patient left up in chair with all lines intact, call button in reach, RN notified, and son present    GOALS:   Multidisciplinary Problems       Occupational Therapy Goals       Not on file              Multidisciplinary Problems (Resolved)          Problem: Occupational Therapy    Goal Priority Disciplines Outcome Interventions   Occupational Therapy Goal   (Resolved)     OT, PT/OT Met    Description: Goals to be met by: 12/16/24 (1 mo)     Patient will increase functional independence with ADLs by performing:    UE Dressing with [place].  LE Dressing with [place].  Grooming while standing at sink with [place].  Toileting from toilet with [place] for hygiene and clothing management.   Rolling to Bilateral with [place].   Supine to sit with [place].  Step transfer with [place]  Toilet transfer to toilet with [place].                         Time Tracking:     OT Date of Treatment: 11/21/24  OT Start Time: 0747  OT Stop Time: 0802  OT Total Time (min): 15 min    Billable Minutes:Therapeutic Activity 15 min    OT/BECKY: OT          11/21/2024

## 2024-11-21 NOTE — PLAN OF CARE
Problem: Infection  Goal: Absence of Infection Signs and Symptoms  Outcome: Progressing     Problem: Wound  Goal: Optimal Coping  Outcome: Progressing     Problem: Skin Injury Risk Increased  Goal: Skin Health and Integrity  Outcome: Progressing     Problem: Fall Injury Risk  Goal: Absence of Fall and Fall-Related Injury  Outcome: Progressing

## 2024-11-21 NOTE — PLAN OF CARE
Problem: Occupational Therapy  Goal: Occupational Therapy Goal  Description: Goals to be met by: 12/16/24 (1 mo)     Patient will increase functional independence with ADLs by performing:    UE Dressing with [place].  LE Dressing with [place].  Grooming while standing at sink with [place].  Toileting from toilet with [place] for hygiene and clothing management.   Rolling to Bilateral with [place].   Supine to sit with [place].  Step transfer with [place]  Toilet transfer to toilet with [place].    Goals met today. No further acute OT needs.  Bebe Mello OT  11/21/2024      Outcome: Met

## 2024-11-21 NOTE — SUBJECTIVE & OBJECTIVE
Interval History: NAEON. AFVSS. No flap issues. Tolerating TF. Trach capped this am.     Medications:  Continuous Infusions:  Scheduled Meds:   ampicillin-sulbactam  3 g Intravenous Q8H    aspirin  81 mg Per G Tube Daily    atorvastatin  20 mg Per G Tube Daily    chlorhexidine  15 mL Mouth/Throat BID    enoxparin  40 mg Subcutaneous Daily    montelukast  10 mg Per G Tube Daily    pantoprazole  40 mg Intravenous Daily    polyethylene glycol  17 g Per G Tube Daily    silodosin  8 mg Per G Tube Daily     PRN Meds:  Current Facility-Administered Medications:     acetaminophen, 500 mg, Per G Tube, Q6H PRN    hydrALAZINE, 10 mg, Intravenous, Q6H PRN    ondansetron, 8 mg, Per G Tube, Q8H PRN    prochlorperazine, 5 mg, Intravenous, Q6H PRN    senna-docusate 8.6-50 mg, 1 tablet, Per G Tube, BID PRN    sodium chloride 0.9%, 10 mL, Intravenous, PRN     Review of patient's allergies indicates:  No Known Allergies  Objective:     Vital Signs (24h Range):  Temp:  [97.9 °F (36.6 °C)-99 °F (37.2 °C)] 98.9 °F (37.2 °C)  Pulse:  [] 82  Resp:  [16-28] 21  SpO2:  [95 %-100 %] 100 %  BP: (125-138)/(64-78) 134/64       Lines/Drains/Airways       Drain  Duration                  Closed/Suction Drain 11/15/24 1412 Tube - 1 Left Other (Comment) Bulb 10 Fr. 5 days         Closed/Suction Drain 11/15/24 1606 Tube - 1 Neck Bulb 15 Fr. 5 days         Gastrostomy/Enterostomy 11/15/24 1059 Percutaneous endoscopic gastrostomy (PEG) LUQ feeding 5 days              Airway  Duration             Adult Surgical Airway 11/18/24 0715 Shiley Uncuffed 6.0/ 75mm 2 days              Peripheral Intravenous Line  Duration                  Peripheral IV - Single Lumen 11/18/24 1320 20 G Anterior;Distal;Right Forearm 2 days                     Physical Exam  NCAT  NAD  Breathing well on RA, no acute distress  HB I/VI bilat  Hearing well at conversational volume  OC and OP patent, essentially edentulous  R native tongue appears more edematous this am,  apparently causing the flap the be slightly more taut - L pete-tongue consists of fasciocutaneous free flap from L forearm, color is normal, although slightly more taut. Silk stitch marks doppler site which is audible on handheld doppler, with normal arterial and venous pulses. Prick with normal blood without significant delay.   L neck incision CDI w staples, ALEE x1 w ss output, no hematoma  6-0 cuffless trach sutured to skin, breathing well - capped  L upper arm incision closed w running suture, CDI, no hematoma  L forearm in gutter splint, ALEE x1 w ss output.Splint removed. FTSG intact, healing well. Bolster re-applied.     Significant Labs:  BMP:   Recent Labs   Lab 11/19/24  0414   *      CO2 25   BUN 20   CREATININE 0.8   CALCIUM 8.4*   MG 2.3     CBC:   Recent Labs   Lab 11/19/24  0414   WBC 8.27   RBC 3.79*   HGB 10.4*   HCT 32.3*      MCV 85   MCH 27.4   MCHC 32.2       Significant Diagnostics:  I have reviewed and interpreted all pertinent imaging results/findings within the past 24 hours.

## 2024-11-21 NOTE — PLAN OF CARE
Problem: Adult Inpatient Plan of Care  Goal: Plan of Care Review  Outcome: Progressing  Goal: Optimal Comfort and Wellbeing  Outcome: Progressing     Problem: Infection  Goal: Absence of Infection Signs and Symptoms  11/21/2024 0239 by Wanda Prince RN  Outcome: Progressing  11/21/2024 0237 by Wanda Prince RN  Outcome: Progressing     Problem: Wound  Goal: Optimal Coping  11/21/2024 0239 by Wanda Prince RN  Outcome: Progressing  11/21/2024 0237 by Wanda Prince RN  Outcome: Progressing  Goal: Absence of Infection Signs and Symptoms  Outcome: Progressing  Goal: Optimal Pain Control and Function  Outcome: Progressing  Goal: Skin Health and Integrity  Outcome: Progressing     Problem: Skin Injury Risk Increased  Goal: Skin Health and Integrity  Outcome: Progressing     Problem: Fall Injury Risk  Goal: Absence of Fall and Fall-Related Injury  11/21/2024 0239 by Wanda Prince RN  Outcome: Progressing  11/21/2024 0237 by Wanda Prince RN  Outcome: Progressing

## 2024-11-21 NOTE — ASSESSMENT & PLAN NOTE
"Status post L partial glossectomy, Left neck dissection levels 1-4, tracheostomy, left radial forearm free flap, left upper arm FTSG 11/16/24 w Dr. Bailon (ablative) and Dr. Thornton (reconstruction).    Continue q4h flap checks: Record Doppler Pulses (artery and vein) ,Capillary Refill , Color, Turgor, temperature.   - Neurovascular Checks q4h of bilateral upper and lower extremities   - Keep head elevated and in neutral position, HOB 30 degress  - Sign above bed that reads "No circumferential Neck Ties"   - FOR ANY FLAP ISSUES, PLEASE PHONE ENT RESIDENT ON CALL.  - Please label drains carefully and document accordingly  - Strict NPO, all med and nutrition through IV/PEG  - 6-0 cuffless trach sutured to skin, possible cap trial and decannulation prior to discharge  - Change arm dressing over other day  - Trach capped this am, possible decannulation tomorrow if tolerates  - SW for trach supplies ordered in case cannot decannulate  - Staples out Friday for arm    Neuro:  - Alert, oriented.   - Scheduled tylenol, PRN oxy and dilauded    CV:  - HDS. D/C A Line. SBP between 110 and 160. No pressors unless ENT notified  - continue with q2h flap checks    Pulm:  - stable  - Recommend fresh trach care (humidified O2, flexible suctioning, trach supplies at bedside)  - Please place replacement 6-0 cuffed and cuffless trachs at bedside  - Bedside supplies: flexible suction caths, replacement inner cannulas, adrian collars and saline bullets  - Replace inner cannula daily   - Gentle suctioning   - Humidified O2   - Trach care teacher per nursing and RT  - Avoid gauze under trach plate to prevent accidental decannulation  - Sutures (if present) are to be cut my ENT MD only   - No circumferential neck ties given free flap  - Trach changed on POD3 (Mon) to 6-0 cuffless, sutured back to skin  - Will re-attempt capping trial  - SW consulted for DME for trach supplies in event that cannot be decannulated      GI/FEN/Lytes:  - Strict " NPO  - On TF, slowly advance as tolerated. Hold for nausea.   - replace lytes prn    Renal:  - UOP adequate. Yee out.     Heme/ID:  - cont to trend HH  - cont unasyn x7d     Endo:  - Q6 acuchecks    MSK  - Left forearm splint remove, will change dressing QOD.   - PT and OT, OOB    PPX:  PT/OT  L40  PPI    Dispo:   Can step down to [facility] for q4h flap checks. Anticipate discharge Friday, may need trach supplies and suction machine if cannot decannulate.

## 2024-11-22 VITALS
BODY MASS INDEX: 29.28 KG/M2 | RESPIRATION RATE: 37 BRPM | HEART RATE: 107 BPM | TEMPERATURE: 99 F | DIASTOLIC BLOOD PRESSURE: 66 MMHG | HEIGHT: 64 IN | SYSTOLIC BLOOD PRESSURE: 130 MMHG | OXYGEN SATURATION: 92 % | WEIGHT: 171.5 LBS

## 2024-11-22 LAB — POCT GLUCOSE: 120 MG/DL (ref 70–110)

## 2024-11-22 PROCEDURE — 25000003 PHARM REV CODE 250

## 2024-11-22 PROCEDURE — 25000003 PHARM REV CODE 250: Performed by: STUDENT IN AN ORGANIZED HEALTH CARE EDUCATION/TRAINING PROGRAM

## 2024-11-22 PROCEDURE — 63600175 PHARM REV CODE 636 W HCPCS: Performed by: OTOLARYNGOLOGY

## 2024-11-22 PROCEDURE — 99900026 HC AIRWAY MAINTENANCE (STAT)

## 2024-11-22 PROCEDURE — 94761 N-INVAS EAR/PLS OXIMETRY MLT: CPT

## 2024-11-22 PROCEDURE — 27100171 HC OXYGEN HIGH FLOW UP TO 24 HOURS

## 2024-11-22 PROCEDURE — 99900035 HC TECH TIME PER 15 MIN (STAT)

## 2024-11-22 PROCEDURE — 63600175 PHARM REV CODE 636 W HCPCS: Performed by: STUDENT IN AN ORGANIZED HEALTH CARE EDUCATION/TRAINING PROGRAM

## 2024-11-22 PROCEDURE — 25000003 PHARM REV CODE 250: Performed by: OTOLARYNGOLOGY

## 2024-11-22 RX ORDER — CHLORHEXIDINE GLUCONATE ORAL RINSE 1.2 MG/ML
15 SOLUTION DENTAL 2 TIMES DAILY
Qty: 473 ML | Refills: 0 | Status: SHIPPED | OUTPATIENT
Start: 2024-11-22 | End: 2024-12-08

## 2024-11-22 RX ORDER — PANTOPRAZOLE SODIUM 40 MG/1
40 TABLET, DELAYED RELEASE ORAL DAILY
Qty: 90 TABLET | Refills: 3 | Status: SHIPPED | OUTPATIENT
Start: 2024-11-22 | End: 2025-11-22

## 2024-11-22 RX ORDER — OXYCODONE HYDROCHLORIDE 5 MG/1
5 TABLET ORAL EVERY 6 HOURS PRN
Qty: 12 TABLET | Refills: 0 | Status: SHIPPED | OUTPATIENT
Start: 2024-11-22

## 2024-11-22 RX ORDER — BACITRACIN 500 [USP'U]/G
OINTMENT TOPICAL EVERY OTHER DAY
Status: DISCONTINUED | OUTPATIENT
Start: 2024-11-22 | End: 2024-11-22 | Stop reason: HOSPADM

## 2024-11-22 RX ORDER — HYDROCORTISONE 1 %
OINTMENT (GRAM) TOPICAL 2 TIMES DAILY
Qty: 396 G | Refills: 0 | Status: SHIPPED | OUTPATIENT
Start: 2024-11-22

## 2024-11-22 RX ADMIN — MONTELUKAST 10 MG: 10 TABLET, FILM COATED ORAL at 09:11

## 2024-11-22 RX ADMIN — AMPICILLIN SODIUM AND SULBACTAM SODIUM 3 G: 2; 1 INJECTION, POWDER, FOR SOLUTION INTRAMUSCULAR; INTRAVENOUS at 06:11

## 2024-11-22 RX ADMIN — AMPICILLIN SODIUM AND SULBACTAM SODIUM 3 G: 2; 1 INJECTION, POWDER, FOR SOLUTION INTRAMUSCULAR; INTRAVENOUS at 03:11

## 2024-11-22 RX ADMIN — POTASSIUM & SODIUM PHOSPHATES POWDER PACK 280-160-250 MG 1 PACKET: 280-160-250 PACK at 08:11

## 2024-11-22 RX ADMIN — POTASSIUM & SODIUM PHOSPHATES POWDER PACK 280-160-250 MG 1 PACKET: 280-160-250 PACK at 03:11

## 2024-11-22 RX ADMIN — PANTOPRAZOLE SODIUM 40 MG: 40 INJECTION, POWDER, LYOPHILIZED, FOR SOLUTION INTRAVENOUS at 08:11

## 2024-11-22 RX ADMIN — ENOXAPARIN SODIUM 40 MG: 40 INJECTION SUBCUTANEOUS at 05:11

## 2024-11-22 RX ADMIN — CHLORHEXIDINE GLUCONATE 0.12% ORAL RINSE 15 ML: 1.2 LIQUID ORAL at 08:11

## 2024-11-22 RX ADMIN — SILODOSIN 8 MG: 4 CAPSULE ORAL at 08:11

## 2024-11-22 RX ADMIN — ATORVASTATIN CALCIUM 20 MG: 20 TABLET, FILM COATED ORAL at 08:11

## 2024-11-22 RX ADMIN — POLYETHYLENE GLYCOL 3350 17 G: 17 POWDER, FOR SOLUTION ORAL at 08:11

## 2024-11-22 RX ADMIN — BACITRACIN: 500 OINTMENT TOPICAL at 08:11

## 2024-11-22 RX ADMIN — ASPIRIN 81 MG CHEWABLE TABLET 81 MG: 81 TABLET CHEWABLE at 08:11

## 2024-11-22 NOTE — DISCHARGE SUMMARY
[facility]  Otorhinolaryngology-Head & Neck Surgery  Discharge Summary      Patient Name: Fan Paz  MRN: [id number]  Admission Date: 11/15/2024  Hospital Length of Stay: 7 days  Discharge Date and Time:  11/22/2024 3:40 PM  Attending Physician: Cristhian Bailon MD   Discharging Provider: Marquis Treadwell MD  Primary Care Provider: Otilio Damon MD    HPI:   Mr. Paz is an 83yo M with PMHx of HLD, HTN, prostate cancer, anemia, GERD, DVT/PE, 2nd degree AV block, SCC of tongue who presents to the SICU s/p L partial glossectomy with L forearm free flap, L neck dissection and tracheostomy. He also had a PEG placed by general surgery during the case. Admitted for q1h flap checks for 48 hours.      Arrived to the SICU extubated on 6L trach collar, HDS off pressors. Received 1500cc IVF during the case with 540 UOP. Hypertensive prior to the case and when waking up, requiring IV hydralazine. Goal is to maintain blood pressure at a MAP >65 and SBP < 180. Arterial access includes a right radial arterial line. He also have two drains (1L neck, 1L forearm) with appropriate output that is SS.     Procedure(s) (LRB):  GLOSSECTOMY (Left)  DISSECTION, NECK, MODIFIED RADICAL (Left)  TRACHEOTOMY (N/A)  CREATION, FREE FLAP, FOREARM, RADIAL ASPECT (Left)  EGD, WITH PEG TUBE INSERTION (N/A)  APPLICATION, GRAFT, SKIN, FULL-THICKNESS (Left)        Hospital Course:   Fan Paz is a 84 y.o. 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 Patient underwent procedures listed below with Cristhian Bailon MD on 11/15/2024. Pt was admitted for flap checks and Gtube tolerance of feeds - he tolerated these well though did not tolerate a capping trial on multiple attempts due to desire to cough secretions out of trach and shortness of breath.   Family was educated on wound care and tracheostomy care.  On hospital  day 7, pt was stable for discharge home. Discharge medications and discharge instructions, including strict return precautions, were provided upon discharge. Pt will follow-up in ENT/head and neck clinic as noted below.     Procedure(s):  GLOSSECTOMY  DISSECTION, NECK, MODIFIED RADICAL  TRACHEOTOMY  CREATION, FREE FLAP, FOREARM, RADIAL ASPECT  EGD, WITH PEG TUBE INSERTION  APPLICATION, GRAFT, SKIN, FULL-THICKNESS    Indwelling Lines/Drains at time of discharge:   Lines/Drains/Airways       Drain  Duration                  Closed/Suction Drain 11/15/24 1412 Tube - 1 Left Other (Comment) Bulb 10 Fr. 7 days         Gastrostomy/Enterostomy 11/15/24 1059 Percutaneous endoscopic gastrostomy (PEG) LUQ feeding 7 days         Closed/Suction Drain 11/15/24 1606 Tube - 1 Neck Bulb 15 Fr. 6 days              Airway  Duration             Adult Surgical Airway 11/18/24 0715 Shiley Uncuffed 6.0/ 75mm 4 days                    No notes on file    Goals of Care Treatment Preferences:  Code Status: Full Code        Consults: None   Consults (From admission, onward)          Status Ordering Provider     Inpatient consult to Cardiology  Once        Provider:  (Not yet assigned)    Completed DUGLAS FLYNN     Inpatient consult to Social Work  Once        Provider:  (Not yet assigned)    Acknowledged SNEHA CISSE     Inpatient consult to Registered Dietitian/Nutritionist  Once        Provider:  (Not yet assigned)    Completed SEBASTIÁN CINTRON            Significant Diagnostic Studies: surgery     Pending Diagnostic Studies:       None          Final Active Diagnoses:    Diagnosis Date Noted POA    PRINCIPAL PROBLEM:  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] 10/23/2024 Yes    Tracheostomy in place [Z93.0] 11/17/2024 Not Applicable    Mobitz type 1 second degree AV block [I44.1] 01/28/2022 Yes    Acid reflux [K21.9] 11/19/2015 Yes    Essential hypertension [I10] 01/30/2014 Yes    Acute on chronic anemia [D64.9] 01/30/2014 Yes  "   Hyperlipidemia [E78.5]  Yes      Problems Resolved During this Admission:        Discharged Condition: stable    Disposition: Home or Self Care    Follow Up:  In clinic in 1-2 weeks with Cristhian Bailon MD      Patient Instructions:      G-TUBE SUPPLIES FOR HOME USE     Order Specific Question Answer Comments   Height: 5' 4" (1.626 m)    Weight: 77.8 kg (171 lb 8.3 oz)    Does patient have medical equipment at home? none    Length of need (1-99 months): 12      TRACH CARE SUPPLIES FOR HOME USE     Order Specific Question Answer Comments   Height: 5' 4" (1.626 m)    Weight: 77.8 kg (171 lb 8.3 oz)    Length of need (1-99 months): 12    Trach type: Shiley    Trach cannula: Cuffless    Size Serbian: 6    Trach care kits (per month)(1-30): 30    Trach collar? Yes    Disposable inter-cannula? Yes    Speaking valve? Yes    Trach cap? Yes    Split drain gauze? Yes    Does patient have medical equipment at home? none      SUCTION MACHINE FOR HOME USE     Order Specific Question Answer Comments   Height: 5' 4" (1.626 m)    Weight: 77.8 kg (171 lb 8.3 oz)    Type of suction: Continuous    Frequency: QID    Disposable cannisters? Yes    Connective tubing? Yes    Yankauer? Yes    Disposable suction catheters? Yes    Length of need (1-99 months): 12    Does patient have medical equipment at home? none      WOUND SUPPLIES FOR HOME USE   Order Comments: One week worth of xeroform and kerlix for arm dressing     Order Specific Question Answer Comments   Height: 5' 4" (1.626 m)    Weight: 77.8 kg (171 lb 8.3 oz)    Does patient have medical equipment at home? none    Length of need (1-99 months): 1 1 week     Diet Adult Regular   Order Comments: As tolerated; start with clear liquids and progress as tolerated     Other restrictions (specify):   Order Comments: Wound care  - OK to shower (if going home with drain see below), but do not abrasively rub your incision site  - Avoid swimming or soaking (including submerging in " bathtub) your incision  - If skin glue is present over your incision, it will begin to peel off on it's own in the next week or so    If you are going home with a drain, please note the following:  - Sponge bathe until your drain is removed. Ok to bathe below level of drain (below neck), rinse hair/head in sink.     No driving until:   Order Comments: No driving while on narcotic pain medication if prescribed or at least 24h following anesthesia (if applicable)     Notify your health care provider if you experience any of the following:  temperature >100.4     Notify your health care provider if you experience any of the following:  persistent nausea and vomiting or diarrhea     Notify your health care provider if you experience any of the following:  severe uncontrolled pain     Notify your health care provider if you experience any of the following:  redness, tenderness, or signs of infection (pain, swelling, redness, odor or green/yellow discharge around incision site)     Notify your health care provider if you experience any of the following:  difficulty breathing or increased cough   Order Comments: Swelling around neck causing difficulty breathing     Notify your health care provider if you experience any of the following:  persistent dizziness, light-headedness, or visual disturbances     Notify your health care provider if you experience any of the following:   Order Comments: Any persistent bleeding from nose or mouth, should report to nearest ED  If applicable, any persistent bleeding from trach or stoma site, report to nearest ED/call ambulance     Change dressing (specify)   Order Comments: Dressing change: 1 times per day using xeroform and kerlix as directed .     Weight bearing restrictions (specify):   Order Comments: No lifting >10 lbs for at least 2 weeks       Medications:  Reconciled Home Medications:      Medication List        START taking these medications      Aquaphor OriginaL 41 %  Oint  Generic drug: white petrolatum  Apply topically 2 (two) times a day. Apply to incision lines twice per day     chlorhexidine 0.12 % solution  Commonly known as: PERIDEX  Use as directed 15 mLs in the mouth or throat 2 (two) times daily. for 7 days     oxyCODONE 5 MG immediate release tablet  Commonly known as: ROXICODONE  Take 1 tablet (5 mg total) by mouth every 6 (six) hours as needed for Pain (pain refractory to over the counter meds).            CHANGE how you take these medications      * pantoprazole 40 MG tablet  Commonly known as: PROTONIX  Take 1 tablet (40 mg total) by mouth once daily.  What changed: Another medication with the same name was added. Make sure you understand how and when to take each.     * pantoprazole 40 MG tablet  Commonly known as: PROTONIX  Take 1 tablet (40 mg total) by mouth once daily.  What changed: You were already taking a medication with the same name, and this prescription was added. Make sure you understand how and when to take each.           * This list has 2 medication(s) that are the same as other medications prescribed for you. Read the directions carefully, and ask your doctor or other care provider to review them with you.                CONTINUE taking these medications      acetaminophen 650 MG Tbsr  Commonly known as: TYLENOL  Take 1 tablet (650 mg total) by mouth every 8 (eight) hours.     * azelastine 137 mcg (0.1 %) nasal spray  Commonly known as: ASTELIN  1 spray (137 mcg total) by Nasal route 2 (two) times daily.     * azelastine 137 mcg (0.1 %) nasal spray  Commonly known as: ASTELIN  1 spray (137 mcg total) by Nasal route 2 (two) times daily.     CENTRUM COMPLETE ORAL  Take 1 tablet by mouth once daily.     * fluticasone propionate 50 mcg/actuation nasal spray  Commonly known as: FLONASE  USE 2 SPRAY(S) IN EACH NOSTRIL TWICE DAILY     * fluticasone propionate 50 mcg/actuation nasal spray  Commonly known as: FLONASE  1 spray (50 mcg total) by Each Nostril  route 2 (two) times daily.     losartan 50 MG tablet  Commonly known as: COZAAR  Take 1 tablet (50 mg total) by mouth once daily.     montelukast 10 mg tablet  Commonly known as: SINGULAIR  TAKE 1 TABLET BY MOUTH ONCE DAILY IN THE EVENING     simvastatin 40 MG tablet  Commonly known as: ZOCOR  Take 1 tablet (40 mg total) by mouth every evening.     STOOL SOFTENER ORAL  Take by mouth as needed.     tamsulosin 0.4 mg Cap  Commonly known as: FLOMAX  Take 1 capsule by mouth once daily           * This list has 4 medication(s) that are the same as other medications prescribed for you. Read the directions carefully, and ask your doctor or other care provider to review them with you.                ASK your doctor about these medications      HYDROcodone-acetaminophen 5-325 mg per tablet  Commonly known as: NORCO  Take 1 tablet by mouth every 6 (six) hours as needed for Pain.              Time spent on the discharge of patient: 40 minutes    Marquis Treadwell MD  Otorhinolaryngology-Head & Neck Surgery  [facility] - Surgical Intensive Care

## 2024-11-22 NOTE — PROGRESS NOTES
[facility]  General Surgery  Progress Note    Subjective:     History of Present Illness:  No notes on file    Post-Op Info:  Procedure(s) (LRB):  GLOSSECTOMY (Left)  DISSECTION, NECK, MODIFIED RADICAL (Left)  TRACHEOTOMY (N/A)  CREATION, FREE FLAP, FOREARM, RADIAL ASPECT (Left)  EGD, WITH PEG TUBE INSERTION (N/A)  APPLICATION, GRAFT, SKIN, FULL-THICKNESS (Left)   7 Days Post-Op     Interval History: NAEON. Pt does not want to cap trach, wants to go home w trach and suction. Awaiting supplies for trach/suction/tube feeds. Family coming today for transport/education.     Medications:  Continuous Infusions:  Scheduled Meds:   ampicillin-sulbactam  3 g Intravenous Q8H    aspirin  81 mg Per G Tube Daily    atorvastatin  20 mg Per G Tube Daily    bacitracin   Topical (Top) Every other day    chlorhexidine  15 mL Mouth/Throat BID    enoxparin  40 mg Subcutaneous Daily    montelukast  10 mg Per G Tube Daily    pantoprazole  40 mg Intravenous Daily    polyethylene glycol  17 g Per G Tube Daily    potassium, sodium phosphates  1 packet Per G Tube TID    silodosin  8 mg Per G Tube Daily     PRN Meds:  Current Facility-Administered Medications:     acetaminophen, 500 mg, Per G Tube, Q6H PRN    hydrALAZINE, 10 mg, Intravenous, Q6H PRN    ondansetron, 8 mg, Per G Tube, Q8H PRN    prochlorperazine, 5 mg, Intravenous, Q6H PRN    senna-docusate 8.6-50 mg, 1 tablet, Per G Tube, BID PRN    sodium chloride 0.9%, 10 mL, Intravenous, PRN     Review of patient's allergies indicates:  No Known Allergies  Objective:     Vital Signs (Most Recent):  Temp: 98.4 °F (36.9 °C) (11/22/24 0301)  Pulse: 93 (11/22/24 0752)  Resp: 17 (11/22/24 0501)  BP: (!) 142/83 (11/22/24 0301)  SpO2: 97 % (11/22/24 0752) Vital Signs (24h Range):  Temp:  [97.2 °F (36.2 °C)-99 °F (37.2 °C)] 98.4 °F (36.9 °C)  Pulse:  [48-96] 93  Resp:  [15-33] 17  SpO2:  [94 %-99 %] 97 %  BP: (137-154)/(71-83) 142/83     Weight: 77.8 kg (171 lb 8.3 oz)  Body  mass index is 29.44 kg/m².    Intake/Output - Last 3 Shifts         11/20 0700  11/21 0659 11/21 0700 11/22 0659 11/22 0700 11/23 0659    NG/GT 1290 2215     IV Piggyback 296 243     Total Intake(mL/kg) 1586 (20.4) 2458 (31.6)     Urine (mL/kg/hr) 450 (0.2) 425 (0.2)     Drains 10 15     Stool  0     Total Output 460 440     Net +1126 +2018            Stool Occurrence  1 x              Physical Exam  NCAT  NAD  Breathing well on RA, no acute distress  HB I/VI bilat  Hearing well at conversational volume  OC and OP patent, essentially edentulous  R native tongue appears more edematous this am, apparently causing the flap the be slightly more taut - L pete-tongue consists of fasciocutaneous free flap from L forearm, color is normal, although slightly more taut. Silk stitch marks doppler site which is audible on handheld doppler, with normal arterial and venous pulses. Prick with normal blood without significant delay.   L neck incision CDI w staples, ALEE x1 w ss output, no hematoma  6-0 cuffless trach sutured to skin, breathing well  L upper arm incision closed w running suture, CDI, no hematoma  L forearm in gutter splint, ALEE x1 w ss output.Splint removed. FTSG intact, healing well. Bolster re-applied.     Significant Labs:  CBC:   Recent Labs   Lab 11/21/24 2108   WBC 6.62   RBC 3.89*   HGB 10.7*   HCT 34.0*      MCV 87   MCH 27.5   MCHC 31.5*     CMP:   Recent Labs   Lab 11/21/24 2108   *   CALCIUM 8.8   ALBUMIN 2.4*   PROT 6.7      K 3.9   CO2 23   *   BUN 22   CREATININE 0.8   ALKPHOS 61   ALT 67*   AST 57*   BILITOT 0.4       Significant Diagnostics:  I have reviewed all pertinent imaging results/findings within the past 24 hours.  I have reviewed and interpreted all pertinent imaging results/findings within the past 24 hours.  Assessment/Plan:     * Squamous cell cancer of tongue  Status post L partial glossectomy, Left neck dissection levels 1-4, tracheostomy, left radial forearm  "free flap, left upper arm FTSG 11/16/24 w Dr. Bailon (ablative) and Dr. Thornton (reconstruction).     Continue q4h flap checks: Record Doppler Pulses (artery and vein) ,Capillary Refill , Color, Turgor, temperature.   - Neurovascular Checks q4h of bilateral upper and lower extremities   - Keep head elevated and in neutral position, HOB 30 degress  - Sign above bed that reads "No circumferential Neck Ties"   - FOR ANY FLAP ISSUES, PLEASE PHONE ENT RESIDENT ON CALL.  - Please label drains carefully and document accordingly  - Strict NPO, all med and nutrition through IV/PEG  - 6-0 cuffless trach sutured to skin, possible cap trial and decannulation prior to discharge  - Change arm dressing over other day  Discharing home w trach  - SW for trach supplies ordered in case cannot decannulate  - Staples out Friday for arm     Neuro:  - Alert, oriented.   - Scheduled tylenol, PRN oxy and dilauded     CV:  - HDS. D/C A Line. SBP between 110 and 160. No pressors unless ENT notified  - continue with q2h flap checks     Pulm:  - stable  - Recommend fresh trach care (humidified O2, flexible suctioning, trach supplies at bedside)  - Please place replacement 6-0 cuffed and cuffless trachs at bedside  - Bedside supplies: flexible suction caths, replacement inner cannulas, adrian collars and saline bullets  - Replace inner cannula daily   - Gentle suctioning   - Humidified O2   - Trach care teacher per nursing and RT  - Avoid gauze under trach plate to prevent accidental decannulation  - Sutures (if present) are to be cut my ENT MD only   - No circumferential neck ties given free flap  - Trach changed on POD3 (Mon) to 6-0 cuffless, sutured back to skin  - Will re-attempt capping trial  - SW consulted for DME for trach supplies in event that cannot be decannulated        GI/FEN/Lytes:  - Strict NPO  - On TF, slowly advance as tolerated. Hold for nausea.   - replace lytes prn     Renal:  - UOP adequate. Yee out.      Heme/ID:  - cont to " trend HH  - cont unasyn x7d      Endo:  - Q6 acuchecks     MSK  - Left forearm splint remove, will change dressing QOD.   - PT and OT, OOB     PPX:  PT/OT  L40  PPI     Dispo:   Anticipate discharge later today after family arrives and instructed on care of trach and feeding tube. Appreciate assistance of RT and nursing to educate family.            Jimy Montalvo MD  General Surgery  [facility] - [facility]

## 2024-11-22 NOTE — PLAN OF CARE
CM spoke with patient, wife and daughter at the bedside in Room 51769. Respiratory was in Room showing daughter suctioning and cleaning techniques, [facility] at the bedside to provide teaching for Feedings. Per Medical Team, ENT will provide follow up appointments. Family agreed to  Plan of Care and feel comfortable to discharge home with no Home Health. Bedside Nurse will enforce teachings before discharge. Patient has suction supplies at bedside. CM offered other options again and was declined. Patient will be transported home with daughter Bella and spouse.     Discharge Plan A and Plan B have been determined by review of patient's clinical status, future medical and therapeutic needs, and coverage/benefits for post-acute care in coordination with multidisciplinary team members.     Karey Domínguez, RN, BSN  Case Management  [phone]

## 2024-11-22 NOTE — PLAN OF CARE
Problem: Adult Inpatient Plan of Care  Goal: Plan of Care Review  Outcome: Progressing  Goal: Absence of Hospital-Acquired Illness or Injury  Outcome: Progressing  Goal: Optimal Comfort and Wellbeing  Outcome: Progressing     Problem: Infection  Goal: Absence of Infection Signs and Symptoms  Outcome: Progressing     Problem: Wound  Goal: Optimal Coping  Outcome: Progressing  Goal: Optimal Pain Control and Function  Outcome: Progressing  Goal: Skin Health and Integrity  Outcome: Progressing  Goal: Optimal Wound Healing  Outcome: Progressing     Problem: Skin Injury Risk Increased  Goal: Skin Health and Integrity  Outcome: Progressing     Problem: Fall Injury Risk  Goal: Absence of Fall and Fall-Related Injury  Outcome: Progressing

## 2024-11-22 NOTE — PROGRESS NOTES
[facility] Home (Bolus) Tube Feeding Education/Discharge Planning Note:     Bedside home Bolus Feeding education completed with the patient, daughter and spouse on 11/22/24. Home tube feeding regimen (Bolus 6 containers of Isosource 1.5 daily +  100 ml syringe water flush before and 100 ml syringe water flush after each feed) reviewed and provided. Teach back by the patient and daughter demonstrated. Also provided review/education on flushing requirements, formula safety, HOB elevation requirements, giving medications through feeding tube, and feeding tube site care. Additional education materials also provided. Time allotted for questions; questions addressed appropriately. The patient and  family are agreeable with the enteral discharge plan and [facility] consent to treatment form reviewed and acknowledged by the patient and daughter. The patient's home tube feeding supplies and formula have been delivered to the bedside. Provided patient with [facility] support number [phone]. The patient is ready discharge home from [facility] perspective. Patient's discharge planning team and bedside nurse updated with the information above.      Please do not hesitate reach out for any additional needs.    [facility]  Tammi De La Garza RN, Clinical Educator  Cell [phone]  Office [phone]  Fax [phone]

## 2024-11-22 NOTE — CARE UPDATE
Cardiology Consult Note   Inpatient consult to Cardiology  Consult performed by: Yanet Chang MD  Consult ordered by: Whitley Ashford MD           Patient is an 83 year old male with Squamous cell cancer of tongue, HTN and HLD who presented to hospital for    L partial glossectomy, Left neck dissection levels 1-4, tracheostomy, left radial forearm free flap, left upper arm FTSG 11/16/24 w Dr. Bailon (ablative) and Dr. Thornton (reconstruction). Cardiology consulted given EKG showing Wenckebach and T wave inversions in I and AVL. Patient without any complains of chest pain or shortness of breath. He follows up with Dr. Carreon as an outpatient.  Patient is asymptomatic and has known went to back, ekg from 2014 also with some lateral T-wave inversions.  These are nonspecific and could be in the setting of anemia since surgery.  This is not require further workup from cardiology standpoint an inpatient unless he were to develop symptoms.  His most recent cardiac workup is below.  Discussed EKG with EP fellow on call who agrees EKG findings         Echocardiogram 02/16/2022:     The left ventricle is normal in size with normal systolic function.  The estimated ejection fraction is 60%.  Moderate left atrial enlargement.  Indeterminate left ventricular diastolic function.  Normal right ventricular size with normal right ventricular systolic function.  Normal central venous pressure (3 mmHg).  The estimated PA systolic pressure is 6 mmHg.  There is no pulmonary hypertension.     Holter 02/16/2022:     Sinus rhythm with heart rates varying between 43 and 113 BPM with an average of 79 BPM  There were very frequent PACs  Wenckebach      Recommendation  No further workup  Please assure that patient has follow-up with Cardiology discharge       Yanet SANCHEZ MD  Cardiology Fellow  [facility]

## 2024-11-22 NOTE — DISCHARGE INSTRUCTIONS
Wound care:  Apply Aquaphor to incision 2-3 times a day to keep the incision moist. Do this until the skin is fully healed, about 1-2 weeks.  You should coat your arm incisions with ointment then wrap your left arm with with yellow gauze and the kerlix.   Your staples in your neck will be removed at a later date given your history of radiation.    You should continue using the peridex mouth rinses for a total of two weeks, then can stop.    Apply SPF 50+ sunscreen to the incision site to promote the best cosmetic result - sun will make the scar darker and more visible.      Caring for your tracheostomy     Humidity  Your nose and mouth warm and humidify the air you breathe.When you breathe through a tracheostomy tube directly into your airway, you must replace this humidity. Without added moisture your secretions may become thick and hard to cough out, making it difficult to breathe. If your secretions become so thick that you cannot clear your airway, you will not be able to breathe.  To maintain moisture and help clear secretions:  Drink plenty of fluid to keep your secretions thin, about 10 glasses a day.  Keep a humidifier in your main living area during the day. It can be a warm or cold mist humidifier and must be cleaned every week to prevent infections.  Use a small bedside warm or cold mist humidifier at night.  Humidity monitors can help determine whether your home has enough humidification.  In the hospital you may receive humidification through tubing that sits over your tracheostomy tube. This may be ordered for home use depending on your condition.     Skin Care  Your surgical incision needs to be cleaned often as it heals - possibly four or five times a day until the skin is entirely healed under the tracheostomy tube. When your skin is healed, clean the area twice a day. Use sterile normal saline or sterile water to clean the site. If there is a lot of crusting at the site you can use hydrogen  peroxide. The hydrogen peroxide must be rinsed off as it can irritate the skin.     Gauze dressings may be used for comfort or moisture management under the flange, but are not necessary. They should be precut by the . Do not cut dressings yourself, as fibers from the gauze may become loose and enter your airway causing irritation. If you have secretions around the flange that are keeping your skin moist, change the dressing often to keep your skin clean and dry.    Suctioning  In the hospital, your nurses will perform suctioning to help clear secretions from your airway until you are able to clear them yourself by coughing. You may or may not need to suction your airway at home. You should know how to suction yourself if you are not able to cough your secretions out.  If your secretions are difficult to cough up, you may need to spray sterile normal saline (not sterile water) into your tracheostomy when suctioning yourself. You will be given normal saline pillows - pink plastic tubes filled with sterile normal saline that can easily be squirted into your  tracheostomy. The saline should be sprayed slowly while you are taking a deep breath. The saline should then be held in the airway until the secretions are loosened and easily coughed out. Coughing should not be tiresome. One or two good coughs should clear your airway. Inspect your secretions; they should be clear or white with no odor. If they are yellow, green or smell bad, these are signs of an infection and you should contact your doctor. Small streaks of blood in your secretions can be normal, but call your doctor if there is more than a small amount of blood.     Supplies  Suction machine  Suction catheters  Small container filled with sterile normal saline or sterile  water for rinsing the suction tubing  Sterile normal saline pillows, if needed  Good lighting  Mirror    Procedure  Wash your hands with soap and water  Position a mirror and  lighting so you can see your airway  Turn on the suction machine  Connect the adapter of the catheter to the tubing of your suction machine  Spray sterile normal saline using the pillow into your airway, if needed  Gently insert the catheter four to five inches into your tracheostomy tube  Cover the suction control port with your thumb and withdraw the catheter in a rotating motion  Breathe deeply five or six times  Rinse the catheter by suctioning the sterile normal saline or sterile water  Re-insert the suction catheter and suction again if needed to clear more secretions    Inner cannula care  Inner cannulas are an important part of managing your tracheostomy safely. They help keep your airway clear and free of secretions, and are either disposable or reusable. They can be changed or cleaned as needed without changing the entire tracheostomy tube. If your tracheostomy has an inner cannula, it should always be worn to ensure that your tracheostomy tube is kept mucus-free and that you are able to breathe easily. Make sure you have enough spare inner cannulas to last you through the month until you are able to get more supplies. Not all tracheostomy tubes have inner cannulas. If your tracheostomy tube does not have an inner cannula you will need to closely monitor your airway for buildup of secretions. The entire tube may need to be changed more frequently and you may need to suction yourself more often to keep the tube clean. See the s handout for care specific to your type of inner cannula. Discuss the information with your doctor or nurse.    Securing your tracheostomy tube  Your tracheostomy tube should be secured in place with Velcro tracheostomy ties. Sometimes it is difficult to loop the new ties through the holes in the flange. You may need someone to help you until you are comfortable changing the ties yourself. If the ties are not in place, the tracheostomy tube can easily fall or be coughed  out. The ties should be tight enough to prevent your tube from accidentally slipping out, but should not be uncomfortable. The ties should be snug but not loose and you should be able to slip on finger underneath the tie. Change your tracheostomy ties as needed to keep your skin clean and dry.    Safety tips  Review and understand the instructions included with your particular brand of tracheostomy.  Avoid dust, mold, fumes, sprays and smoke.  Do not swim; you will not be able to breathe if your tracheostomy is under water.  Do not wear clothes that cover your tracheostomy or that have small fibers that could enter your airway.  If you are unable to remove your inner cannula or tracheostomy tube, do not force it out. Call your doctors office.  Call your doctor or nurse if you notice red, inflamed skin at the stoma site or bad-smelling mucous, as these may be signs of an infection.  Visit the nearest fire station and introduce yourself as a tracheostomy patient    Emergency instructions  If you cannot breathe...  ? Change your inner cannula   ? Spray sterile normal saline into your tracheostomy and cough  ? Suction yourself   ? Change your tracheostomy tube if you have been trained    If your breathing does not improve, call 911. The  will instruct you on basic life support measures. You or your caregiver can also take a class on basic life support. Sources are available online.    If your tracheostomy tube comes out...  Replace your tracheostomy immediately if it falls out or if you cough it out. The stoma can shrink quickly, making it difficult to replace the tracheostomy tube. You should receive instruction on this before leaving the hospital.  Always have an extra tracheostomy tube the same size and one a size smaller nearby. If you have to place the smaller size tube because you had difficulty placing the same size, call your doctors office immediately as you will need to be seen. Call 911 if you are  unable to place the tracheostomy tube back in place. If you are unable to replace the tracheostomy tube you may not be able to breathe.

## 2024-11-22 NOTE — PLAN OF CARE
CM spoke with patient and family at bedside in Room 73768 to discuss alternative options besides Home Health. Patient will discharge home with family and take patient to outpatient clinic for follow ups. Bella Paz patient's daughter is an EMS and will learn how to do trach care at bedside with Bedside Nurse. Patient will be traveling home in care of family.     Discharge Plan A and Plan B have been determined by review of patient's clinical status, future medical and therapeutic needs, and coverage/benefits for post-acute care in coordination with multidisciplinary team members.     Karey Domínguez, RN, BSN  Case Management  [phone]

## 2024-11-22 NOTE — SUBJECTIVE & OBJECTIVE
Interval History: NAEON. Pt does not want to cap trach, wants to go home w trach and suction. Awaiting supplies for trach/suction/tube feeds. Family coming today for transport/education.     Medications:  Continuous Infusions:  Scheduled Meds:   ampicillin-sulbactam  3 g Intravenous Q8H    aspirin  81 mg Per G Tube Daily    atorvastatin  20 mg Per G Tube Daily    bacitracin   Topical (Top) Every other day    chlorhexidine  15 mL Mouth/Throat BID    enoxparin  40 mg Subcutaneous Daily    montelukast  10 mg Per G Tube Daily    pantoprazole  40 mg Intravenous Daily    polyethylene glycol  17 g Per G Tube Daily    potassium, sodium phosphates  1 packet Per G Tube TID    silodosin  8 mg Per G Tube Daily     PRN Meds:  Current Facility-Administered Medications:     acetaminophen, 500 mg, Per G Tube, Q6H PRN    hydrALAZINE, 10 mg, Intravenous, Q6H PRN    ondansetron, 8 mg, Per G Tube, Q8H PRN    prochlorperazine, 5 mg, Intravenous, Q6H PRN    senna-docusate 8.6-50 mg, 1 tablet, Per G Tube, BID PRN    sodium chloride 0.9%, 10 mL, Intravenous, PRN     Review of patient's allergies indicates:  No Known Allergies  Objective:     Vital Signs (Most Recent):  Temp: 98.4 °F (36.9 °C) (11/22/24 0301)  Pulse: 93 (11/22/24 0752)  Resp: 17 (11/22/24 0501)  BP: (!) 142/83 (11/22/24 0301)  SpO2: 97 % (11/22/24 0752) Vital Signs (24h Range):  Temp:  [97.2 °F (36.2 °C)-99 °F (37.2 °C)] 98.4 °F (36.9 °C)  Pulse:  [48-96] 93  Resp:  [15-33] 17  SpO2:  [94 %-99 %] 97 %  BP: (137-154)/(71-83) 142/83     Weight: 77.8 kg (171 lb 8.3 oz)  Body mass index is 29.44 kg/m².    Intake/Output - Last 3 Shifts         11/20 0700  11/21 0659 11/21 0700 11/22 0659 11/22 0700 11/23 0659    NG/GT 1290 2215     IV Piggyback 296 243     Total Intake(mL/kg) 1586 (20.4) 2458 (31.6)     Urine (mL/kg/hr) 450 (0.2) 425 (0.2)     Drains 10 15     Stool  0     Total Output 460 440     Net +1126 +2018            Stool Occurrence  1 x              Physical  Exam  NCAT  NAD  Breathing well on RA, no acute distress  HB I/VI bilat  Hearing well at conversational volume  OC and OP patent, essentially edentulous  R native tongue appears more edematous this am, apparently causing the flap the be slightly more taut - L pete-tongue consists of fasciocutaneous free flap from L forearm, color is normal, although slightly more taut. Silk stitch marks doppler site which is audible on handheld doppler, with normal arterial and venous pulses. Prick with normal blood without significant delay.   L neck incision CDI w staples, ALEE x1 w ss output, no hematoma  6-0 cuffless trach sutured to skin, breathing well  L upper arm incision closed w running suture, CDI, no hematoma  L forearm in gutter splint, ALEE x1 w ss output.Splint removed. FTSG intact, healing well. Bolster re-applied.     Significant Labs:  CBC:   Recent Labs   Lab 11/21/24 2108   WBC 6.62   RBC 3.89*   HGB 10.7*   HCT 34.0*      MCV 87   MCH 27.5   MCHC 31.5*     CMP:   Recent Labs   Lab 11/21/24 2108   *   CALCIUM 8.8   ALBUMIN 2.4*   PROT 6.7      K 3.9   CO2 23   *   BUN 22   CREATININE 0.8   ALKPHOS 61   ALT 67*   AST 57*   BILITOT 0.4       Significant Diagnostics:  I have reviewed all pertinent imaging results/findings within the past 24 hours.  I have reviewed and interpreted all pertinent imaging results/findings within the past 24 hours.

## 2024-11-22 NOTE — NURSING
Patient and family educated by rt, feeding infusionist and nurse about care. Verbalised understanding. AVS given with follow up appointment; medication will picked up by daughter. Wheeled down by Tech; no pain or complaints at this time

## 2024-11-22 NOTE — ASSESSMENT & PLAN NOTE
"Status post L partial glossectomy, Left neck dissection levels 1-4, tracheostomy, left radial forearm free flap, left upper arm FTSG 11/16/24 w Dr. Bailon (ablative) and Dr. Thornton (reconstruction).     Continue q4h flap checks: Record Doppler Pulses (artery and vein) ,Capillary Refill , Color, Turgor, temperature.   - Neurovascular Checks q4h of bilateral upper and lower extremities   - Keep head elevated and in neutral position, HOB 30 degress  - Sign above bed that reads "No circumferential Neck Ties"   - FOR ANY FLAP ISSUES, PLEASE PHONE ENT RESIDENT ON CALL.  - Please label drains carefully and document accordingly  - Strict NPO, all med and nutrition through IV/PEG  - 6-0 cuffless trach sutured to skin, possible cap trial and decannulation prior to discharge  - Change arm dressing over other day  Discharing home w trach  - SW for trach supplies ordered in case cannot decannulate  - Staples out Friday for arm     Neuro:  - Alert, oriented.   - Scheduled tylenol, PRN oxy and dilauded     CV:  - HDS. D/C A Line. SBP between 110 and 160. No pressors unless ENT notified  - continue with q2h flap checks     Pulm:  - stable  - Recommend fresh trach care (humidified O2, flexible suctioning, trach supplies at bedside)  - Please place replacement 6-0 cuffed and cuffless trachs at bedside  - Bedside supplies: flexible suction caths, replacement inner cannulas, adrian collars and saline bullets  - Replace inner cannula daily   - Gentle suctioning   - Humidified O2   - Trach care teacher per nursing and RT  - Avoid gauze under trach plate to prevent accidental decannulation  - Sutures (if present) are to be cut my ENT MD only   - No circumferential neck ties given free flap  - Trach changed on POD3 (Mon) to 6-0 cuffless, sutured back to skin  - Will re-attempt capping trial  - SW consulted for DME for trach supplies in event that cannot be decannulated        GI/FEN/Lytes:  - Strict NPO  - On TF, slowly advance as tolerated. " Hold for nausea.   - replace lytes prn     Renal:  - UOP adequate. Yee out.      Heme/ID:  - cont to trend HH  - cont unasyn x7d      Endo:  - Q6 acuchecks     MSK  - Left forearm splint remove, will change dressing QOD.   - PT and OT, OOB     PPX:  PT/OT  L40  PPI     Dispo:   Anticipate discharge later today after family arrives and instructed on care of trach and feeding tube. Appreciate assistance of RT and nursing to educate family.

## 2024-11-22 NOTE — PLAN OF CARE
CM sent over 30 referrals for Home Health with all denials. Patient lives in [place], [place]. With no Home Health agencies within distance. Patient is out of service for all. Discussed with family other options, patient and family refused for patient to go to a facility for care. Trach supplies arriving today from [facility] and [facility] will deliver Tube feeds. Bedside Nurse will educate family on trach care and feeds. Patient may have to follow up on an outpatient basis. CM will continue to follow up.     Discharge Plan A and Plan B have been determined by review of patient's clinical status, future medical and therapeutic needs, and coverage/benefits for post-acute care in coordination with multidisciplinary team members.     Karey Domínguez RN, BSN  Case Management  [phone]

## 2024-11-23 ENCOUNTER — PATIENT MESSAGE (OUTPATIENT)
Dept: OTOLARYNGOLOGY | Facility: CLINIC | Age: 84
End: 2024-11-23
Payer: MEDICARE

## 2024-11-25 ENCOUNTER — TELEPHONE (OUTPATIENT)
Dept: OTOLARYNGOLOGY | Facility: CLINIC | Age: 84
End: 2024-11-25
Payer: MEDICARE

## 2024-11-25 NOTE — PLAN OF CARE
Date of service: 11/18/2024    Please link this note to the resident's progress note. This note serves as the attestation.    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 He was admitted to the ICU after undergoing tracheostomy, left hemiglossectomy, left modified neck dissection 1A through 4 and free flap for reconstruction and PEG tube placement. He was admitted on trach collar and hemodynamically stable.    Critical Care issues in this patient include:    Squamous cell cancer of tongue              * S/p resection, neck dissection and flap for reconstruction. Continue with q2hr flap checks. Continue multimodal pain regimen. Ok to stop IV narcotics. Monitor drains' outputs and consistencies. Continue post-op antibiotics. Had some concerns for edema at flap site yesterday but continues to have good doppler signals.     Tracheostomy in place              * Trach care per protocol.      Mobitz type 1 second degree AV block              * Avoid keily blockage medications. HR in sinus rhythm.     Patient is doing well. Flap checks are appropriate; he is now on q2 flap checks. Continue with tube feeds. Ok for additional volume down G-tube. Continue to replete phosphorus. He is on a bowel regimen. Incision sites look good. Continue GI and DVT prophylaxis. Currently awaiting a PCU bed.    Critical care time spent: 30 min    Critical care was time spent personally by me on the following activities: development of treatment plan with patient or surrogate and bedside caregivers, discussions with consultants, evaluation of patient's response to treatment, examination of patient, ordering and performing treatments and interventions, ordering and review of laboratory studies, ordering and review of radiographic studies, pulse oximetry, re-evaluation of patient's condition.   This critical care time did not overlap with that of any other provider or involve time for any procedures.      Alirio Iqbal MD, FACS  General Surgery and Surgical Critical Care  [facility]

## 2024-11-25 NOTE — PLAN OF CARE
[facility]  Discharge Final Note    Primary Care Provider: Otilio Damon MD    Expected Discharge Date: 11/22/2024    Patient discharged to home via daughter transportation.     Patient's bedside nurse provided discharge instructions.    Discharge Plan A and Plan B have been determined by review of patient's clinical status, future medical and therapeutic needs, and coverage/benefits for post-acute care in coordination with multidisciplinary team members.        Final Discharge Note (most recent)       Final Note - 11/18/24 1502          Final Note    Assessment Type Discharge Planning Assessment        Post-Acute Status    Coverage Medicare A & B, [employer] supplement                     Important Message from Medicare                 Future Appointments   Date Time Provider Department Center   12/2/2024 11:30 AM Dustin Paul MD [facility] ORTHO [facility]   12/12/2024 10:30 AM Marry Ramachandran PA-C [facility] HNSO [facility]   1/13/2025  9:20 AM Otilio Damon MD [facility]   1/17/2025 10:15 AM Wendi Quick MD [facility] ENT [facility] scheduled post-discharge follow-up appointment and information added to AVS.

## 2024-11-26 ENCOUNTER — TELEPHONE (OUTPATIENT)
Dept: OTOLARYNGOLOGY | Facility: CLINIC | Age: 84
End: 2024-11-26
Payer: MEDICARE

## 2024-11-26 ENCOUNTER — PATIENT MESSAGE (OUTPATIENT)
Dept: OTOLARYNGOLOGY | Facility: CLINIC | Age: 84
End: 2024-11-26
Payer: MEDICARE

## 2024-11-26 NOTE — TELEPHONE ENCOUNTER
----- Message from Med Assistant Ella sent at 11/26/2024  8:25 AM CST -----  Good Morning, please call patients daughter in regards of trach issues. It is a mutual patient of Dr. Quick and Dr. Bailon. Dr Bailon just did surgery on 11/15 and daughter is calling. Dr. Quick is out the office until 12/5.  Thanks,  Ella, CMA

## 2024-11-29 ENCOUNTER — TELEPHONE (OUTPATIENT)
Dept: ORTHOPEDICS | Facility: CLINIC | Age: 84
End: 2024-11-29
Payer: MEDICARE

## 2024-11-29 ENCOUNTER — PATIENT OUTREACH (OUTPATIENT)
Dept: ADMINISTRATIVE | Facility: CLINIC | Age: 84
End: 2024-11-29
Payer: MEDICARE

## 2024-11-29 DIAGNOSIS — Z96.652 S/P REVISION OF TOTAL KNEE, LEFT: Primary | ICD-10-CM

## 2024-11-29 DIAGNOSIS — Z96.652 STATUS POST REVISION OF TOTAL KNEE REPLACEMENT, LEFT: ICD-10-CM

## 2024-11-29 NOTE — PROGRESS NOTES
C3 nurse attempted to contact Fan Paz's wife Bonnie for a TCC post hospital discharge follow up call. No answer. No voicemail available.The patient does not have a scheduled HOSFU appointment. Message sent to PCP staff for assistance with scheduling visit with patient.         0 = independent

## 2024-11-29 NOTE — TELEPHONE ENCOUNTER
Called pt and rescheduled appt to 12/30 at 9:30 AM.     ----- Message from Gopi sent at 11/29/2024  3:27 PM CST -----  Regarding: Reschedule  Contact: alphonso [phone]  Pt is calling in ref to rescheduling his 12/2 cancelled appt to see provider. Pt has a  12/12 post op appt following cancer surgery and would like to see if he can have something scheduled that day or something later.  Patient Requesting Call Back @  [facility]onso [phone]

## 2024-11-30 ENCOUNTER — EXTERNAL CHRONIC CARE MANAGEMENT (OUTPATIENT)
Dept: PRIMARY CARE CLINIC | Facility: CLINIC | Age: 84
End: 2024-11-30
Payer: MEDICARE

## 2024-11-30 PROCEDURE — 99490 CHRNC CARE MGMT STAFF 1ST 20: CPT | Mod: S$PBB,,, | Performed by: FAMILY MEDICINE

## 2024-11-30 PROCEDURE — 99439 CHRNC CARE MGMT STAF EA ADDL: CPT | Mod: S$PBB,,, | Performed by: FAMILY MEDICINE

## 2024-11-30 PROCEDURE — 99490 CHRNC CARE MGMT STAFF 1ST 20: CPT | Mod: PBBFAC,PO | Performed by: FAMILY MEDICINE

## 2024-11-30 PROCEDURE — 99439 CHRNC CARE MGMT STAF EA ADDL: CPT | Mod: PBBFAC,PO | Performed by: FAMILY MEDICINE

## 2024-12-05 ENCOUNTER — HOSPITAL ENCOUNTER (OUTPATIENT)
Facility: HOSPITAL | Age: 84
Discharge: HOME | End: 2024-12-06
Attending: EMERGENCY MEDICINE | Admitting: STUDENT IN AN ORGANIZED HEALTH CARE EDUCATION/TRAINING PROGRAM
Payer: MEDICARE

## 2024-12-05 ENCOUNTER — PATIENT MESSAGE (OUTPATIENT)
Dept: OTOLARYNGOLOGY | Facility: CLINIC | Age: 84
End: 2024-12-05
Payer: MEDICARE

## 2024-12-05 DIAGNOSIS — R07.9 CHEST PAIN: ICD-10-CM

## 2024-12-05 DIAGNOSIS — R06.02 SHORTNESS OF BREATH: ICD-10-CM

## 2024-12-05 PROBLEM — Z93.1 G TUBE FEEDINGS: Status: ACTIVE | Noted: 2024-12-05

## 2024-12-05 PROBLEM — Z93.1 G TUBE FEEDINGS: Status: RESOLVED | Noted: 2024-12-05 | Resolved: 2024-12-05

## 2024-12-05 LAB
ALBUMIN SERPL BCP-MCNC: 3 G/DL (ref 3.5–5.2)
ALP SERPL-CCNC: 63 U/L (ref 40–150)
ALT SERPL W/O P-5'-P-CCNC: 26 U/L (ref 10–44)
ANION GAP SERPL CALC-SCNC: 9 MMOL/L (ref 8–16)
AST SERPL-CCNC: 22 U/L (ref 10–40)
BASOPHILS # BLD AUTO: 0.06 K/UL (ref 0–0.2)
BASOPHILS NFR BLD: 0.7 % (ref 0–1.9)
BILIRUB SERPL-MCNC: 0.4 MG/DL (ref 0.1–1)
BUN SERPL-MCNC: 23 MG/DL (ref 8–23)
CALCIUM SERPL-MCNC: 9.4 MG/DL (ref 8.7–10.5)
CHLORIDE SERPL-SCNC: 100 MMOL/L (ref 95–110)
CO2 SERPL-SCNC: 26 MMOL/L (ref 23–29)
CREAT SERPL-MCNC: 0.9 MG/DL (ref 0.5–1.4)
DIFFERENTIAL METHOD BLD: ABNORMAL
EOSINOPHIL # BLD AUTO: 0.2 K/UL (ref 0–0.5)
EOSINOPHIL NFR BLD: 2.1 % (ref 0–8)
ERYTHROCYTE [DISTWIDTH] IN BLOOD BY AUTOMATED COUNT: 14.4 % (ref 11.5–14.5)
EST. GFR  (NO RACE VARIABLE): >60 ML/MIN/1.73 M^2
GLUCOSE SERPL-MCNC: 103 MG/DL (ref 70–110)
HCT VFR BLD AUTO: 36.9 % (ref 40–54)
HCV AB SERPL QL IA: NORMAL
HGB BLD-MCNC: 11.9 G/DL (ref 14–18)
HIV 1+2 AB+HIV1 P24 AG SERPL QL IA: NORMAL
IMM GRANULOCYTES # BLD AUTO: 0.03 K/UL (ref 0–0.04)
IMM GRANULOCYTES NFR BLD AUTO: 0.4 % (ref 0–0.5)
LYMPHOCYTES # BLD AUTO: 1 K/UL (ref 1–4.8)
LYMPHOCYTES NFR BLD: 11.5 % (ref 18–48)
MCH RBC QN AUTO: 27.2 PG (ref 27–31)
MCHC RBC AUTO-ENTMCNC: 32.2 G/DL (ref 32–36)
MCV RBC AUTO: 84 FL (ref 82–98)
MONOCYTES # BLD AUTO: 1.1 K/UL (ref 0.3–1)
MONOCYTES NFR BLD: 12.8 % (ref 4–15)
NEUTROPHILS # BLD AUTO: 6 K/UL (ref 1.8–7.7)
NEUTROPHILS NFR BLD: 72.5 % (ref 38–73)
NRBC BLD-RTO: 0 /100 WBC
PLATELET # BLD AUTO: 379 K/UL (ref 150–450)
PMV BLD AUTO: 9.9 FL (ref 9.2–12.9)
POTASSIUM SERPL-SCNC: 5.1 MMOL/L (ref 3.5–5.1)
PROT SERPL-MCNC: 8.1 G/DL (ref 6–8.4)
RBC # BLD AUTO: 4.38 M/UL (ref 4.6–6.2)
SODIUM SERPL-SCNC: 135 MMOL/L (ref 136–145)
WBC # BLD AUTO: 8.25 K/UL (ref 3.9–12.7)

## 2024-12-05 PROCEDURE — 85025 COMPLETE CBC W/AUTO DIFF WBC: CPT | Performed by: EMERGENCY MEDICINE

## 2024-12-05 PROCEDURE — 99285 EMERGENCY DEPT VISIT HI MDM: CPT | Mod: 25

## 2024-12-05 PROCEDURE — 25000003 PHARM REV CODE 250

## 2024-12-05 PROCEDURE — 96372 THER/PROPH/DIAG INJ SC/IM: CPT

## 2024-12-05 PROCEDURE — 99900035 HC TECH TIME PER 15 MIN (STAT)

## 2024-12-05 PROCEDURE — 80053 COMPREHEN METABOLIC PANEL: CPT | Performed by: EMERGENCY MEDICINE

## 2024-12-05 PROCEDURE — 93005 ELECTROCARDIOGRAM TRACING: CPT

## 2024-12-05 PROCEDURE — 87389 HIV-1 AG W/HIV-1&-2 AB AG IA: CPT | Performed by: PHYSICIAN ASSISTANT

## 2024-12-05 PROCEDURE — G0378 HOSPITAL OBSERVATION PER HR: HCPCS

## 2024-12-05 PROCEDURE — 63600175 PHARM REV CODE 636 W HCPCS

## 2024-12-05 PROCEDURE — 86803 HEPATITIS C AB TEST: CPT | Performed by: PHYSICIAN ASSISTANT

## 2024-12-05 PROCEDURE — 94761 N-INVAS EAR/PLS OXIMETRY MLT: CPT

## 2024-12-05 PROCEDURE — 93010 ELECTROCARDIOGRAM REPORT: CPT | Mod: ,,, | Performed by: INTERNAL MEDICINE

## 2024-12-05 RX ORDER — GUAIFENESIN 100 MG/5ML
400 SOLUTION ORAL
Status: DISCONTINUED | OUTPATIENT
Start: 2024-12-05 | End: 2024-12-06 | Stop reason: HOSPADM

## 2024-12-05 RX ORDER — ENOXAPARIN SODIUM 100 MG/ML
40 INJECTION SUBCUTANEOUS EVERY 24 HOURS
Status: DISCONTINUED | OUTPATIENT
Start: 2024-12-05 | End: 2024-12-06 | Stop reason: HOSPADM

## 2024-12-05 RX ORDER — ACETAMINOPHEN 325 MG/1
650 TABLET ORAL EVERY 4 HOURS PRN
Status: DISCONTINUED | OUTPATIENT
Start: 2024-12-05 | End: 2024-12-06

## 2024-12-05 RX ORDER — SODIUM CHLORIDE 0.9 % (FLUSH) 0.9 %
10 SYRINGE (ML) INJECTION EVERY 12 HOURS PRN
Status: DISCONTINUED | OUTPATIENT
Start: 2024-12-05 | End: 2024-12-06 | Stop reason: HOSPADM

## 2024-12-05 RX ORDER — GLUCAGON 1 MG
1 KIT INJECTION
Status: DISCONTINUED | OUTPATIENT
Start: 2024-12-05 | End: 2024-12-06 | Stop reason: HOSPADM

## 2024-12-05 RX ORDER — IBUPROFEN 200 MG
16 TABLET ORAL
Status: DISCONTINUED | OUTPATIENT
Start: 2024-12-05 | End: 2024-12-06 | Stop reason: HOSPADM

## 2024-12-05 RX ORDER — OXYCODONE HYDROCHLORIDE 5 MG/1
5 TABLET ORAL EVERY 6 HOURS PRN
Status: DISCONTINUED | OUTPATIENT
Start: 2024-12-05 | End: 2024-12-06

## 2024-12-05 RX ORDER — GUAIFENESIN 100 MG/5ML
600 SOLUTION ORAL 2 TIMES DAILY
Status: DISCONTINUED | OUTPATIENT
Start: 2024-12-05 | End: 2024-12-05

## 2024-12-05 RX ORDER — NALOXONE HCL 0.4 MG/ML
0.02 VIAL (ML) INJECTION
Status: DISCONTINUED | OUTPATIENT
Start: 2024-12-05 | End: 2024-12-06 | Stop reason: HOSPADM

## 2024-12-05 RX ORDER — IBUPROFEN 200 MG
24 TABLET ORAL
Status: DISCONTINUED | OUTPATIENT
Start: 2024-12-05 | End: 2024-12-06 | Stop reason: HOSPADM

## 2024-12-05 RX ADMIN — GUAIFENESIN 400 MG: 200 SOLUTION ORAL at 10:12

## 2024-12-05 RX ADMIN — ENOXAPARIN SODIUM 40 MG: 40 INJECTION SUBCUTANEOUS at 07:12

## 2024-12-05 NOTE — SUBJECTIVE & OBJECTIVE
Medications:  Continuous Infusions:  Scheduled Meds:  PRN Meds:     No current facility-administered medications on file prior to encounter.     Current Outpatient Medications on File Prior to Encounter   Medication Sig    acetaminophen (TYLENOL) 650 MG TbSR Take 1 tablet (650 mg total) by mouth every 8 (eight) hours.    azelastine (ASTELIN) 137 mcg (0.1 %) nasal spray 1 spray (137 mcg total) by Nasal route 2 (two) times daily. (Patient not taking: Reported on 12/2/2024)    azelastine (ASTELIN) 137 mcg (0.1 %) nasal spray 1 spray (137 mcg total) by Nasal route 2 (two) times daily. (Patient not taking: Reported on 12/2/2024)    chlorhexidine (PERIDEX) 0.12 % solution Use as directed 15 mLs in the mouth or throat 2 (two) times daily. for 7 days    docusate sodium (STOOL SOFTENER ORAL) Take by mouth as needed. (Patient not taking: Reported on 12/2/2024)    fluticasone propionate (FLONASE) 50 mcg/actuation nasal spray USE 2 SPRAY(S) IN EACH NOSTRIL TWICE DAILY (Patient not taking: Reported on 12/2/2024)    fluticasone propionate (FLONASE) 50 mcg/actuation nasal spray 1 spray (50 mcg total) by Each Nostril route 2 (two) times daily. (Patient not taking: Reported on 12/2/2024)    HYDROcodone-acetaminophen (NORCO) 5-325 mg per tablet Take 1 tablet by mouth every 6 (six) hours as needed for Pain. (Patient not taking: Reported on 12/2/2024)    losartan (COZAAR) 50 MG tablet Take 1 tablet (50 mg total) by mouth once daily. (Patient not taking: Reported on 12/2/2024)    montelukast (SINGULAIR) 10 mg tablet TAKE 1 TABLET BY MOUTH ONCE DAILY IN THE EVENING (Patient not taking: Reported on 12/2/2024)    multivitamin/iron/folic acid (CENTRUM COMPLETE ORAL) Take 1 tablet by mouth once daily. (Patient not taking: Reported on 12/2/2024)    oxyCODONE (ROXICODONE) 5 MG immediate release tablet Take 1 tablet (5 mg total) by mouth every 6 (six) hours as needed for Pain (pain refractory to over the counter meds).    pantoprazole  (PROTONIX) 40 MG tablet Take 1 tablet (40 mg total) by mouth once daily. (Patient not taking: Reported on 12/2/2024)    pantoprazole (PROTONIX) 40 MG tablet Take 1 tablet (40 mg total) by mouth once daily. (Patient not taking: Reported on 12/2/2024)    simvastatin (ZOCOR) 40 MG tablet Take 1 tablet (40 mg total) by mouth every evening. (Patient not taking: Reported on 12/2/2024)    tamsulosin (FLOMAX) 0.4 mg Cap Take 1 capsule by mouth once daily (Patient not taking: Reported on 12/2/2024)    white petrolatum (AQUAPHOR ORIGINAL) 41 % Oint Apply topically 2 (two) times a day. Apply to incision lines twice per day       Review of patient's allergies indicates:  No Known Allergies    Past Medical History:   Diagnosis Date    Arthritis     Cancer of palate     GERD (gastroesophageal reflux disease)     HTN (hypertension)     Hyperlipidemia     Prostate cancer     2011    Pulmonary embolism      Past Surgical History:   Procedure Laterality Date    BACK SURGERY  y-7    Cancer of palate surgery      Late 90's- [facility]     CARPAL TUNNEL RELEASE  8-14-13    right hand,Left hand 2013    COLONOSCOPY N/A 4/10/2017    Procedure: COLONOSCOPY;  Surgeon: Nilo Kelly MD;  Location: [facility];  Service: Endoscopy;  Laterality: N/A;    COLONOSCOPY N/A 10/21/2020    Procedure: COLONOSCOPY;  Surgeon: Demetrius Araujo MD;  Location: [facility];  Service: Endoscopy;  Laterality: N/A;    ESOPHAGOGASTRODUODENOSCOPY N/A 5/3/2023    Procedure: EGD (ESOPHAGOGASTRODUODENOSCOPY);  Surgeon: Shauna Lopez MD;  Location: [facility];  Service: Endoscopy;  Laterality: N/A;  EGD (with Dr. Lopez or Dr. Parada), : 1-4 weeks, Provider: Dr. Lopez or Dr. Parada Location: [facility], instructions sent to email address [email or web address].     ESOPHAGOGASTRODUODENOSCOPY W/ PEG N/A 11/15/2024    Procedure: EGD, WITH PEG TUBE INSERTION;  Surgeon: Rodrigo Chavez MD;  Location: [facility] OR [address] [place] [address];  Service: General;  Laterality: N/A;    FREE FLAP  RADIAL FOREARM Left 11/15/2024    Procedure: CREATION, FREE FLAP, FOREARM, RADIAL ASPECT;  Surgeon: Katina Thornton MD;  Location: [facility] OR 2ND FLR;  Service: General;  Laterality: Left;    GLOSSECTOMY Left 11/15/2024    Procedure: GLOSSECTOMY;  Surgeon: Cristhian Bailon MD;  Location: [facility] OR 2ND FLR;  Service: ENT;  Laterality: Left;    KNEE SURGERY  y-41    left knee    KNEE SURGERY Right 12-1-15    TKR    MODIFIED RADICAL NECK DISSECTION Left 11/15/2024    Procedure: DISSECTION, NECK, MODIFIED RADICAL;  Surgeon: Cristhian Bailon MD;  Location: [facility] OR 2ND FLR;  Service: ENT;  Laterality: Left;    Radio active seed Implant      January 2012    REVISION OF KNEE ARTHROPLASTY Left 6/2/2023    Procedure: REVISION, ARTHROPLASTY, KNEE;  Surgeon: Dustin Paul MD;  Location: [facility] OR 2ND FLR;  Service: Orthopedics;  Laterality: Left;    SKIN FULL THICKNESS GRAFT Left 11/15/2024    Procedure: APPLICATION, GRAFT, SKIN, FULL-THICKNESS;  Surgeon: Katina Thornton MD;  Location: [facility] OR 2ND FLR;  Service: General;  Laterality: Left;    TOTAL HIP ARTHROPLASTY  3/2011    TRACHEOTOMY N/A 11/15/2024    Procedure: TRACHEOTOMY;  Surgeon: Cristhian Bailon MD;  Location: [facility] OR [facility] FLR;  Service: ENT;  Laterality: N/A;     Family History       Problem Relation (Age of Onset)    Breast cancer Sister    Cancer Sister    Coronary artery disease Father    Diabetes Sister    Esophageal cancer Father    Glaucoma Cousin    Heart disease Sister    Lung cancer Sister, Brother (60), Brother    No Known Problems Mother, Sister, Sister, Brother, Brother, Maternal Aunt, Maternal Uncle, Paternal Aunt, Paternal Uncle, Maternal Grandmother, Maternal Grandfather, Paternal Grandmother, Paternal Grandfather    Stroke Brother          Tobacco Use    Smoking status: Former     Current packs/day: 0.00     Average packs/day: 3.0 packs/day for 20.0 years (60.0 ttl pk-yrs)     Types: Cigarettes     Start date: 7/10/1977     Quit date:  7/10/1997     Years since quittin.4     Passive exposure: Past    Smokeless tobacco: Never    Tobacco comments:     Quit -smoked for 40 years ,2 packs a day on an average   Substance and Sexual Activity    Alcohol use: Not Currently     Comment: used to drink Occasionally    Drug use: No    Sexual activity: Yes     Partners: Female     Review of Systems   Constitutional:  Negative for chills, fever and unexpected weight change.   HENT:  Positive for trouble swallowing and voice change. Negative for congestion, drooling, facial swelling and sore throat.    Eyes:  Negative for visual disturbance.   Respiratory:  Positive for cough and choking. Negative for shortness of breath and stridor.    Gastrointestinal:  Negative for nausea and vomiting.     Objective:     Vital Signs (Most Recent):  Temp: 98.8 °F (37.1 °C) (24 1448)  Pulse: 76 (24 1636)  Resp: 20 (24 1636)  BP: (!) 163/81 (24 1636)  SpO2: 95 % (24 1636) Vital Signs (24h Range):  Temp:  [98.8 °F (37.1 °C)] 98.8 °F (37.1 °C)  Pulse:  [69-89] 76  Resp:  [10-20] 20  SpO2:  [95 %-96 %] 95 %  BP: (117-163)/(66-94) 163/81     Weight: 77.8 kg (171 lb 8.3 oz)  Body mass index is 29.44 kg/m².         Physical Exam  Intraoral flap intact with appropriate color, texture, turgor, vicryls intact wit probing, nontender, thick oral secretions   6-0 cuffless Shiley with  in place w thickened secretions, air escape inferiorly   Left radial forearm donor site with kerlix in place removed with breakdown at trifurcation of FTSG             PROCEDURE: Flexible Laryngoscopy + Tracheoscopy Exam  The risks, benefits, and alternatives to the procedure were explained. Patient/family  agreed to procedure; verbal consent obtained. The scope was inserted into the nasal cavity. Examination of the nasal cavity, nasopharynx, oropharynx, hypopharynx, and larynx was performed; all were closely visualized to confirm presence or absence of erythema,  "edema, or structural lesions.     Pertinent exam findings include the following:  - Nasal cavity: no masses or lesions, pink mucosa, no purulence  - Nasopharynx: no masses or lesions, brando wnl  - Oropharynx: no masses or lesions, tonsils WNL, BOT WNL  - Larynx and Hypopharynx: Limited mobility of bilateral TVF with weak adduction and abduction though no edema, remains intubatable from above   Thickened secretions with poor ability to swallow/clear any secretions and poor glottic closure     - Tracheoscopy: tracheostomy well positioned w og visualized, thickened secretions along posterior wall though no purulence or significant pooled secretions, no appreciable granulation or mucosal irritation    The scope was removed. The patient tolerated the procedure well without complications.    Significant Labs:  CBC:   Recent Labs   Lab 12/05/24  1634   WBC 8.25   RBC 4.38*   HGB 11.9*   HCT 36.9*      MCV 84   MCH 27.2   MCHC 32.2     CMP: No results for input(s): "GLU", "CALCIUM", "ALBUMIN", "PROT", "NA", "K", "CO2", "CL", "BUN", "CREATININE", "ALKPHOS", "ALT", "AST", "BILITOT" in the last 168 hours.    Significant Diagnostics:  Pending CXR     "

## 2024-12-05 NOTE — ASSESSMENT & PLAN NOTE
Fan Paz is a 84 y.o. 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 Present to ED on 12/5/2024 for tracheostomy concerns and c/f coughing/secretions.    Bedside scope notable for tracheostomy in good position with thickened clear secretions in trachea and more significantly throughout oral cavity/oropharynx w poor ability to clear secretions.   Presently gets nutrition from Gtube, denies fevers/chills/malaise.   Pending ED workup -- regardless recommend observation in ED without  to assess oxygen saturation and tolerance of secretions    -- pending final aspiration workup for dispo  -- Presently recommend xeroform to left arm as local wound care  -- Please page ENT on call with any additional questions or concerns

## 2024-12-05 NOTE — CONSULTS
"[facility] - Emergency Dept  Otorhinolaryngology-Head & Neck Surgery  Consult Note    Patient Name: Fan Paz  MRN: [id number]  Code Status: Prior  Admission Date: 12/5/2024  Hospital Length of Stay: 0 days  Attending Physician: Radha Alvarado MD  Primary Care Provider: Otilio Damon MD    Patient information was obtained from patient, past medical records, and ER records.     Inpatient consult to ENT  Consult performed by: Marquis Treadwell MD  Consult ordered by: Sarahi Tucker MD        Subjective:     Chief Complaint/Reason for Admission: trach changes    History of Present Illness: Fan Paz is a 84 y.o. male with remote hx of radiation for palatal cancer and recent SCC of left tongue s/p  L partial glossectomy, Left neck dissection levels I-IV, tracheostomy, left radial forearm free flap, left upper arm FTSG 11/16/24 w Dr. Bailon (ablative) and Dr. Thornton (reconstruction) that presents to the hospital for concerns regarding tracheostomy secretions on 12/5/2024.    Patient presents with his daughter at bedside. He notes he has been feeling otherwise well aside from frequent coughing and choking sensation from significant secretions at times. Notes that he has been wearing the  all the time aside from inner cannula changes however recently has noticed sound like a "deflating tire" from around the trach during significant coughing spells. During these episodes he is uncomfortable and has mild SOB but they resolve relatively quickly. He receives nutrition via established Gtube without difficulty. Denies recent fevers/chills, malaise, rhinorrhea, significant pain, facial pain/pressure.        Medications:  Continuous Infusions:  Scheduled Meds:  PRN Meds:     No current facility-administered medications on file prior to encounter.     Current Outpatient Medications on File Prior to Encounter   Medication Sig    acetaminophen (TYLENOL) 650 MG TbSR Take 1 tablet (650 mg total) by mouth " every 8 (eight) hours.    azelastine (ASTELIN) 137 mcg (0.1 %) nasal spray 1 spray (137 mcg total) by Nasal route 2 (two) times daily. (Patient not taking: Reported on 12/2/2024)    azelastine (ASTELIN) 137 mcg (0.1 %) nasal spray 1 spray (137 mcg total) by Nasal route 2 (two) times daily. (Patient not taking: Reported on 12/2/2024)    chlorhexidine (PERIDEX) 0.12 % solution Use as directed 15 mLs in the mouth or throat 2 (two) times daily. for 7 days    docusate sodium (STOOL SOFTENER ORAL) Take by mouth as needed. (Patient not taking: Reported on 12/2/2024)    fluticasone propionate (FLONASE) 50 mcg/actuation nasal spray USE 2 SPRAY(S) IN EACH NOSTRIL TWICE DAILY (Patient not taking: Reported on 12/2/2024)    fluticasone propionate (FLONASE) 50 mcg/actuation nasal spray 1 spray (50 mcg total) by Each Nostril route 2 (two) times daily. (Patient not taking: Reported on 12/2/2024)    HYDROcodone-acetaminophen (NORCO) 5-325 mg per tablet Take 1 tablet by mouth every 6 (six) hours as needed for Pain. (Patient not taking: Reported on 12/2/2024)    losartan (COZAAR) 50 MG tablet Take 1 tablet (50 mg total) by mouth once daily. (Patient not taking: Reported on 12/2/2024)    montelukast (SINGULAIR) 10 mg tablet TAKE 1 TABLET BY MOUTH ONCE DAILY IN THE EVENING (Patient not taking: Reported on 12/2/2024)    multivitamin/iron/folic acid (CENTRUM COMPLETE ORAL) Take 1 tablet by mouth once daily. (Patient not taking: Reported on 12/2/2024)    oxyCODONE (ROXICODONE) 5 MG immediate release tablet Take 1 tablet (5 mg total) by mouth every 6 (six) hours as needed for Pain (pain refractory to over the counter meds).    pantoprazole (PROTONIX) 40 MG tablet Take 1 tablet (40 mg total) by mouth once daily. (Patient not taking: Reported on 12/2/2024)    pantoprazole (PROTONIX) 40 MG tablet Take 1 tablet (40 mg total) by mouth once daily. (Patient not taking: Reported on 12/2/2024)    simvastatin (ZOCOR) 40 MG tablet Take 1 tablet (40 mg  total) by mouth every evening. (Patient not taking: Reported on 12/2/2024)    tamsulosin (FLOMAX) 0.4 mg Cap Take 1 capsule by mouth once daily (Patient not taking: Reported on 12/2/2024)    white petrolatum (AQUAPHOR ORIGINAL) 41 % Oint Apply topically 2 (two) times a day. Apply to incision lines twice per day       Review of patient's allergies indicates:  No Known Allergies    Past Medical History:   Diagnosis Date    Arthritis     Cancer of palate     GERD (gastroesophageal reflux disease)     HTN (hypertension)     Hyperlipidemia     Prostate cancer     2011    Pulmonary embolism      Past Surgical History:   Procedure Laterality Date    BACK SURGERY  y-7    Cancer of palate surgery      Late 90's- [facility]     CARPAL TUNNEL RELEASE  8-14-13    right hand,Left hand 2013    COLONOSCOPY N/A 4/10/2017    Procedure: COLONOSCOPY;  Surgeon: Nilo Kelly MD;  Location: [facility];  Service: Endoscopy;  Laterality: N/A;    COLONOSCOPY N/A 10/21/2020    Procedure: COLONOSCOPY;  Surgeon: Demetrius Araujo MD;  Location: [facility];  Service: Endoscopy;  Laterality: N/A;    ESOPHAGOGASTRODUODENOSCOPY N/A 5/3/2023    Procedure: EGD (ESOPHAGOGASTRODUODENOSCOPY);  Surgeon: Shauna Lopez MD;  Location: [facility];  Service: Endoscopy;  Laterality: N/A;  EGD (with Dr. Lopez or Dr. Parada), : 1-4 weeks, Provider: Dr. Lopez or Dr. Parada Location: [facility], instructions sent to email address [email or web address]    ESOPHAGOGASTRODUODENOSCOPY W/ PEG N/A 11/15/2024    Procedure: EGD, WITH PEG TUBE INSERTION;  Surgeon: Rodrigo Chavez MD;  Location: [facility] OR 2ND FLR;  Service: General;  Laterality: N/A;    FREE FLAP RADIAL FOREARM Left 11/15/2024    Procedure: CREATION, FREE FLAP, FOREARM, RADIAL ASPECT;  Surgeon: Katina Thornton MD;  Location: [facility] OR 2ND FLR;  Service: General;  Laterality: Left;    GLOSSECTOMY Left 11/15/2024    Procedure: GLOSSECTOMY;  Surgeon: Cristhian Bailon MD;  [facility];   Service: ENT;  Laterality: Left;    KNEE SURGERY  y-41    left knee    KNEE SURGERY Right 12-1-15    TKR    MODIFIED RADICAL NECK DISSECTION Left 11/15/2024    Procedure: DISSECTION, NECK, MODIFIED RADICAL;  Surgeon: Cristhian Bailon MD;  Location: [facility] OR 2ND FLR;  Service: ENT;  Laterality: Left;    Radio active seed Implant      2012    REVISION OF KNEE ARTHROPLASTY Left 2023    Procedure: REVISION, ARTHROPLASTY, KNEE;  Surgeon: Dustin Paul MD;  Location: [facility] OR 2ND FLR;  Service: Orthopedics;  Laterality: Left;    SKIN FULL THICKNESS GRAFT Left 11/15/2024    Procedure: APPLICATION, GRAFT, SKIN, FULL-THICKNESS;  Surgeon: Katina Thornton MD;  Location: [facility] OR 2ND FLR;  Service: General;  Laterality: Left;    TOTAL HIP ARTHROPLASTY  3/2011    TRACHEOTOMY N/A 11/15/2024    Procedure: TRACHEOTOMY;  Surgeon: Cristhian Bailon MD;  Location: [facility] OR [facility] FLR;  Service: ENT;  Laterality: N/A;     Family History       Problem Relation (Age of Onset)    Breast cancer Sister    Cancer Sister    Coronary artery disease Father    Diabetes Sister    Esophageal cancer Father    Glaucoma Cousin    Heart disease Sister    Lung cancer Sister, Brother (60), Brother    No Known Problems Mother, Sister, Sister, Brother, Brother, Maternal Aunt, Maternal Uncle, Paternal Aunt, Paternal Uncle, Maternal Grandmother, Maternal Grandfather, Paternal Grandmother, Paternal Grandfather    Stroke Brother          Tobacco Use    Smoking status: Former     Current packs/day: 0.00     Average packs/day: 3.0 packs/day for 20.0 years (60.0 ttl pk-yrs)     Types: Cigarettes     Start date: 7/10/1977     Quit date: 7/10/1997     Years since quittin.4     Passive exposure: Past    Smokeless tobacco: Never    Tobacco comments:     Quit -smoked for 40 years ,2 packs a day on an average   Substance and Sexual Activity    Alcohol use: Not Currently     Comment: used to drink Occasionally    Drug use: No    Sexual  activity: Yes     Partners: Female     Review of Systems   Constitutional:  Negative for chills, fever and unexpected weight change.   HENT:  Positive for trouble swallowing and voice change. Negative for congestion, drooling, facial swelling and sore throat.    Eyes:  Negative for visual disturbance.   Respiratory:  Positive for cough and choking. Negative for shortness of breath and stridor.    Gastrointestinal:  Negative for nausea and vomiting.     Objective:     Vital Signs (Most Recent):  Temp: 98.8 °F (37.1 °C) (12/05/24 1448)  Pulse: 76 (12/05/24 1636)  Resp: 20 (12/05/24 1636)  BP: (!) 163/81 (12/05/24 1636)  SpO2: 95 % (12/05/24 1636) Vital Signs (24h Range):  Temp:  [98.8 °F (37.1 °C)] 98.8 °F (37.1 °C)  Pulse:  [69-89] 76  Resp:  [10-20] 20  SpO2:  [95 %-96 %] 95 %  BP: (117-163)/(66-94) 163/81     Weight: 77.8 kg (171 lb 8.3 oz)  Body mass index is 29.44 kg/m².         Physical Exam  Intraoral flap intact with appropriate color, texture, turgor, vicryls intact wit probing, nontender, thick oral secretions   6-0 cuffless Shiley with  in place w thickened secretions, air escape inferiorly   Left radial forearm donor site with kerlix in place removed with breakdown at trifurcation of FTSG             PROCEDURE: Flexible Laryngoscopy + Tracheoscopy Exam  The risks, benefits, and alternatives to the procedure were explained. Patient/family  agreed to procedure; verbal consent obtained. The scope was inserted into the nasal cavity. Examination of the nasal cavity, nasopharynx, oropharynx, hypopharynx, and larynx was performed; all were closely visualized to confirm presence or absence of erythema, edema, or structural lesions.     Pertinent exam findings include the following:  - Nasal cavity: no masses or lesions, pink mucosa, no purulence  - Nasopharynx: no masses or lesions, brando wnl  - Oropharynx: no masses or lesions, tonsils WNL, BOT WNL  - Larynx and Hypopharynx: Limited mobility of bilateral  "TVF with weak adduction and abduction though no edema, remains intubatable from above   Thickened secretions with poor ability to swallow/clear any secretions and poor glottic closure     - Tracheoscopy: tracheostomy well positioned w og visualized, thickened secretions along posterior wall though no purulence or significant pooled secretions, no appreciable granulation or mucosal irritation    The scope was removed. The patient tolerated the procedure well without complications.    Significant Labs:  CBC:   Recent Labs   Lab 12/05/24  1634   WBC 8.25   RBC 4.38*   HGB 11.9*   HCT 36.9*      MCV 84   MCH 27.2   MCHC 32.2     CMP: No results for input(s): "GLU", "CALCIUM", "ALBUMIN", "PROT", "NA", "K", "CO2", "CL", "BUN", "CREATININE", "ALKPHOS", "ALT", "AST", "BILITOT" in the last 168 hours.    Significant Diagnostics:  Pending CXR     Assessment/Plan:     Tracheostomy in place  Fan Paz is a 84 y.o. 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 Present to ED on 12/5/2024 for tracheostomy concerns and c/f coughing/secretions.    Bedside scope notable for tracheostomy in good position with thickened clear secretions in trachea and more significantly throughout oral cavity/oropharynx w poor ability to clear secretions.   Presently gets nutrition from Gtube, denies fevers/chills/malaise.   Pending ED workup -- regardless recommend observation in ED without  to assess oxygen saturation and tolerance of secretions for at least a few hours    -- pending final aspiration workup for dispo  -- Presently recommend xeroform to left arm as local wound care  -- Please page ENT on call with any additional questions or concerns         VTE Risk Mitigation (From admission, onward)      None            Thank you for your consult.     Marquis Treadwell, " MD  Otorhinolaryngology-Head & Neck Surgery  [facility]

## 2024-12-05 NOTE — HPI
"Fan Paz is a 84 y.o. male with remote hx of radiation for palatal cancer and recent SCC of left tongue s/p  L partial glossectomy, Left neck dissection levels I-IV, tracheostomy, left radial forearm free flap, left upper arm FTSG 11/16/24 w Dr. Bailon (ablative) and Dr. Thornton (reconstruction) that presents to the hospital for concerns regarding tracheostomy secretions on 12/5/2024.    Patient presents with his daughter at bedside. He notes he has been feeling otherwise well aside from frequent coughing and choking sensation from significant secretions at times. Notes that he has been wearing the  all the time aside from inner cannula changes however recently has noticed sound like a "deflating tire" from around the trach during significant coughing spells. During these episodes he is uncomfortable and has mild SOB but they resolve relatively quickly. He receives nutrition via established Gtube without difficulty. Denies recent fevers/chills, malaise, rhinorrhea, significant pain, facial pain/pressure.      "

## 2024-12-05 NOTE — ED PROVIDER NOTES
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  Three weeks ago he had a trach placed, denies fevers chills nausea vomiting.  Denies bleeding from the trach, daughter at bedside is stating that he is having a small leak around the trach site, with some mucus.  She wanted it evaluated.  Patient is stating that it is not feel sick, denies blood in the stool, denies hemoptysis denies hematemesis.  On further questioning, patient has abnormal airway from above due to multiple resections and surgery.         Review of patient's allergies indicates:  No Known Allergies  Past Medical History:   Diagnosis Date    Arthritis     Cancer of palate     GERD (gastroesophageal reflux disease)     HTN (hypertension)     Hyperlipidemia     Prostate cancer     2011    Pulmonary embolism      Past Surgical History:   Procedure Laterality Date    BACK SURGERY  y-6    Cancer of palate surgery      Late [facility]'s- [facility]     CARPAL TUNNEL RELEASE  8-14-13    right hand,Left hand 2013    COLONOSCOPY N/A 4/10/2017    Procedure: COLONOSCOPY;  Surgeon: Nilo Kelly MD;  Location: [facility];  Service: Endoscopy;  Laterality: N/A;    COLONOSCOPY N/A 10/21/2020    Procedure: COLONOSCOPY;  Surgeon: Demetrius Araujo MD;  Location: [facility];  Service: Endoscopy;  Laterality: N/A;    ESOPHAGOGASTRODUODENOSCOPY N/A 5/3/2023    Procedure: EGD (ESOPHAGOGASTRODUODENOSCOPY);  Surgeon: Shauna Lopez MD;  Location: [facility];  Service: Endoscopy;  Laterality: N/A;  EGD (with Dr. Lopez or Dr. Parada), : 1-4 weeks, Provider: Dr. Lopez or Dr. Parada Location: [facility], instructions sent to email address [email or web address]    " ESOPHAGOGASTRODUODENOSCOPY W/ PEG N/A 11/15/2024    Procedure: EGD, WITH PEG TUBE INSERTION;  Surgeon: Rodrigo Chavez MD;  Location: [facility] OR 2ND FLR;  Service: General;  Laterality: N/A;    FREE FLAP RADIAL FOREARM Left 11/15/2024    Procedure: CREATION, FREE FLAP, FOREARM, RADIAL ASPECT;  Surgeon: Katina Thornton MD;  Location: [facility] OR 2ND FLR;  Service: General;  Laterality: Left;    GLOSSECTOMY Left 11/15/2024    Procedure: GLOSSECTOMY;  Surgeon: Cristhian Bailon MD;  Location: [facility] OR 2ND FLR;  Service: ENT;  Laterality: Left;    KNEE SURGERY  y-40    left knee    KNEE SURGERY Right 12-1-15    TKR    MODIFIED RADICAL NECK DISSECTION Left 11/15/2024    Procedure: DISSECTION, NECK, MODIFIED RADICAL;  Surgeon: Cristhian Bailon MD;  Location: [facility] OR 2ND FLR;  Service: ENT;  Laterality: Left;    Radio active seed Implant      2012    REVISION OF KNEE ARTHROPLASTY Left 2023    Procedure: REVISION, ARTHROPLASTY, KNEE;  Surgeon: Dustin Paul MD;  Location: [facility] OR 2ND FLR;  Service: Orthopedics;  Laterality: Left;    SKIN FULL THICKNESS GRAFT Left 11/15/2024    Procedure: APPLICATION, GRAFT, SKIN, FULL-THICKNESS;  Surgeon: Katina Thornton MD;  Location: [facility] OR 2ND FLR;  Service: General;  Laterality: Left;    TOTAL HIP ARTHROPLASTY  3/2011    TRACHEOTOMY N/A 11/15/2024    Procedure: TRACHEOTOMY;  Surgeon: Cristhian Bailon MD;  Location: [facility] OR 2ND FLR;  Service: ENT;  Laterality: N/A;     Family History   Problem Relation Name Age of Onset    No Known Problems Mother      Esophageal cancer Father      Coronary artery disease Father              Cancer Sister          Liver Cancer -    Diabetes Sister              No Known Problems Sister      No Known Problems Sister      Lung cancer Sister Tessa         Alive    Breast cancer Sister Tessa     Heart disease Sister Mireille     No Known Problems Brother      No Known Problems Brother      Lung cancer Brother mel 60         - was a tobacco user, had lung cancer    Other (Lung cancer) Brother mel     Stroke Brother Ronak             No Known Problems Maternal Aunt      No Known Problems Maternal Uncle      No Known Problems Paternal Aunt      No Known Problems Paternal Uncle      No Known Problems Maternal Grandmother      No Known Problems Maternal Grandfather      No Known Problems Paternal Grandmother      No Known Problems Paternal Grandfather      Glaucoma Cousin      Macular degeneration Neg Hx      Strabismus Neg Hx      Amblyopia Neg Hx      Blindness Neg Hx      Cataracts Neg Hx      Hypertension Neg Hx      Retinal detachment Neg Hx      Thyroid disease Neg Hx      Colon cancer Neg Hx       Social History     Tobacco Use    Smoking status: Former     Current packs/day: 0.00     Average packs/day: 3.0 packs/day for 20.0 years (60.0 ttl pk-yrs)     Types: Cigarettes     Start date: 7/10/1977     Quit date: 7/10/1997     Years since quittin.4     Passive exposure: Past    Smokeless tobacco: Never    Tobacco comments:     Quit -smoked for 40 years ,2 packs a day on an average   Substance Use Topics    Alcohol use: Not Currently     Comment: used to drink Occasionally    Drug use: No     Review of Systems    Physical Exam     Initial Vitals [24 1448]   BP Pulse Resp Temp SpO2   117/66 89 18 98.8 °F (37.1 °C) 95 %      MAP       --         Physical Exam    Nursing note and vitals reviewed.  Constitutional: He appears well-developed and well-nourished.   HENT:   Head: Normocephalic and atraumatic.   Eyes: EOM are normal.   Neck: Neck supple. No tracheal deviation present. No JVD present.   Normal range of motion.  Cardiovascular:  Normal heart sounds and intact distal pulses.     Exam reveals no gallop and no friction rub.       No murmur heard.  Pulmonary/Chest: Breath sounds normal. No stridor. No respiratory distress. He has no wheezes. He has no rales. He exhibits no tenderness.   Abdominal: Abdomen  is soft. He exhibits no distension. There is no abdominal tenderness. There is no rebound.   Musculoskeletal:      Cervical back: Normal range of motion and neck supple.     Skin: Skin is warm.         ED Course   Procedures  Labs Reviewed   CBC W/ AUTO DIFFERENTIAL - Abnormal       Result Value    WBC 8.25      RBC 4.38 (*)     Hemoglobin 11.9 (*)     Hematocrit 36.9 (*)     MCV 84      MCH 27.2      MCHC 32.2      RDW 14.4      Platelets 379      MPV 9.9      Immature Granulocytes 0.4      Gran # (ANC) 6.0      Immature Grans (Abs) 0.03      Lymph # 1.0      Mono # 1.1 (*)     Eos # 0.2      Baso # 0.06      nRBC 0      Gran % 72.5      Lymph % 11.5 (*)     Mono % 12.8      Eosinophil % 2.1      Basophil % 0.7      Differential Method Automated      Narrative:     Release to patient->Immediate   COMPREHENSIVE METABOLIC PANEL - Abnormal    Sodium 135 (*)     Potassium 5.1      Chloride 100      CO2 26      Glucose 103      BUN 23      Creatinine 0.9      Calcium 9.4      Total Protein 8.1      Albumin 3.0 (*)     Total Bilirubin 0.4      Alkaline Phosphatase 63      AST 22      ALT 26      eGFR >60.0      Anion Gap 9      Narrative:     Release to patient->Immediate   HEPATITIS C ANTIBODY    Hepatitis C Ab Non-reactive      Narrative:     Release to patient->Immediate   HIV 1 / 2 ANTIBODY    HIV 1/2 Ag/Ab Non-reactive      Narrative:     Release to patient->Immediate          Imaging Results              X-Ray Chest AP Portable (Final result)  Result time 12/05/24 18:45:22      Final result by Jersey Foss MD (12/05/24 18:45:22)                   Impression:      1. Pulmonary findings suggest edema noting the process is more focal projected over the left lower lung zone.  Developing consolidation not excluded.      Electronically signed by: Jersey Foss MD  Date:    12/05/2024  Time:    18:45               Narrative:    EXAMINATION:  XR CHEST AP PORTABLE    CLINICAL HISTORY:  sob;    TECHNIQUE:  Single  frontal view of the chest was performed.    COMPARISON:  11/30/2020, PET-CT 10/28/2024    FINDINGS:  The ostomy tube in place.  The cardiomediastinal silhouette is not enlarged..  There is obscuration of the left costophrenic angle suggesting small effusion..  The trachea is midline.  The lungs are symmetrically expanded bilaterally with mildly coarse interstitial attenuation, suggesting edema noting shallow inspiratory effort.  The process is somewhat more focal projected over the left lower lung zone, developing consolidation is a consideration..  There is no pneumothorax.  The osseous structures are remarkable for degenerative change.  Surgical change noted of the neck.  There is moderate to large amount of stool in the colon..                                       Medications   sodium chloride 0.9% flush 10 mL (has no administration in time range)   naloxone 0.4 mg/mL injection 0.02 mg (has no administration in time range)   glucose chewable tablet 16 g (has no administration in time range)   glucose chewable tablet 24 g (has no administration in time range)   glucagon (human recombinant) injection 1 mg (has no administration in time range)   enoxaparin injection 40 mg (40 mg Subcutaneous Given 12/1940)   dextrose 10% bolus 125 mL 125 mL (has no administration in time range)   dextrose 10% bolus 250 mL 250 mL (has no administration in time range)   acetaminophen tablet 650 mg (has no administration in time range)   oxyCODONE immediate release tablet 5 mg (has no administration in time range)   guaiFENesin 100 mg/5 ml syrup 400 mg (400 mg Per G Tube Given 12/5/24 2240)     Medical Decision Making  84-year-old male presents with cuff leak.    Differential diagnosis aspiration pneumonia, cuff malfunction ENT shows up to bedside to scope with a trach.  They are requesting admission to hospital medicine for repeat exams and observation.  They will follow.     We did not elect to change the trach as the tract is  relatively new.  Additionally given patient's upper airway being altered by multiple resections and radiation therapy, anticipated difficult airway from above should trach be unable to be replaced.  ENT agrees with this plan.    I discussed case with hospital medicine who agreed to admit the patient for aspiration pneumonia.    Labs notable for mild anemia at patient's baseline, mild hyponatremia, hypoalbuminemia, chest x-ray independently interpreted with opacification of costophrenic angle on the left.  This is likely secondary to some level of aspiration pneumonia.    EKG independently interpreted no STEMI, poor QRS progression in precordial leads however appears similar to prior.                                      Clinical Impression:  Final diagnoses:  [R06.02] Shortness of breath          ED Disposition Condition    Observation                 Matias, MD Martinez  Resident  12/06/24 0043

## 2024-12-05 NOTE — ED NOTES
Patient comes into the emergency department by POV with complaints of trach issues. Patient's daughter states that she noticed this morning that the patient's trach was making sounds like air was coming out of it along with a drop in his O2 sats. Gave mucinex at home to help clear his congestion. Trach was recently placed on Nov 15th 2024. Patient also endorsing R ear pain.    LOC: The patient is awake, alert and aware of environment with an appropriate affect, the patient is oriented x 3 and speaking appropriately.   APPEARANCE: Patient appears comfortable and in no acute distress, patient is clean and well groomed.  SKIN: The skin is warm and dry, color consistent with ethnicity, patient has normal skin turgor and moist mucus membranes. Graft/wound noted to LFA, open to air, see media.  MUSCULOSKELETAL: Patient moving all extremities spontaneously, no swelling noted.  RESPIRATORY: Airway is open and patent, respirations are spontaneous, patient has a normal effort and rate, no accessory muscle. Trach in place.  CARDIAC: Pt placed on cardiac monitor. Patient has a normal rate and regular rhythm, no edema noted, capillary refill < 3 seconds.   GASTRO: Soft and non tender to palpation, no distention noted. Gastro tube in place.  : Pt denies any pain or frequency with urination.  NEURO: Pt opens eyes spontaneously, behavior appropriate to situation, follows commands, facial expression symmetrical, bilateral hand grasp equal and even, purposeful motor response noted, normal sensation in all extremities when touched with a finger.   HEENT: Wound to palate.

## 2024-12-05 NOTE — LETTER
Patient: Fan Paz  YOB: 1940  Date: 12/6/2024 Time: 10:16 AM  Location: [facility]    Leaving the Hospital Against Medical Advice    Chart #:[id number]    This will certify that I, the undersigned,    ______________________________________________________________________    A patient in the above named medical center, having requested discharge and removal from the medical [place] against the advice of my attending physician(s), hereby release [facility], its physicians, officers and employees, severally and individually, from any and all liability of any nature whatsoever for any injury or harm or complication of any kind that may result directly or indirectly, by reason of my terminating my stay as a patient at [facility] and my departure from [facility], and hereby waive any and all rights of action I may now have or later acquire as a result of my voluntary departure from [facility] and the termination of my stay as a patient therein.    This release is made with the full knowledge of the danger that may result from the action which I am taking.      Date:_______________________                         ___________________________                                                                                    Patient/Legal Representative    Witness:        ____________________________                          ___________________________  Nurse                                                                        Physician

## 2024-12-06 VITALS
WEIGHT: 171.5 LBS | SYSTOLIC BLOOD PRESSURE: 142 MMHG | OXYGEN SATURATION: 100 % | DIASTOLIC BLOOD PRESSURE: 71 MMHG | TEMPERATURE: 98 F | HEIGHT: 64 IN | RESPIRATION RATE: 20 BRPM | BODY MASS INDEX: 29.28 KG/M2 | HEART RATE: 88 BPM

## 2024-12-06 LAB
OHS QRS DURATION: 94 MS
OHS QTC CALCULATION: 415 MS

## 2024-12-06 PROCEDURE — 27201112

## 2024-12-06 PROCEDURE — 99900026 HC AIRWAY MAINTENANCE (STAT)

## 2024-12-06 PROCEDURE — 99900035 HC TECH TIME PER 15 MIN (STAT)

## 2024-12-06 PROCEDURE — 27200966 HC CLOSED SUCTION SYSTEM

## 2024-12-06 PROCEDURE — G0378 HOSPITAL OBSERVATION PER HR: HCPCS

## 2024-12-06 PROCEDURE — 94761 N-INVAS EAR/PLS OXIMETRY MLT: CPT

## 2024-12-06 PROCEDURE — 25000003 PHARM REV CODE 250

## 2024-12-06 RX ORDER — ACETAMINOPHEN 325 MG/1
650 TABLET ORAL EVERY 4 HOURS PRN
Status: DISCONTINUED | OUTPATIENT
Start: 2024-12-06 | End: 2024-12-06 | Stop reason: HOSPADM

## 2024-12-06 RX ORDER — OXYCODONE HYDROCHLORIDE 5 MG/1
5 TABLET ORAL EVERY 6 HOURS PRN
Status: DISCONTINUED | OUTPATIENT
Start: 2024-12-06 | End: 2024-12-06 | Stop reason: HOSPADM

## 2024-12-06 RX ADMIN — GUAIFENESIN 400 MG: 200 SOLUTION ORAL at 04:12

## 2024-12-06 NOTE — ED NOTES
"Patient identifiers for Fan Paz 84 y.o. male checked and correct.  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 Pt in no acute distress at present time. Pt is clean and well groomed with clothes appropriately fastened.   Skin: Skin warm, dry & intact. Color consistent with ethnicity. Mucous membranes moist. No breakdown or brusing noted.   Musculoskeletal: Patient moving all extremities well, no obvious swelling or deformities noted.   Respiratory: Respirations spontaneous, even, and non-labored. Visible chest rise note trach. No accessory muscle use noted.   Neurologic: Sensation is intact. Speech is clear and appropriate. Eyes open spontaneously, behavior appropriate to situation, follows commands, facial expression symmetrical, bilateral hand grasp equal and even, purposeful motor response noted.  Cardiac: All peripheral pulses present. No Bilateral lower extremity edema. Cap refill is <3 seconds.  Abdomen: Abdomen soft, non distended, non tender to palpation.   : Pt voids independently, denies dysuria, hematuria, frequency.    "

## 2024-12-06 NOTE — DISCHARGE SUMMARY
"[facility] - Emergency Dept  [facility] Medicine  Discharge Summary      Patient Name: Fan Paz  MRN: [id number]  APURVA: [id number]  Patient Class: OP- Observation  Admission Date: 12/5/2024  Hospital Length of Stay: 0 days  Discharge Date and Time:  12/06/2024 2:47 PM  Attending Physician: No att. providers found   Discharging Provider: Ronak Rosas DO  Primary Care Provider: Otilio Damon MD  [facility] Medicine Team: [facility] 4 Ronak Rosas DO  Primary Care Team: [facility] 4    HPI:   85 y/o M with remote hx of radiation for palatal cancer and recent SCC of left tongue s/p  L partial glossectomy, Left neck dissection levels I-IV, tracheostomy, left radial forearm free flap, left upper arm FTSG 11/16/24 w Dr. Bailon (ablative) and Dr. Thornton (reconstruction), [facility] that presents to the hospital for concerns regarding tracheostomy secretions on 12/5/2024. Patient presents with his daughter at bedside who notes that he is having a small leak around the trach site with worsening mucus secretions. He notes he has been feeling otherwise well aside from frequent coughing and choking sensation from significant secretions at times. Daughter reports his secretions initially increased during the week of Thanksgiving then resolved but today they once again increased. Daughter reports that he has been wearing the  all the time aside from inner cannula changes however recently she has noticed sound like a "deflating tire" from around the trach during significant coughing spells. During these episodes he is uncomfortable and has mild SOB but they resolve relatively quickly though the daughter notes his oxygen saturation does trend down as well. He receives nutrition via established Gtube without difficulty. Denies recent fevers/chills, malaise, rhinorrhea, significant pain, facial pain/pressure, nausea, vomiting, light-headedness, chest pain.     Vitals in the ED: afebrile, -163/66-94 w/ " "HR 66-89, and 96% O2 sat on RA. WBC within normal limits, H/H 11.9/36.9, albumin 3.0 w/ CMP otherwise relatively unremarkable. No medications or interventions given/provided in the ED. On chest XR "pulmonary findings suggest edema noting the process is more focal projected over the left lower lung zone." No RLL consolidation appreciated on chest XR. Pt admitted for observation for possible aspiration pneumonia and serial exams.       * No surgery found *      Hospital Course:   83 y/o M with remote hx of radiation for palatal cancer and recent SCC of left tongue s/p L partial glossectomy, Left neck dissection levels I-IV, tracheostomy, left radial forearm free flap, left upper arm FTSG 11/16/24 w Dr. Bailon (ablative) and Dr. Thornton (reconstruction), [facility] that presents to the hospital for concerns regarding tracheostomy secretions on 12/5/2024. Chest XR obtained which showed some secretions in the left lower lung. Vitally patient was afebrile with no leukocytosis or any concerns for pneumonia during the time of his admission. Evaluated by ENT who performed bedside scope notable for tracheostomy in good position with thickened clear secretions in trachea and more significantly throughout oral cavity/oropharynx w/ poor ability to clear secretions. He was observed overnight without any documented signs of respiratory distress. Prior to discharge ENT counseled the patient on wearing the red tracheostomy cap when awake/talking then taking it off at night. Patient discharged with ENT follow-up within the next week.      Goals of Care Treatment Preferences:  Code Status: Full Code  BP (!) 142/71 (BP Location: Right arm, Patient Position: Lying)   Pulse 88   Temp 98 °F (36.7 °C) (Oral)   Resp 20   Ht 5' 4" (1.626 m)   Wt 77.8 kg (171 lb 8.3 oz)   SpO2 100%   BMI 29.44 kg/m²   Physical Exam  Constitutional:       General: He is not in acute distress.     Appearance: He is not toxic-appearing or diaphoretic. " "  HENT:      Head: Normocephalic and atraumatic.   Eyes:      General:         Right eye: No discharge.         Left eye: No discharge.   Neck:      Comments: 6-0 cuffless Shiley with  in place w/ secretions  Cardiovascular:      Rate and Rhythm: Normal rate and regular rhythm.      Heart sounds: No murmur heard.     No gallop.   Pulmonary:      Effort: Pulmonary effort is normal. No respiratory distress.      Breath sounds: No wheezing or rales.   Abdominal:      General: There is no distension.      Tenderness: There is no abdominal tenderness. There is no guarding.   Musculoskeletal:      Right lower leg: No edema.      Left lower leg: No edema.   Skin:     General: Skin is warm and dry.   Neurological:      General: No focal deficit present.      Mental Status: He is alert and oriented to person, place, and time.         Anesthesia/Surgery risks, benefits and alternative options discussed and understood by patient/family.     SDOH Screening:  The patient was screened for utility difficulties, food insecurity, transport difficulties, housing insecurity, and interpersonal safety and there were no concerns identified this admission.     Consults:   Consults (From admission, onward)          Status Ordering Provider     Inpatient consult to ENT  Once        Provider:  (Not yet assigned)    Completed IVY CORONADO            * Tracheostomy in place  84 y.o. 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 Present to ED on 12/5/2024 for tracheostomy concerns and coughing/secretions. Chest XR w/ "pulmonary findings suggest edema noting the process is more focal projected over the left lower lung zone." Patient is without WBC, not hypoxic, presents w/ thickened clear secretions, without RLL consolidation, however he does appear to have difficulty clearing his secretions though he " is not in respiratory distress. Low suspicion for pneumonia at this time.     Plan:  - evaluated by ENT who recommends aspiration workup for disposition   - continue to monitor secretion output  - mucinex via G tube     History of head and neck cancer  See tracheostomy in place       Prediabetes  - A1c has been in the preDM range in the past  - will continue to monitor glucose inpatient       Essential hypertension  Patient's blood pressure range in the last 24 hours was: BP  Min: 117/66  Max: 163/81.The patient's inpatient anti-hypertensive regimen is listed below:  Current Antihypertensives   Currently not taking any medications     Plan  - BP is considered uncontrolled, however he has confounding factors such as pain and relative distress due to difficulty w/ clearing his secretory output  - will continue to monitor and consider adding his losartan back on     Hyperlipidemia  - Lipid panel: cholesterol 144, triglycerides 92, HDL 46, LDL 79.6 - OP IM physician suggested continuation of his statin, however unclear as to why the patient chose to discontinue       Final Active Diagnoses:    Diagnosis Date Noted POA    PRINCIPAL PROBLEM:  Tracheostomy in place [Z93.0] 11/17/2024 Not Applicable    History of head and neck cancer [Z85.89] 05/10/2023 Not Applicable    Prediabetes [R73.03] 11/27/2015 Yes    Essential hypertension [I10] 01/30/2014 Yes    Hyperlipidemia [E78.5]  Yes      Problems Resolved During this Admission:    Diagnosis Date Noted Date Resolved POA    G tube feedings [Z93.1] 12/05/2024 12/05/2024 Not Applicable       Discharged Condition: good    Disposition: Home or Self Care    Follow Up:    Patient Instructions:   No discharge procedures on file.    Significant Diagnostic Studies: Radiology: X-Ray Chest AP portable: Pulmonary findings suggest edema noting the process is more focal projected over the left lower lung zone     Pending Diagnostic Studies:       None           Medications:  Reconciled  Home Medications:      Medication List        CONTINUE taking these medications      acetaminophen 650 MG Tbsr  Commonly known as: TYLENOL  Take 1 tablet (650 mg total) by mouth every 8 (eight) hours.     Aquaphor OriginaL 41 % Oint  Generic drug: white petrolatum  Apply topically 2 (two) times a day. Apply to incision lines twice per day     CENTRUM COMPLETE ORAL  Take 1 tablet by mouth once daily.     chlorhexidine 0.12 % solution  Commonly known as: PERIDEX  Use as directed 15 mLs in the mouth or throat 2 (two) times daily. for 7 days     oxyCODONE 5 MG immediate release tablet  Commonly known as: ROXICODONE  Take 1 tablet (5 mg total) by mouth every 6 (six) hours as needed for Pain (pain refractory to over the counter meds).     STOOL SOFTENER ORAL  Take by mouth as needed.            STOP taking these medications      azelastine 137 mcg (0.1 %) nasal spray  Commonly known as: ASTELIN     fluticasone propionate 50 mcg/actuation nasal spray  Commonly known as: FLONASE     pantoprazole 40 MG tablet  Commonly known as: PROTONIX            ASK your doctor about these medications      azelastine 137 mcg (0.1 %) nasal spray  Commonly known as: ASTELIN  1 spray (137 mcg total) by Nasal route 2 (two) times daily.     fluticasone propionate 50 mcg/actuation nasal spray  Commonly known as: FLONASE  1 spray (50 mcg total) by Each Nostril route 2 (two) times daily.     HYDROcodone-acetaminophen 5-325 mg per tablet  Commonly known as: NORCO  Take 1 tablet by mouth every 6 (six) hours as needed for Pain.     losartan 50 MG tablet  Commonly known as: COZAAR  Take 1 tablet (50 mg total) by mouth once daily.     montelukast 10 mg tablet  Commonly known as: SINGULAIR  TAKE 1 TABLET BY MOUTH ONCE DAILY IN THE EVENING     pantoprazole 40 MG tablet  Commonly known as: PROTONIX  Take 1 tablet (40 mg total) by mouth once daily.     simvastatin 40 MG tablet  Commonly known as: ZOCOR  Take 1 tablet (40 mg total) by mouth every  evening.     tamsulosin 0.4 mg Cap  Commonly known as: FLOMAX  Take 1 capsule by mouth once daily              Indwelling Lines/Drains at time of discharge:   Lines/Drains/Airways       Drain  Duration                  Gastrostomy/Enterostomy 11/15/24 1059 Percutaneous endoscopic gastrostomy (PEG) LUQ feeding 21 days              Airway  Duration             Adult Surgical Airway 11/18/24 0715 Micah Uncuffed 6.0/ 75mm 18 days                    Time spent on the discharge of patient: 40 minutes         Ronak Rosas DO, PGY1  Department of Hospital Medicine  [facility] - Emergency Dept

## 2024-12-06 NOTE — PROGRESS NOTES
[facility] - Emergency Dept  Otorhinolaryngology-Head & Neck Surgery  Progress Note    Subjective:     Post-Op Info:  * No surgery found *      Hospital Day: 2     Interval History: did better with cap off overnight regarding coughinf fits/feeling like he is drowning in secretions. Discussed not wearing cap at night at home, patient amenable and son at bedside in agreement     Medications:  Continuous Infusions:  Scheduled Meds:   enoxparin  40 mg Subcutaneous Daily    guaiFENesin 100 mg/5 ml  400 mg Per G Tube Q6H     PRN Meds:  Current Facility-Administered Medications:     acetaminophen, 650 mg, Oral, Q4H PRN    dextrose 10%, 12.5 g, Intravenous, PRN    dextrose 10%, 25 g, Intravenous, PRN    glucagon (human recombinant), 1 mg, Intramuscular, PRN    glucose, 16 g, Oral, PRN    glucose, 24 g, Oral, PRN    naloxone, 0.02 mg, Intravenous, PRN    oxyCODONE, 5 mg, Oral, Q6H PRN    sodium chloride 0.9%, 10 mL, Intravenous, Q12H PRN     Review of patient's allergies indicates:  No Known Allergies  Objective:     Vital Signs (24h Range):  Temp:  [98.1 °F (36.7 °C)-98.8 °F (37.1 °C)] 98.4 °F (36.9 °C)  Pulse:  [59-93] 78  Resp:  [10-26] 25  SpO2:  [93 %-99 %] 98 %  BP: (100-169)/(55-94) 115/66       Lines/Drains/Airways       Drain  Duration                  Gastrostomy/Enterostomy 11/15/24 1059 Percutaneous endoscopic gastrostomy (PEG) LUQ feeding 20 days              Airway  Duration             Adult Surgical Airway 11/18/24 0715 Shiley Uncuffed 6.0/ 75mm 18 days              Peripheral Intravenous Line  Duration                  Peripheral IV - Single Lumen 12/05/24 1634 20 G Right Antecubital <1 day                     Physical Exam  Intraoral flap intact with appropriate color, texture, turgor, vicryls intact with probing, nontender, thick oral secretions   6-0 cuffless Shiley with  removed easily clears secretions   Left radial forearm donor site with kerlix in place        Significant Labs:  CBC:   Recent  Labs   Lab 12/05/24  1634   WBC 8.25   RBC 4.38*   HGB 11.9*   HCT 36.9*      MCV 84   MCH 27.2   MCHC 32.2     CMP:   Recent Labs   Lab 12/05/24  1634      CALCIUM 9.4   ALBUMIN 3.0*   PROT 8.1   *   K 5.1   CO2 26      BUN 23   CREATININE 0.9   ALKPHOS 63   ALT 26   AST 22   BILITOT 0.4       Significant Diagnostics:  X-Ray: I have reviewed all pertinent results/findings within the past 24 hours and my personal findings are:  left opacification though no definitive consolidation  Assessment/Plan:     * Tracheostomy in place  Fan Paz is a 84 y.o. 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 Present to ED on 12/5/2024 for tracheostomy concerns and c/f coughing/secretions.    Bedside scope notable for tracheostomy in good position with thickened clear secretions in trachea and more significantly throughout oral cavity/oropharynx w poor ability to clear secretions.   Presently gets nutrition from Gtube, denies fevers/chills/malaise.   Tolerating  with adequate oxygen saturation and tolerance of secretions w suctioning through tracheostomy     -- discussed  instructions - use when awake/talking then should take off at night  -- Presently recommend xeroform to left arm as local wound care  -- Please page ENT on call with any additional questions or concerns           Marquis Treadwell MD  Otorhinolaryngology-Head & Neck Surgery  [facility] - [facility] Dept

## 2024-12-06 NOTE — ASSESSMENT & PLAN NOTE
- Lipid panel: cholesterol 144, triglycerides 92, HDL 46, LDL 79.6 - OP IM physician suggested continuation of his statin, however unclear as to why the patient chose to discontinue

## 2024-12-06 NOTE — ASSESSMENT & PLAN NOTE
Patient's blood pressure range in the last 24 hours was: BP  Min: 117/66  Max: 163/81.The patient's inpatient anti-hypertensive regimen is listed below:  Current Antihypertensives   Currently not taking any medications     Plan  - BP is considered uncontrolled, however he has confounding factors such as pain and relative distress due to difficulty w/ clearing his secretory output  - will continue to monitor and consider adding his losartan back on

## 2024-12-06 NOTE — HPI
"83 y/o M with remote hx of radiation for palatal cancer and recent SCC of left tongue s/p  L partial glossectomy, Left neck dissection levels I-IV, tracheostomy, left radial forearm free flap, left upper arm FTSG 11/16/24 w Dr. Bailon (ablative) and Dr. Thornton (reconstruction), [facility] that presents to the hospital for concerns regarding tracheostomy secretions on 12/5/2024. Patient presents with his daughter at bedside who notes that he is having a small leak around the trach site with worsening mucus secretions. He notes he has been feeling otherwise well aside from frequent coughing and choking sensation from significant secretions at times. Daughter reports his secretions initially increased during the week of Thanksgiving then resolved but today they once again increased. Daughter reports that he has been wearing the  all the time aside from inner cannula changes however recently she has noticed sound like a "deflating tire" from around the trach during significant coughing spells. During these episodes he is uncomfortable and has mild SOB but they resolve relatively quickly though the daughter notes his oxygen saturation does trend down as well. He receives nutrition via established Gtube without difficulty. Denies recent fevers/chills, malaise, rhinorrhea, significant pain, facial pain/pressure, nausea, vomiting, light-headedness, chest pain.     Vitals in the ED: afebrile, -163/66-94 w/ HR 66-89, and 96% O2 sat on RA. WBC within normal limits, H/H 11.9/36.9, albumin 3.0 w/ CMP otherwise relatively unremarkable. No medications or interventions given/provided in the ED. On chest XR "pulmonary findings suggest edema noting the process is more focal projected over the left lower lung zone." No RLL consolidation appreciated on chest XR. Pt admitted for observation for possible aspiration pneumonia and serial exams.     "

## 2024-12-06 NOTE — PLAN OF CARE
[facility] - [facility] Dept  Initial Discharge Assessment       Primary Care Provider: Otilio Damon MD    Admission Diagnosis: Shortness of breath [R06.02]    Admission Date: 12/5/2024  Expected Discharge Date: 12/6/2024    Pt son Dejuan at bedside and assisted with completing the assessment.  As per son pt daughter Bella is an EMS worker and helps pt with his care in the home.  As per son pt is independent with his ambulation and ADL's and does not require assistance or equipment.  As per son pt receives his feeding and trach supplies from [facility]    Per son pt can d/c home with family and does not require any further post acute services.    Pt to d/c home with no needs when ready    Transition of Care Barriers: (P) None    Payor: MEDICARE / Plan: MEDICARE PART A & B / Product Type: Government /     Extended Emergency Contact Information  Primary Emergency Contact: Bonnie Paz  Address:  [address] [address] [facility]  Home Phone: [phone]  Mobile Phone: [phone]  Relation: Spouse  Secondary Emergency Contact: Justice Pazalyn  Relation: Daughter  Preferred language: English   needed? No    Discharge Plan A: (P) Home  Discharge Plan B: (P) Home      [id number] - [place] - [address]  [address]  [place] [address]  Phone: [phone] Fax: [phone]    [id number] - [place] - [address]  [address] [place] [address]  Phone: [phone] Fax: [phone]      Initial Assessment (most recent)       Adult Discharge Assessment - 12/06/24 1003          Discharge Assessment    Assessment Type Discharge Planning Assessment     Confirmed/corrected address, phone number and insurance Yes     Confirmed Demographics Correct on Facesheet     Source of Information family     If unable to respond/provide information was family/caregiver contacted? Yes     Contact Name/Number robert Zaidi at bedside     Reason  For Admission Tracheostomy in place     People in Home spouse;child(issa), adult     Facility Arrived From: home     Do you expect to return to your current living situation? Yes     Do you have help at home or someone to help you manage your care at home? Yes     Who are your caregiver(s) and their phone number(s)? daughter Bella     Prior to hospitilization cognitive status: Alert/Oriented;No Deficits     Current cognitive status: Alert/Oriented;No Deficits     Walking or Climbing Stairs Difficulty no     Dressing/Bathing Difficulty no     Home Accessibility stairs to enter home     Number of Stairs, Main Entrance four     Home Layout Able to live on 1st floor     Equipment Currently Used at Home respiratory supplies;feeding device     Patient currently being followed by outpatient case management? No (P)      Do you currently have service(s) that help you manage your care at home? No (P)      Do you have any problems affording any of your prescribed medications? No (P)      Is the patient taking medications as prescribed? yes (P)      Who is going to help you get home at discharge? family/friends (P)      How do you get to doctors appointments? car, drives self (P)      Are you on dialysis? No (P)      Do you take coumadin? No (P)      Discharge Plan A Home (P)      Discharge Plan B Home (P)      DME Needed Upon Discharge  none (P)      Discharge Plan discussed with: Patient (P)      Transition of Care Barriers None (P)         Physical Activity    On average, how many days per week do you engage in moderate to strenuous exercise (like a brisk walk)? 0 days (P)      On average, how many minutes do you engage in exercise at this level? 0 min (P)         Financial Resource Strain    How hard is it for you to pay for the very basics like food, housing, medical care, and heating? Not very hard (P)         Housing Stability    In the last 12 months, was there a time when you were not able to pay the mortgage or rent  on time? No (P)      At any time in the past 12 months, were you homeless or living in a shelter (including now)? No (P)         Transportation Needs    Has the lack of transportation kept you from medical appointments, meetings, work or from getting things needed for daily living? No (P)         Food Insecurity    Within the past 12 months, you worried that your food would run out before you got the money to buy more. Never true (P)      Within the past 12 months, the food you bought just didn't last and you didn't have money to get more. Never true (P)         Stress    Do you feel stress - tense, restless, nervous, or anxious, or unable to sleep at night because your mind is troubled all the time - these days? To some extent (P)         Social Isolation    How often do you feel lonely or isolated from those around you?  Rarely (P)         Alcohol Use    Q1: How often do you have a drink containing alcohol? Never (P)      Q2: How many drinks containing alcohol do you have on a typical day when you are drinking? Patient does not drink (P)      Q3: How often do you have six or more drinks on one occasion? Never (P)         Utilities    In the past 12 months has the electric, gas, oil, or water company threatened to shut off services in your home? No (P)         Health Literacy    How often do you need to have someone help you when you read instructions, pamphlets, or other written material from your doctor or pharmacy? Rarely (P)         OTHER    Name(s) of People in Home christopher Nicole, adult daughter Bella (P)                    Dorothea Matthews CD, MSW, LMSW, RSW   Case Management  [facility]  Email: [email or web address]

## 2024-12-06 NOTE — ED NOTES
Assumed care of the patient. Report received from Sarahi/ANDRE Ribeiro. Pt on continuous cardiac monitoring, continuous pulse oximetry, and automatic BP cuff cycling. Pt in hospital gown, side rails up X2, bed low and locked, and call light is placed within reach. 1 family/visitors at bedside at this time. Pt denies any complaints or needs.     APPEARANCE: awake and alert in NAD.  SKIN: +skin graft/wound to L arm; staples to neck   MUSCULOSKELETAL: Patient moving all extremities spontaneously, no obvious swelling or deformities noted. Ambulates independently.  RESPIRATORY: Denies shortness of breath.Respirations unlabored; +trach  CARDIAC: Denies CP, 2+ distal pulses; no peripheral edema  ABDOMEN: S/ND/NT, Denies nausea; +PEG tube  : voids spontaneously, denies difficulty  Neurologic: AAO x 4; follows commands equal strength in all extremities; denies numbness/tingling. Denies dizziness

## 2024-12-06 NOTE — ASSESSMENT & PLAN NOTE
Fan Paz is a 84 y.o. 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 Present to ED on 12/5/2024 for tracheostomy concerns and c/f coughing/secretions.    Bedside scope notable for tracheostomy in good position with thickened clear secretions in trachea and more significantly throughout oral cavity/oropharynx w poor ability to clear secretions.   Presently gets nutrition from Gtube, denies fevers/chills/malaise.   Tolerating  with adequate oxygen saturation and tolerance of secretions w suctioning through tracheostomy     -- discussed  instructions - use when awake/talking then should take off at night  -- Presently recommend xeroform to left arm as local wound care  -- Please page ENT on call with any additional questions or concerns

## 2024-12-06 NOTE — SUBJECTIVE & OBJECTIVE
Past Medical History:   Diagnosis Date    Arthritis     Cancer of palate     GERD (gastroesophageal reflux disease)     HTN (hypertension)     Hyperlipidemia     Prostate cancer     2011    Pulmonary embolism        Past Surgical History:   Procedure Laterality Date    BACK SURGERY  y-7    Cancer of palate surgery      Late 90's- [facility]     CARPAL TUNNEL RELEASE  8-14-13    right hand,Left hand 2013    COLONOSCOPY N/A 4/10/2017    Procedure: COLONOSCOPY;  Surgeon: Nilo Kelly MD;  Location: [facility] ENDO;  Service: Endoscopy;  Laterality: N/A;    COLONOSCOPY N/A 10/21/2020    Procedure: COLONOSCOPY;  Surgeon: Demetrius Araujo MD;  Location: [facility];  Service: Endoscopy;  Laterality: N/A;    ESOPHAGOGASTRODUODENOSCOPY N/A 5/3/2023    Procedure: EGD (ESOPHAGOGASTRODUODENOSCOPY);  Surgeon: Shauna Lopez MD;  Location: [facility];  Service: Endoscopy;  Laterality: N/A;  EGD (with Dr. Lopez or Dr. Parada), : 1-4 weeks, Provider: Dr. Lopez or Dr. Parada Location: [facility], instructions sent to email address [email or web address].     ESOPHAGOGASTRODUODENOSCOPY W/ PEG N/A 11/15/2024    Procedure: EGD, WITH PEG TUBE INSERTION;  Surgeon: Rodrigo Chavez MD;  Location: [facility] OR 2ND FLR;  Service: General;  Laterality: N/A;    FREE FLAP RADIAL FOREARM Left 11/15/2024    Procedure: CREATION, FREE FLAP, FOREARM, RADIAL ASPECT;  Surgeon: Katina Thornton MD;  Location: [facility] OR 2ND FLR;  Service: General;  Laterality: Left;    GLOSSECTOMY Left 11/15/2024    Procedure: GLOSSECTOMY;  Surgeon: Cristhian Bailon MD;  Location: [facility] OR 2ND FLR;  Service: ENT;  Laterality: Left;    KNEE SURGERY  y-41    left knee    KNEE SURGERY Right 12-1-15    TKR    MODIFIED RADICAL NECK DISSECTION Left 11/15/2024    Procedure: DISSECTION, NECK, MODIFIED RADICAL;  Surgeon: Cristhian Bailon MD;  Location: [facility] OR 2ND FLR;  Service: ENT;  Laterality: Left;    Radio active seed Implant      January 2012    REVISION OF KNEE ARTHROPLASTY  Left 6/2/2023    Procedure: REVISION, ARTHROPLASTY, KNEE;  Surgeon: Dustin Paul MD;  Location: [facility] OR [address] [place] [address];  Service: Orthopedics;  Laterality: Left;    SKIN FULL THICKNESS GRAFT Left 11/15/2024    Procedure: APPLICATION, GRAFT, SKIN, FULL-THICKNESS;  Surgeon: Katina Thornton MD;  Location: [facility] OR [address] [place] [address];  Service: General;  Laterality: Left;    TOTAL HIP ARTHROPLASTY  3/2011    TRACHEOTOMY N/A 11/15/2024    Procedure: TRACHEOTOMY;  Surgeon: Cristhian Bailon MD;  Location: [facility] OR [address] [place] [address];  Service: ENT;  Laterality: N/A;       Review of patient's allergies indicates:  No Known Allergies    No current facility-administered medications on file prior to encounter.     Current Outpatient Medications on File Prior to Encounter   Medication Sig    acetaminophen (TYLENOL) 650 MG TbSR Take 1 tablet (650 mg total) by mouth every 8 (eight) hours.    azelastine (ASTELIN) 137 mcg (0.1 %) nasal spray 1 spray (137 mcg total) by Nasal route 2 (two) times daily. (Patient not taking: Reported on 12/2/2024)    azelastine (ASTELIN) 137 mcg (0.1 %) nasal spray 1 spray (137 mcg total) by Nasal route 2 (two) times daily. (Patient not taking: Reported on 12/2/2024)    chlorhexidine (PERIDEX) 0.12 % solution Use as directed 15 mLs in the mouth or throat 2 (two) times daily. for 7 days    docusate sodium (STOOL SOFTENER ORAL) Take by mouth as needed. (Patient not taking: Reported on 12/2/2024)    fluticasone propionate (FLONASE) 50 mcg/actuation nasal spray USE 2 SPRAY(S) IN EACH NOSTRIL TWICE DAILY (Patient not taking: Reported on 12/2/2024)    fluticasone propionate (FLONASE) 50 mcg/actuation nasal spray 1 spray (50 mcg total) by Each Nostril route 2 (two) times daily. (Patient not taking: Reported on 12/2/2024)    HYDROcodone-acetaminophen (NORCO) 5-325 mg per tablet Take 1 tablet by mouth every 6 (six) hours as needed for Pain. (Patient not taking: Reported on 12/2/2024)    losartan (COZAAR) 50 MG tablet Take 1 tablet  (50 mg total) by mouth once daily. (Patient not taking: Reported on 2024)    montelukast (SINGULAIR) 10 mg tablet TAKE 1 TABLET BY MOUTH ONCE DAILY IN THE EVENING (Patient not taking: Reported on 2024)    multivitamin/iron/folic acid (CENTRUM COMPLETE ORAL) Take 1 tablet by mouth once daily. (Patient not taking: Reported on 2024)    oxyCODONE (ROXICODONE) 5 MG immediate release tablet Take 1 tablet (5 mg total) by mouth every 6 (six) hours as needed for Pain (pain refractory to over the counter meds).    pantoprazole (PROTONIX) 40 MG tablet Take 1 tablet (40 mg total) by mouth once daily. (Patient not taking: Reported on 2024)    pantoprazole (PROTONIX) 40 MG tablet Take 1 tablet (40 mg total) by mouth once daily. (Patient not taking: Reported on 2024)    simvastatin (ZOCOR) 40 MG tablet Take 1 tablet (40 mg total) by mouth every evening. (Patient not taking: Reported on 2024)    tamsulosin (FLOMAX) 0.4 mg Cap Take 1 capsule by mouth once daily (Patient not taking: Reported on 2024)    white petrolatum (AQUAPHOR ORIGINAL) 41 % Oint Apply topically 2 (two) times a day. Apply to incision lines twice per day     Family History       Problem Relation (Age of Onset)    Breast cancer Sister    Cancer Sister    Coronary artery disease Father    Diabetes Sister    Esophageal cancer Father    Glaucoma Cousin    Heart disease Sister    Lung cancer Sister, Brother (60), Brother    No Known Problems Mother, Sister, Sister, Brother, Brother, Maternal Aunt, Maternal Uncle, Paternal Aunt, Paternal Uncle, Maternal Grandmother, Maternal Grandfather, Paternal Grandmother, Paternal Grandfather    Stroke Brother          Tobacco Use    Smoking status: Former     Current packs/day: 0.00     Average packs/day: 3.0 packs/day for 20.0 years (60.0 ttl pk-yrs)     Types: Cigarettes     Start date: 7/10/1977     Quit date: 7/10/1997     Years since quittin.4     Passive exposure: Past    Smokeless  tobacco: Never    Tobacco comments:     Quit 1997-smoked for 40 years ,2 packs a day on an average   Substance and Sexual Activity    Alcohol use: Not Currently     Comment: used to drink Occasionally    Drug use: No    Sexual activity: Yes     Partners: Female     Review of Systems  Objective:     Vital Signs (Most Recent):  Temp: 98.1 °F (36.7 °C) (12/05/24 1900)  Pulse: 72 (12/05/24 1900)  Resp: 19 (12/05/24 1900)  BP: 139/75 (12/05/24 1900)  SpO2: 95 % (12/05/24 1900) Vital Signs (24h Range):  Temp:  [98.1 °F (36.7 °C)-98.8 °F (37.1 °C)] 98.1 °F (36.7 °C)  Pulse:  [59-89] 72  Resp:  [10-20] 19  SpO2:  [93 %-96 %] 95 %  BP: (117-163)/(66-94) 139/75     Weight: 77.8 kg (171 lb 8.3 oz)  Body mass index is 29.44 kg/m².     Physical Exam  Constitutional:       General: He is not in acute distress.     Appearance: He is not toxic-appearing or diaphoretic.   HENT:      Head: Normocephalic and atraumatic.   Eyes:      General:         Right eye: No discharge.         Left eye: No discharge.   Neck:      Comments: 6-0 cuffless Shiley with  in place w thickened secretions  Cardiovascular:      Rate and Rhythm: Normal rate and regular rhythm.      Heart sounds: No murmur heard.     No gallop.   Pulmonary:      Effort: Pulmonary effort is normal. No respiratory distress.      Breath sounds: No wheezing or rales.   Abdominal:      General: There is no distension.      Tenderness: There is no abdominal tenderness. There is no guarding.   Musculoskeletal:      Right lower leg: No edema.      Left lower leg: No edema.   Skin:     General: Skin is warm and dry.   Neurological:      General: No focal deficit present.      Mental Status: He is alert and oriented to person, place, and time.                Significant Labs: All pertinent labs within the past 24 hours have been reviewed.  Recent Lab Results         12/05/24  1634        Albumin 3.0       ALP 63       ALT 26       Anion Gap 9       AST 22       Baso # 0.06        Basophil % 0.7       BILIRUBIN TOTAL 0.4  Comment: For infants and newborns, interpretation of results should be based  on gestational age, weight and in agreement with clinical  observations.    Premature Infant recommended reference ranges:  Up to 24 hours.............<8.0 mg/dL  Up to 48 hours............<12.0 mg/dL  3-5 days..................<15.0 mg/dL  6-29 days.................<15.0 mg/dL         BUN 23       Calcium 9.4       Chloride 100       CO2 26       Creatinine 0.9       Differential Method Automated       eGFR >60.0       Eos # 0.2       Eos % 2.1       Glucose 103       Gran # (ANC) 6.0       Gran % 72.5       Hematocrit 36.9       Hemoglobin 11.9       Hepatitis C Ab Non-reactive       HIV 1/2 Ag/Ab Non-reactive       Immature Grans (Abs) 0.03  Comment: Mild elevation in immature granulocytes is non specific and   can be seen in a variety of conditions including stress response,   acute inflammation, trauma and pregnancy. Correlation with other   laboratory and clinical findings is essential.         Immature Granulocytes 0.4       Lymph # 1.0       Lymph % 11.5       MCH 27.2       MCHC 32.2       MCV 84       Mono # 1.1       Mono % 12.8       MPV 9.9       nRBC 0       Platelet Count 379       Potassium 5.1       PROTEIN TOTAL 8.1       RBC 4.38       RDW 14.4       Sodium 135       WBC 8.25               Significant Imaging: I have reviewed all pertinent imaging results/findings within the past 24 hours.

## 2024-12-06 NOTE — SUBJECTIVE & OBJECTIVE
Interval History: did better with cap off overnight regarding coughinf fits/feeling like he is drowning in secretions. Discussed not wearing cap at night at home, patient amenable and son at bedside in agreement     Medications:  Continuous Infusions:  Scheduled Meds:   enoxparin  40 mg Subcutaneous Daily    guaiFENesin 100 mg/5 ml  400 mg Per G Tube Q6H     PRN Meds:  Current Facility-Administered Medications:     acetaminophen, 650 mg, Oral, Q4H PRN    dextrose 10%, 12.5 g, Intravenous, PRN    dextrose 10%, 25 g, Intravenous, PRN    glucagon (human recombinant), 1 mg, Intramuscular, PRN    glucose, 16 g, Oral, PRN    glucose, 24 g, Oral, PRN    naloxone, 0.02 mg, Intravenous, PRN    oxyCODONE, 5 mg, Oral, Q6H PRN    sodium chloride 0.9%, 10 mL, Intravenous, Q12H PRN     Review of patient's allergies indicates:  No Known Allergies  Objective:     Vital Signs (24h Range):  Temp:  [98.1 °F (36.7 °C)-98.8 °F (37.1 °C)] 98.4 °F (36.9 °C)  Pulse:  [59-93] 78  Resp:  [10-26] 25  SpO2:  [93 %-99 %] 98 %  BP: (100-169)/(55-94) 115/66       Lines/Drains/Airways       Drain  Duration                  Gastrostomy/Enterostomy 11/15/24 1059 Percutaneous endoscopic gastrostomy (PEG) LUQ feeding 20 days              Airway  Duration             Adult Surgical Airway 11/18/24 0715 Shiley Uncuffed 6.0/ 75mm 18 days              Peripheral Intravenous Line  Duration                  Peripheral IV - Single Lumen 12/05/24 1634 20 G Right Antecubital <1 day                     Physical Exam  Intraoral flap intact with appropriate color, texture, turgor, vicryls intact with probing, nontender, thick oral secretions   6-0 cuffless Shiley with  removed easily clears secretions   Left radial forearm donor site with kerlix in place        Significant Labs:  CBC:   Recent Labs   Lab 12/05/24  1634   WBC 8.25   RBC 4.38*   HGB 11.9*   HCT 36.9*      MCV 84   MCH 27.2   MCHC 32.2     CMP:   Recent Labs   Lab 12/05/24  1634   GLU  103   CALCIUM 9.4   ALBUMIN 3.0*   PROT 8.1   *   K 5.1   CO2 26      BUN 23   CREATININE 0.9   ALKPHOS 63   ALT 26   AST 22   BILITOT 0.4       Significant Diagnostics:  X-Ray: I have reviewed all pertinent results/findings within the past 24 hours and my personal findings are:  left opacification though no definitive consolidation

## 2024-12-06 NOTE — ASSESSMENT & PLAN NOTE
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 Present to ED on 12/5/2024 for tracheostomy concerns and coughing/secretions. Chest XR w/ "pulmonary findings suggest edema noting the process is more focal projected over the left lower lung zone." Patient is without WBC, not hypoxic, presents w/ thickened clear secretions, without RLL consolidation, however he does appear to have difficulty clearing his secretions though he is not in respiratory distress. Low suspicion for pneumonia at this time.     Plan:  - evaluated by ENT who recommends aspiration workup for disposition   - continue to monitor secretion output  - mucinex via G tube   "

## 2024-12-06 NOTE — HOSPITAL COURSE
83 y/o M with remote hx of radiation for palatal cancer and recent SCC of left tongue s/p L partial glossectomy, Left neck dissection levels I-IV, tracheostomy, left radial forearm free flap, left upper arm FTSG 11/16/24 w Dr. Bailon (ablative) and Dr. Thornton (reconstruction), [facility] that presents to the hospital for concerns regarding tracheostomy secretions on 12/5/2024. Chest XR obtained which showed some secretions in the left lower lung. Vitally patient was afebrile with no leukocytosis or any concerns for pneumonia during the time of his admission. Evaluated by ENT who performed bedside scope notable for tracheostomy in good position with thickened clear secretions in trachea and more significantly throughout oral cavity/oropharynx w/ poor ability to clear secretions. He was observed overnight without any documented signs of respiratory distress. Prior to discharge ENT counseled the patient on wearing the red tracheostomy cap when awake/talking then taking it off at night. Patient discharged with ENT follow-up within the next week.

## 2024-12-06 NOTE — H&P
"[facility] - Emergency Dept  [facility]  History & Physical    Patient Name: Fan Paz  MRN: [id number]  Patient Class: OP- Observation  Admission Date: 12/5/2024  Attending Physician: Rosmery Zavala MD   Primary Care Provider: Otilio Damon MD         Patient information was obtained from patient, relative(s), and ER records.     Subjective:     Principal Problem:Tracheostomy in place    Chief Complaint:   Chief Complaint   Patient presents with    Trach issues     Recent surgery for cancer on tongue, has trach, per daughter pts trach sound like air is coming around trach and "like he is breathing underwater"        HPI: 83 y/o M with remote hx of radiation for palatal cancer and recent SCC of left tongue s/p  L partial glossectomy, Left neck dissection levels I-IV, tracheostomy, left radial forearm free flap, left upper arm FTSG 11/16/24 w Dr. Bailon (ablative) and Dr. Thornton (reconstruction), [facility] that presents to the hospital for concerns regarding tracheostomy secretions on 12/5/2024. Patient presents with his daughter at bedside who notes that he is having a small leak around the trach site with worsening mucus secretions. He notes he has been feeling otherwise well aside from frequent coughing and choking sensation from significant secretions at times. Daughter reports his secretions initially increased during the week of Thanksgiving then resolved but today they once again increased. Daughter reports that he has been wearing the  all the time aside from inner cannula changes however recently she has noticed sound like a "deflating tire" from around the trach during significant coughing spells. During these episodes he is uncomfortable and has mild SOB but they resolve relatively quickly though the daughter notes his oxygen saturation does trend down as well. He receives nutrition via established Gtube without difficulty. Denies recent fevers/chills, malaise, rhinorrhea, " "significant pain, facial pain/pressure, nausea, vomiting, light-headedness, chest pain.     Vitals in the ED: afebrile, -163/66-94 w/ HR 66-89, and 96% O2 sat on RA. WBC within normal limits, H/H 11.9/36.9, albumin 3.0 w/ CMP otherwise relatively unremarkable. No medications or interventions given/provided in the ED. On chest XR "pulmonary findings suggest edema noting the process is more focal projected over the left lower lung zone." No RLL consolidation appreciated on chest XR. Pt admitted for observation for possible aspiration pneumonia and serial exams.       Past Medical History:   Diagnosis Date    Arthritis     Cancer of palate     GERD (gastroesophageal reflux disease)     HTN (hypertension)     Hyperlipidemia     Prostate cancer     2011    Pulmonary embolism        Past Surgical History:   Procedure Laterality Date    BACK SURGERY  y-7    Cancer of palate surgery      Late 90's- [facility]     CARPAL TUNNEL RELEASE  8-14-13    right hand,Left hand 2013    COLONOSCOPY N/A 4/10/2017    Procedure: COLONOSCOPY;  Surgeon: Nilo Kelly MD;  Location: [facility];  Service: Endoscopy;  Laterality: N/A;    COLONOSCOPY N/A 10/21/2020    Procedure: COLONOSCOPY;  Surgeon: Demetrius Araujo MD;  Location: [facility];  Service: Endoscopy;  Laterality: N/A;    ESOPHAGOGASTRODUODENOSCOPY N/A 5/3/2023    Procedure: EGD (ESOPHAGOGASTRODUODENOSCOPY);  Surgeon: Shauna Lopez MD;  Location: [facility];  Service: Endoscopy;  Laterality: N/A;  EGD (with Dr. Lopez or Dr. Parada), : 1-4 weeks, Provider: Dr. Lopez or Dr. Parada Location: [facility], instructions sent to email address [email or web address]    ESOPHAGOGASTRODUODENOSCOPY W/ PEG N/A 11/15/2024    Procedure: EGD, WITH PEG TUBE INSERTION;  Surgeon: Rodrigo Chavez MD;  Location: [facility] OR [address] [place] [address];  Service: General;  Laterality: N/A;    FREE FLAP RADIAL FOREARM Left 11/15/2024    Procedure: CREATION, FREE FLAP, FOREARM, RADIAL ASPECT;  Surgeon: Katina Thornton, " MD;  Location: [facility] OR [facility];  Service: General;  Laterality: Left;    GLOSSECTOMY Left 11/15/2024    Procedure: GLOSSECTOMY;  Surgeon: Cristhian Bailon MD;  [facility];  Service: ENT;  Laterality: Left;    KNEE SURGERY  y-41    left knee    KNEE SURGERY Right 12-1-15    TKR    MODIFIED RADICAL NECK DISSECTION Left 11/15/2024    Procedure: DISSECTION, NECK, MODIFIED RADICAL;  Surgeon: Cristhian Bailon MD;  [facility];  Service: ENT;  Laterality: Left;    Radio active seed Implant      January 2012    REVISION OF KNEE ARTHROPLASTY Left 6/2/2023    Procedure: REVISION, ARTHROPLASTY, KNEE;  Surgeon: Dustin Paul MD;  [facility];  Service: Orthopedics;  Laterality: Left;    SKIN FULL THICKNESS GRAFT Left 11/15/2024    Procedure: APPLICATION, GRAFT, SKIN, FULL-THICKNESS;  Surgeon: Katina Thornton MD;  [facility];  Service: General;  Laterality: Left;    TOTAL HIP ARTHROPLASTY  3/2011    TRACHEOTOMY N/A 11/15/2024    Procedure: TRACHEOTOMY;  Surgeon: Cristhian Bailon MD;  [facility];  Service: ENT;  Laterality: N/A;       Review of patient's allergies indicates:  No Known Allergies    No current facility-administered medications on file prior to encounter.     Current Outpatient Medications on File Prior to Encounter   Medication Sig    acetaminophen (TYLENOL) 650 MG TbSR Take 1 tablet (650 mg total) by mouth every 8 (eight) hours.    azelastine (ASTELIN) 137 mcg (0.1 %) nasal spray 1 spray (137 mcg total) by Nasal route 2 (two) times daily. (Patient not taking: Reported on 12/2/2024)    azelastine (ASTELIN) 137 mcg (0.1 %) nasal spray 1 spray (137 mcg total) by Nasal route 2 (two) times daily. (Patient not taking: Reported on 12/2/2024)    chlorhexidine (PERIDEX) 0.12 % solution Use as directed 15 mLs in the mouth or throat 2 (two) times daily. for 7 days    docusate sodium (STOOL SOFTENER ORAL) Take by mouth as needed.  (Patient not taking: Reported on 12/2/2024)    fluticasone propionate (FLONASE) 50 mcg/actuation nasal spray USE 2 SPRAY(S) IN EACH NOSTRIL TWICE DAILY (Patient not taking: Reported on 12/2/2024)    fluticasone propionate (FLONASE) 50 mcg/actuation nasal spray 1 spray (50 mcg total) by Each Nostril route 2 (two) times daily. (Patient not taking: Reported on 12/2/2024)    HYDROcodone-acetaminophen (NORCO) 5-325 mg per tablet Take 1 tablet by mouth every 6 (six) hours as needed for Pain. (Patient not taking: Reported on 12/2/2024)    losartan (COZAAR) 50 MG tablet Take 1 tablet (50 mg total) by mouth once daily. (Patient not taking: Reported on 12/2/2024)    montelukast (SINGULAIR) 10 mg tablet TAKE 1 TABLET BY MOUTH ONCE DAILY IN THE EVENING (Patient not taking: Reported on 12/2/2024)    multivitamin/iron/folic acid (CENTRUM COMPLETE ORAL) Take 1 tablet by mouth once daily. (Patient not taking: Reported on 12/2/2024)    oxyCODONE (ROXICODONE) 5 MG immediate release tablet Take 1 tablet (5 mg total) by mouth every 6 (six) hours as needed for Pain (pain refractory to over the counter meds).    pantoprazole (PROTONIX) 40 MG tablet Take 1 tablet (40 mg total) by mouth once daily. (Patient not taking: Reported on 12/2/2024)    pantoprazole (PROTONIX) 40 MG tablet Take 1 tablet (40 mg total) by mouth once daily. (Patient not taking: Reported on 12/2/2024)    simvastatin (ZOCOR) 40 MG tablet Take 1 tablet (40 mg total) by mouth every evening. (Patient not taking: Reported on 12/2/2024)    tamsulosin (FLOMAX) 0.4 mg Cap Take 1 capsule by mouth once daily (Patient not taking: Reported on 12/2/2024)    white petrolatum (AQUAPHOR ORIGINAL) 41 % Oint Apply topically 2 (two) times a day. Apply to incision lines twice per day     Family History       Problem Relation (Age of Onset)    Breast cancer Sister    Cancer Sister    Coronary artery disease Father    Diabetes Sister    Esophageal cancer Father    Glaucoma Cousin    Heart  disease Sister    Lung cancer Sister, Brother (60), Brother    No Known Problems Mother, Sister, Sister, Brother, Brother, Maternal Aunt, Maternal Uncle, Paternal Aunt, Paternal Uncle, Maternal Grandmother, Maternal Grandfather, Paternal Grandmother, Paternal Grandfather    Stroke Brother          Tobacco Use    Smoking status: Former     Current packs/day: 0.00     Average packs/day: 3.0 packs/day for 20.0 years (60.0 ttl pk-yrs)     Types: Cigarettes     Start date: 7/10/1977     Quit date: 7/10/1997     Years since quittin.4     Passive exposure: Past    Smokeless tobacco: Never    Tobacco comments:     Quit -smoked for 40 years ,2 packs a day on an average   Substance and Sexual Activity    Alcohol use: Not Currently     Comment: used to drink Occasionally    Drug use: No    Sexual activity: Yes     Partners: Female     Review of Systems  Objective:     Vital Signs (Most Recent):  Temp: 98.1 °F (36.7 °C) (24)  Pulse: 72 (24)  Resp: 19 (24)  BP: 139/75 (24)  SpO2: 95 % (24) Vital Signs (24h Range):  Temp:  [98.1 °F (36.7 °C)-98.8 °F (37.1 °C)] 98.1 °F (36.7 °C)  Pulse:  [59-89] 72  Resp:  [10-20] 19  SpO2:  [93 %-96 %] 95 %  BP: (117-163)/(66-94) 139/75     Weight: 77.8 kg (171 lb 8.3 oz)  Body mass index is 29.44 kg/m².     Physical Exam  Constitutional:       General: He is not in acute distress.     Appearance: He is not toxic-appearing or diaphoretic.   HENT:      Head: Normocephalic and atraumatic.   Eyes:      General:         Right eye: No discharge.         Left eye: No discharge.   Neck:      Comments: 6-0 cuffless Shiley with  in place w thickened secretions  Cardiovascular:      Rate and Rhythm: Normal rate and regular rhythm.      Heart sounds: No murmur heard.     No gallop.   Pulmonary:      Effort: Pulmonary effort is normal. No respiratory distress.      Breath sounds: No wheezing or rales.   Abdominal:      General: There is  no distension.      Tenderness: There is no abdominal tenderness. There is no guarding.   Musculoskeletal:      Right lower leg: No edema.      Left lower leg: No edema.   Skin:     General: Skin is warm and dry.   Neurological:      General: No focal deficit present.      Mental Status: He is alert and oriented to person, place, and time.                Significant Labs: All pertinent labs within the past 24 hours have been reviewed.  Recent Lab Results         12/05/24  1634        Albumin 3.0       ALP 63       ALT 26       Anion Gap 9       AST 22       Baso # 0.06       Basophil % 0.7       BILIRUBIN TOTAL 0.4  Comment: For infants and newborns, interpretation of results should be based  on gestational age, weight and in agreement with clinical  observations.    Premature Infant recommended reference ranges:  Up to 24 hours.............<8.0 mg/dL  Up to 48 hours............<12.0 mg/dL  3-5 days..................<15.0 mg/dL  6-29 days.................<15.0 mg/dL         BUN 23       Calcium 9.4       Chloride 100       CO2 26       Creatinine 0.9       Differential Method Automated       eGFR >60.0       Eos # 0.2       Eos % 2.1       Glucose 103       Gran # (ANC) 6.0       Gran % 72.5       Hematocrit 36.9       Hemoglobin 11.9       Hepatitis C Ab Non-reactive       HIV 1/2 Ag/Ab Non-reactive       Immature Grans (Abs) 0.03  Comment: Mild elevation in immature granulocytes is non specific and   can be seen in a variety of conditions including stress response,   acute inflammation, trauma and pregnancy. Correlation with other   laboratory and clinical findings is essential.         Immature Granulocytes 0.4       Lymph # 1.0       Lymph % 11.5       MCH 27.2       MCHC 32.2       MCV 84       Mono # 1.1       Mono % 12.8       MPV 9.9       nRBC 0       Platelet Count 379       Potassium 5.1       PROTEIN TOTAL 8.1       RBC 4.38       RDW 14.4       Sodium 135       WBC 8.25               Significant  "Imaging: I have reviewed all pertinent imaging results/findings within the past 24 hours.  Assessment/Plan:     * Tracheostomy in place  84 y.o. 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 Present to ED on 12/5/2024 for tracheostomy concerns and coughing/secretions. Chest XR w/ "pulmonary findings suggest edema noting the process is more focal projected over the left lower lung zone." Patient is without WBC, not hypoxic, presents w/ thickened clear secretions, without RLL consolidation, however he does appear to have difficulty clearing his secretions though he is not in respiratory distress. Low suspicion for pneumonia at this time.     Plan:  - evaluated by ENT who recommends aspiration workup for disposition   - continue to monitor secretion output  - mucinex via G tube     History of head and neck cancer  See tracheostomy in place       Prediabetes  - A1c has been in the preDM range in the past  - will continue to monitor glucose inpatient       Essential hypertension  Patient's blood pressure range in the last 24 hours was: BP  Min: 117/66  Max: 163/81.The patient's inpatient anti-hypertensive regimen is listed below:  Current Antihypertensives   Currently not taking any medications     Plan  - BP is considered uncontrolled, however he has confounding factors such as pain and relative distress due to difficulty w/ clearing his secretory output  - will continue to monitor and consider adding his losartan back on     Hyperlipidemia  - Lipid panel: cholesterol 144, triglycerides 92, HDL 46, LDL 79.6 - OP IM physician suggested continuation of his statin, however unclear as to why the patient chose to discontinue       VTE Risk Mitigation (From admission, onward)           Ordered     enoxaparin injection 40 mg  Daily         12/05/24 1909     IP VTE HIGH RISK PATIENT  Once         " 12/05/24 1909     Place sequential compression device  Until discontinued         12/05/24 1909                           On 12/05/2024, patient should be placed in hospital observation services under my care in collaboration with Dr. Zavala.         Ronak Rosas DO, PGY1  Department of Hospital Medicine  [facility] - Emergency Dept

## 2024-12-09 ENCOUNTER — PATIENT MESSAGE (OUTPATIENT)
Dept: OTOLARYNGOLOGY | Facility: CLINIC | Age: 84
End: 2024-12-09
Payer: MEDICARE

## 2024-12-12 ENCOUNTER — OFFICE VISIT (OUTPATIENT)
Dept: OTOLARYNGOLOGY | Facility: CLINIC | Age: 84
End: 2024-12-12
Payer: MEDICARE

## 2024-12-12 VITALS
WEIGHT: 173.06 LBS | DIASTOLIC BLOOD PRESSURE: 71 MMHG | BODY MASS INDEX: 29.71 KG/M2 | HEART RATE: 78 BPM | SYSTOLIC BLOOD PRESSURE: 117 MMHG

## 2024-12-12 DIAGNOSIS — Z48.89 ENCOUNTER FOR POSTOPERATIVE WOUND CHECK: ICD-10-CM

## 2024-12-12 DIAGNOSIS — Z48.02 ENCOUNTER FOR STAPLE REMOVAL: Primary | ICD-10-CM

## 2024-12-12 DIAGNOSIS — Z98.890 POST-OPERATIVE STATE: ICD-10-CM

## 2024-12-12 PROCEDURE — 99999 PR PBB SHADOW E&M-EST. PATIENT-LVL III: CPT | Mod: PBBFAC,,, | Performed by: PHYSICIAN ASSISTANT

## 2024-12-12 PROCEDURE — 99213 OFFICE O/P EST LOW 20 MIN: CPT | Mod: PBBFAC | Performed by: PHYSICIAN ASSISTANT

## 2024-12-12 NOTE — PROGRESS NOTES
HEAD AND NECK SURGICAL ONCOLOGY CLINIC NOTE    CC: Follow-up for wound check    TREATMENT HISTORY:  1. Tracheostomy, Left hemiglossectomy, Left modified neck dissection of levels 1A through 4, November 15,  2024  - Dr Bailon    PATHOLOGY:  Final Pathologic Diagnosis 1. NECK LYMPH NODE DISSECTION 1A:    2 LYMPH NODES WITH NO METASTATIC CARCINOMA IDENTIFIED    2. LEFT NECK DISSECTION LEVEL 1B:  1 LYMPH NODE WITH NO METASTATIC CARCINOMA IDENTIFIED  ATROPHIC SALIVARY GLAND    3. 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 FLOOR OF MOUTH, 5. ANTERIOR, 6. BASE OF TONGUE, AND 7. DEEP::  NO CARCINOMA IDENTIFIED    8. LEFT NECK LYMPH NODE DISSECTION LEVELS 2, 3, AND 4:  8 LYMPH NODES WITH NO METASTATIC CARCINOMA IDENTIFIED       INTERVAL HISTORY:  Fan Paz returns to clinic today for follow up. Post op pain is improving. They are changing dressing to the L forearm daily - have started leaving open to air for a few hours at a time. He does have thick mucus from the trach. No SOB.     PHYSICAL EXAM:  Trach in place. Staples noted to the L neck. No signs of infection.  Healing L forearm.      PROCEDURE:  Staples removed without difficulty.  L forearm redressed.      PLAN:  Follow up with Dr Bailon in 1 week.  Saline bullets, suction as needed.   Wound care discussed.   All questions answered.

## 2024-12-19 ENCOUNTER — OFFICE VISIT (OUTPATIENT)
Dept: OTOLARYNGOLOGY | Facility: CLINIC | Age: 84
End: 2024-12-19
Payer: MEDICARE

## 2024-12-19 VITALS
WEIGHT: 164.69 LBS | DIASTOLIC BLOOD PRESSURE: 76 MMHG | SYSTOLIC BLOOD PRESSURE: 120 MMHG | HEART RATE: 86 BPM | BODY MASS INDEX: 28.27 KG/M2

## 2024-12-19 DIAGNOSIS — C02.9 SQUAMOUS CELL CANCER OF TONGUE: Primary | ICD-10-CM

## 2024-12-19 PROCEDURE — 99999 PR PBB SHADOW E&M-EST. PATIENT-LVL III: CPT | Mod: PBBFAC,,, | Performed by: OTOLARYNGOLOGY

## 2024-12-19 PROCEDURE — 99213 OFFICE O/P EST LOW 20 MIN: CPT | Mod: PBBFAC | Performed by: OTOLARYNGOLOGY

## 2024-12-19 PROCEDURE — 99024 POSTOP FOLLOW-UP VISIT: CPT | Mod: POP,,, | Performed by: OTOLARYNGOLOGY

## 2024-12-20 NOTE — PROGRESS NOTES
Fan Paz presents 1 month status post left hemiglossectomy and free flap reconstruction.  He continues to utilize his trach.  No complaints.      On exam, his flap is well healed.  His trach is in place.  His neck scars are well healed.  His flap donor site is healing nicely with several foci of secondary intention healing.      Assessment and plan:   Doing well.  Begin to wear a cap as tolerated.  Return to see me in 2 weeks.

## 2024-12-30 ENCOUNTER — TELEPHONE (OUTPATIENT)
Dept: ORTHOPEDICS | Facility: CLINIC | Age: 84
End: 2024-12-30
Payer: MEDICARE

## 2024-12-30 NOTE — TELEPHONE ENCOUNTER
Called pt and spoke to patients wife. Informed them we got their message and to call us when ready to be seen. Pts wife verbalized understanding.     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    Confirmed contact info below:  Contact Name: Fan Paz  Phone Number: [phone]

## 2024-12-31 ENCOUNTER — EXTERNAL CHRONIC CARE MANAGEMENT (OUTPATIENT)
Dept: PRIMARY CARE CLINIC | Facility: CLINIC | Age: 84
End: 2024-12-31
Payer: MEDICARE

## 2024-12-31 PROCEDURE — 99490 CHRNC CARE MGMT STAFF 1ST 20: CPT | Mod: PBBFAC,PO | Performed by: FAMILY MEDICINE

## 2025-01-09 ENCOUNTER — OFFICE VISIT (OUTPATIENT)
Dept: OTOLARYNGOLOGY | Facility: CLINIC | Age: 85
End: 2025-01-09
Payer: MEDICARE

## 2025-01-09 VITALS
BODY MASS INDEX: 28.19 KG/M2 | DIASTOLIC BLOOD PRESSURE: 64 MMHG | SYSTOLIC BLOOD PRESSURE: 112 MMHG | WEIGHT: 164.25 LBS

## 2025-01-09 DIAGNOSIS — J98.8 AIRWAY OBSTRUCTION: ICD-10-CM

## 2025-01-09 DIAGNOSIS — C02.9 SQUAMOUS CELL CANCER OF TONGUE: Primary | ICD-10-CM

## 2025-01-09 PROCEDURE — 99213 OFFICE O/P EST LOW 20 MIN: CPT | Mod: PBBFAC | Performed by: OTOLARYNGOLOGY

## 2025-01-09 PROCEDURE — 99024 POSTOP FOLLOW-UP VISIT: CPT | Mod: POP,,, | Performed by: OTOLARYNGOLOGY

## 2025-01-09 PROCEDURE — 99999 PR PBB SHADOW E&M-EST. PATIENT-LVL III: CPT | Mod: PBBFAC,,, | Performed by: OTOLARYNGOLOGY

## 2025-01-09 RX ORDER — SODIUM CHLORIDE 0.9 % (FLUSH) 0.9 %
10 SYRINGE (ML) INJECTION
OUTPATIENT
Start: 2025-01-09

## 2025-01-09 RX ORDER — LIDOCAINE HYDROCHLORIDE 10 MG/ML
1 INJECTION, SOLUTION EPIDURAL; INFILTRATION; INTRACAUDAL; PERINEURAL ONCE
OUTPATIENT
Start: 2025-01-09 | End: 2025-01-09

## 2025-01-09 NOTE — H&P (VIEW-ONLY)
Fan Paz presents 2 months status post left hemiglossectomy and free flap reconstruction.  His trach has been capped for roughly 2 weeks.  He denies shortness of breath.  He has not had to remove the cap.      On exam, his flap is well healed.  His trach is in place.  His neck scars are well healed.  His flap donor site is healing nicely with several foci of secondary intention healing.      Flex Naso Hali Hypo Procedures #2    Procedure:  Diagnostic flexible nasopharyngoscopy, laryngoscopy and hypopharyngoscopy:    Routine preparation with local atomizer with 1% neosynephrine/pontocaine with customary flexible endoscope.    Nasopharynx:  No lesions.   Mucosa:  No lesions.   Adenoids:  Present.  Posterior Choanae:  Patent.  Eustachian Tubes:  Patent.  Posterior pharynx:  No lesions.  Larynx/hypopharynx:   Epiglottis:  No lesions, without edema.   AE Folds:  No lesions.   Vocal cords:  No polyps, nodules, ulcers or lesions.   Mobility:  Limited abduction of both true vocal folds.   Hypopharynx:  No lesions.   Piriform sinus:  No pooling, no lesions.   Post Cricoid:  No erythema, no edema.        Assessment and plan:   Doing well.  Tolerating cap but his airway is narrowed due to the limited range of motion of his true vocal folds.  Will plan for laryngoscopy with palpation of cricoarytenoid joints on 01/24/2025.

## 2025-01-10 NOTE — ANESTHESIA PAT ROS NOTE
01/10/2025  Fan Paz is a 84 y.o., male.      Pre-op Assessment          Review of Systems  Anesthesia Hx:  No problems with previous Anesthesia   History of prior surgery of interest to airway management or planning: tracheostomy, radical neck. Previous anesthesia: General GLOSSECTOMY (Left: Mouth)  DISSECTION, NECK, MODIFIED RADICAL (Left)  TRACHEOTOMY (Neck)  CREATION, FREE FLAP, FOREARM, RADIAL ASPECT (Left)  APPLICATION, GRAFT, SKIN, FULL-THICKNESS (Left: Arm)  EGD, WITH PEG TUBE INSERTION (Abdomen) 11/15/24 with general anesthesia.  Procedure performed at an [facility].      Airway issues documented on chart review include mask, easy, easy direct laryngoscopy , view on direct laryngoscopy Grade I    Denies Family Hx of Anesthesia complications.    Denies Personal Hx of Anesthesia complications.                    Social:  Former Smoker, No Alcohol Use       Hematology/Oncology:  Hematology Normal                     Current/Recent Cancer.                EENT/Dental:   SQUAMOUS CELL CARCINOMA OF TONGUE          Cardiovascular:     Hypertension       Denies Angina.     hyperlipidemia    Mobitz type 1 second degree AV block   Functional Capacity good / => 4 METS                         Pulmonary:       Denies Shortness of breath.  Denies Recent URI.                 Renal/:  Renal/ Normal                 Hepatic/GI:     GERD                Musculoskeletal:  Musculoskeletal Normal                Neurological:  Neurology Normal                                      Psych:  Psychiatric Normal                         Anesthesia Assessment: Preoperative EQUATION    Planned Procedure: Procedure(s) (LRB):  LARYNGOSCOPY (N/A)  Requested Anesthesia Type:General  Surgeon: Cristhian Bailon MD  Service: ENT  Known or anticipated Date of Surgery:1/24/2025    Surgeon notes:  reviewed    Electronic QUestionnaire Assessment completed via nurse interview with patient.        Triage considerations:     The patient has no apparent active cardiac condition (No unstable coronary Syndrome such as severe unstable angina or recent [<1 month] myocardial infarction, decompensated CHF, severe valvular   disease or significant arrhythmia)    Previous anesthesia records:GETA and No problems    Airway:  Mallampati: II   Mouth Opening: Normal  TM Distance: Normal  Tongue: Decreased Mobility  Neck ROM: Normal ROM    Last PCP note: 3-6 months ago , within [facility]   Subspecialty notes: Cardiology: General, ENT    Other important co-morbidities: GERD, HLD, HTN, and Mobitz type 1 second degree AV block       Tests already available:  Available tests,  within 3 months , within [facility] .     12/5/24  CMP, CBC, EKG            Instructions given. (See in Nurse's note)    Optimization:  Anesthesia Preop Clinic Assessment  NOT Indicated    Medical Opinion NOT Indicated              Plan:    Testing:  None Needed        Patient  has previously scheduled Medical Appointment: NOT AT THIS TIME    Navigation: traight Line to surgery.               No tests, anesthesia preop clinic visit, or consult required.

## 2025-01-14 DIAGNOSIS — Z00.00 ENCOUNTER FOR MEDICARE ANNUAL WELLNESS EXAM: ICD-10-CM

## 2025-01-23 ENCOUNTER — ANESTHESIA EVENT (OUTPATIENT)
Dept: SURGERY | Facility: HOSPITAL | Age: 85
End: 2025-01-23
Payer: MEDICARE

## 2025-01-23 ENCOUNTER — TELEPHONE (OUTPATIENT)
Dept: OTOLARYNGOLOGY | Facility: CLINIC | Age: 85
End: 2025-01-23
Payer: MEDICARE

## 2025-01-23 NOTE — ANESTHESIA PREPROCEDURE EVALUATION
"[facility]  Anesthesia Pre-Operative Evaluation         Patient Name: Fan Paz  YOB: 1940  MRN: [id number]    SUBJECTIVE:     Pre-operative evaluation for Procedure(s) (LRB):  LARYNGOSCOPY (N/A)     01/23/2025    Fan Paz is a 84 y.o. male w/ a significant PMHx of HLD, HTN, prostate cancer, anemia, GERD, PE, SCC of tongue now s/p left hemiglossectomy and free flap reconstruction from left radial forearm on 11/15/24. Trach and PEG placed at that time, Trach capped off end of December.    Per Dr. Bailon's note, "his airway is narrowed due to the limited range of motion of his true vocal folds".    Last had feeding through PEG last night at 6pm. His daughter at bedside thinks his trach inner cannula is a 6.5.    Patient now presents for the above procedure(s).      Results for orders placed during the hospital encounter of 02/16/22    Echo    Interpretation Summary  · The left ventricle is normal in size with normal systolic function.  · The estimated ejection fraction is 60%.  · Moderate left atrial enlargement.  · Indeterminate left ventricular diastolic function.  · Normal right ventricular size with normal right ventricular systolic function.  · Normal central venous pressure (3 mmHg).  · The estimated PA systolic pressure is 6 mmHg.  · There is no pulmonary hypertension.      LDA:       Gastrostomy/Enterostomy 11/15/24 1059 Percutaneous endoscopic gastrostomy (PEG) LUQ feeding (Active)   Number of days: 69       Prev airway:   Intubation     Date/Time: 11/15/2024 10:49 AM     Performed by: Carina Torrez MD  Authorized by: Carina Torrez MD    Intubation:     Induction:  Intravenous    Intubated:  Postinduction    Mask Ventilation:  Easy with oral airway    Attempts:  1    Attempted By:  Staff anesthesiologist    Method of Intubation:  Direct    Blade:  Abdullahi 3    Laryngeal View Grade: Grade IIA - cords partially seen      Laryngeal View Grade " comment:  Grade IIb without BURP    Difficult Airway Encountered?: No      Complications:  None    Airway Device:  Oral endotracheal tube    Airway Device Size:  7.5    Style/Cuff Inflation:  Cuffed    Inflation Amount (mL):  8    Tube secured:  22    Secured at:  The lips    Placement Verified By:  Capnometry    Complicating Factors:  None    Findings Post-Intubation:  BS equal bilateral    Drips: None documented.      Patient Active Problem List   Diagnosis    Hyperlipidemia    History of prostate cancer    Arthritis of hand    Essential hypertension    Acute on chronic anemia    Osteoarthritis, knee    Dysphagia    Dry mouth    Acid reflux    Hx pulmonary embolism-6/20/09- bilateral lower lobe pulmonary emboli    Asymptomatic varicose veins of bilateral lower extremities    At risk for aspiration    Edema    Prediabetes    Seasonal allergic rhinitis due to pollen    Low HDL (under 40)    BMI 30.0-30.9,adult    Encounter for screening colonoscopy    Mobitz type 1 second degree AV block    History of head and neck cancer    Constipation    Squamous cell cancer of tongue    Hyperkalemia    Aortic atherosclerosis    Tracheostomy in place       Review of patient's allergies indicates:  No Known Allergies    Current Inpatient Medications:      No current facility-administered medications on file prior to encounter.     Current Outpatient Medications on File Prior to Encounter   Medication Sig Dispense Refill    acetaminophen (TYLENOL) 650 MG TbSR Take 1 tablet (650 mg total) by mouth every 8 (eight) hours. 120 tablet 0    azelastine (ASTELIN) 137 mcg (0.1 %) nasal spray 1 spray (137 mcg total) by Nasal route 2 (two) times daily. 30 mL 3    docusate sodium (STOOL SOFTENER ORAL) Take by mouth as needed.      fluticasone propionate (FLONASE) 50 mcg/actuation nasal spray 1 spray (50 mcg total) by Each Nostril route 2 (two) times daily. 9.9 mL 3    HYDROcodone-acetaminophen (NORCO) 5-325 mg per tablet Take 1 tablet by mouth  every 6 (six) hours as needed for Pain. 5 tablet 0    losartan (COZAAR) 50 MG tablet Take 1 tablet (50 mg total) by mouth once daily. 90 tablet 3    montelukast (SINGULAIR) 10 mg tablet TAKE 1 TABLET BY MOUTH ONCE DAILY IN THE EVENING 90 tablet 2    multivitamin/iron/folic acid (CENTRUM COMPLETE ORAL) Take 1 tablet by mouth once daily.      oxyCODONE (ROXICODONE) 5 MG immediate release tablet Take 1 tablet (5 mg total) by mouth every 6 (six) hours as needed for Pain (pain refractory to over the counter meds). 12 tablet 0    pantoprazole (PROTONIX) 40 MG tablet Take 1 tablet (40 mg total) by mouth once daily. 90 tablet 3    simvastatin (ZOCOR) 40 MG tablet Take 1 tablet (40 mg total) by mouth every evening. 90 tablet 3    tamsulosin (FLOMAX) 0.4 mg Cap Take 1 capsule by mouth once daily 90 capsule 2    white petrolatum (AQUAPHOR ORIGINAL) 41 % Oint Apply topically 2 (two) times a day. Apply to incision lines twice per day 396 g 0       Past Surgical History:   Procedure Laterality Date    BACK SURGERY  y-6    Cancer of palate surgery      Late 90's- [facility]     CARPAL TUNNEL RELEASE  8-14-13    right hand,Left hand 2013    COLONOSCOPY N/A 4/10/2017    Procedure: COLONOSCOPY;  Surgeon: Nilo Kelly MD;  Location: [facility];  Service: Endoscopy;  Laterality: N/A;    COLONOSCOPY N/A 10/21/2020    Procedure: COLONOSCOPY;  Surgeon: Demetrius Araujo MD;  Location: [facility];  Service: Endoscopy;  Laterality: N/A;    ESOPHAGOGASTRODUODENOSCOPY N/A 5/3/2023    Procedure: EGD (ESOPHAGOGASTRODUODENOSCOPY);  Surgeon: Shauna Lopez MD;  Location: [facility];  Service: Endoscopy;  Laterality: N/A;  EGD (with Dr. Lopez or Dr. Parada), : 1-4 weeks, Provider: Dr. Lopez or Dr. Parada Location: [facility], instructions sent to email address [email or web address].     ESOPHAGOGASTRODUODENOSCOPY W/ PEG N/A 11/15/2024    Procedure: EGD, WITH PEG TUBE INSERTION;  Surgeon: Rodrigo Chavez MD;  Location: [facility] OR 2ND FLR;   Service: General;  Laterality: N/A;    FREE FLAP RADIAL FOREARM Left 11/15/2024    Procedure: CREATION, FREE FLAP, FOREARM, RADIAL ASPECT;  Surgeon: Katina Thornton MD;  Location: [facility] OR [facility] FLR;  Service: General;  Laterality: Left;    GLOSSECTOMY Left 11/15/2024    Procedure: GLOSSECTOMY;  Surgeon: Cristhian Bailon MD;  Location: [facility] OR [facility] FLR;  Service: ENT;  Laterality: Left;    KNEE SURGERY  y-41    left knee    KNEE SURGERY Right 12-1-15    TKR    MODIFIED RADICAL NECK DISSECTION Left 11/15/2024    Procedure: DISSECTION, NECK, MODIFIED RADICAL;  Surgeon: Cristhian Bailon MD;  Location: [facility] OR [facility] FLR;  Service: ENT;  Laterality: Left;    Radio active seed Implant      2012    REVISION OF KNEE ARTHROPLASTY Left 2023    Procedure: REVISION, ARTHROPLASTY, KNEE;  Surgeon: Dustin Paul MD;  Location: [facility] OR [facility] FLR;  Service: Orthopedics;  Laterality: Left;    SKIN FULL THICKNESS GRAFT Left 11/15/2024    Procedure: APPLICATION, GRAFT, SKIN, FULL-THICKNESS;  Surgeon: Katina Thornton MD;  [facility];  Service: General;  Laterality: Left;    TOTAL HIP ARTHROPLASTY  3/2011    TRACHEOTOMY N/A 11/15/2024    Procedure: TRACHEOTOMY;  Surgeon: Cristhian Bailon MD;  [facility];  Service: ENT;  Laterality: N/A;       Social History     Socioeconomic History    Marital status:     Number of children: 4   Occupational History     Employer: RETIRED   Tobacco Use    Smoking status: Former     Current packs/day: 0.00     Average packs/day: 3.0 packs/day for 20.0 years (60.0 ttl pk-yrs)     Types: Cigarettes     Start date: 7/10/1977     Quit date: 7/10/1997     Years since quittin.5     Passive exposure: Past    Smokeless tobacco: Never    Tobacco comments:     Quit -smoked for 40 years ,2 packs a day on an average   Substance and Sexual Activity    Alcohol use: Not Currently     Comment: used to drink Occasionally    Drug use: No    Sexual activity:  Yes     Partners: Female     Social Drivers of Health     Financial Resource Strain: Low Risk  (12/6/2024)    Overall Financial Resource Strain (CARDIA)     Difficulty of Paying Living Expenses: Not very hard   Food Insecurity: No Food Insecurity (12/6/2024)    Hunger Vital Sign     Worried About Running Out of Food in the Last Year: Never true     Ran Out of Food in the Last Year: Never true   Transportation Needs: No Transportation Needs (12/6/2024)    TRANSPORTATION NEEDS     Transportation : No   Physical Activity: Inactive (12/6/2024)    Exercise Vital Sign     Days of Exercise per Week: 0 days     Minutes of Exercise per Session: 0 min   Stress: Stress Concern Present (12/6/2024)    [employer] - Occupational Stress Questionnaire     Feeling of Stress : To some extent   Housing Stability: Low Risk  (12/6/2024)    Housing Stability Vital Sign     Unable to Pay for Housing in the Last Year: No     Homeless in the Last Year: No       OBJECTIVE:     Vital Signs Range (Last 24H):         Significant Labs:  Lab Results   Component Value Date    WBC 8.25 12/05/2024    HGB 11.9 (L) 12/05/2024    HCT 36.9 (L) 12/05/2024     12/05/2024    CHOL 144 07/13/2024    TRIG 92 07/13/2024    HDL 46 07/13/2024    ALT 26 12/05/2024    AST 22 12/05/2024     (L) 12/05/2024    K 5.1 12/05/2024     12/05/2024    CREATININE 0.9 12/05/2024    BUN 23 12/05/2024    CO2 26 12/05/2024    TSH 1.030 12/07/2012    PSA 0.01 05/01/2018    INR 1.0 05/10/2023    HGBA1C 5.9 (H) 07/13/2024       Diagnostic Studies: No relevant studies.    EKG:   Results for orders placed or performed during the hospital encounter of 12/05/24   EKG 12-lead    Collection Time: 12/05/24  5:32 PM   Result Value Ref Range    QRS Duration 94 ms    [facility]HS QTC Calculation 415 ms    Narrative    Test Reason : R06.02,    Vent. Rate :  75 BPM     Atrial Rate :  89 BPM     P-R Int :    ms          QRS Dur :  94 ms      QT Int : 372  ms       P-R-T Axes :  37  21  26 degrees    QTcB Int : 415 ms    Normal sinus rhythm with second degree AV block (Mobit\z 1 )  Minimal voltage criteria for LVH, may be normal variant  Borderline Abnormal ECG  When compared with ECG of 21-Nov-2024 11:47,  Nonspecific T wave abnormality has replaced inverted T waves in Lateral  leads  Confirmed by Daily Khan ([id number]) on 12/6/2024 3:37:53 PM    Referred By: AAAREFERRAL SELF           Confirmed By: Daily Khan       2D ECHO:  TTE:  Results for orders placed or performed during the hospital encounter of 02/16/22   Echo   Result Value Ref Range    BSA 1.96 m2    TDI SEPTAL 0.05 m/s    LV LATERAL E/E' RATIO 6.44 m/s    LV SEPTAL E/E' RATIO 11.60 m/s    LA WIDTH 4.12 cm    TDI LATERAL 0.09 m/s    PV PEAK VELOCITY 0.91 cm/s    LVIDd 4.91 3.5 - 6.0 cm    IVS 1.08 0.6 - 1.1 cm    PW 1.03 0.6 - 1.1 cm    LVIDs 3.26 2.1 - 4.0 cm    FS 34 28 - 44 %    LA Vol 80.10 cm3    Sinus 3.34 cm    STJ 2.90 cm    LV mass 189.94 g    LA size 4.18 cm    RVDD 4.70 cm    TAPSE 1.72 cm    Left Ventricle Relative Wall Thickness 0.42 cm    AV mean gradient 2 mmHg    AV valve area 2.96 cm2    AV Velocity Ratio 0.83     AV index (prosthetic) 0.83     E/A ratio 0.63     Mean e' 0.07 m/s    E wave deceleration time 192.95 msec    IVRT 114.19 msec    Pulm vein S/D ratio 1.27     LVOT diameter 2.13 cm    LVOT area 3.6 cm2    LVOT peak mauricio 0.86 m/s    LVOT peak VTI 19.99 cm    Ao peak mauricio 1.03 m/s    Ao VTI 24.07 cm    LVOT stroke volume 71.19 cm3    AV peak gradient 4 mmHg    E/E' ratio 8.29 m/s    MV Peak E Mauricio 0.58 m/s    TR Max Mauricio 0.85 m/s    MV Peak A Mauricio 0.92 m/s    PV Peak S Mauricio 0.38 m/s    PV Peak D Mauricio 0.30 m/s    LV Systolic Volume 42.99 mL    LV Systolic Volume Index 22.4 mL/m2    LV Diastolic Volume 113.32 mL    LV Diastolic Volume Index 59.02 mL/m2    AMBER 41.7 mL/m2    LV Mass Index 99 g/m2    RA Major Axis 5.26 cm    Left Atrium Minor Axis 5.27 cm    Left Atrium Major Axis 5.69  cm    Triscuspid Valve Regurgitation Peak Gradient 3 mmHg    RA Width 3.95 cm    Right Atrial Pressure (from IVC) 3 mmHg    EF 60 %    TV resting pulmonary artery pressure 6 mmHg    Narrative    · The left ventricle is normal in size with normal systolic function.  · The estimated ejection fraction is 60%.  · Moderate left atrial enlargement.  · Indeterminate left ventricular diastolic function.  · Normal right ventricular size with normal right ventricular systolic   function.  · Normal central venous pressure (3 mmHg).  · The estimated PA systolic pressure is 6 mmHg.  · There is no pulmonary hypertension.          TIFFANY:  No results found for this or any previous visit.    ASSESSMENT/PLAN:         Pre-op Assessment    I have reviewed the Patient Summary Reports.     I have reviewed the Nursing Notes. I have reviewed the NPO Status.   I have reviewed the Medications.     Review of Systems  Anesthesia Hx:  No problems with previous Anesthesia   History of prior surgery of interest to airway management or planning: facial plastic/reconstructive, tracheostomy, radical neck. Previous anesthesia: General GLOSSECTOMY (Left: Mouth)  DISSECTION, NECK, MODIFIED RADICAL (Left)  TRACHEOTOMY (Neck)  CREATION, FREE FLAP, FOREARM, RADIAL ASPECT (Left)  APPLICATION, GRAFT, SKIN, FULL-THICKNESS (Left: Arm)  EGD, WITH PEG TUBE INSERTION (Abdomen) 11/15/24 with general anesthesia.  Procedure performed at an [facility].      Airway issues documented on chart review include mask, easy, easy direct laryngoscopy , view on direct laryngoscopy Grade I    Denies Family Hx of Anesthesia complications.    Denies Personal Hx of Anesthesia complications.                    Social:  Former Smoker, No Alcohol Use       Hematology/Oncology:  Hematology Normal                     Current/Recent Cancer.                EENT/Dental:   Squamous cell carcinoma of tongue          Cardiovascular:     Hypertension       Denies Angina.      hyperlipidemia    Mobitz type 1 second degree AV block   Functional Capacity good / => 4 METS                         Pulmonary:       Denies Shortness of breath.  Denies Recent URI.                 Renal/:  Renal/ Normal                 Hepatic/GI:     GERD   Nutrition through PEG             Musculoskeletal:     s/p lumbar fusion       Spine Disorders: lumbar            Neurological:  Neurology Normal                                      Psych:  Psychiatric Normal                    Physical Exam  General: Alert, Cooperative, Well nourished and Oriented  Well healing left forearm graft site  Airway:  Mallampati: I   Mouth Opening: Normal  TM Distance: Normal  Neck ROM: Extension Decreased, Flexion Decreased  Pre-Existing Airway: Tracheostomy tube    Dental:  Edentulous    Chest/Lungs:  Clear to auscultation    Heart:  Rate: Normal  Rhythm: Regular Rhythm        Anesthesia Plan  Type of Anesthesia, risks & benefits discussed:    Anesthesia Type: Gen ETT  Intra-op Monitoring Plan: Standard ASA Monitors  Post Op Pain Control Plan: multimodal analgesia and IV/PO Opioids PRN  Induction:  IV  Airway Plan: Direct, Video, Fiberoptic and Via Tracheostomy, Post-Induction  Informed Consent: Informed consent signed with the Patient and all parties understand the risks and agree with anesthesia plan.  All questions answered.   ASA Score: 3  Day of Surgery Review of History & Physical: H&P Update referred to the surgeon/provider.    Ready For Surgery From Anesthesia Perspective.     .

## 2025-01-24 ENCOUNTER — HOSPITAL ENCOUNTER (OUTPATIENT)
Facility: HOSPITAL | Age: 85
Discharge: HOME | End: 2025-01-24
Attending: OTOLARYNGOLOGY | Admitting: OTOLARYNGOLOGY
Payer: MEDICARE

## 2025-01-24 ENCOUNTER — ANESTHESIA (OUTPATIENT)
Dept: SURGERY | Facility: HOSPITAL | Age: 85
End: 2025-01-24
Payer: MEDICARE

## 2025-01-24 ENCOUNTER — TELEPHONE (OUTPATIENT)
Dept: OTOLARYNGOLOGY | Facility: CLINIC | Age: 85
End: 2025-01-24
Payer: MEDICARE

## 2025-01-24 VITALS
SYSTOLIC BLOOD PRESSURE: 129 MMHG | BODY MASS INDEX: 26.8 KG/M2 | DIASTOLIC BLOOD PRESSURE: 60 MMHG | HEIGHT: 64 IN | TEMPERATURE: 98 F | OXYGEN SATURATION: 95 % | HEART RATE: 77 BPM | RESPIRATION RATE: 27 BRPM | WEIGHT: 157 LBS

## 2025-01-24 DIAGNOSIS — C02.9 SQUAMOUS CELL CANCER OF TONGUE: ICD-10-CM

## 2025-01-24 DIAGNOSIS — Z93.0 TRACHEOSTOMY IN PLACE: Primary | ICD-10-CM

## 2025-01-24 DIAGNOSIS — J98.8 AIRWAY OBSTRUCTION: ICD-10-CM

## 2025-01-24 PROCEDURE — 71000016 HC POSTOP RECOV ADDL HR: Performed by: OTOLARYNGOLOGY

## 2025-01-24 PROCEDURE — 71000015 HC POSTOP RECOV 1ST HR: Performed by: OTOLARYNGOLOGY

## 2025-01-24 PROCEDURE — 31526 DX LARYNGOSCOPY W/OPER SCOPE: CPT | Mod: 58,,, | Performed by: OTOLARYNGOLOGY

## 2025-01-24 PROCEDURE — 25000003 PHARM REV CODE 250

## 2025-01-24 PROCEDURE — 25000003 PHARM REV CODE 250: Performed by: OTOLARYNGOLOGY

## 2025-01-24 PROCEDURE — 36000709 HC OR TIME LEV III EA ADD 15 MIN: Performed by: OTOLARYNGOLOGY

## 2025-01-24 PROCEDURE — D9220A PRA ANESTHESIA: Mod: ,,, | Performed by: ANESTHESIOLOGY

## 2025-01-24 PROCEDURE — 63600175 PHARM REV CODE 636 W HCPCS

## 2025-01-24 PROCEDURE — 37000008 HC ANESTHESIA 1ST 15 MINUTES: Performed by: OTOLARYNGOLOGY

## 2025-01-24 PROCEDURE — 27201423 OPTIME MED/SURG SUP & DEVICES STERILE SUPPLY: Performed by: OTOLARYNGOLOGY

## 2025-01-24 PROCEDURE — 71000044 HC DOSC ROUTINE RECOVERY FIRST HOUR: Performed by: OTOLARYNGOLOGY

## 2025-01-24 PROCEDURE — 36000708 HC OR TIME LEV III 1ST 15 MIN: Performed by: OTOLARYNGOLOGY

## 2025-01-24 PROCEDURE — 37000009 HC ANESTHESIA EA ADD 15 MINS: Performed by: OTOLARYNGOLOGY

## 2025-01-24 RX ORDER — HYDROMORPHONE HYDROCHLORIDE 1 MG/ML
0.2 INJECTION, SOLUTION INTRAMUSCULAR; INTRAVENOUS; SUBCUTANEOUS EVERY 5 MIN PRN
Status: DISCONTINUED | OUTPATIENT
Start: 2025-01-24 | End: 2025-01-24 | Stop reason: HOSPADM

## 2025-01-24 RX ORDER — FENTANYL CITRATE 50 UG/ML
INJECTION, SOLUTION INTRAMUSCULAR; INTRAVENOUS
Status: DISCONTINUED | OUTPATIENT
Start: 2025-01-24 | End: 2025-01-24

## 2025-01-24 RX ORDER — SODIUM CHLORIDE 0.9 % (FLUSH) 0.9 %
10 SYRINGE (ML) INJECTION
Status: DISCONTINUED | OUTPATIENT
Start: 2025-01-24 | End: 2025-01-24 | Stop reason: HOSPADM

## 2025-01-24 RX ORDER — LIDOCAINE HYDROCHLORIDE 10 MG/ML
1 INJECTION, SOLUTION EPIDURAL; INFILTRATION; INTRACAUDAL; PERINEURAL ONCE
Status: DISCONTINUED | OUTPATIENT
Start: 2025-01-24 | End: 2025-01-24 | Stop reason: HOSPADM

## 2025-01-24 RX ORDER — PHENYLEPHRINE HCL IN 0.9% NACL 1 MG/10 ML
SYRINGE (ML) INTRAVENOUS
Status: DISCONTINUED | OUTPATIENT
Start: 2025-01-24 | End: 2025-01-24

## 2025-01-24 RX ORDER — ROCURONIUM BROMIDE 10 MG/ML
INJECTION, SOLUTION INTRAVENOUS
Status: DISCONTINUED | OUTPATIENT
Start: 2025-01-24 | End: 2025-01-24

## 2025-01-24 RX ORDER — CEFAZOLIN 2 G/1
2 INJECTION, POWDER, FOR SOLUTION INTRAMUSCULAR; INTRAVENOUS
Status: DISCONTINUED | OUTPATIENT
Start: 2025-01-24 | End: 2025-01-24 | Stop reason: HOSPADM

## 2025-01-24 RX ORDER — GLUCAGON 1 MG
1 KIT INJECTION
Status: DISCONTINUED | OUTPATIENT
Start: 2025-01-24 | End: 2025-01-24 | Stop reason: HOSPADM

## 2025-01-24 RX ORDER — OXYCODONE HYDROCHLORIDE 5 MG/1
5 TABLET ORAL
Status: DISCONTINUED | OUTPATIENT
Start: 2025-01-24 | End: 2025-01-24 | Stop reason: HOSPADM

## 2025-01-24 RX ORDER — PROPOFOL 10 MG/ML
VIAL (ML) INTRAVENOUS
Status: DISCONTINUED | OUTPATIENT
Start: 2025-01-24 | End: 2025-01-24

## 2025-01-24 RX ORDER — OXYMETAZOLINE HCL 0.05 %
SPRAY, NON-AEROSOL (ML) NASAL
Status: DISCONTINUED | OUTPATIENT
Start: 2025-01-24 | End: 2025-01-24 | Stop reason: HOSPADM

## 2025-01-24 RX ORDER — DEXAMETHASONE SODIUM PHOSPHATE 4 MG/ML
INJECTION, SOLUTION INTRA-ARTICULAR; INTRALESIONAL; INTRAMUSCULAR; INTRAVENOUS; SOFT TISSUE
Status: DISCONTINUED | OUTPATIENT
Start: 2025-01-24 | End: 2025-01-24

## 2025-01-24 RX ORDER — LIDOCAINE HYDROCHLORIDE 20 MG/ML
INJECTION, SOLUTION EPIDURAL; INFILTRATION; INTRACAUDAL; PERINEURAL
Status: DISCONTINUED | OUTPATIENT
Start: 2025-01-24 | End: 2025-01-24

## 2025-01-24 RX ORDER — PROCHLORPERAZINE EDISYLATE 5 MG/ML
5 INJECTION INTRAMUSCULAR; INTRAVENOUS EVERY 30 MIN PRN
Status: DISCONTINUED | OUTPATIENT
Start: 2025-01-24 | End: 2025-01-24 | Stop reason: HOSPADM

## 2025-01-24 RX ADMIN — DEXAMETHASONE SODIUM PHOSPHATE 4 MG: 4 INJECTION INTRA-ARTICULAR; INTRALESIONAL; INTRAMUSCULAR; INTRAVENOUS; SOFT TISSUE at 07:01

## 2025-01-24 RX ADMIN — FENTANYL CITRATE 25 MCG: 50 INJECTION, SOLUTION INTRAMUSCULAR; INTRAVENOUS at 07:01

## 2025-01-24 RX ADMIN — ROCURONIUM BROMIDE 30 MG: 10 INJECTION INTRAVENOUS at 07:01

## 2025-01-24 RX ADMIN — Medication 200 MCG: at 07:01

## 2025-01-24 RX ADMIN — PROPOFOL 150 MG: 10 INJECTION, EMULSION INTRAVENOUS at 07:01

## 2025-01-24 RX ADMIN — LIDOCAINE HYDROCHLORIDE 60 MG: 20 INJECTION, SOLUTION EPIDURAL; INFILTRATION; INTRACAUDAL at 07:01

## 2025-01-24 RX ADMIN — SUGAMMADEX 400 MG: 100 INJECTION, SOLUTION INTRAVENOUS at 07:01

## 2025-01-24 RX ADMIN — SODIUM CHLORIDE: 0.9 INJECTION, SOLUTION INTRAVENOUS at 07:01

## 2025-01-24 NOTE — OP NOTE
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 man known to me for recent history of squamous cell carcinoma of the oral tongue status post resection and reconstruction with radial forearm free flap.  He underwent tracheostomy the time of surgery.  He has been wearing his occlusive cap and tolerating his capping trial.  He was noted to have some increased work of breathing on exam.  Flexible laryngoscopy revealed limited range of motion on abduction of the bilateral true vocal folds.  Given the above, I recommended that we proceed to the operating room for examination under anesthesia before proceeding with decannulation.    He was apprised of the risks, benefits and alternatives to surgery.  In spite of the risk inherent to surgery,She provided informed consent for the aforementioned procedures.     PROCEDURE IN DETAIL:  The patient was taken to the operating room and placed on the operating table in the supine position.  General endotracheal anesthesia was induced by the anesthesia team.     The operating table was rotated 90° to the right.  The upper gingiva were protected with a Ray-Valentina sponge.  The larynx was then exposed with a Dedo laryngoscope.  It was suspended from the Lewy suspension device.  Examination was carried out utilizing the 0 degree telescope.  There were no mucosal lesions noted.  On palpation of  the cricoarytenoid joints, there was some limitation of abduction.  There was a dense scar band noted in the posterior glottis across the arytenoid mucosa.  This was consistent with posterior glottic stenosis. The subglottis and cervical trachea were narrowed by approximately 30%.  He was awakened at this time.  The plan would be to refer him to our laryngologist for further evaluation.    There were no intraoperative complications.  I was present for and participated in the entire procedure as dictated above.       ESTIMATED BLOOD LOSS:  Minimal    SPECIMENS:   Specimen (24h ago, onward)      None

## 2025-01-24 NOTE — CARE UPDATE
Asked to examine G tube for concerns of epithelialization around insertion site and residual matter consolidated in tube itself. The G tube was placed on 11/15/2024 with Dr. Chavez. Spoke with patient and his wife. Their main concern was gradual accumulation of matter within the tubing. Otherwise, they have had no issues with it and state is has been completely functional. They have been flushing it several times a day with warm water. There has been minimal leaking at home. On my exam, there is a small amount of epithelialized tissue surrounding the insertion site and no leakage. Encouraged patient and wife to continue flushing the tube several times a day, especially after running tube feeds, and that they may consider flushing with Diet Coke as well. They know to follow up with the General Surgery clinic should they have any issues regarding the G-tube.    Mike Gilliland MD   General Surgery PGY-1

## 2025-01-24 NOTE — PLAN OF CARE
Patient A/O x4. Instructions given to patient and daughter at bedside. All questions answered. IV removed. Patient ready for discharge. Patient's oxygen saturation returned to normal on room air.

## 2025-01-24 NOTE — BRIEF OP NOTE
[facility] - Surgery ([facility])  Brief Operative Note    Surgery Date: 1/24/2025     Surgeons and Role:     * Cristhian Bailon MD - Primary     * Jimy Montalvo MD - Resident - Chief    Assisting Surgeon: None    Pre-op Diagnosis:  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]    Post-op Diagnosis:  Post-Op Diagnosis Codes:     * 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]    Procedure(s) (LRB):  LARYNGOSCOPY (N/A)    Anesthesia: General    Operative Findings: see full op note    Estimated Blood Loss: * No values recorded between 1/24/2025  7:32 AM and 1/24/2025  7:50 AM *         Specimens:   Specimen (24h ago, onward)      None              Discharge Note    OUTCOME: Patient tolerated treatment/procedure well without complication and is now ready for discharge.    DISPOSITION: Home or Self Care    FINAL DIAGNOSIS:  Tracheostomy in place    FOLLOWUP: In clinic    DISCHARGE INSTRUCTIONS:    Discharge Procedure Orders   Diet Adult Regular     Lifting restrictions     No dressing needed     Notify your health care provider if you experience any of the following:  temperature >100.4     Notify your health care provider if you experience any of the following:  persistent nausea and vomiting or diarrhea     Notify your health care provider if you experience any of the following:  severe uncontrolled pain     Notify your health care provider if you experience any of the following:  redness, tenderness, or signs of infection (pain, swelling, redness, odor or green/yellow discharge around incision site)     Notify your health care provider if you experience any of the following:  difficulty breathing or increased cough     Notify your health care provider if you experience any of the following:  severe persistent headache     Notify your health care provider if you experience any of the following:  worsening rash     Notify your health care provider if you experience any of the following:  persistent dizziness,  light-headedness, or visual disturbances     Notify your health care provider if you experience any of the following:  increased confusion or weakness

## 2025-01-24 NOTE — TRANSFER OF CARE
"Anesthesia Transfer of Care Note    Patient: Fan Paz    Procedure(s) Performed: Procedure(s) (LRB):  LARYNGOSCOPY (N/A)    Patient location: [facility]    Anesthesia Type: general    Transport from OR: Transported from OR on 6-10 L/min O2 by face mask with adequate spontaneous ventilation    Post pain: adequate analgesia    Post assessment: no apparent anesthetic complications and tolerated procedure well    Post vital signs: stable    Level of consciousness: awake    Nausea/Vomiting: no nausea/vomiting    Complications: none    Transfer of care protocol was followed      Last vitals: Visit Vitals  BP (!) 149/73   Pulse 70   Temp 36.9 °C (98.4 °F) (Temporal)   Resp 20   Ht 5' 4" (1.626 m)   Wt 71.2 kg (157 lb)   SpO2 99%   BMI 26.95 kg/m²     "

## 2025-01-24 NOTE — PROGRESS NOTES
Pt had few episodes of desats to low 80s, waveform not reliable. Pt does not appear in any distress. Pt does report cough. Suctioned trach at bedside. Called respiratory to assess pt, placed on 6 L O2. Notified Anesthesia MD Benjy, not concerned at this time, MD said this is baseline for pt.

## 2025-01-24 NOTE — TELEPHONE ENCOUNTER
Spoke with patients wife, he missed follow up appointment with our office last week. Asked if I could get them schedule do come back in but they said no not yet due hi having surgery today with dr mai. Will schedule when he starts feeling better as per wife

## 2025-01-24 NOTE — ANESTHESIA PROCEDURE NOTES
Intubation    Date/Time: 1/24/2025 7:25 AM    Performed by: Murray Alcantar MD  Authorized by: Winston Burleson MD    Intubation:     Induction:  Intravenous    Intubated:  Postinduction    Mask Ventilation:  Not attempted (pre-oxygenation through tracheostomy)    Attempts:  1    Attempted By:  Resident anesthesiologist    Method of Intubation:  Other (see comments) (intubation through tracheostomy)    Difficult Airway Encountered?: No      Airway Device:  Oral endotracheal tube    Airway Device Size:  6.5    Style/Cuff Inflation:  Cuffed    Secured at:  The skin level of trach    Placement Verified By:  Capnometry    Complicating Factors:  None    Findings Post-Intubation:  BS equal bilateral

## 2025-01-24 NOTE — DISCHARGE INSTRUCTIONS
Please follow up in 2 weeks  Keep your trach in place    DO NOT CALL [facility] ON CALL FOR POSTOPERATIVE PROBLEMS. CALL THE  [phone] AND ASK FOR ENT ON CALL.

## 2025-01-27 NOTE — ANESTHESIA POSTPROCEDURE EVALUATION
Anesthesia Post Evaluation    Patient: Fan Paz    Procedure(s) Performed: Procedure(s) (LRB):  LARYNGOSCOPY (N/A)    Final Anesthesia Type: general      Patient location during evaluation: [facility]  Patient participation: Yes- Able to Participate  Level of consciousness: awake and alert and oriented  Post-procedure vital signs: reviewed and stable  Pain management: adequate  Airway patency: patent    PONV status at discharge: No PONV  Anesthetic complications: no      Cardiovascular status: hemodynamically stable  Respiratory status: unassisted, spontaneous ventilation and room air  Hydration status: euvolemic  Follow-up not needed.              Vitals Value Taken Time   /60 01/24/25 1017   Temp 36.9 °C (98.4 °F) 01/24/25 1025   Pulse 77 01/24/25 1025   Resp 27 01/24/25 1025   SpO2 96 % 01/24/25 1021   Vitals shown include unfiled device data.      No case tracking events are documented in the log.      Pain/Rosales Score: No data recorded

## 2025-01-31 ENCOUNTER — EXTERNAL CHRONIC CARE MANAGEMENT (OUTPATIENT)
Dept: PRIMARY CARE CLINIC | Facility: CLINIC | Age: 85
End: 2025-01-31
Payer: MEDICARE

## 2025-01-31 PROCEDURE — 99490 CHRNC CARE MGMT STAFF 1ST 20: CPT | Mod: PBBFAC,PO | Performed by: FAMILY MEDICINE

## 2025-01-31 PROCEDURE — 99490 CHRNC CARE MGMT STAFF 1ST 20: CPT | Mod: S$PBB,,, | Performed by: FAMILY MEDICINE

## 2025-02-11 ENCOUNTER — PATIENT MESSAGE (OUTPATIENT)
Dept: OTOLARYNGOLOGY | Facility: CLINIC | Age: 85
End: 2025-02-11
Payer: MEDICARE

## 2025-02-12 ENCOUNTER — PATIENT MESSAGE (OUTPATIENT)
Dept: FAMILY MEDICINE | Facility: CLINIC | Age: 85
End: 2025-02-12
Payer: MEDICARE

## 2025-02-13 ENCOUNTER — PATIENT MESSAGE (OUTPATIENT)
Dept: OTOLARYNGOLOGY | Facility: CLINIC | Age: 85
End: 2025-02-13
Payer: MEDICARE

## 2025-02-13 DIAGNOSIS — R53.0 NEOPLASTIC (MALIGNANT) RELATED FATIGUE: ICD-10-CM

## 2025-02-13 DIAGNOSIS — C02.9 SQUAMOUS CELL CANCER OF TONGUE: Primary | ICD-10-CM

## 2025-02-14 ENCOUNTER — OFFICE VISIT (OUTPATIENT)
Dept: OTOLARYNGOLOGY | Facility: CLINIC | Age: 85
End: 2025-02-14
Payer: MEDICARE

## 2025-02-14 ENCOUNTER — LAB VISIT (OUTPATIENT)
Dept: LAB | Facility: HOSPITAL | Age: 85
End: 2025-02-14
Attending: OTOLARYNGOLOGY
Payer: MEDICARE

## 2025-02-14 VITALS
WEIGHT: 162.25 LBS | DIASTOLIC BLOOD PRESSURE: 58 MMHG | BODY MASS INDEX: 27.7 KG/M2 | SYSTOLIC BLOOD PRESSURE: 104 MMHG | HEIGHT: 64 IN

## 2025-02-14 DIAGNOSIS — T66.XXXD ADVERSE EFFECT OF RADIATION, SUBSEQUENT ENCOUNTER: ICD-10-CM

## 2025-02-14 DIAGNOSIS — R53.0 NEOPLASTIC (MALIGNANT) RELATED FATIGUE: ICD-10-CM

## 2025-02-14 DIAGNOSIS — Z92.3 HISTORY OF HEAD AND NECK RADIATION: ICD-10-CM

## 2025-02-14 DIAGNOSIS — H61.21 IMPACTED CERUMEN OF RIGHT EAR: ICD-10-CM

## 2025-02-14 DIAGNOSIS — C02.9 SQUAMOUS CELL CANCER OF TONGUE: Primary | ICD-10-CM

## 2025-02-14 DIAGNOSIS — C02.9 SQUAMOUS CELL CANCER OF TONGUE: ICD-10-CM

## 2025-02-14 DIAGNOSIS — Z93.0 TRACHEOSTOMY TUBE PRESENT: ICD-10-CM

## 2025-02-14 LAB
ALBUMIN SERPL BCP-MCNC: 3.5 G/DL (ref 3.5–5.2)
ALP SERPL-CCNC: 81 U/L (ref 40–150)
ALT SERPL W/O P-5'-P-CCNC: 13 U/L (ref 10–44)
ANION GAP SERPL CALC-SCNC: 7 MMOL/L (ref 8–16)
AST SERPL-CCNC: 16 U/L (ref 10–40)
BASOPHILS # BLD AUTO: 0.03 K/UL (ref 0–0.2)
BASOPHILS NFR BLD: 0.6 % (ref 0–1.9)
BILIRUB SERPL-MCNC: 0.4 MG/DL (ref 0.1–1)
BUN SERPL-MCNC: 26 MG/DL (ref 8–23)
CALCIUM SERPL-MCNC: 9.5 MG/DL (ref 8.7–10.5)
CHLORIDE SERPL-SCNC: 103 MMOL/L (ref 95–110)
CO2 SERPL-SCNC: 30 MMOL/L (ref 23–29)
CREAT SERPL-MCNC: 0.8 MG/DL (ref 0.5–1.4)
DIFFERENTIAL METHOD BLD: ABNORMAL
EOSINOPHIL # BLD AUTO: 0.1 K/UL (ref 0–0.5)
EOSINOPHIL NFR BLD: 2.6 % (ref 0–8)
ERYTHROCYTE [DISTWIDTH] IN BLOOD BY AUTOMATED COUNT: 17.3 % (ref 11.5–14.5)
EST. GFR  (NO RACE VARIABLE): >60 ML/MIN/1.73 M^2
GLUCOSE SERPL-MCNC: 108 MG/DL (ref 70–110)
HCT VFR BLD AUTO: 39.1 % (ref 40–54)
HGB BLD-MCNC: 12 G/DL (ref 14–18)
IMM GRANULOCYTES # BLD AUTO: 0.02 K/UL (ref 0–0.04)
IMM GRANULOCYTES NFR BLD AUTO: 0.4 % (ref 0–0.5)
LYMPHOCYTES # BLD AUTO: 0.7 K/UL (ref 1–4.8)
LYMPHOCYTES NFR BLD: 13.6 % (ref 18–48)
MCH RBC QN AUTO: 26.7 PG (ref 27–31)
MCHC RBC AUTO-ENTMCNC: 30.7 G/DL (ref 32–36)
MCV RBC AUTO: 87 FL (ref 82–98)
MONOCYTES # BLD AUTO: 0.6 K/UL (ref 0.3–1)
MONOCYTES NFR BLD: 11.2 % (ref 4–15)
NEUTROPHILS # BLD AUTO: 3.9 K/UL (ref 1.8–7.7)
NEUTROPHILS NFR BLD: 71.6 % (ref 38–73)
NRBC BLD-RTO: 0 /100 WBC
PLATELET # BLD AUTO: 243 K/UL (ref 150–450)
PMV BLD AUTO: 10.5 FL (ref 9.2–12.9)
POTASSIUM SERPL-SCNC: 4.6 MMOL/L (ref 3.5–5.1)
PROT SERPL-MCNC: 8 G/DL (ref 6–8.4)
RBC # BLD AUTO: 4.49 M/UL (ref 4.6–6.2)
SODIUM SERPL-SCNC: 140 MMOL/L (ref 136–145)
TSH SERPL DL<=0.005 MIU/L-ACNC: 2.7 UIU/ML (ref 0.4–4)
WBC # BLD AUTO: 5.38 K/UL (ref 3.9–12.7)

## 2025-02-14 PROCEDURE — 85025 COMPLETE CBC W/AUTO DIFF WBC: CPT | Performed by: OTOLARYNGOLOGY

## 2025-02-14 PROCEDURE — 84443 ASSAY THYROID STIM HORMONE: CPT | Performed by: OTOLARYNGOLOGY

## 2025-02-14 PROCEDURE — 80053 COMPREHEN METABOLIC PANEL: CPT | Performed by: OTOLARYNGOLOGY

## 2025-02-14 PROCEDURE — 36415 COLL VENOUS BLD VENIPUNCTURE: CPT | Performed by: OTOLARYNGOLOGY

## 2025-02-14 NOTE — PROGRESS NOTES
OTOLARYNGOLOGY CLINIC NOTE  Date:  02/14/2025     Chief complaint:  Chief Complaint   Patient presents with    Follow-up     History of head and neck radiation and Tongue lesion       History of Present Illness  Fan Paz is a 84 y.o. male  presenting today for a followup.  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 Notes in epic reviewed. Family here today ( daughter and wife) to discuss questions they had about his course and plans.     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-notes reviewed      Past Medical History  Past Medical History:   Diagnosis Date    Arthritis     Cancer of palate     GERD (gastroesophageal reflux disease)     HTN (hypertension)     Hyperlipidemia     Prostate cancer     2011    Pulmonary embolism         Past Surgical History  Past Surgical History:   Procedure Laterality Date    BACK SURGERY  y-6    Cancer of palate surgery      Late 90's[place]     CARPAL TUNNEL RELEASE  8-14-13    right hand,Left hand 2013    COLONOSCOPY N/A 4/10/2017    Procedure: COLONOSCOPY;  Surgeon: Nilo Kelly MD;  Location: [facility];  Service: Endoscopy;  Laterality: N/A;    COLONOSCOPY N/A 10/21/2020    Procedure: COLONOSCOPY;  Surgeon: Demetrius Araujo MD;  Location: [facility];  Service: Endoscopy;  Laterality: N/A;    ESOPHAGOGASTRODUODENOSCOPY N/A 5/3/2023    Procedure: EGD (ESOPHAGOGASTRODUODENOSCOPY);  Surgeon: Shauna Lopez MD;  Location: [facility];  Service: Endoscopy;  Laterality: N/A;  EGD (with Dr. Lopez or Dr. Parada), : 1-4 weeks, Provider: Dr. Lopez or Dr. Parada Location: [facility], instructions sent to email address [email or web address]    ESOPHAGOGASTRODUODENOSCOPY W/ PEG N/A 11/15/2024    Procedure: EGD, WITH PEG TUBE INSERTION;  Surgeon: Rodrigo Chavez MD;   Location: [facility] OR 2ND FLR;  Service: General;  Laterality: N/A;    FREE FLAP RADIAL FOREARM Left 11/15/2024    Procedure: CREATION, FREE FLAP, FOREARM, RADIAL ASPECT;  Surgeon: Katina Thornton MD;  Location: [facility] OR [facility] FLR;  Service: General;  Laterality: Left;    GLOSSECTOMY Left 11/15/2024    Procedure: GLOSSECTOMY;  Surgeon: Cristhian Bailon MD;  Location: [facility] OR [facility] FLR;  Service: ENT;  Laterality: Left;    KNEE SURGERY  y-41    left knee    KNEE SURGERY Right 12-1-15    TKR    LARYNGOSCOPY N/A 1/24/2025    Procedure: LARYNGOSCOPY;  Surgeon: Cristhian Bailon MD;  Location: [facility] OR [facility] FLR;  Service: ENT;  Laterality: N/A;    MODIFIED RADICAL NECK DISSECTION Left 11/15/2024    Procedure: DISSECTION, NECK, MODIFIED RADICAL;  Surgeon: Cristhian Bailon MD;  Location: [facility] OR [facility] FLR;  Service: ENT;  Laterality: Left;    Radio active seed Implant      January 2012    REVISION OF KNEE ARTHROPLASTY Left 6/2/2023    Procedure: REVISION, ARTHROPLASTY, KNEE;  Surgeon: Dustin Paul MD;  Location: [facility] OR [facility] FLR;  Service: Orthopedics;  Laterality: Left;    SKIN FULL THICKNESS GRAFT Left 11/15/2024    Procedure: APPLICATION, GRAFT, SKIN, FULL-THICKNESS;  Surgeon: Katina Thornton MD;  Location: [facility] OR [facility] FLR;  Service: General;  Laterality: Left;    TOTAL HIP ARTHROPLASTY  3/2011    TRACHEOTOMY N/A 11/15/2024    Procedure: TRACHEOTOMY;  Surgeon: Cristhian Bailon MD;  [facility];  Service: ENT;  Laterality: N/A;        Medications  Current Outpatient Medications on File Prior to Visit   Medication Sig Dispense Refill    acetaminophen (TYLENOL) 650 MG TbSR Take 1 tablet (650 mg total) by mouth every 8 (eight) hours. 120 tablet 0    azelastine (ASTELIN) 137 mcg (0.1 %) nasal spray 1 spray (137 mcg total) by Nasal route 2 (two) times daily. 30 mL 3    docusate sodium (STOOL SOFTENER ORAL) Take by mouth as needed.      fluticasone propionate (FLONASE) 50 mcg/actuation nasal spray 1  spray (50 mcg total) by Each Nostril route 2 (two) times daily. 9.9 mL 3    HYDROcodone-acetaminophen (NORCO) 5-325 mg per tablet Take 1 tablet by mouth every 6 (six) hours as needed for Pain. 5 tablet 0    losartan (COZAAR) 50 MG tablet Take 1 tablet (50 mg total) by mouth once daily. 90 tablet 3    montelukast (SINGULAIR) 10 mg tablet TAKE 1 TABLET BY MOUTH ONCE DAILY IN THE EVENING 90 tablet 2    multivitamin/iron/folic acid (CENTRUM COMPLETE ORAL) Take 1 tablet by mouth once daily.      oxyCODONE (ROXICODONE) 5 MG immediate release tablet Take 1 tablet (5 mg total) by mouth every 6 (six) hours as needed for Pain (pain refractory to over the counter meds). 12 tablet 0    pantoprazole (PROTONIX) 40 MG tablet Take 1 tablet (40 mg total) by mouth once daily. 90 tablet 3    simvastatin (ZOCOR) 40 MG tablet Take 1 tablet (40 mg total) by mouth every evening. 90 tablet 3    tamsulosin (FLOMAX) 0.4 mg Cap Take 1 capsule by mouth once daily 90 capsule 2    white petrolatum (AQUAPHOR ORIGINAL) 41 % Oint Apply topically 2 (two) times a day. Apply to incision lines twice per day 396 g 0     No current facility-administered medications on file prior to visit.       Review of Systems  Review of Systems   Constitutional:  Negative for weight loss.   HENT:  Positive for ear pain. Negative for ear discharge and sore throat.    Respiratory:  Negative for hemoptysis.    Cardiovascular:  Negative for chest pain.   Neurological:  Negative for headaches.        Social History   reports that he quit smoking about 27 years ago. His smoking use included cigarettes. He started smoking about 47 years ago. He has a 60 pack-year smoking history. He has been exposed to tobacco smoke. He has never used smokeless tobacco. He reports that he does not currently use alcohol. He reports that he does not use drugs.     Family History  Family History   Problem Relation Name Age of Onset    No Known Problems Mother      Esophageal cancer Father       Coronary artery disease Father              Cancer Sister          Liver Cancer -    Diabetes Sister              No Known Problems Sister      No Known Problems Sister      Lung cancer Sister Tessa         Alive    Breast cancer Sister Tessa     Heart disease Sister Mireille     No Known Problems Brother      No Known Problems Brother      Lung cancer Brother mel 60        - was a tobacco user, had lung cancer    Other (Lung cancer) Brother mel     Stroke Brother Ronak             No Known Problems Maternal Aunt      No Known Problems Maternal Uncle      No Known Problems Paternal Aunt      No Known Problems Paternal Uncle      No Known Problems Maternal Grandmother      No Known Problems Maternal Grandfather      No Known Problems Paternal Grandmother      No Known Problems Paternal Grandfather      Glaucoma Cousin      Macular degeneration Neg Hx      Strabismus Neg Hx      Amblyopia Neg Hx      Blindness Neg Hx      Cataracts Neg Hx      Hypertension Neg Hx      Retinal detachment Neg Hx      Thyroid disease Neg Hx      Colon cancer Neg Hx          Physical Exam   Vitals:    25 0954   BP: (!) 104/58    Body mass index is 27.85 kg/m².            GENERAL: no acute distress.  HEAD: normocephalic.   EYES: No scleral icterus  EARS: external ear without lesion, normal pinna shape and position. Left  External auditory canal with normal cerumen, tympanic membrane fully visible, no perforation , no retraction. No middle ear effusion. Ossicles intact. Right cerumen impaction   NOSE: external nose without significant bony abnormality  ORAL CAVITY/OROPHARYNX: tongue mobile. Flap well healed ; soft  NECK: trachea midline. Tracheostomy tube  in place with cap on ; left superior stoma with small amount of granulation tissue  LYMPH NODES:No cervical lymphadenopathy. Post treatment changes of neck  RESPIRATORY: no stridor, no stertor. Voice slightly garbled sounding , able to phonate with cap  on Respirations nonlabored.  ABDOMEN: g tube site clean with moderate granulation tissue, no skin breakdown  NEURO: alert, responds to questions appropriately.    PSYCH:mood appropriate    Procedure Note   Procedure performed:examination of ears with cerumen disimpaction    Indication for procedure: uniilateral cerumen impaction     Description of procedure:  After verbal consent was obtained and with the patient in seated position and the operating head otoscope was inserted into the right ear.  Otologic instruments including various size otologic suctions and curette were used to remove the cerumen from the right external auditory canals under visualization with the operating head otoscope. After cleaning, visualization was again performed  of the ear canal, tympanic membrane, ossicles and middle ear space.  Findings as indicated below. All portions of the procedure and examination were tolerated well without complication.     Findings:  Right ear: Complete cerumen impaction removed entirely revealing normal external auditory canal; tympanic membrane without bulging, retraction, or perforation; no evidence of middle ear fluid or effusion.       Imaging:  The patient does not have any new imaging of the head and neck since last visit.     Labs:  CBC  Recent Labs   Lab 11/19/24  0414 11/21/24 2108 12/05/24  1634   WBC 8.27 6.62 8.25   Hemoglobin 10.4 L 10.7 L 11.9 L   Hematocrit 32.3 L 34.0 L 36.9 L   MCV 85 87 84   Platelets 187 223 379     BMP  Recent Labs   Lab 11/18/24  0324 11/19/24  0414 11/21/24 2108 12/05/24  1634   Glucose 111 H 123 H 115 H 103   Sodium 138 140 143 135 L   Potassium 3.6 3.9 3.9 5.1   Chloride 106 106 111 H 100   CO2 24 25 23 26   BUN 17 20 22 23   Creatinine 0.8 0.8 0.8 0.9   Calcium 8.6 L 8.4 L 8.8 9.4   Phosphorus 1.8 L 2.2 L 2.2 L  --    Magnesium 2.3 2.3 2.3  --      COAGS  Recent Labs   Lab 05/10/23  1210   INR 1.0       Assessment  1. Squamous cell cancer of tongue    2. History of  head and neck radiation    3. Impacted cerumen of right ear    4. Adverse effect of radiation, subsequent encounter    5. Tracheostomy tube present       Plan:  Discussed plan of care with patient in detail and all questions answered. Patient reported understanding of plan of care. I gave the patient the opportunity to ask questions and patient confirmed all questions answered to satisfaction.     Answered questions regarding dressing change for feeding tube ; counseled them about granulation tissue and mucoid drainage that can occur  Answered questions regarding tracheostomy tube care. Reviewed operative notes in record and discussed about concern for posterior glottic stenosis potential and that would defer to dr hawk to discuss with patient and family about potential laryngeal issues and whether or not he would be able to get tracheostomy removed. Discussed that he may need to keep tracheostomy tube permanently but would defer this based on dr hawk assessment and recommendations and did not want to give them incorrect information regarding his specific case but that there are scenarios where a tracheostomy tube needs to be in place permanently and some scenarios that it can be removed.   Reviewed with them about staging of cancer and how this is determined ( stage 2 - T2N0M0, depth of invasion 9 mm)  I have reached out to dr mai and dr wilson - I am assuming there has been misunderstanding about him needing to still be NPO based on epic message from dr mai regarding feeding tube to stay in until eating enough by mouth but family states that they have been told ( since hospitalization at a subsequent visit) that he needed to be NPO. I am unable to find that documented anywhere  He is aware of pet/ct scheduled   Discussed with family that after cleared by dr mai to be followed up in surveillence clinic can see me if desires   Will set him up for follow up for this - if he ends up following up in clinic  with dr mai to review pet can push back my apptmt     F/u 7-8 weeks with flex     I spent a total of 30 minutes on the day of the visit this does not include procedure time which was additional 5 minutes for a total visit time of 35 minutes. Over 50% of this time was face to face.  This includes face to face time and non-face to face time preparing to see the patient (eg, review of tests), obtaining and/or reviewing separately obtained history, documenting clinical information in the electronic or other health record, independently interpreting results and communicating results to the patient/family/caregiver, or care coordinator.   Please be aware that this note has been generated with the assistance of [email or web address] voice-to-text.  Please excuse any spelling or grammatical errors.

## 2025-02-16 ENCOUNTER — RESULTS FOLLOW-UP (OUTPATIENT)
Dept: OTOLARYNGOLOGY | Facility: CLINIC | Age: 85
End: 2025-02-16

## 2025-02-17 ENCOUNTER — TELEPHONE (OUTPATIENT)
Dept: OTOLARYNGOLOGY | Facility: CLINIC | Age: 85
End: 2025-02-17
Payer: MEDICARE

## 2025-02-17 DIAGNOSIS — R13.14 PHARYNGOESOPHAGEAL DYSPHAGIA: ICD-10-CM

## 2025-02-17 DIAGNOSIS — Z85.819 HISTORY OF ORAL CANCER: ICD-10-CM

## 2025-02-17 DIAGNOSIS — Z92.3 HISTORY OF HEAD AND NECK RADIATION: Primary | ICD-10-CM

## 2025-02-17 NOTE — TELEPHONE ENCOUNTER
Spoke with pt wife,informed per Dr Quick pt to do MBSS before eating by mouth. Appt scheduled through Soraida Liang for 02/24/2025 a t2pm, informed Wife of appt, verb understanding

## 2025-02-18 ENCOUNTER — TELEPHONE (OUTPATIENT)
Dept: SPEECH THERAPY | Facility: HOSPITAL | Age: 85
End: 2025-02-18
Payer: MEDICARE

## 2025-02-18 ENCOUNTER — PATIENT MESSAGE (OUTPATIENT)
Dept: OTOLARYNGOLOGY | Facility: CLINIC | Age: 85
End: 2025-02-18
Payer: MEDICARE

## 2025-02-18 NOTE — TELEPHONE ENCOUNTER
Sw pt spouse to inform of scheduled mbss 2/24@2. Informed to fast 2hrs before appt [facility] radiology clinic.states understanding

## 2025-02-24 ENCOUNTER — HOSPITAL ENCOUNTER (OUTPATIENT)
Dept: RADIOLOGY | Facility: HOSPITAL | Age: 85
Discharge: HOME | End: 2025-02-24
Attending: OTOLARYNGOLOGY
Payer: MEDICARE

## 2025-02-24 ENCOUNTER — CLINICAL SUPPORT (OUTPATIENT)
Dept: SPEECH THERAPY | Facility: HOSPITAL | Age: 85
End: 2025-02-24
Attending: OTOLARYNGOLOGY
Payer: MEDICARE

## 2025-02-24 DIAGNOSIS — Z92.3 HISTORY OF HEAD AND NECK RADIATION: ICD-10-CM

## 2025-02-24 DIAGNOSIS — Z85.819 HISTORY OF ORAL CANCER: ICD-10-CM

## 2025-02-24 DIAGNOSIS — R13.14 PHARYNGOESOPHAGEAL DYSPHAGIA: ICD-10-CM

## 2025-02-24 DIAGNOSIS — R13.12 DYSPHAGIA, OROPHARYNGEAL: Primary | ICD-10-CM

## 2025-02-24 PROCEDURE — 25500020 PHARM REV CODE 255: Performed by: OTOLARYNGOLOGY

## 2025-02-24 PROCEDURE — 74230 X-RAY XM SWLNG FUNCJ C+: CPT | Mod: TC

## 2025-02-24 PROCEDURE — 74230 X-RAY XM SWLNG FUNCJ C+: CPT | Mod: 26,,, | Performed by: RADIOLOGY

## 2025-02-24 PROCEDURE — 92611 MOTION FLUOROSCOPY/SWALLOW: CPT | Mod: GN | Performed by: SPEECH-LANGUAGE PATHOLOGIST

## 2025-02-24 PROCEDURE — A9698 NON-RAD CONTRAST MATERIALNOC: HCPCS | Performed by: OTOLARYNGOLOGY

## 2025-02-24 RX ADMIN — BARIUM SULFATE 40 ML: 0.81 POWDER, FOR SUSPENSION ORAL at 02:02

## 2025-02-24 NOTE — Clinical Note
Anusha Phillips -- I am happy he was NPO.  There is a lot going on (VPD and inconsistent tolerance of the trach cap giving him two holes in his system; effects of the hemiglossectomy and likely late RAD; and even his kyphosis).  I'm going to see if they can do virtual visits since they live in [place] (or see him here as needed).  Apparently his kyphosis is long-standing.  If you think PT could help that, better posture would likely be a benefit to his swallowing.  In the meantime, I recommend staying NPO.  Please let me know if you have questions about the study. Soraida

## 2025-02-25 ENCOUNTER — DOCUMENTATION ONLY (OUTPATIENT)
Dept: OTOLARYNGOLOGY | Facility: CLINIC | Age: 85
End: 2025-02-25
Payer: MEDICARE

## 2025-02-25 NOTE — PLAN OF CARE
IMPRESSIONS:   This 84 y.o. man appears to present with  1.  Significant oropharyngeal dysphagia characterized by reduced lingual ROM, reduced bolus control (per some pooling in the oral cavity), premature spillage to the valleculae, apparent velopharyngeal dysfunction, reduced pharyngeal contraction, reduced hyolaryngeal elevation/excursion, incomplete epiglottic inversion, incomplete and/or inconsistent laryngeal vestibule closure, and inconsistent tolerance of trach capping during swallowing resulting in laryngeal penetration to the TVFs with no spontaneous cough response.  Risk of aspiration is considered high at this time.  The dysphagia associated with the hemiglossectomy is compounded by now dual sources of reduced pressures related to inconsistent tolerance of trach capping during swallowing and velopharyngeal dysfunction.  2.  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 Bailon (DL, 1/24/25).  3.  PEG dependent.  4.  Trach dependent.  5.  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  6.  History  SCC of the L oral tongue.  7.  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  8.  History mild-moderate dysphagia per history s/p #9.  Took a minced and moist diet with thin liquids per report.  Cannot rule out late RAD at this point in his course following #9.  9.  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      RECOMMENDATIONS/PLAN OF CARE:   It is felt that Mr. Paz would benefit from  1.  Remaining NPO for his primary nutrition, hydration, and medication delivery.  2.  ST on a 1-2x/week basis with a home program for 6-8 weeks to address dysphagia and articulation. (This portion of the POC expires 4/30/25.)  3.  Consider PT to determine if any improvement in posture could be achieved as this may benefit swallowing function.  4.  Continued f/u with Marcos Quick and  "[facility] as directed.  5.  Monitor for readiness to repeat MBSS.      Long-term goals:  Mr. Paz will be able to   Consume at least one consistency for pleasure with no to limited signs/symptoms of aspiration.  Have improved speech intelligibility.      Short-term objectives:  Mr. Paz will perform the following with % consistency/accuracy .  1.  Swallowing:  A.  Demonstrate the following swallowing exercises to clinician:  Effortful swallow  Supraglottic swallow  B.  Perform home program until fatigued 2x/daily.  C.  Perform oral cleaning prior to any PO trials.  D.  Advance diet  5-mL plain water  7.5-mL plain water  5-mL nectar-thick liquid  3- to 5-mL thin puree  3- to 5-mL minced/moist solid  E.  Wear trach cap during all swallows, with and without nutrition (may remove in between).  2.  Oral motor   A.  Protrude tongue tip to external border of lower lip.   B.  Lateralize tongue tip to L and R oral commissures.   C.  Elevate tongue tip to alveolar ridge.   D.  Elevate tongue dorsum to velum.    E.  Trace mandibular and maxillary arches with tongue tip; hold 5 seconds at greatest excursion achieved.  3.  Speech:  Produced target phonemes in isolation, words, phrases, sentences, and conversational speech.  Targets include, but are not limited to /t/, /d/, /k/, /g/, /s/, z/, "sh,", "ch," "j," /r/, "er" and its variants, and blends associated with these phonemes.    "

## 2025-02-28 ENCOUNTER — EXTERNAL CHRONIC CARE MANAGEMENT (OUTPATIENT)
Dept: PRIMARY CARE CLINIC | Facility: CLINIC | Age: 85
End: 2025-02-28
Payer: MEDICARE

## 2025-02-28 PROCEDURE — 99490 CHRNC CARE MGMT STAFF 1ST 20: CPT | Mod: PBBFAC,PO | Performed by: FAMILY MEDICINE

## 2025-03-05 ENCOUNTER — TELEPHONE (OUTPATIENT)
Dept: FAMILY MEDICINE | Facility: CLINIC | Age: 85
End: 2025-03-05
Payer: MEDICARE

## 2025-03-05 ENCOUNTER — TELEPHONE (OUTPATIENT)
Dept: SPEECH THERAPY | Facility: HOSPITAL | Age: 85
End: 2025-03-05
Payer: MEDICARE

## 2025-03-05 NOTE — TELEPHONE ENCOUNTER
Luis Alberto pt spouse to arrange virtual visit 3/13@8am. Aware virtual may stop at 3/31 ok with coming in office.

## 2025-03-05 NOTE — TELEPHONE ENCOUNTER
----- Message from Naresh sent at 3/5/2025 10:02 AM CST -----  Regarding: self  Type: Sooner Appointment RequestPatient is requesting a sooner appointment. Patient declined first available appointment listed as well as another facility and provider. Patient will not accept being placed on the waitlist and is requesting a message to be sent to the doctor.Name of caller:selfWhne is the first available appointment:patient had an appt today for 9:30 but stuck in traffic over an hour and needs to be seen today. Symptoms:hospital f/uWould the patient rather a call back or a response via [facility]?callBest call back number:[phone] or [phone] Information:

## 2025-03-06 ENCOUNTER — HOSPITAL ENCOUNTER (OUTPATIENT)
Dept: RADIOLOGY | Facility: HOSPITAL | Age: 85
Discharge: HOME | End: 2025-03-06
Attending: OTOLARYNGOLOGY
Payer: MEDICARE

## 2025-03-06 ENCOUNTER — TELEPHONE (OUTPATIENT)
Dept: SPEECH THERAPY | Facility: HOSPITAL | Age: 85
End: 2025-03-06
Payer: MEDICARE

## 2025-03-06 DIAGNOSIS — C02.9 SQUAMOUS CELL CANCER OF TONGUE: ICD-10-CM

## 2025-03-06 LAB — POCT GLUCOSE: 86 MG/DL (ref 70–110)

## 2025-03-06 PROCEDURE — 78815 PET IMAGE W/CT SKULL-THIGH: CPT | Mod: 26,PS,, | Performed by: STUDENT IN AN ORGANIZED HEALTH CARE EDUCATION/TRAINING PROGRAM

## 2025-03-06 PROCEDURE — 78815 PET IMAGE W/CT SKULL-THIGH: CPT | Mod: TC

## 2025-03-06 PROCEDURE — A9552 F18 FDG: HCPCS | Performed by: OTOLARYNGOLOGY

## 2025-03-06 RX ORDER — FLUDEOXYGLUCOSE F18 500 MCI/ML
13.86 INJECTION INTRAVENOUS
Status: COMPLETED | OUTPATIENT
Start: 2025-03-06 | End: 2025-03-06

## 2025-03-06 RX ADMIN — FLUDEOXYGLUCOSE F-18 13.86 MILLICURIE: 500 INJECTION INTRAVENOUS at 09:03

## 2025-03-06 NOTE — TELEPHONE ENCOUNTER
Luis Alberto pt to inform appt changed to inperson visit 3.13@8am.----- Message from Page sent at 3/6/2025 10:05 AM CST -----  Regarding: appt  Contact: [phone]  .Type:  Needs Medical AdviceWho Called: spouse , BettySymptoms (please be specific): asking to have appt dated 3/13 to be switched to in person  . Attempted to r/s, wouldn't let me change it Would the patient rather a call back or a response via [facility]? Call Best Call Back Number: [phone] Information: n/a

## 2025-03-07 ENCOUNTER — PATIENT MESSAGE (OUTPATIENT)
Dept: FAMILY MEDICINE | Facility: CLINIC | Age: 85
End: 2025-03-07
Payer: MEDICARE

## 2025-03-12 ENCOUNTER — TELEPHONE (OUTPATIENT)
Dept: OTOLARYNGOLOGY | Facility: CLINIC | Age: 85
End: 2025-03-12
Payer: MEDICARE

## 2025-03-12 NOTE — TELEPHONE ENCOUNTER
"----- Message from Wendi Quick MD sent at 3/11/2025  6:56 PM CDT -----  Regarding: FW: swallowing study results update- thoughts?  Ladarius wants to hold diet- just wanted to update you on his reply in case family reaches out  ----- Message -----  From: Cristhian Bailon MD  Sent: 3/9/2025  10:40 AM CDT  To: Wendi Quick MD  Subject: RE: swallowing study results update- thought#    Let's hold off.  ----- Message -----  From: Wendi Quick MD  Sent: 3/5/2025   6:09 PM CDT  To: Cristhian Bailon MD  Subject: swallowing study results update- thoughts?       He saw alphonso I am copying her recs/findings below" incomplete and/or inconsistent laryngeal vestibule closure, and inconsistent tolerance of trach capping during swallowing resulting in laryngeal penetration to the TVFs with no spontaneous cough response.  Risk of aspiration is considered high at this time1.  Remaining NPO for his primary nutrition, hydration, and medication delivery.2.  ST on a 1-2x/week basis with a home program for 6-8 weeks to address dysphagia and articulation. (This portion of the POC expires 4/30/25.)3.  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

## 2025-03-13 ENCOUNTER — TELEPHONE (OUTPATIENT)
Dept: SPEECH THERAPY | Facility: HOSPITAL | Age: 85
End: 2025-03-13
Payer: MEDICARE

## 2025-03-18 ENCOUNTER — PATIENT MESSAGE (OUTPATIENT)
Dept: OTOLARYNGOLOGY | Facility: CLINIC | Age: 85
End: 2025-03-18
Payer: MEDICARE

## 2025-03-19 ENCOUNTER — CLINICAL SUPPORT (OUTPATIENT)
Dept: SPEECH THERAPY | Facility: HOSPITAL | Age: 85
End: 2025-03-19
Payer: MEDICARE

## 2025-03-19 ENCOUNTER — TELEPHONE (OUTPATIENT)
Dept: SURGERY | Facility: CLINIC | Age: 85
End: 2025-03-19
Payer: MEDICARE

## 2025-03-19 ENCOUNTER — TELEPHONE (OUTPATIENT)
Dept: OTOLARYNGOLOGY | Facility: CLINIC | Age: 85
End: 2025-03-19
Payer: MEDICARE

## 2025-03-19 DIAGNOSIS — Z92.3 HISTORY OF HEAD AND NECK RADIATION: ICD-10-CM

## 2025-03-19 DIAGNOSIS — Z85.819 HISTORY OF ORAL CANCER: ICD-10-CM

## 2025-03-19 DIAGNOSIS — Z98.890 S/P PARTIAL GLOSSECTOMY: ICD-10-CM

## 2025-03-19 DIAGNOSIS — R13.12 DYSPHAGIA, OROPHARYNGEAL: Primary | ICD-10-CM

## 2025-03-19 PROCEDURE — 92526 ORAL FUNCTION THERAPY: CPT | Mod: GN | Performed by: SPEECH-LANGUAGE PATHOLOGIST

## 2025-03-19 NOTE — TELEPHONE ENCOUNTER
----- Message from Soraida Samuels sent at 3/19/2025 11:35 AM CDT -----  Regarding: pt at clinic right now  Mr. Paz is just completing an appt with me and I believe your office is trying to set up an appt with him.  He lives 90 minutes away.  Any chance he could be seen while here?Call his wife at [phone] or son at [phone].Thanks.Soraida

## 2025-03-19 NOTE — TELEPHONE ENCOUNTER
----- Message from Nick sent at 3/19/2025 11:19 AM CDT -----  Who called:daughter  What is the request in detail:daughter would like for you to give her a call in regards of her dad upcoming appt on 04-21-25 Can the clinic reply by [facility]? Would the patient rather a call back or a response via [facility]?callback  Best call back number:[phone] Additional Information:

## 2025-03-19 NOTE — PROGRESS NOTES
DIAGNOSIS:  Dysphagia, oropharyngeal (R13.12), s/p partial glossectomy (Z98.890), History head and neck radiation (Z92.3)  REFERRING DOCTOR:  Wendi Quick M.D., Otorhinolaryngologist  LENGTH OF SESSION:  1 hour    REASON FOR VISIT:  Swallowing and speech therapy    INTERVAL HISTORY:  Mr. Paz reported that he tried drinking a little water at home, but thought that he was aspirating some of it, swallowing some of it, and spitting out some of it.      INTERVENTION:  Short-term objectives:  Mr. Paz will perform the following with % consistency/accuracy .  1.  Swallowing:  A.  Demonstrate the following swallowing exercises to clinician:  Effortful swallow  Introduced.  Supraglottic swallow  Introduced to see if he can utilize.  Mixed success today.    B.  Perform home program until fatigued 2x/daily.   N/a    C.  Perform oral cleaning prior to any PO trials.   Instructed.  He reported that he is routinely rinsing his mouth our and cleaning with a soft washcloth.  He sometimes wears his upper plate.    D.  Advance diet  5-mL plain water  Introduced via spoon for best delivery.  Used SGS; needed multiple swallows to clear initial bolus, then had wet voice and needed second cycle of SGS to achieve clear voice.  Had some coughing of secretions via trach afterwards that he endorsed were more than usual; likely aspirated material.  7.5-mL plain water  5-mL nectar-thick liquid  3- to 5-mL thin puree  3- to 5-mL minced/moist solid    E.  Wear trach cap during all swallows, with and without nutrition (may remove in between).   Without today.  Will use at home.    2.  Oral motor              A.  Protrude tongue tip to external border of lower lip.    Introduced today.  Unable to contact interior border of lower lip.              B.  Lateralize tongue tip to L and R oral commissures.    Introduced today.  Able to approximate R oral commissure, but not L.  Achieved about midline when attempting L.              C.  " Elevate tongue tip to alveolar ridge.    Introduced today.  Approximated, but did not contact.              D.  Elevate tongue dorsum to velum.               E.  Trace mandibular and maxillary arches with tongue tip; hold 5 seconds at greatest excursion achieved.    3.  Neck stretches/massage   Added general massage and MFR maneuvers to     4.  Speech:  Produced target phonemes in isolation, words, phrases, sentences, and conversational speech.  Targets include, but are not limited to /t/, /d/, /k/, /g/, /s/, z/, "sh,", "ch," "j," /r/, "er" and its variants, and blends associated with these phonemes.   Deferred today.       IMPRESSIONS:   This 84 y.o. man appears to present with  1.  Significant oropharyngeal dysphagia characterized by reduced lingual ROM, reduced bolus control (per some pooling in the oral cavity), premature spillage to the valleculae, apparent velopharyngeal dysfunction, reduced pharyngeal contraction, reduced hyolaryngeal elevation/excursion, incomplete epiglottic inversion, incomplete and/or inconsistent laryngeal vestibule closure, and inconsistent tolerance of trach capping during swallowing resulting in laryngeal penetration to the TVFs with no spontaneous cough response.  Risk of aspiration is considered high at this time.  The dysphagia associated with the hemiglossectomy is compounded by now dual sources of reduced pressures related to inconsistent tolerance of trach capping during swallowing and velopharyngeal dysfunction.  2.  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 Bailon (DL, 1/24/25).  3.  PEG dependent.  4.  Trach dependent.  5.  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  6.  History  SCC of the L oral tongue.  7.  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  8.  History mild-moderate dysphagia per history s/p #9.  Took a minced and moist diet with thin liquids per report.  Cannot " rule out late RAD at this point in his course following #9.  9.  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        RECOMMENDATIONS/PLAN OF CARE:   It is felt that Mr. Paz would benefit from  1.  Remaining NPO for his primary nutrition, hydration, and medication delivery.  2.  ST on a 1-2x/week basis with a home program for 5-7 weeks to address dysphagia and articulation. (This portion of the POC expires 4/30/25.)  3.  Consider PT to determine if any improvement in posture could be achieved as this may benefit swallowing function.  4.  Continued f/u with Marcos Quick and Uvaldo as directed.  5.  Monitor for readiness to repeat MBSS.        Long-term goals:  Mr. aPz will be able to   Consume at least one consistency for pleasure with no to limited signs/symptoms of aspiration.  Have improved speech intelligibility.

## 2025-03-19 NOTE — PATIENT INSTRUCTIONS
"Stretches:  Roll up a hand towel (or washcloth) and lie with it between the shoulder blades.  Hold for 30 seconds.  Do 10x.  Alternatively, stand facing a corner and put one hand on each wall.  Lean in until you feel a stretch; hold 30 seconds; do 10x.  Turn your head to the side to try to place your chin over your shoulder.  Hold, then turn to the opposite side.  Look forward, then drop your head to one side to try to place your ear over your shoulder.  Hold, then rotate your head so that your chin is near/on your chest.  Let your head be a weight and hold that for 6 seconds.  Rotate your head to the opposite side, and try to place that ear over that shoulder.  Hold.   "The Bulldog":  Extend your jaw forward and raise your lower lip as high as you can over your upper lip.  Then lift your chin until you feel a stretch; hold.  Hollis your fingers to your chin, then use your thumb to massage in little circles in the area under your chin.    Someone can also just massage the neck, shoulders, and even up to the bony prominence on the back of the head.    Tongue stretches:  Do 3-4x/day.  Stick out your tongue and hold 30 seconds.  Do 10x.  Move your tongue to the left and hold 30 seconds. Do 10x.  Raise your tongue tip to try to touch the top gums/teeth; hold 30 seconds.  Do 10x.      Before trying plain water, clean your mouth well.  Use a teaspoon (5-mL).    Keep your head level.    Hold your breath and cover your trach (if not wearing your [place] Valve).  Swallow hard until the water is gone.  Cough out.  Swallow hard again.  Test your voice -- if it sounds wet, repeat swallow - cough - swallow, then test your voice again.  Do up to 5 teaspoonfuls 3x/day.      "

## 2025-03-19 NOTE — TELEPHONE ENCOUNTER
Spoke with pt daughter, states has appt with general surgeon to get g tube checked, informed would pt follow up appt on waitlist get get sooner appt. Daughter verb understanding

## 2025-03-21 ENCOUNTER — OFFICE VISIT (OUTPATIENT)
Dept: SURGERY | Facility: CLINIC | Age: 85
End: 2025-03-21
Payer: MEDICARE

## 2025-03-21 VITALS
SYSTOLIC BLOOD PRESSURE: 136 MMHG | DIASTOLIC BLOOD PRESSURE: 65 MMHG | HEIGHT: 64 IN | BODY MASS INDEX: 27.96 KG/M2 | WEIGHT: 163.81 LBS | OXYGEN SATURATION: 98 % | HEART RATE: 47 BPM

## 2025-03-21 DIAGNOSIS — Z93.1 GASTROSTOMY IN PLACE: Primary | ICD-10-CM

## 2025-03-21 PROCEDURE — 99213 OFFICE O/P EST LOW 20 MIN: CPT | Mod: PBBFAC | Performed by: STUDENT IN AN ORGANIZED HEALTH CARE EDUCATION/TRAINING PROGRAM

## 2025-03-21 PROCEDURE — 99999 PR PBB SHADOW E&M-EST. PATIENT-LVL III: CPT | Mod: PBBFAC,,, | Performed by: STUDENT IN AN ORGANIZED HEALTH CARE EDUCATION/TRAINING PROGRAM

## 2025-03-21 PROCEDURE — 99213 OFFICE O/P EST LOW 20 MIN: CPT | Mod: S$PBB,,, | Performed by: STUDENT IN AN ORGANIZED HEALTH CARE EDUCATION/TRAINING PROGRAM

## 2025-03-21 NOTE — PROGRESS NOTES
History & Physical    SUBJECTIVE:     History of Present Illness:  Patient is a 85 y.o. male w/ PMH of SCC of sonido s/p left hemiglossectomy and PEG placement on 11/15/24 presents for G tube dysfunction. Accompanied by family members. They state that his tube is flushing, venting, and administering feeds/meds appropriately. Patient states that his daughter is concerned about the color of the inside of the tube as she thinks it is infected. Patient has no complaints about his tube.      Review of patient's allergies indicates:  No Known Allergies    Current Medications[1]    Past Medical History:   Diagnosis Date    Arthritis     Cancer of palate     GERD (gastroesophageal reflux disease)     HTN (hypertension)     Hyperlipidemia     Prostate cancer     2011    Pulmonary embolism      Past Surgical History:   Procedure Laterality Date    BACK SURGERY  y-7    Cancer of palate surgery      Late [facility]- [facility]     CARPAL TUNNEL RELEASE  8-14-13    right hand,Left hand 2013    COLONOSCOPY N/A 4/10/2017    Procedure: COLONOSCOPY;  Surgeon: Nilo Kelly MD;  Location: [facility];  Service: Endoscopy;  Laterality: N/A;    COLONOSCOPY N/A 10/21/2020    Procedure: COLONOSCOPY;  Surgeon: Demetrius Araujo MD;  Location: [facility];  Service: Endoscopy;  Laterality: N/A;    ESOPHAGOGASTRODUODENOSCOPY N/A 5/3/2023    Procedure: EGD (ESOPHAGOGASTRODUODENOSCOPY);  Surgeon: Shauna Lopez MD;  Location: [facility];  Service: Endoscopy;  Laterality: N/A;  EGD (with Dr. Lopez or Dr. Parada), : 1-4 weeks, Provider: Dr. Lopez or Dr. Parada Location: [facility], instructions sent to email address [email or web address].     ESOPHAGOGASTRODUODENOSCOPY W/ PEG N/A 11/15/2024    Procedure: EGD, WITH PEG TUBE INSERTION;  Surgeon: Rodrigo Chavez MD;  Location: [facility] OR [address] [place] [address];  Service: General;  Laterality: N/A;    FREE FLAP RADIAL FOREARM Left 11/15/2024    Procedure: CREATION, FREE FLAP, FOREARM, RADIAL ASPECT;  Surgeon: Hillary  MD Katina;  Location: [facility] OR 2ND FLR;  Service: General;  Laterality: Left;    GLOSSECTOMY Left 11/15/2024    Procedure: GLOSSECTOMY;  Surgeon: Cristhain Bailon MD;  Location: [facility] OR 2ND FLR;  Service: ENT;  Laterality: Left;    KNEE SURGERY  y-41    left knee    KNEE SURGERY Right 12-1-15    TKR    LARYNGOSCOPY N/A 2025    Procedure: LARYNGOSCOPY;  Surgeon: Cristhian Bailon MD;  Location: [facility] OR 2ND FLR;  Service: ENT;  Laterality: N/A;    MODIFIED RADICAL NECK DISSECTION Left 11/15/2024    Procedure: DISSECTION, NECK, MODIFIED RADICAL;  Surgeon: Cristhian Bailon MD;  Location: [facility] OR [facility] FLR;  Service: ENT;  Laterality: Left;    Radio active seed Implant      2012    REVISION OF KNEE ARTHROPLASTY Left 2023    Procedure: REVISION, ARTHROPLASTY, KNEE;  Surgeon: Dustin Paul MD;  Location: [facility] OR [facility] FLR;  Service: Orthopedics;  Laterality: Left;    SKIN FULL THICKNESS GRAFT Left 11/15/2024    Procedure: APPLICATION, GRAFT, SKIN, FULL-THICKNESS;  Surgeon: Katina Thornton MD;  Location: [facility] OR [facility] FLR;  Service: General;  Laterality: Left;    TOTAL HIP ARTHROPLASTY  3/2011    TRACHEOTOMY N/A 11/15/2024    Procedure: TRACHEOTOMY;  Surgeon: Cristhian Bailon MD;  Location: [facility] OR [facility] FLR;  Service: ENT;  Laterality: N/A;     Family History   Problem Relation Name Age of Onset    No Known Problems Mother      Esophageal cancer Father      Coronary artery disease Father              Cancer Sister          Liver Cancer -    Diabetes Sister              No Known Problems Sister      No Known Problems Sister      Lung cancer Sister Tessa         Alive    Breast cancer Sister Tessa     Heart disease Sister Mireille     No Known Problems Brother      No Known Problems Brother      Lung cancer Brother mel 60        - was a tobacco user, had lung cancer    Other (Lung cancer) Brother mel     Stroke Brother Ronak             No Known Problems Maternal  "Aunt      No Known Problems Maternal Uncle      No Known Problems Paternal Aunt      No Known Problems Paternal Uncle      No Known Problems Maternal Grandmother      No Known Problems Maternal Grandfather      No Known Problems Paternal Grandmother      No Known Problems Paternal Grandfather      Glaucoma Cousin      Macular degeneration Neg Hx      Strabismus Neg Hx      Amblyopia Neg Hx      Blindness Neg Hx      Cataracts Neg Hx      Hypertension Neg Hx      Retinal detachment Neg Hx      Thyroid disease Neg Hx      Colon cancer Neg Hx       Social History[2]     Review of Systems:  ROS: a comprehensive review of systems was done that was negative unless otherwise stated in the HPI  OBJECTIVE:     Vital Signs (Most Recent)  Pulse: (!) 47 (03/21/25 1345)  BP: 136/65 (03/21/25 1345)  SpO2: 98 % (03/21/25 1345)  5' 4" (1.626 m)  74.3 kg (163 lb 12.8 oz)     Physical Exam:  Physical Exam  Constitutional:       General: He is not in acute distress.  Cardiovascular:      Rate and Rhythm: Normal rate and regular rhythm.      Pulses: Normal pulses.   Pulmonary:      Effort: Pulmonary effort is normal. No respiratory distress.      Comments: Trach in place with speaking valve  Abdominal:      General: Abdomen is flat. There is no distension.      Palpations: Abdomen is soft.      Tenderness: There is no abdominal tenderness.      Comments: PEG tube in place, site without erythema/induration/drainage  No signs of infection   Musculoskeletal:      Cervical back: Normal range of motion.   Skin:     General: Skin is warm and dry.   Neurological:      Mental Status: He is alert and oriented to person, place, and time.         Laboratory  Reviewed    Diagnostic Results:  Reviewed    ASSESSMENT/PLAN:     85 y.o. male w/ PMH of SCC of sonido s/p left hemiglossectomy and PEG placement on 11/15/24 presents for G tube dysfunction. No signs of infection or tube dysfunction.    PLAN:    RTC prn    Ulises Adame MD  General " Surgery  3/21/2025         [1]   Current Outpatient Medications   Medication Sig Dispense Refill    acetaminophen (TYLENOL) 650 MG TbSR Take 1 tablet (650 mg total) by mouth every 8 (eight) hours. 120 tablet 0    azelastine (ASTELIN) 137 mcg (0.1 %) nasal spray 1 spray (137 mcg total) by Nasal route 2 (two) times daily. 30 mL 3    docusate sodium (STOOL SOFTENER ORAL) Take by mouth as needed.      fluticasone propionate (FLONASE) 50 mcg/actuation nasal spray 1 spray (50 mcg total) by Each Nostril route 2 (two) times daily. 9.9 mL 3    HYDROcodone-acetaminophen (NORCO) 5-325 mg per tablet Take 1 tablet by mouth every 6 (six) hours as needed for Pain. 5 tablet 0    losartan (COZAAR) 50 MG tablet Take 1 tablet (50 mg total) by mouth once daily. 90 tablet 3    montelukast (SINGULAIR) 10 mg tablet TAKE 1 TABLET BY MOUTH ONCE DAILY IN THE EVENING 90 tablet 2    multivitamin/iron/folic acid (CENTRUM COMPLETE ORAL) Take 1 tablet by mouth once daily.      oxyCODONE (ROXICODONE) 5 MG immediate release tablet Take 1 tablet (5 mg total) by mouth every 6 (six) hours as needed for Pain (pain refractory to over the counter meds). 12 tablet 0    pantoprazole (PROTONIX) 40 MG tablet Take 1 tablet (40 mg total) by mouth once daily. 90 tablet 3    simvastatin (ZOCOR) 40 MG tablet Take 1 tablet (40 mg total) by mouth every evening. 90 tablet 3    tamsulosin (FLOMAX) 0.4 mg Cap Take 1 capsule by mouth once daily 90 capsule 2    white petrolatum (AQUAPHOR ORIGINAL) 41 % Oint Apply topically 2 (two) times a day. Apply to incision lines twice per day 396 g 0     No current facility-administered medications for this visit.   [2]   Social History  Tobacco Use    Smoking status: Former     Current packs/day: 0.00     Average packs/day: 3.0 packs/day for 20.0 years (60.0 ttl pk-yrs)     Types: Cigarettes     Start date: 7/10/1977     Quit date: 7/10/1997     Years since quittin.7     Passive exposure: Past    Smokeless tobacco: Never     Tobacco comments:     Quit 1997-smoked for 40 years ,2 packs a day on an average   Substance Use Topics    Alcohol use: Not Currently     Comment: used to drink Occasionally    Drug use: No

## 2025-03-31 ENCOUNTER — OFFICE VISIT (OUTPATIENT)
Dept: OTOLARYNGOLOGY | Facility: CLINIC | Age: 85
End: 2025-03-31
Payer: MEDICARE

## 2025-03-31 ENCOUNTER — EXTERNAL CHRONIC CARE MANAGEMENT (OUTPATIENT)
Dept: PRIMARY CARE CLINIC | Facility: CLINIC | Age: 85
End: 2025-03-31
Payer: MEDICARE

## 2025-03-31 ENCOUNTER — PATIENT MESSAGE (OUTPATIENT)
Dept: ADMINISTRATIVE | Facility: HOSPITAL | Age: 85
End: 2025-03-31
Payer: MEDICARE

## 2025-03-31 VITALS — HEART RATE: 34 BPM | SYSTOLIC BLOOD PRESSURE: 183 MMHG | DIASTOLIC BLOOD PRESSURE: 66 MMHG

## 2025-03-31 VITALS — WEIGHT: 161 LBS | BODY MASS INDEX: 27.64 KG/M2

## 2025-03-31 DIAGNOSIS — J38.6 LARYNGEAL STENOSIS: Primary | ICD-10-CM

## 2025-03-31 DIAGNOSIS — T66.XXXA RADIATION INJURY, INITIAL ENCOUNTER: ICD-10-CM

## 2025-03-31 DIAGNOSIS — C02.9 SQUAMOUS CELL CANCER OF TONGUE: Primary | ICD-10-CM

## 2025-03-31 DIAGNOSIS — J39.2 PHARYNGEAL STENOSIS: ICD-10-CM

## 2025-03-31 PROCEDURE — 99214 OFFICE O/P EST MOD 30 MIN: CPT | Mod: 25,S$PBB,, | Performed by: OTOLARYNGOLOGY

## 2025-03-31 PROCEDURE — 99999 PR PBB SHADOW E&M-EST. PATIENT-LVL III: CPT | Mod: PBBFAC,,, | Performed by: OTOLARYNGOLOGY

## 2025-03-31 PROCEDURE — 99490 CHRNC CARE MGMT STAFF 1ST 20: CPT | Mod: PBBFAC,PO | Performed by: FAMILY MEDICINE

## 2025-03-31 PROCEDURE — 31575 DIAGNOSTIC LARYNGOSCOPY: CPT | Mod: S$PBB,,, | Performed by: OTOLARYNGOLOGY

## 2025-03-31 PROCEDURE — 31575 DIAGNOSTIC LARYNGOSCOPY: CPT | Mod: PBBFAC | Performed by: OTOLARYNGOLOGY

## 2025-03-31 PROCEDURE — 99213 OFFICE O/P EST LOW 20 MIN: CPT | Mod: PBBFAC,27 | Performed by: OTOLARYNGOLOGY

## 2025-03-31 PROCEDURE — 99213 OFFICE O/P EST LOW 20 MIN: CPT | Mod: PBBFAC | Performed by: OTOLARYNGOLOGY

## 2025-03-31 PROCEDURE — 99499 UNLISTED E&M SERVICE: CPT | Mod: S$PBB,,, | Performed by: OTOLARYNGOLOGY

## 2025-03-31 NOTE — PROGRESS NOTES
[facility]  Department of Otorhinolaryngology and Communication Sciences    Fan Paz is a 85 y.o. male who presents to the [facility] on referral from Dr. Balion regarding laryngeal stenosis.    He has a complex head and neck cancer treatment history, beginning with soft palate cancer approximately 30 years ago, treated with surgery and radiation.  He had a PEG placed at that time, but this was ultimately removed.  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  In November of that year, he underwent L hemiglossectomy, LMND, tracheostomy, RFFF reconstruction, and replacement of PEG.  He has been tracheostomy and PEG dependent since that time.    Past Medical History  He has a past medical history of Arthritis, Cancer of palate, GERD (gastroesophageal reflux disease), HTN (hypertension), Hyperlipidemia, Prostate cancer, and Pulmonary embolism.    Past Surgical History  He has a past surgical history that includes Back surgery (y-7); Total hip arthroplasty (3/2011); Carpal tunnel release (8-14-13); Cancer of palate surgery; Radio active seed Implant; Knee surgery (y-41); Knee surgery (Right, 12-1-15); Colonoscopy (N/A, 4/10/2017); Colonoscopy (N/A, 10/21/2020); Esophagogastroduodenoscopy (N/A, 5/3/2023); Revision of knee arthroplasty (Left, 6/2/2023); Glossectomy (Left, 11/15/2024); Modified radical neck dissection (Left, 11/15/2024); Tracheotomy (N/A, 11/15/2024); Free flap radial forearm (Left, 11/15/2024); Full thickness skin graft (Left, 11/15/2024); Esophagogastroduodenoscopy w/ PEG (N/A, 11/15/2024); and Laryngoscopy (N/A, 1/24/2025).    Family History  His family history includes Breast cancer in his sister; Cancer in his sister; Coronary artery disease in his father; Diabetes in his sister; Esophageal cancer in his father; Glaucoma in his cousin; Heart disease in his sister; Lung cancer in his brother and sister; Lung cancer (age of onset: 60) in his  brother; No Known Problems in his brother, brother, maternal aunt, maternal grandfather, maternal grandmother, maternal uncle, mother, paternal aunt, paternal grandfather, paternal grandmother, paternal uncle, sister, and sister; Stroke in his brother.    Social History  He reports that he quit smoking about 27 years ago. His smoking use included cigarettes. He started smoking about 47 years ago. He has a 60 pack-year smoking history. He has been exposed to tobacco smoke. He has never used smokeless tobacco. He reports that he does not currently use alcohol. He reports that he does not use drugs.    Allergies  He has no known allergies.    Medications  He has a current medication list which includes the following prescription(s): acetaminophen, azelastine, docusate sodium, fluticasone propionate, hydrocodone-acetaminophen, losartan, montelukast, multivitamin/iron/folic acid, oxycodone, pantoprazole, simvastatin, tamsulosin, and aquaphor original.    Review of Systems   Constitutional:  Negative for fever.   HENT:  Negative for sore throat.    Eyes:  Negative for visual disturbance.   Respiratory:  Negative for wheezing.    Cardiovascular:  Negative for chest pain.   Gastrointestinal:  Negative for nausea.   Musculoskeletal:  Negative for arthralgias.   Skin:  Negative for rash.   Neurological:  Negative for tremors.   Hematological:  Does not bruise/bleed easily.   Psychiatric/Behavioral:  The patient is not nervous/anxious.           Objective:     VS reviewed  Physical Exam  Constitutional: comfortable, well dressed  Psychiatric: appropriate affect  Respiratory: comfortably breathing, symmetric chest rise, no stridor  Cardiovascular: upper extremities non-edematous  Lymphatic: no cervical lymphadenopathy  Neurologic: alert and oriented to time, place, person, and situation; cranial nerves 3-12 grossly intact  Head: normocephalic  Eyes: conjunctivae and sclerae clear  Ears: normal pinnae, normal external auditory  canals, tympanic membranes intact  Nose: mucosa pink and noncongested, no masses, no mucopurulence, no polyps  Oral cavity / oropharynx: edentulous; post surgical changes  Neck: firm; post treatment changes; trach in place, patent, secure  Indirect laryngoscopy: limited due to gag    Procedure  Flexible Laryngoscopy (16675): Laryngoscopy is indicated for assessment of upper aerodigestive structure and function. This was carried out transnasally with a distal chip videoendoscope. After verbal consent was obtained, the patient was positioned and the nose was topically decongested with 1% phenylephrine and topically anesthetized with 4% lidocaine. The endoscope was passed through the most patent nasal cavity and positioned to image the nasopharynx, larynx, and hypopharynx in detail. The following features were examined: nasopharyngeal, laryngeal, hypopharyngeal masses; velopharyngeal strength, closure, and symmetry of motion; vocal fold range and symmetry of motion; laryngeal mucosal edema, erythema, inflammation, and hydration; salivary pooling; and gross laryngeal sensation. The equipment was removed. The patient tolerated the procedure well without complication. All findings were normal except:  - bilateral vocal fold immobility, with both vocal folds apposed at rest, no meaningful abduction  - insensate larynx  - diffuse laryngo pharyngeal postradiation changes  - pharyngo esophageal segment widely patent, easily accommodating passage of flexible laryngoscope into esophagus      Assessment:     Fan Paz is a 85 y.o. 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       Plan:        I had a discussion with the patient and his family  regarding his condition and the further workup and management options.      I demonstrated his examination to them on the video monitor.  I counseled  him that, especially on account of his severe swallowing dysfunction, I do not have a solution which would move him forward towards safe decannulation.  I recommended adherence to his SLP swallowing rehabilitation plan.    He will follow up with Dr. Quick and/or the head and neck cancer team for ongoing posttreatment surveillance.    Today he demonstrated a low heart rate, which he has had for quite some time.  He has been lost to cardiology follow up.  Our team has communicated with the office of Dr. Carreon.  They recommended against presenting to the emergency department instead recommended that he come in to see Dr. Carreon next week.  We strongly encouraged the patient to attend that appointment.    All questions were answered, and the patient is in agreement with the above.     Live Cruz M.D.  [facility]  Department of Otorhinolaryngology and Communication Sciences

## 2025-04-01 ENCOUNTER — OFFICE VISIT (OUTPATIENT)
Dept: CARDIOLOGY | Facility: CLINIC | Age: 85
End: 2025-04-01
Payer: MEDICARE

## 2025-04-01 VITALS
BODY MASS INDEX: 26.81 KG/M2 | SYSTOLIC BLOOD PRESSURE: 110 MMHG | HEIGHT: 65 IN | OXYGEN SATURATION: 97 % | WEIGHT: 160.94 LBS | HEART RATE: 45 BPM | DIASTOLIC BLOOD PRESSURE: 52 MMHG

## 2025-04-01 DIAGNOSIS — I44.1 MOBITZ TYPE 1 SECOND DEGREE AV BLOCK: Primary | ICD-10-CM

## 2025-04-01 DIAGNOSIS — I70.0 AORTIC ATHEROSCLEROSIS: ICD-10-CM

## 2025-04-01 DIAGNOSIS — I10 ESSENTIAL HYPERTENSION: ICD-10-CM

## 2025-04-01 DIAGNOSIS — E78.5 HYPERLIPIDEMIA, UNSPECIFIED HYPERLIPIDEMIA TYPE: ICD-10-CM

## 2025-04-01 LAB
OHS QRS DURATION: 86 MS
OHS QTC CALCULATION: 366 MS

## 2025-04-01 PROCEDURE — 99214 OFFICE O/P EST MOD 30 MIN: CPT | Mod: S$PBB,,, | Performed by: INTERNAL MEDICINE

## 2025-04-01 PROCEDURE — 93010 ELECTROCARDIOGRAM REPORT: CPT | Mod: S$PBB,,, | Performed by: INTERNAL MEDICINE

## 2025-04-01 PROCEDURE — 93005 ELECTROCARDIOGRAM TRACING: CPT | Mod: PBBFAC,PO | Performed by: INTERNAL MEDICINE

## 2025-04-01 PROCEDURE — 99999 PR PBB SHADOW E&M-EST. PATIENT-LVL III: CPT | Mod: PBBFAC,,, | Performed by: INTERNAL MEDICINE

## 2025-04-01 PROCEDURE — 99213 OFFICE O/P EST LOW 20 MIN: CPT | Mod: PBBFAC,PO,25 | Performed by: INTERNAL MEDICINE

## 2025-04-01 NOTE — PROGRESS NOTES
Subjective   Patient ID:  Fan Paz is a 85 y.o. male who presents for follow-up of No chief complaint on file.      HPI      Followed by Dr Susanna Perea type 1 second-degree AV block  Elevated blood pressure reading without diagnosis of hypertension  Hyperlipidemia: LDL 81  Osteoarthritis       Echocardiogram 02/16/2022:     The left ventricle is normal in size with normal systolic function.  The estimated ejection fraction is 60%.  Moderate left atrial enlargement.  Indeterminate left ventricular diastolic function.  Normal right ventricular size with normal right ventricular systolic function.  Normal central venous pressure (3 mmHg).  The estimated PA systolic pressure is 6 mmHg.  There is no pulmonary hypertension.     Holter 02/16/2022:     Sinus rhythm with heart rates varying between 43 and 113 BPM with an average of 79 BPM  There were very frequent PACs  Adilson     1/29/24 Denies CP or SOB  EKG NSR 2:1 AVB at 41 LVH  BP controlled  2:1 AVB on EKG with HR 41 and narrow QRS - suspect this is 2 cycle Mobitz I AVB based on prior Hx Mobitz I. Bradycardia is well tolerated and asymptomatic  Continue Rx for HTN, HLD  OV 6 months     EKG 11/6/24 NSR with Mobitz I AVB and 2:1 AV conduction HR 45 LVH     11/8/24 Needs clearance for ENT surgery - tongue CA  Denies CP, SOB, dizziness or syncope  2:1 AVB on EKG with HR 45 and narrow QRS - suspect this is 2 cycle Mobitz I AVB based on prior Hx Mobitz I. Bradycardia is well tolerated and asymptomatic  Continue Rx for HTN, HLD  OV 6 months  Cleared for ENT surgery at moderate cardiac risk     4/1/25 Referred back by ENT for bradycardia  EKG NSR Mobitz I AVB HR 45  Denies dizziness or near syncope       Review of Systems   Constitutional: Negative for decreased appetite.   HENT:  Negative for ear discharge.    Eyes:  Negative for blurred vision.   Endocrine: Negative for polyphagia.   Skin:  Negative for nail changes.   Genitourinary:  Negative for bladder  incontinence.   Neurological:  Negative for aphonia.   Psychiatric/Behavioral:  Negative for hallucinations.    Allergic/Immunologic: Negative for hives.          Objective     Physical Exam  Constitutional:       Appearance: He is well-developed.   HENT:      Head: Normocephalic and atraumatic.   Eyes:      Conjunctiva/sclera: Conjunctivae normal.      Pupils: Pupils are equal, round, and reactive to light.   Cardiovascular:      Rate and Rhythm: Normal rate.      Pulses: Intact distal pulses.      Heart sounds: Normal heart sounds.   Pulmonary:      Effort: Pulmonary effort is normal.      Breath sounds: Normal breath sounds.   Abdominal:      General: Bowel sounds are normal.      Palpations: Abdomen is soft.   Musculoskeletal:         General: Normal range of motion.      Cervical back: Normal range of motion and neck supple.   Skin:     General: Skin is warm and dry.   Neurological:      Mental Status: He is alert and oriented to person, place, and time.            Assessment and Plan     1. 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 Aortic atherosclerosis    3. Essential hypertension    4. Hyperlipidemia, unspecified hyperlipidemia type        Plan:     Mobitz I AVB. Bradycardia is well tolerated and asymptomatic  Avoid rate lowering medications  Continue Rx for HTN, HLD  OV 6 months    Advance Care Planning     Date: 04/01/2025  Patient did not wish or was not able to name a surrogate decision maker or provide an Advance Care Plan.

## 2025-04-07 ENCOUNTER — CLINICAL SUPPORT (OUTPATIENT)
Dept: SPEECH THERAPY | Facility: HOSPITAL | Age: 85
End: 2025-04-07
Payer: MEDICARE

## 2025-04-07 DIAGNOSIS — Z98.890 S/P PARTIAL GLOSSECTOMY: ICD-10-CM

## 2025-04-07 DIAGNOSIS — R13.12 DYSPHAGIA, OROPHARYNGEAL: Primary | ICD-10-CM

## 2025-04-07 DIAGNOSIS — Z85.819 HISTORY OF ORAL CANCER: ICD-10-CM

## 2025-04-07 PROCEDURE — 92526 ORAL FUNCTION THERAPY: CPT | Mod: GN | Performed by: SPEECH-LANGUAGE PATHOLOGIST

## 2025-04-07 NOTE — PROGRESS NOTES
DIAGNOSIS:  Dysphagia, oropharyngeal (R13.12), s/p partial glossectomy (Z98.890), History head and neck radiation (Z92.3)  REFERRING DOCTOR:  Wendi Quick M.D., Otorhinolaryngologist  LENGTH OF SESSION:  50 minutes    REASON FOR VISIT:  Swallowing and speech therapy    INTERVAL HISTORY:  Mr. Paz reported that he not done stretches daily and had practice 2 boluses of water 1x/day.        INTERVENTION:  Short-term objectives:  Mr. Paz will perform the following with % consistency/accuracy .  1.  Swallowing:  A.  Demonstrate the following swallowing exercises to clinician:  Effortful swallow  Attempting.  Supraglottic swallow  Continues with mixed success today.  Sounds of air moving between swallow attempts.    B.  Perform home program until fatigued 2x/daily.   N/a    C.  Perform oral cleaning prior to any PO trials.   Instructed.  He reported that he is routinely rinsing his mouth our and cleaning with a soft washcloth.  He sometimes wears his upper plate.    D.  Advance diet  5-mL plain water  5 trials.  Attempted use of SGS; needed multiple swallows to clear initial bolus, then cued cough and repeat swallow.  Voice quality at baseline.   7.5-mL plain water  5-mL nectar-thick liquid  3- to 5-mL thin puree  3- to 5-mL minced/moist solid    E.  Wear trach cap during all swallows, with and without nutrition (may remove in between).   Present today.  Used.    2.  Oral motor              A.  Protrude tongue tip to external border of lower lip.    Unable to contact interior border of lower lip.              B.  Lateralize tongue tip to L and R oral commissures.    Introduced today.  Able to approximate R oral commissure, but not L.  Achieved about midline when attempting L.              C.  Elevate tongue tip to alveolar ridge.    Approximated, but did not contact.              D.  Elevate tongue dorsum to velum.     Approximated, but did not contact.              E.  Trace mandibular and maxillary  "arches with tongue tip; hold 5 seconds at greatest excursion achieved.    Deferred     3.  Neck stretches/massage   Continuing general massage and MFR maneuvers along with prescribed stretches.    4.  Speech:  Produced target phonemes in isolation, words, phrases, sentences, and conversational speech.  Targets include, but are not limited to /t/, /d/, /k/, /g/, /s/, z/, "sh,", "ch," "j," /r/, "er" and its variants, and blends associated with these phonemes.   Unable to make contact with alveolar ridge or velum for /t/ or /k/.  Benefits from generally slower rate of speech and from use of cap during speech.       IMPRESSIONS:   This 84 y.o. man appears to present with  1.  Significant oropharyngeal dysphagia characterized by reduced lingual ROM, reduced bolus control (per some pooling in the oral cavity), premature spillage to the valleculae, apparent velopharyngeal dysfunction, reduced pharyngeal contraction, reduced hyolaryngeal elevation/excursion, incomplete epiglottic inversion, incomplete and/or inconsistent laryngeal vestibule closure, and inconsistent tolerance of trach capping during swallowing resulting in laryngeal penetration to the TVFs with no spontaneous cough response.  Risk of aspiration is considered high at this time.  The dysphagia associated with the hemiglossectomy is compounded by now dual sources of reduced pressures related to inconsistent tolerance of trach capping during swallowing and velopharyngeal dysfunction.  2.  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 Bailon (DL, 1/24/25).  3.  PEG dependent.  4.  Trach dependent.  5.  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  6.  History  SCC of the L oral tongue.  7.  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  8.  History mild-moderate dysphagia per history s/p #9.  Took a minced and moist diet with thin liquids per report.  Cannot rule out late " RAD at this point in his course following #9.  9.  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        RECOMMENDATIONS/PLAN OF CARE:   It is felt that Mr. Paz would benefit from  1.  Remaining NPO for his primary nutrition, hydration, and medication delivery.  2.  ST on a 1-2x/week basis with a home program for 4-6 weeks to address dysphagia and articulation. (This portion of the POC expires 4/30/25.)  3.  Consider PT to determine if any improvement in posture could be achieved as this may benefit swallowing function.  4.  Continued f/u with Marcos Quick and Uvaldo as directed.  5.  Monitor for readiness to repeat MBSS.        Long-term goals:  Mr. Paz will be able to   Consume at least one consistency for pleasure with no to limited signs/symptoms of aspiration.  Have improved speech intelligibility.

## 2025-04-07 NOTE — PATIENT INSTRUCTIONS
"Stretches:  Roll up a hand towel (or washcloth) and lie with it between the shoulder blades.  Hold for 30 seconds.  Do 10x.  Alternatively, stand facing a corner and put one hand on each wall.  Lean in until you feel a stretch; hold 30 seconds; do 10x.  Turn your head to the side to try to place your chin over your shoulder.  Hold, then turn to the opposite side.  Look forward, then drop your head to one side to try to place your ear over your shoulder.  Hold, then rotate your head so that your chin is near/on your chest.  Let your head be a weight and hold that for 6 seconds.  Rotate your head to the opposite side, and try to place that ear over that shoulder.  Hold.   "The Bulldog":  Extend your jaw forward and raise your lower lip as high as you can over your upper lip.  Then lift your chin until you feel a stretch; hold.  Coffeen your fingers to your chin, then use your thumb to massage in little circles in the area under your chin.    Someone can also just massage the neck, shoulders, and even up to the bony prominence on the back of the head.    Tongue stretches:  Do 3-4x/day.  Try with a mirror.  Stick out your tongue and hold 15 (building up to 30) seconds.  Do 10x.  Move your tongue to the left and hold 15 (building up to 30) seconds. Do 10x.  Raise your tongue tip to try to touch the top gums/teeth; hold 30 seconds.  Do 10x.      Before trying plain water, clean your mouth well.  Use a teaspoon (5-mL).    Keep your head level.    Hold your breath and cover your trach (if not wearing your ).  Swallow hard until the water is gone.  Cough out.  Swallow hard again.  Test your voice -- if it sounds wet, repeat swallow - cough - swallow, then test your voice again.  Do up to 5 teaspoonfuls 3x/day.    "

## 2025-04-08 ENCOUNTER — PATIENT MESSAGE (OUTPATIENT)
Dept: OTOLARYNGOLOGY | Facility: CLINIC | Age: 85
End: 2025-04-08
Payer: MEDICARE

## 2025-04-14 ENCOUNTER — PATIENT MESSAGE (OUTPATIENT)
Dept: OTOLARYNGOLOGY | Facility: CLINIC | Age: 85
End: 2025-04-14
Payer: MEDICARE

## 2025-04-21 ENCOUNTER — OFFICE VISIT (OUTPATIENT)
Dept: OTOLARYNGOLOGY | Facility: CLINIC | Age: 85
End: 2025-04-21
Payer: MEDICARE

## 2025-04-21 ENCOUNTER — CLINICAL SUPPORT (OUTPATIENT)
Dept: SPEECH THERAPY | Facility: HOSPITAL | Age: 85
End: 2025-04-21
Payer: MEDICARE

## 2025-04-21 VITALS
WEIGHT: 163.81 LBS | SYSTOLIC BLOOD PRESSURE: 141 MMHG | DIASTOLIC BLOOD PRESSURE: 65 MMHG | BODY MASS INDEX: 27.29 KG/M2 | HEIGHT: 65 IN

## 2025-04-21 DIAGNOSIS — Z85.819 HISTORY OF ORAL CANCER: ICD-10-CM

## 2025-04-21 DIAGNOSIS — Z08 ENCOUNTER FOR FOLLOW-UP SURVEILLANCE OF ORAL CAVITY CANCER: ICD-10-CM

## 2025-04-21 DIAGNOSIS — Z85.819 ENCOUNTER FOR FOLLOW-UP SURVEILLANCE OF ORAL CAVITY CANCER: ICD-10-CM

## 2025-04-21 DIAGNOSIS — R13.10 DYSPHAGIA, UNSPECIFIED TYPE: ICD-10-CM

## 2025-04-21 DIAGNOSIS — J30.2 SEASONAL ALLERGIC RHINITIS, UNSPECIFIED TRIGGER: ICD-10-CM

## 2025-04-21 DIAGNOSIS — H90.3 ASYMMETRICAL SENSORINEURAL HEARING LOSS: ICD-10-CM

## 2025-04-21 DIAGNOSIS — R13.14 PHARYNGOESOPHAGEAL DYSPHAGIA: ICD-10-CM

## 2025-04-21 DIAGNOSIS — Z98.890 S/P PARTIAL GLOSSECTOMY: ICD-10-CM

## 2025-04-21 DIAGNOSIS — H92.02 OTALGIA OF LEFT EAR: ICD-10-CM

## 2025-04-21 DIAGNOSIS — B37.0 THRUSH: ICD-10-CM

## 2025-04-21 DIAGNOSIS — T66.XXXD ADVERSE EFFECT OF RADIATION, SUBSEQUENT ENCOUNTER: ICD-10-CM

## 2025-04-21 DIAGNOSIS — R13.12 DYSPHAGIA, OROPHARYNGEAL: Primary | ICD-10-CM

## 2025-04-21 DIAGNOSIS — R49.0 DYSPHONIA: Primary | ICD-10-CM

## 2025-04-21 DIAGNOSIS — Z92.3 HISTORY OF HEAD AND NECK RADIATION: ICD-10-CM

## 2025-04-21 DIAGNOSIS — Z93.0 TRACHEOSTOMY TUBE PRESENT: ICD-10-CM

## 2025-04-21 DIAGNOSIS — C02.9 SQUAMOUS CELL CANCER OF TONGUE: ICD-10-CM

## 2025-04-21 PROCEDURE — 92526 ORAL FUNCTION THERAPY: CPT | Mod: GN | Performed by: SPEECH-LANGUAGE PATHOLOGIST

## 2025-04-21 PROCEDURE — 99215 OFFICE O/P EST HI 40 MIN: CPT | Mod: 25,S$GLB,, | Performed by: OTOLARYNGOLOGY

## 2025-04-21 PROCEDURE — 31575 DIAGNOSTIC LARYNGOSCOPY: CPT | Mod: S$GLB,,, | Performed by: OTOLARYNGOLOGY

## 2025-04-21 RX ORDER — NYSTATIN 100000 [USP'U]/ML
4 SUSPENSION ORAL 4 TIMES DAILY
Qty: 160 ML | Refills: 0 | Status: SHIPPED | OUTPATIENT
Start: 2025-04-21 | End: 2025-05-01

## 2025-04-21 RX ORDER — CLOTRIMAZOLE 10 MG/1
10 LOZENGE ORAL
Qty: 70 TABLET | Refills: 0 | Status: SHIPPED | OUTPATIENT
Start: 2025-04-21 | End: 2025-05-05

## 2025-04-21 NOTE — PROGRESS NOTES
"DIAGNOSIS:  Dysphagia, oropharyngeal (R13.12), s/p partial glossectomy (Z98.890), History head and neck radiation (Z92.3)  REFERRING DOCTOR:  Wendi Quick M.D., Otorhinolaryngologist  LENGTH OF SESSION:  50 minutes    REASON FOR VISIT:  Swallowing and speech therapy    INTERVAL HISTORY:  Mr. Paz reported that he has done stretches daily and had practiced using 2 (5-mL) boluses of water 2x/day.  When why he stopped, he denied it was b/c he was having a problem or fatiguing; he just arbitrarily stopped.   Complained of pain in L posterior parietal area of head.  Had discussed with Dr. Quick today.  Reported he had sensed it as a "tingling" before beginning therapy; now painful, especially with certain movements and coughing.      INTERVENTION:  Re: pain in L posterior head, asked whether it seemed to have worsened with use of stretches that have been prescribed and whether he has been trying to move until he feels a stretch or to move as far as possible.  While he did not report a pattern that seemed a very direct connection with performing the stretches, he did seem, per report, to trying to move as far as possible vs just to the point of a stretch.  Reviewed the latter as the target and the rationale for it.      Short-term objectives:  Mr. Paz will perform the following with % consistency/accuracy .  1.  Swallowing:  A.  Demonstrate the following swallowing exercises to clinician:  Effortful swallow  Attempting.  Supraglottic swallow  Continues with mixed success today.  Sounds of air moving between swallow attempts.  Halted bolus trials to target dry swallows (after moistening mouth with wet Toothette) in an effort to improve control of execution of a swallow as he seemed to be having difficulty initiating.  This seemed to be of benefit re: shorter delay in initiating and more efficient swallow (per reduced wet voice quality) when bolus trials were resumed.    B.  Perform home program until " fatigued 2x/daily.   N/a    C.  Perform oral cleaning prior to any PO trials.   Instructed.  He reported that he is routinely rinsing his mouth our and cleaning with a soft washcloth.  He sometimes wears his upper plate.    D.  Advance diet  5-mL plain water  5 trials.  Initially unable to recount steps in SGS beyond need to place trach cap during swallow.  Reviewed sequence and rationale.  (Wife noted to state steps independently.)  by the end of the visit, he was able to remodel the steps accurately to the clinician.     During trials prior to dry swallow practice as above, he appeared to place the liquid, then could be heard moving several breaths before completing a swallow.  Suspect, per wet voice quality afterwards, that he had release the bolus into the pharynx without protecting the airway adequately.  Following dry swallow practices as above, he executed the SGS more accurately and with less delay, and had baseline voice quality afterwards.  Continuing with dry swallow practice (with moistened mouth) prior to bolus trials.  2.   7.5-mL plain water  3.   5-mL nectar-thick liquid  4.   3- to 5-mL thin puree  5.   3- to 5-mL minced/moist solid    E.  Wear trach cap during all swallows, with and without nutrition (may remove in between).   Present today.  Used.    2.  Oral motor              A.  Protrude tongue tip to external border of lower lip.    Barely able to contact interior border of lower lip.              B.  Lateralize tongue tip to L and R oral commissures.    Able to approximate R oral commissure, but not L.  Achieved about midline when attempting L.              C.  Elevate tongue tip to alveolar ridge.    Approximated, but did not contact.  Getting slightly greater elevation with more of a retraction movement.              D.  Elevate tongue dorsum to velum.     Approximated, but did not contact.              E.  Trace mandibular and maxillary arches with tongue tip; hold 5 seconds at greatest  "excursion achieved.    Deferred     3.  Neck stretches/massage   Continuing general massage and MFR maneuvers along with prescribed stretches.  As above, reviewed goal of moving to the point of feeling a stretch, not greatest ROM.    4.  Speech:  Produced target phonemes in isolation, words, phrases, sentences, and conversational speech.  Targets include, but are not limited to /t/, /d/, /k/, /g/, /s/, z/, "sh,", "ch," "j," /r/, "er" and its variants, and blends associated with these phonemes.   Unable to make contact with alveolar ridge or velum for /t/ or /k/.  Benefits from generally slower rate of speech and from use of cap during speech.    COUNSELING:  Per note from Dr. Quick's visit today, addressed the family's question re: current frequency of therapy (every 2 weeks):  Advised that it is two-fold.  In part, it is related to the distance they need to travel.  Secondly, and of greater import, this is a slow process in which Mr. Paz has not yet fully participated yet, though he has improved in performance of home program.  He needs to continue to do the home program and as we start to see more improvement and/or more rapid improvement, it may be more advisable to increase the frequency of visits.   Mr. Paz asked if he would ever swallow again.  Advised that at this point, the most attainable goal appears to be that he might be able to enjoy sipping liquids -- but if we can do more, then we certainly will continue beyond that.       IMPRESSIONS:   This 84 y.o. man appears to present with  1.  Significant oropharyngeal dysphagia characterized by reduced lingual ROM, reduced bolus control (per some pooling in the oral cavity), premature spillage to the valleculae, apparent velopharyngeal dysfunction, reduced pharyngeal contraction, reduced hyolaryngeal elevation/excursion, incomplete epiglottic inversion, incomplete and/or inconsistent laryngeal vestibule closure, and inconsistent tolerance of trach " capping during swallowing resulting in laryngeal penetration to the TVFs with no spontaneous cough response.  Risk of aspiration is considered high at this time.  The dysphagia associated with the hemiglossectomy is compounded by now dual sources of reduced pressures related to inconsistent tolerance of trach capping during swallowing and velopharyngeal dysfunction.  2.  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 Bailon (DL, 1/24/25).  3.  PEG dependent.  4.  Trach dependent.  5.  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  6.  History  SCC of the L oral tongue.  7.  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  8.  History mild-moderate dysphagia per history s/p #9.  Took a minced and moist diet with thin liquids per report.  Cannot rule out late RAD at this point in his course following #9.  9.  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        RECOMMENDATIONS/PLAN OF CARE:   It is felt that Mr. Paz would benefit from  1.  Remaining NPO for his primary nutrition, hydration, and medication delivery.  2.  ST on a 1-2x/week basis with a home program for 3-5 weeks to address dysphagia and articulation. (This portion of the POC expires 6/30/25.)  3.  Consider PT to determine if any improvement in posture could be achieved as this may benefit swallowing function.  4.  Continued f/u with Marcos Quick and Uvaldo as directed.  5.  Monitor for readiness to repeat MBSS.        Long-term goals:  Mr. Paz will be able to   Consume at least one consistency for pleasure with no to limited signs/symptoms of aspiration.  Have improved speech intelligibility.

## 2025-04-21 NOTE — PROGRESS NOTES
OTOLARYNGOLOGY CLINIC NOTE  Date:  04/21/2025     Chief complaint:  Chief Complaint   Patient presents with    Follow-up     2 mo f/u-CA surv       History of Present Illness  Fan Paz is a 85 y.o. male  presenting today for a followup.    Has been having some colored secretions  Since last visit he has seen dr hawk for evaluation if he would be a candidate for trach decannulation. He has also been evaluated regarding swallowing function.   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 Sinus surgery was done after cancer surgery. Dr. Rushing also did cancer surgery. Had rt and needed feeding tube during that. Had most of teeth extracted except for 6 on the bottom.     He then presented with new oral cavity cancer of the tongue on 10-3-24 and underwent surgery with dr mai consisting of glossectomy and radial forearm flap 11-15-24    I last saw the patient on 2-14-25. Below text is copied from  note on that date describing history of present illness at that time :  Patient is known to me- found to have new oral tongue cancer ( had prior history of head and neck cancer that I was seeing him for) and was referred to dr mai for surgery. Notes in epic reviewed. Family here today ( daughter and wife) to discuss questions they had about his course and plans.      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-notes reviewed    Past Medical History  Past Medical History:   Diagnosis Date    Arthritis     Cancer of palate     GERD (gastroesophageal reflux disease)     HTN  (hypertension)     Hyperlipidemia     Prostate cancer     2011    Pulmonary embolism         Past Surgical History  Past Surgical History:   Procedure Laterality Date    BACK SURGERY  y-6    Cancer of palate surgery      Late [facility]'s- [facility]     CARPAL TUNNEL RELEASE  8-14-13    right hand,Left hand 2013    COLONOSCOPY N/A 4/10/2017    Procedure: COLONOSCOPY;  Surgeon: Nilo Kelly MD;  Location: [facility] ENDO;  Service: Endoscopy;  Laterality: N/A;    COLONOSCOPY N/A 10/21/2020    Procedure: COLONOSCOPY;  Surgeon: Demetrius Araujo MD;  Location: [facility] ENDO;  Service: Endoscopy;  Laterality: N/A;    ESOPHAGOGASTRODUODENOSCOPY N/A 5/3/2023    Procedure: EGD (ESOPHAGOGASTRODUODENOSCOPY);  Surgeon: Shauna Lopez MD;  Location: [facility] ENDO;  Service: Endoscopy;  Laterality: N/A;  EGD (with Dr. Lopez or Dr. Parada), : 1-4 weeks, Provider: Dr. Lopez or Dr. Parada Location: [facility], instructions sent to email address [email or web address]    ESOPHAGOGASTRODUODENOSCOPY W/ PEG N/A 11/15/2024    Procedure: EGD, WITH PEG TUBE INSERTION;  Surgeon: Rodrigo Chavez MD;  Location: [facility] OR 2ND FLR;  Service: General;  Laterality: N/A;    FREE FLAP RADIAL FOREARM Left 11/15/2024    Procedure: CREATION, FREE FLAP, FOREARM, RADIAL ASPECT;  Surgeon: Katina Thornton MD;  Location: [facility] OR 2ND FLR;  Service: General;  Laterality: Left;    GLOSSECTOMY Left 11/15/2024    Procedure: GLOSSECTOMY;  Surgeon: Cristhian Bailon MD;  Location: [facility] OR 2ND FLR;  Service: ENT;  Laterality: Left;    KNEE SURGERY  y-41    left knee    KNEE SURGERY Right 12-1-15    TKR    LARYNGOSCOPY N/A 1/24/2025    Procedure: LARYNGOSCOPY;  Surgeon: Cristhian Bailon MD;  Location: [facility] OR 2ND FLR;  Service: ENT;  Laterality: N/A;    MODIFIED RADICAL NECK DISSECTION Left 11/15/2024    Procedure: DISSECTION, NECK, MODIFIED RADICAL;  Surgeon: Cristhian Bailon MD;  Location: [facility] OR 2ND FLR;  Service: ENT;  Laterality: Left;    Radio active seed  Implant      2012    REVISION OF KNEE ARTHROPLASTY Left 2023    Procedure: REVISION, ARTHROPLASTY, KNEE;  Surgeon: Dustin Paul MD;  Location: [address];  Service: Orthopedics;  Laterality: Left;    SKIN FULL THICKNESS GRAFT Left 11/15/2024    Procedure: APPLICATION, GRAFT, SKIN, FULL-THICKNESS;  Surgeon: Katina Thornton MD;  Location: [address];  Service: General;  Laterality: Left;    TOTAL HIP ARTHROPLASTY  3/2011    TRACHEOTOMY N/A 11/15/2024    Procedure: TRACHEOTOMY;  Surgeon: Cristhian Bailon MD;  Location: [address];  Service: ENT;  Laterality: N/A;        Medications  Medications Ordered Prior to Encounter[1]    Review of Systems  Review of Systems   Constitutional: Negative.    Eyes: Negative.    Respiratory: Negative.     Cardiovascular: Negative.    Gastrointestinal: Negative.    Genitourinary: Negative.    Musculoskeletal: Negative.    Skin: Negative.    Neurological: Negative.    Psychiatric/Behavioral: Negative.            Answers submitted by the patient for this visit:  Review of Symptoms Questionnaire  (Submitted on 2025)  trouble swallowing: Yes  Seasonal Allergies?: Yes      Social History   reports that he quit smoking about 27 years ago. His smoking use included cigarettes. He started smoking about 47 years ago. He has a 60 pack-year smoking history. He has been exposed to tobacco smoke. He has never used smokeless tobacco. He reports that he does not currently use alcohol. He reports that he does not use drugs.     Family History  Family History   Problem Relation Name Age of Onset    No Known Problems Mother      Esophageal cancer Father      Coronary artery disease Father              Cancer Sister          Liver Cancer -    Diabetes Sister              No Known Problems Sister      No Known Problems Sister      Lung cancer Sister Tessa         Alive    Breast cancer Sister Tessa     Heart disease Sister Mireille     No Known  Problems Brother      No Known Problems Brother      Lung cancer Brother mel 60        - was a tobacco user, had lung cancer    Other (Lung cancer) Brother mel     Stroke Brother Ronak             No Known Problems Maternal Aunt      No Known Problems Maternal Uncle      No Known Problems Paternal Aunt      No Known Problems Paternal Uncle      No Known Problems Maternal Grandmother      No Known Problems Maternal Grandfather      No Known Problems Paternal Grandmother      No Known Problems Paternal Grandfather      Glaucoma Cousin      Macular degeneration Neg Hx      Strabismus Neg Hx      Amblyopia Neg Hx      Blindness Neg Hx      Cataracts Neg Hx      Hypertension Neg Hx      Retinal detachment Neg Hx      Thyroid disease Neg Hx      Colon cancer Neg Hx          Physical Exam   Vitals:    25 1113   BP: (!) 141/65    Body mass index is 27.26 kg/m².            GENERAL: no acute distress.  HEAD: normocephalic.   EYES: No scleral icterus  EARS: external ear without lesion, normal pinna shape and position.  External auditory canal with normal cerumen, tympanic membrane fully visible, no perforation , no retraction. No middle ear effusion. Ossicles intact.   NOSE: external nose without significant bony abnormality  ORAL CAVITY/OROPHARYNX: tongue with restricted mobility, free flap recon s/p surgical changes. White plaques along mucosa and  buccal region concerning for developing thrush   NECK: trachea midline. Trahceostomy tube in place - changed to new size 6 cuffless shiley trach- mild peristomal granulation tissue   LYMPH NODES:No cervical lymphadenopathy. Severe postradiation changes   RESPIRATORY: no stridor, no stertor. Voice - able to make voice with cap on but difficult to understand Respirations nonlabored.  NEURO: alert, responds to questions appropriately.    PSYCH:mood appropriate    PROCEDURE NOTE  NAME OF PROCEDURE: Flexible Laryngoscopy, diagnostic  INDICATIONS: gag reflex  "precludes mirror exam, unilateral otalgia , history of head and neck cancer -surveillance exam  FINDINGS: laryngeal stenosis, no recurrent nor new malignancy     Consent: After procedure was explained in detail and all questions answered, verbal consent was obtained for performing flexible laryngoscopy.  Anesthesia: topical 4% lidocaine and neosynephrine  Procedure: With patient in seated position, the scope was inserted into the bilateral nasal passageway and advanced atraumatically into the nasopharynx to examine the following structures:  Nasal cavity: Turbinates with moderate hypertrophy. no middle meatal edema. No purulent drainage.   Nasopharynx: no mass or lesion noted in nasopharynx.   Oropharynx: base of tongue without  mass or ulceration. Postradiation mucosal changes  Hypopharynx: posterior pharyngeal wall without mass or lesion. No pooling of secretions. Pyriform sinuses visible without mass or lesion  Larynx: epiglottis normal without lesion. False vocal folds without edema/erythema/lesion. True vocal folds without lesion- adducted vocal folds with no motion. At times can see slight posterior glottic chink. Postcricoid region with mild edema no lesion   Subglottis: visualized portion of subglottis normal in appearance    After examination performed, the scope was removed atraumatically . The patient tolerated the procedure well. Photodocumentation obtained with representative images below, all images and/or videos uploaded in media section of epic.                              Imaging:  The patient does have any new imaging of the head and neck since last visit.   Pet ct  images and report personally reviewed and reviewed with patient. Radiology report in quotations below  "Interval postoperative changes status post left hemiglossectomy and left neck lymph node dissection.  No convincing hypermetabolic activity to suggest residual/recurrent disease or metastasis.     Prominent, symmetric tracer uptake " "within vocal cords noting edematous appearance on CT and tracheostomy tube in place.  Recommend clinical correlation. "    Labs:  CBC  Recent Labs   Lab 11/21/24 2108 12/05/24  1634 02/14/25  1058   WBC 6.62 8.25 5.38   Hemoglobin 10.7 L 11.9 L 12.0 L   Hematocrit 34.0 L 36.9 L 39.1 L   MCV 87 84 87   Platelets 223 379 243     BMP  Recent Labs   Lab 11/18/24  0324 11/19/24  0414 11/21/24 2108 12/05/24  1634 02/14/25  1058   Glucose 111 H 123 H 115 H 103 108   Sodium 138 140 143 135 L 140   Potassium 3.6 3.9 3.9 5.1 4.6   Chloride 106 106 111 H 100 103   CO2 24 25 23 26 30 H   BUN 17 20 22 23 26 H   Creatinine 0.8 0.8 0.8 0.9 0.8   Calcium 8.6 L 8.4 L 8.8 9.4 9.5   Phosphorus 1.8 L 2.2 L 2.2 L  --   --    Magnesium 2.3 2.3 2.3  --   --      COAGS  Recent Labs   Lab 05/10/23  1210   INR 1.0       Assessment  1. Dysphonia    2. History of head and neck radiation    3. History of oral cancer    4. Pharyngoesophageal dysphagia    5. Squamous cell cancer of tongue    6. Tracheostomy tube present    7. Adverse effect of radiation, subsequent encounter    8. Otalgia of left ear    9. Encounter for follow-up surveillance of oral cavity cancer     10. Seasonal allergic rhinitis, unspecified trigger    11. Asymmetrical sensorineural hearing loss    12. Thrush       Plan:  Discussed plan of care with patient in detail and all questions answered. Patient reported understanding of plan of care. I gave the patient the opportunity to ask questions and patient confirmed all questions answered to satisfaction.     Trach changed today without issue - they will bring supplies to visits for the change  Q4 months for trach change; surveillence q2 months for year 1   Ask alphonso about if ok to do nystatin liquid on swab and would not aspirate - I think would be ok , I do not have any sqwabs in my office, may have at cancer center but I am not sure.   I think may be getting some thrush in oral cavity   Continue slp therapy - patient " inquired about if going to be able to swallow safely again- unclear at this time has had significant changes with larynx since pre surgery but did have radiation changes prior just not as significant of issue compared to now which is not helping either but best chance for improved swallow is continue working with alphonso  Family inquired about frequency of therapy I discussed that would be determined by alphonso and I am ok with whatever she determines is frequency for visits regarding therapy     No mucosal lesion nor concern of postcricoid area on flex scope the correlates with pet/ct either , suspect post tx changes- dr mai also reviewed and agrees presley    F/u 2 months for surveillence visit , hearing test at follow up -last test done April 2024    I spent a total of 40 minutes on the day of the visit ( this does not include time for procedure which was additional time for a total visit time of 44 minutes ).  This includes face to face time and non-face to face time preparing to see the patient (eg, review of tests), obtaining and/or reviewing separately obtained history, documenting clinical information in the electronic or other health record, independently interpreting results and communicating results to the patient/family/caregiver, or care coordinator.   Please be aware that this note has been generated with the assistance of MModal voice-to-text.  Please excuse any spelling or grammatical errors.           [1]   Current Outpatient Medications on File Prior to Visit   Medication Sig Dispense Refill    acetaminophen (TYLENOL) 650 MG TbSR Take 1 tablet (650 mg total) by mouth every 8 (eight) hours. (Patient not taking: Reported on 4/1/2025) 120 tablet 0    azelastine (ASTELIN) 137 mcg (0.1 %) nasal spray 1 spray (137 mcg total) by Nasal route 2 (two) times daily. (Patient not taking: Reported on 4/1/2025) 30 mL 3    docusate sodium (STOOL SOFTENER ORAL) Take by mouth as needed. (Patient not taking: Reported on  4/1/2025)      fluticasone propionate (FLONASE) 50 mcg/actuation nasal spray 1 spray (50 mcg total) by Each Nostril route 2 (two) times daily. (Patient not taking: Reported on 4/1/2025) 9.9 mL 3    HYDROcodone-acetaminophen (NORCO) 5-325 mg per tablet Take 1 tablet by mouth every 6 (six) hours as needed for Pain. (Patient not taking: Reported on 4/1/2025) 5 tablet 0    losartan (COZAAR) 50 MG tablet Take 1 tablet (50 mg total) by mouth once daily. (Patient not taking: Reported on 4/1/2025) 90 tablet 3    montelukast (SINGULAIR) 10 mg tablet TAKE 1 TABLET BY MOUTH ONCE DAILY IN THE EVENING (Patient not taking: Reported on 4/1/2025) 90 tablet 2    multivitamin/iron/folic acid (CENTRUM COMPLETE ORAL) Take 1 tablet by mouth once daily. (Patient not taking: Reported on 4/1/2025)      oxyCODONE (ROXICODONE) 5 MG immediate release tablet Take 1 tablet (5 mg total) by mouth every 6 (six) hours as needed for Pain (pain refractory to over the counter meds). (Patient not taking: Reported on 4/1/2025) 12 tablet 0    pantoprazole (PROTONIX) 40 MG tablet Take 1 tablet (40 mg total) by mouth once daily. (Patient not taking: Reported on 4/1/2025) 90 tablet 3    simvastatin (ZOCOR) 40 MG tablet Take 1 tablet (40 mg total) by mouth every evening. (Patient not taking: Reported on 4/1/2025) 90 tablet 3    tamsulosin (FLOMAX) 0.4 mg Cap Take 1 capsule by mouth once daily (Patient not taking: Reported on 4/1/2025) 90 capsule 2    white petrolatum (AQUAPHOR ORIGINAL) 41 % Oint Apply topically 2 (two) times a day. Apply to incision lines twice per day (Patient not taking: Reported on 4/1/2025) 396 g 0     No current facility-administered medications on file prior to visit.

## 2025-04-21 NOTE — PATIENT INSTRUCTIONS
"Stretches:  Roll up a hand towel (or washcloth) and lie with it between the shoulder blades.  Hold for 30 seconds.  Do 10x.  Alternatively, stand facing a corner and put one hand on each wall.  Lean in until you feel a stretch; hold 30 seconds; do 10x.  Turn your head to the side to try to place your chin over your shoulder.  Hold, then turn to the opposite side.  Look forward, then drop your head to one side to try to place your ear over your shoulder.  Hold, then rotate your head so that your chin is near/on your chest.  Let your head be a weight and hold that for 6 seconds.  Rotate your head to the opposite side, and try to place that ear over that shoulder.  Hold.   "The Bulldog":  Extend your jaw forward and raise your lower lip as high as you can over your upper lip.  Then lift your chin until you feel a stretch; hold.  Como your fingers to your chin, then use your thumb to massage in little circles in the area under your chin.    Someone can also just massage the neck, shoulders, and even up to the bony prominence on the back of the head.    Tongue stretches:  Do 3-4x/day.  Try with a mirror.  Stick out your tongue and hold 15 (building up to 30) seconds.  Do 10x.  Move your tongue to the left and hold 15 (building up to 30) seconds. Do 10x.  Raise your tongue tip to try to touch the top gums/teeth; hold 30 seconds.  Do 10x.      Before trying plain water, clean your mouth well.  Warm-up:  Use a toothette.  Soak it, then wring most of the water out.  Swab your mouth.  Practice getting a swallow going.  Do 5.    Use a teaspoon (5-mL).    Keep your head level.    Hold your breath and cover your trach (if not wearing your ).  Swallow hard until the water is gone.  Cough out.  Swallow hard again.  Test your voice -- if it sounds wet, repeat swallow - cough - swallow, then test your voice again.  Do up to 5 teaspoonfuls 3x/day.  "

## 2025-04-22 ENCOUNTER — PATIENT MESSAGE (OUTPATIENT)
Dept: OTOLARYNGOLOGY | Facility: CLINIC | Age: 85
End: 2025-04-22
Payer: MEDICARE

## 2025-04-22 ENCOUNTER — HOSPITAL ENCOUNTER (EMERGENCY)
Facility: HOSPITAL | Age: 85
Discharge: HOME | End: 2025-04-22
Attending: EMERGENCY MEDICINE
Payer: MEDICARE

## 2025-04-22 VITALS
HEIGHT: 65 IN | HEART RATE: 46 BPM | DIASTOLIC BLOOD PRESSURE: 84 MMHG | SYSTOLIC BLOOD PRESSURE: 192 MMHG | OXYGEN SATURATION: 100 % | BODY MASS INDEX: 26.82 KG/M2 | TEMPERATURE: 98 F | WEIGHT: 161 LBS | RESPIRATION RATE: 19 BRPM

## 2025-04-22 DIAGNOSIS — Z93.0 TRACHEOSTOMY IN PLACE: ICD-10-CM

## 2025-04-22 DIAGNOSIS — R04.2 BLOOD-TINGED SPUTUM: Primary | ICD-10-CM

## 2025-04-22 LAB
ABSOLUTE EOSINOPHIL (OHS): 0.08 K/UL
ABSOLUTE MONOCYTE (OHS): 0.56 K/UL (ref 0.3–1)
ABSOLUTE NEUTROPHIL COUNT (OHS): 3.84 K/UL (ref 1.8–7.7)
ALBUMIN SERPL BCP-MCNC: 3.6 G/DL (ref 3.5–5.2)
ALP SERPL-CCNC: 79 UNIT/L (ref 40–150)
ALT SERPL W/O P-5'-P-CCNC: 10 UNIT/L (ref 10–44)
ANION GAP (OHS): 6 MMOL/L (ref 8–16)
AST SERPL-CCNC: 17 UNIT/L (ref 11–45)
BASOPHILS # BLD AUTO: 0.03 K/UL
BASOPHILS NFR BLD AUTO: 0.6 %
BILIRUB SERPL-MCNC: 0.5 MG/DL (ref 0.1–1)
BUN SERPL-MCNC: 14 MG/DL (ref 8–23)
CALCIUM SERPL-MCNC: 9.4 MG/DL (ref 8.7–10.5)
CHLORIDE SERPL-SCNC: 104 MMOL/L (ref 95–110)
CO2 SERPL-SCNC: 28 MMOL/L (ref 23–29)
CREAT SERPL-MCNC: 0.8 MG/DL (ref 0.5–1.4)
ERYTHROCYTE [DISTWIDTH] IN BLOOD BY AUTOMATED COUNT: 16 % (ref 11.5–14.5)
GFR SERPLBLD CREATININE-BSD FMLA CKD-EPI: >60 ML/MIN/1.73/M2
GLUCOSE SERPL-MCNC: 78 MG/DL (ref 70–110)
HCT VFR BLD AUTO: 37.3 % (ref 40–54)
HGB BLD-MCNC: 11.2 GM/DL (ref 14–18)
IMM GRANULOCYTES # BLD AUTO: 0.01 K/UL (ref 0–0.04)
IMM GRANULOCYTES NFR BLD AUTO: 0.2 % (ref 0–0.5)
LYMPHOCYTES # BLD AUTO: 0.77 K/UL (ref 1–4.8)
MCH RBC QN AUTO: 25.5 PG (ref 27–31)
MCHC RBC AUTO-ENTMCNC: 30 G/DL (ref 32–36)
MCV RBC AUTO: 85 FL (ref 82–98)
NUCLEATED RBC (/100WBC) (OHS): 0 /100 WBC
OHS QRS DURATION: 102 MS
OHS QRS DURATION: 92 MS
OHS QTC CALCULATION: 360 MS
OHS QTC CALCULATION: 373 MS
PLATELET # BLD AUTO: 201 K/UL (ref 150–450)
PMV BLD AUTO: 11 FL (ref 9.2–12.9)
POTASSIUM SERPL-SCNC: 3.8 MMOL/L (ref 3.5–5.1)
PROT SERPL-MCNC: 8.1 GM/DL (ref 6–8.4)
RBC # BLD AUTO: 4.4 M/UL (ref 4.6–6.2)
RELATIVE EOSINOPHIL (OHS): 1.5 %
RELATIVE LYMPHOCYTE (OHS): 14.6 % (ref 18–48)
RELATIVE MONOCYTE (OHS): 10.6 % (ref 4–15)
RELATIVE NEUTROPHIL (OHS): 72.5 % (ref 38–73)
SODIUM SERPL-SCNC: 138 MMOL/L (ref 136–145)
WBC # BLD AUTO: 5.29 K/UL (ref 3.9–12.7)

## 2025-04-22 PROCEDURE — 99285 EMERGENCY DEPT VISIT HI MDM: CPT | Mod: 25

## 2025-04-22 PROCEDURE — 99900035 HC TECH TIME PER 15 MIN (STAT)

## 2025-04-22 PROCEDURE — 85025 COMPLETE CBC W/AUTO DIFF WBC: CPT

## 2025-04-22 PROCEDURE — 93010 ELECTROCARDIOGRAM REPORT: CPT | Mod: ,,, | Performed by: INTERNAL MEDICINE

## 2025-04-22 PROCEDURE — 93005 ELECTROCARDIOGRAM TRACING: CPT

## 2025-04-22 PROCEDURE — 84450 TRANSFERASE (AST) (SGOT): CPT

## 2025-04-22 PROCEDURE — 94761 N-INVAS EAR/PLS OXIMETRY MLT: CPT

## 2025-04-22 NOTE — ED TRIAGE NOTES
Patient presents to the ED today with Multiple Complaints. Patient reports trach problem this AM states got up this morning experiencing SOB and noted blood to trach. Patient and family also noted small amount of tissue from trach. Patient denies any current Sob and states bleeding has resolved at this time. Patient also reports L posterior CHACON at this time states started as tingling but over the past month as progressed to HA. Hx of squamous cell cancer of tongue reports surgery and trach/ feeding tube placement in November 2024. Patient family states has noticed patient experiencing overall generalized fatigue since surgery in November.

## 2025-04-22 NOTE — ED PROVIDER NOTES
Source of History:  Patient and chart review    Chief complaint:  trach Problem (Had some bleeding from trach, none at present, ) and Headache    HPI:  Fan Paz is a 85 y.o. 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  Patient also complaining of left posterior headache since surgery described as pins and needles.  Headache is not associated with changes in vision, nausea/vomiting, confusion, or weakness.  Denies recent fevers/chills, nausea/vomiting, shortness of breath, and chest pain    Seen by ENT yesterday, where the patient had first trach exchange since it was placed. Bedside flexible laryngoscopy was performed and demonstrated laryngeal stenosis with no mucosal lesions, recurrent or new malignancy.    Review of patient's allergies indicates:  No Known Allergies    Medications Ordered Prior to Encounter[1]    PMH:  As per HPI and below:  Past Medical History:   Diagnosis Date    Arthritis     Cancer of palate     GERD (gastroesophageal reflux disease)     HTN (hypertension)     Hyperlipidemia     Prostate cancer     2011    Pulmonary embolism      Past Surgical History:   Procedure Laterality Date    BACK SURGERY  y-6    Cancer of palate surgery      Late 90's- [facility]     CARPAL TUNNEL RELEASE  8-14-13    right hand,Left hand 2013    COLONOSCOPY N/A 4/10/2017    Procedure: COLONOSCOPY;  Surgeon: Nilo Kelly MD;  Location: [facility];  Service: Endoscopy;  Laterality: N/A;    COLONOSCOPY N/A 10/21/2020    Procedure: COLONOSCOPY;  Surgeon: Demetrius Araujo MD;  Location: [facility] ENDO;  Service: Endoscopy;  Laterality: N/A;    ESOPHAGOGASTRODUODENOSCOPY N/A 5/3/2023    Procedure: EGD (ESOPHAGOGASTRODUODENOSCOPY);   Surgeon: Shauna Lopez MD;  Location: [facility] ENDO;  Service: Endoscopy;  Laterality: N/A;  EGD (with Dr. Lopez or Dr. Parada), : 1-4 weeks, Provider: Dr. Lopez or Dr. Parada Location: [facility], instructions sent to email address [email or web address]    ESOPHAGOGASTRODUODENOSCOPY W/ PEG N/A 11/15/2024    Procedure: EGD, WITH PEG TUBE INSERTION;  Surgeon: Rodrigo Chavez MD;  Location: [facility] OR 2ND FLR;  Service: General;  Laterality: N/A;    FREE FLAP RADIAL FOREARM Left 11/15/2024    Procedure: CREATION, FREE FLAP, FOREARM, RADIAL ASPECT;  Surgeon: Katina Thornton MD;  Location: [facility] FLR;  Service: General;  Laterality: Left;    GLOSSECTOMY Left 11/15/2024    Procedure: GLOSSECTOMY;  Surgeon: Cristhian Bailon MD;  Location: [facility] OR [facility] FLR;  Service: ENT;  Laterality: Left;    KNEE SURGERY  y-40    left knee    KNEE SURGERY Right 12-1-15    TKR    LARYNGOSCOPY N/A 1/24/2025    Procedure: LARYNGOSCOPY;  Surgeon: Cristhian Bailon MD;  [facility];  Service: ENT;  Laterality: N/A;    MODIFIED RADICAL NECK DISSECTION Left 11/15/2024    Procedure: DISSECTION, NECK, MODIFIED RADICAL;  Surgeon: Cristhian Bailon MD;  Location: [facility] FLR;  Service: ENT;  Laterality: Left;    Radio active seed Implant      January 2012    REVISION OF KNEE ARTHROPLASTY Left 6/2/2023    Procedure: REVISION, ARTHROPLASTY, KNEE;  Surgeon: Dustin Paul MD;  [facility];  Service: Orthopedics;  Laterality: Left;    SKIN FULL THICKNESS GRAFT Left 11/15/2024    Procedure: APPLICATION, GRAFT, SKIN, FULL-THICKNESS;  Surgeon: Katina Thornton MD;  [facility];  Service: General;  Laterality: Left;    TOTAL HIP ARTHROPLASTY  3/2011    TRACHEOTOMY N/A 11/15/2024    Procedure: TRACHEOTOMY;  Surgeon: Cristhian Bailon MD;  Location: [facility] FLR;  Service: ENT;  Laterality: N/A;       Social History     Socioeconomic History    Marital status:     Number of  children: 4   Occupational History     Employer: RETIRED   Tobacco Use    Smoking status: Former     Current packs/day: 0.00     Average packs/day: 3.0 packs/day for 20.0 years (60.0 ttl pk-yrs)     Types: Cigarettes     Start date: 7/10/1977     Quit date: 7/10/1997     Years since quittin.8     Passive exposure: Past    Smokeless tobacco: Never    Tobacco comments:     Quit -smoked for 40 years ,2 packs a day on an average   Substance and Sexual Activity    Alcohol use: Not Currently     Comment: used to drink Occasionally    Drug use: No    Sexual activity: Yes     Partners: Female     Social Drivers of Health     Financial Resource Strain: Low Risk  (2025)    Overall Financial Resource Strain (CARDIA)     Difficulty of Paying Living Expenses: Not hard at all   Food Insecurity: No Food Insecurity (2025)    Hunger Vital Sign     Worried About Running Out of Food in the Last Year: Never true     Ran Out of Food in the Last Year: Never true   Transportation Needs: No Transportation Needs (2025)    PRAPARE - Transportation     Lack of Transportation (Medical): No     Lack of Transportation (Non-Medical): No   Physical Activity: Inactive (2025)    Exercise Vital Sign     Days of Exercise per Week: 3 days     Minutes of Exercise per Session: 0 min   Stress: No Stress Concern Present (2025)    [place] [employer] - Occupational Stress Questionnaire     Feeling of Stress : Not at all   Housing Stability: Low Risk  (2025)    Housing Stability Vital Sign     Unable to Pay for Housing in the Last Year: No     Homeless in the Last Year: No       Family History   Problem Relation Name Age of Onset    No Known Problems Mother      Esophageal cancer Father      Coronary artery disease Father              Cancer Sister          Liver Cancer -    Diabetes Sister              No Known Problems Sister      No Known Problems Sister      Lung cancer Sister Tessa          Alive    Breast cancer Sister Tessa     Heart disease Sister Mireille     No Known Problems Brother      No Known Problems Brother      Lung cancer Brother mel 60        - was a tobacco user, had lung cancer    Other (Lung cancer) Brother mel     Stroke Brother Ronak             No Known Problems Maternal Aunt      No Known Problems Maternal Uncle      No Known Problems Paternal Aunt      No Known Problems Paternal Uncle      No Known Problems Maternal Grandmother      No Known Problems Maternal Grandfather      No Known Problems Paternal Grandmother      No Known Problems Paternal Grandfather      Glaucoma Cousin      Macular degeneration Neg Hx      Strabismus Neg Hx      Amblyopia Neg Hx      Blindness Neg Hx      Cataracts Neg Hx      Hypertension Neg Hx      Retinal detachment Neg Hx      Thyroid disease Neg Hx      Colon cancer Neg Hx         Physical Exam:      Vitals:    25 1234   BP: (!) 192/84   Pulse: (!) 46   Resp: 19   Temp: 97.7 °F (36.5 °C)     Physical Exam    Gen: No acute distress  Mental Status: Alert and answering questions appropriately  Skin: Warm, dry. No rashes seen  Eyes: No conjunctival injection  Pulm: CTAB. No increased work of breathing, significant tachypnea, or conversational dyspnea    CV: RRR, no m/r/g  Abd: Soft, non distended, non tender to palpation. No guarding or rebound  MSK: No obvious deformities. WWP.  Neuro: Awake. Speech normal. No focal neuro deficit observed    Procedures  Laboratory Studies:  Labs Reviewed   COMPREHENSIVE METABOLIC PANEL - Abnormal       Result Value    Sodium 138      Potassium 3.8      Chloride 104      CO2 28      Glucose 78      BUN 14      Creatinine 0.8      Calcium 9.4      Protein Total 8.1      Albumin 3.6      Bilirubin Total 0.5      ALP 79      AST 17      ALT 10      Anion Gap 6 (*)     eGFR >60     CBC WITH DIFFERENTIAL - Abnormal    WBC 5.29      RBC 4.40 (*)     HGB 11.2 (*)     HCT 37.3 (*)     MCV 85       MCH 25.5 (*)     MCHC 30.0 (*)     RDW 16.0 (*)     Platelet Count 201      MPV 11.0      Nucleated RBC 0      Neut % 72.5      Lymph % 14.6 (*)     Mono % 10.6      Eos % 1.5      Basophil % 0.6      Imm Grans % 0.2      Neut # 3.84      Lymph # 0.77 (*)     Mono # 0.56      Eos # 0.08      Baso # 0.03      Imm Grans # 0.01     CBC W/ AUTO DIFFERENTIAL    Narrative:     The following orders were created for panel order CBC auto differential.                  Procedure                               Abnormality         Status                                     ---------                               -----------         ------                                     CBC with Differential[[id number]]       Abnormal            Final result                                                 Please view results for these tests on the individual orders.       Imaging Results              X-Ray Chest 1 View (Final result)  Result time 04/22/25 12:14:04      Final result by Talha Bhatti MD (04/22/25 12:14:04)                   Impression:      No significant detrimental interval change in the appearance of the chest since 12/05/2024 at 17:50 is appreciated.      Electronically signed by: Talha Bhatti MD  Date:    04/22/2025  Time:    12:14               Narrative:    EXAMINATION:  XR CHEST 1 VIEW    TECHNIQUE:  One view    COMPARISON:  Comparison is made to 12/05/2024 at 17:50.    FINDINGS:  Tracheostomy cannula is again identified, as is a gastrostomy appliance in the left upper abdominal quadrant.  Heart size is within normal limits, as is the appearance of the pulmonary vascularity.  Lung zones appear essentially clear, and are free of significant airspace consolidation or volume loss.  No pleural fluid of any substantial volume is seen on either side.  No abnormal mediastinal widening.  No pneumothorax.  Surgical clips in the left supraclavicular soft tissues are incidentally noted, as is the fact that the gas filled hepatic  flexure of the colon is interposed beneath the right hemidiaphragm.                                      Medications Given:  Medications - No data to display       Discussed with attending physician Dr. Gupta    MDM:    Fan Paz is a 85 y.o. male with above medical history who presents with blood tinged sputum after trach exchange.   Patient is afebrile, hemodynamically stable, and in no acute distress on arrival. Exam significant for benign cardiac, abdominal, and pulmonary exams.     Differential diagnoses considered include, but not limited to: Superficial bleeding    EKG per my interpretation demonstrates normal sinus rhythm, ventricular rate 48 beats per minute, Wenckebach AV block.  No STEMI.  Labs reassuring  Imaging demonstrates no significant pleural effusion, PTX, or lobar consolidation.    Low suspicion for tracheal innominate fistula or active bleeding given self-limited nature, resolution of symptoms, and recent negative ENT evaluation yesterday with flexible laryngoscopy.  Return precautions discussed with the patient and his family who acknowledge understanding.      Medical Decision Making  Amount and/or Complexity of Data Reviewed  Labs: ordered.  Radiology: ordered.           Diagnostic Impression:    1. Blood-tinged sputum    2. Tracheostomy in place         ED Disposition Condition    Discharge Good          ED Prescriptions    None       Follow-up Information       Follow up With Specialties Details Why Contact Info    Page, Otilio MONTIEL MD Family Medicine, Wound Care   [address] [facility] [address]  [phone]      [facility] - Emergency Dept Emergency Medicine  If symptoms worsen [address] [address] [phone]    [facility] Otolaryngology   [address] [id number] [phone]            Patient and/or family understands the plan and is in agreement, verbalized understanding, questions answered    Everardo  MD Wade  Resident  Emergency Medicine        Attending Attestation:   Physician Attestation Statement for Resident:  As the supervising MD   Physician Attestation Statement: I have personally seen and examined this patient.   I agree with the above history.  -:   As the supervising MD I agree with the above PE.     As the supervising MD I agree with the above treatment, course, plan, and disposition.                Attending ED Notes:   STAFF ATTENDING PHYSICIAN NOTE:  I have individually/jointly evaluated Fan Paz and discussed their ED management with the resident physician. I have also reviewed their notes, assessments, and procedures documented.  I was present during all critical portions of any procedure(s) performed on Fan Paz.   ____________________  Jim Gupta MD, [facility]  Emergency Medicine Staff                             Everardo Olvera MD  Resident  04/22/25 [id number]         [1]   No current facility-administered medications on file prior to encounter.     Current Outpatient Medications on File Prior to Encounter   Medication Sig Dispense Refill    losartan (COZAAR) 50 MG tablet Take 1 tablet (50 mg total) by mouth once daily. 90 tablet 3    montelukast (SINGULAIR) 10 mg tablet TAKE 1 TABLET BY MOUTH ONCE DAILY IN THE EVENING 90 tablet 2    simvastatin (ZOCOR) 40 MG tablet Take 1 tablet (40 mg total) by mouth every evening. 90 tablet 3    acetaminophen (TYLENOL) 650 MG TbSR Take 1 tablet (650 mg total) by mouth every 8 (eight) hours. 120 tablet 0    azelastine (ASTELIN) 137 mcg (0.1 %) nasal spray 1 spray (137 mcg total) by Nasal route 2 (two) times daily. (Patient not taking: Reported on 4/1/2025) 30 mL 3    clotrimazole (MYCELEX) 10 mg paul Take 1 tablet (10 mg total) by mouth 5 (five) times daily. for 14 days 70 tablet 0    docusate sodium (STOOL SOFTENER ORAL) Take by mouth as needed.      fluticasone propionate (FLONASE) 50 mcg/actuation nasal  spray 1 spray (50 mcg total) by Each Nostril route 2 (two) times daily. 9.9 mL 3    HYDROcodone-acetaminophen (NORCO) 5-325 mg per tablet Take 1 tablet by mouth every 6 (six) hours as needed for Pain. (Patient not taking: Reported on 4/1/2025) 5 tablet 0    multivitamin/iron/folic acid (CENTRUM COMPLETE ORAL) Take 1 tablet by mouth once daily.      nystatin (MYCOSTATIN) 100,000 unit/mL suspension Take 4 mLs (400,000 Units total) by mouth 4 (four) times daily. Put on a swab and swab in mouth and then spit out for 10 days 160 mL 0    oxyCODONE (ROXICODONE) 5 MG immediate release tablet Take 1 tablet (5 mg total) by mouth every 6 (six) hours as needed for Pain (pain refractory to over the counter meds). (Patient not taking: Reported on 4/1/2025) 12 tablet 0    pantoprazole (PROTONIX) 40 MG tablet Take 1 tablet (40 mg total) by mouth once daily. 90 tablet 3    tamsulosin (FLOMAX) 0.4 mg Cap Take 1 capsule by mouth once daily 90 capsule 2    white petrolatum (AQUAPHOR ORIGINAL) 41 % Oint Apply topically 2 (two) times a day. Apply to incision lines twice per day (Patient not taking: Reported on 4/1/2025) 396 g 0        Cristian Gupta MD  05/01/25 [id number]

## 2025-04-22 NOTE — DISCHARGE INSTRUCTIONS
Future Appointments   Date Time Provider Department Center   5/1/2025  1:00 PM Soraida Samuels MA, CCC-SLP [facility]   6/4/2025  1:40 PM Otilio Damon MD [facility]   6/26/2025  1:45 PM Wendi Quick MD [facility] E[facility]       Return to emergency department if your symptoms persist/worsen, or are associated with coughing up blood, fever/chills, lightheadedness, passing out, difficulty tolerating oral secretions, or difficulty swallowing. Follow-up with PCP within 2-3 days. Follow-up with ENT.

## 2025-04-22 NOTE — ED NOTES
Pt to ED for blood in sputum from trach.  Pt had laryngoscopy yesterday, states hemoptysis started after procedure.  Pt denies SOB, denies pain.  Pt RR even/unlabored, SPO2 98% on RA, airway patent.  RR even/unlabored.  Bed low/locked, siderails uxp2, pt agrees to plan of care.

## 2025-04-30 ENCOUNTER — EXTERNAL CHRONIC CARE MANAGEMENT (OUTPATIENT)
Dept: PRIMARY CARE CLINIC | Facility: CLINIC | Age: 85
End: 2025-04-30
Payer: MEDICARE

## 2025-04-30 PROCEDURE — 99490 CHRNC CARE MGMT STAFF 1ST 20: CPT | Mod: PBBFAC,PO | Performed by: FAMILY MEDICINE

## 2025-05-01 ENCOUNTER — CLINICAL SUPPORT (OUTPATIENT)
Dept: SPEECH THERAPY | Facility: HOSPITAL | Age: 85
End: 2025-05-01
Payer: MEDICARE

## 2025-05-01 DIAGNOSIS — R13.12 DYSPHAGIA, OROPHARYNGEAL: Primary | ICD-10-CM

## 2025-05-01 DIAGNOSIS — Z85.819 HISTORY OF ORAL CANCER: ICD-10-CM

## 2025-05-01 DIAGNOSIS — Z98.890 S/P PARTIAL GLOSSECTOMY: ICD-10-CM

## 2025-05-01 PROCEDURE — 92526 ORAL FUNCTION THERAPY: CPT | Mod: GN | Performed by: SPEECH-LANGUAGE PATHOLOGIST

## 2025-05-01 NOTE — PROGRESS NOTES
"DIAGNOSIS:  Dysphagia, oropharyngeal (R13.12), s/p partial glossectomy (Z98.890), History head and neck radiation (Z92.3)  REFERRING DOCTOR:  Wendi Quick M.D., Otorhinolaryngologist  LENGTH OF SESSION:  50 minutes    REASON FOR VISIT:  Swallowing and speech therapy    INTERVAL HISTORY:  Mr. Paz reported that he has done stretches daily and had practiced using 3-8 (5-mL) boluses of water 3-4x/day.  Reported that on "good" days, he swallowed 7-8 boluses and on "bad" days, 3-4 boluses.  He thought mucous in his throat may be what dictated some days to be good or bad.    Persisting  pain in L posterior parietal area of head that comes/goes.  Denied that he changed his pattern of stretching neck/shoulders since last visit.   Still treating oral thrush.      INTERVENTION:  Reviewed strategies to use to limit airway and GI mucous as much as possible (see AVS).    Short-term objectives:  Mr. Paz will perform the following with % consistency/accuracy .  1.  Swallowing:  A.  Demonstrate the following swallowing exercises to clinician:  Effortful swallow  Attempting.  Supraglottic swallow  Continues with mixed success today.  Sounds of air moving between swallow attempts. Trials with mouth moistened via damp Toothette resulted in high variation in how quickly he could initiate a swallow, but there were more that were performed in a more timely manner.  Unfortuantely, he also needed repeated cueing to cough, then swallow again after the first swallow.      B.  Perform home program until fatigued 2x/daily.   N/a    C.  Perform oral cleaning prior to any PO trials.   Instructed.  He reported that he is routinely rinsing his mouth our and cleaning with a soft washcloth.  He sometimes wears his upper plate.    D.  Advance diet  5-mL plain water  2 trials. Halted after significant delays executing initial swallow and persisting wet voice quality.  2.   7.5-mL plain water  3.   5-mL nectar-thick liquid  4.   3- " "to 5-mL thin puree  5.   3- to 5-mL minced/moist solid    E.  Wear trach cap during all swallows, with and without nutrition (may remove in between).   Present today.  Used.    2.  Oral motor -- deferred direct this date              A.  Protrude tongue tip to external border of lower lip.    Barely able to contact interior border of lower lip.              B.  Lateralize tongue tip to L and R oral commissures.    Able to approximate R oral commissure, but not L.  Achieved about midline when attempting L.              C.  Elevate tongue tip to alveolar ridge.    Approximated, but did not contact.  Getting slightly greater elevation with more of a retraction movement.              D.  Elevate tongue dorsum to velum.     Approximated, but did not contact.              E.  Trace mandibular and maxillary arches with tongue tip; hold 5 seconds at greatest excursion achieved.    Deferred     3.  Neck stretches/massage   To continue in home program.  Still has not had any PT.  Has order from July 2024 re: finger, but none for neck/shoulders. They will contact Dr. Quick to request.    4.  Speech:  Produced target phonemes in isolation, words, phrases, sentences, and conversational speech.  Targets include, but are not limited to /t/, /d/, /k/, /g/, /s/, z/, "sh,", "ch," "j," /r/, "er" and its variants, and blends associated with these phonemes.   Unable to make contact with alveolar ridge or velum for /t/ or /k/.  Benefits from generally slower rate of speech and from use of cap during speech.    COUNSELING:  Based on current status, recommended continued home program using Toothettes to moisten mouth then execute dry swallows, but discontinued trials of thin liquids.  Instructed him to continue these 3-4x/day in sets limited by fatigue -- when he tires, stop.   Would also like him to pursue PT in case any contribution of improved posture and ROM re: neck/shoulders may contribute to improved swallowing function.  Would " like to see him again in 2 months, or earlier if he thinks he is improving in ability to initiate a swallow.  He was disappointed, but verbalized understanding.       IMPRESSIONS:   This 84 y.o. man appears to present with  1.  Significant oropharyngeal dysphagia characterized by reduced lingual ROM, reduced bolus control (per some pooling in the oral cavity), premature spillage to the valleculae, apparent velopharyngeal dysfunction, reduced pharyngeal contraction, reduced hyolaryngeal elevation/excursion, incomplete epiglottic inversion, incomplete and/or inconsistent laryngeal vestibule closure, and inconsistent tolerance of trach capping during swallowing resulting in laryngeal penetration to the TVFs with no spontaneous cough response.  Risk of aspiration is considered high at this time.  The dysphagia associated with the hemiglossectomy is compounded by now dual sources of reduced pressures related to inconsistent tolerance of trach capping during swallowing and velopharyngeal dysfunction.  2.  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 Bailon (DL, 1/24/25).  3.  PEG dependent.  4.  Trach dependent.  5.  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  6.  History  SCC of the L oral tongue.  7.  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  8.  History mild-moderate dysphagia per history s/p #9.  Took a minced and moist diet with thin liquids per report.  Cannot rule out late RAD at this point in his course following #9.  9.  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        RECOMMENDATIONS/PLAN OF CARE:   It is felt that Mr. Paz would benefit from  1.  Remaining NPO for his primary nutrition, hydration, and medication delivery.  2.  Suspend ST for now with recheck in 2 months (earlier as warranted).  3.  Consider PT to determine if any improvement in posture could  be achieved as this may benefit swallowing function.  4.  Continued f/u with Marcos Quick and Uvaldo as directed.  5.  Monitor for readiness to repeat MBSS.        Long-term goals:  Mr. Paz will be able to   Consume at least one consistency for pleasure with no to limited signs/symptoms of aspiration.  Not met at this time.  Have improved speech intelligibility.  Improved with slower rate and use of cap to % intelligibility depending on targets.

## 2025-05-01 NOTE — Clinical Note
"Anusha, I'm giving Mr. Paz a break from therapy to continue his home program targeting "dry" swallows (with moistened mouth). He really struggles to initiate a swallow and you can hear him defending his airway with throat clears while trying to get started.  IF this is going to improve (I'd like to see him able to take a sips of coffee), it needs more time at a really low level.  He was more consistent with how he tried to do the home program this week than the last time I saw him, so hopefully he'll keep trying.  I put him on again for early July, but advised he can return earlier if improving more rapidly.  He could also use a PT assessment for his neck/shoulders.  Any improvement there might help swallowing, too."

## 2025-05-01 NOTE — PATIENT INSTRUCTIONS
"To try to manage mucous:  Use a cool mist humidifier  Continue water via PEG  Slow the rate of the PEG tube feeding to 10 minutes.  Hold the tube low to help it slow down.  Swish and spit (diet soda like Diet 7-Up, diet Sprite, etc.)  Then clean your mouth  Then do your swallowing practice     Continue using the Toothette to moisten your mouth and then perform a "dry" swallow like this:  Cap on  Swallow  Cough  Swallow again  Do 3-4 sets per day.  In a set, continue doing these until you get tired.      "

## 2025-05-01 NOTE — PLAN OF CARE
IMPRESSIONS:   This 84 y.o. man appears to present with  1.  Significant oropharyngeal dysphagia characterized by reduced lingual ROM, reduced bolus control (per some pooling in the oral cavity), premature spillage to the valleculae, apparent velopharyngeal dysfunction, reduced pharyngeal contraction, reduced hyolaryngeal elevation/excursion, incomplete epiglottic inversion, incomplete and/or inconsistent laryngeal vestibule closure, and inconsistent tolerance of trach capping during swallowing resulting in laryngeal penetration to the TVFs with no spontaneous cough response.  Risk of aspiration is considered high at this time.  The dysphagia associated with the hemiglossectomy is compounded by now dual sources of reduced pressures related to inconsistent tolerance of trach capping during swallowing and velopharyngeal dysfunction.  2.  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 Bailon (DL, 1/24/25).  3.  PEG dependent.  4.  Trach dependent.  5.  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  6.  History  SCC of the L oral tongue.  7.  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  8.  History mild-moderate dysphagia per history s/p #9.  Took a minced and moist diet with thin liquids per report.  Cannot rule out late RAD at this point in his course following #9.  9.  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        RECOMMENDATIONS/PLAN OF CARE:   It is felt that Mr. Paz would benefit from  1.  Remaining NPO for his primary nutrition, hydration, and medication delivery.  2.  Suspend ST for now with recheck in 2 months (earlier as warranted).  3.  Consider PT to determine if any improvement in posture could be achieved as this may benefit swallowing function.  4.  Continued f/u with Marcos Quick and Uvaldo as directed.  5.  Monitor for readiness to repeat MBSS.         Long-term goals:  Mr. Paz will be able to   Consume at least one consistency for pleasure with no to limited signs/symptoms of aspiration.  Not met at this time.  Have improved speech intelligibility.  Improved with slower rate and use of cap to % intelligibility depending on targets.

## 2025-05-02 ENCOUNTER — TELEPHONE (OUTPATIENT)
Dept: OTOLARYNGOLOGY | Facility: CLINIC | Age: 85
End: 2025-05-02
Payer: MEDICARE

## 2025-05-02 DIAGNOSIS — R29.3 POSTURE ABNORMALITY: ICD-10-CM

## 2025-05-02 DIAGNOSIS — Z92.3 HISTORY OF HEAD AND NECK RADIATION: Primary | ICD-10-CM

## 2025-05-02 NOTE — TELEPHONE ENCOUNTER
Spoke with wife and speech therapist recommendation given to dr becerril. Will follow back up with wife next week.

## 2025-05-02 NOTE — TELEPHONE ENCOUNTER
----- Message from Zarina sent at 5/2/2025  3:59 PM CDT -----  .Type: Patient Call BackWho called: Bonnie - wife What is the request in detail: Stated she was informed  needs physical therapy as well Can the clinic reply by [facility]? No Would the patient rather a call back or a response via [facility]? Call Back Best call back number: .[phone] (home) [phone] (work)Additional Information:

## 2025-05-19 DIAGNOSIS — Z93.0 TRACHEOSTOMY TUBE PRESENT: Primary | ICD-10-CM

## 2025-05-19 RX ORDER — CIPROFLOXACIN AND DEXAMETHASONE 3; 1 MG/ML; MG/ML
4 SUSPENSION/ DROPS AURICULAR (OTIC) 2 TIMES DAILY
Qty: 7.5 ML | Refills: 1 | Status: SHIPPED | OUTPATIENT
Start: 2025-05-19 | End: 2025-05-26

## 2025-05-21 ENCOUNTER — TELEPHONE (OUTPATIENT)
Dept: OTOLARYNGOLOGY | Facility: CLINIC | Age: 85
End: 2025-05-21
Payer: MEDICARE

## 2025-05-21 DIAGNOSIS — Z93.0 TRACHEOSTOMY TUBE PRESENT: Primary | ICD-10-CM

## 2025-05-21 RX ORDER — CIPROFLOXACIN HYDROCHLORIDE 3 MG/ML
SOLUTION/ DROPS OPHTHALMIC
Qty: 10 ML | Refills: 1 | Status: SHIPPED | OUTPATIENT
Start: 2025-05-21

## 2025-05-21 NOTE — TELEPHONE ENCOUNTER
Spoke with pt daughters, states not picked up ciprodex gtts yet, informed insurance not covering, will separate the two medications to see if the insurance will cover them, so will have options to see if medication is cheaper if not can use good rx to see if can get cheaper. Verb understanding.

## 2025-05-31 ENCOUNTER — EXTERNAL CHRONIC CARE MANAGEMENT (OUTPATIENT)
Dept: PRIMARY CARE CLINIC | Facility: CLINIC | Age: 85
End: 2025-05-31
Payer: MEDICARE

## 2025-05-31 PROCEDURE — 99490 CHRNC CARE MGMT STAFF 1ST 20: CPT | Mod: PBBFAC,PO | Performed by: FAMILY MEDICINE

## 2025-06-04 ENCOUNTER — OFFICE VISIT (OUTPATIENT)
Dept: FAMILY MEDICINE | Facility: CLINIC | Age: 85
End: 2025-06-04
Payer: MEDICARE

## 2025-06-04 VITALS
RESPIRATION RATE: 18 BRPM | SYSTOLIC BLOOD PRESSURE: 136 MMHG | DIASTOLIC BLOOD PRESSURE: 68 MMHG | WEIGHT: 164.38 LBS | BODY MASS INDEX: 27.39 KG/M2 | HEART RATE: 53 BPM | HEIGHT: 65 IN | OXYGEN SATURATION: 98 %

## 2025-06-04 DIAGNOSIS — I10 ESSENTIAL HYPERTENSION: ICD-10-CM

## 2025-06-04 DIAGNOSIS — Z85.89 HISTORY OF HEAD AND NECK CANCER: Primary | ICD-10-CM

## 2025-06-04 DIAGNOSIS — R73.03 PREDIABETES: ICD-10-CM

## 2025-06-04 DIAGNOSIS — D64.9 ACUTE ANEMIA: ICD-10-CM

## 2025-06-04 DIAGNOSIS — E78.5 HYPERLIPIDEMIA, UNSPECIFIED HYPERLIPIDEMIA TYPE: ICD-10-CM

## 2025-06-04 DIAGNOSIS — Z85.46 HISTORY OF PROSTATE CANCER: ICD-10-CM

## 2025-06-04 PROBLEM — K59.00 CONSTIPATION: Status: RESOLVED | Noted: 2023-05-10 | Resolved: 2025-06-04

## 2025-06-04 PROBLEM — E87.5 HYPERKALEMIA: Status: RESOLVED | Noted: 2024-11-06 | Resolved: 2025-06-04

## 2025-06-04 PROCEDURE — 99999 PR PBB SHADOW E&M-EST. PATIENT-LVL V: CPT | Mod: PBBFAC,,, | Performed by: FAMILY MEDICINE

## 2025-06-04 PROCEDURE — 99215 OFFICE O/P EST HI 40 MIN: CPT | Mod: PBBFAC,PO | Performed by: FAMILY MEDICINE

## 2025-06-04 RX ORDER — SIMVASTATIN 40 MG/1
40 TABLET, FILM COATED ORAL NIGHTLY
Qty: 90 TABLET | Refills: 3 | Status: CANCELLED | OUTPATIENT
Start: 2025-06-04

## 2025-06-04 RX ORDER — PANTOPRAZOLE SODIUM 40 MG/1
40 TABLET, DELAYED RELEASE ORAL DAILY
Qty: 90 TABLET | Refills: 3 | Status: CANCELLED | OUTPATIENT
Start: 2025-06-04 | End: 2026-06-04

## 2025-06-04 RX ORDER — TAMSULOSIN HYDROCHLORIDE 0.4 MG/1
1 CAPSULE ORAL
Qty: 90 CAPSULE | Refills: 2 | Status: CANCELLED | OUTPATIENT
Start: 2025-06-04

## 2025-06-04 RX ORDER — LOSARTAN POTASSIUM 50 MG/1
50 TABLET ORAL DAILY
Qty: 90 TABLET | Refills: 3 | Status: CANCELLED | OUTPATIENT
Start: 2025-06-04

## 2025-06-11 ENCOUNTER — PATIENT MESSAGE (OUTPATIENT)
Dept: OTOLARYNGOLOGY | Facility: CLINIC | Age: 85
End: 2025-06-11
Payer: MEDICARE

## 2025-06-26 ENCOUNTER — OFFICE VISIT (OUTPATIENT)
Dept: OTOLARYNGOLOGY | Facility: CLINIC | Age: 85
End: 2025-06-26
Payer: MEDICARE

## 2025-06-26 ENCOUNTER — LAB VISIT (OUTPATIENT)
Dept: LAB | Facility: HOSPITAL | Age: 85
End: 2025-06-26
Attending: FAMILY MEDICINE
Payer: MEDICARE

## 2025-06-26 ENCOUNTER — PATIENT MESSAGE (OUTPATIENT)
Dept: SURGERY | Facility: CLINIC | Age: 85
End: 2025-06-26
Payer: MEDICARE

## 2025-06-26 ENCOUNTER — RESULTS FOLLOW-UP (OUTPATIENT)
Dept: FAMILY MEDICINE | Facility: CLINIC | Age: 85
End: 2025-06-26

## 2025-06-26 VITALS
HEIGHT: 65 IN | SYSTOLIC BLOOD PRESSURE: 124 MMHG | BODY MASS INDEX: 27.29 KG/M2 | DIASTOLIC BLOOD PRESSURE: 68 MMHG | WEIGHT: 163.81 LBS

## 2025-06-26 DIAGNOSIS — H61.22 IMPACTED CERUMEN OF LEFT EAR: ICD-10-CM

## 2025-06-26 DIAGNOSIS — D64.9 ACUTE ANEMIA: ICD-10-CM

## 2025-06-26 DIAGNOSIS — Z92.3 HISTORY OF HEAD AND NECK RADIATION: ICD-10-CM

## 2025-06-26 DIAGNOSIS — Z93.0 TRACHEOSTOMY TUBE PRESENT: ICD-10-CM

## 2025-06-26 DIAGNOSIS — K94.20 COMPLICATION OF FEEDING TUBE: Primary | ICD-10-CM

## 2025-06-26 DIAGNOSIS — R13.14 PHARYNGOESOPHAGEAL DYSPHAGIA: ICD-10-CM

## 2025-06-26 DIAGNOSIS — T66.XXXD ADVERSE EFFECT OF RADIATION, SUBSEQUENT ENCOUNTER: ICD-10-CM

## 2025-06-26 DIAGNOSIS — C02.9 SQUAMOUS CELL CANCER OF TONGUE: ICD-10-CM

## 2025-06-26 DIAGNOSIS — I10 ESSENTIAL HYPERTENSION: ICD-10-CM

## 2025-06-26 LAB
ABSOLUTE EOSINOPHIL (OHS): 0.16 K/UL
ABSOLUTE MONOCYTE (OHS): 0.57 K/UL (ref 0.3–1)
ABSOLUTE NEUTROPHIL COUNT (OHS): 3.71 K/UL (ref 1.8–7.7)
ALBUMIN SERPL BCP-MCNC: 3.9 G/DL (ref 3.5–5.2)
ALP SERPL-CCNC: 86 UNIT/L (ref 40–150)
ALT SERPL W/O P-5'-P-CCNC: 14 UNIT/L (ref 10–44)
ANION GAP (OHS): 9 MMOL/L (ref 8–16)
AST SERPL-CCNC: 21 UNIT/L (ref 11–45)
BASOPHILS # BLD AUTO: 0.04 K/UL
BASOPHILS NFR BLD AUTO: 0.7 %
BILIRUB SERPL-MCNC: 0.3 MG/DL (ref 0.1–1)
BUN SERPL-MCNC: 19 MG/DL (ref 8–23)
CALCIUM SERPL-MCNC: 9.5 MG/DL (ref 8.7–10.5)
CHLORIDE SERPL-SCNC: 102 MMOL/L (ref 95–110)
CO2 SERPL-SCNC: 28 MMOL/L (ref 23–29)
CREAT SERPL-MCNC: 0.8 MG/DL (ref 0.5–1.4)
ERYTHROCYTE [DISTWIDTH] IN BLOOD BY AUTOMATED COUNT: 16.1 % (ref 11.5–14.5)
GFR SERPLBLD CREATININE-BSD FMLA CKD-EPI: >60 ML/MIN/1.73/M2
GLUCOSE SERPL-MCNC: 91 MG/DL (ref 70–110)
HCT VFR BLD AUTO: 40.5 % (ref 40–54)
HGB BLD-MCNC: 12.6 GM/DL (ref 14–18)
IMM GRANULOCYTES # BLD AUTO: 0.01 K/UL (ref 0–0.04)
IMM GRANULOCYTES NFR BLD AUTO: 0.2 % (ref 0–0.5)
LYMPHOCYTES # BLD AUTO: 0.89 K/UL (ref 1–4.8)
MCH RBC QN AUTO: 27 PG (ref 27–31)
MCHC RBC AUTO-ENTMCNC: 31.1 G/DL (ref 32–36)
MCV RBC AUTO: 87 FL (ref 82–98)
NUCLEATED RBC (/100WBC) (OHS): 0 /100 WBC
PLATELET # BLD AUTO: 209 K/UL (ref 150–450)
PMV BLD AUTO: 10.5 FL (ref 9.2–12.9)
POTASSIUM SERPL-SCNC: 4.5 MMOL/L (ref 3.5–5.1)
PROT SERPL-MCNC: 8.6 GM/DL (ref 6–8.4)
RBC # BLD AUTO: 4.66 M/UL (ref 4.6–6.2)
RELATIVE EOSINOPHIL (OHS): 3 %
RELATIVE LYMPHOCYTE (OHS): 16.5 % (ref 18–48)
RELATIVE MONOCYTE (OHS): 10.6 % (ref 4–15)
RELATIVE NEUTROPHIL (OHS): 69 % (ref 38–73)
SODIUM SERPL-SCNC: 139 MMOL/L (ref 136–145)
WBC # BLD AUTO: 5.38 K/UL (ref 3.9–12.7)

## 2025-06-26 PROCEDURE — 69210 REMOVE IMPACTED EAR WAX UNI: CPT | Mod: 51,S$GLB,, | Performed by: OTOLARYNGOLOGY

## 2025-06-26 PROCEDURE — 82040 ASSAY OF SERUM ALBUMIN: CPT

## 2025-06-26 PROCEDURE — 31575 DIAGNOSTIC LARYNGOSCOPY: CPT | Mod: S$GLB,,, | Performed by: OTOLARYNGOLOGY

## 2025-06-26 PROCEDURE — 99214 OFFICE O/P EST MOD 30 MIN: CPT | Mod: 25,S$GLB,, | Performed by: OTOLARYNGOLOGY

## 2025-06-26 PROCEDURE — 36415 COLL VENOUS BLD VENIPUNCTURE: CPT

## 2025-06-26 PROCEDURE — 85025 COMPLETE CBC W/AUTO DIFF WBC: CPT

## 2025-06-26 RX ORDER — FLUTICASONE PROPIONATE 50 MCG
2 SPRAY, SUSPENSION (ML) NASAL 2 TIMES DAILY
Qty: 18.2 ML | Refills: 3 | Status: SHIPPED | OUTPATIENT
Start: 2025-06-26

## 2025-06-26 RX ORDER — AZELASTINE 1 MG/ML
1 SPRAY, METERED NASAL 2 TIMES DAILY
Qty: 30 ML | Refills: 3 | Status: SHIPPED | OUTPATIENT
Start: 2025-06-26

## 2025-06-26 RX ORDER — DEXAMETHASONE SODIUM PHOSPHATE 1 MG/ML
SOLUTION/ DROPS OPHTHALMIC
COMMUNITY
Start: 2025-05-22

## 2025-06-26 NOTE — PROGRESS NOTES
OTOLARYNGOLOGY CLINIC NOTE  Date:  06/26/2025     Chief complaint:  Chief Complaint   Patient presents with    Squamous cell cancer of tongue     2 mo f/u- CA surv with flex       History of Present Illness  Fan Paz is a 85 y.o. male  presenting today for a followup.  No bleeding around trach   Prior egd in 2023   One benign-appearing, intrinsic severe (stenosis; an endoscope        cannot pass) stenosis was found 19 cm from the incisors. The        stenosis was traversed after downsizing scope and dilating. A TTS        dilator was passed through the scope. Dilation with a 6-7-8 mm        balloon, an 8-9-10 mm balloon and a 10-11-12 mm balloon dilator was        performed to 12 mm. Mucosal disruption noted after dilation at 12mm.   He has been doing the PT for shoulder ROM etc  Saw gen surg earlier this year about the feeding tube and was told to use coke to clear it up and concerned about appearance and has some black dots in the tubing and she is concerned about mold.     It is flushign ok and no pain around the gtube site.   Has gotten bigger since it came up. Sometimes the swelling will go down a little bit. No redness. Randomly started swelling no trauma.     I last saw the patient on 4-21-25. Below text is copied from  note on that date describing history of present illness at that time :  Has been having some colored secretions  Since last visit he has seen dr hawk for evaluation if he would be a candidate for trach decannulation. He has also been evaluated regarding swallowing function.   He has not had his trach tube changed since October or November  Had pet in march of 2025 which was negative per notes in chart from dr mai      He has been working with alphonso  Has been having pain on left side of head in occiput area- initially was tingling but now just a pain      He has been taking tylenol 2 pills twice a day      Regarding onc history    had history of palate cancer and had  radiation. Sinus surgery was done after cancer surgery. Dr. Rushing also did cancer surgery. Had rt and needed feeding tube during that. Had most of teeth extracted except for 6 on the bottom.      He then presented with new oral cavity cancer of the tongue on 10-3-24 and underwent surgery with dr mai consisting of glossectomy and radial forearm flap 11-15-24     I last saw the patient on 2-14-25. Below text is copied from  note on that date describing history of present illness at that time :  Patient is known to me- found to have new oral tongue cancer ( had prior history of head and neck cancer that I was seeing him for) and was referred to dr mai for surgery. Notes in epic reviewed. Family here today ( daughter and wife) to discuss questions they had about his course and plans.      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-notes reviewed    Past Medical History  Past Medical History:   Diagnosis Date    Arthritis     Cancer of palate     GERD (gastroesophageal reflux disease)     HTN (hypertension)     Hyperlipidemia     Prostate cancer     2011    Pulmonary embolism         Past Surgical History  Past Surgical History:   Procedure Laterality Date    BACK SURGERY  y-6    Cancer of palate surgery      Late 90's- [facility]     CARPAL TUNNEL RELEASE  8-14-13    right hand,Left hand 2013    COLONOSCOPY N/A 4/10/2017    Procedure: COLONOSCOPY;  Surgeon: Nilo Kelly MD;  Location: [facility];  Service: Endoscopy;  Laterality: N/A;    COLONOSCOPY N/A 10/21/2020    Procedure: COLONOSCOPY;  Surgeon: Demetrius Araujo MD;  Location: [facility];  Service: Endoscopy;  Laterality: N/A;    ESOPHAGOGASTRODUODENOSCOPY N/A 5/3/2023    Procedure: EGD (ESOPHAGOGASTRODUODENOSCOPY);  Surgeon: Shauna Lopez MD;  Location: [facility];  Service: Endoscopy;  Laterality: N/A;  EGD (with Dr. Lopez or Dr. Parada), : 1-4 weeks, Provider: Dr. Lopez or   Tal Location:  Endo, instructions sent to email address [email or web address]    ESOPHAGOGASTRODUODENOSCOPY W/ PEG N/A 11/15/2024    Procedure: EGD, WITH PEG TUBE INSERTION;  Surgeon: Rodrigo Chavez MD;  Location: [facility] OR 2ND FLR;  Service: General;  Laterality: N/A;    FREE FLAP RADIAL FOREARM Left 11/15/2024    Procedure: CREATION, FREE FLAP, FOREARM, RADIAL ASPECT;  Surgeon: Katina Thornton MD;  Location: [facility] OR [employer] FLR;  Service: General;  Laterality: Left;    GLOSSECTOMY Left 11/15/2024    Procedure: GLOSSECTOMY;  Surgeon: Cristhian Bailon MD;  [facility];  Service: ENT;  Laterality: Left;    KNEE SURGERY  y-41    left knee    KNEE SURGERY Right 12-1-15    TKR    LARYNGOSCOPY N/A 1/24/2025    Procedure: LARYNGOSCOPY;  Surgeon: Cristhian Bailon MD;  [facility];  Service: ENT;  Laterality: N/A;    MODIFIED RADICAL NECK DISSECTION Left 11/15/2024    Procedure: DISSECTION, NECK, MODIFIED RADICAL;  Surgeon: Cristhian Bailon MD;  [facility];  Service: ENT;  Laterality: Left;    Radio active seed Implant      January 2012    REVISION OF KNEE ARTHROPLASTY Left 6/2/2023    Procedure: REVISION, ARTHROPLASTY, KNEE;  Surgeon: Dustin Paul MD;  [facility];  Service: Orthopedics;  Laterality: Left;    SKIN FULL THICKNESS GRAFT Left 11/15/2024    Procedure: APPLICATION, GRAFT, SKIN, FULL-THICKNESS;  Surgeon: Katina Thornton MD;  [facility];  Service: General;  Laterality: Left;    TOTAL HIP ARTHROPLASTY  3/2011    TRACHEOTOMY N/A 11/15/2024    Procedure: TRACHEOTOMY;  Surgeon: Cristhian Bailon MD;  [facility];  Service: ENT;  Laterality: N/A;        Medications  Medications Ordered Prior to Encounter[1]    Review of Systems  Review of Systems   Constitutional: Negative.    HENT: Negative.     Eyes: Negative.    Respiratory: Negative.     Cardiovascular: Negative.    Gastrointestinal: Negative.     Genitourinary: Negative.    Musculoskeletal: Negative.    Skin: Negative.    Neurological: Negative.    Psychiatric/Behavioral: Negative.          Social History   reports that he quit smoking about 27 years ago. His smoking use included cigarettes. He started smoking about 47 years ago. He has a 60 pack-year smoking history. He has been exposed to tobacco smoke. He has never used smokeless tobacco. He reports that he does not currently use alcohol. He reports that he does not use drugs.     Family History  Family History   Problem Relation Name Age of Onset    No Known Problems Mother      Esophageal cancer Father      Coronary artery disease Father              Cancer Sister          Liver Cancer -    Diabetes Sister              No Known Problems Sister      No Known Problems Sister      Lung cancer Sister Tessa         Alive    Breast cancer Sister Tessa     Heart disease Sister Mireille     No Known Problems Brother      No Known Problems Brother      Lung cancer Brother mel 60        - was a tobacco user, had lung cancer    Other (Lung cancer) Brother mel     Stroke Brother Ronak             No Known Problems Maternal Aunt      No Known Problems Maternal Uncle      No Known Problems Paternal Aunt      No Known Problems Paternal Uncle      No Known Problems Maternal Grandmother      No Known Problems Maternal Grandfather      No Known Problems Paternal Grandmother      No Known Problems Paternal Grandfather      Glaucoma Cousin      Macular degeneration Neg Hx      Strabismus Neg Hx      Amblyopia Neg Hx      Blindness Neg Hx      Cataracts Neg Hx      Hypertension Neg Hx      Retinal detachment Neg Hx      Thyroid disease Neg Hx      Colon cancer Neg Hx          Physical Exam   Vitals:    25 1355   BP: 124/68    Body mass index is 27.26 kg/m².            GENERAL: no acute distress.  HEAD: normocephalic.   EYES: No scleral icterus  EARS: external ear without lesion, normal  pinna shape and position.  External auditory canal with cerumen impaction bilaterally  NOSE: external nose without significant bony abnormality  ORAL CAVITY/OROPHARYNX: tongue mobile.   NECK: trachea midline.   LYMPH NODES:No cervical lymphadenopathy.  RESPIRATORY: no stridor, no stertor. Voice normal. Respirations nonlabored.  NEURO: alert, responds to questions appropriately.    PSYCH:mood appropriate    PROCEDURE NOTE  NAME OF PROCEDURE: Flexible Laryngoscopy, diagnostic  INDICATIONS: gag reflex precludes mirror exam, history of head and neck cancer- surveillence exam; dysphagia   FINDINGS: Vocal folds with baerly space no abduction     Consent: After procedure was explained in detail and all questions answered, verbal consent was obtained for performing flexible laryngoscopy.  Anesthesia: topical 4% lidocaine and neosynephrine  Procedure: With patient in seated position, the scope was inserted into the bilateral nasal passageway and advanced atraumatically into the nasopharynx to examine the following structures:  Nasal cavity: Turbinates with  hypertrophy. no middle meatal edema. No purulent drainage.   Nasopharynx: no mass or lesion noted in nasopharynx.   Oropharynx: base of tongue without  mass or ulceration. Lingual tonsils normal in appearance  Hypopharynx: posterior pharyngeal wall without mass or lesion. No pooling of secretions. Pyriform sinuses visible without mass or lesion  Larynx: epiglottis normal without lesion. False vocal folds without edema/erythema/lesion. True vocal folds immobile and midline, mucosa without lesion. Mild interarytenoid edema no erythema . Postcricoid region with mild edema no lesion   Subglottis: visualized portion of subglottis normal in appearance    After examination performed, the scope was removed atraumatically . The patient tolerated the procedure well. Photodocumentation obtained with representative images below, all images and/or videos uploaded in media section of  epic.        Procedure Note   Procedure performed:examination of ears with cerumen disimpaction    Indication for procedure: unilateral cerumen impaction     Description of procedure:  After verbal consent was obtained and with the patient in seated position and the operating head otoscope was inserted into the right ear.  Otologic instruments including various size otologic suctions and curette were used to remove the cerumen from the right external auditory canals under visualization with the operating head otoscope. After cleaning, visualization was again performed  of the ear canal, tympanic membrane, ossicles and middle ear space. The same procedure was then repeated for the left ear. Findings as indicated below. All portions of the procedure and examination were tolerated well without complication.     Findings:  Right ear: unable to remove impaction   Left ear: Complete cerumen impaction removed entirely revealing normal external auditory canal; tympanic membrane without bulging, retraction, or perforation; no evidence of middle ear fluid or effusion.     Imaging:  The patient does not have any new imaging of the head and neck since last visit.     Labs:  CBC  Recent Labs   Lab 12/05/24  1634 02/14/25  1058 04/22/25  1015   WBC 8.25 5.38 5.29   Hemoglobin 11.9 L 12.0 L  --    HGB  --   --  11.2 L   Hematocrit 36.9 L 39.1 L  --    HCT  --   --  37.3 L   MCV 84 87 85   Platelet Count  --   --  201   Platelets 379 243  --      BMP  Recent Labs   Lab 11/18/24  0324 11/19/24  0414 11/21/24  2108 12/05/24  1634 02/14/25  1058 04/22/25  1015   Glucose 111 H 123 H 115 H 103 108 78   Sodium 138 140 143 135 L 140 138   Potassium 3.6 3.9 3.9 5.1 4.6 3.8   Chloride 106 106 111 H 100 103 104   CO2 24 25 23 26 30 H 28   BUN 17 20 22 23 26 H 14   Creatinine 0.8 0.8 0.8 0.9 0.8 0.8   Calcium 8.6 L 8.4 L 8.8 9.4 9.5 9.4   Phosphorus 1.8 L 2.2 L 2.2 L  --   --   --    Magnesium 2.3 2.3 2.3  --   --   --      COAGS  Recent Labs    Lab 05/10/23  1210   INR 1.0       Assessment  1. Complication of feeding tube  - Ambulatory referral/consult to General Surgery; Future    2. Tracheostomy tube present  - HME - OTHER    3. History of head and neck radiation    4. Squamous cell cancer of tongue    5. Pharyngoesophageal dysphagia    6. Adverse effect of radiation, subsequent encounter    7. Impacted cerumen of left ear [H61.22]       Plan:  Discussed plan of care with patient in detail and all questions answered. Patient reported understanding of plan of care. I gave the patient the opportunity to ask questions and patient confirmed all questions answered to satisfaction.     Reviewed notes from alphonso kennedy- recommended physical therpay for neck and shoulder exercises- order placed 5-2-25   Will get passy desiree valve  Messaged jessica wilson today about his elbow, contact pcp  per patient     Trach change at f/u no odor, doing better after round of ciprodex     Restart nasal sprays     Debrox right ear now and then 5 days prior to f/u    Tutu contact alphonso about possibly doing fees - cords are together completely with no perceptible motion   Discussed that asensate but cords midline so not much room for anything to go through the cords so I think aspiration risk is likely lower  Gen surg to address gtube concerns . Not blocked   F/u 8 weeks with flex, trach change, ear cleaning               [1]   Current Outpatient Medications on File Prior to Visit   Medication Sig Dispense Refill    acetaminophen (TYLENOL) 650 MG TbSR Take 1 tablet (650 mg total) by mouth every 8 (eight) hours. 120 tablet 0    azelastine (ASTELIN) 137 mcg (0.1 %) nasal spray 1 spray (137 mcg total) by Nasal route 2 (two) times daily. (Patient not taking: Reported on 4/1/2025) 30 mL 3    ciprofloxacin HCl (CILOXAN) 0.3 % ophthalmic solution 4 drops to tracheal tube twice daily x 7 days 10 mL 1    dexAMETHasone (DECADRON) 0.1 % DrpS 4 gtts in tracheal tube twice daily x 7 days 5 mL 1     docusate sodium (STOOL SOFTENER ORAL) Take by mouth as needed.      fluticasone propionate (FLONASE) 50 mcg/actuation nasal spray 1 spray (50 mcg total) by Each Nostril route 2 (two) times daily. 9.9 mL 3    HYDROcodone-acetaminophen (NORCO) 5-325 mg per tablet Take 1 tablet by mouth every 6 (six) hours as needed for Pain. (Patient not taking: Reported on 4/1/2025) 5 tablet 0    losartan (COZAAR) 50 MG tablet Take 1 tablet (50 mg total) by mouth once daily. 90 tablet 3    montelukast (SINGULAIR) 10 mg tablet TAKE 1 TABLET BY MOUTH ONCE DAILY IN THE EVENING 90 tablet 2    multivitamin/iron/folic acid (CENTRUM COMPLETE ORAL) Take 1 tablet by mouth once daily.      oxyCODONE (ROXICODONE) 5 MG immediate release tablet Take 1 tablet (5 mg total) by mouth every 6 (six) hours as needed for Pain (pain refractory to over the counter meds). (Patient not taking: Reported on 4/1/2025) 12 tablet 0    pantoprazole (PROTONIX) 40 MG tablet Take 1 tablet (40 mg total) by mouth once daily. 90 tablet 3    simvastatin (ZOCOR) 40 MG tablet Take 1 tablet (40 mg total) by mouth every evening. 90 tablet 3    tamsulosin (FLOMAX) 0.4 mg Cap Take 1 capsule by mouth once daily 90 capsule 2    white petrolatum (AQUAPHOR ORIGINAL) 41 % Oint Apply topically 2 (two) times a day. Apply to incision lines twice per day (Patient not taking: Reported on 4/1/2025) 396 g 0     No current facility-administered medications on file prior to visit.

## 2025-06-26 NOTE — PATIENT INSTRUCTIONS
Information and instructions from your visit with me today:  Always use saline every time before a medication spray. You can also use saline on its own. If you are using saline and/or the medication sprays on an as needed basis and you have symptoms use the regimen daily for at least 2 weeks. You can use the flonase and astelin together, or if you prefer to start with just one medication spray, the flonase works better by itself compared to astelin by itself. You can try doing the saline and flonase and if still congested, add on the astelin again doing this regimen daily for up to two weeks when congestion. There may be times of the year that you only need saline and there may be times of the year that you need saline, flonase and astelin to control symptoms.     Start using the following medication nasal sprays:   Fluticasone spray:    This medication is a steroid spray. It stays within the nose and does not have absorption into the body that leads to side effects that one has with oral steroid medication. Fluticasone nasal spray is the same as the Flonase brand nasal spray. Discuss with your pharmacist if the price is lower over the counter or with a prescription ( this varies depending on insurance). The medication that is over the counter is the same as the prescription medication. Use this medication as instructed on the prescription, 1-2 sprays on each side of your nose twice daily.     Azelastine  spray:  This medication is an antihistamine used to treat nasal symptoms of allergy, which works specifically in the nose unlike antihistamine pills which have more of an effect on the whole body. Use this medication as instructed on the prescription, 1 spray on each side of your nose twice daily.     Additional instructions for medication sprays  Place the tip of the medication bottle in your nose and aim slightly up and out on each side to get medication high and deep into your nose and sinuses, and not have it  "all deposit in the very front of your nose. Aim the tip of the nozzle towards the outer corner of your eye . You can imagine aiming towards the back of your eyeball on each side for this, as opposed to straight back to the center of your nose and head.     You need to use this medication every day regardless of symptoms, as it takes time ( a few weeks) to work and get the benefits. It does not work on an "as needed" basis like taking a decongestant. If your symptoms only occur in a particular season, then the medication can be used seasonally instead of year long. For seasonal symptoms, you should start using the spray twice daily a month before when you normally have symptoms ( for example, if symptoms start in August, should start at the end of June).     Start nasal irrigations with saline solution- you can either use a rinse or a mist spray:    NASAL SALINE SPRAY ( simply saline and arm and hammer are examples) There are several different brands found in the cold and flu aisle of the pharmacy. You can use any brand of saline spray - this will deliver the saline by a gentle mist ( if you have difficulty or discomfort with nasal rinse/ a lot of fluid in the nose, this will be more comfortable).       Always rinse your nose with saline prior to using medication sprays and wait a couple of hours before using again. You can use the saline throughout the day to help with stuffy nose or dry nose.    Do not use nasal decongestant sprays such as Afrin or similar products long term ( over 3 days) .  This can cause long term physical nasal addiction. Afrin should only be used if having nose bleeds, severe nasal congestion , or severe ear pain/fullness and should not be used for more than 2-3 days in a row . It is a not a medication that should be used for a long period of time.     It was nice meeting you today, and I look forward to helping you feel better soon. Please don't hesitate to call if you have any other " questions or concerns, or if I can be of any assistance in the meantime.      Wendi Quick MD    [facility]     Phone  [phone]    Fax      [phone]        Wendi Quick MD  Otorhinolaryngology

## 2025-06-27 ENCOUNTER — DOCUMENTATION ONLY (OUTPATIENT)
Dept: OTOLARYNGOLOGY | Facility: CLINIC | Age: 85
End: 2025-06-27
Payer: MEDICARE

## 2025-06-27 ENCOUNTER — OFFICE VISIT (OUTPATIENT)
Dept: SURGERY | Facility: CLINIC | Age: 85
End: 2025-06-27
Payer: MEDICARE

## 2025-06-27 VITALS
DIASTOLIC BLOOD PRESSURE: 74 MMHG | BODY MASS INDEX: 27.27 KG/M2 | HEIGHT: 65 IN | HEART RATE: 59 BPM | OXYGEN SATURATION: 97 % | WEIGHT: 163.69 LBS | SYSTOLIC BLOOD PRESSURE: 131 MMHG

## 2025-06-27 DIAGNOSIS — K94.20 COMPLICATION OF FEEDING TUBE: ICD-10-CM

## 2025-06-27 PROCEDURE — 99214 OFFICE O/P EST MOD 30 MIN: CPT | Mod: PBBFAC | Performed by: STUDENT IN AN ORGANIZED HEALTH CARE EDUCATION/TRAINING PROGRAM

## 2025-06-27 PROCEDURE — 99213 OFFICE O/P EST LOW 20 MIN: CPT | Mod: S$PBB,,, | Performed by: STUDENT IN AN ORGANIZED HEALTH CARE EDUCATION/TRAINING PROGRAM

## 2025-06-27 PROCEDURE — 99999 PR PBB SHADOW E&M-EST. PATIENT-LVL IV: CPT | Mod: PBBFAC,,, | Performed by: STUDENT IN AN ORGANIZED HEALTH CARE EDUCATION/TRAINING PROGRAM

## 2025-06-27 NOTE — PROGRESS NOTES
"General Surgery Clinic Note  HPI:  Mr. Paz is a 85 y.o. male who [place] [facility] [place] of gastrostomy tube.  Gastrostomy tube is functioning, able tolerate feeds, and flushes without issue.    At the distal aspect of the tube the are black spots concerning for mold.  Otherwise no other abnormalities.    Physical Exam:  /74 (BP Location: Left arm, Patient Position: Sitting)   Pulse (!) 59   Ht 5' 5" (1.651 m)   Wt 74.2 kg (163 lb 11.1 oz)   SpO2 97%   BMI 27.24 kg/m²   Gen: no acute distress, alert and oriented  Abd: Soft, nontender, nondistended, gastrostomy tube in place    Assessment:   Mr. Paz is a 85 y.o. male who [place] [facility] [place] evaluation of gastrostomy tube.     To prevent invasive removal and placement of new gastrostomy tube, tube was cut proximal to the areas of concern and new distal connection piece applied.    We provided Mr. Paz with contact information to our clinic and he was informed to contact us without hesitation should he have any questions or concerns. All of his questions were answered before leaving clinic.      Plan:  -gastrostomy tube revised    Rodrigo Chavez MD, FACS  Acute Care Surgery & Surgical Critical Care  [facility]    "

## 2025-06-30 ENCOUNTER — EXTERNAL CHRONIC CARE MANAGEMENT (OUTPATIENT)
Dept: PRIMARY CARE CLINIC | Facility: CLINIC | Age: 85
End: 2025-06-30
Payer: MEDICARE

## 2025-06-30 PROCEDURE — 99490 CHRNC CARE MGMT STAFF 1ST 20: CPT | Mod: S$PBB,,, | Performed by: FAMILY MEDICINE

## 2025-06-30 PROCEDURE — 99490 CHRNC CARE MGMT STAFF 1ST 20: CPT | Mod: PBBFAC,PO | Performed by: FAMILY MEDICINE

## 2025-07-03 ENCOUNTER — CLINICAL SUPPORT (OUTPATIENT)
Dept: SPEECH THERAPY | Facility: HOSPITAL | Age: 85
End: 2025-07-03
Payer: MEDICARE

## 2025-07-03 ENCOUNTER — TELEPHONE (OUTPATIENT)
Dept: FAMILY MEDICINE | Facility: CLINIC | Age: 85
End: 2025-07-03
Payer: MEDICARE

## 2025-07-03 DIAGNOSIS — R13.12 DYSPHAGIA, OROPHARYNGEAL: Primary | ICD-10-CM

## 2025-07-03 DIAGNOSIS — Z98.890 S/P PARTIAL GLOSSECTOMY: ICD-10-CM

## 2025-07-03 DIAGNOSIS — Z85.819 HISTORY OF ORAL CANCER: ICD-10-CM

## 2025-07-03 PROCEDURE — 92526 ORAL FUNCTION THERAPY: CPT | Mod: GN | Performed by: SPEECH-LANGUAGE PATHOLOGIST

## 2025-07-03 NOTE — TELEPHONE ENCOUNTER
----- Message from MTATY Hector sent at 7/2/2025  8:32 PM CDT -----  Please let him know:     Your blood counts are improving. The rest of your lab work looks good. Please let me know if you have any questions or concerns.      Thank you!  ALYCE Tuttle    ----- Message -----  From: [facility], System Message  Sent: 7/1/2025  11:05 PM CDT  To: MATTY Jarrell  Subject: Notification of Unviewed Test Results            OBLA REICH has not viewed the following results:  - COMPREHENSIVE METABOLIC PANEL  - CBC WITH DIFFERENTIAL

## 2025-07-03 NOTE — PROGRESS NOTES
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  Instructed him to continue these 3-4x/day in sets limited by fatigue -- when he tires, stop.   Also counseled that I would also like him to pursue PT in case any contribution of improved posture and ROM re: neck/shoulders may contribute to improved swallowing function.  Would like to see him again in 2 months, or earlier if he thinks he is improving in ability to initiate a swallow.  He was disappointed, but verbalized understanding.    Today, Mr. Paz reported he received a PMSV from Dr. Quick about 2 days ago.  Stated that he could swallow better with it and his family can understand him better.  He was previously using a trach cap, but feels he can breathe better with the PMSV.      He reported that he had continued swallowing home program with teaspoons of water and judged that he could only do a couple before having to stop and remove the trach cap.  In the short time he has had the PMSV, he judges that he can do 5-6 swallows before having to stop to clear mucous.  No new pulmonary issues.    He reported that he is doing his swallow home program 3-4x/day as instructed.   He is also participating in PT 2x/week and feels it has helped his posture and his swallowing.      INTERVENTION:  Short-term objectives:  Mr. Paz will perform the following with % consistency/accuracy .  1.  Swallowing:  A.  Demonstrate the following swallowing exercises to clinician:  Effortful swallow  Denied that he was utilizing  this in home program. Demonstrated that he could.  Instructed him to perform each swallow in this manner.  Supraglottic swallow  Continues with mixed success today.  Sounds of air moving between swallow attempts. Needed 2-4 cycles to  all material had been cleared.    B.  Perform home program until fatigued 3-4x/daily.   Being met re: # of times per day.  Working until fatigue may be revisited with improved tolerance with PMSV.    C.  Perform oral cleaning prior to any PO trials.   Previously instructed.  He has been trying to wear his upper plate.  Can tolerate 5-6 hours.    D.  Advance diet  5-mL plain water  6 trials. Halted after he reported fatigue..  2.   7.5-mL plain water  3.   5-mL nectar-thick liquid  4.   3- to 5-mL thin puree  5.   3- to 5-mL minced/moist solid    E.  Wear trach cap during all swallows, with and without nutrition (may remove in between).   Now with PMSV.  Instructed to wear it during all waking hours.  Reviewed how to attach tether.    2.  Oral motor -- deferred direct this date              A.  Protrude tongue tip to external border of lower lip.    Barely able to contact interior border of lower lip.              B.  Lateralize tongue tip to L and R oral commissures.    Able to approximate R oral commissure, but not L.  Achieved about midline when attempting L.              C.  Elevate tongue tip to alveolar ridge.    Approximated, but did not contact.  Getting slightly greater elevation with more of a retraction movement.              D.  Elevate tongue dorsum to velum.     Approximated, but did not contact.              E.  Trace mandibular and maxillary arches with tongue tip; hold 5 seconds at greatest excursion achieved.    Deferred     3.  Neck stretches/massage   Working with PT.  Complained of numbness in L ear and face, but endorsed that it is a smaller area than it has been.  Still less than a year s/p surgery.  Advised that it may yet reduce in area more.    4.  Speech:  " Produced target phonemes in isolation, words, phrases, sentences, and conversational speech.  Targets include, but are not limited to /t/, /d/, /k/, /g/, /s/, z/, "sh,", "ch," "j," /r/, "er" and its variants, and blends associated with these phonemes.   Did not address specifically, but noted that with PMSV, his speech was more intelligible.  Suspect he better tolerance of this device for breathing allows for better coordination for speech.    Home program:  Instructed Mr. Paz to continue with the frequency of practice, to be sure to swallow hard every time, and to practice until fatigued.  He will also continue to wear the PMSV during all waking hours and to participate in PT and do home program for that as well.       IMPRESSIONS:   This 84 y.o. man appears to present with  1.  Per 2/24/25 MBSS, significant oropharyngeal dysphagia characterized by reduced lingual ROM, reduced bolus control (per some pooling in the oral cavity), premature spillage to the valleculae, apparent velopharyngeal dysfunction, reduced pharyngeal contraction, reduced hyolaryngeal elevation/excursion, incomplete epiglottic inversion, incomplete and/or inconsistent laryngeal vestibule closure, and inconsistent tolerance of trach capping during swallowing resulting in laryngeal penetration to the TVFs with no spontaneous cough response.  Risk of aspiration is considered high at this time.  The dysphagia associated with the hemiglossectomy is compounded by now dual sources of reduced pressures related to inconsistent tolerance of trach capping during swallowing and velopharyngeal dysfunction.  2.  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 Bailon (DL, 1/24/25).  3.  PEG dependent.  4.  Trach dependent.  Now with PMSV per Dr. Quick.  5.  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  6.  History  SCC of the L oral tongue.  7.  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  8.  History mild-moderate dysphagia per history s/p #9.  Took a minced and moist diet with thin liquids per report.  Cannot rule out late RAD at this point in his course following #9.  9.  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        RECOMMENDATIONS/PLAN OF CARE:   It is felt that Mr. Paz would benefit from  1.  Remaining NPO for his primary nutrition, hydration, and medication delivery.  2.  Continue ST for now in two weeks; will determine readiness for repeat MBSS at that point..  3.  Continue PT with home program.  4.  Continued f/u with Marcos Quick and Uvaldo as directed.          Long-term goals:  Mr. Paz will be able to   Consume at least one consistency for pleasure with no to limited signs/symptoms of aspiration.  Not met at this time.  Have improved speech intelligibility.  Improved with slower rate and use of cap to % intelligibility depending on targets.

## 2025-07-03 NOTE — Clinical Note
Anusha Phillips -- I am sorry that I did not  on the difference a PMSV might make for Mr. Paz and am glad you did!  The family can understand him better and he thinks he is swallowing better.  I'm guessing it has to do with his improved tolerance of it for breathing and that that is allowing him to coordinate breathing better with talking and swallowing (but correct me if otherwise).  Since he just got it and hasn't been swallowing hard (although he did much better at following home program since I last saw him), I want to see him again first, but I suspect we'll need a MBSS.  In his case, that will let me compare apples to apples and see the whole mechanism ( port, pharynx, and airway) at the same time.  I'll keep you posted.  Thanks Soraida

## 2025-07-16 ENCOUNTER — TELEPHONE (OUTPATIENT)
Dept: SPEECH THERAPY | Facility: HOSPITAL | Age: 85
End: 2025-07-16
Payer: MEDICARE

## 2025-07-24 ENCOUNTER — CLINICAL SUPPORT (OUTPATIENT)
Dept: SPEECH THERAPY | Facility: HOSPITAL | Age: 85
End: 2025-07-24
Payer: MEDICARE

## 2025-07-24 DIAGNOSIS — Z85.819 HISTORY OF ORAL CANCER: ICD-10-CM

## 2025-07-24 DIAGNOSIS — R13.12 DYSPHAGIA, OROPHARYNGEAL: Primary | ICD-10-CM

## 2025-07-24 DIAGNOSIS — Z92.3 HISTORY OF HEAD AND NECK RADIATION: ICD-10-CM

## 2025-07-24 DIAGNOSIS — Z98.890 S/P PARTIAL GLOSSECTOMY: ICD-10-CM

## 2025-07-24 PROCEDURE — 92526 ORAL FUNCTION THERAPY: CPT | Mod: GN | Performed by: SPEECH-LANGUAGE PATHOLOGIST

## 2025-07-24 NOTE — PROGRESS NOTES
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  Reported that he wears it 2-3 h, 3-4x/day. Reported that it clogs up with mucous.  Also reported mucous in GI track that makes swallowing a challenge, but reported that he did 3-4 sets of  up to 8-9 swallows per.  He reported feeling that his swallow was better -- especially when he had less mucous.  He had used Diet Sprite to swish and spit for a time, but had stopped.  Recently recalled it had been helpful and plans to resume.    7/3/25:  At his last visit, 5/1/25, counseled that based on current status, recommended continued home program using Toothettes to moisten mouth then execute dry swallows, but discontinued trials of thin liquids.  Instructed him to continue these 3-4x/day in sets limited by fatigue -- when he tires, stop.   Also counseled that I would also like him to pursue PT in case any contribution of improved posture and ROM re: neck/shoulders may contribute to improved swallowing function.  Would like to see him again in 2 months, or earlier if he thinks he is improving in ability to initiate a swallow.  He was disappointed, but verbalized understanding.    Today, Mr. Paz reported he received a PMSV from Dr. Quick about 2 days ago.  Stated that he could swallow better with it and his family can understand him better.  He was previously using a trach cap, but feels he can breathe better with the PMSV.      He reported that he had continued swallowing home program with teaspoons of water and judged that he could only do a couple before having to stop and remove the trach cap.  In the short time he has had the PMSV, he judges that he can do 5-6 swallows before having to  stop to clear mucous.  No new pulmonary issues.    He reported that he is doing his swallow home program 3-4x/day as instructed.   He is also participating in PT 2x/week and feels it has helped his posture and his swallowing.      INTERVENTION:  Mucous management:  Airway:  Stated that he rarely suctions, but uses a breathing treatment 2-4x/day.  Uses sterile water or saline in the trach 2-3x/day.  Takes Mucinex once every 1-2 days.  Using cool mist humidifier.  Since he is not tolerating the PMSV throughout waking hours, engaged in trial with filter made for trach.  Trials TrachPhone in office and he tolerated it well.  Sent him home with cassette adapter and box of HMEs b/c had that in clinic and would like to see if consistent use of HMEs will improve mucous.  Will complete prescription for TrachPhone for Dr. Bailon's signature to see if insurance will cover as he did like that device.  Mrs. Paz had question re: best medication to address mucous; recommended asking Dr. Quick or PCP.  Digestive track:  Had him swish and spit with Diet Sprite.  He will continue this in home program prior to swallowing practice.    Short-term objectives:  Mr. Paz will perform the following with % consistency/accuracy .  1.  Swallowing:    A.  Demonstrate the following swallowing exercises to clinician:  Effortful swallow  Utilizing more consistently.  Supraglottic swallow  Continues with mixed success today.  Sounds of air moving between swallow attempts. Needed 2-4 cycles to  all material had been cleared.  About 25% of swallows today were executed in a timely manner.  After two trials in a row included real (vs volitional) coughs, halted trials.  He completed 8.   Today, used Trachphone with digital depression during swallow.  Instructed him to use PMSV during swallowing trials for best pressures, but to use TrachPhone or HME cassette adapter/HME the rest of the time.    B.  Perform home program until  "fatigued 3-4x/daily.   Being met re: # of times per day.  Working until fatigue or sloppiness (2 trials with coughing) occur.    C.  Perform oral cleaning prior to any PO trials.   Previously instructed.  He has been trying to wear his upper plate.  Can tolerate 5-6 hours.    D.  Advance diet  5-mL plain water  8 trials. Halted after 2 trials in a row with spontaneous coughing.  2.   7.5-mL plain water  3.   5-mL nectar-thick liquid  4.   3- to 5-mL thin puree  5.   3- to 5-mL minced/moist solid    E.  Wear trach cap during all swallows, with and without nutrition (may remove in between).   Now with PMSV but with suboptimal tolerance.  Instructed him to wear it during swallowing trials, but to wear the filter devices the rest of the time..      2.  Oral motor -- deferred direct this date              A.  Protrude tongue tip to external border of lower lip.    Barely able to contact interior border of lower lip.              B.  Lateralize tongue tip to L and R oral commissures.    Able to approximate R oral commissure, but not L.  Achieved about midline when attempting L.              C.  Elevate tongue tip to alveolar ridge.    Approximated, but did not contact.  Getting slightly greater elevation with more of a retraction movement.              D.  Elevate tongue dorsum to velum.     Approximated, but did not contact.              E.  Trace mandibular and maxillary arches with tongue tip; hold 5 seconds at greatest excursion achieved.    Deferred     3.  Neck stretches/massage   Working with PT.      4.  Speech:  Produced target phonemes in isolation, words, phrases, sentences, and conversational speech.  Targets include, but are not limited to /t/, /d/, /k/, /g/, /s/, z/, "sh,", "ch," "j," /r/, "er" and its variants, and blends associated with these phonemes.   Did not address specifically, but noted that with PMSV in the past and with TrachPhone today, his speech was more intelligible.      Home " program:  Instructed Mr. Paz to continue with the frequency of practice, to be sure to swallow hard every time, and to practice until fatigued or he has 2 trials in a row with spontaneous coughing..  .       IMPRESSIONS:   This 84 y.o. man appears to present with  1.  Per 2/24/25 MBSS, significant oropharyngeal dysphagia characterized by reduced lingual ROM, reduced bolus control (per some pooling in the oral cavity), premature spillage to the valleculae, apparent velopharyngeal dysfunction, reduced pharyngeal contraction, reduced hyolaryngeal elevation/excursion, incomplete epiglottic inversion, incomplete and/or inconsistent laryngeal vestibule closure, and inconsistent tolerance of trach capping during swallowing resulting in laryngeal penetration to the TVFs with no spontaneous cough response.  Risk of aspiration is considered high at this time.  The dysphagia associated with the hemiglossectomy is compounded by now dual sources of reduced pressures related to inconsistent tolerance of trach capping during swallowing and velopharyngeal dysfunction.  2.  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 Bailon (DL, 1/24/25).  3.  PEG dependent.  4.  Trach dependent.  Now with PMSV per Dr. Quick.  5.  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  6.  History  SCC of the L oral tongue.  7.  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  8.  History mild-moderate dysphagia per history s/p #9.  Took a minced and moist diet with thin liquids per report.  Cannot rule out late RAD at this point in his course following #9.  9.  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        RECOMMENDATIONS/PLAN OF CARE:   It is felt that Mr. Paz would benefit from  1.  Remaining NPO for his primary nutrition, hydration, and medication delivery.  2.  Continue ST for now in two weeks;  will determine readiness for repeat MBSS at that point..  3.  Continue PT with home program.  4.  Continued f/u with Marcos Quick and Uvaldo as directed.          Long-term goals:  Mr. Paz will be able to   Consume at least one consistency for pleasure with no to limited signs/symptoms of aspiration.  Not met at this time.  Have improved speech intelligibility.  Improved with slower rate and use of cap to % intelligibility depending on targets.

## 2025-07-25 ENCOUNTER — PATIENT MESSAGE (OUTPATIENT)
Dept: OTOLARYNGOLOGY | Facility: CLINIC | Age: 85
End: 2025-07-25
Payer: MEDICARE

## 2025-07-31 ENCOUNTER — EXTERNAL CHRONIC CARE MANAGEMENT (OUTPATIENT)
Dept: PRIMARY CARE CLINIC | Facility: CLINIC | Age: 85
End: 2025-07-31
Payer: MEDICARE

## 2025-07-31 PROCEDURE — 99490 CHRNC CARE MGMT STAFF 1ST 20: CPT | Mod: PBBFAC,PO | Performed by: FAMILY MEDICINE

## 2025-08-07 ENCOUNTER — CLINICAL SUPPORT (OUTPATIENT)
Dept: SPEECH THERAPY | Facility: HOSPITAL | Age: 85
End: 2025-08-07
Payer: MEDICARE

## 2025-08-07 DIAGNOSIS — Z85.819 HISTORY OF ORAL CANCER: ICD-10-CM

## 2025-08-07 DIAGNOSIS — Z98.890 S/P PARTIAL GLOSSECTOMY: ICD-10-CM

## 2025-08-07 DIAGNOSIS — Z92.3 HISTORY OF HEAD AND NECK RADIATION: ICD-10-CM

## 2025-08-07 DIAGNOSIS — R13.12 DYSPHAGIA, OROPHARYNGEAL: Primary | ICD-10-CM

## 2025-08-07 PROCEDURE — 92526 ORAL FUNCTION THERAPY: CPT | Mod: GN | Performed by: SPEECH-LANGUAGE PATHOLOGIST

## 2025-08-07 NOTE — PROGRESS NOTES
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  Tolerating it well and speech is clearer with it in place.    Doing home program 4-5x/day and reported he is up to 10-12 swallows per session.  Consumes 2-3 oz plain water per day.  Denied changes in pulmonary status.   Reported that he does a breathing treatment prior to trials of swallowing and this has helped.    7/24/25:  Forgot PMSV at home.  Reported that he wears it 2-3 h, 3-4x/day. Reported that it clogs up with mucous.  Also reported mucous in GI track that makes swallowing a challenge, but reported that he did 3-4 sets of  up to 8-9 swallows per.  He reported feeling that his swallow was better -- especially when he had less mucous.  He had used Diet Sprite to swish and spit for a time, but had stopped.  Recently recalled it had been helpful and plans to resume.    7/3/25:  At his last visit, 5/1/25, counseled that based on current status, recommended continued home program using Toothettes to moisten mouth then execute dry swallows, but discontinued trials of thin liquids.  Instructed him to continue these 3-4x/day in sets limited by fatigue -- when he tires, stop.   Also counseled that I would also like him to pursue PT in case any contribution of improved posture and ROM re: neck/shoulders may contribute to improved swallowing function.  Would like to see him again in 2 months, or earlier if he thinks he is improving in ability to initiate a swallow.  He was disappointed, but verbalized understanding.    Today, Mr. Paz reported he received a PMSV from Dr. Quick about 2 days ago.  Stated that he could swallow  better with it and his family can understand him better.  He was previously using a trach cap, but feels he can breathe better with the PMSV.      He reported that he had continued swallowing home program with teaspoons of water and judged that he could only do a couple before having to stop and remove the trach cap.  In the short time he has had the PMSV, he judges that he can do 5-6 swallows before having to stop to clear mucous.  No new pulmonary issues.    He reported that he is doing his swallow home program 3-4x/day as instructed.   He is also participating in PT 2x/week and feels it has helped his posture and his swallowing.      INTERVENTION:  Mucous management per report 7/24/25:  Airway:  Stated that he rarely suctions, but uses a breathing treatment 2-4x/day.  Uses sterile water or saline in the trach 2-3x/day.  Takes Mucinex once every 1-2 days.  Using cool mist humidifier.  Since he is not tolerating the PMSV throughout waking hours, engaged in trial with filter made for trach.  Trials TrachPhone in office and he tolerated it well.  Sent him home with cassette adapter and box of HMEs b/c had that in clinic and would like to see if consistent use of HMEs will improve mucous.  Will complete prescription for TrachPhone for Dr. Bailon's signature to see if insurance will cover as he did like that device.  Mrs. Paz had question re: best medication to address mucous; recommended asking Dr. Quick or PCP.  Digestive track:  Had him swish and spit with Diet Sprite.  He will continue this in home program prior to swallowing practice.    He thinks he forgot to do the breathing treatment prior to coming to visit today.    Short-term objectives:  Mr. Paz will perform the following with % consistency/accuracy .  1.  Swallowing:    A.  Demonstrate the following swallowing exercises to clinician:  Effortful swallow  Utilizing more consistently.  Supraglottic swallow  Continues with mixed success  today.  Sounds of air moving between swallow attempts, but did appear to execute swallows a bit more quickly today.. Needed 2-4 cycles to  all material had been cleared.  About 35% of swallows today were executed in a timely manner.  After fatigue, halted trials.  He completed 7.   He verbalized that he did not feel he was swallowing as well today as he has been and it felt like things were sticking (indicated level of valleculae).  Was observed to be having more anterior loss than usual, even after primary bolus seemed to have been swallowed.     B.  Perform home program until fatigued 3-4x/daily.   Being met re: # of times per day.  Working until fatigue or sloppiness (2 trials with coughing) occur.    C.  Perform oral cleaning prior to any PO trials.   Previously instructed.  He has been trying to wear his upper plate.  Can tolerate 5-6 hours.    D.  Advance diet  5-mL plain water  7 trials. Halted after fatigue.  2.   7.5-mL plain water  3.   5-mL nectar-thick liquid  4.   3- to 5-mL thin puree  5.   3- to 5-mL minced/moist solid    E.  Wear trach cap during all swallows, with and without nutrition (may remove in between).   Now with PMSV ..      2.  Oral motor --               A.  Protrude tongue tip to external border of lower lip.    Able to contact interior border of lower lip.              B.  Lateralize tongue tip to L and R oral commissures.    Able to approximate R oral commissure, but not L.  Achieved about midline when attempting L.              C.  Elevate tongue tip to alveolar ridge.    Approximated, but did not contact.  Getting slightly greater elevation with more of a retraction movement.              D.  Elevate tongue dorsum to velum.     Approximated, but did not contact.              E.  Trace mandibular and maxillary arches with tongue tip; hold 5 seconds at greatest excursion achieved.    Deferred     3.  Neck stretches/massage   Completed intervention with PT.  Pt and family feel  "posture improved    4.  Speech:  Produced target phonemes in isolation, words, phrases, sentences, and conversational speech.  Targets include, but are not limited to /t/, /d/, /k/, /g/, /s/, z/, "sh,", "ch," "j," /r/, "er" and its variants, and blends associated with these phonemes.   Did not address specifically, but noted that with PMSV, his speech was more intelligible.      Home program:  Instructed Mr. Paz to continue with the frequency of practice, to be sure to swallow hard every time, and to practice until fatigued or he has 2 trials in a row with spontaneous coughing..  .       IMPRESSIONS:   This 84 y.o. man appears to present with  1.  Per 2/24/25 MBSS, significant oropharyngeal dysphagia characterized by reduced lingual ROM, reduced bolus control (per some pooling in the oral cavity), premature spillage to the valleculae, apparent velopharyngeal dysfunction, reduced pharyngeal contraction, reduced hyolaryngeal elevation/excursion, incomplete epiglottic inversion, incomplete and/or inconsistent laryngeal vestibule closure, and inconsistent tolerance of trach capping during swallowing resulting in laryngeal penetration to the TVFs with no spontaneous cough response.  Risk of aspiration is considered high at this time.  The dysphagia associated with the hemiglossectomy is compounded by now dual sources of reduced pressures related to inconsistent tolerance of trach capping during swallowing and velopharyngeal dysfunction.  2.  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 Bailon (DL, 1/24/25).  3.  PEG dependent.  4.  Trach dependent.  Now with PMSV per Dr. Quick.  5.  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  6.  History  SCC of the L oral tongue.  7.  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  8.  History mild-moderate dysphagia per history s/p #9.  Took a minced and moist diet with thin liquids per " report.  Cannot rule out late RAD at this point in his course following #9.  9.  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        RECOMMENDATIONS/PLAN OF CARE:   It is felt that Mr. Paz would benefit from  1.  Remaining NPO for his primary nutrition, hydration, and medication delivery.  2.  Continue ST for now in two weeks; Feel this is a good juncture to repeat MBSS and will request of Dr. Quick..  3.  Continued f/u with Marcos Quick and Uvaldo as directed.          Long-term goals:  Mr. Paz will be able to   Consume at least one consistency for pleasure with no to limited signs/symptoms of aspiration.  Not met at this time.  Have improved speech intelligibility.  Improved with slower rate and use of cap to % intelligibility depending on targets.

## 2025-08-07 NOTE — Clinical Note
Anusha Phillips -- I'd like to repeat Mr. Paz's MBSS.  The last was in February.  This has been a slow slog, but after I gave him a hiatus due to limited compliance, he got serious and I do think he's made some slight improvement.  Would you be willing to put in orders for a MBSS? Thanks. Soraida

## 2025-08-09 DIAGNOSIS — Z93.0 TRACHEOSTOMY TUBE PRESENT: ICD-10-CM

## 2025-08-16 DIAGNOSIS — E78.5 HYPERLIPIDEMIA, UNSPECIFIED HYPERLIPIDEMIA TYPE: ICD-10-CM

## 2025-08-18 RX ORDER — SIMVASTATIN 40 MG/1
40 TABLET, FILM COATED ORAL NIGHTLY
Qty: 90 TABLET | Refills: 0 | Status: SHIPPED | OUTPATIENT
Start: 2025-08-18

## 2025-08-19 RX ORDER — DEXAMETHASONE SODIUM PHOSPHATE 1 MG/ML
SOLUTION/ DROPS OPHTHALMIC
Qty: 5 ML | Refills: 0 | Status: SHIPPED | OUTPATIENT
Start: 2025-08-19

## 2025-08-19 RX ORDER — CIPROFLOXACIN HYDROCHLORIDE 3 MG/ML
SOLUTION/ DROPS OPHTHALMIC
Qty: 10 ML | Refills: 0 | Status: SHIPPED | OUTPATIENT
Start: 2025-08-19

## 2025-08-21 ENCOUNTER — CLINICAL SUPPORT (OUTPATIENT)
Dept: SPEECH THERAPY | Facility: HOSPITAL | Age: 85
End: 2025-08-21
Payer: MEDICARE

## 2025-08-21 DIAGNOSIS — C02.9 SQUAMOUS CELL CARCINOMA OF TONGUE: Primary | ICD-10-CM

## 2025-08-21 DIAGNOSIS — Z98.890 S/P PARTIAL GLOSSECTOMY: ICD-10-CM

## 2025-08-21 DIAGNOSIS — R13.12 DYSPHAGIA, OROPHARYNGEAL: Primary | ICD-10-CM

## 2025-08-21 PROCEDURE — 92526 ORAL FUNCTION THERAPY: CPT | Mod: GN | Performed by: SPEECH-LANGUAGE PATHOLOGIST

## 2025-08-26 ENCOUNTER — HOSPITAL ENCOUNTER (OUTPATIENT)
Dept: RADIOLOGY | Facility: HOSPITAL | Age: 85
Discharge: HOME | End: 2025-08-26
Attending: OTOLARYNGOLOGY
Payer: MEDICARE

## 2025-08-26 ENCOUNTER — CLINICAL SUPPORT (OUTPATIENT)
Dept: SPEECH THERAPY | Facility: HOSPITAL | Age: 85
End: 2025-08-26
Attending: OTOLARYNGOLOGY
Payer: MEDICARE

## 2025-08-26 DIAGNOSIS — R13.12 DYSPHAGIA, OROPHARYNGEAL: Primary | ICD-10-CM

## 2025-08-26 DIAGNOSIS — C02.9 SQUAMOUS CELL CARCINOMA OF TONGUE: ICD-10-CM

## 2025-08-26 DIAGNOSIS — Z98.890 S/P PARTIAL GLOSSECTOMY: ICD-10-CM

## 2025-08-26 DIAGNOSIS — Z92.3 HISTORY OF HEAD AND NECK RADIATION: ICD-10-CM

## 2025-08-26 PROCEDURE — 74230 X-RAY XM SWLNG FUNCJ C+: CPT | Mod: 26,,, | Performed by: STUDENT IN AN ORGANIZED HEALTH CARE EDUCATION/TRAINING PROGRAM

## 2025-08-26 PROCEDURE — 92611 MOTION FLUOROSCOPY/SWALLOW: CPT | Mod: GN | Performed by: SPEECH-LANGUAGE PATHOLOGIST

## 2025-08-26 PROCEDURE — A9698 NON-RAD CONTRAST MATERIALNOC: HCPCS | Performed by: OTOLARYNGOLOGY

## 2025-08-26 PROCEDURE — 25500020 PHARM REV CODE 255: Performed by: OTOLARYNGOLOGY

## 2025-08-26 PROCEDURE — 74230 X-RAY XM SWLNG FUNCJ C+: CPT | Mod: TC,FY

## 2025-08-26 RX ADMIN — BARIUM SULFATE 20 ML: 0.81 POWDER, FOR SUSPENSION ORAL at 02:08

## 2025-08-27 ENCOUNTER — TELEPHONE (OUTPATIENT)
Dept: SPEECH THERAPY | Facility: HOSPITAL | Age: 85
End: 2025-08-27
Payer: MEDICARE

## 2025-08-28 ENCOUNTER — OFFICE VISIT (OUTPATIENT)
Dept: OTOLARYNGOLOGY | Facility: CLINIC | Age: 85
End: 2025-08-28
Payer: MEDICARE

## 2025-08-28 ENCOUNTER — PATIENT MESSAGE (OUTPATIENT)
Dept: SURGERY | Facility: CLINIC | Age: 85
End: 2025-08-28
Payer: MEDICARE

## 2025-08-28 ENCOUNTER — CLINICAL SUPPORT (OUTPATIENT)
Dept: AUDIOLOGY | Facility: CLINIC | Age: 85
End: 2025-08-28
Payer: MEDICARE

## 2025-08-28 DIAGNOSIS — H90.A22 SENSORINEURAL HEARING LOSS (SNHL) OF LEFT EAR WITH RESTRICTED HEARING OF RIGHT EAR: ICD-10-CM

## 2025-08-28 DIAGNOSIS — H90.A31 MIXED CONDUCTIVE AND SENSORINEURAL HEARING LOSS OF RIGHT EAR WITH RESTRICTED HEARING OF LEFT EAR: Primary | ICD-10-CM

## (undated) DEVICE — GOWN SURGICAL X-LARGE

## (undated) DEVICE — SUT SILK 3-0 SH 18IN BLACK

## (undated) DEVICE — SUT VICRYL PLUS 0 CT1 18IN

## (undated) DEVICE — ELECTRODE REM PLYHSV RETURN 9

## (undated) DEVICE — PULSAVAC ZIMMER

## (undated) DEVICE — SUT 2-0 SILK 30IN BLK BRAID

## (undated) DEVICE — ELECTRODE BLD 1 INCH TEFLON

## (undated) DEVICE — LUBRICANT SURGILUBE 2 OZ

## (undated) DEVICE — CONTAINER SPECIMEN STRL 4OZ

## (undated) DEVICE — SUT VICRYL 3-0 27 SH

## (undated) DEVICE — LAVAGE WOUND BACTISURE 1L BAG

## (undated) DEVICE — Device

## (undated) DEVICE — MASK FLYTE HOOD PEEL AWAY

## (undated) DEVICE — TRAY MINOR GEN SURG OMC

## (undated) DEVICE — SUT VICRYL PLUS 2-0 CT1 18

## (undated) DEVICE — PAD COLD THERAPY KNEE WRAP ON

## (undated) DEVICE — SEE MEDLINE ITEM 146271

## (undated) DEVICE — DRAPE STERI INSTRUMENT 1018

## (undated) DEVICE — TUBING SUC UNIV W/CONN 12FT

## (undated) DEVICE — SUT PDS II 2-0 CT1

## (undated) DEVICE — SUT LIGACLIP SMALL XTRA

## (undated) DEVICE — TOURNIQUET SB QC DP 34X4IN

## (undated) DEVICE — DRESSING AQUACEL AG ADV 3.5X12

## (undated) DEVICE — PADDING CAST SPECIALIST 6X4YD

## (undated) DEVICE — SUT VICRYL+ 1 CT1 18IN

## (undated) DEVICE — SET CEMENT (SCULP)

## (undated) DEVICE — KIT TOTAL KNEE TKOFG OMC

## (undated) DEVICE — SOL IRR NACL .9% 3000ML

## (undated) DEVICE — PUMP COLD THERAPY

## (undated) DEVICE — SUT SILK 2-0 SH 18IN BLACK

## (undated) DEVICE — HOOK LONE STAR BLUNT 12MM

## (undated) DEVICE — GOWN SMARTGOWN 3XL XLONG

## (undated) DEVICE — KIT IRR SUCTION HND PIECE

## (undated) DEVICE — SEE MEDLINE ITEM 146298

## (undated) DEVICE — ELECTRODE BLADE INSULATED 1 IN

## (undated) DEVICE — SUT 1 27IN PDS II VIO MONO

## (undated) DEVICE — SEE MEDLINE ITEM 152515

## (undated) DEVICE — TOWEL OR DISP STRL BLUE 4/PK

## (undated) DEVICE — TRAY SKIN SCRUB WET PREMIUM

## (undated) DEVICE — SOL BETADINE 5%

## (undated) DEVICE — ADHESIVE DERMABOND ADVANCED

## (undated) DEVICE — TAPE SURG DURAPORE 2 X10YD

## (undated) DEVICE — BOWL STERILE LARGE 32OZ

## (undated) DEVICE — KIT PEG PULL STANDARD 20F

## (undated) DEVICE — DRAPE EENT SPLIT STERILE

## (undated) DEVICE — SUT SILK 3-0 RB-1 30IN BLK

## (undated) DEVICE — ALCOHOL 70% ISOP W/GREEN 16OZ

## (undated) DEVICE — BLADE SAGITTAL 18 X 1.27 X 90M

## (undated) DEVICE — GAUZE SPONGE 4X4 12PLY

## (undated) DEVICE — TUBE SUCTION YANKAUER

## (undated) DEVICE — SEE MEDLINE ITEM 157131

## (undated) DEVICE — TRACH TUBE CUFF FLEX DISP 7.5

## (undated) DEVICE — CORD BIPOLAR 12 FOOT

## (undated) DEVICE — KIT TOTAL KNEE TKOFG

## (undated) DEVICE — HOLDER TRACH TUBE NECKBAND

## (undated) DEVICE — HOOD T7 W/ PEEL AWAY LENS

## (undated) DEVICE — DRAPE T EXTRM SURG 121X128X90

## (undated) DEVICE — DRAPE INCISE IOBAN 2 23X33IN

## (undated) DEVICE — WRAP KNEE DURA*SOFT W/2 INSERT

## (undated) DEVICE — GAUZE SPONGE PEANUT STRL

## (undated) DEVICE — DRAPE THREE-QUARTER 53X77IN

## (undated) DEVICE — CLIP MED TICALL

## (undated) DEVICE — NDL HYPO REG 25G X 1 1/2

## (undated) DEVICE — BRACE WRIST FOREARM L

## (undated) DEVICE — CONTAINER SPECIMEN OR STER 4OZ

## (undated) DEVICE — TRAY ENT 4/CS

## (undated) DEVICE — KIT ANTIFOG W/SPONG & FLUID

## (undated) DEVICE — BAG DRAIN ANTI REFLUX 2000ML

## (undated) DEVICE — BOWL CEMENT

## (undated) DEVICE — BLADE SAG 13.0 X1.27X90